# Patient Record
Sex: FEMALE | Race: BLACK OR AFRICAN AMERICAN | NOT HISPANIC OR LATINO | Employment: OTHER | URBAN - METROPOLITAN AREA
[De-identification: names, ages, dates, MRNs, and addresses within clinical notes are randomized per-mention and may not be internally consistent; named-entity substitution may affect disease eponyms.]

---

## 2017-02-26 DIAGNOSIS — I10 ESSENTIAL HYPERTENSION: ICD-10-CM

## 2017-02-27 RX ORDER — AMLODIPINE AND BENAZEPRIL HYDROCHLORIDE 5; 40 MG/1; MG/1
CAPSULE ORAL
Qty: 90 CAPSULE | Refills: 0 | Status: SHIPPED | OUTPATIENT
Start: 2017-02-27 | End: 2017-11-27 | Stop reason: SDUPTHER

## 2017-03-15 ENCOUNTER — CLINICAL SUPPORT (OUTPATIENT)
Dept: OTHER | Facility: CLINIC | Age: 67
End: 2017-03-15
Payer: COMMERCIAL

## 2017-03-15 VITALS
WEIGHT: 183 LBS | SYSTOLIC BLOOD PRESSURE: 130 MMHG | HEIGHT: 61 IN | DIASTOLIC BLOOD PRESSURE: 80 MMHG | BODY MASS INDEX: 34.55 KG/M2

## 2017-03-15 DIAGNOSIS — Z00.8 HEALTH EXAMINATION IN POPULATION SURVEYS: Primary | ICD-10-CM

## 2017-03-15 LAB
GLUCOSE SERPL-MCNC: NORMAL MG/DL (ref 60–140)
POC CHOLESTEROL, HDL: 62 MG/DL (ref 40–?)
POC CHOLESTEROL, LDL: 84 MG/DL (ref ?–160)
POC CHOLESTEROL, TOTAL: 174 MG/DL (ref ?–240)
POC GLUCOSE FASTING: 83 MG/DL (ref 60–110)
POC TOTAL CHOLESTEROL / HDL RATIO: 2.8 (ref ?–6)
POC TRIGLYCERIDES: 141 MG/DL (ref ?–160)

## 2017-03-15 PROCEDURE — 99401 PREV MED CNSL INDIV APPRX 15: CPT | Mod: S$GLB,,, | Performed by: INTERNAL MEDICINE

## 2017-03-15 PROCEDURE — 80061 LIPID PANEL: CPT | Mod: QW,S$GLB,, | Performed by: INTERNAL MEDICINE

## 2017-03-15 PROCEDURE — 82947 ASSAY GLUCOSE BLOOD QUANT: CPT | Mod: QW,S$GLB,, | Performed by: INTERNAL MEDICINE

## 2017-04-03 ENCOUNTER — OFFICE VISIT (OUTPATIENT)
Dept: OPTOMETRY | Facility: CLINIC | Age: 67
End: 2017-04-03
Payer: COMMERCIAL

## 2017-04-03 DIAGNOSIS — H26.9 CORTICAL CATARACT OF BOTH EYES: ICD-10-CM

## 2017-04-03 DIAGNOSIS — H52.201 HYPEROPIA WITH ASTIGMATISM, RIGHT: ICD-10-CM

## 2017-04-03 DIAGNOSIS — H52.01 HYPEROPIA WITH ASTIGMATISM, RIGHT: ICD-10-CM

## 2017-04-03 DIAGNOSIS — H52.4 PRESBYOPIA OU: ICD-10-CM

## 2017-04-03 DIAGNOSIS — H25.13 NUCLEAR SCLEROSIS OF BOTH EYES: Primary | ICD-10-CM

## 2017-04-03 DIAGNOSIS — Z13.5 SCREENING FOR OTHER EYE CONDITIONS: ICD-10-CM

## 2017-04-03 DIAGNOSIS — H52.02 HYPEROPIA, LEFT: ICD-10-CM

## 2017-04-03 PROCEDURE — 99999 PR PBB SHADOW E&M-EST. PATIENT-LVL II: CPT | Mod: PBBFAC,,, | Performed by: OPTOMETRIST

## 2017-04-03 PROCEDURE — 92004 COMPRE OPH EXAM NEW PT 1/>: CPT | Mod: S$GLB,,, | Performed by: OPTOMETRIST

## 2017-04-03 PROCEDURE — 92015 DETERMINE REFRACTIVE STATE: CPT | Mod: S$GLB,,, | Performed by: OPTOMETRIST

## 2017-04-03 NOTE — PROGRESS NOTES
"HPI     Hypertensive Eye Exam    Additional comments: general eye examination and refraction.   H/o   hypertension.  Takes meds for BP control.             Comments   Patient's age: 66 y.o. AA female  Occupation: Retired  Approximate date of last eye examination:  11/2015  Name of last eye doctor seen: Dr. Do   [place]  Wears glasses? Yes     If yes, wears  Full-time or part-time?  Full time  Present glasses are: Bifocal, SV Distance, SV Reading?  Progressives  Approximate age of present glasses:  2 years   Got new glasses following last exam, or subsequently?:  yes   Any problem with VA with glasses?  No  Wears CLs?:  No  Headaches?  No  Eye pain/discomfort?  No                                                                                     Flashes?  No  Floaters?  No  Diplopia/Double vision?  No  Patient's Ocular History:         Any eye surgeries? No         Any eye injury?  No         Any treatment for eye disease?  No  Family history of eye disease?  Mom Cataracts  Significant patient medical history:         1. Diabetes?  No       If yes, IDDM or NIDDM? N/A   2. HBP?  Yes               3. Other (describe):  No   ! OTC eyedrops currently using:  No   ! Prescription eye meds currently using:  No   ! Any history of allergy/adverse reaction to any eye meds used   previously?  No    ! Any history of allergy/adverse reaction to eyedrops used during prior   eye exam(s)? No    ! Any history of allergy/adverse reaction to Novacaine or similar meds?   No   ! Any history of allergy/adverse reaction to Epinephrine or similar meds?   No    ! Patient okay with use of anesthetic eyedrops to check eye pressure?    Yes        ! Patient okay with use of eyedrops to dilate pupils today?  Yes   !  Allergies/Medications/Medical History/Family History reviewed today?    Yes      PD =   66/62  Desired reading distance =  16.75"                                                                Last edited by " Paul Mesa, OD on 4/3/2017  9:21 AM. (History)            Assessment /Plan     For exam results, see Encounter Report.    1. Nuclear sclerosis of both eyes     2. Cortical cataract of both eyes     3. Hyperopia with astigmatism, right     4. Hyperopia, left     5. Presbyopia OU     6. Screening for other eye conditions                    Early nuclear sclerosis of lens both eyes, consistent with age.  Early cortical lens spoking of both eyes, and peripheral cortical cortical cataract in the right eye.  No need for cataract surgery at this time.  Otherwise, ocular health appears good in both eyes.  No hypertensive retinal changes.  Hyperopia with slight astigmatism in the right eye, and hyperopia in the left eye.  Presbyopia consistent with age.  New spectacle lens Rx issued for use as desired.  Recommend full-time wear.  Recheck in one year - or prior if any problem or decreased in VA noted in either eye in the interim.

## 2017-04-03 NOTE — MR AVS SNAPSHOT
[facility]  [address]  [facility] [address]  Phone: [phone]  Fax: [phone]                  Rajesh Mas   4/3/2017 8:45 AM   Office Visit    Description:  Female : 1950   Provider:  Paul Mesa OD   Department:  [facility]           Reason for Visit     Hypertensive Eye Exam                To Do List           Future Appointments        Provider Department Dept Phone    2017 9:20 AM Gail Villarreal MD [facility] - Internal Medicine [phone]      Goals (5 Years of Data)     None      [facility]     [facility] On [facility] Nurse Care Line -  Assistance  Unless otherwise directed by your provider, please contact [facility], our nurse care line that is available for  assistance.     Registered nurses in the [facility] On Call Center provide: appointment scheduling, clinical advisement, health education, and other advisory services.  Call: [phone] (toll free)               Medications           Message regarding Medications     Verify the changes and/or additions to your medication regime listed below are the same as discussed with your clinician today.  If any of these changes or additions are incorrect, please notify your healthcare provider.        STOP taking these medications     predniSONE (DELTASONE) 10 MG tablet Take 3 pills a day for 2 days then 2 pills a day for 2 days then 1 pill a day for 2 days.           Verify that the below list of medications is an accurate representation of the medications you are currently taking.  If none reported, the list may be blank. If incorrect, please contact your healthcare provider. Carry this list with you in case of emergency.           Current Medications     amlodipine-benazepril (LOTREL) 5-40 mg per capsule TAKE ONE CAPSULE BY MOUTH ONCE A DAY    triamterene-hydrochlorothiazide 37.5-25 mg (DYAZIDE) 37.5-25 mg per capsule Take 1 capsule by mouth every morning.    ketoconazole  (NIZORAL) 2 % cream AAA bid x 4 wks           Clinical Reference Information           Your Vitals Were     Last Period                   (LMP Unknown)           Allergies as of 4/3/2017     Adhesive      Immunizations Administered on Date of Encounter - 4/3/2017     None      Instructions    Early nuclear sclerosis of lens both eyes, consistent with age.  Early cortical lens spoking of both eyes, and peripheral cortical cortical cataract in the right eye.  No need for cataract surgery at this time.  Otherwise, ocular health appears good in both eyes.  No hypertensive retinal changes.  Hyperopia with slight astigmatism in the right eye, and hyperopia in the left eye.  Presbyopia consistent with age.  New spectacle lens Rx issued for use as desired.  Recommend full-time wear.  Recheck in one year - or prior if any problem or decreased in VA noted in either eye in the interim.        Language Assistance Services     ATTENTION: Language assistance services are available, free of charge. Please call [phone].      ATENCIÓN: Si habla jo ann, tiene a nogueira disposición servicios gratuitos de asistencia lingüística. Llame al [phone].     JEFF Ý: N?u b?n nói Ti?ng Vi?t, có các d?ch v? h? tr? ngôn ng? mi?n phí dành cho b?n. G?i s? [phone].         [facility] complies with applicable Federal civil rights laws and does not discriminate on the basis of race, color, national origin, age, disability, or sex.

## 2017-04-03 NOTE — PATIENT INSTRUCTIONS
Early nuclear sclerosis of lens both eyes, consistent with age.  Early cortical lens spoking of both eyes, and peripheral cortical cortical cataract in the right eye.  No need for cataract surgery at this time.  Otherwise, ocular health appears good in both eyes.  No hypertensive retinal changes.  Hyperopia with slight astigmatism in the right eye, and hyperopia in the left eye.  Presbyopia consistent with age.  New spectacle lens Rx issued for use as desired.  Recommend full-time wear.  Recheck in one year - or prior if any problem or decreased in VA noted in either eye in the interim.

## 2017-04-05 ENCOUNTER — LAB VISIT (OUTPATIENT)
Dept: LAB | Facility: HOSPITAL | Age: 67
End: 2017-04-05
Attending: INTERNAL MEDICINE
Payer: COMMERCIAL

## 2017-04-05 ENCOUNTER — OFFICE VISIT (OUTPATIENT)
Dept: INTERNAL MEDICINE | Facility: CLINIC | Age: 67
End: 2017-04-05
Payer: COMMERCIAL

## 2017-04-05 VITALS
HEIGHT: 61 IN | WEIGHT: 181.69 LBS | BODY MASS INDEX: 34.3 KG/M2 | HEART RATE: 76 BPM | SYSTOLIC BLOOD PRESSURE: 130 MMHG | DIASTOLIC BLOOD PRESSURE: 76 MMHG

## 2017-04-05 DIAGNOSIS — R10.11 RUQ ABDOMINAL PAIN: Primary | ICD-10-CM

## 2017-04-05 DIAGNOSIS — R10.11 RUQ ABDOMINAL PAIN: ICD-10-CM

## 2017-04-05 LAB
ALBUMIN SERPL BCP-MCNC: 3.9 G/DL
ALP SERPL-CCNC: 76 U/L
ALT SERPL W/O P-5'-P-CCNC: 19 U/L
ANION GAP SERPL CALC-SCNC: 8 MMOL/L
AST SERPL-CCNC: 23 U/L
BASOPHILS # BLD AUTO: 0.03 K/UL
BASOPHILS NFR BLD: 0.3 %
BILIRUB SERPL-MCNC: 0.5 MG/DL
BUN SERPL-MCNC: 28 MG/DL
CALCIUM SERPL-MCNC: 10.2 MG/DL
CHLORIDE SERPL-SCNC: 105 MMOL/L
CO2 SERPL-SCNC: 29 MMOL/L
CREAT SERPL-MCNC: 1.5 MG/DL
DIFFERENTIAL METHOD: ABNORMAL
EOSINOPHIL # BLD AUTO: 0.2 K/UL
EOSINOPHIL NFR BLD: 1.4 %
ERYTHROCYTE [DISTWIDTH] IN BLOOD BY AUTOMATED COUNT: 14.6 %
EST. GFR  (AFRICAN AMERICAN): 42.1 ML/MIN/1.73 M^2
EST. GFR  (NON AFRICAN AMERICAN): 36.6 ML/MIN/1.73 M^2
GLUCOSE SERPL-MCNC: 105 MG/DL
HCT VFR BLD AUTO: 41.5 %
HGB BLD-MCNC: 13.7 G/DL
LIPASE SERPL-CCNC: 63 U/L
LYMPHOCYTES # BLD AUTO: 3.3 K/UL
LYMPHOCYTES NFR BLD: 31.9 %
MCH RBC QN AUTO: 28.7 PG
MCHC RBC AUTO-ENTMCNC: 33 %
MCV RBC AUTO: 87 FL
MONOCYTES # BLD AUTO: 0.7 K/UL
MONOCYTES NFR BLD: 7 %
NEUTROPHILS # BLD AUTO: 6.2 K/UL
NEUTROPHILS NFR BLD: 59.4 %
PLATELET # BLD AUTO: 158 K/UL
PMV BLD AUTO: ABNORMAL FL
POTASSIUM SERPL-SCNC: 4.1 MMOL/L
PROT SERPL-MCNC: 8.5 G/DL
RBC # BLD AUTO: 4.77 M/UL
SODIUM SERPL-SCNC: 142 MMOL/L
WBC # BLD AUTO: 10.41 K/UL

## 2017-04-05 PROCEDURE — 36415 COLL VENOUS BLD VENIPUNCTURE: CPT

## 2017-04-05 PROCEDURE — 99999 PR PBB SHADOW E&M-EST. PATIENT-LVL III: CPT | Mod: PBBFAC,,, | Performed by: INTERNAL MEDICINE

## 2017-04-05 PROCEDURE — 99214 OFFICE O/P EST MOD 30 MIN: CPT | Mod: S$GLB,,, | Performed by: INTERNAL MEDICINE

## 2017-04-05 PROCEDURE — 3078F DIAST BP <80 MM HG: CPT | Mod: S$GLB,,, | Performed by: INTERNAL MEDICINE

## 2017-04-05 PROCEDURE — 83690 ASSAY OF LIPASE: CPT

## 2017-04-05 PROCEDURE — 85025 COMPLETE CBC W/AUTO DIFF WBC: CPT

## 2017-04-05 PROCEDURE — 80053 COMPREHEN METABOLIC PANEL: CPT

## 2017-04-05 PROCEDURE — 3075F SYST BP GE 130 - 139MM HG: CPT | Mod: S$GLB,,, | Performed by: INTERNAL MEDICINE

## 2017-04-05 PROCEDURE — 1160F RVW MEDS BY RX/DR IN RCRD: CPT | Mod: S$GLB,,, | Performed by: INTERNAL MEDICINE

## 2017-04-05 NOTE — PROGRESS NOTES
"Subjective:       Patient ID: Rajesh Mas is a 66 y.o. female.    Chief Complaint: Generalized Body Aches (right side)    HPI   65 yo F with HTN, gout presents for intermittent right sided pain.   ruq under rib cage more so on side, sometimes travels to back.   Reports high tolerance for pain and has been ignoring. Probably 2-3 months. Comes and goes.   Not related to eating. No heavy lifting.  Feels like a pinching feeling. Occasional nausea. No vomiting.   No fevers.     Previously labs with gfr 46.     Review of Systems   Constitutional: Negative for fever.   Respiratory: Negative for shortness of breath.    Cardiovascular: Negative for chest pain.   Gastrointestinal: Positive for abdominal pain. Negative for constipation, diarrhea, nausea and vomiting.   Genitourinary: Positive for frequency. Negative for dysuria, pelvic pain and urgency.        Chronic frequency   Musculoskeletal: Negative.    Skin: Negative.        Objective:   /76  Pulse 76  Ht 5' 1" (1.549 m)  Wt 82.4 kg (181 lb 10.5 oz)  LMP  (LMP Unknown)  BMI 34.32 kg/m2     Physical Exam   Constitutional: She is oriented to person, place, and time. She appears well-developed and well-nourished. No distress.   HENT:   Head: Normocephalic and atraumatic.   Cardiovascular: Normal rate and regular rhythm.    Pulmonary/Chest: Effort normal. No respiratory distress. She has no wheezes. She has no rales.   Abdominal: Soft. She exhibits no distension.   ttp over ruq without rebound nor guarding  No ttp over flank  No rash   Neurological: She is alert and oriented to person, place, and time.   Skin: Skin is warm and dry. She is not diaphoretic.   Psychiatric: She has a normal mood and affect. Her behavior is normal.       Assessment:       1. RUQ abdominal pain        Plan:       Rajesh was seen today for generalized body aches.    Diagnoses and all orders for this visit:    RUQ abdominal pain  -     Comprehensive metabolic panel; Future  -     " Lipase; Future  -     US Abdomen Complete; Future  -     CBC auto differential; Future      If workup unrevealing consider MSK pain  Will have her try icyhot or bengay OTC in meantime  Avoids nsaids due to ckd

## 2017-04-05 NOTE — MR AVS SNAPSHOT
[facility] Medicine  [address]  [facility] [address]  Phone: [phone]  Fax: [phone]                  Rajesh Mas   2017 2:20 PM   Office Visit    Description:  Female : 1950   Provider:  Gail Villarreal MD   Department:  [facility] Medicine           Reason for Visit     Generalized Body Aches           Diagnoses this Visit        Comments    RUQ abdominal pain    -  Primary            To Do List           Future Appointments        Provider Department Dept Phone    2017 9:20 AM Gail Villarreal MD [facility] Internal Medicine [phone]      Goals (5 Years of Data)     None      Follow-Up and Disposition     Return for scheduled.    Follow-up and Disposition History      [facility]     [facility] On Call Nurse Care Line -  Assistance  Unless otherwise directed by your provider, please contact [facility], our nurse care line that is available for  assistance.     Registered nurses in the [facility] On Call Center provide: appointment scheduling, clinical advisement, health education, and other advisory services.  Call: [phone] (toll free)               Medications           Message regarding Medications     Verify the changes and/or additions to your medication regime listed below are the same as discussed with your clinician today.  If any of these changes or additions are incorrect, please notify your healthcare provider.             Verify that the below list of medications is an accurate representation of the medications you are currently taking.  If none reported, the list may be blank. If incorrect, please contact your healthcare provider. Carry this list with you in case of emergency.           Current Medications     amlodipine-benazepril (LOTREL) 5-40 mg per capsule TAKE ONE CAPSULE BY MOUTH ONCE A DAY    ketoconazole (NIZORAL) 2 % cream AAA bid x 4 wks    triamterene-hydrochlorothiazide 37.5-25 mg (DYAZIDE)  "37.5-25 mg per capsule Take 1 capsule by mouth every morning.           Clinical Reference Information           Your Vitals Were     BP Pulse Height Weight Last Period BMI    130/76 76 5' 1" (1.549 m) 82.4 kg (181 lb 10.5 oz) (LMP Unknown) 34.32 kg/m2      Blood Pressure          Most Recent Value    BP  130/76      Allergies as of 4/5/2017     Adhesive      Immunizations Administered on Date of Encounter - 4/5/2017     None      Orders Placed During Today's Visit     Future Labs/Procedures Expected by Expires    CBC auto differential  4/5/2017 6/4/2018    Comprehensive metabolic panel  4/5/2017 7/4/2017    Lipase  4/5/2017 7/4/2017    US Abdomen Complete  4/5/2017 7/4/2017      Language Assistance Services     ATTENTION: Language assistance services are available, free of charge. Please call [phone].      ATENCIÓN: Si habla jo ann, tiene a nogueira disposición servicios gratuitos de asistencia lingüística. Llame al [phone].     JEFF Ý: N?u b?n nói Ti?ng Vi?t, có các d?ch v? h? tr? ngôn ng? mi?n phí dành cho b?n. G?i s? [phone].         [facility] complies with applicable Federal civil rights laws and does not discriminate on the basis of race, color, national origin, age, disability, or sex.        "

## 2017-04-12 ENCOUNTER — PATIENT MESSAGE (OUTPATIENT)
Dept: INTERNAL MEDICINE | Facility: CLINIC | Age: 67
End: 2017-04-12

## 2017-04-13 ENCOUNTER — PATIENT MESSAGE (OUTPATIENT)
Dept: INTERNAL MEDICINE | Facility: CLINIC | Age: 67
End: 2017-04-13

## 2017-04-13 ENCOUNTER — HOSPITAL ENCOUNTER (OUTPATIENT)
Dept: RADIOLOGY | Facility: HOSPITAL | Age: 67
Discharge: HOME | End: 2017-04-13
Attending: INTERNAL MEDICINE
Payer: COMMERCIAL

## 2017-04-13 DIAGNOSIS — K81.9 CHOLECYSTITIS: ICD-10-CM

## 2017-04-13 DIAGNOSIS — K80.50 BILIARY COLIC: ICD-10-CM

## 2017-04-13 DIAGNOSIS — R10.11 RUQ ABDOMINAL PAIN: ICD-10-CM

## 2017-04-13 DIAGNOSIS — K80.20 CALCULUS OF GALLBLADDER WITHOUT CHOLECYSTITIS WITHOUT OBSTRUCTION: ICD-10-CM

## 2017-04-13 DIAGNOSIS — D13.5 ADENOMYOMA OF GALLBLADDER: Primary | ICD-10-CM

## 2017-04-13 PROCEDURE — 76700 US EXAM ABDOM COMPLETE: CPT | Mod: 26,,, | Performed by: RADIOLOGY

## 2017-04-13 PROCEDURE — 76700 US EXAM ABDOM COMPLETE: CPT | Mod: TC

## 2017-04-21 ENCOUNTER — HOSPITAL ENCOUNTER (OUTPATIENT)
Dept: CARDIOLOGY | Facility: CLINIC | Age: 67
Discharge: HOME | End: 2017-04-21
Payer: COMMERCIAL

## 2017-04-21 ENCOUNTER — OFFICE VISIT (OUTPATIENT)
Dept: SURGERY | Facility: CLINIC | Age: 67
End: 2017-04-21
Payer: COMMERCIAL

## 2017-04-21 ENCOUNTER — PATIENT MESSAGE (OUTPATIENT)
Dept: INTERNAL MEDICINE | Facility: CLINIC | Age: 67
End: 2017-04-21

## 2017-04-21 VITALS
DIASTOLIC BLOOD PRESSURE: 69 MMHG | TEMPERATURE: 98 F | HEART RATE: 80 BPM | WEIGHT: 180 LBS | HEIGHT: 61 IN | SYSTOLIC BLOOD PRESSURE: 116 MMHG | BODY MASS INDEX: 33.99 KG/M2

## 2017-04-21 DIAGNOSIS — K80.20 GALLSTONES: Primary | ICD-10-CM

## 2017-04-21 DIAGNOSIS — K80.20 GALLSTONES: ICD-10-CM

## 2017-04-21 PROCEDURE — 99243 OFF/OP CNSLTJ NEW/EST LOW 30: CPT | Mod: S$GLB,,, | Performed by: SURGERY

## 2017-04-21 PROCEDURE — 99999 PR PBB SHADOW E&M-EST. PATIENT-LVL IV: CPT | Mod: PBBFAC,,, | Performed by: SURGERY

## 2017-04-21 PROCEDURE — 93000 ELECTROCARDIOGRAM COMPLETE: CPT | Mod: S$GLB,,, | Performed by: INTERNAL MEDICINE

## 2017-04-21 RX ORDER — SODIUM CHLORIDE 9 MG/ML
INJECTION, SOLUTION INTRAVENOUS CONTINUOUS
Status: CANCELLED | OUTPATIENT
Start: 2017-04-21

## 2017-04-21 NOTE — LETTER
April 21, 2017      Gail Villarreal MD  [address]  [place] [address]           [facility] - General Surgery  [address]  [facility] [address]  Phone: [phone]          Patient: Rajesh Mas   MR Number: [id number]   YOB: 1950   Date of Visit: 4/21/2017       Dear Dr. Gail Villarreal:    Thank you for referring Rajesh Mas to me for evaluation. Attached you will find relevant portions of my assessment and plan of care.    If you have questions, please do not hesitate to call me. I look forward to following Rajesh Mas along with you.    Sincerely,    Joshua Goldberg, MD    Enclosure  CC:  No Recipients    If you would like to receive this communication electronically, please contact [email or web address] or [phone] to request more information on [employer] access.    For providers and/or their staff who would like to refer a patient to [facility], please contact us through our one-stop-shop provider referral line, [facility], at [phone].    If you feel you have received this communication in error or would no longer like to receive these types of communications, please e-mail [email or web address]

## 2017-04-21 NOTE — MR AVS SNAPSHOT
[facility] - General Surgery  [address]  [facility] [address]  Phone: [phone]                  Rajesh Mas   2017 11:00 AM   Office Visit    Description:  Female : 1950   Provider:  Joshua Goldberg, MD   Department:  [facility]           Diagnoses this Visit        Comments    Gallstones    -  Primary            To Do List           Future Appointments        Provider Department Dept Phone    2017 2:15 PM EKG, APPT [facility] - [phone]    2017 9:20 AM Gail Villarreal MD [facility] - Internal Medicine [phone]      Goals (5 Years of Data)     None      [facility] On Call     [facility] On Call Nurse Care Line -  Assistance  Unless otherwise directed by your provider, please contact [facility] On-Call, our nurse care line that is available for  assistance.     Registered nurses in the [facility] On Call Center provide: appointment scheduling, clinical advisement, health education, and other advisory services.  Call: [phone] (toll free)               Medications           Message regarding Medications     Verify the changes and/or additions to your medication regime listed below are the same as discussed with your clinician today.  If any of these changes or additions are incorrect, please notify your healthcare provider.             Verify that the below list of medications is an accurate representation of the medications you are currently taking.  If none reported, the list may be blank. If incorrect, please contact your healthcare provider. Carry this list with you in case of emergency.           Current Medications     amlodipine-benazepril (LOTREL) 5-40 mg per capsule TAKE ONE CAPSULE BY MOUTH ONCE A DAY    ketoconazole (NIZORAL) 2 % cream AAA bid x 4 wks    triamterene-hydrochlorothiazide 37.5-25 mg (DYAZIDE) 37.5-25 mg per capsule Take 1 capsule by mouth every morning.           Clinical Reference Information           Your Vitals  "Were     BP Pulse Temp Height Weight Last Period    116/69 80 98.1 °F (36.7 °C) (Oral) 5' 1" (1.549 m) 81.6 kg (180 lb) (LMP Unknown)    BMI                34.01 kg/m2          Blood Pressure          Most Recent Value    BP  116/69      Allergies as of 4/21/2017     Adhesive      Immunizations Administered on Date of Encounter - 4/21/2017     None      Orders Placed During Today's Visit     Future Labs/Procedures Expected by Expires    SCHEDULED EKG 12-LEAD (to Muse)  As directed 4/21/2018      Instructions      Gallstones with Biliary Colic    You have abdominal pain due to irritation and spasm of the gallbladder. This is called biliary colic. The gallbladder is a small sac under the liver, which stores and releases a fluid that aids in the digestion of fat. A collection of crystals may form stones inside the gallbladder (gallstones). Gallstones can cause the gallbladder to spasm. If they block the duct out of the gallbladder, they can cause pain and even an infection.   A number of factors increase the risk for having gallstones:  · Being female  · Being severely overweight (obese)  · Older age  · Losing or gaining weight quickly  · Eating a high-calorie diet  · Being pregnant  · Taking hormone therapy  · Having diabetes  Home care  · Rest in bed.  · Drink only clear liquids until you feel better.  · You may have been prescribed medicine for pain or nausea. Take these as directed.  · Fat in your diet makes the gallbladder contract and may cause increased pain. Avoid foods that are high in fat (such as full-fat dairy, fried foods, and fatty meats) for at least two days.  · If you are overweight, talk to your healthcare provider about losing weight.  Follow-up care  Follow up with your healthcare provider or as advised. There is a chance that you will have another episode of pain from your gallstones at some point. Removal of the gallbladder is an option to prevent this. Talk with your healthcare provider about " your treatment options.  When to seek medical advice  Call your healthcare provider if any of the following occur:  · Worsening pain or pain lasting for longer than 6 hours  · Pain moving to the right lower abdomen  · Repeated vomiting  · Swollen abdomen  · Fever of 100.4ºF (38ºC) or higher, or as directed by your healthcare provider  · Very dark urine, light colored stools, or yellow color of the skin or eyes  · Chest, arm, back, neck or jaw pain  Date Last Reviewed: 6/18/2015 © 2000-2016 [employer]. [address] [place] [address]. All rights reserved. This information is not intended as a substitute for professional medical care. Always follow your healthcare professional's instructions.        Cholecystectomy     Clips close off the duct connecting the gallbladder to the bile duct. The gallbladder is then removed.     Youve had painful attacks caused by gallstones. To treat the problem, your healthcare provider wants to remove your gallbladder. This surgery is called cholecystectomy. Removing the gallbladder can relieve pain. It will also prevent future attacks. You can live a healthy life without your gallbladder. You may also be able to go back to eating foods you enjoyed before your gallbladder problems started.  Before your surgery  Be prepared:  · Tell your provider what medicines you take. Include those bought over the counter. Also include herbs or supplements. Be sure to mention if you take prescription blood thinners. This includes warfarin, clopidogrel, and aspirin.  · Have any tests your provider asks for, such as blood tests.  · Dont eat or drink after midnight, the night before your surgery. This includes water, coffee, and mints. However, you may need to take some medicine with sips of water--talk with your healthcare provider.  The day of surgery  When you arrive, you will prepare for surgery:  · An IV line will be put into a vein in your arm or hand. This gives you  fluids and medicine.  · An anesthesiologist will talk with you about anesthesia. This is medicine used to prevent pain. You will receive general anesthesia. This puts you into a state like deep sleep through the procedure.  During surgery  There are 2 methods for removing the gallbladder. Your healthcare provider will choose which method is best for you:  · Laparoscopic cholecystectomy. This is most common. During surgery, 2 to 4 small incisions are made. A thin tube with a camera is used. This is called a laparoscope. The scope is put through one of the incisions. It sends images to a video screen. Surgical tools are put through other incisions. The gallbladder is removed using the scope and these tools.  · Open cholecystectomy. One larger incision is made. The surgeon sees and works through this incision. Open surgery is most often used when scarring or other factors make it a better choice for you.  In some cases, safety requires a change from laparoscopic to open surgery during the procedure.  After surgery  You will be sent to a room to wake up from the anesthesia. You will likely go home the same day. In some cases, an overnight stay is needed. If you had open cholecystectomy, you may need to stay in the hospital for a few days. When you are released to go home, have a family member or friend ready to drive you.  Risks and possible complications of gallbladder surgery  All surgeries have risks. The risks of gallbladder surgery include:  · Bleeding  · Infection  · Injury to the common bile duct or nearby organs  · Blood clots in the legs  · Bile leaks  · Hernia at incision site  · Pnemonia   Date Last Reviewed: 7/1/2016  © 3226-2396 [employer], [employer]. [address] [place] [address], [place] [address]. All rights reserved. This information is not intended as a substitute for professional medical care. Always follow your healthcare professional's instructions.        Having Laparoscopic Cholecystectomy  Small  incisions are made in your belly (abdomen). The scope is put through one of the incisions. Surgical tools are put through other incisions.  Small clips are used to close off the connection between the gallbladder and the bile duct. The gallbladder is then detached from the liver.  The gallbladder is removed through one of the incisions. Bile still flows from the liver to the small intestine.  When the surgery is done, all tools are removed. Incisions are closed with stitches (sutures) or staples. Sometimes, a laparoscopic surgery may need to be changed to an open surgery. This method uses one large incision. This change may happen because of scar tissue, unusual anatomy, or for some other reason.     Possible incision sites.   Youve had painful attacks caused by gallstones. Because of this, you are having surgery to remove your gallbladder. This is called cholecystectomy. A method called laparoscopy will be used. This allows surgery to be done through a few small cuts (incisions).  Before your surgery  · Tell your provider what medicines you take. This includes prescription medicines, over-the-counter medicines, street drugs, herbs, vitamins, and other supplements. Be sure to mention if you take prescription blood thinners. This includes warfarin, clopidogrel, ibuprofen, and aspirin.  · If you drink alcohol, tell your provider how much you drink. This is very important if you are a heavy drinker or have had alcohol withdrawal symptoms in the past. Alcohol withdrawal can be dangerous. But the symptoms can be safely managed if your healthcare provider knows your alcohol history.  · Have any tests your provider asks for, such as blood tests.  · Dont eat or drink after midnight the night before your surgery. This includes water, coffee, and mints.  The day of surgery  · Your provider may have you take your normal medicine with a sip of water. Check with your provider.   When you arrive, you will prepare for  surgery:  · An IV (intravenous) line will be put into a vein in your arm or hand. This gives you fluids and medicine.  · An anesthesiologist will talk with you about anesthesia. This is medicine used to prevent pain. You will receive general anesthesia. This puts you into a deep sleep-like state through the procedure.  During laparoscopic surgery  For this surgery, a thin tube with a tiny camera is used. This is called a laparoscope. The scope sends images from inside your body to a video screen. This lets the surgeon view and work on your gallbladder:  · Small incisions are made in your belly (abdomen). The scope is put through one of the incisions. Surgical tools are put through other incisions.  · Small clips are used to close off the connection between the gallbladder and the bile duct. The gallbladder is then detached from the liver.  · The gallbladder is removed through one of the incisions. Bile still flows from the liver to the small intestine.  · When the surgery is done, all tools are removed. Incisions are closed with stitches (sutures) or staples. Sometimes, a laparoscopic surgery may need to be changed to an open surgery. This method uses one large incision. This change may happen because of scar tissue, unusual anatomy, or for some other reason.  After surgery  You will be sent to a room to wake up from the anesthesia. You will likely go home the same day. In some cases, an overnight stay is needed. When you are released to go home, have a family member or friend ready to drive you.  Risks and possible complications of gallbladder surgery  All surgeries have risks. The risks of gallbladder surgery include:  · Bleeding  · Infection  · Injury to the common bile duct or nearby organs  · Blood clots in the legs  · Bile leaks  · Hernia at incision site  · Pneumonia   Date Last Reviewed: 7/1/2016  © 0470-3949 [employer]. [address] [place] [address], [place] [address]. All rights reserved. This  information is not intended as a substitute for professional medical care. Always follow your healthcare professional's instructions.        After Gallbladder Surgery  You can usually go home the same day as your surgery. In some cases, you may need to stay overnight. After open laparoscopic surgery, you will usually need to stay in the hospital for 2 to 5 days. Once youre at home, be sure to follow all your healthcare providers instructions.  In the hospital  Bandages will cover your incisions and you may have special boots on your legs to prevent blood clots. To aid recovery, youll be asked to get up and move as soon as possible. You may also be asked to use a device that helps promote deep breathing to keep your lungs clear.  At home  You can get back to your normal routine as soon as you feel able. To speed healing:  · Take any prescribed pain medicines as directed.  · Follow your healthcare providers instructions about bathing and caring for your incisions.  · Walk and move around as often as possible, but follow your healthcare provider's instructions on how much weight you can lift.   · Ask your healthcare provider about driving and going back to work. This is often about 5 to 10 days after surgery.  Eating normally again  Removing the gallbladder doesnt mean you have to be on a special diet. But you may want to start with light meals. It can also take a few weeks for your digestion to adjust. You may have indigestion, loose stools, or diarrhea. This is common and should go away in time.  Following up  Keep follow-up appointments during your recovery. These allow your healthcare provider to check your progress and answer any questions. Be sure to mention if you have any new symptoms. Also mention if you have diarrhea that doesnt go away.     Call your healthcare provider  Contact your healthcare provider if you have any of the following:  · Fever of 100.4º F (38.0ºC) or higher, or as directed by your  healthcare provider  · Chills  · Increasing pain, redness, or drainage at an incision site  · Vomiting or nausea that lasts more than 12 hours  · Prolonged diarrhea  · Belly pain  · Leg swelling or trouble breathing  · Difficulty urinating  · Bleeding from the rectum   Date Last Reviewed: 7/1/2016  © 7174-4389 [employer]. [address] [place] [address]. All rights reserved. This information is not intended as a substitute for professional medical care. Always follow your healthcare professional's instructions.             Language Assistance Services     ATTENTION: Language assistance services are available, free of charge. Please call [phone].      ATENCIÓN: Si habla español, tiene a nogueira disposición servicios gratuitos de asistencia lingüística. Llame al [phone].     JEFF Ý: N?u b?n nói Ti?ng Vi?t, có các d?ch v? h? tr? ngôn ng? mi?n phí dành cho b?n. G?i s? [phone].         [facility] complies with applicable Federal civil rights laws and does not discriminate on the basis of race, color, national origin, age, disability, or sex.

## 2017-04-21 NOTE — PATIENT INSTRUCTIONS
Gallstones with Biliary Colic    You have abdominal pain due to irritation and spasm of the gallbladder. This is called biliary colic. The gallbladder is a small sac under the liver, which stores and releases a fluid that aids in the digestion of fat. A collection of crystals may form stones inside the gallbladder (gallstones). Gallstones can cause the gallbladder to spasm. If they block the duct out of the gallbladder, they can cause pain and even an infection.   A number of factors increase the risk for having gallstones:  · Being female  · Being severely overweight (obese)  · Older age  · Losing or gaining weight quickly  · Eating a high-calorie diet  · Being pregnant  · Taking hormone therapy  · Having diabetes  Home care  · Rest in bed.  · Drink only clear liquids until you feel better.  · You may have been prescribed medicine for pain or nausea. Take these as directed.  · Fat in your diet makes the gallbladder contract and may cause increased pain. Avoid foods that are high in fat (such as full-fat dairy, fried foods, and fatty meats) for at least two days.  · If you are overweight, talk to your healthcare provider about losing weight.  Follow-up care  Follow up with your healthcare provider or as advised. There is a chance that you will have another episode of pain from your gallstones at some point. Removal of the gallbladder is an option to prevent this. Talk with your healthcare provider about your treatment options.  When to seek medical advice  Call your healthcare provider if any of the following occur:  · Worsening pain or pain lasting for longer than 6 hours  · Pain moving to the right lower abdomen  · Repeated vomiting  · Swollen abdomen  · Fever of 100.4ºF (38ºC) or higher, or as directed by your healthcare provider  · Very dark urine, light colored stools, or yellow color of the skin or eyes  · Chest, arm, back, neck or jaw pain  Date Last Reviewed: 6/18/2015  © 7528-4875 [employer],  [employer]. [address] [place] [address]. All rights reserved. This information is not intended as a substitute for professional medical care. Always follow your healthcare professional's instructions.        Cholecystectomy     Clips close off the duct connecting the gallbladder to the bile duct. The gallbladder is then removed.     Youve had painful attacks caused by gallstones. To treat the problem, your healthcare provider wants to remove your gallbladder. This surgery is called cholecystectomy. Removing the gallbladder can relieve pain. It will also prevent future attacks. You can live a healthy life without your gallbladder. You may also be able to go back to eating foods you enjoyed before your gallbladder problems started.  Before your surgery  Be prepared:  · Tell your provider what medicines you take. Include those bought over the counter. Also include herbs or supplements. Be sure to mention if you take prescription blood thinners. This includes warfarin, clopidogrel, and aspirin.  · Have any tests your provider asks for, such as blood tests.  · Dont eat or drink after midnight, the night before your surgery. This includes water, coffee, and mints. However, you may need to take some medicine with sips of water--talk with your healthcare provider.  The day of surgery  When you arrive, you will prepare for surgery:  · An IV line will be put into a vein in your arm or hand. This gives you fluids and medicine.  · An anesthesiologist will talk with you about anesthesia. This is medicine used to prevent pain. You will receive general anesthesia. This puts you into a state like deep sleep through the procedure.  During surgery  There are 2 methods for removing the gallbladder. Your healthcare provider will choose which method is best for you:  · Laparoscopic cholecystectomy. This is most common. During surgery, 2 to 4 small incisions are made. A thin tube with a camera is used. This is called a  laparoscope. The scope is put through one of the incisions. It sends images to a video screen. Surgical tools are put through other incisions. The gallbladder is removed using the scope and these tools.  · Open cholecystectomy. One larger incision is made. The surgeon sees and works through this incision. Open surgery is most often used when scarring or other factors make it a better choice for you.  In some cases, safety requires a change from laparoscopic to open surgery during the procedure.  After surgery  You will be sent to a room to wake up from the anesthesia. You will likely go home the same day. In some cases, an overnight stay is needed. If you had open cholecystectomy, you may need to stay in the hospital for a few days. When you are released to go home, have a family member or friend ready to drive you.  Risks and possible complications of gallbladder surgery  All surgeries have risks. The risks of gallbladder surgery include:  · Bleeding  · Infection  · Injury to the common bile duct or nearby organs  · Blood clots in the legs  · Bile leaks  · Hernia at incision site  · Pnemonia   Date Last Reviewed: 7/1/2016 © 2000-2016 [employer]. [address] [place] [address]. All rights reserved. This information is not intended as a substitute for professional medical care. Always follow your healthcare professional's instructions.        Having Laparoscopic Cholecystectomy  Small incisions are made in your belly (abdomen). The scope is put through one of the incisions. Surgical tools are put through other incisions.  Small clips are used to close off the connection between the gallbladder and the bile duct. The gallbladder is then detached from the liver.  The gallbladder is removed through one of the incisions. Bile still flows from the liver to the small intestine.  When the surgery is done, all tools are removed. Incisions are closed with stitches (sutures) or staples. Sometimes, a  laparoscopic surgery may need to be changed to an open surgery. This method uses one large incision. This change may happen because of scar tissue, unusual anatomy, or for some other reason.     Possible incision sites.   Youve had painful attacks caused by gallstones. Because of this, you are having surgery to remove your gallbladder. This is called cholecystectomy. A method called laparoscopy will be used. This allows surgery to be done through a few small cuts (incisions).  Before your surgery  · Tell your provider what medicines you take. This includes prescription medicines, over-the-counter medicines, street drugs, herbs, vitamins, and other supplements. Be sure to mention if you take prescription blood thinners. This includes warfarin, clopidogrel, ibuprofen, and aspirin.  · If you drink alcohol, tell your provider how much you drink. This is very important if you are a heavy drinker or have had alcohol withdrawal symptoms in the past. Alcohol withdrawal can be dangerous. But the symptoms can be safely managed if your healthcare provider knows your alcohol history.  · Have any tests your provider asks for, such as blood tests.  · Dont eat or drink after midnight the night before your surgery. This includes water, coffee, and mints.  The day of surgery  · Your provider may have you take your normal medicine with a sip of water. Check with your provider.   When you arrive, you will prepare for surgery:  · An IV (intravenous) line will be put into a vein in your arm or hand. This gives you fluids and medicine.  · An anesthesiologist will talk with you about anesthesia. This is medicine used to prevent pain. You will receive general anesthesia. This puts you into a deep sleep-like state through the procedure.  During laparoscopic surgery  For this surgery, a thin tube with a tiny camera is used. This is called a laparoscope. The scope sends images from inside your body to a video screen. This lets the surgeon  view and work on your gallbladder:  · Small incisions are made in your belly (abdomen). The scope is put through one of the incisions. Surgical tools are put through other incisions.  · Small clips are used to close off the connection between the gallbladder and the bile duct. The gallbladder is then detached from the liver.  · The gallbladder is removed through one of the incisions. Bile still flows from the liver to the small intestine.  · When the surgery is done, all tools are removed. Incisions are closed with stitches (sutures) or staples. Sometimes, a laparoscopic surgery may need to be changed to an open surgery. This method uses one large incision. This change may happen because of scar tissue, unusual anatomy, or for some other reason.  After surgery  You will be sent to a room to wake up from the anesthesia. You will likely go home the same day. In some cases, an overnight stay is needed. When you are released to go home, have a family member or friend ready to drive you.  Risks and possible complications of gallbladder surgery  All surgeries have risks. The risks of gallbladder surgery include:  · Bleeding  · Infection  · Injury to the common bile duct or nearby organs  · Blood clots in the legs  · Bile leaks  · Hernia at incision site  · Pneumonia   Date Last Reviewed: 7/1/2016  © 1691-2916 [employer], [employer]. [address] [place] [address], [place] [address]. All rights reserved. This information is not intended as a substitute for professional medical care. Always follow your healthcare professional's instructions.        After Gallbladder Surgery  You can usually go home the same day as your surgery. In some cases, you may need to stay overnight. After open laparoscopic surgery, you will usually need to stay in the hospital for 2 to 5 days. Once youre at home, be sure to follow all your healthcare providers instructions.  In the hospital  Bandages will cover your incisions and you may have special  boots on your legs to prevent blood clots. To aid recovery, youll be asked to get up and move as soon as possible. You may also be asked to use a device that helps promote deep breathing to keep your lungs clear.  At home  You can get back to your normal routine as soon as you feel able. To speed healing:  · Take any prescribed pain medicines as directed.  · Follow your healthcare providers instructions about bathing and caring for your incisions.  · Walk and move around as often as possible, but follow your healthcare provider's instructions on how much weight you can lift.   · Ask your healthcare provider about driving and going back to work. This is often about 5 to 10 days after surgery.  Eating normally again  Removing the gallbladder doesnt mean you have to be on a special diet. But you may want to start with light meals. It can also take a few weeks for your digestion to adjust. You may have indigestion, loose stools, or diarrhea. This is common and should go away in time.  Following up  Keep follow-up appointments during your recovery. These allow your healthcare provider to check your progress and answer any questions. Be sure to mention if you have any new symptoms. Also mention if you have diarrhea that doesnt go away.     Call your healthcare provider  Contact your healthcare provider if you have any of the following:  · Fever of 100.4º F (38.0ºC) or higher, or as directed by your healthcare provider  · Chills  · Increasing pain, redness, or drainage at an incision site  · Vomiting or nausea that lasts more than 12 hours  · Prolonged diarrhea  · Belly pain  · Leg swelling or trouble breathing  · Difficulty urinating  · Bleeding from the rectum   Date Last Reviewed: 7/1/2016  © 3219-2786 [employer]. [address] [place] [address], [place] [address]. All rights reserved. This information is not intended as a substitute for professional medical care. Always follow your healthcare professional's  instructions.

## 2017-04-21 NOTE — PROGRESS NOTES
History & Physical    SUBJECTIVE:     History of Present Illness:  Patient is a 66 y.o. female presents for evaluation of epigastric and RUQ pain for a few months. No relation to food intake. Denies any f/c, SOB or CP. Did have some nausea on one occasion, no emesis. Deneis any reflux/heartburn. Having diarrhea recently. Pain is in epigastric, RUQ, and radiates to right flank and back.     Review of patient's allergies indicates:   Allergen Reactions    Adhesive Rash       Current Outpatient Prescriptions   Medication Sig Dispense Refill    amlodipine-benazepril (LOTREL) 5-40 mg per capsule TAKE ONE CAPSULE BY MOUTH ONCE A DAY 90 capsule 0    ketoconazole (NIZORAL) 2 % cream AAA bid x 4 wks 60 g 3    triamterene-hydrochlorothiazide 37.5-25 mg (DYAZIDE) 37.5-25 mg per capsule Take 1 capsule by mouth every morning. 30 capsule 11     No current facility-administered medications for this visit.        Past Medical History:   Diagnosis Date    Allergy     seasonal    Fever blister     Hypertension      Past Surgical History:   Procedure Laterality Date    COLONOSCOPY N/A 12/6/2016    Procedure: COLONOSCOPY;  Surgeon: Fidel Mason MD;  Location: [facility] ([address] [place] [address]);  Service: Endoscopy;  Laterality: N/A;    partial colectomy  1997    sigmoid removed due to diverticulitis    SINUS SURGERY  2007     Family History   Problem Relation Age of Onset    Diabetes Mother     Heart failure Mother     Hypertension Mother     Liver cancer Maternal Uncle     Melanoma Neg Hx      Social History   Substance Use Topics    Smoking status: Never Smoker    Smokeless tobacco: None    Alcohol use No        Review of Systems:  Review of Systems   Constitutional: Negative for chills and fever.   HENT: Negative for trouble swallowing.    Eyes: Negative for visual disturbance.   Respiratory: Negative for chest tightness and shortness of breath.    Cardiovascular: Negative for chest pain and palpitations.   Gastrointestinal:  "Positive for abdominal pain and diarrhea. Negative for abdominal distention, constipation, nausea and vomiting.   Endocrine: Negative for polyuria.   Genitourinary: Negative for dysuria.   Neurological: Negative for dizziness and light-headedness.       OBJECTIVE:     Vital Signs (Most Recent)  Temp: 98.1 °F (36.7 °C) (04/21/17 1133)  Pulse: 80 (04/21/17 1133)  BP: 116/69 (04/21/17 1133)  5' 1" (1.549 m)  81.6 kg (180 lb)     Physical Exam:  Physical Exam   Constitutional: She appears well-developed and well-nourished. No distress.   Cardiovascular: Normal rate and regular rhythm.    Pulmonary/Chest: Effort normal and breath sounds normal.   Abdominal: Soft. She exhibits no distension. There is tenderness.   TTP in RUQ and epigastric regions. BS+. No peritoneal signs.        Laboratory  CBC: Reviewed  CMP: Reviewed    Diagnostic Results:  No intrahepatic biliary ductal dilatation is detected. The common bile duct is within normal limits at 0.3 cm. A large non-mobile 2.9 cm gallstone is present in the fundus of the gallbladder. There is evidence of focal adenomyomatosis/cholesterolosis of the gall bladder wall with a possible band of adenomyomatosis near the base of this gallstone. No gallbladder wall thickening, pericholecystic fluid, or focal tenderness over the gallbladder.    ASSESSMENT/PLAN:   67 yo female with large gallstone and symptomatic cholelithiasis.    PLAN:Plan     -to OR for lap cholecystectomy  -consents signed in clinic    Gerald Orellana PGY5    ATTENDING ATTESTATION: Patient seen and examined. My examination confirms the resident's findings and I agree with his assessment and plan above.    "

## 2017-04-25 ENCOUNTER — TELEPHONE (OUTPATIENT)
Dept: SURGERY | Facility: CLINIC | Age: 67
End: 2017-04-25

## 2017-04-25 NOTE — TELEPHONE ENCOUNTER
Pt notified to arrive at [facility] tomorrow 4/26/17 at 8:30am for surgery with Dr. Goldberg.  Pre-Op instructions reinforced.  She verbalized understanding.

## 2017-04-26 ENCOUNTER — SURGERY (OUTPATIENT)
Age: 67
End: 2017-04-26

## 2017-04-26 ENCOUNTER — HOSPITAL ENCOUNTER (OUTPATIENT)
Facility: HOSPITAL | Age: 67
Discharge: HOME | End: 2017-04-26
Attending: SURGERY | Admitting: SURGERY
Payer: COMMERCIAL

## 2017-04-26 ENCOUNTER — ANESTHESIA (OUTPATIENT)
Dept: SURGERY | Facility: HOSPITAL | Age: 67
End: 2017-04-26
Payer: COMMERCIAL

## 2017-04-26 ENCOUNTER — ANESTHESIA EVENT (OUTPATIENT)
Dept: SURGERY | Facility: HOSPITAL | Age: 67
End: 2017-04-26
Payer: COMMERCIAL

## 2017-04-26 VITALS
TEMPERATURE: 98 F | HEART RATE: 72 BPM | DIASTOLIC BLOOD PRESSURE: 66 MMHG | SYSTOLIC BLOOD PRESSURE: 113 MMHG | BODY MASS INDEX: 33.99 KG/M2 | RESPIRATION RATE: 16 BRPM | HEIGHT: 61 IN | OXYGEN SATURATION: 96 % | WEIGHT: 180 LBS

## 2017-04-26 DIAGNOSIS — K80.20 GALLSTONES: ICD-10-CM

## 2017-04-26 PROCEDURE — 71000033 HC RECOVERY, INTIAL HOUR: Performed by: SURGERY

## 2017-04-26 PROCEDURE — 25000003 PHARM REV CODE 250: Performed by: SURGERY

## 2017-04-26 PROCEDURE — 25000003 PHARM REV CODE 250: Performed by: STUDENT IN AN ORGANIZED HEALTH CARE EDUCATION/TRAINING PROGRAM

## 2017-04-26 PROCEDURE — 27201423 OPTIME MED/SURG SUP & DEVICES STERILE SUPPLY: Performed by: SURGERY

## 2017-04-26 PROCEDURE — 25000003 PHARM REV CODE 250: Performed by: PHYSICIAN ASSISTANT

## 2017-04-26 PROCEDURE — 88304 TISSUE EXAM BY PATHOLOGIST: CPT | Mod: 26,,,

## 2017-04-26 PROCEDURE — 71000039 HC RECOVERY, EACH ADD'L HOUR: Performed by: SURGERY

## 2017-04-26 PROCEDURE — 71000015 HC POSTOP RECOV 1ST HR: Performed by: SURGERY

## 2017-04-26 PROCEDURE — 36000709 HC OR TIME LEV III EA ADD 15 MIN: Performed by: SURGERY

## 2017-04-26 PROCEDURE — 88304 TISSUE EXAM BY PATHOLOGIST: CPT

## 2017-04-26 PROCEDURE — 36000708 HC OR TIME LEV III 1ST 15 MIN: Performed by: SURGERY

## 2017-04-26 PROCEDURE — 37000008 HC ANESTHESIA 1ST 15 MINUTES: Performed by: SURGERY

## 2017-04-26 PROCEDURE — 63600175 PHARM REV CODE 636 W HCPCS: Performed by: STUDENT IN AN ORGANIZED HEALTH CARE EDUCATION/TRAINING PROGRAM

## 2017-04-26 PROCEDURE — 47562 LAPAROSCOPIC CHOLECYSTECTOMY: CPT | Mod: ,,, | Performed by: SURGERY

## 2017-04-26 PROCEDURE — 27000221 HC OXYGEN, UP TO 24 HOURS

## 2017-04-26 PROCEDURE — 63600175 PHARM REV CODE 636 W HCPCS: Performed by: PHYSICIAN ASSISTANT

## 2017-04-26 PROCEDURE — 37000009 HC ANESTHESIA EA ADD 15 MINS: Performed by: SURGERY

## 2017-04-26 PROCEDURE — D9220A PRA ANESTHESIA: Mod: ,,, | Performed by: ANESTHESIOLOGY

## 2017-04-26 PROCEDURE — 71000016 HC POSTOP RECOV ADDL HR: Performed by: SURGERY

## 2017-04-26 RX ORDER — OXYCODONE AND ACETAMINOPHEN 5; 325 MG/1; MG/1
1 TABLET ORAL EVERY 4 HOURS PRN
Qty: 61 TABLET | Refills: 0 | Status: SHIPPED | OUTPATIENT
Start: 2017-04-26 | End: 2017-05-12

## 2017-04-26 RX ORDER — HYDROCODONE BITARTRATE AND ACETAMINOPHEN 5; 325 MG/1; MG/1
1 TABLET ORAL EVERY 4 HOURS PRN
Status: DISCONTINUED | OUTPATIENT
Start: 2017-04-26 | End: 2017-04-26 | Stop reason: HOSPADM

## 2017-04-26 RX ORDER — HYDROMORPHONE HYDROCHLORIDE 1 MG/ML
0.2 INJECTION, SOLUTION INTRAMUSCULAR; INTRAVENOUS; SUBCUTANEOUS EVERY 5 MIN PRN
Status: DISCONTINUED | OUTPATIENT
Start: 2017-04-26 | End: 2017-04-26 | Stop reason: HOSPADM

## 2017-04-26 RX ORDER — LIDOCAINE HCL/PF 100 MG/5ML
SYRINGE (ML) INTRAVENOUS
Status: DISCONTINUED | OUTPATIENT
Start: 2017-04-26 | End: 2017-04-26

## 2017-04-26 RX ORDER — BUPIVACAINE HYDROCHLORIDE 5 MG/ML
INJECTION, SOLUTION EPIDURAL; INTRACAUDAL
Status: DISCONTINUED | OUTPATIENT
Start: 2017-04-26 | End: 2017-04-26 | Stop reason: HOSPADM

## 2017-04-26 RX ORDER — ONDANSETRON 2 MG/ML
4 INJECTION INTRAMUSCULAR; INTRAVENOUS DAILY PRN
Status: DISCONTINUED | OUTPATIENT
Start: 2017-04-26 | End: 2017-04-26 | Stop reason: HOSPADM

## 2017-04-26 RX ORDER — PROPOFOL 10 MG/ML
VIAL (ML) INTRAVENOUS
Status: DISCONTINUED | OUTPATIENT
Start: 2017-04-26 | End: 2017-04-26

## 2017-04-26 RX ORDER — MIDAZOLAM HYDROCHLORIDE 1 MG/ML
INJECTION, SOLUTION INTRAMUSCULAR; INTRAVENOUS
Status: DISCONTINUED | OUTPATIENT
Start: 2017-04-26 | End: 2017-04-26

## 2017-04-26 RX ORDER — KETOROLAC TROMETHAMINE 30 MG/ML
INJECTION, SOLUTION INTRAMUSCULAR; INTRAVENOUS
Status: DISCONTINUED | OUTPATIENT
Start: 2017-04-26 | End: 2017-04-26

## 2017-04-26 RX ORDER — ONDANSETRON 2 MG/ML
4 INJECTION INTRAMUSCULAR; INTRAVENOUS EVERY 12 HOURS PRN
Status: DISCONTINUED | OUTPATIENT
Start: 2017-04-26 | End: 2017-04-26 | Stop reason: HOSPADM

## 2017-04-26 RX ORDER — SODIUM CHLORIDE 9 MG/ML
INJECTION, SOLUTION INTRAVENOUS CONTINUOUS
Status: DISCONTINUED | OUTPATIENT
Start: 2017-04-26 | End: 2017-04-26 | Stop reason: HOSPADM

## 2017-04-26 RX ORDER — NEOSTIGMINE METHYLSULFATE 1 MG/ML
INJECTION, SOLUTION INTRAVENOUS
Status: DISCONTINUED | OUTPATIENT
Start: 2017-04-26 | End: 2017-04-26

## 2017-04-26 RX ORDER — FENTANYL CITRATE 50 UG/ML
INJECTION, SOLUTION INTRAMUSCULAR; INTRAVENOUS
Status: DISCONTINUED | OUTPATIENT
Start: 2017-04-26 | End: 2017-04-26

## 2017-04-26 RX ORDER — GLYCOPYRROLATE 0.2 MG/ML
INJECTION INTRAMUSCULAR; INTRAVENOUS
Status: DISCONTINUED | OUTPATIENT
Start: 2017-04-26 | End: 2017-04-26

## 2017-04-26 RX ORDER — KETAMINE HYDROCHLORIDE 100 MG/ML
INJECTION, SOLUTION INTRAMUSCULAR; INTRAVENOUS
Status: DISCONTINUED | OUTPATIENT
Start: 2017-04-26 | End: 2017-04-26

## 2017-04-26 RX ORDER — SODIUM CHLORIDE 0.9 % (FLUSH) 0.9 %
3 SYRINGE (ML) INJECTION EVERY 8 HOURS
Status: DISCONTINUED | OUTPATIENT
Start: 2017-04-26 | End: 2017-04-26 | Stop reason: HOSPADM

## 2017-04-26 RX ORDER — CEFAZOLIN SODIUM 2 G/50ML
2 SOLUTION INTRAVENOUS
Status: COMPLETED | OUTPATIENT
Start: 2017-04-26 | End: 2017-04-26

## 2017-04-26 RX ORDER — ONDANSETRON 2 MG/ML
INJECTION INTRAMUSCULAR; INTRAVENOUS
Status: DISCONTINUED | OUTPATIENT
Start: 2017-04-26 | End: 2017-04-26

## 2017-04-26 RX ORDER — DEXAMETHASONE SODIUM PHOSPHATE 4 MG/ML
INJECTION, SOLUTION INTRA-ARTICULAR; INTRALESIONAL; INTRAMUSCULAR; INTRAVENOUS; SOFT TISSUE
Status: DISCONTINUED | OUTPATIENT
Start: 2017-04-26 | End: 2017-04-26

## 2017-04-26 RX ORDER — ROCURONIUM BROMIDE 10 MG/ML
INJECTION, SOLUTION INTRAVENOUS
Status: DISCONTINUED | OUTPATIENT
Start: 2017-04-26 | End: 2017-04-26

## 2017-04-26 RX ORDER — SODIUM CHLORIDE 0.9 % (FLUSH) 0.9 %
3 SYRINGE (ML) INJECTION
Status: DISCONTINUED | OUTPATIENT
Start: 2017-04-26 | End: 2017-04-26 | Stop reason: HOSPADM

## 2017-04-26 RX ORDER — ACETAMINOPHEN 10 MG/ML
INJECTION, SOLUTION INTRAVENOUS
Status: DISCONTINUED | OUTPATIENT
Start: 2017-04-26 | End: 2017-04-26

## 2017-04-26 RX ADMIN — ONDANSETRON 4 MG: 2 INJECTION INTRAMUSCULAR; INTRAVENOUS at 12:04

## 2017-04-26 RX ADMIN — BUPIVACAINE HYDROCHLORIDE 30 ML: 5 INJECTION, SOLUTION EPIDURAL; INTRACAUDAL; PERINEURAL at 12:04

## 2017-04-26 RX ADMIN — KETOROLAC TROMETHAMINE 30 MG: 30 INJECTION, SOLUTION INTRAMUSCULAR; INTRAVENOUS at 12:04

## 2017-04-26 RX ADMIN — FENTANYL CITRATE 100 MCG: 50 INJECTION, SOLUTION INTRAMUSCULAR; INTRAVENOUS at 11:04

## 2017-04-26 RX ADMIN — ROCURONIUM BROMIDE 5 MG: 10 INJECTION, SOLUTION INTRAVENOUS at 12:04

## 2017-04-26 RX ADMIN — SODIUM CHLORIDE: 0.9 INJECTION, SOLUTION INTRAVENOUS at 11:04

## 2017-04-26 RX ADMIN — KETAMINE HYDROCHLORIDE 5 MG: 100 INJECTION, SOLUTION, CONCENTRATE INTRAMUSCULAR; INTRAVENOUS at 12:04

## 2017-04-26 RX ADMIN — LIDOCAINE HYDROCHLORIDE 100 MG: 20 INJECTION, SOLUTION INTRAVENOUS at 11:04

## 2017-04-26 RX ADMIN — HYDROCODONE BITARTRATE AND ACETAMINOPHEN 1 TABLET: 5; 325 TABLET ORAL at 02:04

## 2017-04-26 RX ADMIN — PROPOFOL 30 MG: 10 INJECTION, EMULSION INTRAVENOUS at 11:04

## 2017-04-26 RX ADMIN — NEOSTIGMINE METHYLSULFATE 3.5 MG: 1 INJECTION INTRAVENOUS at 01:04

## 2017-04-26 RX ADMIN — GLYCOPYRROLATE 0.6 MG: 0.2 INJECTION, SOLUTION INTRAMUSCULAR; INTRAVENOUS at 01:04

## 2017-04-26 RX ADMIN — SODIUM CHLORIDE: 0.9 INJECTION, SOLUTION INTRAVENOUS at 09:04

## 2017-04-26 RX ADMIN — KETAMINE HYDROCHLORIDE 20 MG: 100 INJECTION, SOLUTION, CONCENTRATE INTRAMUSCULAR; INTRAVENOUS at 11:04

## 2017-04-26 RX ADMIN — DEXAMETHASONE SODIUM PHOSPHATE 8 MG: 4 INJECTION, SOLUTION INTRAMUSCULAR; INTRAVENOUS at 11:04

## 2017-04-26 RX ADMIN — FENTANYL CITRATE 50 MCG: 50 INJECTION, SOLUTION INTRAMUSCULAR; INTRAVENOUS at 11:04

## 2017-04-26 RX ADMIN — FENTANYL CITRATE 25 MCG: 50 INJECTION, SOLUTION INTRAMUSCULAR; INTRAVENOUS at 12:04

## 2017-04-26 RX ADMIN — CEFAZOLIN SODIUM 2 G: 2 SOLUTION INTRAVENOUS at 11:04

## 2017-04-26 RX ADMIN — ACETAMINOPHEN 1000 MG: 10 INJECTION, SOLUTION INTRAVENOUS at 11:04

## 2017-04-26 RX ADMIN — ROCURONIUM BROMIDE 40 MG: 10 INJECTION, SOLUTION INTRAVENOUS at 11:04

## 2017-04-26 RX ADMIN — SODIUM CHLORIDE, SODIUM GLUCONATE, SODIUM ACETATE, POTASSIUM CHLORIDE, MAGNESIUM CHLORIDE, SODIUM PHOSPHATE, DIBASIC, AND POTASSIUM PHOSPHATE: .53; .5; .37; .037; .03; .012; .00082 INJECTION, SOLUTION INTRAVENOUS at 11:04

## 2017-04-26 RX ADMIN — PROPOFOL 170 MG: 10 INJECTION, EMULSION INTRAVENOUS at 11:04

## 2017-04-26 RX ADMIN — MIDAZOLAM HYDROCHLORIDE 2 MG: 1 INJECTION, SOLUTION INTRAMUSCULAR; INTRAVENOUS at 11:04

## 2017-04-26 NOTE — ANESTHESIA POSTPROCEDURE EVALUATION
"Anesthesia Post Evaluation    Patient: Rajesh Mas    Procedure(s) Performed: Procedure(s) (LRB):  CHOLECYSTECTOMY-LAPAROSCOPIC Possible Open (N/A)    Final Anesthesia Type: general  Patient location during evaluation: PACU  Patient participation: Yes- Able to Participate  Level of consciousness: awake and alert, awake and oriented  Post-procedure vital signs: reviewed and stable  Pain management: adequate  Airway patency: patent  PONV status at discharge: No PONV  Anesthetic complications: no      Cardiovascular status: blood pressure returned to baseline, stable and hemodynamically stable  Respiratory status: unassisted, spontaneous ventilation and room air  Hydration status: euvolemic  Follow-up not needed.        Visit Vitals    /77 (BP Location: Right arm, Patient Position: Lying, BP Method: Automatic)    Pulse 72    Temp 36.6 °C (97.9 °F) (Axillary)    Resp 16    Ht 5' 1" (1.549 m)    Wt 81.6 kg (180 lb)    LMP  (LMP Unknown)    SpO2 95%    Breastfeeding No    BMI 34.01 kg/m2       Pain/Vishal Score: Pain Assessment Performed: Yes (4/26/2017  3:10 PM)  Presence of Pain: non-verbal indicators absent (4/26/2017  3:10 PM)  Pain Rating Prior to Med Admin: 3 (4/26/2017  2:40 PM)  Vishal Score: 7 (4/26/2017  3:10 PM)      "

## 2017-04-26 NOTE — IP AVS SNAPSHOT
[facility]  [address]  [facility] [address]  Phone: [phone]           Patient Discharge Instructions   Our goal is to set you up for success. This packet includes information on your condition, medications, and your home care.  It will help you care for yourself to prevent having to return to the hospital.     Please ask your nurse if you have any questions.      There are many details to remember when preparing to leave the hospital. Here is what you will need to do:    1. Take your medicine. If you are prescribed medications, review your Medication List on the following pages. You may have new medications to  at the pharmacy and others that you'll need to stop taking. Review the instructions for how and when to take your medications. Talk with your doctor or nurses if you are unsure of what to do.     2. Go to your follow-up appointments. Specific follow-up information is listed in the following pages. Your may be contacted by a nurse or clinical provider about future appointments. Be sure we have all of the phone numbers to reach you. Please contact your provider's office if you are unable to make an appointment.     3. Watch for warning signs. Your doctor or nurse will give you detailed warning signs to watch for and when to call for assistance. These instructions may also include educational information about your condition. If you experience any of warning signs to your health, call your doctor.           [facility]  Unless otherwise directed by your provider, please   contact [facility], our nurse care line   that is available for 24/7 assistance.     [phone] (toll-free)     Registered nurses in the [facility]   provide: appointment scheduling, clinical advisement, health education, and other advisory services.                  ** Verify the list of medication(s) below is accurate and up to date. Carry this with you in case of  emergency. If your medications have changed, please notify your healthcare provider.             Medication List      START taking these medications        Additional Info                      oxycodone-acetaminophen 5-325 mg per tablet   Commonly known as:  PERCOCET   Quantity:  61 tablet   Refills:  0   Dose:  1 tablet    Instructions:  Take 1 tablet by mouth every 4 (four) hours as needed.     Begin Date    AM    Noon    PM    Bedtime         CONTINUE taking these medications        Additional Info                      amlodipine-benazepril 5-40 mg per capsule   Commonly known as:  LOTREL   Quantity:  90 capsule   Refills:  0    Instructions:  TAKE ONE CAPSULE BY MOUTH ONCE A DAY     Begin Date    AM    Noon    PM    Bedtime       ketoconazole 2 % cream   Commonly known as:  NIZORAL   Quantity:  60 g   Refills:  3    Instructions:  AAA bid x 4 wks     Begin Date    AM    Noon    PM    Bedtime       triamterene-hydrochlorothiazide 37.5-25 mg 37.5-25 mg per capsule   Commonly known as:  DYAZIDE   Quantity:  30 capsule   Refills:  11   Dose:  1 capsule    Instructions:  Take 1 capsule by mouth every morning.     Begin Date    AM    Noon    PM    Bedtime            Where to Get Your Medications      You can get these medications from any pharmacy     Bring a paper prescription for each of these medications     oxycodone-acetaminophen 5-325 mg per tablet                  Please bring to all follow up appointments:    1. A copy of your discharge instructions.  2. All medicines you are currently taking in their original bottles.  3. Identification and insurance card.    Please arrive 15 minutes ahead of scheduled appointment time.    Please call 24 hours in advance if you must reschedule your appointment and/or time.        Your Scheduled Appointments     May 09, 2017  9:45 AM CDT   Post OP with Joshua Goldberg, MD [address] - General Surgery ([facility] )    [address]  [facility] [address]    [phone]            May 12, 2017  9:20 AM CDT   Annual Checkup/Physical with Gail Villarreal MD   [facility] - Internal Medicine ([facility])    [address]  [facility] [address] [phone]              Follow-up Information     Follow up with Joshua Goldberg, MD In 2 weeks.    Specialty:  General Surgery    Why:  For wound re-check    Contact information:    [address]  [facility] [id number] [phone]          Discharge Instructions     Future Orders    Call MD for:  difficulty breathing, headache or visual disturbances     Call MD for:  extreme fatigue     Call MD for:  hives     Call MD for:  persistent dizziness or light-headedness     Call MD for:  persistent nausea and vomiting     Call MD for:  redness, tenderness, or signs of infection (pain, swelling, redness, odor or green/yellow discharge around incision site)     Call MD for:  severe uncontrolled pain     Call MD for:  temperature >100.4     Diet general     Questions:    Total calories:      Fat restriction, if any:      Protein restriction, if any:      Na restriction, if any:      Fluid restriction:      Additional restrictions:      Lifting restrictions     Comments:    Nothing greater than 10 lbs for 4-6 weeks    Remove dressing in 48 hours     Comments:    Remove dressing in 48 hours. May shower after dressing removed. Steristrips will fall off on their own. Do not submerge incision under water for 2 weeks such as bath tub or pool        Discharge Instructions         Discharge Instructions for Laparoscopic Cholecystectomy  You have had a procedure known as a laparoscopic cholecystectomy. A laparoscopic cholecystectomy is a procedure to remove your gallbladder. People who have this procedure usually recover more quickly and have less pain than with open gallbladder surgery (called open cholecystectomy). Many surgeons recommend a low-fat diet, avoiding fried food in particular,  for the first month after surgery.   You can live a full and healthy life without your gallbladder. This includes eating the foods and doing the things you enjoyed before your gallbladder problems started.  Home care  Recommendations for home care include the following:   · Ask someone to drive you to your appointments for the next 3 days. Dont drive until you are no longer taking pain medicine and are able to step on the brake pedal without hesitation.   · Wash the skin around your incision daily with mild soap and water. It's OK to shower the day after your surgery.  · Eat your regular diet. It is wise to stay away from rich, greasy, or spicy food for a few days.  · Remember, it takes at least 1 week for you to get most of your strength and energy back.  · Make an office visit to talk to your healthcare provider if the following symptoms dont go away within a week after your surgery:  ¨ Fatigue  ¨ Pain around the incision  ¨ Diarrhea or constipation  ¨ Loss of appetite     When to call your healthcare provider  Call your healthcare provider immediately if you have any of the following:  · Yellowing of your eyes or skin (jaundice)  · Chills  · Fever of 100.4°F (38.0°C) or higher, or as directed by your healthcare provider   · Redness, swelling, increasing pain, pus, or a foul smell at the incision site  · Dark or rust-colored urine  · Stool that is beatriz-colored or light in color instead of brown  · Increasing belly pain  · Rectal bleeding  · Leg swelling or shortness of breath   Date Last Reviewed: 7/1/2016  © 4499-3612 [employer]. [address] [place] [address], [place] [address]. All rights reserved. This information is not intended as a substitute for professional medical care. Always follow your healthcare professional's instructions.      Home Care Instructions  LAPAROSCOPIC  CHOLECYSTECTOMY    ACTIVITY:  If you received sedation or an anesthetic, you may feel sleepy for several hours. Rest until you  are more awake.  Gradually resume light activity. You will probably be able to return to work within 10-12 days after surgery,  usually after your appointment with your surgeon. No heavy lifting for 2- 4 weeks. (Ask your surgeon for  his/her recommendations.) You may resume ordinary daily activities as you improve.  DIET:  You may have a liquid diet the evening of surgery, i.e. soups, jellos, tea, etc. Gradually resume your normal  diet.  DRESSING:  Clean wounds with warm soap and water. (Wash your hands thoroughly before touching the incision sites, and  do not allow others to touch the incisions until completely healed.) You will notice small steri strips (tape) over  your incisions. If the steri strips begin to peel back, they may be removed. You may notice a small amount of  old blood on your incision. It is normal. A small amount of bruising around the incision is expected.  BATHING:  You may shower after surgery.    MEDICATIONS:  You will receive instructions for any pain and/or antibiotic prescriptions. Pain medication should be taken only  if needed and as directed. Antibiotics, if ordered, should be taken as directed until the entire prescription is    CALL YOUR SURGEON IF YOU HAVE OR NOTICE ANY OF THE FOLLOWING:   Bleeding or drainage from the incision site that soaks through clothing or continues to bleed.   Persistent pain not relieved by pain medications.   Redness or heat, swelling, foul odor, and/or drainage from the incision sites.   Temperature higher than 101ºF (38.4ºC).   Persistent nausea/vomiting.  RETURN APPOINTMENT: ______________________________________________________________  FOR EMERGENCIES:  Please call the General Surgery Clinic at [phone] during Clinic hours. After Clinic hours, please call the  page  at [phone] and ask for the general surgery resident on call.            Primary Diagnosis     Your primary diagnosis was:  Gallstones      Admission Information   "   Date & Time Provider Department CSN    4/26/2017  8:23 AM Joshua Goldberg, MD [facility] [id number]      Care Providers     Provider Role Specialty Primary office phone    Joshua Goldberg, MD Attending Provider General Surgery [phone]    Joshua Goldberg, MD Surgeon  General Surgery [phone]      Your Vitals Were     BP Pulse Temp Resp Height Weight    112/68 (BP Location: Right arm, Patient Position: Lying, BP Method: Automatic) 72 97.9 °F (36.6 °C) (Axillary) 16 5' 1" (1.549 m) 81.6 kg (180 lb)    Last Period SpO2 BMI          (LMP Unknown) 95% 34.01 kg/m2        Recent Lab Values     No lab values to display.      Pending Labs     Order Current Status    Specimen to Pathology - Surgery Collected (04/26/17 1238)      Allergies as of 4/26/2017        Reactions    Adhesive Rash      Advance Directives     An advance directive is a document which, in the event you are no longer able to make decisions for yourself, tells your healthcare team what kind of treatment you do or do not want to receive, or who you would like to make those decisions for you.  If you do not currently have an advance directive, [facility] encourages you to create one.  For more information call:  [phone]), [phone] ([phone]),  or log on to [email or web address].        Language Assistance Services     ATTENTION: Language assistance services are available, free of charge. Please call [phone].      ATENCIÓN: Si habla español, tiene a nogueira disposición servicios gratuitos de asistencia lingüística. Llame al [phone].     CHÚ Ý: N?u b?n nói Ti?ng Vi?t, có các d?ch v? h? tr? ngôn ng? mi?n phí dành cho b?n. G?i s? [phone].         [facility] complies with applicable Federal civil rights laws and does not discriminate on the basis of race, color, national origin, age, disability, or sex.        "

## 2017-04-26 NOTE — PROGRESS NOTES
Pt going to DOS 33 early, unreleased to finish recovering in phase II. VSS. 94% on 3L/NC. Denies pain.

## 2017-04-26 NOTE — INTERVAL H&P NOTE
The patient has been examined and the H&P has been reviewed:    I concur with the findings and no changes have occurred since H&P was written.    Anesthesia/Surgery risks, benefits and alternative options discussed and understood by patient/family.          Active Hospital Problems    Diagnosis  POA    Gallstones [K80.20]  Yes      Resolved Hospital Problems    Diagnosis Date Resolved POA   No resolved problems to display.

## 2017-04-26 NOTE — OP NOTE
DATE OF PROCEDURE: 04/26/2017     PREOPERATIVE DIAGNOSIS: Biliary colic.     POSTOPERATIVE DIAGNOSIS: Biliary colic.     PROCEDURE PERFORMED: Laparoscopic cholecystectomy.     ATTENDING SURGEON: Joshua Goldberg, M.D.     HOUSESTAFF SURGEON: Enrique High M.D. (RES)     ANESTHESIA: General endotracheal.     ESTIMATED BLOOD LOSS: 10 mL.     FINDINGS: Cholelithiasis and moderate chronic inflammation.     SPECIMEN: Gallbladder.     DRAINS: None.     COMPLICATIONS: None.     INDICATIONS: Rajesh Mas is a 66 y.o.female referred to my General Surgery Clinic with a history of postprandial right upper quadrant abdominal pain. The history and exam were consistent with biliary colic, which was confirmed by laboratory studies and ultrasound. We recommended laparoscopic cholecystectomy and the patient agreed to proceed. The patient signed informed consent and expressed understanding of the risks and benefits of surgery.     OPERATIVE PROCEDURE: The patient was identified in Preoperative Holding and brought back to the Operating Room. Placed supine on the operating table and padded appropriately. Monitors were applied and there was smooth induction of general endotracheal anesthesia. The patient's abdomen was prepped and draped in the standard sterile surgical fashion. A time-out was performed and all team members present agreed this was the correct procedure on the correct patient. We also confirmed administration of appropriate preoperative antibiotics.    Due to prior colectomy with a midline incision that extended above the umbilicus, we used the Optiview technique. A small transverse skin incision was made in the epigastric area and the abdominal wall was grasped with two penetrating towel clamps. A straight 5-mm laparoscope was loaded into a 5-mm Optiview trocar and the trocar was inserted into the peritoneal cavity under direct vision. The abdomen was insufflated with carbon dioxide to a maximum pressure of 15  mmHg. Two additional 5-mm trocars were placed in the right upper quadrant with separate stab incisions. We visualized some omental adhesions to the periumbilical area and took these down sharply with laparoscopic scissors. A blunt 11-mm trocar was inserted into the abdomen under direct vision at the umbilicus after making a skin incision. We directed our attention to the right upper quadrant. The gallbladder was identified and noted to have moderate chronic inflammatory change. The fundus was grasped and retracted cranially and the infundibulum was grasped and retracted laterally. We bluntly dissected the peritoneal reflection off the infundibulum and neck of the gallbladder. With careful blunt dissection in this area, we were able to identify the cystic duct. Further careful dissection identified the cystic artery and we did obtain a critical view of safety. Both duct and artery were triply clipped and divided. The gallbladder was dissected off the gallbladder fossa using Bovie electrocautery from infundibulum to fundus until free. It was placed into an EndoCatch bag and removed from the umbilical port site. We did have to extended the skin and fascial incisions here slightly due to a large gallstone. We returned the laparoscope and 11-mm trocar to the umbilicus and reexamined the right upper quadrant. The gallbladder fossa was examined and no further bleeding or any bile leak were noted. The clips on the cystic duct and artery were examined and no bleeding or bile leak were noted. The right upper quadrant was irrigated with saline briefly until the returning effluent was clear. All ports were removed under direct vision and no bleeding from any port site was noted. The insufflation of the abdomen was evacuated and the laparoscope and final trocar were removed. The fascial incision at the umbilical port site was closed with a running 0 PDS stitch. All port sites were infiltrated with Marcaine and closed in a  subcuticular fashion. Sterile dressings were applied. The patient was extubated in the Operating Room and transported to the Recovery Room in stable condition. All sponge, instrument and needle counts were correct at the end of the case. I was present and scrubbed for the entire procedure.

## 2017-04-26 NOTE — ANESTHESIA RELEASE NOTE
"Anesthesia Release from PACU Note    Patient: Rajesh Mas    Procedure(s) Performed: Procedure(s) (LRB):  CHOLECYSTECTOMY-LAPAROSCOPIC Possible Open (N/A)    Anesthesia type: general    Post pain: Adequate analgesia    Post assessment: no apparent anesthetic complications, tolerated procedure well and no evidence of recall    Last Vitals:   Visit Vitals    /77 (BP Location: Right arm, Patient Position: Lying, BP Method: Automatic)    Pulse 72    Temp 36.6 °C (97.9 °F) (Axillary)    Resp 16    Ht 5' 1" (1.549 m)    Wt 81.6 kg (180 lb)    LMP  (LMP Unknown)    SpO2 95%    Breastfeeding No    BMI 34.01 kg/m2       Post vital signs: stable    Level of consciousness: awake, alert  and oriented    Nausea/Vomiting: no nausea/no vomiting    Complications: none    Airway Patency: patent    Respiratory: unassisted, spontaneous ventilation, room air    Cardiovascular: stable and blood pressure at baseline    Hydration: euvolemic  "

## 2017-04-26 NOTE — ANESTHESIA PREPROCEDURE EVALUATION
04/26/2017  Rajesh Mas is a 66 y.o., female.    Anesthesia Evaluation    I have reviewed the Patient Summary Reports.        Review of Systems  Anesthesia Hx:   Denies Personal Hx of Anesthesia complications.   Cardiovascular:   Hypertension        Physical Exam  General:  Obesity    Airway/Jaw/Neck:  Airway Findings: Mouth Opening: Normal Tongue: Normal  General Airway Assessment: Adult  Mallampati: III  Improves to II with phonation.  TM Distance: 4 - 6 cm      Dental:  Dental Findings: In tact   Chest/Lungs:  Chest/Lungs Clear    Heart/Vascular:  Heart Findings: Normal            Anesthesia Plan  Type of Anesthesia, risks & benefits discussed:  Anesthesia Type:  general  Patient's Preference:   Intra-op Monitoring Plan:   Intra-op Monitoring Plan Comments:   Post Op Pain Control Plan:   Post Op Pain Control Plan Comments:   Induction:   IV  Beta Blocker:  Patient is not currently on a Beta-Blocker (No further documentation required).       Informed Consent: Patient understands risks and agrees with Anesthesia plan.  Questions answered. Anesthesia consent signed with patient.  ASA Score: 2     Day of Surgery Review of History & Physical:  There are no significant changes.          Ready For Surgery From Anesthesia Perspective.

## 2017-04-26 NOTE — BRIEF OP NOTE
[facility]  Brief Operative Note     SUMMARY     Surgery Date: 4/26/2017     Surgeon(s) and Role:     * Joshua Goldberg, MD - Primary    Assisting Surgeon: None    Pre-op Diagnosis:  Gallstones [K80.20]    Post-op Diagnosis:  Post-Op Diagnosis Codes:     * Gallstones [K80.20]    Procedure(s) (LRB):  CHOLECYSTECTOMY-LAPAROSCOPIC Possible Open (N/A)    Anesthesia: General    Description of the findings of the procedure: Laparoscopic cholecystectomy     Findings/Key Components: Lysis of adhesions, Non inflamed gallbladder with large 4cm gallstone     Estimated Blood Loss: 10cc    Specimens:   Specimen (12h ago through future)    Start     Ordered    04/26/17 1238  Specimen to Pathology - Surgery  Once     Comments:  1) Gall bladder -permanent    04/26/17 1238          Discharge Note    SUMMARY     Admit Date: 4/26/2017    Discharge Date and Time:  04/26/2017 1:25 PM    Hospital Course (synopsis of major diagnoses, care, treatment, and services provided during the course of the hospital stay): Uneventful procedure. Discharged home from recovery when awake and alert.      Final Diagnosis: Post-Op Diagnosis Codes:     * Gallstones [K80.20]    Disposition: Home or Self Care    Follow Up/Patient Instructions:     Medications:  Reconciled Home Medications:   Current Discharge Medication List      CONTINUE these medications which have NOT CHANGED    Details   amlodipine-benazepril (LOTREL) 5-40 mg per capsule TAKE ONE CAPSULE BY MOUTH ONCE A DAY  Qty: 90 capsule, Refills: 0    Associated Diagnoses: Essential hypertension      ketoconazole (NIZORAL) 2 % cream AAA bid x 4 wks  Qty: 60 g, Refills: 3    Associated Diagnoses: Tinea corporis      triamterene-hydrochlorothiazide 37.5-25 mg (DYAZIDE) 37.5-25 mg per capsule Take 1 capsule by mouth every morning.  Qty: 30 capsule, Refills: 11             Discharge Procedure Orders  Diet general     Lifting restrictions   Order Comments: Nothing greater than 10 lbs  for 4-6 weeks     Call MD for:  extreme fatigue     Call MD for:  persistent dizziness or light-headedness     Call MD for:  hives     Call MD for:  redness, tenderness, or signs of infection (pain, swelling, redness, odor or green/yellow discharge around incision site)     Call MD for:  difficulty breathing, headache or visual disturbances     Call MD for:  severe uncontrolled pain     Call MD for:  persistent nausea and vomiting     Call MD for:  temperature >100.4     Remove dressing in 48 hours   Order Comments: Remove dressing in 48 hours. May shower after dressing removed. Steristrips will fall off on their own. Do not submerge incision under water for 2 weeks such as bath tub or pool       Follow-up Information     Follow up with Joshua Goldberg, MD In 2 weeks.    Specialty:  General Surgery    Why:  For wound re-check    Contact information:    [address]  [facility] [id number] [phone]

## 2017-04-26 NOTE — DISCHARGE INSTRUCTIONS
Discharge Instructions for Laparoscopic Cholecystectomy  You have had a procedure known as a laparoscopic cholecystectomy. A laparoscopic cholecystectomy is a procedure to remove your gallbladder. People who have this procedure usually recover more quickly and have less pain than with open gallbladder surgery (called open cholecystectomy). Many surgeons recommend a low-fat diet, avoiding fried food in particular, for the first month after surgery.   You can live a full and healthy life without your gallbladder. This includes eating the foods and doing the things you enjoyed before your gallbladder problems started.  Home care  Recommendations for home care include the following:   · Ask someone to drive you to your appointments for the next 3 days. Dont drive until you are no longer taking pain medicine and are able to step on the brake pedal without hesitation.   · Wash the skin around your incision daily with mild soap and water. It's OK to shower the day after your surgery.  · Eat your regular diet. It is wise to stay away from rich, greasy, or spicy food for a few days.  · Remember, it takes at least 1 week for you to get most of your strength and energy back.  · Make an office visit to talk to your healthcare provider if the following symptoms dont go away within a week after your surgery:  ¨ Fatigue  ¨ Pain around the incision  ¨ Diarrhea or constipation  ¨ Loss of appetite     When to call your healthcare provider  Call your healthcare provider immediately if you have any of the following:  · Yellowing of your eyes or skin (jaundice)  · Chills  · Fever of 100.4°F (38.0°C) or higher, or as directed by your healthcare provider   · Redness, swelling, increasing pain, pus, or a foul smell at the incision site  · Dark or rust-colored urine  · Stool that is beatriz-colored or light in color instead of brown  · Increasing belly pain  · Rectal bleeding  · Leg swelling or shortness of breath   Date Last Reviewed:  7/1/2016 © 2000-2016 [employer]. [address] [place] [address], [place] [address]. All rights reserved. This information is not intended as a substitute for professional medical care. Always follow your healthcare professional's instructions.      Home Care Instructions  LAPAROSCOPIC  CHOLECYSTECTOMY    ACTIVITY:  If you received sedation or an anesthetic, you may feel sleepy for several hours. Rest until you are more awake.  Gradually resume light activity. You will probably be able to return to work within 10-12 days after surgery,  usually after your appointment with your surgeon. No heavy lifting for 2- 4 weeks. (Ask your surgeon for  his/her recommendations.) You may resume ordinary daily activities as you improve.  DIET:  You may have a liquid diet the evening of surgery, i.e. soups, jellos, tea, etc. Gradually resume your normal  diet.  DRESSING:  Clean wounds with warm soap and water. (Wash your hands thoroughly before touching the incision sites, and  do not allow others to touch the incisions until completely healed.) You will notice small steri strips (tape) over  your incisions. If the steri strips begin to peel back, they may be removed. You may notice a small amount of  old blood on your incision. It is normal. A small amount of bruising around the incision is expected.  BATHING:  You may shower after surgery.    MEDICATIONS:  You will receive instructions for any pain and/or antibiotic prescriptions. Pain medication should be taken only  if needed and as directed. Antibiotics, if ordered, should be taken as directed until the entire prescription is    CALL YOUR SURGEON IF YOU HAVE OR NOTICE ANY OF THE FOLLOWING:   Bleeding or drainage from the incision site that soaks through clothing or continues to bleed.   Persistent pain not relieved by pain medications.   Redness or heat, swelling, foul odor, and/or drainage from the incision sites.   Temperature higher than 101ºF (38.4ºC).    Persistent nausea/vomiting.  RETURN APPOINTMENT: ______________________________________________________________  FOR EMERGENCIES:  Please call the General Surgery Clinic at [phone] during Clinic hours. After Clinic hours, please call the  page  at [phone] and ask for the general surgery resident on call.

## 2017-04-26 NOTE — H&P (VIEW-ONLY)
History & Physical    SUBJECTIVE:     History of Present Illness:  Patient is a 66 y.o. female presents for evaluation of epigastric and RUQ pain for a few months. No relation to food intake. Denies any f/c, SOB or CP. Did have some nausea on one occasion, no emesis. Deneis any reflux/heartburn. Having diarrhea recently. Pain is in epigastric, RUQ, and radiates to right flank and back.     Review of patient's allergies indicates:   Allergen Reactions    Adhesive Rash       Current Outpatient Prescriptions   Medication Sig Dispense Refill    amlodipine-benazepril (LOTREL) 5-40 mg per capsule TAKE ONE CAPSULE BY MOUTH ONCE A DAY 90 capsule 0    ketoconazole (NIZORAL) 2 % cream AAA bid x 4 wks 60 g 3    triamterene-hydrochlorothiazide 37.5-25 mg (DYAZIDE) 37.5-25 mg per capsule Take 1 capsule by mouth every morning. 30 capsule 11     No current facility-administered medications for this visit.        Past Medical History:   Diagnosis Date    Allergy     seasonal    Fever blister     Hypertension      Past Surgical History:   Procedure Laterality Date    COLONOSCOPY N/A 12/6/2016    Procedure: COLONOSCOPY;  Surgeon: Fidel Mason MD;  Location: [facility] ([address] [place] [address]);  Service: Endoscopy;  Laterality: N/A;    partial colectomy  1997    sigmoid removed due to diverticulitis    SINUS SURGERY  2007     Family History   Problem Relation Age of Onset    Diabetes Mother     Heart failure Mother     Hypertension Mother     Liver cancer Maternal Uncle     Melanoma Neg Hx      Social History   Substance Use Topics    Smoking status: Never Smoker    Smokeless tobacco: None    Alcohol use No        Review of Systems:  Review of Systems   Constitutional: Negative for chills and fever.   HENT: Negative for trouble swallowing.    Eyes: Negative for visual disturbance.   Respiratory: Negative for chest tightness and shortness of breath.    Cardiovascular: Negative for chest pain and palpitations.   Gastrointestinal:  "Positive for abdominal pain and diarrhea. Negative for abdominal distention, constipation, nausea and vomiting.   Endocrine: Negative for polyuria.   Genitourinary: Negative for dysuria.   Neurological: Negative for dizziness and light-headedness.       OBJECTIVE:     Vital Signs (Most Recent)  Temp: 98.1 °F (36.7 °C) (04/21/17 1133)  Pulse: 80 (04/21/17 1133)  BP: 116/69 (04/21/17 1133)  5' 1" (1.549 m)  81.6 kg (180 lb)     Physical Exam:  Physical Exam   Constitutional: She appears well-developed and well-nourished. No distress.   Cardiovascular: Normal rate and regular rhythm.    Pulmonary/Chest: Effort normal and breath sounds normal.   Abdominal: Soft. She exhibits no distension. There is tenderness.   TTP in RUQ and epigastric regions. BS+. No peritoneal signs.        Laboratory  CBC: Reviewed  CMP: Reviewed    Diagnostic Results:  No intrahepatic biliary ductal dilatation is detected. The common bile duct is within normal limits at 0.3 cm. A large non-mobile 2.9 cm gallstone is present in the fundus of the gallbladder. There is evidence of focal adenomyomatosis/cholesterolosis of the gall bladder wall with a possible band of adenomyomatosis near the base of this gallstone. No gallbladder wall thickening, pericholecystic fluid, or focal tenderness over the gallbladder.    ASSESSMENT/PLAN:   67 yo female with large gallstone and symptomatic cholelithiasis.    PLAN:Plan     -to OR for lap cholecystectomy  -consents signed in clinic    Gerald Orellana PGY5    ATTENDING ATTESTATION: Patient seen and examined. My examination confirms the resident's findings and I agree with his assessment and plan above.    "

## 2017-04-26 NOTE — TRANSFER OF CARE
"Anesthesia Transfer of Care Note    Patient: Rajesh Mas    Procedure(s) Performed: Procedure(s) (LRB):  CHOLECYSTECTOMY-LAPAROSCOPIC Possible Open (N/A)    Patient location: PACU    Anesthesia Type: general    Transport from OR: Transported from OR on 6-10 L/min O2 by face mask with adequate spontaneous ventilation    Post pain: adequate analgesia    Post assessment: no apparent anesthetic complications and tolerated procedure well    Post vital signs: stable    Level of consciousness: awake and alert    Nausea/Vomiting: no nausea/vomiting    Complications: none          Last vitals:   Visit Vitals    /62    Pulse 70    Temp 36.6 °C (97.9 °F) (Axillary)    Resp 14    Ht 5' 1" (1.549 m)    Wt 81.6 kg (180 lb)    LMP  (LMP Unknown)    SpO2 (!) 94%    Breastfeeding No    BMI 34.01 kg/m2     "

## 2017-05-09 ENCOUNTER — OFFICE VISIT (OUTPATIENT)
Dept: SURGERY | Facility: CLINIC | Age: 67
End: 2017-05-09
Payer: COMMERCIAL

## 2017-05-09 VITALS
SYSTOLIC BLOOD PRESSURE: 115 MMHG | WEIGHT: 178 LBS | HEIGHT: 61 IN | BODY MASS INDEX: 33.61 KG/M2 | TEMPERATURE: 98 F | DIASTOLIC BLOOD PRESSURE: 57 MMHG | HEART RATE: 59 BPM

## 2017-05-09 DIAGNOSIS — K80.20 GALLSTONES: Primary | ICD-10-CM

## 2017-05-09 PROCEDURE — 99999 PR PBB SHADOW E&M-EST. PATIENT-LVL III: CPT | Mod: PBBFAC,,, | Performed by: SURGERY

## 2017-05-09 PROCEDURE — 99024 POSTOP FOLLOW-UP VISIT: CPT | Mod: S$GLB,,, | Performed by: SURGERY

## 2017-05-09 NOTE — PROGRESS NOTES
HPI:  The patient is status post-laparoscopic cholecystectomy on 4/26/17. She has 0/10 pain. She is eating well. The patient denies nausea. The patient complains of some diarrhea. She denies any fevers or chills. Pathology showed chronic cholecystitis and cholelithiasis. This was discussed with the patient during her visit.    PHYSICAL EXAM:  Physical Exam   Constitutional: She is oriented to person, place, and time. She appears well-developed and well-nourished. No distress.   HENT:   Head: Normocephalic and atraumatic.   Cardiovascular: Normal rate and regular rhythm.    Pulmonary/Chest: Effort normal and breath sounds normal.   Abdominal: Soft. She exhibits no distension and no mass. There is no tenderness. No hernia.   Neurological: She is alert and oriented to person, place, and time.   Skin: Skin is warm and dry.       ASSESSMENT:    The patient is doing well after surgery.     PLAN:    Follow up PRN.      ATTENDING ATTESTATION: Patient seen and examined. My examination confirms the resident's findings and I agree with her assessment and plan above.

## 2017-05-12 ENCOUNTER — OFFICE VISIT (OUTPATIENT)
Dept: INTERNAL MEDICINE | Facility: CLINIC | Age: 67
End: 2017-05-12
Payer: COMMERCIAL

## 2017-05-12 VITALS
HEART RATE: 62 BPM | BODY MASS INDEX: 33.95 KG/M2 | HEIGHT: 61 IN | DIASTOLIC BLOOD PRESSURE: 74 MMHG | WEIGHT: 179.81 LBS | SYSTOLIC BLOOD PRESSURE: 115 MMHG

## 2017-05-12 DIAGNOSIS — Z90.49 STATUS POST CHOLECYSTECTOMY: ICD-10-CM

## 2017-05-12 DIAGNOSIS — Z00.00 HEALTH CARE MAINTENANCE: Primary | ICD-10-CM

## 2017-05-12 DIAGNOSIS — R94.31 ABNORMAL EKG: ICD-10-CM

## 2017-05-12 DIAGNOSIS — I10 ESSENTIAL HYPERTENSION: ICD-10-CM

## 2017-05-12 PROBLEM — K80.20 GALLSTONES: Status: RESOLVED | Noted: 2017-04-26 | Resolved: 2017-05-12

## 2017-05-12 PROCEDURE — 3078F DIAST BP <80 MM HG: CPT | Mod: S$GLB,,, | Performed by: INTERNAL MEDICINE

## 2017-05-12 PROCEDURE — 99396 PREV VISIT EST AGE 40-64: CPT | Mod: S$GLB,,, | Performed by: INTERNAL MEDICINE

## 2017-05-12 PROCEDURE — 99999 PR PBB SHADOW E&M-EST. PATIENT-LVL III: CPT | Mod: PBBFAC,,, | Performed by: INTERNAL MEDICINE

## 2017-05-12 PROCEDURE — 3074F SYST BP LT 130 MM HG: CPT | Mod: S$GLB,,, | Performed by: INTERNAL MEDICINE

## 2017-05-12 NOTE — PROGRESS NOTES
"Subjective:       Patient ID: Rajesh Mas is a 66 y.o. female.    Chief Complaint: Annual Exam    HPI   67 yo F here for yearly preventative healthcare visit.     Gallstones post cholecystectomy 4/26/17.     HTN  amlod-benazepril 5-40  Tri-hctz 37.5-25  Has been running well at home too.     She noticed a strange coincidence - when mom was 45 had ex-lap for blood in stool and had partial colectomy. In 1989 at 66 had gallbladder removed. When she was 70 she had a heart cath she then had a cabg x 2.   Review of Systems   Constitutional: Negative for fever.   HENT: Negative.    Eyes: Negative.    Respiratory: Negative for shortness of breath.         Occasional shortness of breath with exertion that she has attributed to being out of shape   Cardiovascular: Negative for chest pain and leg swelling.   Gastrointestinal: Negative for abdominal pain, diarrhea, nausea and vomiting.   Genitourinary: Negative.    Musculoskeletal: Negative for arthralgias.   Skin: Negative for rash.   Psychiatric/Behavioral: Negative.        Objective:   /74  Pulse 62  Ht 5' 1" (1.549 m)  Wt 81.5 kg (179 lb 12.6 oz)  LMP  (LMP Unknown)  BMI 33.97 kg/m2     Physical Exam   Constitutional: She is oriented to person, place, and time. She appears well-developed and well-nourished. No distress.   HENT:   Head: Normocephalic and atraumatic.   Cardiovascular: Normal rate and regular rhythm.    Pulmonary/Chest: Effort normal. No respiratory distress. She has no wheezes. She has no rales.   Abdominal:   Well healed lap edgar surgical scars, mild hepatomegaly, soft, nt, nd   Neurological: She is alert and oriented to person, place, and time.   Skin: Skin is warm and dry. She is not diaphoretic.   Psychiatric: She has a normal mood and affect. Her behavior is normal.       Assessment:       1. Health care maintenance    2. Essential hypertension    3. Status post cholecystectomy    4. Abnormal EKG        Plan:       Rajesh was seen today " for annual exam.    Diagnoses and all orders for this visit:    Health care maintenance  -     CBC auto differential; Future  -     Comprehensive metabolic panel; Future  -     Lipid panel; Future  -     TSH; Future    Essential hypertension  Well controlled, continue current meds    Status post cholecystectomy  Doing well    Abnormal EKG seen on pre-op, she has mild exertional dyspnea and hx of CAD in her mother at similar age  -     Exercise stress echo with color flow; Future   If cp, significant dyspnea go to ED

## 2017-05-12 NOTE — MR AVS SNAPSHOT
[facility]  [address]  [facility] [address]  Phone: [phone]  Fax: [phone]                  Rajesh Mas   2017 9:20 AM   Office Visit    Description:  Female : 1950   Provider:  Gail Villarreal MD   Department:  [facility]           Reason for Visit     Annual Exam           Diagnoses this Visit        Comments    Health care maintenance    -  Primary     Essential hypertension         Status post cholecystectomy         Abnormal EKG                To Do List           Goals (5 Years of Data)     None      Follow-Up and Disposition     Return in about 6 months (around 2017).      [facility] On Call     [facility] On Call Nurse Care Line -  Assistance  Unless otherwise directed by your provider, please contact [facility], our nurse care line that is available for  assistance.     Registered nurses in the [facility] provide: appointment scheduling, clinical advisement, health education, and other advisory services.  Call: [phone] (toll free)               Medications           Message regarding Medications     Verify the changes and/or additions to your medication regime listed below are the same as discussed with your clinician today.  If any of these changes or additions are incorrect, please notify your healthcare provider.        STOP taking these medications     oxycodone-acetaminophen (PERCOCET) 5-325 mg per tablet Take 1 tablet by mouth every 4 (four) hours as needed.           Verify that the below list of medications is an accurate representation of the medications you are currently taking.  If none reported, the list may be blank. If incorrect, please contact your healthcare provider. Carry this list with you in case of emergency.           Current Medications     amlodipine-benazepril (LOTREL) 5-40 mg per capsule TAKE ONE CAPSULE BY MOUTH ONCE A DAY    ketoconazole (NIZORAL) 2 % cream AAA bid  "x 4 wks    triamterene-hydrochlorothiazide 37.5-25 mg (DYAZIDE) 37.5-25 mg per capsule Take 1 capsule by mouth every morning.           Clinical Reference Information           Your Vitals Were     BP Pulse Height Weight Last Period BMI    115/74 62 5' 1" (1.549 m) 81.5 kg (179 lb 12.6 oz) (LMP Unknown) 33.97 kg/m2      Blood Pressure          Most Recent Value    BP  115/74      Allergies as of 5/12/2017     Adhesive      Immunizations Administered on Date of Encounter - 5/12/2017     None      Orders Placed During Today's Visit     Future Labs/Procedures Expected by Expires    CBC auto differential  5/12/2017 7/11/2018    Comprehensive metabolic panel  5/12/2017 7/11/2018    Lipid panel  5/12/2017 7/11/2018    TSH  5/12/2017 7/11/2018    Exercise stress echo with color flow  As directed 5/12/2018      Language Assistance Services     ATTENTION: Language assistance services are available, free of charge. Please call [phone].      ATENCIÓN: Si habla jo ann, tiene a nogueira disposición servicios gratuitos de asistencia lingüística. Llame al [phone].     JEFF Ý: N?u b?n nói Ti?ng Vi?t, có các d?ch v? h? tr? ngôn ng? mi?n phí dành cho b?n. G?i s? [phone].         [facility] complies with applicable Federal civil rights laws and does not discriminate on the basis of race, color, national origin, age, disability, or sex.        "

## 2017-05-13 ENCOUNTER — LAB VISIT (OUTPATIENT)
Dept: LAB | Facility: HOSPITAL | Age: 67
End: 2017-05-13
Attending: INTERNAL MEDICINE
Payer: COMMERCIAL

## 2017-05-13 DIAGNOSIS — Z00.00 HEALTH CARE MAINTENANCE: ICD-10-CM

## 2017-05-13 LAB
ALBUMIN SERPL BCP-MCNC: 3.5 G/DL
ALP SERPL-CCNC: 62 U/L
ALT SERPL W/O P-5'-P-CCNC: 18 U/L
ANION GAP SERPL CALC-SCNC: 12 MMOL/L
AST SERPL-CCNC: 20 U/L
BASOPHILS # BLD AUTO: 0.04 K/UL
BASOPHILS NFR BLD: 0.5 %
BILIRUB SERPL-MCNC: 0.5 MG/DL
BUN SERPL-MCNC: 18 MG/DL
CALCIUM SERPL-MCNC: 9.4 MG/DL
CHLORIDE SERPL-SCNC: 108 MMOL/L
CHOLEST/HDLC SERPL: 3.2 {RATIO}
CO2 SERPL-SCNC: 25 MMOL/L
CREAT SERPL-MCNC: 1.1 MG/DL
DIFFERENTIAL METHOD: ABNORMAL
EOSINOPHIL # BLD AUTO: 0.1 K/UL
EOSINOPHIL NFR BLD: 1.2 %
ERYTHROCYTE [DISTWIDTH] IN BLOOD BY AUTOMATED COUNT: 14.5 %
EST. GFR  (AFRICAN AMERICAN): >60 ML/MIN/1.73 M^2
EST. GFR  (NON AFRICAN AMERICAN): 53.2 ML/MIN/1.73 M^2
GLUCOSE SERPL-MCNC: 87 MG/DL
HCT VFR BLD AUTO: 38.3 %
HDL/CHOLESTEROL RATIO: 30.9 %
HDLC SERPL-MCNC: 175 MG/DL
HDLC SERPL-MCNC: 54 MG/DL
HGB BLD-MCNC: 12.6 G/DL
LDLC SERPL CALC-MCNC: 83 MG/DL
LYMPHOCYTES # BLD AUTO: 2.9 K/UL
LYMPHOCYTES NFR BLD: 34.3 %
MCH RBC QN AUTO: 28.9 PG
MCHC RBC AUTO-ENTMCNC: 32.9 %
MCV RBC AUTO: 88 FL
MONOCYTES # BLD AUTO: 0.7 K/UL
MONOCYTES NFR BLD: 8.6 %
NEUTROPHILS # BLD AUTO: 4.7 K/UL
NEUTROPHILS NFR BLD: 55.3 %
NONHDLC SERPL-MCNC: 121 MG/DL
PLATELET # BLD AUTO: 134 K/UL
PMV BLD AUTO: 13.7 FL
POTASSIUM SERPL-SCNC: 4.3 MMOL/L
PROT SERPL-MCNC: 7.7 G/DL
RBC # BLD AUTO: 4.36 M/UL
SODIUM SERPL-SCNC: 145 MMOL/L
T4 FREE SERPL-MCNC: 0.73 NG/DL
TRIGL SERPL-MCNC: 190 MG/DL
TSH SERPL DL<=0.005 MIU/L-ACNC: 4.39 UIU/ML
WBC # BLD AUTO: 8.4 K/UL

## 2017-05-13 PROCEDURE — 80061 LIPID PANEL: CPT

## 2017-05-13 PROCEDURE — 36415 COLL VENOUS BLD VENIPUNCTURE: CPT

## 2017-05-13 PROCEDURE — 84443 ASSAY THYROID STIM HORMONE: CPT

## 2017-05-13 PROCEDURE — 84439 ASSAY OF FREE THYROXINE: CPT

## 2017-05-13 PROCEDURE — 85025 COMPLETE CBC W/AUTO DIFF WBC: CPT

## 2017-05-13 PROCEDURE — 80053 COMPREHEN METABOLIC PANEL: CPT

## 2017-05-18 ENCOUNTER — HOSPITAL ENCOUNTER (OUTPATIENT)
Dept: CARDIOLOGY | Facility: CLINIC | Age: 67
Discharge: HOME | End: 2017-05-18
Payer: COMMERCIAL

## 2017-05-18 DIAGNOSIS — R94.31 ABNORMAL EKG: ICD-10-CM

## 2017-05-18 LAB
DIASTOLIC DYSFUNCTION: YES
ESTIMATED PA SYSTOLIC PRESSURE: 21.34
RETIRED EF AND QEF - SEE NOTES: 45 (ref 55–65)
TRICUSPID VALVE REGURGITATION: ABNORMAL

## 2017-05-18 PROCEDURE — 93325 DOPPLER ECHO COLOR FLOW MAPG: CPT | Mod: S$GLB,,, | Performed by: INTERNAL MEDICINE

## 2017-05-18 PROCEDURE — 93320 DOPPLER ECHO COMPLETE: CPT | Mod: S$GLB,,, | Performed by: INTERNAL MEDICINE

## 2017-05-18 PROCEDURE — 93351 STRESS TTE COMPLETE: CPT | Mod: S$GLB,,, | Performed by: INTERNAL MEDICINE

## 2017-05-19 DIAGNOSIS — E03.8 SUBCLINICAL HYPOTHYROIDISM: Primary | ICD-10-CM

## 2017-05-19 PROBLEM — I51.89 DIASTOLIC DYSFUNCTION: Status: ACTIVE | Noted: 2017-05-19

## 2017-05-22 ENCOUNTER — PATIENT MESSAGE (OUTPATIENT)
Dept: INTERNAL MEDICINE | Facility: CLINIC | Age: 67
End: 2017-05-22

## 2017-06-06 ENCOUNTER — OFFICE VISIT (OUTPATIENT)
Dept: PODIATRY | Facility: CLINIC | Age: 67
End: 2017-06-06
Payer: COMMERCIAL

## 2017-06-06 VITALS
RESPIRATION RATE: 18 BRPM | SYSTOLIC BLOOD PRESSURE: 125 MMHG | BODY MASS INDEX: 33.79 KG/M2 | WEIGHT: 179 LBS | HEART RATE: 70 BPM | HEIGHT: 61 IN | DIASTOLIC BLOOD PRESSURE: 72 MMHG

## 2017-06-06 DIAGNOSIS — B35.3 TINEA PEDIS OF RIGHT FOOT: ICD-10-CM

## 2017-06-06 DIAGNOSIS — B35.1 ONYCHOMYCOSIS OF TOENAIL: Primary | ICD-10-CM

## 2017-06-06 PROCEDURE — 99203 OFFICE O/P NEW LOW 30 MIN: CPT | Mod: S$GLB,,, | Performed by: PODIATRIST

## 2017-06-06 PROCEDURE — 99999 PR PBB SHADOW E&M-EST. PATIENT-LVL III: CPT | Mod: PBBFAC,,, | Performed by: PODIATRIST

## 2017-06-06 RX ORDER — ECONAZOLE NITRATE 10 MG/G
CREAM TOPICAL 2 TIMES DAILY
Qty: 85 G | Refills: 1 | Status: SHIPPED | OUTPATIENT
Start: 2017-06-06 | End: 2017-12-07

## 2017-06-06 RX ORDER — TERBINAFINE HYDROCHLORIDE 250 MG/1
250 TABLET ORAL DAILY
Qty: 90 TABLET | Refills: 0 | Status: SHIPPED | OUTPATIENT
Start: 2017-06-06 | End: 2017-12-07

## 2017-06-06 NOTE — PROGRESS NOTES
Subjective:      Patient ID: Rajesh Mas is a 66 y.o. female.    Chief Complaint: PCP (Gail Villarreal MD 5/12/17); Nail Problem (Rt great toenail thick and fungus ); and Toe Pain    Rajesh is a 66 y.o. female who presents to the clinic complaining of thick and discolored toenails on both feet. Rajesh is inquiring about treatment options.        Patient Active Problem List   Diagnosis    Essential hypertension    Diastolic dysfunction       Current Outpatient Prescriptions on File Prior to Visit   Medication Sig Dispense Refill    amlodipine-benazepril (LOTREL) 5-40 mg per capsule TAKE ONE CAPSULE BY MOUTH ONCE A DAY 90 capsule 0    triamterene-hydrochlorothiazide 37.5-25 mg (DYAZIDE) 37.5-25 mg per capsule Take 1 capsule by mouth every morning. 30 capsule 11    [DISCONTINUED] ketoconazole (NIZORAL) 2 % cream AAA bid x 4 wks 60 g 3     No current facility-administered medications on file prior to visit.        Review of patient's allergies indicates:   Allergen Reactions    Adhesive Rash       Past Surgical History:   Procedure Laterality Date    CHOLECYSTECTOMY  04/26/2017    COLONOSCOPY N/A 12/6/2016    Procedure: COLONOSCOPY;  Surgeon: Fidel Mason MD;  Location: [address]);  Service: Endoscopy;  Laterality: N/A;    partial colectomy  1997    sigmoid removed due to diverticulitis    SINUS SURGERY  2007       Family History   Problem Relation Age of Onset    Diabetes Mother     Heart failure Mother     Hypertension Mother     Liver cancer Maternal Uncle     Melanoma Neg Hx        Social History     Social History    Marital status:      Spouse name: N/A    Number of children: N/A    Years of education: N/A     Occupational History    Not on file.     Social History Main Topics    Smoking status: Never Smoker    Smokeless tobacco: Not on file    Alcohol use No    Drug use: Unknown    Sexual activity: Not on file     Other Topics Concern    Not on file     Social  "History Narrative    No narrative on file           Review of Systems   Constitution: Negative for chills, decreased appetite and fever.   Cardiovascular: Negative for leg swelling.   Skin: Positive for dry skin. Negative for nail changes.   Musculoskeletal: Negative for arthritis, joint pain, joint swelling and myalgias.   Gastrointestinal: Negative for nausea and vomiting.   Neurological: Negative for loss of balance, numbness and paresthesias.           Objective:       Vitals:    06/06/17 0956   BP: 125/72   Pulse: 70   Resp: 18   Weight: 81.2 kg (179 lb)   Height: 5' 1" (1.549 m)   PainSc: 0-No pain        Physical Exam   Constitutional: She is oriented to person, place, and time. She appears well-developed and well-nourished.   Cardiovascular:   Pulses:       Dorsalis pedis pulses are 2+ on the right side, and 2+ on the left side.        Posterior tibial pulses are 2+ on the right side, and 2+ on the left side.   Musculoskeletal: She exhibits no edema or tenderness.        Right ankle: Normal.        Left ankle: Normal.        Right foot: There is no swelling, no crepitus and no deformity.        Left foot: There is no swelling, no crepitus and no deformity.   Adequate joint range of motion without pain, limitation, nor crepitation Bilateral feet and ankle joints. Muscle strength is 5/5 in all groups bilaterally.         Lymphadenopathy:   No palpable lymph nodes   Neurological: She is alert and oriented to person, place, and time. She has normal strength.   Skin: Skin is warm, dry and intact. No rash noted. No erythema. Nails show no clubbing.   Scaling dryness in a moccasin distribution is noted to the bilateral lower extremities with associated erythema.    Nails x3 are elongated by  2-4mm's, thickened by 2-6 mm's, dystrophic, and are darkened in  coloration . Xerosis Bilaterally. No open lesions noted.     Psychiatric: She has a normal mood and affect. Her behavior is normal.             Assessment:     "   Encounter Diagnoses   Name Primary?    Onychomycosis of toenail Yes    Tinea pedis of right foot          Plan:       Rajesh was seen today for pcp, nail problem and toe pain.    Diagnoses and all orders for this visit:    Onychomycosis of toenail    Tinea pedis of right foot    Other orders  -     terbinafine HCl (LAMISIL) 250 mg tablet; Take 1 tablet (250 mg total) by mouth once daily. 1 pill by mouth x 90 days. Avoid alcohol intake while taking medication  -     econazole nitrate 1 % cream; Apply topically 2 (two) times daily.      I counseled the patient on her conditions, their implications and medical management.    Discussed  options for nail fungus including topicals such as Jublia and Penlac, Oral Lamisil, Lasers, and topical OTC remedies    We discussed using Lamisil for onychomycosis. This drug offers a fairly high but not universal cure rate. A 12 week treatment course is recommended. The patient is aware that rare cases of liver injury have been reported; and agrees to come in for liver function tests at 6 weeks of treatment. The symptoms of liver disease have been discussed; call if such occurs. In addition, some insurance plans do not cover the expense of this drug for treating a cosmetic condition, and the patient understands they may have to pay for the medication. Other side effects, such as headaches and rashes, have also been discussed.    Econazole 1% topical cream prescribed for treatment of aforementioned tinea pedis. Patient will use this medication as directed in addition to thourougly drying between toes daily, and applying powder as needed    Advised the patient that fungus likes warm, dark, and moist environments such as bathrooms, gyms, and pools. Advised to spray shower, shower mat , and any shoes w/ Lysol periodically        .

## 2017-06-06 NOTE — PATIENT INSTRUCTIONS
Nail Fungal Infection  A nail fungal infection changes the way fingernails and toenails look. They may thicken, discolor, change shape, or split. This condition is hard to treat because nails grow slowly and have limited blood supply. The infection often comes back after treatment.  There are 2 types of medicines used to treat this condition:  · Topical anti-fungal medicines. These are applied to the surface of the skin and nail area. These medicines are not very effective because they cant get deep into the nail.  · Oral antifungal medicines. These medicines work better because they go into the nail from the inside out. But the infection may still come back. It may take 9 to 12 months for your nail to look normal again. This means you are cured. You can repeat treatment if needed. Most people take these medicines without any problems. It is rare to stop therapy because of side effects. But your healthcare provider may give you some monitoring tests. Talk about possible side effects with your provider before starting treatment.  If medicines fail, the nail can be removed surgically or chemically. These methods physically remove the fungus from the body. This helps medical treatment be more effective.  Home care  · Use medicines exactly as directed for as long as directed. Treating a fungal infection can take longer than other kinds of infections.  · Smoking is a risk factor for fungal infection. This is one more reason to quit.  · Wear absorbent socks, and shoes that let your feet breathe. Sweaty feet increase your risk of fungal infection. They also make an existing infection harder to treat.  · Use footwear when in damp public places like swimming pools, gyms, and shower rooms. This will help you avoid the fungus that grows there.  · Don't share nail clippers or scissors with others.  Follow-up care  Follow up with your healthcare provider, or as advised.  When to seek medical advice  Call your healthcare  provider right away if any of these occur:  · Skin by the nail becomes red, swollen, painful, or drains pus (a creamy yellow or white liquid)  · Side effects from oral anti-fungal medicines  Date Last Reviewed: 8/1/2016 © 2000-2016 [employer]. [address] [place] [address]. All rights reserved. This information is not intended as a substitute for professional medical care. Always follow your healthcare professional's instructions.        Athletes Foot    Athletes foot (tinea pedis) is caused by a fungal infection in the skin. It affects the skin between the toes, causing cracks in the skin called fissures. It can also affect the bottom of the foot where it causes dry white scales and peeling of the skin. This infection is more likely to occur when the foot is in hot, sweaty socks and shoes for long periods of time. You may feel itching and burning between your toes. This infection is treated with skin creams or medicine taken by mouth.  Home care  The following are general care guidelines:  · It is important to keep the feet dry. Use absorbent cotton socks and change them if they become sweaty. Or wear an open-toe shoe or sandal. Wash the feet at least once a day with soap and water.  · Apply the antifungal cream as prescribed. Some antifungal creams are available without a prescription.  · It may take a week before the rash starts to improve. It can take about 3 to 4 weeks to completely clear. Continue the medicine until the rash is all gone.  · Use over-the-counter antifungal powders or sprays on your feet after exposure to high-risk environments, such as public showers, gyms, and locker rooms. This can help prevent future infections. Wearing appropriate shoes in these situations can help.  Prevention  The following tips may help prevent athletes foot:  · Don't share shoes or socks with someone who has athlete's foot.  · Don't walk barefoot in places where a fungal infection can  spread quickly such as locker rooms, showers, and swimming pools.  · Change socks regularly.  · Alternate shoes to assist in drying.  Follow-up care  Follow up with your healthcare provider as recommended if the rash does not improve after 10 days of treatment, or if the rash continues to spread.  When to seek medical care  Get medical attention right away if any of the following occur:  · Fever of 100.4°F (38°C) or higher, or as directed  · Increasing redness or swelling of the foot  · Infection comes back soon after treatment  · Pus draining from cracks in the skin  Date Last Reviewed: 8/1/2016  © 4303-1673 [employer]. [address] [place] [address], [place] [address]. All rights reserved. This information is not intended as a substitute for professional medical care. Always follow your healthcare professional's instructions.

## 2017-06-07 ENCOUNTER — PATIENT MESSAGE (OUTPATIENT)
Dept: PODIATRY | Facility: CLINIC | Age: 67
End: 2017-06-07

## 2017-07-07 ENCOUNTER — OFFICE VISIT (OUTPATIENT)
Dept: INTERNAL MEDICINE | Facility: CLINIC | Age: 67
End: 2017-07-07
Payer: COMMERCIAL

## 2017-07-07 VITALS
SYSTOLIC BLOOD PRESSURE: 100 MMHG | TEMPERATURE: 98 F | WEIGHT: 181.19 LBS | DIASTOLIC BLOOD PRESSURE: 70 MMHG | HEART RATE: 80 BPM | HEIGHT: 61 IN | BODY MASS INDEX: 34.21 KG/M2

## 2017-07-07 DIAGNOSIS — N30.00 ACUTE CYSTITIS WITHOUT HEMATURIA: Primary | ICD-10-CM

## 2017-07-07 LAB
BACTERIA #/AREA URNS AUTO: ABNORMAL /HPF
BILIRUB UR QL STRIP: NEGATIVE
CLARITY UR REFRACT.AUTO: ABNORMAL
COLOR UR AUTO: ABNORMAL
GLUCOSE UR QL STRIP: NEGATIVE
HGB UR QL STRIP: ABNORMAL
HYALINE CASTS UR QL AUTO: 0 /LPF
KETONES UR QL STRIP: NEGATIVE
LEUKOCYTE ESTERASE UR QL STRIP: ABNORMAL
MICROSCOPIC COMMENT: ABNORMAL
NITRITE UR QL STRIP: NEGATIVE
PH UR STRIP: 7 [PH] (ref 5–8)
PROT UR QL STRIP: ABNORMAL
RBC #/AREA URNS AUTO: >100 /HPF (ref 0–4)
SP GR UR STRIP: 1.02 (ref 1–1.03)
SQUAMOUS #/AREA URNS AUTO: 0 /HPF
URN SPEC COLLECT METH UR: ABNORMAL
UROBILINOGEN UR STRIP-ACNC: NEGATIVE EU/DL
WBC #/AREA URNS AUTO: >100 /HPF (ref 0–5)

## 2017-07-07 PROCEDURE — 87077 CULTURE AEROBIC IDENTIFY: CPT

## 2017-07-07 PROCEDURE — 99213 OFFICE O/P EST LOW 20 MIN: CPT | Mod: S$GLB,,, | Performed by: PHYSICIAN ASSISTANT

## 2017-07-07 PROCEDURE — 99999 PR PBB SHADOW E&M-EST. PATIENT-LVL IV: CPT | Mod: PBBFAC,,, | Performed by: PHYSICIAN ASSISTANT

## 2017-07-07 PROCEDURE — 87088 URINE BACTERIA CULTURE: CPT

## 2017-07-07 PROCEDURE — 87086 URINE CULTURE/COLONY COUNT: CPT

## 2017-07-07 PROCEDURE — 81001 URINALYSIS AUTO W/SCOPE: CPT

## 2017-07-07 PROCEDURE — 87186 SC STD MICRODIL/AGAR DIL: CPT

## 2017-07-07 RX ORDER — AMOXICILLIN AND CLAVULANATE POTASSIUM 875; 125 MG/1; MG/1
1 TABLET, FILM COATED ORAL EVERY 12 HOURS
Qty: 14 TABLET | Refills: 0 | Status: CANCELLED | OUTPATIENT
Start: 2017-07-07

## 2017-07-07 RX ORDER — AMOXICILLIN AND CLAVULANATE POTASSIUM 500; 125 MG/1; MG/1
1 TABLET, FILM COATED ORAL 3 TIMES DAILY
Qty: 30 TABLET | Refills: 0 | Status: SHIPPED | OUTPATIENT
Start: 2017-07-07 | End: 2017-07-31

## 2017-07-07 NOTE — PROGRESS NOTES
"Subjective:       Patient ID: Rajesh Mas is a 66 y.o. female.        Chief Complaint: Urinary Tract Infection    Rajesh Mas is an established patient of Gail Villarreal MD here today for urgent care visit.    Increased urinary frequency started last night with decreased urine volume.  "Pulling" sensation when she urinates.  Also with urgency.  With hematuria this morning x 1 episode (perhaps 1/2 teaspoon she thinks).  No N/V.  With suprapubic pain/pressure.  No flank pain. No fever, chills, sweats, body aches.      No UTI in many years.  She has had blood in the urine with previous UTI's.             Review of Systems   Constitutional: Negative for chills, diaphoresis, fatigue and fever.   HENT: Negative for congestion and sore throat.    Eyes: Negative for visual disturbance.   Respiratory: Negative for cough, chest tightness and shortness of breath.    Cardiovascular: Negative for chest pain, palpitations and leg swelling.   Gastrointestinal: Negative for abdominal pain, blood in stool, constipation, diarrhea, nausea and vomiting.   Genitourinary: Positive for dysuria, frequency, hematuria and urgency.   Musculoskeletal: Negative for arthralgias and back pain.   Skin: Negative for rash.   Neurological: Negative for dizziness, syncope, weakness and headaches.   Psychiatric/Behavioral: Negative for dysphoric mood and sleep disturbance. The patient is not nervous/anxious.        Objective:      Physical Exam   Constitutional: She appears well-developed and well-nourished. No distress.   HENT:   Head: Normocephalic and atraumatic.   Right Ear: Tympanic membrane and external ear normal.   Left Ear: Tympanic membrane and external ear normal.   Nose: Nose normal.   Mouth/Throat: Oropharynx is clear and moist.   Eyes: Conjunctivae are normal. Pupils are equal, round, and reactive to light.   Cardiovascular: Normal rate, regular rhythm and normal heart sounds.  Exam reveals no gallop.    No murmur " "heard.  Pulmonary/Chest: Effort normal and breath sounds normal. No respiratory distress.   Abdominal: Soft. Normal appearance. There is tenderness (mild) in the suprapubic area. There is no rigidity, no rebound, no guarding and no CVA tenderness.   Musculoskeletal: She exhibits no edema.   Neurological: She is alert.   Skin: Skin is warm and dry. She is not diaphoretic.   Psychiatric: She has a normal mood and affect.   Nursing note and vitals reviewed.      Assessment:       1. Acute cystitis without hematuria        Plan:       Rajesh was seen today for urinary tract infection.    Diagnoses and all orders for this visit:    Acute cystitis without hematuria  -     POCT urinalysis, dipstick or tablet reag-1+ leukocytes, 250 RBC's  -     Urinalysis  -     Urine culture  -     amoxicillin-clavulanate 500-125mg (AUGMENTIN) 500-125 mg Tab; Take 1 tablet (500 mg total) by mouth 3 (three) times daily.    Drink plenty of fluids, get lots of rest, and follow-up poor results.      Pt has been given instructions populated from [employer] database and has verbalized understanding of the after visit summary and information contained wherein.    Follow up with a primary care provider. May go to ER for acute shortness of breath, lightheadedness, fever, or any other emergent complaints or changes in condition.    "This note will be shared with the patient"    No future appointments.            "

## 2017-07-07 NOTE — PATIENT INSTRUCTIONS

## 2017-07-10 LAB — BACTERIA UR CULT: NORMAL

## 2017-07-18 ENCOUNTER — OFFICE VISIT (OUTPATIENT)
Dept: OBSTETRICS AND GYNECOLOGY | Facility: CLINIC | Age: 67
End: 2017-07-18
Payer: COMMERCIAL

## 2017-07-18 ENCOUNTER — TELEPHONE (OUTPATIENT)
Dept: OBSTETRICS AND GYNECOLOGY | Facility: CLINIC | Age: 67
End: 2017-07-18

## 2017-07-18 ENCOUNTER — PATIENT MESSAGE (OUTPATIENT)
Dept: OBSTETRICS AND GYNECOLOGY | Facility: CLINIC | Age: 67
End: 2017-07-18

## 2017-07-18 VITALS
DIASTOLIC BLOOD PRESSURE: 80 MMHG | WEIGHT: 184.19 LBS | HEIGHT: 61 IN | SYSTOLIC BLOOD PRESSURE: 120 MMHG | BODY MASS INDEX: 34.78 KG/M2

## 2017-07-18 DIAGNOSIS — N76.0 ACUTE VAGINITIS: Primary | ICD-10-CM

## 2017-07-18 LAB
CANDIDA RRNA VAG QL PROBE: POSITIVE
G VAGINALIS RRNA GENITAL QL PROBE: POSITIVE
T VAGINALIS RRNA GENITAL QL PROBE: NEGATIVE

## 2017-07-18 PROCEDURE — 87480 CANDIDA DNA DIR PROBE: CPT

## 2017-07-18 PROCEDURE — 99999 PR PBB SHADOW E&M-EST. PATIENT-LVL III: CPT | Mod: PBBFAC,,, | Performed by: ADVANCED PRACTICE MIDWIFE

## 2017-07-18 PROCEDURE — 99214 OFFICE O/P EST MOD 30 MIN: CPT | Mod: S$GLB,,, | Performed by: ADVANCED PRACTICE MIDWIFE

## 2017-07-18 PROCEDURE — 87660 TRICHOMONAS VAGIN DIR PROBE: CPT

## 2017-07-18 RX ORDER — FLUCONAZOLE 200 MG/1
200 TABLET ORAL EVERY OTHER DAY
Qty: 2 TABLET | Refills: 0 | Status: SHIPPED | OUTPATIENT
Start: 2017-07-18 | End: 2017-07-21

## 2017-07-18 NOTE — TELEPHONE ENCOUNTER
----- Message from Janine Gonsales sent at 7/18/2017  1:55 PM CDT -----  Contact: Pt  x_ 1st Request  _ 2nd Request  _ 3rd Request    Who: Pt    Why: is returning a call from staff    What Number to Call Back: [phone]    When to Expect a call back: (Before the end of the day)  -- if call after 3:00 call back will be tomorrow.

## 2017-07-18 NOTE — PROGRESS NOTES
Rajesh Mas is a 66 y.o. female No obstetric history on file. [facility] with complaint of vaginal irritation and itch.  She reports itching, and denies odor.  She states the discharge is white.  Pt reports being treated for cystitis last last week and had taken a course of antibiotics. Pt reports having severe vaginal itching and irritation for last 2-3 days.        ROS:  GENERAL: No fever, chills, fatigability or weight loss.  VULVAR: No pain, no lesions and no itching.  VAGINAL: No relaxation, no abnormal bleeding and no lesions. Vaginal itching and irritation  ABDOMEN: No abdominal pain. Denies nausea. Denies vomiting. No diarrhea. No constipation  BREAST: Denies pain. No lumps. No discharge.  URINARY: No incontinence, no nocturia, no frequency and no dysuria.  CARDIOVASCULAR: No chest pain. No shortness of breath. No leg cramps.  NEUROLOGICAL: No headaches. No vision changes.      Review of patient's allergies indicates:   Allergen Reactions    Adhesive Rash       Current Outpatient Prescriptions:     amlodipine-benazepril (LOTREL) 5-40 mg per capsule, TAKE ONE CAPSULE BY MOUTH ONCE A DAY, Disp: 90 capsule, Rfl: 0    amoxicillin-clavulanate 500-125mg (AUGMENTIN) 500-125 mg Tab, Take 1 tablet (500 mg total) by mouth 3 (three) times daily., Disp: 30 tablet, Rfl: 0    econazole nitrate 1 % cream, Apply topically 2 (two) times daily., Disp: 85 g, Rfl: 1    fluconazole (DIFLUCAN) 200 MG Tab, Take 1 tablet (200 mg total) by mouth every other day., Disp: 2 tablet, Rfl: 0    terbinafine HCl (LAMISIL) 250 mg tablet, Take 1 tablet (250 mg total) by mouth once daily. 1 pill by mouth x 90 days. Avoid alcohol intake while taking medication, Disp: 90 tablet, Rfl: 0    triamterene-hydrochlorothiazide 37.5-25 mg (DYAZIDE) 37.5-25 mg per capsule, Take 1 capsule by mouth every morning., Disp: 30 capsule, Rfl: 11    Past Medical History:   Diagnosis Date    Allergy     seasonal    Diverticulitis   "   Fever blister     Hypertension      Past Surgical History:   Procedure Laterality Date    CHOLECYSTECTOMY  04/26/2017    COLONOSCOPY N/A 12/6/2016    Procedure: COLONOSCOPY;  Surgeon: Fidel Mason MD;  Location: [facility] ([address] [place] [address]);  Service: Endoscopy;  Laterality: N/A;    partial colectomy  1997    sigmoid removed due to diverticulitis    SINUS SURGERY  2007     Social History   Substance Use Topics    Smoking status: Never Smoker    Smokeless tobacco: Never Used    Alcohol use No     OB History   No data available       /80   Ht 5' 1" (1.549 m)   Wt 83.6 kg (184 lb 3.1 oz)   LMP  (LMP Unknown)   BMI 34.80 kg/m²     PHYSICAL EXAM:  GENERAL: Calm and appropriate affect, alert, oriented x4  SKIN: Color appropriate for race, warm and dry, clean and intact with no rashes.  RESP: Even, unlabored breathing  ABDOMEN: Soft, nontender, no masses.  :   Normal external female genitalia without lesions. Normal hair distribution. Adequate perineal body, normal urethral meatus.  Vagina pink and well rugated, no lesions, vaginal discharge - minimal discharge, no odor  Pt with difficulty tolerating exam        ASSESSMENT / PLAN:    ICD-10-CM ICD-9-CM    1. Acute vaginitis N76.0 616.10 Vaginosis Screen by DNA Probe      fluconazole (DIFLUCAN) 200 MG Tab     Acute vaginitis  -     Vaginosis Screen by DNA Probe  -     fluconazole (DIFLUCAN) 200 MG Tab; Take 1 tablet (200 mg total) by mouth every other day.  Dispense: 2 tablet; Refill: 0          Patient was counseled today on vaginitis prevention including :  a. avoiding feminine products such as deoderant soaps, body wash, bubble bath, douches, scented toilet paper, deoderant tampons or pads, feminine wipes, chronic pad use, etc.  b. avoiding other vulvovaginal irritants such as long hot baths, humidity, tight, synthetic clothing, chlorine and sitting around in wet bathing suits  c. wearing cotton underwear, avoiding thong underwear and no underwear to " bed  d. taking showers instead of baths and use a hair dryer on cool setting afterwards to dry  e. wearing cotton to exercise and shower immediately after exercise and change clothes  f. using polyurethane condoms without spermicide if sexually active and symptoms are triggered by intercourse  g. Discussed use of vagisil, along with repHresh and probiotics    FOLLOW UP:   Pending lab results, PRN lack of improvement.

## 2017-07-19 ENCOUNTER — PATIENT MESSAGE (OUTPATIENT)
Dept: OBSTETRICS AND GYNECOLOGY | Facility: OTHER | Age: 67
End: 2017-07-19

## 2017-07-19 DIAGNOSIS — N76.0 BV (BACTERIAL VAGINOSIS): Primary | ICD-10-CM

## 2017-07-19 DIAGNOSIS — B96.89 BV (BACTERIAL VAGINOSIS): Primary | ICD-10-CM

## 2017-07-19 RX ORDER — METRONIDAZOLE 500 MG/1
500 TABLET ORAL EVERY 12 HOURS
Qty: 14 TABLET | Refills: 0 | Status: SHIPPED | OUTPATIENT
Start: 2017-07-19 | End: 2017-07-26

## 2017-07-28 ENCOUNTER — PATIENT MESSAGE (OUTPATIENT)
Dept: INTERNAL MEDICINE | Facility: CLINIC | Age: 67
End: 2017-07-28

## 2017-07-31 ENCOUNTER — HOSPITAL ENCOUNTER (OUTPATIENT)
Dept: RADIOLOGY | Facility: HOSPITAL | Age: 67
Discharge: HOME | End: 2017-07-31
Attending: INTERNAL MEDICINE
Payer: COMMERCIAL

## 2017-07-31 ENCOUNTER — OFFICE VISIT (OUTPATIENT)
Dept: INTERNAL MEDICINE | Facility: CLINIC | Age: 67
End: 2017-07-31
Payer: COMMERCIAL

## 2017-07-31 VITALS
DIASTOLIC BLOOD PRESSURE: 92 MMHG | OXYGEN SATURATION: 96 % | BODY MASS INDEX: 34.17 KG/M2 | HEART RATE: 66 BPM | WEIGHT: 181 LBS | SYSTOLIC BLOOD PRESSURE: 154 MMHG | TEMPERATURE: 99 F | HEIGHT: 61 IN

## 2017-07-31 DIAGNOSIS — M25.461 PAIN AND SWELLING OF RIGHT KNEE: ICD-10-CM

## 2017-07-31 DIAGNOSIS — M25.561 PAIN AND SWELLING OF RIGHT KNEE: ICD-10-CM

## 2017-07-31 DIAGNOSIS — M25.461 EFFUSION, RIGHT KNEE: Primary | ICD-10-CM

## 2017-07-31 DIAGNOSIS — M25.461 EFFUSION, RIGHT KNEE: ICD-10-CM

## 2017-07-31 PROCEDURE — 99213 OFFICE O/P EST LOW 20 MIN: CPT | Mod: S$GLB,,, | Performed by: INTERNAL MEDICINE

## 2017-07-31 PROCEDURE — 73562 X-RAY EXAM OF KNEE 3: CPT | Mod: 26,RT,, | Performed by: RADIOLOGY

## 2017-07-31 PROCEDURE — 99999 PR PBB SHADOW E&M-EST. PATIENT-LVL IV: CPT | Mod: PBBFAC,,, | Performed by: INTERNAL MEDICINE

## 2017-07-31 PROCEDURE — 73562 X-RAY EXAM OF KNEE 3: CPT | Mod: TC,RT

## 2017-07-31 RX ORDER — TRAMADOL HYDROCHLORIDE 50 MG/1
50 TABLET ORAL EVERY 6 HOURS PRN
Qty: 30 TABLET | Refills: 0 | Status: SHIPPED | OUTPATIENT
Start: 2017-07-31 | End: 2017-08-10

## 2017-07-31 NOTE — PROGRESS NOTES
Subjective:       Patient ID: Rajesh Mas is a 66 y.o. female.    Chief Complaint: Leg Pain (rt leg x 4 days- woke up with it hurtiung. hasn't tried anything for pain )    Leg Pain    There was no injury mechanism. The pain is present in the right knee. The quality of the pain is described as aching. The pain is at a severity of 5/10. The pain is moderate. Pertinent negatives include no inability to bear weight, loss of motion, loss of sensation, muscle weakness, numbness or tingling. She reports no foreign bodies present. The symptoms are aggravated by weight bearing. She has tried nothing for the symptoms. The treatment provided no relief.     Review of Systems   Constitutional: Negative for activity change, chills, fatigue, fever and unexpected weight change.   HENT: Negative for congestion, ear pain, hearing loss, nosebleeds, postnasal drip, rhinorrhea, sinus pressure, sore throat and trouble swallowing.    Eyes: Negative.  Negative for discharge and visual disturbance.   Respiratory: Negative for cough, chest tightness, shortness of breath and wheezing.    Cardiovascular: Negative for chest pain and palpitations.   Gastrointestinal: Negative for abdominal pain, blood in stool, constipation, diarrhea, nausea and vomiting.   Endocrine: Negative for polydipsia and polyuria.   Genitourinary: Negative for difficulty urinating, dysuria, frequency, hematuria, menstrual problem and urgency.   Musculoskeletal: Positive for arthralgias. Negative for joint swelling, neck pain and neck stiffness.   Skin: Negative for rash.   Neurological: Negative for dizziness, tingling, weakness, numbness and headaches.   Psychiatric/Behavioral: Negative for confusion, dysphoric mood and sleep disturbance. The patient is not nervous/anxious.        Objective:      Physical Exam   Musculoskeletal:        Right knee: She exhibits effusion. She exhibits normal range of motion, no swelling, no ecchymosis, no deformity, no laceration, no  erythema, normal alignment, no LCL laxity, normal patellar mobility, no bony tenderness, normal meniscus and no MCL laxity. No tenderness found.       Assessment:       1. Effusion, right knee    2. Pain and swelling of right knee        Plan:   Rajesh was seen today for leg pain.    Diagnoses and all orders for this visit:    Effusion, right knee  -     X-Ray Knee 3 View Right; Future  -     Ambulatory referral to Orthopedics    Pain and swelling of right knee  -     X-Ray Knee 3 View Right; Future  -     Ambulatory referral to Orthopedics    Other orders  -     tramadol (ULTRAM) 50 mg tablet; Take 1 tablet (50 mg total) by mouth every 6 (six) hours as needed for Pain.

## 2017-08-03 ENCOUNTER — OFFICE VISIT (OUTPATIENT)
Dept: ORTHOPEDICS | Facility: CLINIC | Age: 67
End: 2017-08-03
Payer: COMMERCIAL

## 2017-08-03 DIAGNOSIS — M17.11 OSTEOARTHRITIS OF RIGHT KNEE, UNSPECIFIED OSTEOARTHRITIS TYPE: Primary | ICD-10-CM

## 2017-08-03 PROCEDURE — 99203 OFFICE O/P NEW LOW 30 MIN: CPT | Mod: S$GLB,,, | Performed by: PHYSICIAN ASSISTANT

## 2017-08-03 PROCEDURE — 99999 PR PBB SHADOW E&M-EST. PATIENT-LVL II: CPT | Mod: PBBFAC,,, | Performed by: PHYSICIAN ASSISTANT

## 2017-08-03 PROCEDURE — 3008F BODY MASS INDEX DOCD: CPT | Mod: S$GLB,,, | Performed by: PHYSICIAN ASSISTANT

## 2017-08-03 RX ORDER — MELOXICAM 7.5 MG/1
7.5 TABLET ORAL DAILY
Qty: 15 TABLET | Refills: 0 | Status: SHIPPED | OUTPATIENT
Start: 2017-08-03 | End: 2017-12-07

## 2017-08-03 NOTE — LETTER
August 3, 2017      Karina Medrano MD  [address]  [facility] [address]           [facility] - Orthopedics  [address], [address]  [facility] [address]  Phone: [phone]          Patient: Rajesh Mas   MR Number: [id number]   YOB: 1950   Date of Visit: 8/3/2017       Dear Dr. Karina Medrano:    Thank you for referring Rajesh Mas to me for evaluation. Attached you will find relevant portions of my assessment and plan of care.    If you have questions, please do not hesitate to call me. I look forward to following Rajesh Mas along with you.    Sincerely,    Isabela Hathaway PA-C    Enclosure  CC:  No Recipients    If you would like to receive this communication electronically, please contact [email or web address] or [phone] to request more information on [employer] Link access.    For providers and/or their staff who would like to refer a patient to [facility], please contact us through our one-stop-shop provider referral line, [facility] , at [phone].    If you feel you have received this communication in error or would no longer like to receive these types of communications, please e-mail [email or web address]

## 2017-08-04 NOTE — PROGRESS NOTES
Subjective:      Patient ID: Rajesh Mas is a 66 y.o. female.    Chief Complaint: No chief complaint on file.    HPI    Patient is a 66 year old female who presents to clinic with chief complaint of intermittent right knee pain since 2017. Pain increased with walking and standing. Patient has taken ibuprofen without complete relief. Patient denied locking catching    Review of Systems   Constitution: Negative for chills and fever.   Cardiovascular: Negative for chest pain.   Respiratory: Negative for cough and shortness of breath.    Skin: Negative for color change, dry skin, itching, nail changes, poor wound healing and rash.   Musculoskeletal:        Right knee pain   Neurological: Negative for dizziness.   Psychiatric/Behavioral: Negative for altered mental status. The patient is not nervous/anxious.    All other systems reviewed and are negative.        Objective:            General    Constitutional: She is oriented to person, place, and time. She appears well-developed and well-nourished. No distress.   HENT:   Head: Atraumatic.   Eyes: Conjunctivae are normal.   Cardiovascular: Normal rate.    Pulmonary/Chest: Effort normal.   Neurological: She is alert and oriented to person, place, and time.   Psychiatric: She has a normal mood and affect. Her behavior is normal.           Right Knee Exam     Inspection   Swelling: present    Tenderness   The patient is tender to palpation of the medial joint line.    Range of Motion   The patient has normal right knee ROM.    Tests   Ligament Examination Lachman: normal (-1 to 2mm) PCL-Posterior Drawer: normal (0 to 2mm)     MCL - Valgus: normal (0 to 2mm)  LCL - Varus: normal    Other   Sensation: normal    Muscle Strength   Right Lower Extremity   Quadriceps:  5/5   Hamstrin/5         RADS: Tibiofemoral and patellofemoral degenerative arthritic changes.  No evidence of fracture      Assessment:       Encounter Diagnosis   Name Primary?    Osteoarthritis of  right knee, unspecified osteoarthritis type Yes          Plan:       Discussed treatment options with patient. At this time she would like to  rest ice elevation  Antiinflammatories.   - return to clinic as needed, if continued pain consider injection

## 2017-08-28 RX ORDER — TRIAMTERENE AND HYDROCHLOROTHIAZIDE 37.5; 25 MG/1; MG/1
1 CAPSULE ORAL EVERY MORNING
Qty: 90 CAPSULE | Refills: 0 | Status: SHIPPED | OUTPATIENT
Start: 2017-08-28 | End: 2017-12-07 | Stop reason: SDUPTHER

## 2017-09-12 ENCOUNTER — TELEPHONE (OUTPATIENT)
Dept: OBSTETRICS AND GYNECOLOGY | Facility: CLINIC | Age: 67
End: 2017-09-12

## 2017-09-12 ENCOUNTER — PATIENT MESSAGE (OUTPATIENT)
Dept: OBSTETRICS AND GYNECOLOGY | Facility: CLINIC | Age: 67
End: 2017-09-12

## 2017-09-12 DIAGNOSIS — Z12.31 OTHER SCREENING MAMMOGRAM: Primary | ICD-10-CM

## 2017-10-31 ENCOUNTER — TELEPHONE (OUTPATIENT)
Dept: OBSTETRICS AND GYNECOLOGY | Facility: CLINIC | Age: 67
End: 2017-10-31

## 2017-11-03 ENCOUNTER — HOSPITAL ENCOUNTER (OUTPATIENT)
Dept: RADIOLOGY | Facility: HOSPITAL | Age: 67
Discharge: HOME | End: 2017-11-03
Attending: OBSTETRICS & GYNECOLOGY
Payer: COMMERCIAL

## 2017-11-03 ENCOUNTER — TELEPHONE (OUTPATIENT)
Dept: OBSTETRICS AND GYNECOLOGY | Facility: CLINIC | Age: 67
End: 2017-11-03

## 2017-11-03 VITALS — WEIGHT: 181 LBS | BODY MASS INDEX: 34.17 KG/M2 | HEIGHT: 61 IN

## 2017-11-03 DIAGNOSIS — Z12.31 SCREENING MAMMOGRAM, ENCOUNTER FOR: ICD-10-CM

## 2017-11-03 PROCEDURE — 77063 BREAST TOMOSYNTHESIS BI: CPT | Mod: 26,,, | Performed by: RADIOLOGY

## 2017-11-03 PROCEDURE — 77067 SCR MAMMO BI INCL CAD: CPT | Mod: 26,,, | Performed by: RADIOLOGY

## 2017-11-03 PROCEDURE — 77067 SCR MAMMO BI INCL CAD: CPT | Mod: TC

## 2017-11-03 NOTE — TELEPHONE ENCOUNTER
Called pt and rescheduled appt due to Dr. Zhou not being in clinic on 12/4. Pt verbalized understanding.

## 2017-11-03 NOTE — TELEPHONE ENCOUNTER
----- Message from Jannie Shearer sent at 11/2/2017  9:55 AM CDT -----  Contact: self  Pt returning a missed call, she can be reached at [phone].

## 2017-11-14 ENCOUNTER — PATIENT MESSAGE (OUTPATIENT)
Dept: INTERNAL MEDICINE | Facility: CLINIC | Age: 67
End: 2017-11-14

## 2017-11-27 ENCOUNTER — OFFICE VISIT (OUTPATIENT)
Dept: OBSTETRICS AND GYNECOLOGY | Facility: CLINIC | Age: 67
End: 2017-11-27
Payer: MEDICARE

## 2017-11-27 ENCOUNTER — TELEPHONE (OUTPATIENT)
Dept: INTERNAL MEDICINE | Facility: CLINIC | Age: 67
End: 2017-11-27

## 2017-11-27 VITALS — WEIGHT: 177 LBS | HEIGHT: 61 IN | BODY MASS INDEX: 33.42 KG/M2

## 2017-11-27 DIAGNOSIS — I10 ESSENTIAL HYPERTENSION: ICD-10-CM

## 2017-11-27 DIAGNOSIS — Z01.419 WELL WOMAN EXAM WITH ROUTINE GYNECOLOGICAL EXAM: Primary | ICD-10-CM

## 2017-11-27 PROCEDURE — 99396 PREV VISIT EST AGE 40-64: CPT | Mod: S$GLB,,, | Performed by: OBSTETRICS & GYNECOLOGY

## 2017-11-27 PROCEDURE — 99999 PR PBB SHADOW E&M-EST. PATIENT-LVL III: CPT | Mod: PBBFAC,,, | Performed by: OBSTETRICS & GYNECOLOGY

## 2017-11-27 RX ORDER — AMLODIPINE AND BENAZEPRIL HYDROCHLORIDE 5; 40 MG/1; MG/1
1 CAPSULE ORAL DAILY
Qty: 90 CAPSULE | Refills: 3 | Status: SHIPPED | OUTPATIENT
Start: 2017-11-27 | End: 2017-12-07 | Stop reason: SDUPTHER

## 2017-11-27 NOTE — PROGRESS NOTES
History & Physical  Gynecology      SUBJECTIVE:     Chief Complaint: Well Woman       History of Present Illness:  Annual Exam-Postmenopausal  Patient presents for annual exam. The patient has no complaints today. Patient denies post-menopausal vaginal bleeding. The patient is sexually active. The patient is not taking hormone replacement therapy.    GYN screening history: last pap: approximate date  and was normal.  Last colonoscopy was .  Last mammogram was 2017, normal.  Patient reports DXA scan  and was normal.      The patient participates in regular exercise: yes.  The patient is not taking calcium and vitamin D supplementation.  She does no smoke.     FH:   Breast cancer: grandmother  Colon cancer: none  Ovarian cancer: none    Review of patient's allergies indicates:   Allergen Reactions    Nickel sutures [surgical stainless steel]     Adhesive Rash       Past Medical History:   Diagnosis Date    Allergy     seasonal    Diverticulitis     Fever blister     Hypertension      Past Surgical History:   Procedure Laterality Date    CHOLECYSTECTOMY  2017    COLONOSCOPY N/A 2016    Procedure: COLONOSCOPY;  Surgeon: Fidel Mason MD;  Location: [address];  Service: Endoscopy;  Laterality: N/A;    partial colectomy      sigmoid removed due to diverticulitis    SINUS SURGERY       OB History      Para Term  AB Living    5 4 4   1      SAB TAB Ectopic Multiple Live Births    1                Family History   Problem Relation Age of Onset    Diabetes Mother     Heart failure Mother     Hypertension Mother     Liver cancer Maternal Uncle     Breast cancer Maternal Grandmother     Melanoma Neg Hx     Colon cancer Neg Hx     Ovarian cancer Neg Hx      Social History   Substance Use Topics    Smoking status: Never Smoker    Smokeless tobacco: Never Used    Alcohol use No       Current Outpatient Prescriptions   Medication Sig     amlodipine-benazepril (LOTREL) 5-40 mg per capsule Take 1 capsule by mouth once daily.    triamterene-hydrochlorothiazide 37.5-25 mg (DYAZIDE) 37.5-25 mg per capsule Take 1 capsule by mouth every morning.    econazole nitrate 1 % cream Apply topically 2 (two) times daily.    meloxicam (MOBIC) 7.5 MG tablet Take 1 tablet (7.5 mg total) by mouth once daily.    terbinafine HCl (LAMISIL) 250 mg tablet Take 1 tablet (250 mg total) by mouth once daily. 1 pill by mouth x 90 days. Avoid alcohol intake while taking medication     No current facility-administered medications for this visit.        Review of Systems:  Review of Systems   Constitutional: Negative for appetite change, fever and unexpected weight change.   Respiratory: Negative for shortness of breath.    Cardiovascular: Negative for chest pain.   Gastrointestinal: Negative for nausea and vomiting.   Genitourinary: Negative for dyspareunia, frequency, genital sores, pelvic pain, urgency, vaginal bleeding, vaginal discharge, vaginal pain, urinary incontinence, postcoital bleeding, postmenopausal bleeding and vaginal odor.        OBJECTIVE:     Physical Exam:  Physical Exam   Constitutional: She is oriented to person, place, and time. She appears well-developed and well-nourished.   Neck: Normal range of motion. Neck supple. No tracheal deviation present. No thyromegaly present.   Cardiovascular: Normal rate, regular rhythm and normal heart sounds.    Pulmonary/Chest: Effort normal and breath sounds normal. Right breast exhibits no inverted nipple, no mass, no nipple discharge, no skin change and no tenderness. Left breast exhibits no inverted nipple, no mass, no nipple discharge, no skin change and no tenderness. Breasts are symmetrical.   Abdominal: Soft.   Genitourinary: Vagina normal and uterus normal. No labial fusion. There is no rash, tenderness, lesion or injury on the right labia. There is no rash, tenderness, lesion or injury on the left labia. Uterus  is not deviated, not enlarged, not fixed and not tender. Cervix exhibits no motion tenderness, no discharge and no friability. Right adnexum displays no mass, no tenderness and no fullness. Left adnexum displays no mass, no tenderness and no fullness. No erythema, tenderness or bleeding in the vagina. No foreign body in the vagina. No signs of injury around the vagina. No vaginal discharge found.   Neurological: She is alert and oriented to person, place, and time.   Psychiatric: She has a normal mood and affect. Her behavior is normal. Judgment and thought content normal.   Nursing note and vitals reviewed.        ASSESSMENT:       ICD-10-CM ICD-9-CM    1. Well woman exam with routine gynecological exam Z01.419 V72.31           Plan:      Rajesh was seen today for well woman.    Diagnoses and all orders for this visit:    Well woman exam with routine gynecological exam        No orders of the defined types were placed in this encounter.      Well Woman:   - Pap smear: done last year, normal  - Mammogram: recently done  - Colonoscopy: recently done  - Dexa: per patient, done in 2015 and was normal  - Immunizations: discussed pneumovax and flu; patient has PCP appt next week and will get this done there  - Labs: PCP  - Calcium/Vitamin D counseling provided  - Exercise counseling provided    Return in about 1 year (around 11/27/2018) for annual or prn.    Lia Zhou

## 2017-12-07 ENCOUNTER — IMMUNIZATION (OUTPATIENT)
Dept: INTERNAL MEDICINE | Facility: CLINIC | Age: 67
End: 2017-12-07
Payer: MEDICARE

## 2017-12-07 ENCOUNTER — OFFICE VISIT (OUTPATIENT)
Dept: INTERNAL MEDICINE | Facility: CLINIC | Age: 67
End: 2017-12-07
Payer: MEDICARE

## 2017-12-07 VITALS
BODY MASS INDEX: 33.51 KG/M2 | WEIGHT: 177.5 LBS | HEART RATE: 65 BPM | SYSTOLIC BLOOD PRESSURE: 124 MMHG | HEIGHT: 61 IN | OXYGEN SATURATION: 98 % | DIASTOLIC BLOOD PRESSURE: 71 MMHG

## 2017-12-07 DIAGNOSIS — I10 ESSENTIAL HYPERTENSION: ICD-10-CM

## 2017-12-07 DIAGNOSIS — Z00.00 HEALTH CARE MAINTENANCE: Primary | ICD-10-CM

## 2017-12-07 DIAGNOSIS — D69.6 THROMBOCYTOPENIA: ICD-10-CM

## 2017-12-07 PROCEDURE — 99999 PR PBB SHADOW E&M-EST. PATIENT-LVL III: CPT | Mod: PBBFAC,,, | Performed by: INTERNAL MEDICINE

## 2017-12-07 PROCEDURE — 99213 OFFICE O/P EST LOW 20 MIN: CPT | Mod: PBBFAC | Performed by: INTERNAL MEDICINE

## 2017-12-07 PROCEDURE — G0008 ADMIN INFLUENZA VIRUS VAC: HCPCS | Mod: PBBFAC

## 2017-12-07 PROCEDURE — 99396 PREV VISIT EST AGE 40-64: CPT | Mod: S$PBB,,, | Performed by: INTERNAL MEDICINE

## 2017-12-07 RX ORDER — TRIAMTERENE AND HYDROCHLOROTHIAZIDE 37.5; 25 MG/1; MG/1
1 CAPSULE ORAL EVERY MORNING
Qty: 90 CAPSULE | Refills: 3 | Status: SHIPPED | OUTPATIENT
Start: 2017-12-07 | End: 2018-08-03 | Stop reason: SDUPTHER

## 2017-12-07 RX ORDER — AMLODIPINE AND BENAZEPRIL HYDROCHLORIDE 5; 40 MG/1; MG/1
1 CAPSULE ORAL DAILY
Qty: 90 CAPSULE | Refills: 3 | Status: SHIPPED | OUTPATIENT
Start: 2017-12-07 | End: 2018-02-22 | Stop reason: DRUGHIGH

## 2017-12-07 NOTE — PROGRESS NOTES
"Subjective:       Patient ID: Rajesh Mas is a 66 y.o. female.    Chief Complaint: Annual Exam (yearly check up.)    HPI   Rajesh Mas is a 66 y.o. female here for a yearly preventative healthcare visit.     Last labs with slightly high tsh and slightly low platelet count in May.     Amlodipine -benazepril 5-40  triamterene 37.5-25  BP has been good at home.     Knees intermittently bothering her. Arthritis. Took course of meloxicam which helped.     Review of Systems   Constitutional: Negative for activity change and unexpected weight change.   HENT: Negative for hearing loss, rhinorrhea and trouble swallowing.    Eyes: Negative for discharge and visual disturbance.   Respiratory: Negative for chest tightness and wheezing.    Cardiovascular: Negative for chest pain and palpitations.   Gastrointestinal: Negative for blood in stool, constipation, diarrhea and vomiting.   Endocrine: Positive for polyuria. Negative for polydipsia.   Genitourinary: Negative for difficulty urinating, dysuria, hematuria and menstrual problem.   Musculoskeletal: Positive for arthralgias. Negative for joint swelling and neck pain.   Neurological: Negative for weakness and headaches.   Psychiatric/Behavioral: Negative for confusion and dysphoric mood.       Objective:   /71 (BP Location: Left arm, Patient Position: Sitting, BP Method: Medium (Manual))   Pulse 65   Ht 5' 1" (1.549 m)   Wt 80.5 kg (177 lb 7.5 oz)   LMP  (LMP Unknown)   SpO2 98%   BMI 33.53 kg/m²      Physical Exam   Constitutional: She is oriented to person, place, and time. She appears well-developed and well-nourished.   HENT:   Head: Normocephalic and atraumatic.   Eyes: Conjunctivae and EOM are normal. Pupils are equal, round, and reactive to light.   Neck: Neck supple. No thyromegaly present.   Cardiovascular: Normal rate, regular rhythm and normal heart sounds.    No murmur heard.  Pulmonary/Chest: Effort normal and breath sounds normal. No respiratory " distress. She has no wheezes.   Abdominal: Soft. Bowel sounds are normal. She exhibits no distension. There is no tenderness.   Musculoskeletal: Normal range of motion.   Neurological: She is alert and oriented to person, place, and time.   Skin: Skin is warm and dry. No rash noted.   Psychiatric: She has a normal mood and affect. Judgment and thought content normal.   Vitals reviewed.      Assessment:       1. Health care maintenance    2. Essential hypertension    3. Thrombocytopenia        Plan:       Rajesh was seen today for annual exam.    Diagnoses and all orders for this visit:    Health care maintenance  -     CBC auto differential; Future  -     Comprehensive metabolic panel; Future  -     TSH; Future    Essential hypertension  -     Comprehensive metabolic panel; Future  -     triamterene-hydrochlorothiazide 37.5-25 mg (DYAZIDE) 37.5-25 mg per capsule; Take 1 capsule by mouth every morning.  -     amlodipine-benazepril (LOTREL) 5-40 mg per capsule; Take 1 capsule by mouth once daily.    Thrombocytopenia  -     CBC auto differential; Future

## 2018-01-01 ENCOUNTER — PATIENT MESSAGE (OUTPATIENT)
Dept: INTERNAL MEDICINE | Facility: CLINIC | Age: 68
End: 2018-01-01

## 2018-01-02 ENCOUNTER — PATIENT MESSAGE (OUTPATIENT)
Dept: INTERNAL MEDICINE | Facility: CLINIC | Age: 68
End: 2018-01-02

## 2018-01-02 DIAGNOSIS — M10.00 ACUTE IDIOPATHIC GOUT, UNSPECIFIED SITE: Primary | ICD-10-CM

## 2018-01-03 ENCOUNTER — OFFICE VISIT (OUTPATIENT)
Dept: INTERNAL MEDICINE | Facility: CLINIC | Age: 68
End: 2018-01-03
Payer: MEDICARE

## 2018-01-03 ENCOUNTER — PATIENT MESSAGE (OUTPATIENT)
Dept: INTERNAL MEDICINE | Facility: CLINIC | Age: 68
End: 2018-01-03

## 2018-01-03 VITALS
HEART RATE: 88 BPM | DIASTOLIC BLOOD PRESSURE: 92 MMHG | OXYGEN SATURATION: 99 % | WEIGHT: 181 LBS | BODY MASS INDEX: 34.17 KG/M2 | SYSTOLIC BLOOD PRESSURE: 162 MMHG | HEIGHT: 61 IN

## 2018-01-03 DIAGNOSIS — M10.9 ACUTE GOUT OF LEFT FOOT, UNSPECIFIED CAUSE: Primary | ICD-10-CM

## 2018-01-03 DIAGNOSIS — I10 ESSENTIAL HYPERTENSION: ICD-10-CM

## 2018-01-03 PROCEDURE — 99213 OFFICE O/P EST LOW 20 MIN: CPT | Mod: S$PBB,,, | Performed by: NURSE PRACTITIONER

## 2018-01-03 PROCEDURE — 99213 OFFICE O/P EST LOW 20 MIN: CPT | Mod: PBBFAC | Performed by: NURSE PRACTITIONER

## 2018-01-03 PROCEDURE — 99999 PR PBB SHADOW E&M-EST. PATIENT-LVL III: CPT | Mod: PBBFAC,,, | Performed by: NURSE PRACTITIONER

## 2018-01-03 PROCEDURE — 96372 THER/PROPH/DIAG INJ SC/IM: CPT | Mod: PBBFAC

## 2018-01-03 RX ORDER — BETAMETHASONE SODIUM PHOSPHATE AND BETAMETHASONE ACETATE 3; 3 MG/ML; MG/ML
6 INJECTION, SUSPENSION INTRA-ARTICULAR; INTRALESIONAL; INTRAMUSCULAR; SOFT TISSUE
Status: COMPLETED | OUTPATIENT
Start: 2018-01-03 | End: 2018-01-03

## 2018-01-03 RX ORDER — PREDNISONE 10 MG/1
TABLET ORAL
Qty: 12 TABLET | Refills: 0 | Status: SHIPPED | OUTPATIENT
Start: 2018-01-03 | End: 2018-01-05 | Stop reason: ALTCHOICE

## 2018-01-03 RX ORDER — COLCHICINE 0.6 MG/1
0.6 TABLET ORAL 2 TIMES DAILY
Qty: 14 TABLET | Refills: 1 | Status: SHIPPED | OUTPATIENT
Start: 2018-01-03 | End: 2018-08-03

## 2018-01-03 RX ADMIN — BETAMETHASONE ACETATE AND BETAMETHASONE SODIUM PHOSPHATE 6 MG: 3; 3 INJECTION, SUSPENSION INTRA-ARTICULAR; INTRALESIONAL; INTRAMUSCULAR; SOFT TISSUE at 05:01

## 2018-01-03 NOTE — PROGRESS NOTES
INTERNAL MEDICINE URGENT CARE NOTE    CHIEF COMPLAINT     Chief Complaint   Patient presents with    Foot Pain     L foot pain and swelling began thursday       HPI     Rajesh Mas is a 67 y.o. female with HTN and diastolic dysfunction who presents for an urgent visit today.  She is an established pt of Dr. Villarreal who presents to the clinic for left foot swelling and pain x 6 days. Has h/o gout and attributes current swelling and pain to it.   Dr. Villarreal called in prednisone this morning - pt has not picked it up yet   Pain and redness to the left great toe. Sensitive to touch and painful for sheet to touch foot.     Past Medical History:  Past Medical History:   Diagnosis Date    Allergy     seasonal    Diverticulitis     Fever blister     Hypertension        Home Medications:  Prior to Admission medications    Medication Sig Start Date End Date Taking? Authorizing Provider   amlodipine-benazepril (LOTREL) 5-40 mg per capsule Take 1 capsule by mouth once daily. 12/7/17   Gail Villarreal MD   predniSONE (DELTASONE) 10 MG tablet Take 3 pills a day for 2 days then 2 pills a day for 2 days then 1 pill a day for 2 days 1/3/18   Gail Villarreal MD   triamterene-hydrochlorothiazide 37.5-25 mg (DYAZIDE) 37.5-25 mg per capsule Take 1 capsule by mouth every morning. 12/7/17   Gail Villarreal MD       Review of Systems:  Review of Systems   Constitutional: Negative for activity change and unexpected weight change.   HENT: Negative for hearing loss, rhinorrhea and trouble swallowing.    Eyes: Negative for discharge and visual disturbance.   Respiratory: Negative for chest tightness and wheezing.    Cardiovascular: Negative for chest pain and palpitations.   Gastrointestinal: Negative for blood in stool, constipation, diarrhea and vomiting.   Endocrine: Negative for polydipsia and polyuria.   Genitourinary: Negative for difficulty urinating, dysuria, hematuria and menstrual problem.   Musculoskeletal:  "Positive for arthralgias and joint swelling. Negative for neck pain.   Neurological: Negative for weakness and headaches.   Psychiatric/Behavioral: Negative for confusion and dysphoric mood.       Health Maintainence:   Immunizations:  Health Maintenance       Date Due Completion Date    DEXA SCAN 12/13/1992 ---    Pneumococcal (65+) (1 of 2 - PCV13) 12/13/2017 ---    Mammogram 11/03/2018 11/3/2017    Override on 10/1/2015: Done    Lipid Panel 05/13/2022 5/13/2017    TETANUS VACCINE 11/02/2026 11/2/2016    Colonoscopy 12/06/2026 12/6/2016    Override on 12/6/2016: Done           PHYSICAL EXAM     BP (!) 162/92 (BP Location: Left arm, Patient Position: Sitting, BP Method: Large (Manual))   Pulse 88   Ht 5' 1" (1.549 m)   Wt 82.1 kg (181 lb)   LMP  (LMP Unknown)   SpO2 99%   BMI 34.20 kg/m²     Physical Exam   Constitutional: She is oriented to person, place, and time. She appears well-developed and well-nourished.   HENT:   Head: Normocephalic and atraumatic.   Eyes: Pupils are equal, round, and reactive to light.   Cardiovascular: Normal rate and regular rhythm.    Pulmonary/Chest: Effort normal.   Musculoskeletal:        Left foot: There is tenderness, bony tenderness and swelling. There is normal range of motion, normal capillary refill, no crepitus, no deformity and no laceration.        Feet:    Neurological: She is alert and oriented to person, place, and time.   Psychiatric: She has a normal mood and affect.       LABS     No results found for: LABA1C, HGBA1C  CMP  Sodium   Date Value Ref Range Status   12/07/2017 140 136 - 145 mmol/L Final     Potassium   Date Value Ref Range Status   12/07/2017 4.6 3.5 - 5.1 mmol/L Final     Chloride   Date Value Ref Range Status   12/07/2017 103 95 - 110 mmol/L Final     CO2   Date Value Ref Range Status   12/07/2017 29 23 - 29 mmol/L Final     Glucose   Date Value Ref Range Status   12/07/2017 92 70 - 110 mg/dL Final     BUN, Bld   Date Value Ref Range Status "   12/07/2017 24 (H) 8 - 23 mg/dL Final     Creatinine   Date Value Ref Range Status   12/07/2017 1.3 0.5 - 1.4 mg/dL Final     Calcium   Date Value Ref Range Status   12/07/2017 10.1 8.7 - 10.5 mg/dL Final     Total Protein   Date Value Ref Range Status   12/07/2017 8.9 (H) 6.0 - 8.4 g/dL Final     Albumin   Date Value Ref Range Status   12/07/2017 4.2 3.5 - 5.2 g/dL Final     Total Bilirubin   Date Value Ref Range Status   12/07/2017 0.7 0.1 - 1.0 mg/dL Final     Comment:     For infants and newborns, interpretation of results should be based  on gestational age, weight and in agreement with clinical  observations.  Premature Infant recommended reference ranges:  Up to 24 hours.............<8.0 mg/dL  Up to 48 hours............<12.0 mg/dL  3-5 days..................<15.0 mg/dL  6-29 days.................<15.0 mg/dL       Alkaline Phosphatase   Date Value Ref Range Status   12/07/2017 75 55 - 135 U/L Final     AST   Date Value Ref Range Status   12/07/2017 20 10 - 40 U/L Final     ALT   Date Value Ref Range Status   12/07/2017 20 10 - 44 U/L Final     Anion Gap   Date Value Ref Range Status   12/07/2017 8 8 - 16 mmol/L Final     eGFR if    Date Value Ref Range Status   12/07/2017 50 (A) >60 mL/min/1.73 m^2 Final     eGFR if non    Date Value Ref Range Status   12/07/2017 43 (A) >60 mL/min/1.73 m^2 Final     Comment:     Calculation used to obtain the estimated glomerular filtration  rate (eGFR) is the CKD-EPI equation.        Lab Results   Component Value Date    WBC 8.45 12/07/2017    HGB 14.2 12/07/2017    HCT 44.0 12/07/2017    MCV 88 12/07/2017     (L) 12/07/2017     Lab Results   Component Value Date    CHOL 175 05/13/2017    CHOL 174 03/15/2017    CHOL 183 02/26/2016     Lab Results   Component Value Date    HDL 54 05/13/2017    HDL 53 02/26/2016     Lab Results   Component Value Date    LDLCALC 83.0 05/13/2017    LDLCALC 91.4 02/26/2016     Lab Results   Component Value  Date    TRIG 190 (H) 05/13/2017    TRIG 193 (H) 02/26/2016     Lab Results   Component Value Date    CHOLHDL 30.9 05/13/2017    CHOLHDL 29.0 02/26/2016     Lab Results   Component Value Date    TSH 4.506 (H) 12/07/2017       ASSESSMENT/PLAN     Rajesh Mas is a 67 y.o. female with  Past Medical History:   Diagnosis Date    Allergy     seasonal    Diverticulitis     Fever blister     Hypertension        Acute gout of left foot, unspecified cause- will start colchicine bid. [facility] in office   -     colchicine 0.6 mg tablet; Take 1 tablet (0.6 mg total) by mouth 2 (two) times daily.  Dispense: 14 tablet; Refill: 1  -     betamethasone acetate-betamethasone sodium phosphate injection 6 mg; Inject 1 mL (6 mg total) into the muscle one time.     Essential hypertension- Elevated today 2/2 pain. ?HCTZ leading to increased uric acid levels? Pt will discuss with PCP. Low Na diet         Follow up as needed and with PCP     Patient education provided from [facility]. Patient was counseled on when and how to seek emergent care.       Yvette TAYLOR, APRN, FNP-c   Department of Internal Medicine - [facility]  5:08 PM

## 2018-01-05 ENCOUNTER — PATIENT MESSAGE (OUTPATIENT)
Dept: INTERNAL MEDICINE | Facility: CLINIC | Age: 68
End: 2018-01-05

## 2018-01-08 ENCOUNTER — PATIENT MESSAGE (OUTPATIENT)
Dept: INTERNAL MEDICINE | Facility: CLINIC | Age: 68
End: 2018-01-08

## 2018-01-08 DIAGNOSIS — Z12.83 SKIN EXAM, SCREENING FOR CANCER: Primary | ICD-10-CM

## 2018-01-12 ENCOUNTER — OFFICE VISIT (OUTPATIENT)
Dept: DERMATOLOGY | Facility: CLINIC | Age: 68
End: 2018-01-12
Payer: MEDICARE

## 2018-01-12 DIAGNOSIS — L82.1 SK (SEBORRHEIC KERATOSIS): Primary | ICD-10-CM

## 2018-01-12 DIAGNOSIS — L30.4 INTERTRIGO: ICD-10-CM

## 2018-01-12 DIAGNOSIS — Z12.83 SKIN CANCER SCREENING: ICD-10-CM

## 2018-01-12 PROCEDURE — 99212 OFFICE O/P EST SF 10 MIN: CPT | Mod: PBBFAC | Performed by: DERMATOLOGY

## 2018-01-12 PROCEDURE — 99999 PR PBB SHADOW E&M-EST. PATIENT-LVL II: CPT | Mod: PBBFAC,,, | Performed by: DERMATOLOGY

## 2018-01-12 PROCEDURE — 99213 OFFICE O/P EST LOW 20 MIN: CPT | Mod: S$PBB,,, | Performed by: DERMATOLOGY

## 2018-01-12 NOTE — PROGRESS NOTES
Subjective:       Patient ID:  Rajesh Mas is a 67 y.o. female who presents for   Chief Complaint   Patient presents with    Skin Check     UBSE     HPI  Interested in upper body skin check today.  States her PCP sent her here to have her moles checked.  Denies any new, changing, or symptomatic lesions on the skin.    Review of Systems   Constitutional: Negative for fever, chills, weight loss, weight gain, fatigue, night sweats and malaise.   Skin: Negative for daily sunscreen use and recent sunburn.   Hematologic/Lymphatic: Does not bruise/bleed easily.        Objective:    Physical Exam   Constitutional: She appears well-developed and well-nourished. No distress.   Neurological: She is alert and oriented to person, place, and time. She is not disoriented.   Psychiatric: She has a normal mood and affect.   Skin:   Areas Examined (abnormalities noted in diagram):   Head / Face Inspection Performed  Neck Inspection Performed  Chest / Axilla Inspection Performed  Abdomen Inspection Performed  Back Inspection Performed  RUE Inspected  LUE Inspection Performed              Diagram Legend     Erythematous scaling macule/papule c/w actinic keratosis       Vascular papule c/w angioma      Pigmented verrucoid papule/plaque c/w seborrheic keratosis      Yellow umbilicated papule c/w sebaceous hyperplasia      Irregularly shaped tan macule c/w lentigo     1-2 mm smooth white papules consistent with Milia      Movable subcutaneous cyst with punctum c/w epidermal inclusion cyst      Subcutaneous movable cyst c/w pilar cyst      Firm pink to brown papule c/w dermatofibroma      Pedunculated fleshy papule(s) c/w skin tag(s)      Evenly pigmented macule c/w junctional nevus     Mildly variegated pigmented, slightly irregular-bordered macule c/w mildly atypical nevus      Flesh colored to evenly pigmented papule c/w intradermal nevus       Pink pearly papule/plaque c/w basal cell carcinoma      Erythematous hyperkeratotic  cursted plaque c/w SCC      Surgical scar with no sign of skin cancer recurrence      Open and closed comedones      Inflammatory papules and pustules      Verrucoid papule consistent consistent with wart     Erythematous eczematous patches and plaques     Dystrophic onycholytic nail with subungual debris c/w onychomycosis     Umbilicated papule    Erythematous-base heme-crusted tan verrucoid plaque consistent with inflamed seborrheic keratosis     Erythematous Silvery Scaling Plaque c/w Psoriasis     See annotation      Assessment / Plan:        SK (seborrheic keratosis)  These are benign inherited growths without a malignant potential. Reassurance given to patient. No treatment is necessary.   Treatment of benign, asymptomatic lesions may be considered cosmetic.  Warned about risk of hypo- or hyperpigmentation with treatment and risk of recurrence.    Intertrigo  Rec: zeasorb AF powder    Skin cancer screening  Upper body skin examination performed today including at least 6 points as noted in physical examination. No lesions suspicious for malignancy noted.  Patient instructed in importance of daily broad spectrum sunscreen use with spf at least 30. Sun avoidance and topical protection/protective clothing discussed.    Follow-up in about 1 year (around 1/12/2019) for skin check or sooner for any concerns.

## 2018-01-30 ENCOUNTER — PATIENT MESSAGE (OUTPATIENT)
Dept: INTERNAL MEDICINE | Facility: CLINIC | Age: 68
End: 2018-01-30

## 2018-01-30 DIAGNOSIS — M10.9 ACUTE GOUT OF FOOT, UNSPECIFIED CAUSE, UNSPECIFIED LATERALITY: Primary | ICD-10-CM

## 2018-02-01 ENCOUNTER — PATIENT MESSAGE (OUTPATIENT)
Dept: INTERNAL MEDICINE | Facility: CLINIC | Age: 68
End: 2018-02-01

## 2018-02-01 ENCOUNTER — LAB VISIT (OUTPATIENT)
Dept: LAB | Facility: HOSPITAL | Age: 68
End: 2018-02-01
Attending: INTERNAL MEDICINE
Payer: MEDICARE

## 2018-02-01 DIAGNOSIS — M10.9 ACUTE GOUT OF FOOT, UNSPECIFIED CAUSE, UNSPECIFIED LATERALITY: ICD-10-CM

## 2018-02-01 LAB
ALBUMIN SERPL BCP-MCNC: 3.8 G/DL
ALP SERPL-CCNC: 76 U/L
ALT SERPL W/O P-5'-P-CCNC: 22 U/L
ANION GAP SERPL CALC-SCNC: 7 MMOL/L
AST SERPL-CCNC: 21 U/L
BILIRUB SERPL-MCNC: 0.6 MG/DL
BUN SERPL-MCNC: 19 MG/DL
CALCIUM SERPL-MCNC: 9.6 MG/DL
CHLORIDE SERPL-SCNC: 109 MMOL/L
CO2 SERPL-SCNC: 28 MMOL/L
CREAT SERPL-MCNC: 1 MG/DL
EST. GFR  (AFRICAN AMERICAN): >60 ML/MIN/1.73 M^2
EST. GFR  (NON AFRICAN AMERICAN): 59 ML/MIN/1.73 M^2
GLUCOSE SERPL-MCNC: 94 MG/DL
POTASSIUM SERPL-SCNC: 4 MMOL/L
PROT SERPL-MCNC: 7.9 G/DL
SODIUM SERPL-SCNC: 144 MMOL/L
URATE SERPL-MCNC: 6.6 MG/DL

## 2018-02-01 PROCEDURE — 36415 COLL VENOUS BLD VENIPUNCTURE: CPT

## 2018-02-01 PROCEDURE — 80053 COMPREHEN METABOLIC PANEL: CPT

## 2018-02-01 PROCEDURE — 84550 ASSAY OF BLOOD/URIC ACID: CPT

## 2018-02-01 RX ORDER — METHYLPREDNISOLONE 4 MG/1
TABLET ORAL
Qty: 1 PACKAGE | Refills: 0 | Status: SHIPPED | OUTPATIENT
Start: 2018-02-01 | End: 2018-02-13

## 2018-02-06 ENCOUNTER — PATIENT MESSAGE (OUTPATIENT)
Dept: INTERNAL MEDICINE | Facility: CLINIC | Age: 68
End: 2018-02-06

## 2018-02-07 ENCOUNTER — PATIENT MESSAGE (OUTPATIENT)
Dept: INTERNAL MEDICINE | Facility: CLINIC | Age: 68
End: 2018-02-07

## 2018-02-07 DIAGNOSIS — M1A.0720 IDIOPATHIC CHRONIC GOUT OF LEFT FOOT WITHOUT TOPHUS: ICD-10-CM

## 2018-02-07 RX ORDER — ALLOPURINOL 100 MG/1
100 TABLET ORAL DAILY
Qty: 30 TABLET | Refills: 5 | Status: SHIPPED | OUTPATIENT
Start: 2018-02-07 | End: 2018-08-03 | Stop reason: SDUPTHER

## 2018-02-13 ENCOUNTER — HOSPITAL ENCOUNTER (EMERGENCY)
Facility: HOSPITAL | Age: 68
Discharge: HOME | End: 2018-02-13
Attending: FAMILY MEDICINE
Payer: MEDICARE

## 2018-02-13 VITALS
OXYGEN SATURATION: 99 % | BODY MASS INDEX: 33.82 KG/M2 | DIASTOLIC BLOOD PRESSURE: 84 MMHG | RESPIRATION RATE: 20 BRPM | WEIGHT: 179 LBS | TEMPERATURE: 99 F | HEART RATE: 76 BPM | SYSTOLIC BLOOD PRESSURE: 158 MMHG

## 2018-02-13 DIAGNOSIS — J06.9 ACUTE UPPER RESPIRATORY INFECTION: Primary | ICD-10-CM

## 2018-02-13 DIAGNOSIS — R06.02 SHORTNESS OF BREATH: ICD-10-CM

## 2018-02-13 LAB
FLUAV AG SPEC QL IA: NEGATIVE
FLUBV AG SPEC QL IA: NEGATIVE
SPECIMEN SOURCE: NORMAL

## 2018-02-13 PROCEDURE — 93010 ELECTROCARDIOGRAM REPORT: CPT | Mod: ,,, | Performed by: INTERNAL MEDICINE

## 2018-02-13 PROCEDURE — 99285 EMERGENCY DEPT VISIT HI MDM: CPT | Mod: ,,, | Performed by: FAMILY MEDICINE

## 2018-02-13 PROCEDURE — 99284 EMERGENCY DEPT VISIT MOD MDM: CPT | Mod: 25

## 2018-02-13 PROCEDURE — 87400 INFLUENZA A/B EACH AG IA: CPT

## 2018-02-13 PROCEDURE — 93005 ELECTROCARDIOGRAM TRACING: CPT

## 2018-02-13 RX ORDER — BENZONATATE 100 MG/1
100 CAPSULE ORAL 3 TIMES DAILY PRN
Qty: 20 CAPSULE | Refills: 0 | Status: SHIPPED | OUTPATIENT
Start: 2018-02-13 | End: 2018-02-23

## 2018-02-13 NOTE — ED TRIAGE NOTES
Patient states onset cough and SOB at 0430, sinus congestion with nasal drip that caused her throat to swell, unable to  breathe. States clear secretions with cough.

## 2018-02-14 NOTE — DISCHARGE INSTRUCTIONS
You may take Zyrtec or Claritin as well as Flonase or Nasonex nasal sprays as needed for postnasal drip and runny nose.  Rest.  Drink plenty of fluids.  Follow-up a primary care doctor in the next couple of days for reevaluation.  Return to the ER immediately for any new or worsening symptoms.

## 2018-02-14 NOTE — ED NOTES
Patient identifiers verified and correct for Ms Mas  C/C: Cough, sore throat, SOB  APPEARANCE: awake and alert in NAD.  SKIN: warm, dry and intact. No breakdown or bruising.  MUSCULOSKELETAL: Patient moving all extremities spontaneously, no obvious swelling or deformities noted. Ambulates independently.  RESPIRATORY: Denies shortness of breath.Respirations unlabored. Positive cough with clear secretions  CARDIAC: Denies CP, 2+ distal pulses; no peripheral edema  ABDOMEN: S/ND/NT, Denies nausea  : voids spontaneously, denies difficulty  Neurologic: AAO x 4; follows commands equal strength in all extremities; denies numbness/tingling. Denies dizziness Positive weakness, positive nasal congestion, Throat pink, min edema noted.

## 2018-02-14 NOTE — ED PROVIDER NOTES
Attending Attestation:      Physician Attestation Statement for NP/PA:   I discussed this assessment and plan of this patient with the NP/PA, but I did not personally examine the patient. The face to face encounter was performed by the NP/PA.Encounter Date: 2/13/2018       History     Chief Complaint   Patient presents with    Shortness of Breath     started around 0430 this morning     67-year-old female with HTN resents for evaluation of shortness of breath.  Patient reports postnasal drip, sore throat, subjective fever/chills, cough productive of clear sputum, nasal congestion, rhinorrhea, and shortness of breath that began this morning.  Patient states that the postnasal drip caused her to feel as if she was choking.  She also had an episode of feeling that her throat was closing.  She currently denies this symptom.  Patient reports an episode of vomiting during a choking episode, but denies nausea, diarrhea, abdominal pain.  She denies chest pain.          Review of patient's allergies indicates:   Allergen Reactions    Nickel sutures [surgical stainless steel]     Adhesive Rash     Past Medical History:   Diagnosis Date    Allergy     seasonal    Diverticulitis     Fever blister     Hypertension      Past Surgical History:   Procedure Laterality Date    CHOLECYSTECTOMY  04/26/2017    COLONOSCOPY N/A 12/6/2016    Procedure: COLONOSCOPY;  Surgeon: Fidel Mason MD;  Location: [facility] ([address] [place] [address]);  Service: Endoscopy;  Laterality: N/A;    partial colectomy  1997    sigmoid removed due to diverticulitis    SINUS SURGERY  2007     Family History   Problem Relation Age of Onset    Diabetes Mother     Heart failure Mother     Hypertension Mother     Liver cancer Maternal Uncle     Breast cancer Maternal Grandmother     Other Brother      prostate issue    Melanoma Neg Hx     Colon cancer Neg Hx     Ovarian cancer Neg Hx      Social History   Substance Use Topics    Smoking status: Never Smoker     Smokeless tobacco: Never Used    Alcohol use No     Review of Systems   Constitutional: Positive for chills and fever.   HENT: Positive for postnasal drip. Negative for sore throat.    Respiratory: Negative for shortness of breath.    Cardiovascular: Negative for chest pain.   Gastrointestinal: Negative for nausea.   Genitourinary: Negative for dysuria.   Musculoskeletal: Negative for back pain.   Skin: Negative for rash.   Neurological: Negative for weakness.   Hematological: Does not bruise/bleed easily.       Physical Exam     Initial Vitals [02/13/18 1649]   BP Pulse Resp Temp SpO2   (!) 171/80 69 20 98.1 °F (36.7 °C) 98 %      MAP       110.33         Physical Exam    Nursing note and vitals reviewed.  Constitutional: She appears well-developed and well-nourished. She is not diaphoretic.  Non-toxic appearance. She does not appear ill. No distress.   HENT:   Head: Normocephalic and atraumatic.   Mouth/Throat: Posterior oropharyngeal erythema present.   Neck: Neck supple.   Cardiovascular: Normal rate and regular rhythm. Exam reveals no gallop and no friction rub.    No murmur heard.  Pulmonary/Chest: Effort normal and breath sounds normal. No accessory muscle usage. No tachypnea. No respiratory distress. She has no decreased breath sounds. She has no wheezes. She has no rhonchi. She has no rales.   Abdominal: Normal appearance. She exhibits no distension.   Musculoskeletal: Normal range of motion.   Lymphadenopathy:     She has no cervical adenopathy.   Neurological: She is alert and oriented to person, place, and time.   Skin: Skin is warm and dry. No rash noted. No pallor.   Psychiatric: She has a normal mood and affect. Her behavior is normal. Judgment and thought content normal.         ED Course   Procedures  Labs Reviewed   INFLUENZA A AND B ANTIGEN             Medical Decision Making:   History:   Old Medical Records: I decided to obtain old medical records.  Differential Diagnosis:   My differential  diagnosis includes but is not limited to:  URI, influenza, pneumonia, bronchitis, viral syndrome, anemia, electrolyte disturbance  Clinical Tests:   Lab Tests: Ordered and Reviewed  Radiological Study: Ordered and Reviewed       APC / Resident Notes:   68 yo F presents with flu-like symptoms that began this morning. Bp elevated to 171/80. Vitals otherwise WNL. Afebrile. NAD. Lungs CTAB. Mild posterior oropharyngeal erythema.     Rapid flu negative. CXR negative. I will d/c pt in stable condition with Tessalon. Encouraged increased oral fluid intake, rest, OTC medications for symptomatic treatment. Advised PCP follow in 2-3 days if no improvement. Return precautions given. I have reviewed the patient's records and discussed this case with my supervising physician.                ED Course      Clinical Impression:   The primary encounter diagnosis was Acute upper respiratory infection. A diagnosis of Shortness of breath was also pertinent to this visit.    Disposition:   Disposition: Discharged  Condition: Stable                        Zohreh Hart PA-C  02/15/18 1157       Kelvin Lynch MD  02/16/18 0639

## 2018-02-15 ENCOUNTER — PATIENT MESSAGE (OUTPATIENT)
Dept: INTERNAL MEDICINE | Facility: CLINIC | Age: 68
End: 2018-02-15

## 2018-02-22 ENCOUNTER — OFFICE VISIT (OUTPATIENT)
Dept: INTERNAL MEDICINE | Facility: CLINIC | Age: 68
End: 2018-02-22
Payer: MEDICARE

## 2018-02-22 VITALS
HEART RATE: 68 BPM | HEIGHT: 61 IN | OXYGEN SATURATION: 97 % | SYSTOLIC BLOOD PRESSURE: 130 MMHG | DIASTOLIC BLOOD PRESSURE: 79 MMHG | WEIGHT: 176 LBS | BODY MASS INDEX: 33.23 KG/M2

## 2018-02-22 DIAGNOSIS — I10 ESSENTIAL HYPERTENSION: ICD-10-CM

## 2018-02-22 DIAGNOSIS — M89.9 DISORDER OF BONE: ICD-10-CM

## 2018-02-22 DIAGNOSIS — D69.6 THROMBOCYTOPENIA: ICD-10-CM

## 2018-02-22 DIAGNOSIS — J06.9 UPPER RESPIRATORY TRACT INFECTION, UNSPECIFIED TYPE: Primary | ICD-10-CM

## 2018-02-22 PROCEDURE — 99214 OFFICE O/P EST MOD 30 MIN: CPT | Mod: S$PBB,,, | Performed by: INTERNAL MEDICINE

## 2018-02-22 PROCEDURE — 99999 PR PBB SHADOW E&M-EST. PATIENT-LVL IV: CPT | Mod: PBBFAC,,, | Performed by: INTERNAL MEDICINE

## 2018-02-22 PROCEDURE — 99214 OFFICE O/P EST MOD 30 MIN: CPT | Mod: PBBFAC | Performed by: INTERNAL MEDICINE

## 2018-02-22 RX ORDER — BENAZEPRIL HYDROCHLORIDE 40 MG/1
40 TABLET ORAL DAILY
Qty: 90 TABLET | Refills: 3 | Status: SHIPPED | OUTPATIENT
Start: 2018-02-22 | End: 2019-02-04 | Stop reason: ALTCHOICE

## 2018-02-22 RX ORDER — AMLODIPINE BESYLATE 5 MG/1
5 TABLET ORAL DAILY
Qty: 90 TABLET | Refills: 3 | Status: SHIPPED | OUTPATIENT
Start: 2018-02-22 | End: 2019-03-10 | Stop reason: SDUPTHER

## 2018-02-22 NOTE — PROGRESS NOTES
"Subjective:       Patient ID: Rajesh Mas is a 67 y.o. female.    Chief Complaint: Follow-up (pt was seen in the hospital for acute respiratory infection.) and URI (pt still complains of symptoms like post nasal drip, coughing up thick green mucus. no signs of nausea/vomiting or fever.)    HPI     66 yo F here for ED follow up - seen 2/16 and dx URI. cxr negative, flu swab negative. Dc with tessalon, oral fluids, rest, symptomatic treatment.     Still with post nasal drip, coughing and nasal drainage. Started clear then cloudy then green. Worse at night. No wheezing or sob anymore. Temp to 100.8 on 2/17 but no more after that.     Taking zyrtec OTC.     No gout flares on allopurinol.     Review of Systems   Constitutional: Negative for activity change and unexpected weight change.   HENT: Positive for rhinorrhea. Negative for hearing loss and trouble swallowing.    Eyes: Negative for discharge and visual disturbance.   Respiratory: Negative for chest tightness and wheezing.    Cardiovascular: Negative for chest pain and palpitations.   Gastrointestinal: Negative for blood in stool, constipation, diarrhea and vomiting.   Endocrine: Negative for polydipsia and polyuria.   Genitourinary: Negative for difficulty urinating, dysuria, hematuria and menstrual problem.   Musculoskeletal: Negative for arthralgias, joint swelling and neck pain.   Neurological: Positive for headaches. Negative for weakness.   Psychiatric/Behavioral: Negative for confusion and dysphoric mood.       Objective:   /79 (BP Location: Left arm, Patient Position: Sitting, BP Method: Large (Manual))   Pulse 68   Ht 5' 1" (1.549 m)   Wt 79.8 kg (176 lb)   LMP  (LMP Unknown)   SpO2 97%   BMI 33.25 kg/m²      Physical Exam   Constitutional: She is oriented to person, place, and time. She appears well-developed and well-nourished. No distress.   HENT:   Head: Normocephalic and atraumatic.   Bilateral tm with intact light reflex "   Cardiovascular: Normal rate and regular rhythm.    Pulmonary/Chest: Effort normal. No respiratory distress. She has no wheezes. She has no rales.   Neurological: She is alert and oriented to person, place, and time.   Skin: Skin is warm and dry. She is not diaphoretic.   Psychiatric: She has a normal mood and affect. Her behavior is normal.       Assessment:       1. Upper respiratory tract infection, unspecified type    2. Thrombocytopenia    3. Disorder of bone    4. Essential hypertension        Plan:       Rajesh was seen today for follow-up and uri.    Diagnoses and all orders for this visit:    Upper respiratory tract infection, unspecified type  Improving    Thrombocytopenia  Stable, mild, monitor    Disorder of bone  -     DXA Bone Density Spine And Hip; Future    Essential hypertension  -     Change amlod-benazepril combo to:  amLODIPine (NORVASC) 5 MG tablet; Take 1 tablet (5 mg total) by mouth once daily.  -     benazepril (LOTENSIN) 40 MG tablet; Take 1 tablet (40 mg total) by mouth once daily.

## 2018-02-23 ENCOUNTER — HOSPITAL ENCOUNTER (OUTPATIENT)
Dept: RADIOLOGY | Facility: CLINIC | Age: 68
Discharge: HOME | End: 2018-02-23
Attending: INTERNAL MEDICINE
Payer: MEDICARE

## 2018-02-23 DIAGNOSIS — M89.9 DISORDER OF BONE: ICD-10-CM

## 2018-02-23 PROCEDURE — 77080 DXA BONE DENSITY AXIAL: CPT | Mod: TC

## 2018-02-23 PROCEDURE — 77080 DXA BONE DENSITY AXIAL: CPT | Mod: 26,,, | Performed by: INTERNAL MEDICINE

## 2018-08-03 ENCOUNTER — OFFICE VISIT (OUTPATIENT)
Dept: INTERNAL MEDICINE | Facility: CLINIC | Age: 68
End: 2018-08-03
Payer: MEDICARE

## 2018-08-03 ENCOUNTER — TELEPHONE (OUTPATIENT)
Dept: INTERNAL MEDICINE | Facility: CLINIC | Age: 68
End: 2018-08-03

## 2018-08-03 ENCOUNTER — CLINICAL SUPPORT (OUTPATIENT)
Dept: INTERNAL MEDICINE | Facility: CLINIC | Age: 68
End: 2018-08-03
Payer: MEDICARE

## 2018-08-03 VITALS
HEART RATE: 59 BPM | HEIGHT: 61 IN | SYSTOLIC BLOOD PRESSURE: 123 MMHG | OXYGEN SATURATION: 97 % | DIASTOLIC BLOOD PRESSURE: 73 MMHG | WEIGHT: 176 LBS | BODY MASS INDEX: 33.23 KG/M2

## 2018-08-03 DIAGNOSIS — E03.8 SUBCLINICAL HYPOTHYROIDISM: ICD-10-CM

## 2018-08-03 DIAGNOSIS — I10 ESSENTIAL HYPERTENSION: Primary | ICD-10-CM

## 2018-08-03 DIAGNOSIS — M1A.0720 IDIOPATHIC CHRONIC GOUT OF LEFT FOOT WITHOUT TOPHUS: ICD-10-CM

## 2018-08-03 DIAGNOSIS — Z23 NEED FOR VACCINATION WITH 13-POLYVALENT PNEUMOCOCCAL CONJUGATE VACCINE: ICD-10-CM

## 2018-08-03 DIAGNOSIS — D69.6 THROMBOCYTOPENIA: ICD-10-CM

## 2018-08-03 PROCEDURE — 99213 OFFICE O/P EST LOW 20 MIN: CPT | Mod: PBBFAC,25 | Performed by: INTERNAL MEDICINE

## 2018-08-03 PROCEDURE — 99999 PR PBB SHADOW E&M-EST. PATIENT-LVL III: CPT | Mod: PBBFAC,,, | Performed by: INTERNAL MEDICINE

## 2018-08-03 PROCEDURE — G0009 ADMIN PNEUMOCOCCAL VACCINE: HCPCS | Mod: PBBFAC

## 2018-08-03 PROCEDURE — 99214 OFFICE O/P EST MOD 30 MIN: CPT | Mod: S$PBB,,, | Performed by: INTERNAL MEDICINE

## 2018-08-03 RX ORDER — TRIAMTERENE AND HYDROCHLOROTHIAZIDE 37.5; 25 MG/1; MG/1
1 CAPSULE ORAL EVERY MORNING
Qty: 90 CAPSULE | Refills: 3 | Status: SHIPPED | OUTPATIENT
Start: 2018-08-03 | End: 2019-07-08 | Stop reason: SDUPTHER

## 2018-08-03 NOTE — PROGRESS NOTES
"Subjective:       Patient ID: Rajesh Mas is a 67 y.o. female.    Chief Complaint: Foot Pain (pt complains of L foot edema/pain that it reoccurring and started about 3 weeks ago. pt currently takes Tylenol to help relieve. )    HPI   66 yo F here for follow up of HTN and gout.     Overall doing well.   She has had intermittent left foot pain and swelling.  Has been off and on with pain of left dorsum of foot. Right foot not involved.       Review of Systems   Constitutional: Negative for activity change and unexpected weight change.   HENT: Negative for hearing loss, rhinorrhea and trouble swallowing.    Eyes: Negative for discharge and visual disturbance.   Respiratory: Negative for chest tightness and wheezing.    Cardiovascular: Negative for chest pain and palpitations.   Gastrointestinal: Negative for blood in stool, constipation, diarrhea and vomiting.   Endocrine: Positive for polyuria. Negative for polydipsia.   Genitourinary: Negative for difficulty urinating, dysuria, hematuria and menstrual problem.   Musculoskeletal: Positive for arthralgias and joint swelling. Negative for neck pain.   Neurological: Negative for weakness and headaches.   Psychiatric/Behavioral: Negative for confusion and dysphoric mood.       Objective:   /73 (BP Location: Right arm, Patient Position: Sitting, BP Method: Medium (Manual))   Pulse (!) 59   Ht 5' 1" (1.549 m)   Wt 79.8 kg (176 lb)   LMP  (LMP Unknown)   SpO2 97%   BMI 33.25 kg/m²      Physical Exam   Constitutional: She is oriented to person, place, and time. She appears well-developed and well-nourished.   HENT:   Head: Normocephalic and atraumatic.   Eyes: Conjunctivae and EOM are normal. Pupils are equal, round, and reactive to light.   Neck: Neck supple. No thyromegaly present.   Cardiovascular: Normal rate, regular rhythm and normal heart sounds.    No murmur heard.  Pulmonary/Chest: Effort normal and breath sounds normal. No respiratory distress. She " has no wheezes.   Abdominal: Soft. Bowel sounds are normal. She exhibits no distension. There is no tenderness.   Musculoskeletal: Normal range of motion.   Neurological: She is alert and oriented to person, place, and time.   Skin: Skin is warm and dry. No rash noted.   Psychiatric: She has a normal mood and affect. Judgment and thought content normal.   Vitals reviewed.      Assessment:       1. Essential hypertension    2. Idiopathic chronic gout of left foot without tophus    3. Thrombocytopenia    4. Subclinical hypothyroidism        Plan:       Rajesh was seen today for foot pain.    Diagnoses and all orders for this visit:    Essential hypertension  -     Comprehensive metabolic panel; Future  -     Lipid panel; Future  -     triamterene-hydrochlorothiazide 37.5-25 mg (DYAZIDE) 37.5-25 mg per capsule; Take 1 capsule by mouth every morning.    Idiopathic chronic gout of left foot without tophus  -     Uric acid; Future   Discussed diet for gout   If uric acid high will increase allopurinol to 300mg    Thrombocytopenia  -     CBC auto differential; Future    Subclinical hypothyroidism  -     TSH; Future

## 2018-08-03 NOTE — TELEPHONE ENCOUNTER
Please call pt. Your uric acid is still high at 6.2. I am going to increase your allopurinol to 300mg. Other labs looked good.

## 2018-08-03 NOTE — PATIENT INSTRUCTIONS
Eating to Prevent Gout  Gout is a painful form of arthritis caused by an excess of uric acid. This is a waste product made by the body. It builds up in the body and forms crystals that collect in the joints, bringing on a gout attack. Alcohol and certain foods can trigger a gout attack. Below are some guidelines for changing your diet to help you manage gout. Your healthcare provider can work with you to determine the best eating plan for you. Know that diet is only one part of managing gout. Take your medicines as prescribed and follow the other guidelines your healthcare provider has given you.  Foods to limit  Eating too many foods containing purines may increase the levels of uric acid in your body and increase your risk for a gout attack. It may be best to limit these high-purine foods:  · Alcohol (beer, red wine). You may be told to avoid alcohol completely.  · Certain fish (anchovies, sardines, fish roes, herring, tuna, mussels, codfish, scallops, trout, and larry)  · Certain meats (red meat, processed meat, romero, turkey, wild game, and goose)  · Sauces and gravies made with meat  · Organ meats (such as liver, kidneys, sweetbreads, and tripe)  · Legumes (such as dried beans, peas)  · Mushrooms, spinach, asparagus, and cauliflower  · Yeast and yeast extract supplements  Foods to try  Some foods may be helpful for people with gout. You may want to try adding some of the following foods to your diet:  · Dark berries: These include blueberries, blackberries, and cherries. These berries contain chemicals that may lower uric acid.  · Tofu: Tofu, which is made from soy, is a good source of protein. Studies have shown that it may be a better choice than meat for people with gout.  · Omega fatty acids: These acids are found in fatty fish (such as salmon), certain oils (such as flax, olive, or nut oils), or nuts. They may help prevent inflammation due to gout.  The following guidelines are recommended by the  American Medical Association for people with gout. Your diet should be:  · High in fiber, whole grains, fruits, and vegetables.  · Low in protein (15% of calories should come from protein. Choose lean sources such as soy, lean meats, and poultry).  · Low in fat (no more than 30% of calories should come from fat, with only 10% coming from animal fat).   Date Last Reviewed: 6/17/2015  © 0665-5254 [employer]. [address] [place] [address], [place] [address]. All rights reserved. This information is not intended as a substitute for professional medical care. Always follow your healthcare professional's instructions.        Gout Diet  Gout is a painful condition caused by an excess of uric acid, a waste product made by the body. Uric acid forms crystals that collect in the joints. The immune response to these crystals brings on symptoms of joint pain and swelling. This is called a gout attack. Often, medications and diet changes are combined to manage gout. Below are some guidelines for changing your diet to help you manage gout and prevent attacks. Your health care provider will help you determine the best eating plan for you.     Eating to manage gout  Weight loss for those who are overweight may help reduce gout attacks.  Eat less of these foods  Eating too many foods containing purines may raise the levels of uric acid in your body. This raises your risk for a gout attack. Try to limit these foods and drinks:  · Alcohol, such as beer and red wine. You may be told to avoid alcohol completely.  · Soft drinks that contain sugar or high fructose corn syrup  · Certain fish, including anchovies, sardines, fish eggs, and herring  · Shellfish  · Certain meats, such as red meat, hot dogs, luncheon meats, and turkey  · Organ meats, such as liver, kidneys, and sweetbreads  · Legumes, such as dried beans and peas  · Other high fat foods such as gravy, whole milk, and high fat cheeses  · Vegetables such as asparagus,  cauliflower, spinach, and mushrooms used to be thought to contribute to an increased risk for a gout attack, but recent studies show that high purine vegetables don't increase the risk for a gout attack.  Eat more of these foods  Other foods may be helpful for people with gout. Add some of these foods to your diet:  · Cherries contain chemicals that may lower uric acid.  · Omega fatty acids. These are found in some fatty fish such as salmon, certain oils (flax, olive, or nut), and nuts themselves. Omega fatty acids may help prevent inflammation due to gout.  · Dairy products that are low-fat or fat-free, such as cheese and yogurt  · Complex carbohydrate foods, including whole grains, brown rice, oats, and beans  · Coffee, in moderation  · Water, approximately 64 ounces per day  Follow-up care  Follow up with your healthcare provider as advised.  When to seek medical advice  Call your healthcare provider right away if any of these occur:  · Return of gout symptoms, usually at night:  · Severe pain, swelling, and heat in a joint, especially the base of the big toe  · Affected joint is hard to move  · Skin of the affected joint is purple or red  · Fever of 100.4°F (38°C) or higher  · Pain that doesn't get better even with prescribed medicine   Date Last Reviewed: 1/12/2016  © 1878-6527 [employer]. [address] [place] [address], [place] [address]. All rights reserved. This information is not intended as a substitute for professional medical care. Always follow your healthcare professional's instructions.

## 2018-08-06 NOTE — TELEPHONE ENCOUNTER
May be age related. Try kegel exercises. If any burning or discomfort will do urine sample. If persists can see urogynecology.

## 2018-08-06 NOTE — TELEPHONE ENCOUNTER
Spoke with pt, notified of results and medication change. Pt verbalized understanding. Pt is also wondering why she is going to the restroom so frequently at night. she states she takes her fluid pill early in the am but tends to go to the bathroom constantly through the night. She says she has no other symptoms and does not go as often during the day. She is wondering if its age related and what pcp suggest? Please advise

## 2018-09-28 ENCOUNTER — PATIENT MESSAGE (OUTPATIENT)
Dept: OBSTETRICS AND GYNECOLOGY | Facility: CLINIC | Age: 68
End: 2018-09-28

## 2018-09-28 DIAGNOSIS — Z12.39 SCREENING FOR BREAST CANCER: Primary | ICD-10-CM

## 2018-11-06 ENCOUNTER — HOSPITAL ENCOUNTER (OUTPATIENT)
Dept: RADIOLOGY | Facility: HOSPITAL | Age: 68
Discharge: HOME | End: 2018-11-06
Attending: OBSTETRICS & GYNECOLOGY
Payer: MEDICARE

## 2018-11-06 DIAGNOSIS — Z12.39 SCREENING FOR BREAST CANCER: ICD-10-CM

## 2018-11-06 PROCEDURE — 77063 BREAST TOMOSYNTHESIS BI: CPT | Mod: TC

## 2018-11-06 PROCEDURE — 77063 BREAST TOMOSYNTHESIS BI: CPT | Mod: 26,,, | Performed by: RADIOLOGY

## 2018-11-06 PROCEDURE — 77067 SCR MAMMO BI INCL CAD: CPT | Mod: 26,,, | Performed by: RADIOLOGY

## 2018-11-06 PROCEDURE — 77067 SCR MAMMO BI INCL CAD: CPT | Mod: TC

## 2018-11-28 ENCOUNTER — OFFICE VISIT (OUTPATIENT)
Dept: OBSTETRICS AND GYNECOLOGY | Facility: CLINIC | Age: 68
End: 2018-11-28
Payer: MEDICARE

## 2018-11-28 VITALS
SYSTOLIC BLOOD PRESSURE: 146 MMHG | WEIGHT: 181.19 LBS | HEIGHT: 61 IN | BODY MASS INDEX: 34.21 KG/M2 | DIASTOLIC BLOOD PRESSURE: 82 MMHG

## 2018-11-28 DIAGNOSIS — Z01.419 ENCOUNTER FOR GYNECOLOGICAL EXAMINATION WITHOUT ABNORMAL FINDING: Primary | ICD-10-CM

## 2018-11-28 DIAGNOSIS — Z12.4 PAP SMEAR FOR CERVICAL CANCER SCREENING: ICD-10-CM

## 2018-11-28 DIAGNOSIS — Z12.31 SCREENING MAMMOGRAM, ENCOUNTER FOR: ICD-10-CM

## 2018-11-28 PROCEDURE — 99213 OFFICE O/P EST LOW 20 MIN: CPT | Mod: PBBFAC | Performed by: OBSTETRICS & GYNECOLOGY

## 2018-11-28 PROCEDURE — G0101 CA SCREEN;PELVIC/BREAST EXAM: HCPCS | Mod: S$PBB,,, | Performed by: OBSTETRICS & GYNECOLOGY

## 2018-11-28 PROCEDURE — 88175 CYTOPATH C/V AUTO FLUID REDO: CPT

## 2018-11-28 PROCEDURE — 99999 PR PBB SHADOW E&M-EST. PATIENT-LVL III: CPT | Mod: PBBFAC,,, | Performed by: OBSTETRICS & GYNECOLOGY

## 2018-11-28 PROCEDURE — G0101 CA SCREEN;PELVIC/BREAST EXAM: HCPCS | Mod: PBBFAC

## 2018-11-28 PROCEDURE — 87624 HPV HI-RISK TYP POOLED RSLT: CPT

## 2018-11-28 NOTE — PROGRESS NOTES
Subjective:       Patient ID: Rajesh Mas is a 67 y.o. female.    Chief Complaint:  Well Woman      History of Present Illness  HPI    Rajesh Mas is a 67 y.o. female  NEW TO ME here for her annual GYN exam.    She describes her periods as stopped around age 38,   denies break through bleeding.   denies vaginal itching or irritation.  Denies vaginal discharge.  She is sexually active. She has had 1 partner for the past 48 years .   History of abnormal pap: No  Last Pap: approximate date  and was normal  Last MMG: normal--routine follow-up in 12 months  Last Colonoscopy:  colonoscopy 2 years ago without abnormalities.  denies domestic violence. She does feel safe at home.     Past Medical History:   Diagnosis Date    Allergy     seasonal    Diverticulitis     Fever blister     Hypertension      Past Surgical History:   Procedure Laterality Date    CHOLECYSTECTOMY  2017    CHOLECYSTECTOMY-LAPAROSCOPIC Possible Open N/A 2017    Performed by Joshua Goldberg, MD at [facility] OR [facility]    COLONOSCOPY N/A 2016    Procedure: COLONOSCOPY;  Surgeon: Fidel Mason MD;  Location: [facility] ([address] [place] [address]);  Service: Endoscopy;  Laterality: N/A;    COLONOSCOPY N/A 2016    Performed by Fidel Mason MD at [facility] ([address] [place] [address])    partial colectomy      sigmoid removed due to diverticulitis    SINUS SURGERY       Social History     Socioeconomic History    Marital status:      Spouse name: Not on file    Number of children: Not on file    Years of education: Not on file    Highest education level: Not on file   Social Needs    Financial resource strain: Not on file    Food insecurity - worry: Not on file    Food insecurity - inability: Not on file    Transportation needs - medical: Not on file    Transportation needs - non-medical: Not on file   Occupational History    Not on file   Tobacco Use    Smoking status: Never Smoker    Smokeless tobacco: Never Used  "  Substance and Sexual Activity    Alcohol use: No     Alcohol/week: 0.0 oz    Drug use: No    Sexual activity: Yes     Comment: , together since    Other Topics Concern    Are you pregnant or think you may be? Not Asked    Breast-feeding Not Asked   Social History Narrative    Not on file     Family History   Problem Relation Age of Onset    Diabetes Mother     Heart failure Mother     Hypertension Mother     Liver cancer Maternal Uncle     Breast cancer Maternal Grandmother     Other Brother         prostate issue    Melanoma Neg Hx     Colon cancer Neg Hx     Ovarian cancer Neg Hx      OB History      Para Term  AB Living    5 4 4   1 4    SAB TAB Ectopic Multiple Live Births    1       4          BP (!) 146/82   Ht 5' 1" (1.549 m)   Wt 82.2 kg (181 lb 3.5 oz)   LMP 1988 (Approximate)   BMI 34.24 kg/m²         GYN & OB History  Patient's last menstrual period was 1988 (approximate).   Date of Last Pap: 2016    OB History    Para Term  AB Living   5 4 4   1 4   SAB TAB Ectopic Multiple Live Births   1       4      # Outcome Date GA Lbr Yonny/2nd Weight Sex Delivery Anes PTL Lv   5 SAB            4 Term      Vag-Spont   HALLEY   3 Term      Vag-Spont   HALLEY   2 Term      Vag-Spont   HALLEY   1 Term      Vag-Spont   HALLEY          Review of Systems  Review of Systems   Constitutional: Negative for activity change, appetite change, fatigue and unexpected weight change.   HENT: Negative.    Eyes: Negative for visual disturbance.   Respiratory: Negative for shortness of breath and wheezing.    Cardiovascular: Negative for chest pain, palpitations and leg swelling.   Gastrointestinal: Negative for abdominal pain, bloating and blood in stool.   Endocrine: Negative for diabetes, hair loss and hot flashes.   Genitourinary: Negative for decreased libido, dyspareunia, vaginal pain, postcoital bleeding and postmenopausal bleeding.   Musculoskeletal: Negative for " back pain and joint swelling.   Neurological: Negative for headaches.   Hematological: Does not bruise/bleed easily.   Psychiatric/Behavioral: Negative for depression and sleep disturbance. The patient is not nervous/anxious.    Breast: Positive for breast self exam.Negative for mastodynia and nipple discharge          Objective:      Physical Exam:   Constitutional: She is oriented to person, place, and time. She appears well-developed and well-nourished.    HENT:   Head: Normocephalic and atraumatic.    Eyes: EOM are normal. Pupils are equal, round, and reactive to light.    Neck: Normal range of motion. Neck supple.    Cardiovascular: Normal rate and regular rhythm.     Pulmonary/Chest: Effort normal and breath sounds normal.   BREASTS:  no mass, no tenderness, no deformity and no retraction. Right breast exhibits no inverted nipple, no mass, no nipple discharge, no skin change, no tenderness, no bleeding and no swelling. Left breast exhibits no inverted nipple, no mass, no nipple discharge, no skin change, no tenderness, no bleeding and no swelling. Breasts are symmetrical.              Abdominal: Soft. Bowel sounds are normal.     Genitourinary: Pelvic exam was performed with patient supine.   Genitourinary Comments: PELVIC: Normal external genitalia without lesions.  Normal hair distribution.  Adequate perineal body, normal urethral meatus.  Vagina  Dry and poorly rugated, atrophic, without lesions or discharge.  Cervix pink, without lesions, discharge or tenderness.  No significant cystocele or rectocele.  Bimanual exam shows uterus to be normal size, regular, mobile and nontender.  Adnexa without masses or tenderness.    RECTAL:Deferred             Musculoskeletal: Normal range of motion and moves all extremeties.       Neurological: She is alert and oriented to person, place, and time.    Skin: Skin is warm and dry.    Psychiatric: She has a normal mood and affect.              Assessment:        1.  Encounter for gynecological examination without abnormal finding    2. Pap smear for cervical cancer screening    3. Screening mammogram, encounter for                Plan:        1. Encounter for gynecological examination without abnormal finding  COUNSELING:  The patient was counseled today on osteoporosis prevention, calcium supplementation, and regular weight bearing exercise. The patient was also counseled today on ACS PAP guidelines, with recommendations for yearly pelvic exams unless their uterus, cervix, and ovaries were removed for benign reasons; in that case, examinations every 3-5 years are recommended. The patient was also counseled regarding monthly breast self-examination, routine STD screening for at-risk populations, prophylactic immunizations for transmitted infections such as HPV, Pertussis, or Influenza as appropriate, and yearly mammograms when indicated by ACS guidelines. She was advised to see her primary care physician for all other health maintenance.   FOLLOW-UP with me for next routine visit.         2. Pap smear for cervical cancer screening    - Liquid-based pap smear, screening  - HPV High Risk Genotypes, PCR    3. Screening mammogram, encounter for    - Mammo Digital Screening Bilat w/ Bryce; Future       Follow-up in about 2 years (around 11/28/2020).

## 2018-11-30 LAB
HPV HR 12 DNA CVX QL NAA+PROBE: NEGATIVE
HPV16 AG SPEC QL: NEGATIVE
HPV18 DNA SPEC QL NAA+PROBE: NEGATIVE

## 2018-12-04 ENCOUNTER — PATIENT MESSAGE (OUTPATIENT)
Dept: INTERNAL MEDICINE | Facility: CLINIC | Age: 68
End: 2018-12-04

## 2018-12-20 ENCOUNTER — IMMUNIZATION (OUTPATIENT)
Dept: INTERNAL MEDICINE | Facility: CLINIC | Age: 68
End: 2018-12-20
Payer: MEDICARE

## 2018-12-20 PROCEDURE — 90662 IIV NO PRSV INCREASED AG IM: CPT | Mod: PBBFAC

## 2019-02-04 ENCOUNTER — OFFICE VISIT (OUTPATIENT)
Dept: INTERNAL MEDICINE | Facility: CLINIC | Age: 69
End: 2019-02-04
Attending: INTERNAL MEDICINE
Payer: MEDICARE

## 2019-02-04 VITALS
DIASTOLIC BLOOD PRESSURE: 76 MMHG | HEIGHT: 61 IN | HEART RATE: 63 BPM | SYSTOLIC BLOOD PRESSURE: 124 MMHG | BODY MASS INDEX: 33.99 KG/M2 | WEIGHT: 180 LBS | OXYGEN SATURATION: 96 %

## 2019-02-04 DIAGNOSIS — I10 ESSENTIAL HYPERTENSION: Primary | ICD-10-CM

## 2019-02-04 DIAGNOSIS — M1A.0720 IDIOPATHIC CHRONIC GOUT OF LEFT FOOT WITHOUT TOPHUS: ICD-10-CM

## 2019-02-04 DIAGNOSIS — I51.89 DIASTOLIC DYSFUNCTION: ICD-10-CM

## 2019-02-04 DIAGNOSIS — D69.6 THROMBOCYTOPENIA: ICD-10-CM

## 2019-02-04 PROBLEM — M10.9 ACUTE GOUT OF LEFT FOOT: Status: RESOLVED | Noted: 2018-01-03 | Resolved: 2019-02-04

## 2019-02-04 PROCEDURE — 99214 PR OFFICE/OUTPT VISIT, EST, LEVL IV, 30-39 MIN: ICD-10-PCS | Mod: S$PBB,,, | Performed by: INTERNAL MEDICINE

## 2019-02-04 PROCEDURE — 99999 PR PBB SHADOW E&M-EST. PATIENT-LVL III: ICD-10-PCS | Mod: PBBFAC,,, | Performed by: INTERNAL MEDICINE

## 2019-02-04 PROCEDURE — 99213 OFFICE O/P EST LOW 20 MIN: CPT | Mod: PBBFAC | Performed by: INTERNAL MEDICINE

## 2019-02-04 PROCEDURE — 99214 OFFICE O/P EST MOD 30 MIN: CPT | Mod: S$PBB,,, | Performed by: INTERNAL MEDICINE

## 2019-02-04 PROCEDURE — 99999 PR PBB SHADOW E&M-EST. PATIENT-LVL III: CPT | Mod: PBBFAC,,, | Performed by: INTERNAL MEDICINE

## 2019-02-04 RX ORDER — LOSARTAN POTASSIUM 100 MG/1
100 TABLET ORAL DAILY
Qty: 90 TABLET | Refills: 3 | Status: SHIPPED | OUTPATIENT
Start: 2019-02-04 | End: 2019-09-23 | Stop reason: SDUPTHER

## 2019-02-04 NOTE — PROGRESS NOTES
"Subjective:       Patient ID: Rajesh Mas is a 68 y.o. female.    Chief Complaint: Annual Exam (general check up.)    HPI   Rajesh Mas is a 68 y.o. female here for routine follow up of the following chronic issues:    HTN w diastolic dysfunction  Amlodipine 5mg  Benazepril 40mg  Triamterene-hctz 37.5-25mg    Gout  Allopurinol 300mg  Uric acid 6.2 in august.     Occasional RLQ/right groin discomfort without clear cause. No pain w walking. Gyn eval was okay.   No constipation. cscope up to date.   Intermittent pain down left lateral leg while in bed.     Review of Systems   Constitutional: Negative for activity change and unexpected weight change.   HENT: Negative for hearing loss, rhinorrhea and trouble swallowing.    Eyes: Negative for discharge and visual disturbance.   Respiratory: Negative for chest tightness and wheezing.    Cardiovascular: Negative for chest pain and palpitations.   Gastrointestinal: Negative for blood in stool, constipation, diarrhea and vomiting.   Endocrine: Positive for polyuria. Negative for polydipsia.   Genitourinary: Negative for difficulty urinating, dysuria, hematuria and menstrual problem.   Musculoskeletal: Positive for arthralgias. Negative for joint swelling and neck pain.   Neurological: Negative for weakness and headaches.   Psychiatric/Behavioral: Negative for confusion and dysphoric mood.       Objective:   /76 (BP Location: Left arm, Patient Position: Sitting, BP Method: Medium (Manual))   Pulse 63   Ht 5' 1" (1.549 m)   Wt 81.6 kg (180 lb)   LMP 11/28/1988 (Approximate)   SpO2 96%   BMI 34.01 kg/m²      Physical Exam   Constitutional: She is oriented to person, place, and time. She appears well-developed and well-nourished. No distress.   HENT:   Head: Normocephalic and atraumatic.   Cardiovascular: Normal rate and regular rhythm.   Pulmonary/Chest: Effort normal. No respiratory distress. She has no wheezes. She has no rales.   Abdominal: Soft. She " exhibits no distension and no mass. There is no tenderness. There is no guarding.   Musculoskeletal:   Intact ROM right hip without pain, Upper and lower extremity strength 5/5     Neurological: She is alert and oriented to person, place, and time.   Skin: Skin is warm and dry. She is not diaphoretic.   Psychiatric: She has a normal mood and affect. Her behavior is normal.       Assessment:       1. Essential hypertension    2. Idiopathic chronic gout of left foot without tophus    3. Diastolic dysfunction    4. Thrombocytopenia        Plan:       Rajesh was seen today for annual exam.    Diagnoses and all orders for this visit:    Essential hypertension  -     Comprehensive metabolic panel; Future  -     losartan (COZAAR) 100 MG tablet; Take 1 tablet (100 mg total) by mouth once daily.replaces benazepril  Amlodipine 5mg  Triamterene-hctz 37.5-25mg    Idiopathic chronic gout of left foot without tophus  -     Comprehensive metabolic panel; Future  -     Uric acid; Future    Diastolic dysfunction  -     Comprehensive metabolic panel; Future    Thrombocytopenia  -     CBC auto differential; Future    Monitor intermittent RLQ and left leg pain. Suspect abdominal cramps and muscle cramps respectively as cause.

## 2019-02-21 ENCOUNTER — PATIENT MESSAGE (OUTPATIENT)
Dept: INTERNAL MEDICINE | Facility: CLINIC | Age: 69
End: 2019-02-21

## 2019-02-21 ENCOUNTER — OFFICE VISIT (OUTPATIENT)
Dept: INTERNAL MEDICINE | Facility: CLINIC | Age: 69
End: 2019-02-21
Payer: MEDICARE

## 2019-02-21 ENCOUNTER — HOSPITAL ENCOUNTER (OUTPATIENT)
Dept: RADIOLOGY | Facility: HOSPITAL | Age: 69
Discharge: HOME | End: 2019-02-21
Attending: NURSE PRACTITIONER
Payer: MEDICARE

## 2019-02-21 VITALS
HEART RATE: 63 BPM | WEIGHT: 181.69 LBS | HEIGHT: 61 IN | DIASTOLIC BLOOD PRESSURE: 82 MMHG | BODY MASS INDEX: 34.3 KG/M2 | OXYGEN SATURATION: 97 % | SYSTOLIC BLOOD PRESSURE: 124 MMHG

## 2019-02-21 DIAGNOSIS — M54.6 THORACIC SPINE PAIN: ICD-10-CM

## 2019-02-21 DIAGNOSIS — M54.6 THORACIC SPINE PAIN: Primary | ICD-10-CM

## 2019-02-21 LAB
BILIRUB UR QL STRIP: NEGATIVE
CLARITY UR REFRACT.AUTO: ABNORMAL
COLOR UR AUTO: YELLOW
GLUCOSE UR QL STRIP: NEGATIVE
HGB UR QL STRIP: NEGATIVE
KETONES UR QL STRIP: NEGATIVE
LEUKOCYTE ESTERASE UR QL STRIP: NEGATIVE
NITRITE UR QL STRIP: NEGATIVE
PH UR STRIP: 6 [PH] (ref 5–8)
PROT UR QL STRIP: NEGATIVE
SP GR UR STRIP: 1.01 (ref 1–1.03)
URN SPEC COLLECT METH UR: ABNORMAL

## 2019-02-21 PROCEDURE — 99999 PR PBB SHADOW E&M-EST. PATIENT-LVL IV: CPT | Mod: PBBFAC,,, | Performed by: NURSE PRACTITIONER

## 2019-02-21 PROCEDURE — 72070 X-RAY EXAM THORAC SPINE 2VWS: CPT | Mod: TC

## 2019-02-21 PROCEDURE — 72070 XR THORACIC SPINE AP LATERAL: ICD-10-PCS | Mod: 26,,, | Performed by: RADIOLOGY

## 2019-02-21 PROCEDURE — 99213 PR OFFICE/OUTPT VISIT, EST, LEVL III, 20-29 MIN: ICD-10-PCS | Mod: S$PBB,,, | Performed by: NURSE PRACTITIONER

## 2019-02-21 PROCEDURE — 99213 OFFICE O/P EST LOW 20 MIN: CPT | Mod: S$PBB,,, | Performed by: NURSE PRACTITIONER

## 2019-02-21 PROCEDURE — 99999 PR PBB SHADOW E&M-EST. PATIENT-LVL IV: ICD-10-PCS | Mod: PBBFAC,,, | Performed by: NURSE PRACTITIONER

## 2019-02-21 PROCEDURE — 99214 OFFICE O/P EST MOD 30 MIN: CPT | Mod: PBBFAC,25 | Performed by: NURSE PRACTITIONER

## 2019-02-21 PROCEDURE — 81003 URINALYSIS AUTO W/O SCOPE: CPT

## 2019-02-21 PROCEDURE — 72070 X-RAY EXAM THORAC SPINE 2VWS: CPT | Mod: 26,,, | Performed by: RADIOLOGY

## 2019-02-21 RX ORDER — METHOCARBAMOL 500 MG/1
500 TABLET, FILM COATED ORAL 3 TIMES DAILY PRN
Qty: 30 TABLET | Refills: 0 | Status: SHIPPED | OUTPATIENT
Start: 2019-02-21 | End: 2019-03-03

## 2019-02-21 RX ORDER — NAPROXEN 500 MG/1
500 TABLET ORAL 2 TIMES DAILY WITH MEALS
Qty: 20 TABLET | Refills: 0 | Status: SHIPPED | OUTPATIENT
Start: 2019-02-21 | End: 2019-05-20

## 2019-02-21 NOTE — TELEPHONE ENCOUNTER
Spoke with pt regarding xrays- DJD. Will start NSAIDs and muscle relaxer. Call clinic if s/s do not improve after 2 weeks. Pt states understanding

## 2019-02-21 NOTE — PROGRESS NOTES
INTERNAL MEDICINE URGENT CARE NOTE    CHIEF COMPLAINT     Chief Complaint   Patient presents with    Flank Pain     R sided pain, x4 days       HPI     Rajesh Mas is a 68 y.o. female with HTN, thrombocytopenia, diastolic dysfunction, and gout who presents for an urgent visit today.  She is an established pt of Dr Villarreal.     Here with c/o right flank/lower back pain x approx 1 week. Pain intermittent but now constant since last night. ?colicky Described as pushing and TTP.   Located tot he right flank region to the right posterior ribs and back.   Denies injury and trauma.   Denies urinary s/s.   Denies fever and chills.    Denies n/v/d/c   H/o cholecystectomy and colectomy      Past Medical History:  Past Medical History:   Diagnosis Date    Allergy     seasonal    Diverticulitis     Fever blister     Hypertension        Home Medications:  Prior to Admission medications    Medication Sig Start Date End Date Taking? Authorizing Provider   allopurinol (ZYLOPRIM) 300 MG tablet Take 1 tablet (300 mg total) by mouth once daily. (to prevent gout) 8/3/18  Yes Gail Villarreal MD   amLODIPine (NORVASC) 5 MG tablet Take 1 tablet (5 mg total) by mouth once daily. 2/22/18 2/22/19 Yes Gail Villarreal MD   losartan (COZAAR) 100 MG tablet Take 1 tablet (100 mg total) by mouth once daily. 2/4/19 2/4/20 Yes Gail Villarreal MD   triamterene-hydrochlorothiazide 37.5-25 mg (DYAZIDE) 37.5-25 mg per capsule Take 1 capsule by mouth every morning. 8/3/18  Yes Gail Villarreal MD       Review of Systems:  Review of Systems   Constitutional: Negative for activity change and unexpected weight change.   HENT: Negative for hearing loss, rhinorrhea and trouble swallowing.    Eyes: Negative for discharge and visual disturbance.   Respiratory: Negative for chest tightness and wheezing.    Cardiovascular: Negative for chest pain and palpitations.   Gastrointestinal: Negative for blood in stool, constipation, diarrhea  "and vomiting.   Endocrine: Negative for polydipsia and polyuria.   Genitourinary: Negative for difficulty urinating, dysuria, hematuria and menstrual problem.   Musculoskeletal: Negative for arthralgias, joint swelling and neck pain.   Neurological: Negative for weakness and headaches.   Psychiatric/Behavioral: Negative for confusion and dysphoric mood.       Health Maintainence:   Immunizations:  Health Maintenance       Date Due Completion Date    Pneumococcal Vaccine (65+ Low/Medium Risk) (2 of 2 - PPSV23) 08/03/2019 8/3/2018    Mammogram 11/06/2019 11/6/2018    Override on 10/1/2015: Done    Lipid Panel 08/03/2023 8/3/2018    TETANUS VACCINE 11/02/2026 11/2/2016    Colonoscopy 12/06/2026 12/6/2016    Override on 12/6/2016: Done    DEXA SCAN 02/23/2028 2/23/2018           PHYSICAL EXAM     /82 (BP Location: Left arm, Patient Position: Sitting, BP Method: Large (Manual))   Pulse 63   Ht 5' 1" (1.549 m)   Wt 82.4 kg (181 lb 10.5 oz)   LMP 11/28/1988 (Approximate)   SpO2 97%   BMI 34.32 kg/m²     Physical Exam   Constitutional: She is oriented to person, place, and time. She appears well-developed and well-nourished.   HENT:   Head: Normocephalic.   Right Ear: External ear normal.   Left Ear: External ear normal.   Nose: Nose normal.   Mouth/Throat: Oropharynx is clear and moist. No oropharyngeal exudate.   Eyes: Pupils are equal, round, and reactive to light.   Neck: Neck supple. No JVD present. No tracheal deviation present. No thyromegaly present.   Cardiovascular: Normal rate, regular rhythm, normal heart sounds and intact distal pulses. Exam reveals no gallop and no friction rub.   No murmur heard.  Pulmonary/Chest: Effort normal and breath sounds normal. No respiratory distress. She has no wheezes. She has no rales.   Abdominal: Soft. Bowel sounds are normal. She exhibits no distension and no mass. There is no hepatomegaly. There is no tenderness. There is no rigidity, no rebound, no guarding, no " CVA tenderness, no tenderness at McBurney's point and negative Sanford's sign. No hernia.   Musculoskeletal: Normal range of motion. She exhibits no edema.        Thoracic back: She exhibits tenderness, bony tenderness, pain and spasm. She exhibits normal range of motion, no swelling, no edema, no deformity, no laceration and normal pulse.        Back:    Lymphadenopathy:     She has no cervical adenopathy.   Neurological: She is alert and oriented to person, place, and time.   Skin: Skin is warm and dry. No rash noted.   Psychiatric: She has a normal mood and affect. Her behavior is normal.   Vitals reviewed.      LABS     No results found for: LABA1C, HGBA1C  CMP  Sodium   Date Value Ref Range Status   02/04/2019 143 136 - 145 mmol/L Final     Potassium   Date Value Ref Range Status   02/04/2019 4.3 3.5 - 5.1 mmol/L Final     Chloride   Date Value Ref Range Status   02/04/2019 107 95 - 110 mmol/L Final     CO2   Date Value Ref Range Status   02/04/2019 28 23 - 29 mmol/L Final     Glucose   Date Value Ref Range Status   02/04/2019 97 70 - 110 mg/dL Final     BUN, Bld   Date Value Ref Range Status   02/04/2019 16 8 - 23 mg/dL Final     Creatinine   Date Value Ref Range Status   02/04/2019 1.1 0.5 - 1.4 mg/dL Final     Calcium   Date Value Ref Range Status   02/04/2019 9.8 8.7 - 10.5 mg/dL Final     Total Protein   Date Value Ref Range Status   02/04/2019 8.0 6.0 - 8.4 g/dL Final     Albumin   Date Value Ref Range Status   02/04/2019 3.9 3.5 - 5.2 g/dL Final     Total Bilirubin   Date Value Ref Range Status   02/04/2019 0.6 0.1 - 1.0 mg/dL Final     Comment:     For infants and newborns, interpretation of results should be based  on gestational age, weight and in agreement with clinical  observations.  Premature Infant recommended reference ranges:  Up to 24 hours.............<8.0 mg/dL  Up to 48 hours............<12.0 mg/dL  3-5 days..................<15.0 mg/dL  6-29 days.................<15.0 mg/dL       Alkaline  Phosphatase   Date Value Ref Range Status   02/04/2019 76 55 - 135 U/L Final     AST   Date Value Ref Range Status   02/04/2019 18 10 - 40 U/L Final     ALT   Date Value Ref Range Status   02/04/2019 18 10 - 44 U/L Final     Anion Gap   Date Value Ref Range Status   02/04/2019 8 8 - 16 mmol/L Final     eGFR if    Date Value Ref Range Status   02/04/2019 >60 >60 mL/min/1.73 m^2 Final     eGFR if non    Date Value Ref Range Status   02/04/2019 52 (A) >60 mL/min/1.73 m^2 Final     Comment:     Calculation used to obtain the estimated glomerular filtration  rate (eGFR) is the CKD-EPI equation.        Lab Results   Component Value Date    WBC 7.51 02/04/2019    HGB 13.9 02/04/2019    HCT 44.3 02/04/2019    MCV 91 02/04/2019     (L) 02/04/2019     Lab Results   Component Value Date    CHOL 175 08/03/2018    CHOL 175 05/13/2017    CHOL 174 03/15/2017     Lab Results   Component Value Date    HDL 62 08/03/2018    HDL 54 05/13/2017    HDL 53 02/26/2016     Lab Results   Component Value Date    LDLCALC 80.2 08/03/2018    LDLCALC 83.0 05/13/2017    LDLCALC 91.4 02/26/2016     Lab Results   Component Value Date    TRIG 164 (H) 08/03/2018    TRIG 190 (H) 05/13/2017    TRIG 193 (H) 02/26/2016     Lab Results   Component Value Date    CHOLHDL 35.4 08/03/2018    CHOLHDL 30.9 05/13/2017    CHOLHDL 29.0 02/26/2016     Lab Results   Component Value Date    TSH 3.649 08/03/2018       ASSESSMENT/PLAN     Rajesh Mas is a 68 y.o. female with  Past Medical History:   Diagnosis Date    Allergy     seasonal    Diverticulitis     Fever blister     Hypertension      Thoracic spine pain- likely msk in nature. Abd and resp exam benign. Send for imaging. U/a today. NSAIDs and muscle relaxer as needed. Heat to the area.   -     Urinalysis  -     X-Ray Thoracic Spine AP Lateral; Future; Expected date: 02/21/2019    Follow up as needed and with PCP     Patient education provided from [facility]. Patient was  counseled on when and how to seek emergent care.       Yvette TAYLOR, TITO, FNP-c   Department of Internal Medicine - [facility]  7:34 AM

## 2019-02-22 ENCOUNTER — PATIENT MESSAGE (OUTPATIENT)
Dept: INTERNAL MEDICINE | Facility: CLINIC | Age: 69
End: 2019-02-22

## 2019-02-27 ENCOUNTER — PATIENT MESSAGE (OUTPATIENT)
Dept: INTERNAL MEDICINE | Facility: CLINIC | Age: 69
End: 2019-02-27

## 2019-02-28 DIAGNOSIS — M54.6 THORACIC SPINE PAIN: Primary | ICD-10-CM

## 2019-03-10 DIAGNOSIS — I10 ESSENTIAL HYPERTENSION: ICD-10-CM

## 2019-03-11 RX ORDER — AMLODIPINE BESYLATE 5 MG/1
TABLET ORAL
Qty: 90 TABLET | Refills: 0 | Status: SHIPPED | OUTPATIENT
Start: 2019-03-11 | End: 2019-07-08 | Stop reason: SDUPTHER

## 2019-03-13 ENCOUNTER — PATIENT MESSAGE (OUTPATIENT)
Dept: INTERNAL MEDICINE | Facility: CLINIC | Age: 69
End: 2019-03-13

## 2019-03-21 ENCOUNTER — OFFICE VISIT (OUTPATIENT)
Dept: SPINE | Facility: CLINIC | Age: 69
End: 2019-03-21
Payer: MEDICARE

## 2019-03-21 VITALS
DIASTOLIC BLOOD PRESSURE: 70 MMHG | BODY MASS INDEX: 34.17 KG/M2 | HEART RATE: 74 BPM | WEIGHT: 181 LBS | TEMPERATURE: 98 F | HEIGHT: 61 IN | SYSTOLIC BLOOD PRESSURE: 144 MMHG

## 2019-03-21 DIAGNOSIS — M54.14 THORACIC RADICULITIS: ICD-10-CM

## 2019-03-21 DIAGNOSIS — M54.6 ACUTE RIGHT-SIDED THORACIC BACK PAIN: Primary | ICD-10-CM

## 2019-03-21 DIAGNOSIS — M79.18 MYOFASCIAL MUSCLE PAIN: ICD-10-CM

## 2019-03-21 DIAGNOSIS — M47.24 OSTEOARTHRITIS OF SPINE WITH RADICULOPATHY, THORACIC REGION: ICD-10-CM

## 2019-03-21 PROCEDURE — 99214 OFFICE O/P EST MOD 30 MIN: CPT | Mod: S$PBB,,, | Performed by: PHYSICIAN ASSISTANT

## 2019-03-21 PROCEDURE — 99215 OFFICE O/P EST HI 40 MIN: CPT | Mod: PBBFAC | Performed by: PHYSICIAN ASSISTANT

## 2019-03-21 PROCEDURE — 99214 PR OFFICE/OUTPT VISIT, EST, LEVL IV, 30-39 MIN: ICD-10-PCS | Mod: S$PBB,,, | Performed by: PHYSICIAN ASSISTANT

## 2019-03-21 PROCEDURE — 99999 PR PBB SHADOW E&M-EST. PATIENT-LVL V: CPT | Mod: PBBFAC,,, | Performed by: PHYSICIAN ASSISTANT

## 2019-03-21 PROCEDURE — 99999 PR PBB SHADOW E&M-EST. PATIENT-LVL V: ICD-10-PCS | Mod: PBBFAC,,, | Performed by: PHYSICIAN ASSISTANT

## 2019-03-21 RX ORDER — METHYLPREDNISOLONE 4 MG/1
TABLET ORAL
Qty: 1 PACKAGE | Refills: 0 | Status: SHIPPED | OUTPATIENT
Start: 2019-03-21 | End: 2019-05-20

## 2019-03-21 NOTE — PROGRESS NOTES
Subjective:      Patient ID: Rajesh Mas is a 68 y.o. female.    Chief Complaint: Mid-back Pain      HPI  ([place])    History of HTN.     6 week history of constant right sided mid thoracic pain that radiates into her ribs/anterior chest. No left sided pain. Some improvement with hot bath and heating pad. No specific aggravating factors. No known injury. No previous pain like this. No arm pain. No numbness, tingling, or weakness. Pain varies and can be sharp, shooting, stabbing, and aching. She rates her pain as a 5 on a scale of 1-10. She had gallbladder out in April 2017. No change in symptoms with eating/not eating.     She took robaxin with no improvement. Minimal relief relief with naproxen. No PT, injections, or surgery on her back.     Patient denies balance issues, changes in handwriting, problems with hand dexterity, and frequent dropping of items.      Review of Systems   Constitution: Negative for fever, weakness, malaise/fatigue, night sweats, weight gain and weight loss.   HENT: Negative for hearing loss, nosebleeds and odynophagia.    Eyes: Negative for blurred vision and double vision.   Cardiovascular: Negative for chest pain, irregular heartbeat and palpitations.   Respiratory: Negative for cough, hemoptysis, shortness of breath and wheezing.    Endocrine: Negative for cold intolerance and polydipsia.   Hematologic/Lymphatic: Does not bruise/bleed easily.   Skin: Negative for dry skin, poor wound healing, rash and suspicious lesions.        Positive for change in a mole.    Musculoskeletal: Positive for back pain.        See HPI for pertinent positives.   Gastrointestinal: Negative for bloating, abdominal pain, constipation, diarrhea, hematochezia, melena, nausea and vomiting.   Genitourinary: Negative for bladder incontinence, dysuria, hematuria, hesitancy and incomplete emptying.   Neurological: Negative for disturbances in coordination, dizziness, focal weakness, headaches, loss of balance,  numbness, paresthesias and seizures.   Psychiatric/Behavioral: Negative for depression and hallucinations. The patient is not nervous/anxious.            Objective:        General: Rajesh is well-developed, well-nourished, appears stated age, in no acute distress, alert and oriented to time, place and person.     General    Vitals reviewed.  Constitutional: She is oriented to person, place, and time. She appears well-developed and well-nourished.   Pulmonary/Chest: Effort normal.   Abdominal: She exhibits no distension.   Neurological: She is alert and oriented to person, place, and time.   Psychiatric: She has a normal mood and affect. Her behavior is normal. Judgment and thought content normal.           Gait: normal, Romberg shows sway without falling, tandem walking is normal and she is able to heel/toe stand.     On exam of the cervical spine, pt has no posterior cervical or bilateral trapezial tenderness. .     Skin in cervical region is warm to the touch without visible rashes.     Strength Testing of Bilateral UEs shows  Right :  +5/5   Left :  +5/5  Right deltoid:  +5/5   Left deltoid:  +5/5  Right biceps:  +5/5   Left biceps:  +5/5  Right triceps:  +5/5   Left triceps:  +5/5  Right wrist extension:  +5/5  Left wrist extension:  +5/5  Right wrist flexion:  +5/5  Left wrist flexion:  +5/5  Right interosseus:  +5/5  Left interosseus:  +5/5    Sensation is grossly intact to light touch in C5, C6, C7, C8, T1 distribution.     negative clonus in bilateral LEs.    negative hoffmans bilateral UEs.    DTRs:  Right biceps:  +2     Left biceps:  +2   Right brachioradialis:  +2  Left brachioradialis:  +2    On exam of the thoracolumbar spine, Inspection of back is normal, she has mid thoracic tenderness around T7 area with tenderness wrapping around to her ribs. No rash noted. No evidence of shingles.     Skin in the thoracolumbar region is warm to the touch without visible rashes as above.     muscle tone  normal without spasm, limited range of motion without pain  Patient denies pain with lumbar ROM.    Strength testing of the bilateral LEs shows  Right hip abduction:  +5/5  Left hip abduction:  +5/5  Right hip flexion:  +5/5   Left hip flexion:  +5/5  Right hip extensors:  +5/5  Left hip extensors:  +5/5  Right quadriceps:  +5/5  Left quadriceps:  +5/5  Right hamstring:  +5/5  Left hamstring:  +5/5  Right dorsiflexion:  +5/5  Left dorsiflexion:  +5/5  Right plantar flexion:  +5/5  Left plantar flexion:  +5/5   Right EHL:  +5/5   Left EHL:  +5/5    negative straight leg raise on bilateral LEs.     DTRs:  Right patellar:  +2     Left patellar:  +2  Right achilles:  +2   Left achilles:  +2    Sensation is grossly intact in L2, L3, L4, L5, and S1 distribution.       XRAY INTERPRETATION:  X-rays of thoracic spine (AP/lat) dated 2/21/19 are personally reviewed showing diffuse thoracic spondylosis with bridging anterior osteophytes.        Assessment:       1. Acute right-sided thoracic back pain    2. Osteoarthritis of spine with radiculopathy, thoracic region    3. Thoracic radiculitis    4. Myofascial muscle pain           Plan:       Orders Placed This Encounter    Ambulatory referral to Physical Therapy - Lumbar/Thoracic    methylPREDNISolone (MEDROL DOSEPACK) 4 mg tablet       6 week history of constant right sided mid thoracic pain that radiates into her ribs/anterior chest. No left sided pain. Known diffuse thoracic spondylosis with bridging anterior osteophytes. Pain into ribs appears radicular in nature, however she likely has myofascial component as well. Treatment options reviewed with patient along with above thoracic XRs. Following plan made:     - Medrol dose pack for symptom relief. Reviewed dosing and side effects.   - PT for thoracic spine with good HEP. Internal orders sent to [facility].   - If no improvement with above, consider thoracic MRI.     Follow-up: Follow-up in about 2 months (around  5/21/2019). If there are any questions prior to this, the patient was instructed to contact the office.

## 2019-03-25 ENCOUNTER — PATIENT MESSAGE (OUTPATIENT)
Dept: SPINE | Facility: CLINIC | Age: 69
End: 2019-03-25

## 2019-03-28 ENCOUNTER — CLINICAL SUPPORT (OUTPATIENT)
Dept: REHABILITATION | Facility: HOSPITAL | Age: 69
End: 2019-03-28
Payer: MEDICARE

## 2019-03-28 DIAGNOSIS — M54.6 ACUTE RIGHT-SIDED THORACIC BACK PAIN: ICD-10-CM

## 2019-03-28 PROCEDURE — 97140 MANUAL THERAPY 1/> REGIONS: CPT | Mod: PO

## 2019-03-28 PROCEDURE — 97161 PT EVAL LOW COMPLEX 20 MIN: CPT | Mod: PO

## 2019-03-28 NOTE — PLAN OF CARE
[facility]  Physical Therapy Initial Evaluation    Name: Rajesh Mas  Clinic Number: [id number]    Therapy Diagnosis:   Encounter Diagnosis   Name Primary?    Acute right-sided thoracic back pain      Physician: Fiorella Huynh PA-C    Physician Orders: PT Eval and Treat   Medical Diagnosis from Referral:   M54.6 (ICD-10-CM) - Acute right-sided thoracic back pain   M47.24 (ICD-10-CM) - Osteoarthritis of spine with radiculopathy, thoracic region   M54.14 (ICD-10-CM) - Thoracic radiculitis   M79.18 (ICD-10-CM) - Myofascial muscle pain     Evaluation Date: 3/28/2019  Authorization Period Expiration: 3/20/20  Plan of Care Expiration: 5/24/19  Visit # / Visits authorized: 1/ 1    Time In: 700 AM  Time Out: 755 AM  Total Billable Time: 55 minutes    Precautions: Standard    Subjective   Date of onset: Over month ago  History of current condition - Rajesh reports: she began experiencing R sided mid back pain which began radiating into her anterior chest.  At first, she thought it was gas pain, but then it did not subside. She reports it is an intermittent pain. She is unsure what aggravates the pain. She was given steroids which did help with the pain; however, now that she finished the pack, her pain has returned. Occasionally her pain keeps her awake at night. She pushes through the pain and does not let it limit her. She denies numbness and tingling. She has never had this injury before. She states she did have similar pain when she had to have her gallbladder removed.      Medical History:   Past Medical History:   Diagnosis Date    Allergy     seasonal    Diverticulitis     Fever blister     Hypertension        Surgical History:   Rajesh Mas  has a past surgical history that includes partial colectomy (1997); Sinus surgery (2007); Colonoscopy (N/A, 12/6/2016); and Cholecystectomy (04/26/2017).    Medications:   Rajesh has a current medication list which includes the following  prescription(s): allopurinol, amlodipine, losartan, methylprednisolone, naproxen, and triamterene-hydrochlorothiazide 37.5-25 mg.    Allergies:   Review of patient's allergies indicates:   Allergen Reactions    Nickel sutures [surgical stainless steel]     Adhesive Rash        Imaging, X ray FINDINGS:  DJD and bridging osteophytosis.  No fracture or dislocation.  No bone destruction identified    Prior Therapy: None  Social History: Pt lives with their spouse.  Occupation: Pt is retired.  Prior Level of Function: independent  Current Level of Function: independent but pain with sleeping, ADLs    Pain:  Current 5/10, worst 6/10, best 2/10   Location: right thoracic spine   Description: Aching and Burning  Aggravating Factors: unsure what increases the pain  Easing Factors: lying down, aleve as needed, elevating her shoulder    Pts goals: to relieve some of her pain    Objective     Observation: Moderate HFRS posture    - Cervical Flexion: 100%, pulling in R sided thoracic paraspinal  - Cervical Ext: 100%, no pain  - Cervical Rot R:  100%, no pain  - Cervical Rot L:  100%, no pain    UE ROM: L UE WNLs. R UE WNLs except pain with reaching across her body (adduction)      Thoracic Range of Motion:    Degrees Pain   Flexion 75%   increased        Extension 75%   No pain        Left rotation   75% slight        Right Rotation   50% severe           General B UE strength 4 to 4+/5 B    Periscapular:  Rhomboid 4-/5 B  Mid Trap 4-/5 B  Lower trap 3+/5 B      Dermatomes: In tact to LT      Joint Mobility: Hypomobile ribs and T/s, R sided ribs anteriorly rotated in comparison to R    Palpation: + TTP T/S paraspinals and rib 7, 8, 9,  from posterior to sternum        CMS Impairment/Limitation/Restriction for FOTO Lumbar Survey    Therapist reviewed FOTO scores for Rajesh Mas on 3/28/2019.   FOTO documents entered into [employer] - see Media section.    Limitation Score: 21%  Category: Mobility    Current : CJ = at least 20%  but < 40% impaired, limited or restricted  Goal: CI = at least 1% but < 20% impaired, limited or restricted         TREATMENT   Treatment Time In: 740 AM  Treatment Time Out: 755 AM  Total Treatment time separate from Evaluation: 15 minutes    Rajesh received therapeutic exercises to develop strength, endurance, ROM and flexibility for 5 minutes including:  Modified child's pose fwd and to the L 3 x 30s ea direction    Rajesh received the following manual therapy techniques: Joint mobilizations, Myofacial release and Soft tissue Mobilization were applied to the: R T/S spine and ribs for 10 minutes, including:  R posterior rib mobilization T7-8  T/S  grade III T5-9  T/S thoracic SB/rot mobs  Gentle scap oscillations    Home Exercises and Patient Education Provided    Education provided:   - discussed proper posture and body awareness    Written Home Exercises Provided: yes.  Exercises were reviewed and Rajesh was able to demonstrate them prior to the end of the session.  Rajesh demonstrated good  understanding of the education provided.     See EMR under Patient Instructions for exercises provided 3/28/2019.    Assessment   Rajesh is a 68 y.o. female referred to outpatient Physical Therapy with a medical diagnosis of acute R sided thoracic pain. Pt presents with decreased cervical and thoracic mobility, decreased periscapular strength, rib dysfunction, and limited functional mobility due to pain. Positive prognostic factors include acute nature of pain and motivation to participate in therapy.    Pt prognosis is Good.   Pt will benefit from skilled outpatient Physical Therapy to address the deficits stated above and in the chart below, provide pt/family education, and to maximize pt's level of independence.     Plan of care discussed with patient: Yes  Pt's spiritual, cultural and educational needs considered and patient is agreeable to the plan of care and goals as stated below:     Anticipated Barriers for  therapy: none    Medical Necessity is demonstrated by the following  History  Co-morbidities and personal factors that may impact the plan of care Co-morbidities:   HTN and diastolic dysfunction, thrombocytopenia, gout    Personal Factors:   no deficits     high   Examination  Body Structures and Functions, activity limitations and participation restrictions that may impact the plan of care Body Regions:   neck  upper extremities    Body Systems:    gross symmetry  ROM  strength  gross coordinated movement  transitions  motor control    Participation Restrictions:   Pain with ADLs and sleeping    Activity limitations:   Learning and applying knowledge  no deficits    General Tasks and Commands  no deficits    Communication  no deficits    Mobility  lifting and carrying objects  driving (bike, car, motorcycle)    Self care  washing oneself (bathing, drying, washing hands)  caring for body parts (brushing teeth, shaving, grooming)  dressing  looking after one's health    Domestic Life  shopping  cooking  doing house work (cleaning house, washing dishes, laundry)    Interactions/Relationships  no deficits    Life Areas  no deficits    Community and Social Life  community life  recreation and leisure         high   Clinical Presentation stable and uncomplicated low   Decision Making/ Complexity Score: low     Goals:    GOALS: Short Term Goals: 4 weeks    - Pt will increase thoracic flexion ROM by 25% to show improvements in mobility.  - Pt will have pain free cervical ROM in order to show improvements in mobility with driving and ADLs.  - Pt will be consistent and independent with HEP in order to promote carryover between therapy sessions.  - Pt will be able to demonstrate proper sitting posture without therapist cuing in order to decrease pain and improve positional tolerance.    Long Term Goals: 8 weeks    - Pt will increase B periscapular strength by 1 ms grade in order to improve performance of functional activities  to increase tolerance for ADL and work activities.  -  Pt to be Independent with updated HEP to maintain therapy gains following DC.  -  Pt will report a 50% decrease in episodes of R sided thoracic pain in order to improve sleep quality and ability to perform ADLs.  -  Pt will report at CI level (0-20% impaired) on FOTO neck survey score for neck pain disability to demonstrate decrease in disability and improvement in neck pain.      Plan   Plan of care Certification: 3/28/2019 to 5/24/19.    Outpatient Physical Therapy 2 times weekly for 8 weeks to include the following interventions: Manual Therapy, Neuromuscular Re-ed, Patient Education, Self Care and Therapeutic Exercise.     Ashley Holstein, PT

## 2019-04-04 ENCOUNTER — CLINICAL SUPPORT (OUTPATIENT)
Dept: REHABILITATION | Facility: HOSPITAL | Age: 69
End: 2019-04-04
Payer: MEDICARE

## 2019-04-04 DIAGNOSIS — M54.6 ACUTE RIGHT-SIDED THORACIC BACK PAIN: ICD-10-CM

## 2019-04-04 PROCEDURE — 97110 THERAPEUTIC EXERCISES: CPT | Mod: PO

## 2019-04-04 PROCEDURE — 97140 MANUAL THERAPY 1/> REGIONS: CPT | Mod: PO

## 2019-04-04 NOTE — PROGRESS NOTES
Physical Therapy Daily Treatment Note     Name: Rajesh [facility] Number: [id number]    Therapy Diagnosis:   Encounter Diagnosis   Name Primary?    Acute right-sided thoracic back pain      Physician: Fiorella Huynh PA-C    Visit Date: 4/4/2019      Physician Orders: PT Eval and Treat   Medical Diagnosis from Referral:   M54.6 (ICD-10-CM) - Acute right-sided thoracic back pain   M47.24 (ICD-10-CM) - Osteoarthritis of spine with radiculopathy, thoracic region   M54.14 (ICD-10-CM) - Thoracic radiculitis   M79.18 (ICD-10-CM) - Myofascial muscle pain      Evaluation Date: 3/28/2019  Authorization Period Expiration:12/31/19  Plan of Care Expiration: 5/24/19  Visit # / Visits authorized: 1/ 20 (2 total)    FOTO #: 2/5    Time In: 1000  Time Out: 1105  Total Billable Time: 55 minutes    Precautions: Standard    Subjective     Pt reports: she has been doing a little better overall. On Monday, she did have a slight increase in warmth and tenderness but it subsided.  She was compliant with home exercise program.  Response to previous treatment: slight soreness  Functional change: less pain overall    Pain: 2/10  Location: right back      Objective        Rajesh received therapeutic exercises to develop strength, endurance, ROM and flexibility for 35 minutes including:    Chin tucks 20 x 5s  Pelvic tilt 20 x 5s  DKTC 3 x 30s  LTR 10 x 5s  Cat/Cow 10 x 10s  Shoulder rolls posterior 2 x 10  Scap retraction 2 x 10  Swiss ball roll outs 10 x 10 fwd. 5 x 10ea side       Rajesh received the following manual therapy techniques: Joint mobilizations, Myofacial release and Soft tissue Mobilization were applied to the: R T/S spine and ribs for 20 minutes, including:  R posterior rib mobilization T7-8  T/S  grade III T5-9  T/S thoracic SB/rot mobs  Cupping to R sided thoracolumbar paraspinals    Vettthea received hot pack for 10 minutes to mid back.    Home Exercises Provided and Patient Education Provided     Education  provided:   - cont with HEP    Written Home Exercises Provided: yes.  Exercises were reviewed and Rajesh was able to demonstrate them prior to the end of the session.  Rajesh demonstrated good  understanding of the education provided.     See EMR under Patient Instructions for exercises provided prior visit.    Assessment     Good tolerance to initial tx session. Increased MF restrictions noted along thoracolumbar paraspinals which reduced following manual therapy and cupping techniques. Pt performed all activities well without an increase in pain or symtpoms. Occasional cueing needed for technique as this was the pt's first tx session. Will progress per josé luis.  Rajesh is progressing well towards her goals.   Pt prognosis is Good.     Pt will continue to benefit from skilled outpatient physical therapy to address the deficits listed in the problem list box on initial evaluation, provide pt/family education and to maximize pt's level of independence in the home and community environment.     Pt's spiritual, cultural and educational needs considered and pt agreeable to plan of care and goals.     Anticipated barriers to physical therapy: none    Goals:      Short Term Goals: 4 weeks     - Pt will increase thoracic flexion ROM by 25% to show improvements in mobility.  - Pt will have pain free cervical ROM in order to show improvements in mobility with driving and ADLs.  - Pt will be consistent and independent with HEP in order to promote carryover between therapy sessions.  - Pt will be able to demonstrate proper sitting posture without therapist cuing in order to decrease pain and improve positional tolerance.     Long Term Goals: 8 weeks     - Pt will increase B periscapular strength by 1 ms grade in order to improve performance of functional activities to increase tolerance for ADL and work activities.  -  Pt to be Independent with updated HEP to maintain therapy gains following DC.  -  Pt will report a 50%  decrease in episodes of R sided thoracic pain in order to improve sleep quality and ability to perform ADLs.  -  Pt will report at CI level (0-20% impaired) on FOTO neck survey score for neck pain disability to demonstrate decrease in disability and improvement in neck pain.       Plan     Continue to promote gentle stretching and postural retraining    Ashley Holstein, PT

## 2019-04-08 ENCOUNTER — CLINICAL SUPPORT (OUTPATIENT)
Dept: REHABILITATION | Facility: HOSPITAL | Age: 69
End: 2019-04-08
Payer: MEDICARE

## 2019-04-08 DIAGNOSIS — M54.6 ACUTE RIGHT-SIDED THORACIC BACK PAIN: ICD-10-CM

## 2019-04-08 PROCEDURE — 97110 THERAPEUTIC EXERCISES: CPT | Mod: PO

## 2019-04-08 NOTE — PROGRESS NOTES
Physical Therapy Daily Treatment Note     Name: Rajesh Mas  [facility] Number: [id number]    Therapy Diagnosis:   Encounter Diagnosis   Name Primary?    Acute right-sided thoracic back pain      Physician: Fiorella Huynh PA-C    Visit Date: 4/8/2019      Physician Orders: PT Eval and Treat   Medical Diagnosis from Referral:   M54.6 (ICD-10-CM) - Acute right-sided thoracic back pain   M47.24 (ICD-10-CM) - Osteoarthritis of spine with radiculopathy, thoracic region   M54.14 (ICD-10-CM) - Thoracic radiculitis   M79.18 (ICD-10-CM) - Myofascial muscle pain      Evaluation Date: 3/28/2019  Authorization Period Expiration:12/31/19  Plan of Care Expiration: 5/24/19  Visit # / Visits authorized: 2/ 20 (3 total)    FOTO #: 3/5    Time In: 11:00 AM  Time Out: 12:05 PM  Total Billable Time: 55 minutes    Precautions: Standard    Subjective     Pt reports: yesterday she had more pain in her right side but reports that for right now it is better.   She was compliant with home exercise program.  Response to previous treatment: slight soreness  Functional change: less pain overall    Pain: 3/10  Location: right back      Objective        Rajesh received therapeutic exercises to develop strength, endurance, ROM and flexibility for 45 minutes including:    Chin tucks 20 x 5s  Pelvic tilt 20 x 5s  DKTC 2 x 30s  LTR 20 x 5s  Cat/Cow 10 x 10s  Shoulder rolls posterior 2 x 10  Scap retraction 2 x 10  Swiss ball roll outs 10 x 10 fwd. 5 x 10ea side       Rajesh received the following manual therapy techniques: Myofacial release and Soft tissue Mobilization were applied to the: R T/S spine for 5 minutes, including:  R posterior rib mobilization T7-8 - NP  T/S  grade III T5-9 - NP   T/S thoracic SB/rot mobs - NP  Cupping to R sided thoracolumbar paraspinals    Vettice received hot pack for 10 minutes to mid back.    Home Exercises Provided and Patient Education Provided     Education provided:   - cont with HEP    Written Home  Exercises Provided: yes.  Exercises were reviewed and Rajesh was able to demonstrate them prior to the end of the session.  Giattthea demonstrated good  understanding of the education provided.     See EMR under Patient Instructions for exercises provided prior visit.    Assessment     Patient tolerated session well today with no onset of adverse effects. Patient with good tolerance to all exercises performed today. Patient with mild cueing needed throughout exercises however responds well to cueing.Will progress per josé luis.  Rajesh is progressing well towards her goals.   Pt prognosis is Good.     Pt will continue to benefit from skilled outpatient physical therapy to address the deficits listed in the problem list box on initial evaluation, provide pt/family education and to maximize pt's level of independence in the home and community environment.     Pt's spiritual, cultural and educational needs considered and pt agreeable to plan of care and goals.     Anticipated barriers to physical therapy: none    Goals:      Short Term Goals: 4 weeks     - Pt will increase thoracic flexion ROM by 25% to show improvements in mobility.  - Pt will have pain free cervical ROM in order to show improvements in mobility with driving and ADLs.  - Pt will be consistent and independent with HEP in order to promote carryover between therapy sessions.  - Pt will be able to demonstrate proper sitting posture without therapist cuing in order to decrease pain and improve positional tolerance.     Long Term Goals: 8 weeks     - Pt will increase B periscapular strength by 1 ms grade in order to improve performance of functional activities to increase tolerance for ADL and work activities.  -  Pt to be Independent with updated HEP to maintain therapy gains following DC.  -  Pt will report a 50% decrease in episodes of R sided thoracic pain in order to improve sleep quality and ability to perform ADLs.  -  Pt will report at CI level (0-20%  impaired) on FOTO neck survey score for neck pain disability to demonstrate decrease in disability and improvement in neck pain.       Plan     Continue to promote gentle stretching and postural retraining    Mona Joe, PTA

## 2019-04-11 ENCOUNTER — CLINICAL SUPPORT (OUTPATIENT)
Dept: REHABILITATION | Facility: HOSPITAL | Age: 69
End: 2019-04-11
Payer: MEDICARE

## 2019-04-11 DIAGNOSIS — M54.6 ACUTE RIGHT-SIDED THORACIC BACK PAIN: ICD-10-CM

## 2019-04-11 PROCEDURE — 97110 THERAPEUTIC EXERCISES: CPT | Mod: PO

## 2019-04-11 NOTE — PROGRESS NOTES
Physical Therapy Daily Treatment Note     Name: Rajesh Mas  [facility] Number: [id number]    Therapy Diagnosis:   Encounter Diagnosis   Name Primary?    Acute right-sided thoracic back pain      Physician: Fiorella Huynh PA-C    Visit Date: 4/11/2019      Physician Orders: PT Eval and Treat   Medical Diagnosis from Referral:   M54.6 (ICD-10-CM) - Acute right-sided thoracic back pain   M47.24 (ICD-10-CM) - Osteoarthritis of spine with radiculopathy, thoracic region   M54.14 (ICD-10-CM) - Thoracic radiculitis   M79.18 (ICD-10-CM) - Myofascial muscle pain      Evaluation Date: 3/28/2019  Authorization Period Expiration:12/31/19  Plan of Care Expiration: 5/24/19  Visit # / Visits authorized: 3/ 20 (4 total)    FOTO #: 4/5    Time In: 10:00 AM  Time Out: 11:05 AM  Total Billable Time: 15 minutes    Precautions: Standard    Subjective     Pt reports: she is not experiencing any pain.   She was compliant with home exercise program.  Response to previous treatment: mid back soreness.   Functional change: less pain overall    Pain: 0/10  Location: right back      Objective        Rajesh received therapeutic exercises to develop strength, endurance, ROM and flexibility for 50 minutes including:    Chin tucks 20 x 5s  Pelvic tilt 20 x 5s  DKTC 2 x 30s  LTR 20 x 5s  Cat/Cow 10 x 10s  Shoulder rolls posterior 2 x 10  Scap retraction 2 x 10  Swiss ball roll outs 10 x 10 fwd. 5 x 10ea side  Shoulder Rows with OTB 2 x 10        Vettice received the following manual therapy techniques: Myofacial release and Soft tissue Mobilization were applied to the: R T/S spine for 5 minutes, including:    Cupping to R sided thoracolumbar paraspinals  kinesiotape applied in star pattern     Vettice received hot pack for 10 minutes to mid back.    Home Exercises Provided and Patient Education Provided     Education provided:   - cont with HEP    Written Home Exercises Provided: yes.  Exercises were reviewed and Rajesh was able to demonstrate  them prior to the end of the session.  Rajesh demonstrated good  understanding of the education provided.     See EMR under Patient Instructions for exercises provided prior visit.    Assessment     Patient tolerated session well today with no onset of adverse effects. Patient with good tolerance to additional shoulder rows with theraband performed today. Patient continues to report relief with manual therapy techniques. Will progress per josé luis.  Rajesh is progressing well towards her goals.   Pt prognosis is Good.     Pt will continue to benefit from skilled outpatient physical therapy to address the deficits listed in the problem list box on initial evaluation, provide pt/family education and to maximize pt's level of independence in the home and community environment.     Pt's spiritual, cultural and educational needs considered and pt agreeable to plan of care and goals.     Anticipated barriers to physical therapy: none    Goals:      Short Term Goals: 4 weeks     - Pt will increase thoracic flexion ROM by 25% to show improvements in mobility.  - Pt will have pain free cervical ROM in order to show improvements in mobility with driving and ADLs.  - Pt will be consistent and independent with HEP in order to promote carryover between therapy sessions.  - Pt will be able to demonstrate proper sitting posture without therapist cuing in order to decrease pain and improve positional tolerance.     Long Term Goals: 8 weeks     - Pt will increase B periscapular strength by 1 ms grade in order to improve performance of functional activities to increase tolerance for ADL and work activities.  -  Pt to be Independent with updated HEP to maintain therapy gains following DC.  -  Pt will report a 50% decrease in episodes of R sided thoracic pain in order to improve sleep quality and ability to perform ADLs.  -  Pt will report at CI level (0-20% impaired) on FOTO neck survey score for neck pain disability to demonstrate  decrease in disability and improvement in neck pain.       Plan     Continue to promote gentle stretching and postural retraining    Mona Joe PTA

## 2019-04-18 ENCOUNTER — CLINICAL SUPPORT (OUTPATIENT)
Dept: REHABILITATION | Facility: HOSPITAL | Age: 69
End: 2019-04-18
Payer: MEDICARE

## 2019-04-18 DIAGNOSIS — M54.6 ACUTE RIGHT-SIDED THORACIC BACK PAIN: ICD-10-CM

## 2019-04-18 PROCEDURE — 97110 THERAPEUTIC EXERCISES: CPT | Mod: PO

## 2019-04-18 PROCEDURE — 97140 MANUAL THERAPY 1/> REGIONS: CPT | Mod: PO

## 2019-04-18 NOTE — PROGRESS NOTES
Physical Therapy Daily Treatment Note     Name: Rajesh [facility] Number: [id number]    Therapy Diagnosis:   Encounter Diagnosis   Name Primary?    Acute right-sided thoracic back pain      Physician: Fiorella Huynh PA-C    Visit Date: 4/18/2019      Physician Orders: PT Eval and Treat   Medical Diagnosis from Referral:   M54.6 (ICD-10-CM) - Acute right-sided thoracic back pain   M47.24 (ICD-10-CM) - Osteoarthritis of spine with radiculopathy, thoracic region   M54.14 (ICD-10-CM) - Thoracic radiculitis   M79.18 (ICD-10-CM) - Myofascial muscle pain      Evaluation Date: 3/28/2019  Authorization Period Expiration:12/31/19  Plan of Care Expiration: 5/24/19  Visit # / Visits authorized: 4/ 20 (5 total)    Time In: 7:00 AM  Time Out: 805 AM  Total Billable Time: 55 minutes    Precautions: Standard    Subjective     Pt reports: she is not experiencing any pain currently. She does have pain when turning to the R.  She was compliant with home exercise program.  Response to previous treatment: felt like she had a good workout  Functional change: less pain overall    Pain: 0/10  Location: right back      Objective        Rajesh received therapeutic exercises to develop strength, endurance, ROM and flexibility for 45 minutes including:    Chin tucks 20 x 5s  Pelvic tilt 20 x 5s + march  DKTC 2 x 30s  LTR 20 x 5s  Cat/Cow 10 x 10s  Open books x 10 B  Shoulder rolls posterior 2 x 10  Swiss ball roll outs 10 x 10 fwd. 5 x 10ea side  Shoulder Rows with OTB 2 x 10   Shoulder extension with OTB 2 x 10       Rajesh received the following manual therapy techniques: Myofacial release and Soft tissue Mobilization were applied to the: R T/S spine for 10 minutes, including:    Cupping to R sided thoracolumbar paraspinals  Rib springing  T6-9 CPA grade I-II  kinesiotape applied in star pattern     Vettice received hot pack for 10 minutes to mid back.    Home Exercises Provided and Patient Education Provided     Education provided:    - cont with HEP    Written Home Exercises Provided: yes.  Exercises were reviewed and Rajesh was able to demonstrate them prior to the end of the session.  Rajesh demonstrated good  understanding of the education provided.     See EMR under Patient Instructions for exercises provided prior visit.    Assessment     Patient responded well to new activities added at today's session. Occasional cues required for proper technique with therex.. Patient continues to report relief with manual therapy techniques. Increased hypomobility noted in mid thoracic spine today; however, some increased mobility was noted following manual mobilizations. Will progress per josé luis.  aRjesh is progressing well towards her goals.   Pt prognosis is Good.     Pt will continue to benefit from skilled outpatient physical therapy to address the deficits listed in the problem list box on initial evaluation, provide pt/family education and to maximize pt's level of independence in the home and community environment.     Pt's spiritual, cultural and educational needs considered and pt agreeable to plan of care and goals.     Anticipated barriers to physical therapy: none    Goals:      Short Term Goals: 4 weeks     - Pt will increase thoracic flexion ROM by 25% to show improvements in mobility.  - Pt will have pain free cervical ROM in order to show improvements in mobility with driving and ADLs.  - Pt will be consistent and independent with HEP in order to promote carryover between therapy sessions.  - Pt will be able to demonstrate proper sitting posture without therapist cuing in order to decrease pain and improve positional tolerance.     Long Term Goals: 8 weeks     - Pt will increase B periscapular strength by 1 ms grade in order to improve performance of functional activities to increase tolerance for ADL and work activities.  -  Pt to be Independent with updated HEP to maintain therapy gains following DC.  -  Pt will report a 50%  decrease in episodes of R sided thoracic pain in order to improve sleep quality and ability to perform ADLs.  -  Pt will report at CI level (0-20% impaired) on FOTO neck survey score for neck pain disability to demonstrate decrease in disability and improvement in neck pain.       Plan     Continue to promote gentle stretching and postural retraining    Ashley Holstein, PT

## 2019-04-22 ENCOUNTER — CLINICAL SUPPORT (OUTPATIENT)
Dept: REHABILITATION | Facility: HOSPITAL | Age: 69
End: 2019-04-22
Payer: MEDICARE

## 2019-04-22 DIAGNOSIS — M54.6 ACUTE RIGHT-SIDED THORACIC BACK PAIN: ICD-10-CM

## 2019-04-22 PROCEDURE — 97110 THERAPEUTIC EXERCISES: CPT | Mod: PO

## 2019-04-22 NOTE — PROGRESS NOTES
Physical Therapy Daily Treatment Note     [id number]    Therapy Diagnosis:   Encounter Diagnosis   Name Primary?    Acute right-sided thoracic back pain      Physician: Fiorella Huynh PA-C    Visit Date: 4/22/2019      Physician Orders: PT Eval and Treat   Medical Diagnosis from Referral:   M54.6 (ICD-10-CM) - Acute right-sided thoracic back pain   M47.24 (ICD-10-CM) - Osteoarthritis of spine with radiculopathy, thoracic region   M54.14 (ICD-10-CM) - Thoracic radiculitis   M79.18 (ICD-10-CM) - Myofascial muscle pain      Evaluation Date: 3/28/2019  Authorization Period Expiration:12/31/19  Plan of Care Expiration: 5/24/19  Visit # / Visits authorized: 5/ 20 (6 total)    Time In: 8:50 AM  Time Out: 10:04 AM  Total Billable Time: 45 minutes    Precautions: Standard    Subjective     Pt reports: soreness on the R side of her mid back over the rib cage that started on Saturday.   She was compliant with home exercise program.  Response to previous treatment: no adverse effects   Functional change: less pain overall    Pain: 2/10  Location: right back      Objective        Raejsh received therapeutic exercises to develop strength, endurance, ROM and flexibility for 50 minutes including:    Chin tucks 20 x 5s  Pelvic tilt 20 x 5s + march  DKTC 2 x 30s  LTR 20 x 5s  Cat/Cow 10 x 10s  Open books x 15 B  Shoulder rolls posterior 2 x 10  Swiss ball roll outs 10 x 10 fwd. 5 x 10ea side  Shoulder Rows with OTB 2 x 10   Shoulder extension with OTB 2 x 10     Rajesh received the following manual therapy techniques: Myofacial release and Soft tissue Mobilization were applied to the: R T/S spine for 5 minutes, including:    Cupping to R sided thoracolumbar paraspinals  kinesiotape applied in star pattern     Vettice received hot pack for 10 minutes to mid back.    Home Exercises Provided and Patient Education Provided     Education provided:   - cont with HEP    Written Home Exercises  Provided: Patient instructed to cont prior HEP.  Exercises were reviewed and Rajesh was able to demonstrate them prior to the end of the session.  Vettthea demonstrated good  understanding of the education provided.     See EMR under Patient Instructions for exercises provided prior visit.    Assessment     Patient tolerated session well today. Patient reports her back began to loosen up with exercise and reports decreased soreness by the end of treatment. Patient required some cueing with standing shoulder rows and extensions with theraband however with good response to cues given. Will progress per josé luis.  Rajesh is progressing well towards her goals.   Pt prognosis is Good.     Pt will continue to benefit from skilled outpatient physical therapy to address the deficits listed in the problem list box on initial evaluation, provide pt/family education and to maximize pt's level of independence in the home and community environment.     Pt's spiritual, cultural and educational needs considered and pt agreeable to plan of care and goals.     Anticipated barriers to physical therapy: none    Goals:      Short Term Goals: 4 weeks     - Pt will increase thoracic flexion ROM by 25% to show improvements in mobility.  - Pt will have pain free cervical ROM in order to show improvements in mobility with driving and ADLs.  - Pt will be consistent and independent with HEP in order to promote carryover between therapy sessions.  - Pt will be able to demonstrate proper sitting posture without therapist cuing in order to decrease pain and improve positional tolerance.     Long Term Goals: 8 weeks     - Pt will increase B periscapular strength by 1 ms grade in order to improve performance of functional activities to increase tolerance for ADL and work activities.  -  Pt to be Independent with updated HEP to maintain therapy gains following DC.  -  Pt will report a 50% decrease in episodes of R sided thoracic pain in order to  improve sleep quality and ability to perform ADLs.  -  Pt will report at CI level (0-20% impaired) on FOTO neck survey score for neck pain disability to demonstrate decrease in disability and improvement in neck pain.       Plan     Continue to promote gentle stretching and postural retraining    Mona Joe PTA

## 2019-04-25 ENCOUNTER — CLINICAL SUPPORT (OUTPATIENT)
Dept: REHABILITATION | Facility: HOSPITAL | Age: 69
End: 2019-04-25
Payer: MEDICARE

## 2019-04-25 ENCOUNTER — DOCUMENTATION ONLY (OUTPATIENT)
Dept: REHABILITATION | Facility: HOSPITAL | Age: 69
End: 2019-04-25

## 2019-04-25 DIAGNOSIS — M54.6 ACUTE RIGHT-SIDED THORACIC BACK PAIN: ICD-10-CM

## 2019-04-25 PROCEDURE — 97110 THERAPEUTIC EXERCISES: CPT | Mod: PO

## 2019-04-25 NOTE — PROGRESS NOTES
PT/PTA met face to face to discuss pt's treatment plan and progress towards established goals. Pt will be seen by a physical therapist minimally every 6th visit or every 30 days.      Mona Joe PTA

## 2019-04-25 NOTE — PROGRESS NOTES
Physical Therapy Daily Treatment Note     Name: Rajesh Mas  [facility] Number: [id number]    Therapy Diagnosis:   Encounter Diagnosis   Name Primary?    Acute right-sided thoracic back pain      Physician: Fiorella Huynh PA-C    Visit Date: 4/25/2019      Physician Orders: PT Eval and Treat   Medical Diagnosis from Referral:   M54.6 (ICD-10-CM) - Acute right-sided thoracic back pain   M47.24 (ICD-10-CM) - Osteoarthritis of spine with radiculopathy, thoracic region   M54.14 (ICD-10-CM) - Thoracic radiculitis   M79.18 (ICD-10-CM) - Myofascial muscle pain      Evaluation Date: 3/28/2019  Authorization Period Expiration:12/31/19  Plan of Care Expiration: 5/24/19  Visit # / Visits authorized: 6/ 20 (7 total)    Time In: 8:55 AM  Time Out: 10:00 AM  Total Billable Time: 45 minutes    Precautions: Standard    Subjective     Pt reports: no pain upon arrival. Patient reports R sided soreness isn't as bad today.   She was compliant with home exercise program.  Response to previous treatment: no adverse effects   Functional change: less pain overall    Pain: 0/10  Location: right back      Objective        Rajesh received therapeutic exercises to develop strength, endurance, ROM and flexibility for 50 minutes including:    Seated Chin tucks 20 x 5s  Pelvic tilt 20 x 5s + march  DKTC 2 x 30s  LTR 20 x 5s  Cat/Cow 10 x 10s  Open books x 20 B  Shoulder rolls posterior 2 x 10  Swiss ball roll outs 10 x 10 fwd. 5 x 10ea side  Shoulder Rows with OTB 2 x 10   Shoulder extension with OTB 2 x 10  TrA Contraction with unilateral hip abduction, OTB, 2 x 10      Rajesh received the following manual therapy techniques: Myofacial release and Soft tissue Mobilization were applied to the: R T/S spine for 5 minutes, including:    Cupping to R sided thoracolumbar paraspinals      Home Exercises Provided and Patient Education Provided     Education provided:   - HEP    Written Home Exercises Provided: yes.  Exercises were reviewed and  Rajesh was able to demonstrate them prior to the end of the session.  Rajesh demonstrated good  understanding of the education provided.     See EMR under Patient Instructions for exercises provided 4/25/19.    Assessment     Patient tolerated session well today. Patient with good tolerance to performing chin tucks seated rather than in supine with mild cueing needed for proper form. Patient with cueing required for proper positioning during cat/cow exercise. Patient with good postural awareness while seated performing chin tucks making good progress towards goals. Will progress per josé luis.  Rajesh is progressing well towards her goals.   Pt prognosis is Good.     Pt will continue to benefit from skilled outpatient physical therapy to address the deficits listed in the problem list box on initial evaluation, provide pt/family education and to maximize pt's level of independence in the home and community environment.     Pt's spiritual, cultural and educational needs considered and pt agreeable to plan of care and goals.     Anticipated barriers to physical therapy: none    Goals:      Short Term Goals: 4 weeks     - Pt will increase thoracic flexion ROM by 25% to show improvements in mobility.  - Pt will have pain free cervical ROM in order to show improvements in mobility with driving and ADLs.  - Pt will be consistent and independent with HEP in order to promote carryover between therapy sessions.  - Pt will be able to demonstrate proper sitting posture without therapist cuing in order to decrease pain and improve positional tolerance.     Long Term Goals: 8 weeks     - Pt will increase B periscapular strength by 1 ms grade in order to improve performance of functional activities to increase tolerance for ADL and work activities.  -  Pt to be Independent with updated HEP to maintain therapy gains following DC.  -  Pt will report a 50% decrease in episodes of R sided thoracic pain in order to improve sleep quality  and ability to perform ADLs.  -  Pt will report at CI level (0-20% impaired) on FOTO neck survey score for neck pain disability to demonstrate decrease in disability and improvement in neck pain.       Plan     Continue to promote gentle stretching and postural retraining    Mona Joe PTA

## 2019-04-30 ENCOUNTER — CLINICAL SUPPORT (OUTPATIENT)
Dept: REHABILITATION | Facility: HOSPITAL | Age: 69
End: 2019-04-30
Payer: MEDICARE

## 2019-04-30 DIAGNOSIS — M54.6 ACUTE RIGHT-SIDED THORACIC BACK PAIN: ICD-10-CM

## 2019-04-30 PROCEDURE — 97140 MANUAL THERAPY 1/> REGIONS: CPT | Mod: PO

## 2019-04-30 PROCEDURE — 97110 THERAPEUTIC EXERCISES: CPT | Mod: PO

## 2019-04-30 NOTE — PROGRESS NOTES
"  Physical Therapy Daily Treatment Note     Name: Rajesh Mas  [facility] Number: [id number]    Therapy Diagnosis:   Encounter Diagnosis   Name Primary?    Acute right-sided thoracic back pain      Physician: Fiorella Huynh PA-C    Visit Date: 4/30/2019      Physician Orders: PT Eval and Treat   Medical Diagnosis from Referral:   M54.6 (ICD-10-CM) - Acute right-sided thoracic back pain   M47.24 (ICD-10-CM) - Osteoarthritis of spine with radiculopathy, thoracic region   M54.14 (ICD-10-CM) - Thoracic radiculitis   M79.18 (ICD-10-CM) - Myofascial muscle pain      Evaluation Date: 3/28/2019  Authorization Period Expiration:12/31/19  Plan of Care Expiration: 5/24/19  Visit # / Visits authorized: 7/ 20 (8 total)    Time In: 200 PM  Time Out: 255 PM  Total Billable Time: 55 minutes    Precautions: Standard    Subjective     Pt reports: no pain upon arrival to therapy today. She has some diffiuclty when getting out of bed in the AM. She also report some discomfort when picking up objects with her R hand versus her L.  She was compliant with home exercise program.  Response to previous treatment: no adverse effects   Functional change: less pain overall    Pain: 0/10  Location: right back      Objective        Rajesh received therapeutic exercises to develop strength, endurance, ROM and flexibility for 45 minutes including:    Seated Chin tucks 20 x 5s  Pelvic tilt 20 x 5s + march  Serratus punches 2 x 10  DKTC 2 x 30s  LTR 20 x 5s  Cat/Cow 10 x 10s  Open books x 20 B YTB  Shoulder rolls posterior 2 x 10  Swiss ball roll outs 10 x 10 fwd. 5 x 10ea side  Shoulder Rows with OTB 2 x 10   Shoulder extension with OTB 2 x 10  TrA Contraction with unilateral hip abduction, OTB, 2 x 10   Kinesiotape "star" for unloading     Rajesh received the following manual therapy techniques: Myofacial release and Soft tissue Mobilization were applied to the: R T/S spine for 10 minutes, including:  Cupping to R sided thoracolumbar " paraspinals  T/S pA mobs  Rib springing  Lateral scap distraction      Home Exercises Provided and Patient Education Provided     Education provided:   - HEP    Written Home Exercises Provided: yes.  Exercises were reviewed and Rajesh was able to demonstrate them prior to the end of the session.  Vettice demonstrated good  understanding of the education provided.     See EMR under Patient Instructions for exercises provided 4/25/19.    Assessment     Patient able to tolerate slight advancements in activities as bolded above without adverse effects. Lateral scapular mobs and distraction performed today to assist with soft tissue mobilization of periscapular musculatures. Kinesiotape performed today due to pt request. Will progress per josé luis.  Rajesh is progressing well towards her goals.   Pt prognosis is Good.     Pt will continue to benefit from skilled outpatient physical therapy to address the deficits listed in the problem list box on initial evaluation, provide pt/family education and to maximize pt's level of independence in the home and community environment.     Pt's spiritual, cultural and educational needs considered and pt agreeable to plan of care and goals.     Anticipated barriers to physical therapy: none    Goals:      Short Term Goals: 4 weeks     - Pt will increase thoracic flexion ROM by 25% to show improvements in mobility.  - Pt will have pain free cervical ROM in order to show improvements in mobility with driving and ADLs.  - Pt will be consistent and independent with HEP in order to promote carryover between therapy sessions.  - Pt will be able to demonstrate proper sitting posture without therapist cuing in order to decrease pain and improve positional tolerance.     Long Term Goals: 8 weeks     - Pt will increase B periscapular strength by 1 ms grade in order to improve performance of functional activities to increase tolerance for ADL and work activities.  -  Pt to be Independent with  updated HEP to maintain therapy gains following DC.  -  Pt will report a 50% decrease in episodes of R sided thoracic pain in order to improve sleep quality and ability to perform ADLs.  -  Pt will report at CI level (0-20% impaired) on FOTO neck survey score for neck pain disability to demonstrate decrease in disability and improvement in neck pain.       Plan     Continue to promote gentle stretching and postural retraining    Ashley Holstein, PT

## 2019-05-06 ENCOUNTER — CLINICAL SUPPORT (OUTPATIENT)
Dept: REHABILITATION | Facility: HOSPITAL | Age: 69
End: 2019-05-06
Payer: MEDICARE

## 2019-05-06 DIAGNOSIS — M54.6 ACUTE RIGHT-SIDED THORACIC BACK PAIN: ICD-10-CM

## 2019-05-06 PROCEDURE — 97110 THERAPEUTIC EXERCISES: CPT | Mod: PO

## 2019-05-06 NOTE — PROGRESS NOTES
"  Physical Therapy Daily Treatment Note     Name: Rajesh Mas  [facility] Number: [id number]    Therapy Diagnosis:   Encounter Diagnosis   Name Primary?    Acute right-sided thoracic back pain      Physician: Fiorella Huynh PA-C    Visit Date: 5/6/2019      Physician Orders: PT Eval and Treat   Medical Diagnosis from Referral:   M54.6 (ICD-10-CM) - Acute right-sided thoracic back pain   M47.24 (ICD-10-CM) - Osteoarthritis of spine with radiculopathy, thoracic region   M54.14 (ICD-10-CM) - Thoracic radiculitis   M79.18 (ICD-10-CM) - Myofascial muscle pain      Evaluation Date: 3/28/2019  Authorization Period Expiration:12/31/19  Plan of Care Expiration: 5/24/19  Visit # / Visits authorized: 8/ 20 (9 total)    Time In: 10:00 AM  Time Out: 11:00 AM   Total Billable Time: 30 minutes    Precautions: Standard    Subjective     Pt reports: mild pain in the right side of her back.   She was compliant with home exercise program.  Response to previous treatment: no adverse effects   Functional change: less pain overall    Pain: 2/10  Location: right back      Objective     Rajesh received therapeutic exercises to develop strength, endurance, ROM and flexibility for 55 minutes including:    Seated Chin tucks 20 x 5s  Pelvic tilt 20 x 5s + march  Serratus punches 2 x 10  DKTC 2 x 30s  LTR 20 x 5s  Cat/Cow 10 x 10s  Open books x 20 B YTB  Shoulder rolls posterior 2 x 10  Swiss ball roll outs 10 x 10 fwd. 5 x 10ea side  Shoulder Rows with OTB 2 x 10   Shoulder extension with OTB 2 x 10  TrA Contraction with unilateral hip abduction, OTB, 2 x 10 - NP d/t out of time   Kinesiotape "star" for unloading     Rajesh received the following manual therapy techniques: Myofacial release and Soft tissue Mobilization were applied to the: R T/S spine for 5 minutes, including:  Cupping to R sided thoracolumbar paraspinals      Home Exercises Provided and Patient Education Provided     Education provided:   - HEP    Written Home Exercises " Provided: Patient instructed to cont prior HEP.  Exercises were reviewed and Rajesh was able to demonstrate them prior to the end of the session.  Vettice demonstrated good  understanding of the education provided.     See EMR under Patient Instructions for exercises provided 4/25/19.    Assessment     Patient tolerated session well today with no onset of adverse effects. Patient required verbal and tactile cueing for serratus punches throughout the exercise. Kinesiotape performed again today due to pt request. Will progress per josé luis.  Rajesh is progressing well towards her goals.   Pt prognosis is Good.     Pt will continue to benefit from skilled outpatient physical therapy to address the deficits listed in the problem list box on initial evaluation, provide pt/family education and to maximize pt's level of independence in the home and community environment.     Pt's spiritual, cultural and educational needs considered and pt agreeable to plan of care and goals.     Anticipated barriers to physical therapy: none    Goals:      Short Term Goals: 4 weeks     - Pt will increase thoracic flexion ROM by 25% to show improvements in mobility.  - Pt will have pain free cervical ROM in order to show improvements in mobility with driving and ADLs.  - Pt will be consistent and independent with HEP in order to promote carryover between therapy sessions.  - Pt will be able to demonstrate proper sitting posture without therapist cuing in order to decrease pain and improve positional tolerance.     Long Term Goals: 8 weeks     - Pt will increase B periscapular strength by 1 ms grade in order to improve performance of functional activities to increase tolerance for ADL and work activities.  -  Pt to be Independent with updated HEP to maintain therapy gains following DC.  -  Pt will report a 50% decrease in episodes of R sided thoracic pain in order to improve sleep quality and ability to perform ADLs.  -  Pt will report at CI  level (0-20% impaired) on FOTO neck survey score for neck pain disability to demonstrate decrease in disability and improvement in neck pain.       Plan     Continue to promote gentle stretching and postural retraining    Mona Joe PTA

## 2019-05-10 ENCOUNTER — CLINICAL SUPPORT (OUTPATIENT)
Dept: REHABILITATION | Facility: HOSPITAL | Age: 69
End: 2019-05-10
Payer: MEDICARE

## 2019-05-10 DIAGNOSIS — M54.6 ACUTE RIGHT-SIDED THORACIC BACK PAIN: ICD-10-CM

## 2019-05-10 PROCEDURE — 97140 MANUAL THERAPY 1/> REGIONS: CPT | Mod: PO

## 2019-05-10 PROCEDURE — 97110 THERAPEUTIC EXERCISES: CPT | Mod: PO

## 2019-05-10 NOTE — PROGRESS NOTES
"  Physical Therapy Daily Treatment Note     Name: Rajesh Mas  Clinic Number: [id number]    Therapy Diagnosis:   Encounter Diagnosis   Name Primary?    Acute right-sided thoracic back pain      Physician: Fiorella Huynh PA-C    Visit Date: 5/10/2019      Physician Orders: PT Eval and Treat   Medical Diagnosis from Referral:   M54.6 (ICD-10-CM) - Acute right-sided thoracic back pain   M47.24 (ICD-10-CM) - Osteoarthritis of spine with radiculopathy, thoracic region   M54.14 (ICD-10-CM) - Thoracic radiculitis   M79.18 (ICD-10-CM) - Myofascial muscle pain      Evaluation Date: 3/28/2019  Authorization Period Expiration:12/31/19  Plan of Care Expiration: 5/24/19  Visit # / Visits authorized: 9/ 20 (10 total)    Time In: 800 AM  Time Out: 858 AM  Total Billable Time: 30 minutes    Precautions: Standard    Subjective     Pt reports: she feels like her pain keeps getting better and better.  She was compliant with home exercise program.  Response to previous treatment: no adverse effects   Functional change: less pain overall    Pain: 0/10, 4/10 with twisting  Location: right back      Objective     Rajesh received therapeutic exercises to develop strength, endurance, ROM and flexibility for 50 minutes including:    Seated Chin tucks 20 x 5s + arm flexion B 1#  Pelvic tilt 20 x 5s + march 1#  Serratus punches 2 x 10 1#  DKTC 2 x 30s  LTR 20 x 5s  Cat/Cow 10 x 10s  Open books x 20 B YTB  Brueggers no band 2 x 10  Shoulder rolls posterior 2 x 10  Swiss ball roll outs 10 x 10 fwd. 5 x 10ea side  Shoulder Rows with OTB 2 x 10   Shoulder extension with OTB 2 x 10  TrA Contraction with unilateral hip abduction, OTB, 2 x 10 - NP d/t out of time   Kinesiotape "star" for unloading     Rajesh received the following manual therapy techniques: Myofacial release and Soft tissue Mobilization were applied to the: R T/S spine for 8 minutes, including:  Cupping to R sided thoracolumbar paraspinals      Home Exercises Provided and " Patient Education Provided     Education provided:   - HEP    Written Home Exercises Provided: Patient instructed to cont prior HEP.  Exercises were reviewed and Rajesh was able to demonstrate them prior to the end of the session.  Rajesh demonstrated good  understanding of the education provided.     See EMR under Patient Instructions for exercises provided 4/25/19.    Assessment     Pt able to tolerate advancements in core and scapular stabilization activities as bolded above without adverse effects. Continued improvements in soft tissue restrictions reported with cupping and myofascial release. Pt is demonstrating improved independence with therex and symptom mgmt, and she should be ready for DC next week.    Rajesh is progressing well towards her goals.   Pt prognosis is Good.     Pt will continue to benefit from skilled outpatient physical therapy to address the deficits listed in the problem list box on initial evaluation, provide pt/family education and to maximize pt's level of independence in the home and community environment.     Pt's spiritual, cultural and educational needs considered and pt agreeable to plan of care and goals.     Anticipated barriers to physical therapy: none    Goals:      Short Term Goals: 4 weeks     - Pt will increase thoracic flexion ROM by 25% to show improvements in mobility. In progress 5/10/2019   - Pt will have pain free cervical ROM in order to show improvements in mobility with driving and ADLs. In progress 5/10/2019   - Pt will be consistent and independent with HEP in order to promote carryover between therapy sessions. In progress 5/10/2019   - Pt will be able to demonstrate proper sitting posture without therapist cuing in order to decrease pain and improve positional tolerance. In progress 5/10/2019      Long Term Goals: 8 weeks     - Pt will increase B periscapular strength by 1 ms grade in order to improve performance of functional activities to increase  tolerance for ADL and work activities. In progress 5/10/2019   -  Pt to be Independent with updated HEP to maintain therapy gains following DC. In progress 5/10/2019   -  Pt will report a 50% decrease in episodes of R sided thoracic pain in order to improve sleep quality and ability to perform ADLs. In progress 5/10/2019   -  Pt will report at CI level (0-20% impaired) on FOTO neck survey score for neck pain disability to demonstrate decrease in disability and improvement in neck pain. In progress 5/10/2019        Plan     Continue to promote gentle stretching and postural retraining. DC in 2 visits.    Ashley Holstein, PT

## 2019-05-13 ENCOUNTER — CLINICAL SUPPORT (OUTPATIENT)
Dept: REHABILITATION | Facility: HOSPITAL | Age: 69
End: 2019-05-13
Payer: MEDICARE

## 2019-05-13 DIAGNOSIS — M54.6 ACUTE RIGHT-SIDED THORACIC BACK PAIN: ICD-10-CM

## 2019-05-13 PROCEDURE — 97140 MANUAL THERAPY 1/> REGIONS: CPT | Mod: PO

## 2019-05-13 PROCEDURE — 97110 THERAPEUTIC EXERCISES: CPT | Mod: PO

## 2019-05-13 NOTE — PROGRESS NOTES
"  Physical Therapy Daily Treatment Note     Name: Rajesh Mas  [facility] Number: [id number]    Therapy Diagnosis:   Encounter Diagnosis   Name Primary?    Acute right-sided thoracic back pain      Physician: Fiorella Huynh PA-C    Visit Date: 5/13/2019      Physician Orders: PT Eval and Treat   Medical Diagnosis from Referral:   M54.6 (ICD-10-CM) - Acute right-sided thoracic back pain   M47.24 (ICD-10-CM) - Osteoarthritis of spine with radiculopathy, thoracic region   M54.14 (ICD-10-CM) - Thoracic radiculitis   M79.18 (ICD-10-CM) - Myofascial muscle pain      Evaluation Date: 3/28/2019  Authorization Period Expiration:12/31/19  Plan of Care Expiration: 5/24/19  Visit # / Visits authorized: 10/ 20 (10 total)    Time In: 1000 AM  Time Out: 1058 AM  Total Billable Time: 30 minutes    Precautions: Standard    Subjective     Pt reports:no pain prior to session today. Her pain has been pretty manageable over the weekend. She had a little pain with twisting but performed her HEP and felt better.  She was compliant with home exercise program.  Response to previous treatment: no adverse effects   Functional change: less pain overall    Pain: 0/10, 2-3/10 with twisting  Location: right back      Objective     Rajesh received therapeutic exercises to develop strength, endurance, ROM and flexibility for 50 minutes including:    Supine Chin tucks 20 x 5s + arm flexion B 1#  Pelvic tilt 20 x 5s + march 1#  Serratus punches 2 x 10 1#  DKTC 2 x 30s  LTR 20 x 5s  Cat/Cow 10 x 10s  Open books x 20 B YTB  Brueggers no band 2 x 10  Shoulder rolls posterior 2 x 10  Swiss ball roll outs 10 x 10 fwd. 5 x 10ea side  Shoulder Rows with OTB 2 x 10   Shoulder extension with OTB 2 x 10  TrA Contraction with unilateral hip abduction, OTB, 2 x 10 - NP d/t out of time   Kinesiotape "star" for unloading     Rajesh received the following manual therapy techniques: Myofacial release and Soft tissue Mobilization were applied to the: R T/S spine for " 8 minutes, including:  Cupping to R sided thoracolumbar paraspinals      Home Exercises Provided and Patient Education Provided     Education provided:   - HEP    Written Home Exercises Provided: Patient instructed to cont prior HEP.  Exercises were reviewed and Rajesh was able to demonstrate them prior to the end of the session.  Rajesh demonstrated good  understanding of the education provided.     See EMR under Patient Instructions for exercises provided 4/25/19.    Assessment     Pt independent with all exercises at this time. She has been able to manage her symptoms on a daily basis, and she feels ready for DC at next visit. Reassessment and updated HEP to be issued at next session.    Rajesh is progressing well towards her goals.   Pt prognosis is Good.     Pt will continue to benefit from skilled outpatient physical therapy to address the deficits listed in the problem list box on initial evaluation, provide pt/family education and to maximize pt's level of independence in the home and community environment.     Pt's spiritual, cultural and educational needs considered and pt agreeable to plan of care and goals.     Anticipated barriers to physical therapy: none    Goals:      Short Term Goals: 4 weeks     - Pt will increase thoracic flexion ROM by 25% to show improvements in mobility. In progress 5/13/2019   - Pt will have pain free cervical ROM in order to show improvements in mobility with driving and ADLs. In progress 5/10/2019   - Pt will be consistent and independent with HEP in order to promote carryover between therapy sessions. In progress 5/10/2019   - Pt will be able to demonstrate proper sitting posture without therapist cuing in order to decrease pain and improve positional tolerance. In progress 5/10/2019      Long Term Goals: 8 weeks     - Pt will increase B periscapular strength by 1 ms grade in order to improve performance of functional activities to increase tolerance for ADL and work  activities. In progress 5/10/2019   -  Pt to be Independent with updated HEP to maintain therapy gains following DC. In progress 5/10/2019   -  Pt will report a 50% decrease in episodes of R sided thoracic pain in order to improve sleep quality and ability to perform ADLs. In progress 5/10/2019   -  Pt will report at CI level (0-20% impaired) on FOTO neck survey score for neck pain disability to demonstrate decrease in disability and improvement in neck pain. In progress 5/10/2019        Plan     Continue to promote gentle stretching and postural retraining. DC in 1 visits.    Ashley Holstein, PT

## 2019-05-15 ENCOUNTER — CLINICAL SUPPORT (OUTPATIENT)
Dept: REHABILITATION | Facility: HOSPITAL | Age: 69
End: 2019-05-15
Payer: MEDICARE

## 2019-05-15 DIAGNOSIS — M54.6 ACUTE RIGHT-SIDED THORACIC BACK PAIN: ICD-10-CM

## 2019-05-15 PROCEDURE — G8980 MOBILITY D/C STATUS: HCPCS | Mod: CJ,PO

## 2019-05-15 PROCEDURE — 97110 THERAPEUTIC EXERCISES: CPT | Mod: PO

## 2019-05-15 PROCEDURE — 97140 MANUAL THERAPY 1/> REGIONS: CPT | Mod: PO

## 2019-05-15 PROCEDURE — G8979 MOBILITY GOAL STATUS: HCPCS | Mod: CI,PO

## 2019-05-15 NOTE — PROGRESS NOTES
Physical Therapy Daily Treatment Note/Discharge Summary     Name: Rajesh Msa  Clinic Number: [id number]    Therapy Diagnosis:   Encounter Diagnosis   Name Primary?    Acute right-sided thoracic back pain      Physician: Fiorella Huynh PA-C    Visit Date: 5/15/2019      Physician Orders: PT Eval and Treat   Medical Diagnosis from Referral:   M54.6 (ICD-10-CM) - Acute right-sided thoracic back pain   M47.24 (ICD-10-CM) - Osteoarthritis of spine with radiculopathy, thoracic region   M54.14 (ICD-10-CM) - Thoracic radiculitis   M79.18 (ICD-10-CM) - Myofascial muscle pain      Evaluation Date: 3/28/2019  Authorization Period Expiration:12/31/19  Plan of Care Expiration: 5/24/19  Visit # / Visits authorized: 11/ 20 (12 total)    Time In: 1000 AM  Time Out: 1058 AM  Total Billable Time: 30 minutes    Precautions: Standard    Subjective     Pt reports: no pain prior to session today. Pain is in more manageable levels today. She was running errands yesterday so her pain got to a 2/10.  She was compliant with home exercise program.  Response to previous treatment: no adverse effects   Functional change: less pain overall    Pain: 0/10, 2-3/10 with twisting  Location: right back      Objective     Reassess:    Observation: Moderate HFRS posture     - Cervical Flexion: 100%, pulling in R sided thoracic paraspinal  - Cervical Ext: 100%, no pain  - Cervical Rot R:  100%, no pain  - Cervical Rot L:  100%, no pain     UE ROM: L UE WNLs. R UE WNLs except pain with reaching across her body (adduction)        Thoracic Range of Motion:     Degrees Pain   Flexion 90%    stretch         Extension 75%    No pain         Left rotation    90% slight         Right Rotation    70% Slight pulling            General B UE strength 4+/5 B     Periscapular:  Rhomboid 4/5 B  Mid Trap 4/5 B  Lower trap 4-/5 B        CMS Impairment/Limitation/Restriction for FOTO Lumbar Survey     Therapist reviewed FOTO scores for Rajesh Mas on 3/28/2019.  "  FOTO documents entered into [employer] - see Media section.     Limitation Score: 30%  Category: Mobility     Current : CJ = at least 20% but < 40% impaired, limited or restricted  Goal: CI = at least 1% but < 20% impaired, limited or restricted          Vettthea received therapeutic exercises to develop strength, endurance, ROM and flexibility for 50 minutes including: review of issued HEP in patient instructions    Supine Chin tucks 20 x 5s + arm flexion B 1#  Pelvic tilt 20 x 5s + march 1#  Serratus punches 2 x 10 1#  DKTC 2 x 30s  LTR 20 x 5s  Cat/Cow 10 x 10s  Open books x 20 B YTB  Brueggers no band 2 x 10  Shoulder rolls posterior 2 x 10  Swiss ball roll outs 10 x 10 fwd. 5 x 10ea side  Shoulder Rows with OTB 2 x 10   Shoulder extension with OTB 2 x 10  TrA Contraction with unilateral hip abduction, OTB, 2 x 10 - NP d/t out of time   Kinesiotape "star" for unloading     Vettice received the following manual therapy techniques: Myofacial release and Soft tissue Mobilization were applied to the: R T/S spine for 8 minutes, including:  Cupping to R sided thoracolumbar paraspinals      Home Exercises Provided and Patient Education Provided     Education provided:   - new discharge HEP    Written Home Exercises Provided: yes.  Exercises were reviewed and Vettice was able to demonstrate them prior to the end of the session.  Vettice demonstrated good  understanding of the education provided.     See EMR under Patient Instructions for exercises provided 5/15/19.    Assessment     Patient was seen for 12 outpatient PT visits from 3/28/19 to 5/15/2019. Treatment included: evaluation, HEP, pt education, manual therapy, ther ex. Pt has met some goals, and she reports her pain is a more manageable level. She will continue HEP, and pt will follow up with referring provider on Monday. This patient is discharged from outpatient PT Services.      Rajesh is progressing well towards her goals.   Pt prognosis is Good.     Pt will " continue to benefit from skilled outpatient physical therapy to address the deficits listed in the problem list box on initial evaluation, provide pt/family education and to maximize pt's level of independence in the home and community environment.     Pt's spiritual, cultural and educational needs considered and pt agreeable to plan of care and goals.     Anticipated barriers to physical therapy: none    Goals:      Short Term Goals: 4 weeks     - Pt will increase thoracic flexion ROM by 25% to show improvements in mobility. In progress 5/15/2019   - Pt will have pain free cervical ROM in order to show improvements in mobility with driving and ADLs. Met 5/15/2019   - Pt will be consistent and independent with HEP in order to promote carryover between therapy sessions. Met 5/15/2019   - Pt will be able to demonstrate proper sitting posture without therapist cuing in order to decrease pain and improve positional tolerance.met 5/15/2019      Long Term Goals: 8 weeks     - Pt will increase B periscapular strength by 1 ms grade in order to improve performance of functional activities to increase tolerance for ADL and work activities.met 5/15/2019   -  Pt to be Independent with updated HEP to maintain therapy gains following DC.met 5/15/2019   -  Pt will report a 50% decrease in episodes of R sided thoracic pain in order to improve sleep quality and ability to perform ADLs. Met 5/15/2019   -  Pt will report at CI level (0-20% impaired) on FOTO neck survey score for neck pain disability to demonstrate decrease in disability and improvement in neck pain. In progress 5/15/2019        Plan     DC with updated HEP    Ashley Holstein, PT

## 2019-05-18 NOTE — PROGRESS NOTES
Subjective:      Patient ID: Rajesh Mas is a 68 y.o. female.    Chief Complaint: No chief complaint on file.      HPI  ([place])    History of HTN.     Known diffuse thoracic spondylosis with bridging anterior osteophytes. Pain into ribs appears radicular in nature, however she likely has myofascial component as well.    She was given a medrol dose pack and sent to PT at her last visit. Here for follow up.     She did well with PT and pain is much improved. Still with intermittent right sided thoracic pain that is tolerable. She rates her pain as a 0 on a scale of 1-10. She thinks she had relief with dose pack as well. No current pain. No numbness, tingling, or weakness. She is not taking any medication for her back right now.       Review of Systems   Constitution: Negative for chills, fever, night sweats and weight gain.   Gastrointestinal: Negative for bowel incontinence, nausea and vomiting.   Genitourinary: Negative for bladder incontinence.   Neurological: Negative for disturbances in coordination and loss of balance.           Objective:        General: Rajesh is well-developed, well-nourished, appears stated age, in no acute distress, alert and oriented to time, place and person.     Ortho/SPM Exam     Patient sits comfortably in the exam room and answers questions appropriately. Grossly patient is able to move bilateral UEs/LEs without difficulty. Ambulates normally.         Assessment:       1. Acute right-sided thoracic back pain    2. Thoracic radiculitis    3. Myofascial muscle pain           Plan:            Great improvement in right sided thoracic pain with PT and dose pack. Pain is tolerable and intermittent. Currently she has no pain. Known diffuse thoracic spondylosis with bridging anterior osteophytes. Treatment options reviewed with patient and following plan made:     - Continue with HEP from PT.   - Call if she has a flare up and will consider repeat medrol dose pack. Can have up to 3  per year.     Follow-up: Follow up if symptoms worsen or fail to improve. If there are any questions prior to this, the patient was instructed to contact the office.

## 2019-05-20 ENCOUNTER — OFFICE VISIT (OUTPATIENT)
Dept: SPINE | Facility: CLINIC | Age: 69
End: 2019-05-20
Payer: MEDICARE

## 2019-05-20 VITALS
HEIGHT: 61 IN | HEART RATE: 78 BPM | DIASTOLIC BLOOD PRESSURE: 73 MMHG | SYSTOLIC BLOOD PRESSURE: 134 MMHG | WEIGHT: 181 LBS | BODY MASS INDEX: 34.17 KG/M2

## 2019-05-20 DIAGNOSIS — M54.14 THORACIC RADICULITIS: ICD-10-CM

## 2019-05-20 DIAGNOSIS — M79.18 MYOFASCIAL MUSCLE PAIN: ICD-10-CM

## 2019-05-20 DIAGNOSIS — M54.6 ACUTE RIGHT-SIDED THORACIC BACK PAIN: Primary | ICD-10-CM

## 2019-05-20 PROCEDURE — 99213 OFFICE O/P EST LOW 20 MIN: CPT | Mod: S$PBB,,, | Performed by: PHYSICIAN ASSISTANT

## 2019-05-20 PROCEDURE — 99999 PR PBB SHADOW E&M-EST. PATIENT-LVL III: CPT | Mod: PBBFAC,,, | Performed by: PHYSICIAN ASSISTANT

## 2019-05-20 PROCEDURE — 99213 PR OFFICE/OUTPT VISIT, EST, LEVL III, 20-29 MIN: ICD-10-PCS | Mod: S$PBB,,, | Performed by: PHYSICIAN ASSISTANT

## 2019-05-20 PROCEDURE — 99213 OFFICE O/P EST LOW 20 MIN: CPT | Mod: PBBFAC | Performed by: PHYSICIAN ASSISTANT

## 2019-05-20 PROCEDURE — 99999 PR PBB SHADOW E&M-EST. PATIENT-LVL III: ICD-10-PCS | Mod: PBBFAC,,, | Performed by: PHYSICIAN ASSISTANT

## 2019-05-20 NOTE — PATIENT INSTRUCTIONS
It was good to see you again today!    I am so glad that you are feeling better.     I recommend that you continue to do the home exercises they gave you in PT. I would do these at least a few times a week. If you have a bad flare up then let me know. I can always call in another steroid pack like we did last time. You can have up to 3 of these per year.     Call or email me if you need anything. I hope you continue to do well!    Fiorella

## 2019-06-30 ENCOUNTER — EXTERNAL CHRONIC CARE MANAGEMENT (OUTPATIENT)
Dept: PRIMARY CARE CLINIC | Facility: CLINIC | Age: 69
End: 2019-06-30
Payer: MEDICARE

## 2019-06-30 PROCEDURE — 99490 PR CHRONIC CARE MGMT, 1ST 20 MIN: ICD-10-PCS | Mod: S$PBB,,, | Performed by: INTERNAL MEDICINE

## 2019-06-30 PROCEDURE — 99490 CHRNC CARE MGMT STAFF 1ST 20: CPT | Mod: PBBFAC | Performed by: INTERNAL MEDICINE

## 2019-06-30 PROCEDURE — 99490 CHRNC CARE MGMT STAFF 1ST 20: CPT | Mod: S$PBB,,, | Performed by: INTERNAL MEDICINE

## 2019-07-01 ENCOUNTER — PATIENT MESSAGE (OUTPATIENT)
Dept: INTERNAL MEDICINE | Facility: CLINIC | Age: 69
End: 2019-07-01

## 2019-07-01 DIAGNOSIS — D69.6 THROMBOCYTOPENIA: ICD-10-CM

## 2019-07-01 DIAGNOSIS — M1A.0720 IDIOPATHIC CHRONIC GOUT OF LEFT FOOT WITHOUT TOPHUS: ICD-10-CM

## 2019-07-01 DIAGNOSIS — I10 ESSENTIAL HYPERTENSION: ICD-10-CM

## 2019-07-01 DIAGNOSIS — Z13.6 SCREENING FOR CARDIOVASCULAR CONDITION: ICD-10-CM

## 2019-07-01 DIAGNOSIS — Z00.00 HEALTH CARE MAINTENANCE: Primary | ICD-10-CM

## 2019-07-05 ENCOUNTER — LAB VISIT (OUTPATIENT)
Dept: LAB | Facility: HOSPITAL | Age: 69
End: 2019-07-05
Attending: INTERNAL MEDICINE
Payer: MEDICARE

## 2019-07-05 DIAGNOSIS — D69.6 THROMBOCYTOPENIA: ICD-10-CM

## 2019-07-05 DIAGNOSIS — I10 ESSENTIAL HYPERTENSION: ICD-10-CM

## 2019-07-05 DIAGNOSIS — Z13.6 SCREENING FOR CARDIOVASCULAR CONDITION: ICD-10-CM

## 2019-07-05 DIAGNOSIS — M1A.0720 IDIOPATHIC CHRONIC GOUT OF LEFT FOOT WITHOUT TOPHUS: ICD-10-CM

## 2019-07-05 LAB
ALBUMIN SERPL BCP-MCNC: 3.8 G/DL (ref 3.5–5.2)
ALP SERPL-CCNC: 73 U/L (ref 55–135)
ALT SERPL W/O P-5'-P-CCNC: 12 U/L (ref 10–44)
ANION GAP SERPL CALC-SCNC: 8 MMOL/L (ref 8–16)
AST SERPL-CCNC: 16 U/L (ref 10–40)
BASOPHILS # BLD AUTO: 0.03 K/UL (ref 0–0.2)
BASOPHILS NFR BLD: 0.4 % (ref 0–1.9)
BILIRUB SERPL-MCNC: 0.6 MG/DL (ref 0.1–1)
BUN SERPL-MCNC: 22 MG/DL (ref 8–23)
CALCIUM SERPL-MCNC: 9.7 MG/DL (ref 8.7–10.5)
CHLORIDE SERPL-SCNC: 106 MMOL/L (ref 95–110)
CHOLEST SERPL-MCNC: 181 MG/DL (ref 120–199)
CHOLEST/HDLC SERPL: 3.1 {RATIO} (ref 2–5)
CO2 SERPL-SCNC: 30 MMOL/L (ref 23–29)
CREAT SERPL-MCNC: 1.2 MG/DL (ref 0.5–1.4)
DIFFERENTIAL METHOD: ABNORMAL
EOSINOPHIL # BLD AUTO: 0.2 K/UL (ref 0–0.5)
EOSINOPHIL NFR BLD: 2 % (ref 0–8)
ERYTHROCYTE [DISTWIDTH] IN BLOOD BY AUTOMATED COUNT: 14.4 % (ref 11.5–14.5)
EST. GFR  (AFRICAN AMERICAN): 54 ML/MIN/1.73 M^2
EST. GFR  (NON AFRICAN AMERICAN): 47 ML/MIN/1.73 M^2
GLUCOSE SERPL-MCNC: 96 MG/DL (ref 70–110)
HCT VFR BLD AUTO: 43.6 % (ref 37–48.5)
HDLC SERPL-MCNC: 58 MG/DL (ref 40–75)
HDLC SERPL: 32 % (ref 20–50)
HGB BLD-MCNC: 13.9 G/DL (ref 12–16)
LDLC SERPL CALC-MCNC: 96.4 MG/DL (ref 63–159)
LYMPHOCYTES # BLD AUTO: 2.7 K/UL (ref 1–4.8)
LYMPHOCYTES NFR BLD: 35.8 % (ref 18–48)
MCH RBC QN AUTO: 29.2 PG (ref 27–31)
MCHC RBC AUTO-ENTMCNC: 31.9 G/DL (ref 32–36)
MCV RBC AUTO: 92 FL (ref 82–98)
MONOCYTES # BLD AUTO: 0.7 K/UL (ref 0.3–1)
MONOCYTES NFR BLD: 9.1 % (ref 4–15)
NEUTROPHILS # BLD AUTO: 4 K/UL (ref 1.8–7.7)
NEUTROPHILS NFR BLD: 52.7 % (ref 38–73)
NONHDLC SERPL-MCNC: 123 MG/DL
PLATELET # BLD AUTO: 126 K/UL (ref 150–350)
PMV BLD AUTO: 13.9 FL (ref 9.2–12.9)
POTASSIUM SERPL-SCNC: 4.4 MMOL/L (ref 3.5–5.1)
PROT SERPL-MCNC: 7.6 G/DL (ref 6–8.4)
RBC # BLD AUTO: 4.76 M/UL (ref 4–5.4)
SODIUM SERPL-SCNC: 144 MMOL/L (ref 136–145)
TRIGL SERPL-MCNC: 133 MG/DL (ref 30–150)
TSH SERPL DL<=0.005 MIU/L-ACNC: 3.26 UIU/ML (ref 0.4–4)
URATE SERPL-MCNC: 7.8 MG/DL (ref 2.4–5.7)
WBC # BLD AUTO: 7.55 K/UL (ref 3.9–12.7)

## 2019-07-05 PROCEDURE — 80061 LIPID PANEL: CPT

## 2019-07-05 PROCEDURE — 84443 ASSAY THYROID STIM HORMONE: CPT

## 2019-07-05 PROCEDURE — 36415 COLL VENOUS BLD VENIPUNCTURE: CPT

## 2019-07-05 PROCEDURE — 80053 COMPREHEN METABOLIC PANEL: CPT

## 2019-07-05 PROCEDURE — 85025 COMPLETE CBC W/AUTO DIFF WBC: CPT

## 2019-07-05 PROCEDURE — 84550 ASSAY OF BLOOD/URIC ACID: CPT

## 2019-07-08 ENCOUNTER — OFFICE VISIT (OUTPATIENT)
Dept: INTERNAL MEDICINE | Facility: CLINIC | Age: 69
End: 2019-07-08
Payer: MEDICARE

## 2019-07-08 VITALS
BODY MASS INDEX: 33.99 KG/M2 | HEIGHT: 61 IN | WEIGHT: 180 LBS | HEART RATE: 57 BPM | SYSTOLIC BLOOD PRESSURE: 162 MMHG | DIASTOLIC BLOOD PRESSURE: 88 MMHG

## 2019-07-08 DIAGNOSIS — M1A.0720 IDIOPATHIC CHRONIC GOUT OF LEFT FOOT WITHOUT TOPHUS: ICD-10-CM

## 2019-07-08 DIAGNOSIS — I10 ESSENTIAL HYPERTENSION: ICD-10-CM

## 2019-07-08 PROCEDURE — 99213 OFFICE O/P EST LOW 20 MIN: CPT | Mod: PBBFAC | Performed by: INTERNAL MEDICINE

## 2019-07-08 PROCEDURE — 99214 OFFICE O/P EST MOD 30 MIN: CPT | Mod: S$PBB,,, | Performed by: INTERNAL MEDICINE

## 2019-07-08 PROCEDURE — 99214 PR OFFICE/OUTPT VISIT, EST, LEVL IV, 30-39 MIN: ICD-10-PCS | Mod: S$PBB,,, | Performed by: INTERNAL MEDICINE

## 2019-07-08 PROCEDURE — 99999 PR PBB SHADOW E&M-EST. PATIENT-LVL III: CPT | Mod: PBBFAC,,, | Performed by: INTERNAL MEDICINE

## 2019-07-08 PROCEDURE — 99999 PR PBB SHADOW E&M-EST. PATIENT-LVL III: ICD-10-PCS | Mod: PBBFAC,,, | Performed by: INTERNAL MEDICINE

## 2019-07-08 RX ORDER — ALLOPURINOL 300 MG/1
300 TABLET ORAL DAILY
Qty: 90 TABLET | Refills: 3 | Status: SHIPPED | OUTPATIENT
Start: 2019-07-08 | End: 2021-04-26 | Stop reason: SDUPTHER

## 2019-07-08 RX ORDER — AMLODIPINE BESYLATE 10 MG/1
10 TABLET ORAL DAILY
Qty: 90 TABLET | Refills: 3 | Status: SHIPPED | OUTPATIENT
Start: 2019-07-08 | End: 2020-08-10

## 2019-07-08 RX ORDER — TRIAMTERENE AND HYDROCHLOROTHIAZIDE 37.5; 25 MG/1; MG/1
1 CAPSULE ORAL EVERY MORNING
Qty: 90 CAPSULE | Refills: 3 | Status: SHIPPED | OUTPATIENT
Start: 2019-07-08 | End: 2022-05-03 | Stop reason: SDUPTHER

## 2019-07-08 NOTE — PROGRESS NOTES
"Subjective:       Patient ID: Rajesh Mas is a 68 y.o. female.    Chief Complaint: Follow-up    HPI   Rajesh Mas is a 68 y.o. female here for routine follow up of the following chronic issues:     HTN w diastolic dysfunction  Amlodipine 5mg  Losartan 100mg  Triamterene-hctz 37.5-25mg   bp at home 157/101 in am, 137/70 in pm.     Gout  Allopurinol 300mg  Uric acid 7.8 in July 2019.  Mostly chicken and fish. Not much red meat. No alcohol.   Review of Systems   Constitutional: Negative for activity change and unexpected weight change.   HENT: Negative for hearing loss, rhinorrhea and trouble swallowing.    Eyes: Negative for discharge and visual disturbance.   Respiratory: Negative for chest tightness and wheezing.    Cardiovascular: Positive for palpitations. Negative for chest pain.   Gastrointestinal: Negative for blood in stool, constipation, diarrhea and vomiting.   Endocrine: Positive for polyuria. Negative for polydipsia.   Genitourinary: Negative for difficulty urinating, dysuria, hematuria and menstrual problem.   Musculoskeletal: Negative for arthralgias, joint swelling and neck pain.   Neurological: Negative for weakness and headaches.   Psychiatric/Behavioral: Negative for confusion and dysphoric mood.       Objective:   BP (!) 162/88 (BP Location: Right arm, Patient Position: Sitting, BP Method: Large (Manual))   Pulse (!) 57   Ht 5' 1" (1.549 m)   Wt 81.6 kg (180 lb)   LMP 11/28/1988 (Approximate)   BMI 34.01 kg/m²      Physical Exam   Constitutional: She is oriented to person, place, and time. She appears well-developed and well-nourished. No distress.   HENT:   Head: Normocephalic and atraumatic.   Cardiovascular: Normal rate and regular rhythm.   Pulmonary/Chest: Effort normal. No respiratory distress. She has no wheezes. She has no rales.   Neurological: She is alert and oriented to person, place, and time.   Skin: Skin is warm and dry. She is not diaphoretic.   Psychiatric: She has a " normal mood and affect. Her behavior is normal.       Assessment:       1. Essential hypertension    2. Idiopathic chronic gout of left foot without tophus        Plan:       Rajesh was seen today for follow-up.    Diagnoses and all orders for this visit:    Essential hypertension  -    increase amLODIPine (NORVASC) 10 MG tablet; Take 1 tablet (10 mg total) by mouth once daily.  Losartan 100mg  Triamterene-hctz 37.5-25mg  -     Hypertension Digital Medicine ([facility]) Enrollment Order  -     Hypertension Digital Medicine ([facility]): Assign Onboarding Questionnaires  -     triamterene-hydrochlorothiazide 37.5-25 mg (DYAZIDE) 37.5-25 mg per capsule; Take 1 capsule by mouth every morning.    Idiopathic chronic gout of left foot without tophus  -     allopurinol (ZYLOPRIM) 300 MG tablet; Take 1 tablet (300 mg total) by mouth once daily. (to prevent gout)

## 2019-07-11 ENCOUNTER — PATIENT MESSAGE (OUTPATIENT)
Dept: INTERNAL MEDICINE | Facility: CLINIC | Age: 69
End: 2019-07-11

## 2019-07-18 ENCOUNTER — PATIENT MESSAGE (OUTPATIENT)
Dept: ADMINISTRATIVE | Facility: OTHER | Age: 69
End: 2019-07-18

## 2019-07-31 ENCOUNTER — EXTERNAL CHRONIC CARE MANAGEMENT (OUTPATIENT)
Dept: PRIMARY CARE CLINIC | Facility: CLINIC | Age: 69
End: 2019-07-31
Payer: MEDICARE

## 2019-07-31 PROCEDURE — 99490 CHRNC CARE MGMT STAFF 1ST 20: CPT | Mod: PBBFAC | Performed by: INTERNAL MEDICINE

## 2019-07-31 PROCEDURE — 99490 PR CHRONIC CARE MGMT, 1ST 20 MIN: ICD-10-PCS | Mod: S$PBB,,, | Performed by: INTERNAL MEDICINE

## 2019-07-31 PROCEDURE — 99490 CHRNC CARE MGMT STAFF 1ST 20: CPT | Mod: S$PBB,,, | Performed by: INTERNAL MEDICINE

## 2019-08-06 ENCOUNTER — PATIENT OUTREACH (OUTPATIENT)
Dept: ADMINISTRATIVE | Facility: OTHER | Age: 69
End: 2019-08-06

## 2019-08-06 ENCOUNTER — OFFICE VISIT (OUTPATIENT)
Dept: DERMATOLOGY | Facility: CLINIC | Age: 69
End: 2019-08-06
Payer: MEDICARE

## 2019-08-06 DIAGNOSIS — L82.1 SK (SEBORRHEIC KERATOSIS): Primary | ICD-10-CM

## 2019-08-06 PROCEDURE — 99212 PR OFFICE/OUTPT VISIT, EST, LEVL II, 10-19 MIN: ICD-10-PCS | Mod: S$GLB,,, | Performed by: DERMATOLOGY

## 2019-08-06 PROCEDURE — 99212 OFFICE O/P EST SF 10 MIN: CPT | Mod: S$GLB,,, | Performed by: DERMATOLOGY

## 2019-08-06 NOTE — PROGRESS NOTES
Subjective:       Patient ID:  Rajesh Mas is a 68 y.o. female who presents for   Chief Complaint   Patient presents with    Lesion     chest     Lesion  - Initial  Affected locations: chest  Duration: 1 year  Signs / symptoms: irritated, growing and rough  Severity: mild to moderate  Timing: constant  Aggravated by: sweating  Relieving factors/Treatments tried: nothing      Review of Systems   Skin: Negative for rash and recent sunburn.   Hematologic/Lymphatic: Does not bruise/bleed easily.        Objective:    Physical Exam   Constitutional: She appears well-developed and well-nourished. No distress.   HENT:   Mouth/Throat: Lips normal.    Eyes: Lids are normal.  No conjunctival no injection.   Neurological: She is alert and oriented to person, place, and time. She is not disoriented.   Psychiatric: She has a normal mood and affect.   Skin:   Areas Examined (abnormalities noted in diagram):   Head / Face Inspection Performed  Neck Inspection Performed  Chest / Axilla Inspection Performed  Abdomen Inspection Performed              Diagram Legend     Erythematous scaling macule/papule c/w actinic keratosis       Vascular papule c/w angioma      Pigmented verrucoid papule/plaque c/w seborrheic keratosis      Yellow umbilicated papule c/w sebaceous hyperplasia      Irregularly shaped tan macule c/w lentigo     1-2 mm smooth white papules consistent with Milia      Movable subcutaneous cyst with punctum c/w epidermal inclusion cyst      Subcutaneous movable cyst c/w pilar cyst      Firm pink to brown papule c/w dermatofibroma      Pedunculated fleshy papule(s) c/w skin tag(s)      Evenly pigmented macule c/w junctional nevus     Mildly variegated pigmented, slightly irregular-bordered macule c/w mildly atypical nevus      Flesh colored to evenly pigmented papule c/w intradermal nevus       Pink pearly papule/plaque c/w basal cell carcinoma      Erythematous hyperkeratotic cursted plaque c/w SCC      Surgical scar  with no sign of skin cancer recurrence      Open and closed comedones      Inflammatory papules and pustules      Verrucoid papule consistent consistent with wart     Erythematous eczematous patches and plaques     Dystrophic onycholytic nail with subungual debris c/w onychomycosis     Umbilicated papule    Erythematous-base heme-crusted tan verrucoid plaque consistent with inflamed seborrheic keratosis     Erythematous Silvery Scaling Plaque c/w Psoriasis     See annotation      Assessment / Plan:        SK (seborrheic keratosis)  These are benign, inherited growths without a malignant potential. Reassurance given to patient. No treatment is necessary. Handout was provided.  Discussed with the patient that treatment of these benign lesions is not covered by insurance as it is considered cosmetic. Warned about risk of scarring and hypo- or hyperpigmentation with treatment, as well as risk of recurrence and/or development of more lesions.    Follow up if symptoms worsen or fail to improve.

## 2019-08-31 ENCOUNTER — EXTERNAL CHRONIC CARE MANAGEMENT (OUTPATIENT)
Dept: PRIMARY CARE CLINIC | Facility: CLINIC | Age: 69
End: 2019-08-31
Payer: MEDICARE

## 2019-08-31 PROCEDURE — 99490 CHRNC CARE MGMT STAFF 1ST 20: CPT | Mod: PBBFAC | Performed by: INTERNAL MEDICINE

## 2019-08-31 PROCEDURE — 99490 PR CHRONIC CARE MGMT, 1ST 20 MIN: ICD-10-PCS | Mod: S$PBB,,, | Performed by: INTERNAL MEDICINE

## 2019-08-31 PROCEDURE — 99490 CHRNC CARE MGMT STAFF 1ST 20: CPT | Mod: S$PBB,,, | Performed by: INTERNAL MEDICINE

## 2019-09-23 ENCOUNTER — PATIENT MESSAGE (OUTPATIENT)
Dept: INTERNAL MEDICINE | Facility: CLINIC | Age: 69
End: 2019-09-23

## 2019-09-23 DIAGNOSIS — I10 ESSENTIAL HYPERTENSION: ICD-10-CM

## 2019-09-23 RX ORDER — LOSARTAN POTASSIUM 100 MG/1
100 TABLET ORAL DAILY
Qty: 90 TABLET | Refills: 3 | Status: SHIPPED | OUTPATIENT
Start: 2019-09-23 | End: 2020-11-20

## 2019-09-24 ENCOUNTER — PATIENT MESSAGE (OUTPATIENT)
Dept: INTERNAL MEDICINE | Facility: CLINIC | Age: 69
End: 2019-09-24

## 2019-10-17 ENCOUNTER — CLINICAL SUPPORT (OUTPATIENT)
Dept: INTERNAL MEDICINE | Facility: CLINIC | Age: 69
End: 2019-10-17
Payer: MEDICARE

## 2019-10-17 ENCOUNTER — TELEPHONE (OUTPATIENT)
Dept: INTERNAL MEDICINE | Facility: CLINIC | Age: 69
End: 2019-10-17

## 2019-10-17 PROCEDURE — 90662 IIV NO PRSV INCREASED AG IM: CPT | Mod: PBBFAC

## 2019-11-06 ENCOUNTER — LAB VISIT (OUTPATIENT)
Dept: LAB | Facility: HOSPITAL | Age: 69
End: 2019-11-06
Attending: INTERNAL MEDICINE
Payer: MEDICARE

## 2019-11-06 ENCOUNTER — OFFICE VISIT (OUTPATIENT)
Dept: INTERNAL MEDICINE | Facility: CLINIC | Age: 69
End: 2019-11-06
Payer: MEDICARE

## 2019-11-06 VITALS
SYSTOLIC BLOOD PRESSURE: 120 MMHG | HEIGHT: 61 IN | WEIGHT: 182.56 LBS | BODY MASS INDEX: 34.47 KG/M2 | DIASTOLIC BLOOD PRESSURE: 74 MMHG | HEART RATE: 72 BPM

## 2019-11-06 DIAGNOSIS — R00.2 HEART PALPITATIONS: Primary | ICD-10-CM

## 2019-11-06 DIAGNOSIS — R00.2 HEART PALPITATIONS: ICD-10-CM

## 2019-11-06 PROBLEM — M54.6 ACUTE RIGHT-SIDED THORACIC BACK PAIN: Status: RESOLVED | Noted: 2019-03-28 | Resolved: 2019-11-06

## 2019-11-06 LAB
ANION GAP SERPL CALC-SCNC: 9 MMOL/L (ref 8–16)
BASOPHILS # BLD AUTO: 0.02 K/UL (ref 0–0.2)
BASOPHILS NFR BLD: 0.2 % (ref 0–1.9)
BUN SERPL-MCNC: 16 MG/DL (ref 8–23)
CALCIUM SERPL-MCNC: 9.5 MG/DL (ref 8.7–10.5)
CHLORIDE SERPL-SCNC: 105 MMOL/L (ref 95–110)
CO2 SERPL-SCNC: 27 MMOL/L (ref 23–29)
CREAT SERPL-MCNC: 1 MG/DL (ref 0.5–1.4)
DIFFERENTIAL METHOD: ABNORMAL
EOSINOPHIL # BLD AUTO: 0.2 K/UL (ref 0–0.5)
EOSINOPHIL NFR BLD: 1.6 % (ref 0–8)
ERYTHROCYTE [DISTWIDTH] IN BLOOD BY AUTOMATED COUNT: 15.3 % (ref 11.5–14.5)
EST. GFR  (AFRICAN AMERICAN): >60 ML/MIN/1.73 M^2
EST. GFR  (NON AFRICAN AMERICAN): 59 ML/MIN/1.73 M^2
GLUCOSE SERPL-MCNC: 85 MG/DL (ref 70–110)
HCT VFR BLD AUTO: 40.7 % (ref 37–48.5)
HGB BLD-MCNC: 13.6 G/DL (ref 12–16)
LYMPHOCYTES # BLD AUTO: 3.3 K/UL (ref 1–4.8)
LYMPHOCYTES NFR BLD: 35.6 % (ref 18–48)
MCH RBC QN AUTO: 29.8 PG (ref 27–31)
MCHC RBC AUTO-ENTMCNC: 33.4 G/DL (ref 32–36)
MCV RBC AUTO: 89 FL (ref 82–98)
MONOCYTES # BLD AUTO: 0.7 K/UL (ref 0.3–1)
MONOCYTES NFR BLD: 7.3 % (ref 4–15)
NEUTROPHILS # BLD AUTO: 5.2 K/UL (ref 1.8–7.7)
NEUTROPHILS NFR BLD: 55.3 % (ref 38–73)
PLATELET # BLD AUTO: 148 K/UL (ref 150–350)
PMV BLD AUTO: 13.5 FL (ref 9.2–12.9)
POTASSIUM SERPL-SCNC: 3.9 MMOL/L (ref 3.5–5.1)
RBC # BLD AUTO: 4.57 M/UL (ref 4–5.4)
SODIUM SERPL-SCNC: 141 MMOL/L (ref 136–145)
T4 FREE SERPL-MCNC: 0.86 NG/DL (ref 0.71–1.51)
TSH SERPL DL<=0.005 MIU/L-ACNC: 4.33 UIU/ML (ref 0.4–4)
WBC # BLD AUTO: 9.37 K/UL (ref 3.9–12.7)

## 2019-11-06 PROCEDURE — 80048 BASIC METABOLIC PNL TOTAL CA: CPT

## 2019-11-06 PROCEDURE — 85025 COMPLETE CBC W/AUTO DIFF WBC: CPT

## 2019-11-06 PROCEDURE — 84443 ASSAY THYROID STIM HORMONE: CPT

## 2019-11-06 PROCEDURE — 99999 PR PBB SHADOW E&M-EST. PATIENT-LVL III: CPT | Mod: PBBFAC,,, | Performed by: INTERNAL MEDICINE

## 2019-11-06 PROCEDURE — 93010 EKG 12-LEAD: ICD-10-PCS | Mod: S$PBB,,, | Performed by: INTERNAL MEDICINE

## 2019-11-06 PROCEDURE — 99214 OFFICE O/P EST MOD 30 MIN: CPT | Mod: S$PBB,,, | Performed by: INTERNAL MEDICINE

## 2019-11-06 PROCEDURE — 99214 PR OFFICE/OUTPT VISIT, EST, LEVL IV, 30-39 MIN: ICD-10-PCS | Mod: S$PBB,,, | Performed by: INTERNAL MEDICINE

## 2019-11-06 PROCEDURE — 99213 OFFICE O/P EST LOW 20 MIN: CPT | Mod: PBBFAC,25 | Performed by: INTERNAL MEDICINE

## 2019-11-06 PROCEDURE — 93010 ELECTROCARDIOGRAM REPORT: CPT | Mod: S$PBB,,, | Performed by: INTERNAL MEDICINE

## 2019-11-06 PROCEDURE — 84439 ASSAY OF FREE THYROXINE: CPT

## 2019-11-06 PROCEDURE — 99999 PR PBB SHADOW E&M-EST. PATIENT-LVL III: ICD-10-PCS | Mod: PBBFAC,,, | Performed by: INTERNAL MEDICINE

## 2019-11-06 PROCEDURE — 36415 COLL VENOUS BLD VENIPUNCTURE: CPT

## 2019-11-06 PROCEDURE — 93005 ELECTROCARDIOGRAM TRACING: CPT | Mod: PBBFAC | Performed by: INTERNAL MEDICINE

## 2019-11-06 NOTE — PROGRESS NOTES
Rajesh Mas presents today to urgent care for:  Palpitations (x2. once 4 days ago and once 2 days ago.)      Palpitations    This is a new problem. The current episode started in the past 7 days. The problem occurs every several days. The problem has been resolved. Nothing aggravates the symptoms. Pertinent negatives include no anxiety, chest fullness, chest pain, coughing, diaphoresis, dizziness, fever, irregular heartbeat, malaise/fatigue, nausea, near-syncope, numbness, shortness of breath, syncope, vomiting or weakness. She has tried nothing for the symptoms. There is no history of anxiety, heart disease or hyperthyroidism.       Past medical, social, family and surgical history was reviewed and updated today as needed. See encounter for details.     Review of Systems   Constitutional: Negative for diaphoresis, fever and malaise/fatigue.   Respiratory: Negative for cough and shortness of breath.    Cardiovascular: Positive for palpitations. Negative for chest pain, syncope and near-syncope.   Gastrointestinal: Negative for nausea and vomiting.   Genitourinary: Negative.    Musculoskeletal: Negative.    Neurological: Negative for dizziness, weakness and numbness.   Psychiatric/Behavioral: The patient is not nervous/anxious.        Vitals:    11/06/19 0923   BP: 120/74   Pulse: 72   Body mass index is 34.49 kg/m².   Physical Exam   Constitutional: She is oriented to person, place, and time. She appears well-developed and well-nourished. No distress.   Neck: No JVD present. No thyromegaly present.   Cardiovascular: Normal rate, regular rhythm, normal heart sounds and intact distal pulses. Exam reveals no gallop and no friction rub.   No murmur heard.  Pulmonary/Chest: Effort normal and breath sounds normal.   Abdominal: Soft. Bowel sounds are normal.   Musculoskeletal: She exhibits no edema.   Neurological: She is alert and oriented to person, place, and time.   Skin: Skin is warm and dry.         Assessment/plan:   1. Heart palpitations  New issue. Additional evaluation needed.   Orders:   - Basic metabolic panel; Future  - EKG 12-lead  - CBC auto differential; Future  - TSH; Future  Will review results and contact patient with results and recommendations.   Likely this is benign in nature. Prelim reading in office of EKG: shows no arrythmia, no acute ischemic changes.   This was a 25 minute visit.  Greater than 50% of today's visit was time spent on counseling and coordination of care.

## 2019-11-07 ENCOUNTER — HOSPITAL ENCOUNTER (OUTPATIENT)
Dept: RADIOLOGY | Facility: HOSPITAL | Age: 69
Discharge: HOME | End: 2019-11-07
Attending: OBSTETRICS & GYNECOLOGY
Payer: MEDICARE

## 2019-11-07 VITALS — BODY MASS INDEX: 34.47 KG/M2 | HEIGHT: 61 IN | WEIGHT: 182.56 LBS

## 2019-11-07 DIAGNOSIS — Z12.31 SCREENING MAMMOGRAM, ENCOUNTER FOR: ICD-10-CM

## 2019-11-07 PROCEDURE — 77063 BREAST TOMOSYNTHESIS BI: CPT | Mod: 26,,, | Performed by: RADIOLOGY

## 2019-11-07 PROCEDURE — 77067 MAMMO DIGITAL SCREENING BILAT WITH TOMOSYNTHESIS_CAD: ICD-10-PCS | Mod: 26,,, | Performed by: RADIOLOGY

## 2019-11-07 PROCEDURE — 77067 SCR MAMMO BI INCL CAD: CPT | Mod: 26,,, | Performed by: RADIOLOGY

## 2019-11-07 PROCEDURE — 77067 SCR MAMMO BI INCL CAD: CPT | Mod: TC

## 2019-11-07 PROCEDURE — 77063 MAMMO DIGITAL SCREENING BILAT WITH TOMOSYNTHESIS_CAD: ICD-10-PCS | Mod: 26,,, | Performed by: RADIOLOGY

## 2019-12-04 ENCOUNTER — PATIENT MESSAGE (OUTPATIENT)
Dept: OBSTETRICS AND GYNECOLOGY | Facility: CLINIC | Age: 69
End: 2019-12-04

## 2019-12-04 ENCOUNTER — OFFICE VISIT (OUTPATIENT)
Dept: OBSTETRICS AND GYNECOLOGY | Facility: CLINIC | Age: 69
End: 2019-12-04
Payer: MEDICARE

## 2019-12-04 ENCOUNTER — CLINICAL SUPPORT (OUTPATIENT)
Dept: INFECTIOUS DISEASES | Facility: CLINIC | Age: 69
End: 2019-12-04
Payer: MEDICARE

## 2019-12-04 ENCOUNTER — PATIENT OUTREACH (OUTPATIENT)
Dept: ADMINISTRATIVE | Facility: OTHER | Age: 69
End: 2019-12-04

## 2019-12-04 VITALS
WEIGHT: 179.63 LBS | HEIGHT: 61 IN | DIASTOLIC BLOOD PRESSURE: 75 MMHG | BODY MASS INDEX: 33.91 KG/M2 | SYSTOLIC BLOOD PRESSURE: 112 MMHG

## 2019-12-04 DIAGNOSIS — R39.15 URINARY URGENCY: ICD-10-CM

## 2019-12-04 DIAGNOSIS — Z12.31 SCREENING MAMMOGRAM, ENCOUNTER FOR: ICD-10-CM

## 2019-12-04 DIAGNOSIS — R35.0 URINARY FREQUENCY: ICD-10-CM

## 2019-12-04 DIAGNOSIS — R35.0 URINARY FREQUENCY: Primary | ICD-10-CM

## 2019-12-04 DIAGNOSIS — Z01.419 ENCOUNTER FOR GYNECOLOGICAL EXAMINATION WITHOUT ABNORMAL FINDING: Primary | ICD-10-CM

## 2019-12-04 PROCEDURE — G0101 PR CA SCREEN;PELVIC/BREAST EXAM: ICD-10-PCS | Mod: S$PBB,,, | Performed by: OBSTETRICS & GYNECOLOGY

## 2019-12-04 PROCEDURE — 99999 PR PBB SHADOW E&M-EST. PATIENT-LVL III: ICD-10-PCS | Mod: PBBFAC,,, | Performed by: OBSTETRICS & GYNECOLOGY

## 2019-12-04 PROCEDURE — G0009 ADMIN PNEUMOCOCCAL VACCINE: HCPCS | Mod: PBBFAC

## 2019-12-04 PROCEDURE — G0101 CA SCREEN;PELVIC/BREAST EXAM: HCPCS | Mod: S$PBB,,, | Performed by: OBSTETRICS & GYNECOLOGY

## 2019-12-04 PROCEDURE — 99213 OFFICE O/P EST LOW 20 MIN: CPT | Mod: PBBFAC,25 | Performed by: OBSTETRICS & GYNECOLOGY

## 2019-12-04 PROCEDURE — 99999 PR PBB SHADOW E&M-EST. PATIENT-LVL III: CPT | Mod: PBBFAC,,, | Performed by: OBSTETRICS & GYNECOLOGY

## 2019-12-04 RX ORDER — OXYBUTYNIN CHLORIDE 15 MG/1
15 TABLET, EXTENDED RELEASE ORAL DAILY
Qty: 30 TABLET | Refills: 11 | Status: SHIPPED | OUTPATIENT
Start: 2019-12-04 | End: 2019-12-04

## 2019-12-04 RX ORDER — OXYBUTYNIN CHLORIDE 5 MG/1
5 TABLET ORAL 2 TIMES DAILY
Qty: 60 TABLET | Refills: 2 | Status: SHIPPED | OUTPATIENT
Start: 2019-12-04 | End: 2020-03-02

## 2019-12-04 NOTE — PROGRESS NOTES
Subjective:       Patient ID: Rajesh Mas is a 68 y.o. female.    Chief Complaint:  Annual Exam and Urinary Urgency      History of Present Illness  HPI    Rjaesh Mas is a 68 y.o. female  here for her annual GYN exam.  She also reports urinary frequency and urgency, denies dysuria.   She describes her periods as stopped around age 38.  denies break through bleeding.   denies vaginal itching or irritation.  Denies vaginal discharge.  She is sexually active. She has had1 partner for 48 years .   History of abnormal pap: No  Last Pap: approximate date  and was normal  Last MMG: normal--routine follow-up in 12 months  Last Colonoscopy:  colonoscopy 3 years ago with abnormalities.  denies domestic violence. She does feel safe at home.     Past Medical History:   Diagnosis Date    Allergy     seasonal    Diverticulitis     Fever blister     Hypertension      Past Surgical History:   Procedure Laterality Date    CHOLECYSTECTOMY  2017    COLONOSCOPY N/A 2016    Procedure: COLONOSCOPY;  Surgeon: Fidel Mason MD;  Location: [address];  Service: Endoscopy;  Laterality: N/A;    partial colectomy      sigmoid removed due to diverticulitis    SINUS SURGERY       Social History     Socioeconomic History    Marital status:      Spouse name: Not on file    Number of children: Not on file    Years of education: Not on file    Highest education level: Not on file   Occupational History    Not on file   Social Needs    Financial resource strain: Not on file    Food insecurity:     Worry: Not on file     Inability: Not on file    Transportation needs:     Medical: Not on file     Non-medical: Not on file   Tobacco Use    Smoking status: Never Smoker    Smokeless tobacco: Never Used   Substance and Sexual Activity    Alcohol use: No     Alcohol/week: 0.0 standard drinks    Drug use: No    Sexual activity: Yes     Comment: , together since   "  Lifestyle    Physical activity:     Days per week: Not on file     Minutes per session: Not on file    Stress: Not on file   Relationships    Social connections:     Talks on phone: Not on file     Gets together: Not on file     Attends Protestant service: Not on file     Active member of club or organization: Not on file     Attends meetings of clubs or organizations: Not on file     Relationship status: Not on file   Other Topics Concern    Are you pregnant or think you may be? Not Asked    Breast-feeding Not Asked   Social History Narrative    Not on file     Family History   Problem Relation Age of Onset    Diabetes Mother     Heart failure Mother     Hypertension Mother     Liver cancer Maternal Uncle     Breast cancer Maternal Grandmother     Other Brother         prostate issue    Melanoma Neg Hx     Colon cancer Neg Hx     Ovarian cancer Neg Hx      OB History        5    Para   4    Term   4            AB   1    Living   4       SAB   1    TAB        Ectopic        Multiple        Live Births   4                 /75   Ht 5' 1" (1.549 m)   Wt 81.5 kg (179 lb 9.6 oz)   LMP 1988 (Approximate)   BMI 33.94 kg/m²         GYN & OB History  Patient's last menstrual period was 1988 (approximate).   Date of Last Pap: 2018    OB History    Para Term  AB Living   5 4 4   1 4   SAB TAB Ectopic Multiple Live Births   1       4      # Outcome Date GA Lbr Yonny/2nd Weight Sex Delivery Anes PTL Lv   5 SAB            4 Term      Vag-Spont   HALLEY   3 Term      Vag-Spont   HALLEY   2 Term      Vag-Spont   HALLEY   1 Term      Vag-Spont   HALLEY       Review of Systems  Review of Systems   Constitutional: Negative for activity change, appetite change, fatigue and unexpected weight change.   HENT: Negative.    Eyes: Negative for visual disturbance.   Respiratory: Negative for shortness of breath and wheezing.    Cardiovascular: Negative for chest pain, palpitations and " leg swelling.   Gastrointestinal: Negative for abdominal pain, bloating and blood in stool.   Endocrine: Negative for diabetes and hair loss.   Genitourinary: Positive for frequency, urgency and vaginal dryness. Negative for decreased libido and dyspareunia.   Musculoskeletal: Negative for back pain and joint swelling.   Integumentary:  Negative for acne, hair changes and nipple discharge.   Neurological: Negative for headaches.   Hematological: Does not bruise/bleed easily.   Psychiatric/Behavioral: Negative for depression and sleep disturbance. The patient is not nervous/anxious.    Breast: Negative for mastodynia and nipple discharge          Objective:      Physical Exam:   Constitutional: She is oriented to person, place, and time. She appears well-developed and well-nourished.    HENT:   Head: Normocephalic and atraumatic.    Eyes: Pupils are equal, round, and reactive to light. EOM are normal.    Neck: Normal range of motion. Neck supple.    Cardiovascular: Normal rate and regular rhythm.     Pulmonary/Chest: Effort normal and breath sounds normal.   BREASTS:  no mass, no tenderness, no deformity and no retraction. Right breast exhibits no inverted nipple, no mass, no nipple discharge, no skin change, no tenderness, no bleeding and no swelling. Left breast exhibits no inverted nipple, no mass, no nipple discharge, no skin change, no tenderness, no bleeding and no swelling. Breasts are symmetrical.              Abdominal: Soft. Bowel sounds are normal.     Genitourinary: Pelvic exam was performed with patient supine.   Genitourinary Comments: PELVIC: Normal external genitalia without lesions.  Normal hair distribution.  Adequate perineal body, normal urethral meatus.  Vagina  Dry and poorly rugated, atrophic, without lesions or discharge.  Cervix pink, without lesions, discharge or tenderness.  Second degree  cystocele & rectocele.  Bimanual exam shows uterus to be normal size, regular, mobile and nontender.   Adnexa without masses or tenderness.    RECTAL:Deferred             Musculoskeletal: Normal range of motion and moves all extremeties.       Neurological: She is alert and oriented to person, place, and time.    Skin: Skin is warm and dry.    Psychiatric: She has a normal mood and affect.              Assessment:        1. Encounter for gynecological examination without abnormal finding    2. Screening mammogram, encounter for    3. Urinary frequency    4. Urinary urgency                Plan:        1. Encounter for gynecological examination without abnormal finding  COUNSELING:  The patient was counseled today on regular weight bearing exercise. Patient was counseled today on the new ACS guidelines for cervical cytology screening as well as the current recommendations for breast cancer screening. Counseling session lasted approximately 10 minutes, and all her questions were answered. She was advised to see her primary care physician for all other health maintenance.   FOLLOW-UP with me for next routine visit.         2. Screening mammogram, encounter for      - Mammo Digital Screening Bilat w/ Bryce; Future    3. Urinary frequency      - oxybutynin (DITROPAN XL) 15 MG TR24; Take 1 tablet (15 mg total) by mouth once daily.  Dispense: 30 tablet; Refill: 11    4. Urinary urgency      - oxybutynin (DITROPAN XL) 15 MG TR24; Take 1 tablet (15 mg total) by mouth once daily.  Dispense: 30 tablet; Refill: 11       Follow up in about 2 years (around 12/4/2021).      Stable

## 2019-12-11 ENCOUNTER — PATIENT OUTREACH (OUTPATIENT)
Dept: ADMINISTRATIVE | Facility: HOSPITAL | Age: 69
End: 2019-12-11

## 2019-12-27 ENCOUNTER — OFFICE VISIT (OUTPATIENT)
Dept: INTERNAL MEDICINE | Facility: CLINIC | Age: 69
End: 2019-12-27
Payer: MEDICARE

## 2019-12-27 VITALS
BODY MASS INDEX: 33.74 KG/M2 | SYSTOLIC BLOOD PRESSURE: 130 MMHG | DIASTOLIC BLOOD PRESSURE: 82 MMHG | WEIGHT: 178.56 LBS | HEART RATE: 82 BPM | OXYGEN SATURATION: 98 %

## 2019-12-27 DIAGNOSIS — I51.89 DIASTOLIC DYSFUNCTION: ICD-10-CM

## 2019-12-27 DIAGNOSIS — I10 ESSENTIAL HYPERTENSION: Primary | Chronic | ICD-10-CM

## 2019-12-27 PROCEDURE — 99214 OFFICE O/P EST MOD 30 MIN: CPT | Mod: S$PBB,,, | Performed by: INTERNAL MEDICINE

## 2019-12-27 PROCEDURE — 99999 PR PBB SHADOW E&M-EST. PATIENT-LVL III: ICD-10-PCS | Mod: PBBFAC,,, | Performed by: INTERNAL MEDICINE

## 2019-12-27 PROCEDURE — 99999 PR PBB SHADOW E&M-EST. PATIENT-LVL III: CPT | Mod: PBBFAC,,, | Performed by: INTERNAL MEDICINE

## 2019-12-27 PROCEDURE — 1159F MED LIST DOCD IN RCRD: CPT | Mod: ,,, | Performed by: INTERNAL MEDICINE

## 2019-12-27 PROCEDURE — 1126F AMNT PAIN NOTED NONE PRSNT: CPT | Mod: ,,, | Performed by: INTERNAL MEDICINE

## 2019-12-27 PROCEDURE — 1159F PR MEDICATION LIST DOCUMENTED IN MEDICAL RECORD: ICD-10-PCS | Mod: ,,, | Performed by: INTERNAL MEDICINE

## 2019-12-27 PROCEDURE — 99213 OFFICE O/P EST LOW 20 MIN: CPT | Mod: PBBFAC | Performed by: INTERNAL MEDICINE

## 2019-12-27 PROCEDURE — 1126F PR PAIN SEVERITY QUANTIFIED, NO PAIN PRESENT: ICD-10-PCS | Mod: ,,, | Performed by: INTERNAL MEDICINE

## 2019-12-27 PROCEDURE — 99214 PR OFFICE/OUTPT VISIT, EST, LEVL IV, 30-39 MIN: ICD-10-PCS | Mod: S$PBB,,, | Performed by: INTERNAL MEDICINE

## 2019-12-27 NOTE — PROGRESS NOTES
Subjective:       Patient ID: Rajesh Mas is a 69 y.o. female.    Chief Complaint: Annual Exam    HPI   Rajesh Mas is a 69 y.o. female here for routine follow up of the following chronic issues:    HTN w diastolic dysfunction  Amlodipine 5mg -> 10mg last visit  Losartan 100mg  Triamterene-hctz 37.5-25mg     Home 120s/70s and 130s/80s.   Gout  Allopurinol 300mg  Uric acid 7.8 in July 2019.  Mostly chicken and fish. Not much red meat. No alcohol.     She had subclinical HYPOthryoidism on labs done for palpitations last month.     Review of Systems   Constitutional: Negative for activity change and unexpected weight change.   HENT: Negative for hearing loss, rhinorrhea and trouble swallowing.    Eyes: Negative for discharge and visual disturbance.   Respiratory: Negative for wheezing.    Cardiovascular: Negative for chest pain and palpitations.   Gastrointestinal: Negative for blood in stool, constipation, diarrhea and vomiting.   Endocrine: Negative for polydipsia.   Genitourinary: Negative for difficulty urinating, dysuria, hematuria and menstrual problem.   Musculoskeletal: Positive for arthralgias. Negative for joint swelling and neck pain.   Neurological: Negative for headaches.   Psychiatric/Behavioral: Negative for confusion and dysphoric mood.       Objective:   /82   Pulse 82   Wt 81 kg (178 lb 9.2 oz)   LMP 11/28/1988 (Approximate)   SpO2 98%   BMI 33.74 kg/m²      Physical Exam   Constitutional: She is oriented to person, place, and time. She appears well-developed and well-nourished. No distress.   HENT:   Head: Normocephalic and atraumatic.   Cardiovascular: Normal rate and regular rhythm.   Pulmonary/Chest: Effort normal. No respiratory distress. She has no wheezes. She has no rales.   Neurological: She is alert and oriented to person, place, and time.   Skin: Skin is warm and dry. She is not diaphoretic.   Psychiatric: She has a normal mood and affect. Her behavior is normal.        Assessment:       1. Essential hypertension    2. Diastolic dysfunction        Plan:       Rajesh was seen today for annual exam.    Diagnoses and all orders for this visit:    Essential hypertension  Diastolic dysfunction  Continue current medication        Consider holter if palpitations occur again

## 2020-03-01 DIAGNOSIS — R35.0 URINARY FREQUENCY: ICD-10-CM

## 2020-03-02 RX ORDER — OXYBUTYNIN CHLORIDE 5 MG/1
TABLET ORAL
Qty: 180 TABLET | Refills: 2 | Status: SHIPPED | OUTPATIENT
Start: 2020-03-02 | End: 2021-07-19 | Stop reason: SDUPTHER

## 2020-09-29 ENCOUNTER — PATIENT MESSAGE (OUTPATIENT)
Dept: OTHER | Facility: OTHER | Age: 70
End: 2020-09-29

## 2020-12-11 ENCOUNTER — PATIENT MESSAGE (OUTPATIENT)
Dept: OTHER | Facility: OTHER | Age: 70
End: 2020-12-11

## 2020-12-28 ENCOUNTER — PES CALL (OUTPATIENT)
Dept: ADMINISTRATIVE | Facility: CLINIC | Age: 70
End: 2020-12-28

## 2020-12-30 ENCOUNTER — PES CALL (OUTPATIENT)
Dept: ADMINISTRATIVE | Facility: CLINIC | Age: 70
End: 2020-12-30

## 2020-12-30 ENCOUNTER — OFFICE VISIT (OUTPATIENT)
Dept: INTERNAL MEDICINE | Facility: CLINIC | Age: 70
End: 2020-12-30
Payer: MEDICARE

## 2020-12-30 VITALS
SYSTOLIC BLOOD PRESSURE: 124 MMHG | DIASTOLIC BLOOD PRESSURE: 78 MMHG | BODY MASS INDEX: 31.34 KG/M2 | HEART RATE: 52 BPM | WEIGHT: 166 LBS | OXYGEN SATURATION: 98 % | HEIGHT: 61 IN

## 2020-12-30 DIAGNOSIS — M1A.0720 IDIOPATHIC CHRONIC GOUT OF LEFT FOOT WITHOUT TOPHUS: ICD-10-CM

## 2020-12-30 DIAGNOSIS — Z00.00 ENCOUNTER FOR PREVENTIVE HEALTH EXAMINATION: Primary | ICD-10-CM

## 2020-12-30 DIAGNOSIS — I10 ESSENTIAL HYPERTENSION: Chronic | ICD-10-CM

## 2020-12-30 DIAGNOSIS — Z12.31 ENCOUNTER FOR SCREENING MAMMOGRAM FOR BREAST CANCER: ICD-10-CM

## 2020-12-30 DIAGNOSIS — D69.6 THROMBOCYTOPENIA: ICD-10-CM

## 2020-12-30 DIAGNOSIS — I51.89 DIASTOLIC DYSFUNCTION: ICD-10-CM

## 2020-12-30 PROCEDURE — G0439 PPPS, SUBSEQ VISIT: HCPCS | Mod: ,,, | Performed by: NURSE PRACTITIONER

## 2020-12-30 PROCEDURE — 99999 PR PBB SHADOW E&M-EST. PATIENT-LVL IV: ICD-10-PCS | Mod: PBBFAC,,, | Performed by: NURSE PRACTITIONER

## 2020-12-30 PROCEDURE — G0439 PR MEDICARE ANNUAL WELLNESS SUBSEQUENT VISIT: ICD-10-PCS | Mod: ,,, | Performed by: NURSE PRACTITIONER

## 2020-12-30 PROCEDURE — 99999 PR PBB SHADOW E&M-EST. PATIENT-LVL IV: CPT | Mod: PBBFAC,,, | Performed by: NURSE PRACTITIONER

## 2020-12-30 PROCEDURE — 99214 OFFICE O/P EST MOD 30 MIN: CPT | Mod: PBBFAC | Performed by: NURSE PRACTITIONER

## 2020-12-30 NOTE — PROGRESS NOTES
"  Rajesh Mas presented for a  Medicare AWV and comprehensive Health Risk Assessment today. The following components were reviewed and updated:    · Medical history  · Family History  · Social history  · Allergies and Current Medications  · Health Risk Assessment  · Health Maintenance  · Care Team         ** See Completed Assessments for Annual Wellness Visit within the encounter summary.**         The following assessments were completed:  · Living Situation  · CAGE  · Depression Screening  · Timed Get Up and Go  · Whisper Test  · Cognitive Function Screening      ·   · Nutrition Screening  · ADL Screening  · PAQ Screening        Vitals:    12/30/20 1312 12/30/20 1324   BP:  124/78   Pulse:  (!) 52   SpO2:  98%   Weight: 75.3 kg (166 lb 0.1 oz)    Height: 5' 1" (1.549 m)      Body mass index is 31.37 kg/m².  Physical Exam  Vitals signs and nursing note reviewed.   Constitutional:       Appearance: She is well-developed.   HENT:      Head: Normocephalic.   Cardiovascular:      Rate and Rhythm: Normal rate and regular rhythm.   Pulmonary:      Effort: Pulmonary effort is normal.      Breath sounds: Normal breath sounds.   Abdominal:      General: Bowel sounds are normal.      Palpations: Abdomen is soft.   Musculoskeletal: Normal range of motion.   Skin:     General: Skin is warm and dry.   Neurological:      Mental Status: She is alert and oriented to person, place, and time.      Motor: No abnormal muscle tone.   Psychiatric:         Mood and Affect: Mood normal.               Diagnoses and health risks identified today and associated recommendations/orders:    1. Encounter for preventive health examination  Here for Health Risk Assessment/Annual Wellness Visit.  Health maintenance reviewed and updated. Follow up in one year.  Declined influenza vaccine today - prescription given for shingrix    2. Essential hypertension  Chronic, stable on current medications. Followed by PCP.    3. Diastolic " dysfunction  Chronic, stable. Followed by PCP.    4. Thrombocytopenia  Chronic, stable. Followed by PCP.    5. Idiopathic chronic gout of left foot without tophus  Chronic, stable on current medication. Reports no recent gout attacks. Followed by PCP.    6. Encounter for screening mammogram for breast cancer  - Mammo Digital Screening Bilat; Future    7. BMI 31.0-31.9,adult  Chronic, weight decreased 12 from 12/2019. Reports regular exercise. Followed by PCP.      Provided Vettice with a 5-10 year written screening schedule and personal prevention plan. Recommendations were developed using the USPSTF age appropriate recommendations. Education, counseling, and referrals were provided as needed. After Visit Summary printed and given to patient which includes a list of additional screenings\tests needed.    Follow up in about 4 weeks (around 1/27/2021).with PCP    Christy Dee NP

## 2020-12-30 NOTE — PATIENT INSTRUCTIONS
Counseling and Referral of Other Preventative  (Italic type indicates deductible and co-insurance are waived)    Patient Name: Rajesh Mas  Today's Date: 12/30/2020    Health Maintenance       Date Due Completion Date    Shingles Vaccine (2 of 3) 06/02/2016 4/7/2016    Influenza Vaccine (1) 08/01/2020 10/17/2019    Mammogram 11/07/2020 11/7/2019    Override on 10/1/2015: Done    Colorectal Cancer Screening 12/06/2021 12/6/2016    Override on 12/6/2016: Done    Lipid Panel 07/05/2024 7/5/2019    TETANUS VACCINE 11/02/2026 11/2/2016    DEXA SCAN 02/23/2028 2/23/2018        Orders Placed This Encounter   Procedures    Mammo Digital Screening Bilat     The following information is provided to all patients.  This information is to help you find resources for any of the problems found today that may be affecting your health:                Living healthy guide: www.[facility].[place].[facility]      Understanding Diabetes: www.diabetes.org      Eating healthy: www.cdc.gov/healthyweight      CDC home safety checklist: www.cdc.gov/steadi/patient.html      Agency on Aging: www.goea.[place]      Alcoholics anonymous (AA): www.aa.org      Physical Activity: www.ilir.nih.gov/wn6oztv      Tobacco use: www.quitwithusla.org      1 week of sinus congestion. 27 weeks pregnant. No fever. No dizziness. Mild cough.

## 2021-02-18 ENCOUNTER — IMMUNIZATION (OUTPATIENT)
Dept: INTERNAL MEDICINE | Facility: CLINIC | Age: 71
End: 2021-02-18
Payer: MEDICARE

## 2021-02-18 DIAGNOSIS — Z23 NEED FOR VACCINATION: Primary | ICD-10-CM

## 2021-02-18 PROCEDURE — 91300 COVID-19, MRNA, LNP-S, PF, 30 MCG/0.3 ML DOSE VACCINE: CPT | Mod: PBBFAC

## 2021-03-11 ENCOUNTER — IMMUNIZATION (OUTPATIENT)
Dept: INTERNAL MEDICINE | Facility: CLINIC | Age: 71
End: 2021-03-11
Payer: MEDICARE

## 2021-03-11 DIAGNOSIS — Z23 NEED FOR VACCINATION: Primary | ICD-10-CM

## 2021-03-11 PROCEDURE — 91300 COVID-19, MRNA, LNP-S, PF, 30 MCG/0.3 ML DOSE VACCINE: CPT | Mod: PBBFAC | Performed by: INTERNAL MEDICINE

## 2021-03-11 PROCEDURE — 0002A COVID-19, MRNA, LNP-S, PF, 30 MCG/0.3 ML DOSE VACCINE: CPT | Mod: PBBFAC | Performed by: INTERNAL MEDICINE

## 2021-03-26 ENCOUNTER — TELEPHONE (OUTPATIENT)
Dept: ADMINISTRATIVE | Facility: CLINIC | Age: 71
End: 2021-03-26

## 2021-04-14 ENCOUNTER — TELEPHONE (OUTPATIENT)
Dept: ADMINISTRATIVE | Facility: CLINIC | Age: 71
End: 2021-04-14

## 2021-04-26 ENCOUNTER — OFFICE VISIT (OUTPATIENT)
Dept: INTERNAL MEDICINE | Facility: CLINIC | Age: 71
End: 2021-04-26
Payer: MEDICARE

## 2021-04-26 VITALS
HEIGHT: 61 IN | DIASTOLIC BLOOD PRESSURE: 62 MMHG | BODY MASS INDEX: 31.53 KG/M2 | OXYGEN SATURATION: 96 % | HEART RATE: 66 BPM | WEIGHT: 167 LBS | SYSTOLIC BLOOD PRESSURE: 118 MMHG

## 2021-04-26 DIAGNOSIS — E03.8 SUBCLINICAL HYPOTHYROIDISM: ICD-10-CM

## 2021-04-26 DIAGNOSIS — I10 ESSENTIAL HYPERTENSION: Primary | ICD-10-CM

## 2021-04-26 DIAGNOSIS — M1A.0720 IDIOPATHIC CHRONIC GOUT OF LEFT FOOT WITHOUT TOPHUS: ICD-10-CM

## 2021-04-26 DIAGNOSIS — I51.89 DIASTOLIC DYSFUNCTION: ICD-10-CM

## 2021-04-26 DIAGNOSIS — D69.6 THROMBOCYTOPENIA: ICD-10-CM

## 2021-04-26 PROCEDURE — 99214 OFFICE O/P EST MOD 30 MIN: CPT | Mod: S$PBB,,, | Performed by: INTERNAL MEDICINE

## 2021-04-26 PROCEDURE — 99999 PR PBB SHADOW E&M-EST. PATIENT-LVL III: CPT | Mod: PBBFAC,,, | Performed by: INTERNAL MEDICINE

## 2021-04-26 PROCEDURE — 99213 OFFICE O/P EST LOW 20 MIN: CPT | Mod: PBBFAC | Performed by: INTERNAL MEDICINE

## 2021-04-26 PROCEDURE — 99214 PR OFFICE/OUTPT VISIT, EST, LEVL IV, 30-39 MIN: ICD-10-PCS | Mod: S$PBB,,, | Performed by: INTERNAL MEDICINE

## 2021-04-26 PROCEDURE — 99999 PR PBB SHADOW E&M-EST. PATIENT-LVL III: ICD-10-PCS | Mod: PBBFAC,,, | Performed by: INTERNAL MEDICINE

## 2021-04-26 RX ORDER — LOSARTAN POTASSIUM 100 MG/1
100 TABLET ORAL DAILY
Qty: 90 TABLET | Refills: 3 | Status: SHIPPED | OUTPATIENT
Start: 2021-04-26 | End: 2022-05-03 | Stop reason: SDUPTHER

## 2021-04-26 RX ORDER — AMLODIPINE BESYLATE 10 MG/1
10 TABLET ORAL DAILY
Qty: 90 TABLET | Refills: 3 | Status: SHIPPED | OUTPATIENT
Start: 2021-04-26 | End: 2022-05-03 | Stop reason: SDUPTHER

## 2021-04-26 RX ORDER — ALLOPURINOL 300 MG/1
300 TABLET ORAL DAILY
Qty: 90 TABLET | Refills: 3 | Status: SHIPPED | OUTPATIENT
Start: 2021-04-26 | End: 2022-05-03 | Stop reason: SDUPTHER

## 2021-04-27 ENCOUNTER — LAB VISIT (OUTPATIENT)
Dept: LAB | Facility: HOSPITAL | Age: 71
End: 2021-04-27
Attending: INTERNAL MEDICINE
Payer: MEDICARE

## 2021-04-27 DIAGNOSIS — M1A.0720 IDIOPATHIC CHRONIC GOUT OF LEFT FOOT WITHOUT TOPHUS: ICD-10-CM

## 2021-04-27 DIAGNOSIS — D69.6 THROMBOCYTOPENIA: ICD-10-CM

## 2021-04-27 DIAGNOSIS — E03.8 SUBCLINICAL HYPOTHYROIDISM: ICD-10-CM

## 2021-04-27 DIAGNOSIS — I10 ESSENTIAL HYPERTENSION: ICD-10-CM

## 2021-04-27 LAB
ALBUMIN SERPL BCP-MCNC: 3.7 G/DL (ref 3.5–5.2)
ALP SERPL-CCNC: 76 U/L (ref 55–135)
ALT SERPL W/O P-5'-P-CCNC: 17 U/L (ref 10–44)
ANION GAP SERPL CALC-SCNC: 7 MMOL/L (ref 8–16)
AST SERPL-CCNC: 20 U/L (ref 10–40)
BASOPHILS # BLD AUTO: 0.04 K/UL (ref 0–0.2)
BASOPHILS NFR BLD: 0.6 % (ref 0–1.9)
BILIRUB SERPL-MCNC: 0.8 MG/DL (ref 0.1–1)
BUN SERPL-MCNC: 16 MG/DL (ref 8–23)
CALCIUM SERPL-MCNC: 9.3 MG/DL (ref 8.7–10.5)
CHLORIDE SERPL-SCNC: 107 MMOL/L (ref 95–110)
CHOLEST SERPL-MCNC: 167 MG/DL (ref 120–199)
CHOLEST/HDLC SERPL: 2.5 {RATIO} (ref 2–5)
CO2 SERPL-SCNC: 29 MMOL/L (ref 23–29)
CREAT SERPL-MCNC: 1.2 MG/DL (ref 0.5–1.4)
DIFFERENTIAL METHOD: ABNORMAL
EOSINOPHIL # BLD AUTO: 0.1 K/UL (ref 0–0.5)
EOSINOPHIL NFR BLD: 1.7 % (ref 0–8)
ERYTHROCYTE [DISTWIDTH] IN BLOOD BY AUTOMATED COUNT: 14.3 % (ref 11.5–14.5)
EST. GFR  (AFRICAN AMERICAN): 53.7 ML/MIN/1.73 M^2
EST. GFR  (NON AFRICAN AMERICAN): 46.5 ML/MIN/1.73 M^2
GLUCOSE SERPL-MCNC: 87 MG/DL (ref 70–110)
HCT VFR BLD AUTO: 42.8 % (ref 37–48.5)
HDLC SERPL-MCNC: 66 MG/DL (ref 40–75)
HDLC SERPL: 39.5 % (ref 20–50)
HGB BLD-MCNC: 13.8 G/DL (ref 12–16)
IMM GRANULOCYTES # BLD AUTO: 0.01 K/UL (ref 0–0.04)
IMM GRANULOCYTES NFR BLD AUTO: 0.1 % (ref 0–0.5)
LDLC SERPL CALC-MCNC: 76.2 MG/DL (ref 63–159)
LYMPHOCYTES # BLD AUTO: 2.5 K/UL (ref 1–4.8)
LYMPHOCYTES NFR BLD: 34.7 % (ref 18–48)
MCH RBC QN AUTO: 28.7 PG (ref 27–31)
MCHC RBC AUTO-ENTMCNC: 32.2 G/DL (ref 32–36)
MCV RBC AUTO: 89 FL (ref 82–98)
MONOCYTES # BLD AUTO: 0.7 K/UL (ref 0.3–1)
MONOCYTES NFR BLD: 9.2 % (ref 4–15)
NEUTROPHILS # BLD AUTO: 3.8 K/UL (ref 1.8–7.7)
NEUTROPHILS NFR BLD: 53.7 % (ref 38–73)
NONHDLC SERPL-MCNC: 101 MG/DL
NRBC BLD-RTO: 0 /100 WBC
PLATELET # BLD AUTO: 148 K/UL (ref 150–450)
PMV BLD AUTO: 14.1 FL (ref 9.2–12.9)
POTASSIUM SERPL-SCNC: 4.6 MMOL/L (ref 3.5–5.1)
PROT SERPL-MCNC: 8.1 G/DL (ref 6–8.4)
RBC # BLD AUTO: 4.81 M/UL (ref 4–5.4)
SODIUM SERPL-SCNC: 143 MMOL/L (ref 136–145)
TRIGL SERPL-MCNC: 124 MG/DL (ref 30–150)
TSH SERPL DL<=0.005 MIU/L-ACNC: 3.34 UIU/ML (ref 0.4–4)
URATE SERPL-MCNC: 5.8 MG/DL (ref 2.4–5.7)
WBC # BLD AUTO: 7.09 K/UL (ref 3.9–12.7)

## 2021-04-27 PROCEDURE — 84550 ASSAY OF BLOOD/URIC ACID: CPT | Performed by: INTERNAL MEDICINE

## 2021-04-27 PROCEDURE — 84443 ASSAY THYROID STIM HORMONE: CPT | Performed by: INTERNAL MEDICINE

## 2021-04-27 PROCEDURE — 36415 COLL VENOUS BLD VENIPUNCTURE: CPT | Performed by: INTERNAL MEDICINE

## 2021-04-27 PROCEDURE — 80053 COMPREHEN METABOLIC PANEL: CPT | Performed by: INTERNAL MEDICINE

## 2021-04-27 PROCEDURE — 85025 COMPLETE CBC W/AUTO DIFF WBC: CPT | Performed by: INTERNAL MEDICINE

## 2021-04-27 PROCEDURE — 80061 LIPID PANEL: CPT | Performed by: INTERNAL MEDICINE

## 2021-05-02 ENCOUNTER — PATIENT MESSAGE (OUTPATIENT)
Dept: INTERNAL MEDICINE | Facility: CLINIC | Age: 71
End: 2021-05-02

## 2021-07-19 DIAGNOSIS — R35.0 URINARY FREQUENCY: ICD-10-CM

## 2021-07-19 RX ORDER — OXYBUTYNIN CHLORIDE 5 MG/1
5 TABLET ORAL 2 TIMES DAILY
Qty: 180 TABLET | Refills: 0 | Status: SHIPPED | OUTPATIENT
Start: 2021-07-19 | End: 2021-11-29 | Stop reason: SDUPTHER

## 2021-10-26 ENCOUNTER — IMMUNIZATION (OUTPATIENT)
Dept: INTERNAL MEDICINE | Facility: CLINIC | Age: 71
End: 2021-10-26
Payer: MEDICARE

## 2021-10-26 DIAGNOSIS — Z23 NEED FOR VACCINATION: Primary | ICD-10-CM

## 2021-10-26 PROCEDURE — 0003A COVID-19, MRNA, LNP-S, PF, 30 MCG/0.3 ML DOSE VACCINE: CPT | Mod: PBBFAC,CV19

## 2021-10-26 PROCEDURE — 91300 COVID-19, MRNA, LNP-S, PF, 30 MCG/0.3 ML DOSE VACCINE: CPT | Mod: PBBFAC

## 2021-11-12 ENCOUNTER — PES CALL (OUTPATIENT)
Dept: ADMINISTRATIVE | Facility: CLINIC | Age: 71
End: 2021-11-12
Payer: MEDICARE

## 2021-11-27 ENCOUNTER — PATIENT MESSAGE (OUTPATIENT)
Dept: OBSTETRICS AND GYNECOLOGY | Facility: CLINIC | Age: 71
End: 2021-11-27
Payer: MEDICARE

## 2021-11-29 DIAGNOSIS — R35.0 URINARY FREQUENCY: ICD-10-CM

## 2021-11-29 RX ORDER — OXYBUTYNIN CHLORIDE 5 MG/1
5 TABLET ORAL 2 TIMES DAILY
Qty: 60 TABLET | Refills: 0 | Status: SHIPPED | OUTPATIENT
Start: 2021-11-29 | End: 2021-12-08

## 2021-12-06 ENCOUNTER — TELEPHONE (OUTPATIENT)
Dept: ADMINISTRATIVE | Facility: CLINIC | Age: 71
End: 2021-12-06
Payer: MEDICARE

## 2021-12-08 ENCOUNTER — OFFICE VISIT (OUTPATIENT)
Dept: OBSTETRICS AND GYNECOLOGY | Facility: CLINIC | Age: 71
End: 2021-12-08
Payer: MEDICARE

## 2021-12-08 VITALS
DIASTOLIC BLOOD PRESSURE: 88 MMHG | SYSTOLIC BLOOD PRESSURE: 160 MMHG | BODY MASS INDEX: 31.43 KG/M2 | HEIGHT: 61 IN | WEIGHT: 166.44 LBS

## 2021-12-08 DIAGNOSIS — Z12.31 SCREENING MAMMOGRAM, ENCOUNTER FOR: ICD-10-CM

## 2021-12-08 DIAGNOSIS — Z01.419 ENCOUNTER FOR GYNECOLOGICAL EXAMINATION WITHOUT ABNORMAL FINDING: Primary | ICD-10-CM

## 2021-12-08 DIAGNOSIS — R35.0 URINARY FREQUENCY: ICD-10-CM

## 2021-12-08 PROCEDURE — G0101 CA SCREEN;PELVIC/BREAST EXAM: HCPCS | Mod: S$PBB,,, | Performed by: OBSTETRICS & GYNECOLOGY

## 2021-12-08 PROCEDURE — 99999 PR PBB SHADOW E&M-EST. PATIENT-LVL III: CPT | Mod: PBBFAC,,, | Performed by: OBSTETRICS & GYNECOLOGY

## 2021-12-08 PROCEDURE — G0101 PR CA SCREEN;PELVIC/BREAST EXAM: ICD-10-PCS | Mod: S$PBB,,, | Performed by: OBSTETRICS & GYNECOLOGY

## 2021-12-08 PROCEDURE — G0101 CA SCREEN;PELVIC/BREAST EXAM: HCPCS | Mod: PBBFAC | Performed by: OBSTETRICS & GYNECOLOGY

## 2021-12-08 PROCEDURE — 99999 PR PBB SHADOW E&M-EST. PATIENT-LVL III: ICD-10-PCS | Mod: PBBFAC,,, | Performed by: OBSTETRICS & GYNECOLOGY

## 2021-12-08 PROCEDURE — 99213 OFFICE O/P EST LOW 20 MIN: CPT | Mod: PBBFAC | Performed by: OBSTETRICS & GYNECOLOGY

## 2021-12-08 RX ORDER — OXYBUTYNIN CHLORIDE 5 MG/1
5 TABLET ORAL 2 TIMES DAILY
Qty: 180 TABLET | Refills: 3 | Status: SHIPPED | OUTPATIENT
Start: 2021-12-08 | End: 2022-01-03

## 2022-01-10 ENCOUNTER — PES CALL (OUTPATIENT)
Dept: ADMINISTRATIVE | Facility: CLINIC | Age: 72
End: 2022-01-10
Payer: MEDICARE

## 2022-01-11 ENCOUNTER — HOSPITAL ENCOUNTER (OUTPATIENT)
Dept: RADIOLOGY | Facility: HOSPITAL | Age: 72
Discharge: HOME | End: 2022-01-11
Attending: OBSTETRICS & GYNECOLOGY
Payer: MEDICARE

## 2022-01-11 VITALS — WEIGHT: 167 LBS | HEIGHT: 61 IN | BODY MASS INDEX: 31.53 KG/M2

## 2022-01-11 DIAGNOSIS — Z12.31 SCREENING MAMMOGRAM, ENCOUNTER FOR: ICD-10-CM

## 2022-01-11 PROCEDURE — 77063 MAMMO DIGITAL SCREENING BILAT WITH TOMO: ICD-10-PCS | Mod: 26,,, | Performed by: RADIOLOGY

## 2022-01-11 PROCEDURE — 77063 BREAST TOMOSYNTHESIS BI: CPT | Mod: 26,,, | Performed by: RADIOLOGY

## 2022-01-11 PROCEDURE — 77067 MAMMO DIGITAL SCREENING BILAT WITH TOMO: ICD-10-PCS | Mod: 26,,, | Performed by: RADIOLOGY

## 2022-01-11 PROCEDURE — 77067 SCR MAMMO BI INCL CAD: CPT | Mod: 26,,, | Performed by: RADIOLOGY

## 2022-01-11 PROCEDURE — 77063 BREAST TOMOSYNTHESIS BI: CPT | Mod: TC

## 2022-01-11 PROCEDURE — 77067 SCR MAMMO BI INCL CAD: CPT | Mod: TC

## 2022-01-12 ENCOUNTER — PATIENT MESSAGE (OUTPATIENT)
Dept: PHARMACY | Facility: CLINIC | Age: 72
End: 2022-01-12
Payer: MEDICARE

## 2022-01-12 ENCOUNTER — OFFICE VISIT (OUTPATIENT)
Dept: INTERNAL MEDICINE | Facility: CLINIC | Age: 72
End: 2022-01-12
Payer: MEDICARE

## 2022-01-12 VITALS
DIASTOLIC BLOOD PRESSURE: 72 MMHG | HEART RATE: 68 BPM | WEIGHT: 168.44 LBS | OXYGEN SATURATION: 98 % | SYSTOLIC BLOOD PRESSURE: 112 MMHG | BODY MASS INDEX: 31.8 KG/M2 | HEIGHT: 61 IN

## 2022-01-12 DIAGNOSIS — I51.89 DIASTOLIC DYSFUNCTION: ICD-10-CM

## 2022-01-12 DIAGNOSIS — I10 ESSENTIAL HYPERTENSION: Chronic | ICD-10-CM

## 2022-01-12 DIAGNOSIS — N18.30 STAGE 3 CHRONIC KIDNEY DISEASE, UNSPECIFIED WHETHER STAGE 3A OR 3B CKD: ICD-10-CM

## 2022-01-12 DIAGNOSIS — D69.6 THROMBOCYTOPENIA: ICD-10-CM

## 2022-01-12 DIAGNOSIS — Z00.00 ENCOUNTER FOR PREVENTIVE HEALTH EXAMINATION: Primary | ICD-10-CM

## 2022-01-12 PROBLEM — M1A.0720 IDIOPATHIC CHRONIC GOUT OF LEFT FOOT WITHOUT TOPHUS: Status: RESOLVED | Noted: 2018-02-07 | Resolved: 2022-01-12

## 2022-01-12 PROCEDURE — G0439 PR MEDICARE ANNUAL WELLNESS SUBSEQUENT VISIT: ICD-10-PCS | Mod: S$GLB,,, | Performed by: NURSE PRACTITIONER

## 2022-01-12 PROCEDURE — 99999 PR PBB SHADOW E&M-EST. PATIENT-LVL IV: ICD-10-PCS | Mod: PBBFAC,,, | Performed by: NURSE PRACTITIONER

## 2022-01-12 PROCEDURE — 99999 PR PBB SHADOW E&M-EST. PATIENT-LVL IV: CPT | Mod: PBBFAC,,, | Performed by: NURSE PRACTITIONER

## 2022-01-12 PROCEDURE — G0439 PPPS, SUBSEQ VISIT: HCPCS | Mod: S$GLB,,, | Performed by: NURSE PRACTITIONER

## 2022-01-12 PROCEDURE — 99214 OFFICE O/P EST MOD 30 MIN: CPT | Mod: PBBFAC | Performed by: NURSE PRACTITIONER

## 2022-01-12 NOTE — PATIENT INSTRUCTIONS
Counseling and Referral of Other Preventative  (Italic type indicates deductible and co-insurance are waived)    Patient Name: Rajesh Mas  Today's Date: 1/12/2022    Health Maintenance       Date Due Completion Date    Shingles Vaccine (2 of 3) 06/02/2016 4/7/2016    Influenza Vaccine (1) 09/01/2021 10/17/2019    Colorectal Cancer Screening 12/06/2021 12/6/2016    Override on 12/6/2016: Done    Mammogram 01/11/2023 1/11/2022    Override on 10/1/2015: Done    Lipid Panel 04/27/2026 4/27/2021    TETANUS VACCINE 11/02/2026 11/2/2016    DEXA SCAN 02/23/2028 2/23/2018        No orders of the defined types were placed in this encounter.    The following information is provided to all patients.  This information is to help you find resources for any of the problems found today that may be affecting your health:                Living healthy guide: www.[facility].[place].[facility]      Understanding Diabetes: www.diabetes.org      Eating healthy: www.cdc.gov/healthyweight      [facility] home safety checklist: www.cdc.gov/steadi/patient.html      Agency on Aging: www.goea.[place]      Alcoholics anonymous (AA): www.aa.org      Physical Activity: www.ilir.nih.gov/nv5ulua      Tobacco use: www.quitwithusla.org

## 2022-01-12 NOTE — PROGRESS NOTES
"  Rajesh Mas presented for a  Medicare AWV and comprehensive Health Risk Assessment today. The following components were reviewed and updated:    · Medical history  · Family History  · Social history  · Allergies and Current Medications  · Health Risk Assessment  · Health Maintenance  · Care Team         ** See Completed Assessments for Annual Wellness Visit within the encounter summary.**         The following assessments were completed:  · Living Situation  · CAGE  · Depression Screening  · Timed Get Up and Go  · Whisper Test  · Cognitive Function Screening      ·   · Nutrition Screening  · ADL Screening  · PAQ Screening        Vitals:    01/12/22 1019 01/12/22 1025   BP:  112/72   BP Location:  Right arm   Patient Position:  Sitting   Pulse:  68   SpO2:  98%   Weight: 76.4 kg (168 lb 6.9 oz)    Height: 5' 1" (1.549 m)      Body mass index is 31.82 kg/m².  Physical Exam  Vitals and nursing note reviewed.   Constitutional:       Appearance: She is well-developed and well-nourished.   HENT:      Head: Normocephalic.   Cardiovascular:      Rate and Rhythm: Normal rate and regular rhythm.   Pulmonary:      Effort: Pulmonary effort is normal.      Breath sounds: Normal breath sounds.   Abdominal:      General: Bowel sounds are normal.      Palpations: Abdomen is soft.   Musculoskeletal:         General: No edema. Normal range of motion.   Skin:     General: Skin is warm and dry.   Neurological:      Mental Status: She is alert and oriented to person, place, and time.      Motor: No abnormal muscle tone.   Psychiatric:         Mood and Affect: Mood and affect normal.               Diagnoses and health risks identified today and associated recommendations/orders:    1. Encounter for preventive health examination  Here for Health Risk Assessment/Annual Wellness Visit.  Health maintenance reviewed and updated. Follow up in one year.  Prescription given for influenza vaccine, Shingrix.  Declined colonoscopy - wishes to " discuss with PCP at visit on 3/04/2022.     2. Essential hypertension  Chronic, stable on current medications. Followed by PCP.    3. Diastolic dysfunction  Chronic, stable on current medications. Followed by PCP.    4. Thrombocytopenia  Chronic, stable. Followed by PCP.    5. Stage 3 chronic kidney disease, unspecified whether stage 3a or 3b CKD  Chronic, stable on current medications. Followed by PCP.    6. BMI 31.0-31.9,adult  Chronic, reports she is walking 7 days weekly. Followed by PCP.      Provided Vettice with a 5-10 year written screening schedule and personal prevention plan. Recommendations were developed using the USPSTF age appropriate recommendations. Education, counseling, and referrals were provided as needed. After Visit Summary printed and given to patient which includes a list of additional screenings\tests needed.    Follow up in 7 weeks (on 3/4/2022).with PCP    Christy Dee NP

## 2022-04-05 ENCOUNTER — OFFICE VISIT (OUTPATIENT)
Dept: INTERNAL MEDICINE | Facility: CLINIC | Age: 72
End: 2022-04-05
Payer: MEDICARE

## 2022-04-05 ENCOUNTER — HOSPITAL ENCOUNTER (OUTPATIENT)
Dept: RADIOLOGY | Facility: HOSPITAL | Age: 72
Discharge: HOME | End: 2022-04-05
Attending: FAMILY MEDICINE
Payer: MEDICARE

## 2022-04-05 VITALS
OXYGEN SATURATION: 97 % | HEART RATE: 68 BPM | WEIGHT: 165.13 LBS | SYSTOLIC BLOOD PRESSURE: 138 MMHG | BODY MASS INDEX: 31.18 KG/M2 | DIASTOLIC BLOOD PRESSURE: 90 MMHG | HEIGHT: 61 IN

## 2022-04-05 DIAGNOSIS — M95.8 ACQUIRED DEFORMITY OF CLAVICLE: Primary | ICD-10-CM

## 2022-04-05 DIAGNOSIS — M95.8 ACQUIRED DEFORMITY OF CLAVICLE: ICD-10-CM

## 2022-04-05 PROCEDURE — 73000 X-RAY EXAM OF COLLAR BONE: CPT | Mod: 26,50,, | Performed by: RADIOLOGY

## 2022-04-05 PROCEDURE — 99999 PR PBB SHADOW E&M-EST. PATIENT-LVL III: CPT | Mod: PBBFAC,,, | Performed by: FAMILY MEDICINE

## 2022-04-05 PROCEDURE — 76536 US EXAM OF HEAD AND NECK: CPT | Mod: 26,,, | Performed by: RADIOLOGY

## 2022-04-05 PROCEDURE — 76536 US EXAM OF HEAD AND NECK: CPT | Mod: TC

## 2022-04-05 PROCEDURE — 73000 X-RAY EXAM OF COLLAR BONE: CPT | Mod: TC,50

## 2022-04-05 PROCEDURE — 76536 US SOFT TISSUE HEAD NECK THYROID: ICD-10-PCS | Mod: 26,,, | Performed by: RADIOLOGY

## 2022-04-05 PROCEDURE — 73000 XR CLAVICLE BILATERAL: ICD-10-PCS | Mod: 26,50,, | Performed by: RADIOLOGY

## 2022-04-05 PROCEDURE — 99213 OFFICE O/P EST LOW 20 MIN: CPT | Mod: S$GLB,,, | Performed by: FAMILY MEDICINE

## 2022-04-05 PROCEDURE — 99213 PR OFFICE/OUTPT VISIT, EST, LEVL III, 20-29 MIN: ICD-10-PCS | Mod: S$GLB,,, | Performed by: FAMILY MEDICINE

## 2022-04-05 PROCEDURE — 99999 PR PBB SHADOW E&M-EST. PATIENT-LVL III: ICD-10-PCS | Mod: PBBFAC,,, | Performed by: FAMILY MEDICINE

## 2022-04-05 NOTE — PROGRESS NOTES
Subjective:       Patient ID: Rajesh Mas is a 71 y.o. female. PCP Gail Villarreal MD     Chief Complaint: Neck Pain (Swelling x2 months )    Patient is here for UC visit for above  Pt and her family have noticed a swelling x 2 months that seems abnormal, not painful.  Although she says in her neck she is pointing to the medial end of the right clavacle        Social History     Tobacco Use    Smoking status: Never Smoker    Smokeless tobacco: Never Used   Substance Use Topics    Alcohol use: No     Alcohol/week: 0.0 standard drinks    Drug use: No       Family History   Problem Relation Age of Onset    Diabetes Mother     Heart failure Mother     Hypertension Mother     Liver cancer Maternal Uncle     Breast cancer Maternal Grandmother     Other Brother         prostate issue    No Known Problems Sister     No Known Problems Daughter     No Known Problems Son     No Known Problems Son     No Known Problems Daughter     Melanoma Neg Hx     Colon cancer Neg Hx     Ovarian cancer Neg Hx        Past Surgical History:   Procedure Laterality Date    CHOLECYSTECTOMY  04/26/2017    COLON SURGERY      COLONOSCOPY N/A 12/6/2016    Procedure: COLONOSCOPY;  Surgeon: Fidel Mason MD;  Location: [facility] ([address] [place] [address]);  Service: Endoscopy;  Laterality: N/A;    partial colectomy  1997    sigmoid removed due to diverticulitis    SINUS SURGERY  2007       Patient Active Problem List   Diagnosis    Essential hypertension    Diastolic dysfunction    Thrombocytopenia    BMI 31.0-31.9,adult    Stage 3 chronic kidney disease       Current Outpatient Medications on File Prior to Visit   Medication Sig Dispense Refill    allopurinoL (ZYLOPRIM) 300 MG tablet Take 1 tablet (300 mg total) by mouth once daily. (to prevent gout) 90 tablet 3    amLODIPine (NORVASC) 10 MG tablet Take 1 tablet (10 mg total) by mouth once daily. 90 tablet 3    losartan (COZAAR) 100 MG tablet Take 1 tablet (100 mg total) by  mouth once daily. 90 tablet 3    oxybutynin (DITROPAN) 5 MG Tab Take 1 tablet by mouth twice daily 60 tablet 10    triamterene-hydrochlorothiazide 37.5-25 mg (DYAZIDE) 37.5-25 mg per capsule Take 1 capsule by mouth every morning. 90 capsule 3     No current facility-administered medications on file prior to visit.           Review of Systems   Constitutional: Negative for chills and fever.   HENT: Negative for ear pain.    Eyes: Negative for pain.   Respiratory: Negative for chest tightness.    Cardiovascular: Negative for chest pain.   Gastrointestinal: Negative for abdominal pain.   Genitourinary: Negative for flank pain.   Musculoskeletal: Negative for gait problem.   Neurological: Negative for syncope.   Psychiatric/Behavioral: Negative for behavioral problems.       Objective:      Physical Exam  Vitals and nursing note reviewed.   Constitutional:       Appearance: She is well-developed.   HENT:      Head: Normocephalic and atraumatic.   Neck:     Cardiovascular:      Rate and Rhythm: Normal rate.      Heart sounds: Normal heart sounds.   Pulmonary:      Effort: No respiratory distress.      Breath sounds: Normal breath sounds. No wheezing or rales.   Abdominal:      Palpations: Abdomen is soft.   Musculoskeletal:      Cervical back: Neck supple.   Skin:     General: Skin is dry.   Neurological:      Mental Status: She is alert.   Psychiatric:         Behavior: Behavior normal.         Assessment:       1. Acquired deformity of clavicle        Plan:       Rajesh was seen today for neck pain.    Diagnoses and all orders for this visit:    Acquired deformity of clavicle  -     X-Ray Clavicle Bilateral; Future - check for bony deformities or bone tumors of clavicle that might explain the asymmetry and appearance of a mass at medical end of clavacle  -     US Soft Tissue Head Neck Thyroid; Future - r/o soft tissue mass near medial end of right clavacle. I palpated no neck LNs nor thyroid nodules  - we dicussed  that the resutls might be no problem found. Will cc her PCP on this note

## 2022-04-06 ENCOUNTER — PATIENT MESSAGE (OUTPATIENT)
Dept: INTERNAL MEDICINE | Facility: CLINIC | Age: 72
End: 2022-04-06
Payer: MEDICARE

## 2022-05-03 ENCOUNTER — OFFICE VISIT (OUTPATIENT)
Dept: INTERNAL MEDICINE | Facility: CLINIC | Age: 72
End: 2022-05-03
Payer: MEDICARE

## 2022-05-03 ENCOUNTER — IMMUNIZATION (OUTPATIENT)
Dept: INTERNAL MEDICINE | Facility: CLINIC | Age: 72
End: 2022-05-03
Payer: MEDICARE

## 2022-05-03 ENCOUNTER — LAB VISIT (OUTPATIENT)
Dept: LAB | Facility: HOSPITAL | Age: 72
End: 2022-05-03
Attending: INTERNAL MEDICINE
Payer: MEDICARE

## 2022-05-03 VITALS
HEIGHT: 61 IN | SYSTOLIC BLOOD PRESSURE: 132 MMHG | HEART RATE: 67 BPM | DIASTOLIC BLOOD PRESSURE: 84 MMHG | BODY MASS INDEX: 32.11 KG/M2 | OXYGEN SATURATION: 96 % | WEIGHT: 170.06 LBS

## 2022-05-03 DIAGNOSIS — D69.6 THROMBOCYTOPENIA: ICD-10-CM

## 2022-05-03 DIAGNOSIS — N18.31 STAGE 3A CHRONIC KIDNEY DISEASE: ICD-10-CM

## 2022-05-03 DIAGNOSIS — Z13.6 SCREENING FOR CARDIOVASCULAR CONDITION: ICD-10-CM

## 2022-05-03 DIAGNOSIS — M1A.0720 IDIOPATHIC CHRONIC GOUT OF LEFT FOOT WITHOUT TOPHUS: ICD-10-CM

## 2022-05-03 DIAGNOSIS — R79.89 TSH ELEVATION: ICD-10-CM

## 2022-05-03 DIAGNOSIS — I10 ESSENTIAL HYPERTENSION: Primary | ICD-10-CM

## 2022-05-03 DIAGNOSIS — Z12.11 COLON CANCER SCREENING: ICD-10-CM

## 2022-05-03 DIAGNOSIS — Z23 NEED FOR VACCINATION: Primary | ICD-10-CM

## 2022-05-03 LAB
ALBUMIN SERPL BCP-MCNC: 3.8 G/DL (ref 3.5–5.2)
ALP SERPL-CCNC: 67 U/L (ref 55–135)
ALT SERPL W/O P-5'-P-CCNC: 18 U/L (ref 10–44)
ANION GAP SERPL CALC-SCNC: 9 MMOL/L (ref 8–16)
AST SERPL-CCNC: 18 U/L (ref 10–40)
BASOPHILS # BLD AUTO: 0.07 K/UL (ref 0–0.2)
BASOPHILS NFR BLD: 0.7 % (ref 0–1.9)
BILIRUB SERPL-MCNC: 1 MG/DL (ref 0.1–1)
BUN SERPL-MCNC: 22 MG/DL (ref 8–23)
CALCIUM SERPL-MCNC: 9.6 MG/DL (ref 8.7–10.5)
CHLORIDE SERPL-SCNC: 104 MMOL/L (ref 95–110)
CHOLEST SERPL-MCNC: 186 MG/DL (ref 120–199)
CHOLEST/HDLC SERPL: 2.6 {RATIO} (ref 2–5)
CO2 SERPL-SCNC: 28 MMOL/L (ref 23–29)
CREAT SERPL-MCNC: 1.2 MG/DL (ref 0.5–1.4)
DIFFERENTIAL METHOD: ABNORMAL
EOSINOPHIL # BLD AUTO: 0.3 K/UL (ref 0–0.5)
EOSINOPHIL NFR BLD: 2.9 % (ref 0–8)
ERYTHROCYTE [DISTWIDTH] IN BLOOD BY AUTOMATED COUNT: 14.4 % (ref 11.5–14.5)
EST. GFR  (AFRICAN AMERICAN): 53.3 ML/MIN/1.73 M^2
EST. GFR  (NON AFRICAN AMERICAN): 46.2 ML/MIN/1.73 M^2
GLUCOSE SERPL-MCNC: 83 MG/DL (ref 70–110)
HCT VFR BLD AUTO: 44 % (ref 37–48.5)
HDLC SERPL-MCNC: 72 MG/DL (ref 40–75)
HDLC SERPL: 38.7 % (ref 20–50)
HGB BLD-MCNC: 13.6 G/DL (ref 12–16)
IMM GRANULOCYTES # BLD AUTO: 0.02 K/UL (ref 0–0.04)
IMM GRANULOCYTES NFR BLD AUTO: 0.2 % (ref 0–0.5)
LDLC SERPL CALC-MCNC: 82.6 MG/DL (ref 63–159)
LYMPHOCYTES # BLD AUTO: 2.8 K/UL (ref 1–4.8)
LYMPHOCYTES NFR BLD: 29.4 % (ref 18–48)
MCH RBC QN AUTO: 28.5 PG (ref 27–31)
MCHC RBC AUTO-ENTMCNC: 30.9 G/DL (ref 32–36)
MCV RBC AUTO: 92 FL (ref 82–98)
MONOCYTES # BLD AUTO: 0.8 K/UL (ref 0.3–1)
MONOCYTES NFR BLD: 7.9 % (ref 4–15)
NEUTROPHILS # BLD AUTO: 5.6 K/UL (ref 1.8–7.7)
NEUTROPHILS NFR BLD: 58.9 % (ref 38–73)
NONHDLC SERPL-MCNC: 114 MG/DL
NRBC BLD-RTO: 0 /100 WBC
PLATELET # BLD AUTO: 127 K/UL (ref 150–450)
PMV BLD AUTO: 13.2 FL (ref 9.2–12.9)
POTASSIUM SERPL-SCNC: 4.4 MMOL/L (ref 3.5–5.1)
PROT SERPL-MCNC: 8 G/DL (ref 6–8.4)
RBC # BLD AUTO: 4.77 M/UL (ref 4–5.4)
SODIUM SERPL-SCNC: 141 MMOL/L (ref 136–145)
TRIGL SERPL-MCNC: 157 MG/DL (ref 30–150)
TSH SERPL DL<=0.005 MIU/L-ACNC: 3.89 UIU/ML (ref 0.4–4)
URATE SERPL-MCNC: 6.8 MG/DL (ref 2.4–5.7)
WBC # BLD AUTO: 9.57 K/UL (ref 3.9–12.7)

## 2022-05-03 PROCEDURE — 36415 COLL VENOUS BLD VENIPUNCTURE: CPT | Performed by: INTERNAL MEDICINE

## 2022-05-03 PROCEDURE — 84443 ASSAY THYROID STIM HORMONE: CPT | Performed by: INTERNAL MEDICINE

## 2022-05-03 PROCEDURE — 99999 PR PBB SHADOW E&M-EST. PATIENT-LVL III: ICD-10-PCS | Mod: PBBFAC,,, | Performed by: INTERNAL MEDICINE

## 2022-05-03 PROCEDURE — 80061 LIPID PANEL: CPT | Performed by: INTERNAL MEDICINE

## 2022-05-03 PROCEDURE — 85025 COMPLETE CBC W/AUTO DIFF WBC: CPT | Performed by: INTERNAL MEDICINE

## 2022-05-03 PROCEDURE — 99999 PR PBB SHADOW E&M-EST. PATIENT-LVL III: CPT | Mod: PBBFAC,,, | Performed by: INTERNAL MEDICINE

## 2022-05-03 PROCEDURE — 99214 OFFICE O/P EST MOD 30 MIN: CPT | Mod: S$GLB,,, | Performed by: INTERNAL MEDICINE

## 2022-05-03 PROCEDURE — 80053 COMPREHEN METABOLIC PANEL: CPT | Performed by: INTERNAL MEDICINE

## 2022-05-03 PROCEDURE — 84550 ASSAY OF BLOOD/URIC ACID: CPT | Performed by: INTERNAL MEDICINE

## 2022-05-03 PROCEDURE — 99214 PR OFFICE/OUTPT VISIT, EST, LEVL IV, 30-39 MIN: ICD-10-PCS | Mod: S$GLB,,, | Performed by: INTERNAL MEDICINE

## 2022-05-03 PROCEDURE — 91300 COVID-19, MRNA, LNP-S, PF, 30 MCG/0.3 ML DOSE VACCINE: CPT | Mod: PBBFAC | Performed by: INTERNAL MEDICINE

## 2022-05-03 RX ORDER — AMLODIPINE BESYLATE 10 MG/1
10 TABLET ORAL DAILY
Qty: 90 TABLET | Refills: 3 | Status: SHIPPED | OUTPATIENT
Start: 2022-05-03 | End: 2023-04-26 | Stop reason: SINTOL

## 2022-05-03 RX ORDER — LOSARTAN POTASSIUM 100 MG/1
100 TABLET ORAL DAILY
Qty: 90 TABLET | Refills: 3 | Status: SHIPPED | OUTPATIENT
Start: 2022-05-03 | End: 2023-04-26 | Stop reason: SDUPTHER

## 2022-05-03 RX ORDER — TRIAMTERENE AND HYDROCHLOROTHIAZIDE 37.5; 25 MG/1; MG/1
1 CAPSULE ORAL EVERY MORNING
Qty: 90 CAPSULE | Refills: 3 | Status: SHIPPED | OUTPATIENT
Start: 2022-05-03 | End: 2023-04-26 | Stop reason: SDUPTHER

## 2022-05-03 RX ORDER — ALLOPURINOL 300 MG/1
300 TABLET ORAL DAILY
Qty: 90 TABLET | Refills: 3 | Status: SHIPPED | OUTPATIENT
Start: 2022-05-03 | End: 2023-07-08 | Stop reason: SDUPTHER

## 2022-05-03 NOTE — PROGRESS NOTES
"Subjective:       Patient ID: Rajesh Mas is a 71 y.o. female.    Chief Complaint: Annual Exam    HPI   Rajesh Mas is a 71 y.o. female here for routine follow up of the following chronic issues:    Gout  allopurionl 300mg  No recent flares    HTN  Losartan 100mg  Amlodipine 10mg  Triamterene-hctz    Oxybutynin    Capsular hypertrophy and degenerative changes of the sternoclavicular joint with adjacent poorly defined soft tissue nodule, most likely representing a synovial cyst or ganglion.  Clinical follow-up suggested.  Measurement:  0.9 x 0.5 x 0.8 cm near the right medial clavicle  Review of Systems   Constitutional: Negative for fever.   Respiratory: Negative for shortness of breath.    Cardiovascular: Negative for chest pain.   Musculoskeletal: Negative.    Skin: Negative.        Objective:   /84 (BP Location: Right arm, Patient Position: Sitting, BP Method: Medium (Manual))   Pulse 67   Ht 5' 1" (1.549 m)   Wt 77.2 kg (170 lb 1.4 oz)   LMP 11/28/1988 (Approximate)   SpO2 96%   BMI 32.14 kg/m²      Physical Exam  Vitals reviewed.   Constitutional:       Appearance: She is well-developed.   HENT:      Head: Normocephalic and atraumatic.   Eyes:      Conjunctiva/sclera: Conjunctivae normal.      Pupils: Pupils are equal, round, and reactive to light.   Neck:      Thyroid: No thyromegaly.   Cardiovascular:      Rate and Rhythm: Normal rate and regular rhythm.      Heart sounds: Normal heart sounds. No murmur heard.  Pulmonary:      Effort: Pulmonary effort is normal. No respiratory distress.      Breath sounds: Normal breath sounds. No wheezing.   Abdominal:      General: There is no distension.      Palpations: Abdomen is soft.      Tenderness: There is no abdominal tenderness. There is no rebound.   Musculoskeletal:         General: No swelling or deformity. Normal range of motion.      Cervical back: Neck supple.   Lymphadenopathy:      Cervical: No cervical adenopathy.   Skin:     General: " Skin is warm and dry.      Findings: No rash.   Neurological:      Mental Status: She is alert and oriented to person, place, and time.   Psychiatric:         Thought Content: Thought content normal.         Judgment: Judgment normal.         Assessment:       1. Essential hypertension    2. Stage 3a chronic kidney disease    3. Thrombocytopenia    4. Idiopathic chronic gout of left foot without tophus    5. Colon cancer screening    6. Screening for cardiovascular condition    7. TSH elevation        Plan:       Rajesh was seen today for annual exam.    Diagnoses and all orders for this visit:    Essential hypertension  -     amLODIPine (NORVASC) 10 MG tablet; Take 1 tablet (10 mg total) by mouth once daily.  -     losartan (COZAAR) 100 MG tablet; Take 1 tablet (100 mg total) by mouth once daily.  -     triamterene-hydrochlorothiazide 37.5-25 mg (DYAZIDE) 37.5-25 mg per capsule; Take 1 capsule by mouth every morning.    Stage 3a chronic kidney disease  -     Comprehensive Metabolic Panel; Future    Thrombocytopenia  -     CBC Auto Differential; Future    Idiopathic chronic gout of left foot without tophus  -     Uric Acid; Future  -     allopurinoL (ZYLOPRIM) 300 MG tablet; Take 1 tablet (300 mg total) by mouth once daily. (to prevent gout)    Colon cancer screening  -     Case Request Endoscopy: COLONOSCOPY    Screening for cardiovascular condition  -     Lipid Panel; Future    TSH elevation  -     TSH; Future          Colon 2016 - repeat due (5 years)  covid booster  shingrx

## 2022-05-09 ENCOUNTER — PES CALL (OUTPATIENT)
Dept: ADMINISTRATIVE | Facility: CLINIC | Age: 72
End: 2022-05-09
Payer: MEDICARE

## 2022-05-11 ENCOUNTER — PATIENT MESSAGE (OUTPATIENT)
Dept: ENDOSCOPY | Facility: HOSPITAL | Age: 72
End: 2022-05-11
Payer: MEDICARE

## 2022-05-11 DIAGNOSIS — Z12.11 SPECIAL SCREENING FOR MALIGNANT NEOPLASMS, COLON: Primary | ICD-10-CM

## 2022-05-11 RX ORDER — POLYETHYLENE GLYCOL 3350, SODIUM SULFATE ANHYDROUS, SODIUM BICARBONATE, SODIUM CHLORIDE, POTASSIUM CHLORIDE 236; 22.74; 6.74; 5.86; 2.97 G/4L; G/4L; G/4L; G/4L; G/4L
4 POWDER, FOR SOLUTION ORAL ONCE
Qty: 4000 ML | Refills: 0 | Status: SHIPPED | OUTPATIENT
Start: 2022-05-11 | End: 2022-05-13

## 2022-05-17 ENCOUNTER — HOSPITAL ENCOUNTER (OUTPATIENT)
Facility: HOSPITAL | Age: 72
Discharge: HOME | End: 2022-05-17
Attending: COLON & RECTAL SURGERY | Admitting: COLON & RECTAL SURGERY
Payer: MEDICARE

## 2022-05-17 ENCOUNTER — ANESTHESIA EVENT (OUTPATIENT)
Dept: ENDOSCOPY | Facility: HOSPITAL | Age: 72
End: 2022-05-17
Payer: MEDICARE

## 2022-05-17 ENCOUNTER — ANESTHESIA (OUTPATIENT)
Dept: ENDOSCOPY | Facility: HOSPITAL | Age: 72
End: 2022-05-17
Payer: MEDICARE

## 2022-05-17 VITALS
OXYGEN SATURATION: 99 % | HEIGHT: 61 IN | BODY MASS INDEX: 31.72 KG/M2 | WEIGHT: 168 LBS | SYSTOLIC BLOOD PRESSURE: 172 MMHG | RESPIRATION RATE: 18 BRPM | TEMPERATURE: 97 F | DIASTOLIC BLOOD PRESSURE: 86 MMHG | HEART RATE: 74 BPM

## 2022-05-17 DIAGNOSIS — Z12.11 SCREENING FOR COLON CANCER: ICD-10-CM

## 2022-05-17 PROCEDURE — 27201089 HC SNARE, DISP (ANY): Performed by: COLON & RECTAL SURGERY

## 2022-05-17 PROCEDURE — 45385 PR COLONOSCOPY,REMV LESN,SNARE: ICD-10-PCS | Mod: PT,,, | Performed by: COLON & RECTAL SURGERY

## 2022-05-17 PROCEDURE — 37000009 HC ANESTHESIA EA ADD 15 MINS: Performed by: COLON & RECTAL SURGERY

## 2022-05-17 PROCEDURE — 37000008 HC ANESTHESIA 1ST 15 MINUTES: Performed by: COLON & RECTAL SURGERY

## 2022-05-17 PROCEDURE — 88305 TISSUE EXAM BY PATHOLOGIST: ICD-10-PCS | Mod: 26,,, | Performed by: PATHOLOGY

## 2022-05-17 PROCEDURE — 25000003 PHARM REV CODE 250: Performed by: REGISTERED NURSE

## 2022-05-17 PROCEDURE — E9220 PRA ENDO ANESTHESIA: ICD-10-PCS | Mod: PT,,, | Performed by: REGISTERED NURSE

## 2022-05-17 PROCEDURE — E9220 PRA ENDO ANESTHESIA: HCPCS | Mod: PT,,, | Performed by: REGISTERED NURSE

## 2022-05-17 PROCEDURE — 88305 TISSUE EXAM BY PATHOLOGIST: CPT | Performed by: PATHOLOGY

## 2022-05-17 PROCEDURE — 45385 COLONOSCOPY W/LESION REMOVAL: CPT | Mod: PT | Performed by: COLON & RECTAL SURGERY

## 2022-05-17 PROCEDURE — 63600175 PHARM REV CODE 636 W HCPCS: Performed by: REGISTERED NURSE

## 2022-05-17 PROCEDURE — 25000003 PHARM REV CODE 250: Performed by: COLON & RECTAL SURGERY

## 2022-05-17 PROCEDURE — 88305 TISSUE EXAM BY PATHOLOGIST: CPT | Mod: 26,,, | Performed by: PATHOLOGY

## 2022-05-17 PROCEDURE — 45385 COLONOSCOPY W/LESION REMOVAL: CPT | Mod: PT,,, | Performed by: COLON & RECTAL SURGERY

## 2022-05-17 RX ORDER — PROPOFOL 10 MG/ML
VIAL (ML) INTRAVENOUS CONTINUOUS PRN
Status: DISCONTINUED | OUTPATIENT
Start: 2022-05-17 | End: 2022-05-17

## 2022-05-17 RX ORDER — LIDOCAINE HCL/PF 100 MG/5ML
SYRINGE (ML) INTRAVENOUS
Status: DISCONTINUED | OUTPATIENT
Start: 2022-05-17 | End: 2022-05-17

## 2022-05-17 RX ORDER — SODIUM CHLORIDE 9 MG/ML
INJECTION, SOLUTION INTRAVENOUS CONTINUOUS
Status: DISCONTINUED | OUTPATIENT
Start: 2022-05-17 | End: 2022-05-17 | Stop reason: HOSPADM

## 2022-05-17 RX ORDER — PROPOFOL 10 MG/ML
INJECTION, EMULSION INTRAVENOUS
Status: DISCONTINUED | OUTPATIENT
Start: 2022-05-17 | End: 2022-05-17

## 2022-05-17 RX ADMIN — Medication 100 MG: at 07:05

## 2022-05-17 RX ADMIN — GLYCOPYRROLATE 0.2 MG: 0.2 INJECTION, SOLUTION INTRAMUSCULAR; INTRAVITREAL at 07:05

## 2022-05-17 RX ADMIN — PROPOFOL 125 MCG/KG/MIN: 10 INJECTION, EMULSION INTRAVENOUS at 07:05

## 2022-05-17 RX ADMIN — PROPOFOL 60 MG: 10 INJECTION, EMULSION INTRAVENOUS at 07:05

## 2022-05-17 RX ADMIN — SODIUM CHLORIDE: 0.9 INJECTION, SOLUTION INTRAVENOUS at 06:05

## 2022-05-17 NOTE — PROVATION PATIENT INSTRUCTIONS
Discharge Summary/Instructions after an Endoscopic Procedure  Patient Name: Rajesh Mas  Patient MRN: [id number]  Patient YOB: 1950  Tuesday, May 17, 2022  Fidel Rolon MD  Dear patient,  As a result of recent federal legislation (The Federal Cures Act), you may   receive lab or pathology results from your procedure in your [facility]   account before your physician is able to contact you. Your physician or   their representative will relay the results to you with their   recommendations at their soonest availability.  Thank you,  RESTRICTIONS:  During your procedure today, you received medications for sedation.  These   medications may affect your judgment, balance and coordination.  Therefore,   for 24 hours, you have the following restrictions:   - DO NOT drive a car, operate machinery, make legal/financial decisions,   sign important papers or drink alcohol.    ACTIVITY:  Today: no heavy lifting, straining or running due to procedural   sedation/anesthesia.  The following day: return to full activity including work.  DIET:  Eat and drink normally unless instructed otherwise.     TREATMENT FOR COMMON SIDE EFFECTS:  - Mild abdominal pain, nausea, belching, bloating or excessive gas:  rest,   eat lightly and use a heating pad.  - Sore Throat: treat with throat lozenges and/or gargle with warm salt   water.  - Because air was used during the procedure, expelling large amounts of air   from your rectum or belching is normal.  - If a bowel prep was taken, you may not have a bowel movement for 1-3 days.    This is normal.  SYMPTOMS TO WATCH FOR AND REPORT TO YOUR PHYSICIAN:  1. Abdominal pain or bloating, other than gas cramps.  2. Chest pain.  3. Back pain.  4. Signs of infection such as: chills or fever occurring within 24 hours   after the procedure.  5. Rectal bleeding, which would show as bright red, maroon, or black stools.   (A tablespoon of blood from the rectum is not serious, especially if    hemorrhoids are present.)  6. Vomiting.  7. Weakness or dizziness.  GO DIRECTLY TO THE NEAREST EMERGENCY ROOM IF YOU HAVE ANY OF THE FOLLOWING:      Difficulty breathing              Chills and/or fever over 101 F   Persistent vomiting and/or vomiting blood   Severe abdominal pain   Severe chest pain   Black, tarry stools   Bleeding- more than one tablespoon   Any other symptom or condition that you feel may need urgent attention  Your doctor recommends these additional instructions:  If any biopsies were taken, your doctors clinic will contact you in 1 to 2   weeks with any results.  - Discharge patient to home (ambulatory).   - Patient has a contact number available for emergencies.  The signs and   symptoms of potential delayed complications were discussed with the   patient.  Return to normal activities tomorrow.  Written discharge   instructions were provided to the patient.   - Resume previous diet.   - Continue present medications.   - Await pathology results.   - Repeat colonoscopy in 5 years for surveillance based on pathology   results.  For questions, problems or results please call your physician - Fidel Rolon MD at Work:  [phone].  [facility] [place] [facility], EMERGENCY ROOM PHONE NUMBER: [phone]  IF A COMPLICATION OR EMERGENCY SITUATION ARISES AND YOU ARE UNABLE TO REACH   YOUR PHYSICIAN - GO DIRECTLY TO THE EMERGENCY ROOM.  Fidel Rolon MD  5/17/2022 7:44:53 AM  This report has been verified and signed electronically.  Dear patient,  As a result of recent federal legislation (The Federal Cures Act), you may   receive lab or pathology results from your procedure in your [facility]   account before your physician is able to contact you. Your physician or   their representative will relay the results to you with their   recommendations at their soonest availability.  Thank you,  PROVATION

## 2022-05-17 NOTE — TRANSFER OF CARE
Anesthesia Transfer of Care Note    Patient: Rajesh Mas    Procedure(s) Performed: Procedure(s) (LRB):  COLONOSCOPY (N/A)    Patient location: GI    Anesthesia Type: MAC    Transport from OR: Transported from OR on room air with adequate spontaneous ventilation    Post pain: adequate analgesia    Post assessment: no apparent anesthetic complications and tolerated procedure well    Post vital signs: stable    Level of consciousness: awake    Nausea/Vomiting: no nausea/vomiting    Complications: none    Transfer of care protocol was followed      Last vitals:   BP:145/70, HR 68, RR 16, SpO2 100%

## 2022-05-17 NOTE — H&P
COLONOSCOPY HISTORY AND PHYSICAL EXAM    Procedure : Colonoscopy      INDICATIONS: personal history of colon polyps    Family Hx of CRC: Denies    Last Colonoscopy:  2016  Findings: - Diverticulosis in the descending colon, in the                         transverse colon and in the ascending colon.                         - One 3 mm polyp in the ascending colon, removed                         with a cold biopsy forceps. Resected and retrieved. (Path: TA)                        - Medium-sized lipoma in the ascending colon.                         - Patent end-to-end colo-colonic anastomosis,                         characterized by healthy appearing mucosa.                         - The examination was otherwise normal.        Past Medical History:   Diagnosis Date    Allergy     seasonal    Diverticulitis     Fever blister     Hypertension     Personal history of colonic polyps      Sedation Problems: NO  Family History   Problem Relation Age of Onset    Diabetes Mother     Heart failure Mother     Hypertension Mother     No Known Problems Sister     Other Brother         prostate issue    No Known Problems Daughter     No Known Problems Daughter     No Known Problems Son     No Known Problems Son     Liver cancer Maternal Uncle     Colon cancer Maternal Grandmother         colon cancer    Breast cancer Maternal Grandmother     Melanoma Neg Hx     Ovarian cancer Neg Hx      Fam Hx of Sedation Problems: NO  Social History     Socioeconomic History    Marital status:    Tobacco Use    Smoking status: Never Smoker    Smokeless tobacco: Never Used   Substance and Sexual Activity    Alcohol use: No     Alcohol/week: 0.0 standard drinks    Drug use: No    Sexual activity: Yes     Partners: Male     Comment: , together since 1971     Social Determinants of Health     Financial Resource Strain: Medium Risk    Difficulty of Paying Living Expenses: Somewhat hard   Food Insecurity:  "Unknown    Worried About Running Out of Food in the Last Year: Patient refused    Ran Out of Food in the Last Year: Patient refused   Transportation Needs: No Transportation Needs    Lack of Transportation (Medical): No    Lack of Transportation (Non-Medical): No   Physical Activity: Sufficiently Active    Days of Exercise per Week: 5 days    Minutes of Exercise per Session: 50 min   Stress: No Stress Concern Present    Feeling of Stress : Only a little   Social Connections: Unknown    Frequency of Communication with Friends and Family: Three times a week    Frequency of Social Gatherings with Friends and Family: Twice a week    Active Member of Clubs or Organizations: Yes    Attends Club or Organization Meetings: More than 4 times per year    Marital Status:    Housing Stability: Unknown    Unable to Pay for Housing in the Last Year: Patient refused    Number of Places Lived in the Last Year: 1    Unstable Housing in the Last Year: No       Review of Systems - Negative except   Respiratory ROS: no dyspnea  Cardiovascular ROS: no exertional chest pain  Gastrointestinal ROS: NO abdominal discomfort,  NO rectal bleeding  Musculoskeletal ROS: no muscular pain  Neurological ROS: no recent stroke    Physical Exam:  BP (!) 195/91   Pulse (!) 55   Temp 97.3 °F (36.3 °C) (Temporal)   Resp 16   Ht 5' 1" (1.549 m)   Wt 76.2 kg (168 lb)   LMP 11/28/1988 (Approximate)   SpO2 98%   Breastfeeding No   BMI 31.74 kg/m²   General: no distress  Head: normocephalic  Mallampati Score   Neck: supple, symmetrical, trachea midline  Lungs:  clear to auscultation bilaterally and normal respiratory effort  Heart: regular rate and rhythm and no murmur  Abdomen: soft, non-tender non-distented; bowel sounds normal; no masses,  no organomegaly  Extremities: no cyanosis or edema, or clubbing    ASA:  III    PLAN  COLONOSCOPY.    SedationPlan :MAC    The details of the procedure, the possible need for biopsy or " polypectomy and the potential risks including bleeding, perforation, missed polyps were discussed in detail.

## 2022-05-17 NOTE — ANESTHESIA POSTPROCEDURE EVALUATION
Anesthesia Post Evaluation    Patient: Rajesh Mas    Procedure(s) Performed: Procedure(s) (LRB):  COLONOSCOPY (N/A)    Final Anesthesia Type: general      Patient location during evaluation: PACU  Patient participation: Yes- Able to Participate  Level of consciousness: awake and alert  Post-procedure vital signs: reviewed and stable  Pain management: adequate  Airway patency: patent    PONV status at discharge: No PONV  Anesthetic complications: no      Cardiovascular status: blood pressure returned to baseline  Respiratory status: unassisted  Hydration status: euvolemic  Follow-up not needed.          Vitals Value Taken Time   /86 05/17/22 0810   Temp 36.3 °C (97.3 °F) 05/17/22 0746   Pulse 74 05/17/22 0810   Resp 18 05/17/22 0810   SpO2 99 % 05/17/22 0810         Event Time   Out of Recovery 08:27:36         Pain/Vishal Score: Vishal Score: 10 (5/17/2022  8:11 AM)

## 2022-05-17 NOTE — ANESTHESIA PREPROCEDURE EVALUATION
05/17/2022  Rajesh Mas is a 71 y.o., female.    Past Medical History:   Diagnosis Date    Allergy     seasonal    Diverticulitis     Fever blister     Hypertension     Personal history of colonic polyps      Past Surgical History:   Procedure Laterality Date    CHOLECYSTECTOMY  04/26/2017    COLON SURGERY      COLONOSCOPY N/A 12/6/2016    Procedure: COLONOSCOPY;  Surgeon: Fidel Mason MD;  Location: [facility] ([address] [place] [address]);  Service: Endoscopy;  Laterality: N/A;    partial colectomy  1997    sigmoid removed due to diverticulitis    SINUS SURGERY  2007     No current facility-administered medications on file prior to encounter.     Current Outpatient Medications on File Prior to Encounter   Medication Sig Dispense Refill    allopurinoL (ZYLOPRIM) 300 MG tablet Take 1 tablet (300 mg total) by mouth once daily. (to prevent gout) 90 tablet 3    amLODIPine (NORVASC) 10 MG tablet Take 1 tablet (10 mg total) by mouth once daily. 90 tablet 3    losartan (COZAAR) 100 MG tablet Take 1 tablet (100 mg total) by mouth once daily. 90 tablet 3    oxybutynin (DITROPAN) 5 MG Tab Take 1 tablet by mouth twice daily 60 tablet 10    triamterene-hydrochlorothiazide 37.5-25 mg (DYAZIDE) 37.5-25 mg per capsule Take 1 capsule by mouth every morning. 90 capsule 3       Pre-op Assessment    I have reviewed the Patient Summary Reports.     I have reviewed the Nursing Notes.    I have reviewed the Medications.     Review of Systems      Physical Exam  General: Well nourished, Cooperative and Alert    Airway:  Mallampati: II   Mouth Opening: Normal  TM Distance: Normal  Tongue: Normal  Neck ROM: Normal ROM    Dental:  Missing teeth noted, Denies any loose teeth      Anesthesia Plan  Type of Anesthesia, risks & benefits discussed:    Anesthesia Type: MAC  Intra-op Monitoring Plan: Standard ASA Monitors  Induction:   IV  Informed Consent: Informed consent signed with the Patient and all parties understand the risks and agree with anesthesia plan.  All questions answered.   ASA Score: 3  Day of Surgery Review of History & Physical: H&P Update referred to the surgeon/provider.I have interviewed and examined the patient. I have reviewed the patient's H&P dated:     Ready For Surgery From Anesthesia Perspective.     .

## 2022-05-24 LAB
FINAL PATHOLOGIC DIAGNOSIS: NORMAL
Lab: NORMAL

## 2022-06-07 ENCOUNTER — PATIENT MESSAGE (OUTPATIENT)
Dept: OBSTETRICS AND GYNECOLOGY | Facility: CLINIC | Age: 72
End: 2022-06-07
Payer: MEDICARE

## 2022-06-07 DIAGNOSIS — R35.0 URINARY FREQUENCY: ICD-10-CM

## 2022-06-07 RX ORDER — OXYBUTYNIN CHLORIDE 5 MG/1
5 TABLET ORAL 2 TIMES DAILY
Qty: 60 TABLET | Refills: 6 | Status: SHIPPED | OUTPATIENT
Start: 2022-06-07 | End: 2023-02-13 | Stop reason: SDUPTHER

## 2022-06-25 NOTE — PROGRESS NOTES
Adenoma or history of polyps    Repeat colonoscopy in 5 years       If you have any questions or if I can clarify any of the above please contact me:    [employer] [phone]   Pager [phone]  email [email or web address]  Nurse Jaz Fermin [phone]  :  [phone]    Sincerely  H, Fidel Rolon MD, FACS, FASCRS  Staff Surgeon  Dept of Colon and Rectal Surgery

## 2022-09-12 ENCOUNTER — OFFICE VISIT (OUTPATIENT)
Dept: INTERNAL MEDICINE | Facility: CLINIC | Age: 72
End: 2022-09-12
Payer: MEDICARE

## 2022-09-12 ENCOUNTER — HOSPITAL ENCOUNTER (EMERGENCY)
Facility: HOSPITAL | Age: 72
Discharge: HOME | End: 2022-09-12
Attending: EMERGENCY MEDICINE
Payer: MEDICARE

## 2022-09-12 VITALS
BODY MASS INDEX: 31.72 KG/M2 | WEIGHT: 168 LBS | HEIGHT: 61 IN | OXYGEN SATURATION: 98 % | HEART RATE: 84 BPM | SYSTOLIC BLOOD PRESSURE: 175 MMHG | DIASTOLIC BLOOD PRESSURE: 95 MMHG

## 2022-09-12 VITALS
DIASTOLIC BLOOD PRESSURE: 75 MMHG | OXYGEN SATURATION: 94 % | TEMPERATURE: 99 F | SYSTOLIC BLOOD PRESSURE: 138 MMHG | BODY MASS INDEX: 31.53 KG/M2 | WEIGHT: 167 LBS | HEIGHT: 61 IN | HEART RATE: 50 BPM | RESPIRATION RATE: 18 BRPM

## 2022-09-12 DIAGNOSIS — M54.9 BACK PAIN, UNSPECIFIED BACK LOCATION, UNSPECIFIED BACK PAIN LATERALITY, UNSPECIFIED CHRONICITY: Primary | ICD-10-CM

## 2022-09-12 DIAGNOSIS — R07.89 LEFT CHEST PRESSURE: ICD-10-CM

## 2022-09-12 DIAGNOSIS — I10 ESSENTIAL HYPERTENSION: ICD-10-CM

## 2022-09-12 DIAGNOSIS — R07.9 CHEST PAIN: ICD-10-CM

## 2022-09-12 LAB
ALBUMIN SERPL BCP-MCNC: 4 G/DL (ref 3.5–5.2)
ALP SERPL-CCNC: 67 U/L (ref 55–135)
ALT SERPL W/O P-5'-P-CCNC: 15 U/L (ref 10–44)
ANION GAP SERPL CALC-SCNC: 9 MMOL/L (ref 8–16)
ANISOCYTOSIS BLD QL SMEAR: SLIGHT
AST SERPL-CCNC: 18 U/L (ref 10–40)
BACTERIA #/AREA URNS AUTO: ABNORMAL /HPF
BASOPHILS # BLD AUTO: 0.05 K/UL (ref 0–0.2)
BASOPHILS NFR BLD: 0.5 % (ref 0–1.9)
BILIRUB SERPL-MCNC: 0.7 MG/DL (ref 0.1–1)
BILIRUB UR QL STRIP: NEGATIVE
BNP SERPL-MCNC: 78 PG/ML (ref 0–99)
BUN SERPL-MCNC: 20 MG/DL (ref 8–23)
CALCIUM SERPL-MCNC: 9.6 MG/DL (ref 8.7–10.5)
CAOX CRY UR QL COMP ASSIST: ABNORMAL
CHLORIDE SERPL-SCNC: 104 MMOL/L (ref 95–110)
CLARITY UR REFRACT.AUTO: ABNORMAL
CO2 SERPL-SCNC: 27 MMOL/L (ref 23–29)
COLOR UR AUTO: YELLOW
CREAT SERPL-MCNC: 1.2 MG/DL (ref 0.5–1.4)
DIFFERENTIAL METHOD: ABNORMAL
EOSINOPHIL # BLD AUTO: 0.1 K/UL (ref 0–0.5)
EOSINOPHIL NFR BLD: 1.3 % (ref 0–8)
ERYTHROCYTE [DISTWIDTH] IN BLOOD BY AUTOMATED COUNT: 14.4 % (ref 11.5–14.5)
EST. GFR  (NO RACE VARIABLE): 49 ML/MIN/1.73 M^2
GIANT PLATELETS BLD QL SMEAR: PRESENT
GLUCOSE SERPL-MCNC: 95 MG/DL (ref 70–110)
GLUCOSE UR QL STRIP: NEGATIVE
HCT VFR BLD AUTO: 41.2 % (ref 37–48.5)
HGB BLD-MCNC: 14 G/DL (ref 12–16)
HGB UR QL STRIP: NEGATIVE
HYALINE CASTS UR QL AUTO: 0 /LPF
IMM GRANULOCYTES # BLD AUTO: 0.02 K/UL (ref 0–0.04)
IMM GRANULOCYTES NFR BLD AUTO: 0.2 % (ref 0–0.5)
KETONES UR QL STRIP: ABNORMAL
LEUKOCYTE ESTERASE UR QL STRIP: ABNORMAL
LYMPHOCYTES # BLD AUTO: 2.5 K/UL (ref 1–4.8)
LYMPHOCYTES NFR BLD: 26.8 % (ref 18–48)
MCH RBC QN AUTO: 30.4 PG (ref 27–31)
MCHC RBC AUTO-ENTMCNC: 34 G/DL (ref 32–36)
MCV RBC AUTO: 90 FL (ref 82–98)
MICROSCOPIC COMMENT: ABNORMAL
MONOCYTES # BLD AUTO: 0.7 K/UL (ref 0.3–1)
MONOCYTES NFR BLD: 7.3 % (ref 4–15)
NEUTROPHILS # BLD AUTO: 6 K/UL (ref 1.8–7.7)
NEUTROPHILS NFR BLD: 63.9 % (ref 38–73)
NITRITE UR QL STRIP: NEGATIVE
NRBC BLD-RTO: 0 /100 WBC
PH UR STRIP: 6 [PH] (ref 5–8)
PLATELET # BLD AUTO: 135 K/UL (ref 150–450)
PLATELET BLD QL SMEAR: ABNORMAL
PMV BLD AUTO: ABNORMAL FL (ref 9.2–12.9)
POTASSIUM SERPL-SCNC: 3.6 MMOL/L (ref 3.5–5.1)
PROT SERPL-MCNC: 8.2 G/DL (ref 6–8.4)
PROT UR QL STRIP: ABNORMAL
RBC # BLD AUTO: 4.6 M/UL (ref 4–5.4)
RBC #/AREA URNS AUTO: 8 /HPF (ref 0–4)
SODIUM SERPL-SCNC: 140 MMOL/L (ref 136–145)
SP GR UR STRIP: 1.02 (ref 1–1.03)
SQUAMOUS #/AREA URNS AUTO: 10 /HPF
TROPONIN I SERPL DL<=0.01 NG/ML-MCNC: 0.01 NG/ML (ref 0–0.03)
TROPONIN I SERPL DL<=0.01 NG/ML-MCNC: 0.01 NG/ML (ref 0–0.03)
URN SPEC COLLECT METH UR: ABNORMAL
WBC # BLD AUTO: 9.42 K/UL (ref 3.9–12.7)
WBC #/AREA URNS AUTO: 45 /HPF (ref 0–5)

## 2022-09-12 PROCEDURE — 1125F AMNT PAIN NOTED PAIN PRSNT: CPT | Mod: CPTII,GC,S$GLB, | Performed by: STUDENT IN AN ORGANIZED HEALTH CARE EDUCATION/TRAINING PROGRAM

## 2022-09-12 PROCEDURE — 99214 PR OFFICE/OUTPT VISIT, EST, LEVL IV, 30-39 MIN: ICD-10-PCS | Mod: GC,S$GLB,, | Performed by: STUDENT IN AN ORGANIZED HEALTH CARE EDUCATION/TRAINING PROGRAM

## 2022-09-12 PROCEDURE — 3077F SYST BP >= 140 MM HG: CPT | Mod: CPTII,GC,S$GLB, | Performed by: STUDENT IN AN ORGANIZED HEALTH CARE EDUCATION/TRAINING PROGRAM

## 2022-09-12 PROCEDURE — 4010F ACE/ARB THERAPY RXD/TAKEN: CPT | Mod: CPTII,GC,S$GLB, | Performed by: STUDENT IN AN ORGANIZED HEALTH CARE EDUCATION/TRAINING PROGRAM

## 2022-09-12 PROCEDURE — 93005 ELECTROCARDIOGRAM TRACING: CPT | Mod: S$GLB,,, | Performed by: STUDENT IN AN ORGANIZED HEALTH CARE EDUCATION/TRAINING PROGRAM

## 2022-09-12 PROCEDURE — 1101F PT FALLS ASSESS-DOCD LE1/YR: CPT | Mod: CPTII,GC,S$GLB, | Performed by: STUDENT IN AN ORGANIZED HEALTH CARE EDUCATION/TRAINING PROGRAM

## 2022-09-12 PROCEDURE — 3080F PR MOST RECENT DIASTOLIC BLOOD PRESSURE >= 90 MM HG: ICD-10-PCS | Mod: CPTII,GC,S$GLB, | Performed by: STUDENT IN AN ORGANIZED HEALTH CARE EDUCATION/TRAINING PROGRAM

## 2022-09-12 PROCEDURE — 99214 OFFICE O/P EST MOD 30 MIN: CPT | Mod: GC,S$GLB,, | Performed by: STUDENT IN AN ORGANIZED HEALTH CARE EDUCATION/TRAINING PROGRAM

## 2022-09-12 PROCEDURE — 4010F PR ACE/ARB THEARPY RXD/TAKEN: ICD-10-PCS | Mod: CPTII,GC,S$GLB, | Performed by: STUDENT IN AN ORGANIZED HEALTH CARE EDUCATION/TRAINING PROGRAM

## 2022-09-12 PROCEDURE — 81001 URINALYSIS AUTO W/SCOPE: CPT | Performed by: STUDENT IN AN ORGANIZED HEALTH CARE EDUCATION/TRAINING PROGRAM

## 2022-09-12 PROCEDURE — 87088 URINE BACTERIA CULTURE: CPT | Performed by: STUDENT IN AN ORGANIZED HEALTH CARE EDUCATION/TRAINING PROGRAM

## 2022-09-12 PROCEDURE — 93010 ELECTROCARDIOGRAM REPORT: CPT | Mod: S$GLB,,, | Performed by: INTERNAL MEDICINE

## 2022-09-12 PROCEDURE — 99284 PR EMERGENCY DEPT VISIT,LEVEL IV: ICD-10-PCS | Mod: GC,,, | Performed by: EMERGENCY MEDICINE

## 2022-09-12 PROCEDURE — 3080F DIAST BP >= 90 MM HG: CPT | Mod: CPTII,GC,S$GLB, | Performed by: STUDENT IN AN ORGANIZED HEALTH CARE EDUCATION/TRAINING PROGRAM

## 2022-09-12 PROCEDURE — 80053 COMPREHEN METABOLIC PANEL: CPT | Performed by: PHYSICIAN ASSISTANT

## 2022-09-12 PROCEDURE — 87186 SC STD MICRODIL/AGAR DIL: CPT | Performed by: STUDENT IN AN ORGANIZED HEALTH CARE EDUCATION/TRAINING PROGRAM

## 2022-09-12 PROCEDURE — 3008F BODY MASS INDEX DOCD: CPT | Mod: CPTII,GC,S$GLB, | Performed by: STUDENT IN AN ORGANIZED HEALTH CARE EDUCATION/TRAINING PROGRAM

## 2022-09-12 PROCEDURE — 3288F FALL RISK ASSESSMENT DOCD: CPT | Mod: CPTII,GC,S$GLB, | Performed by: STUDENT IN AN ORGANIZED HEALTH CARE EDUCATION/TRAINING PROGRAM

## 2022-09-12 PROCEDURE — 99999 PR PBB SHADOW E&M-EST. PATIENT-LVL III: CPT | Mod: PBBFAC,GC,, | Performed by: STUDENT IN AN ORGANIZED HEALTH CARE EDUCATION/TRAINING PROGRAM

## 2022-09-12 PROCEDURE — 85025 COMPLETE CBC W/AUTO DIFF WBC: CPT | Performed by: PHYSICIAN ASSISTANT

## 2022-09-12 PROCEDURE — 3008F PR BODY MASS INDEX (BMI) DOCUMENTED: ICD-10-PCS | Mod: CPTII,GC,S$GLB, | Performed by: STUDENT IN AN ORGANIZED HEALTH CARE EDUCATION/TRAINING PROGRAM

## 2022-09-12 PROCEDURE — 1101F PR PT FALLS ASSESS DOC 0-1 FALLS W/OUT INJ PAST YR: ICD-10-PCS | Mod: CPTII,GC,S$GLB, | Performed by: STUDENT IN AN ORGANIZED HEALTH CARE EDUCATION/TRAINING PROGRAM

## 2022-09-12 PROCEDURE — 3077F PR MOST RECENT SYSTOLIC BLOOD PRESSURE >= 140 MM HG: ICD-10-PCS | Mod: CPTII,GC,S$GLB, | Performed by: STUDENT IN AN ORGANIZED HEALTH CARE EDUCATION/TRAINING PROGRAM

## 2022-09-12 PROCEDURE — 93005 EKG 12-LEAD: ICD-10-PCS | Mod: S$GLB,,, | Performed by: STUDENT IN AN ORGANIZED HEALTH CARE EDUCATION/TRAINING PROGRAM

## 2022-09-12 PROCEDURE — 87077 CULTURE AEROBIC IDENTIFY: CPT | Performed by: STUDENT IN AN ORGANIZED HEALTH CARE EDUCATION/TRAINING PROGRAM

## 2022-09-12 PROCEDURE — 3288F PR FALLS RISK ASSESSMENT DOCUMENTED: ICD-10-PCS | Mod: CPTII,GC,S$GLB, | Performed by: STUDENT IN AN ORGANIZED HEALTH CARE EDUCATION/TRAINING PROGRAM

## 2022-09-12 PROCEDURE — 83880 ASSAY OF NATRIURETIC PEPTIDE: CPT | Performed by: PHYSICIAN ASSISTANT

## 2022-09-12 PROCEDURE — 87086 URINE CULTURE/COLONY COUNT: CPT | Performed by: STUDENT IN AN ORGANIZED HEALTH CARE EDUCATION/TRAINING PROGRAM

## 2022-09-12 PROCEDURE — 1125F PR PAIN SEVERITY QUANTIFIED, PAIN PRESENT: ICD-10-PCS | Mod: CPTII,GC,S$GLB, | Performed by: STUDENT IN AN ORGANIZED HEALTH CARE EDUCATION/TRAINING PROGRAM

## 2022-09-12 PROCEDURE — 84484 ASSAY OF TROPONIN QUANT: CPT | Performed by: PHYSICIAN ASSISTANT

## 2022-09-12 PROCEDURE — 99999 PR PBB SHADOW E&M-EST. PATIENT-LVL III: ICD-10-PCS | Mod: PBBFAC,GC,, | Performed by: STUDENT IN AN ORGANIZED HEALTH CARE EDUCATION/TRAINING PROGRAM

## 2022-09-12 PROCEDURE — 99284 EMERGENCY DEPT VISIT MOD MDM: CPT | Mod: GC,,, | Performed by: EMERGENCY MEDICINE

## 2022-09-12 PROCEDURE — 99285 EMERGENCY DEPT VISIT HI MDM: CPT | Mod: 25

## 2022-09-12 PROCEDURE — 93010 EKG 12-LEAD: ICD-10-PCS | Mod: S$GLB,,, | Performed by: INTERNAL MEDICINE

## 2022-09-12 NOTE — FIRST PROVIDER EVALUATION
Emergency Department TeleTriage Encounter Note      CHIEF COMPLAINT    Chief Complaint   Patient presents with    Chest Pain     Pt complains of chest pain that started last night, pt endorses shortness of breath this morning. Pt denies cardiac history. Pt sent from clinic.        VITAL SIGNS   Initial Vitals [09/12/22 1644]   BP Pulse Resp Temp SpO2   (!) 186/88 60 18 99.1 °F (37.3 °C) 98 %      MAP       --            ALLERGIES    Review of patient's allergies indicates:   Allergen Reactions    Nickel sutures [surgical stainless steel]     Adhesive Rash       PROVIDER TRIAGE NOTE  This is a teletriage evaluation of a 71 y.o. female presenting to the ED complaining of chest pain. Patient reports intermittent chest pain since last  night. She has associated shortness of breath.     Initial orders will be placed and care will be transferred to an alternate provider when patient is roomed for a full evaluation. Any additional orders and the final disposition will be determined by that provider.         ORDERS  Labs Reviewed   CBC W/ AUTO DIFFERENTIAL   COMPREHENSIVE METABOLIC PANEL   TROPONIN I   TROPONIN I   B-TYPE NATRIURETIC PEPTIDE       ED Orders (720h ago, onward)      Start Ordered     Status Ordering Provider    09/12/22 2131 09/12/22 1831  Troponin I #2  Once Timed         Ordered KIM ROSEN.    09/12/22 1832 09/12/22 1831  X-Ray Chest AP Portable  1 time imaging         Ordered KIM ROSEN.    09/12/22 1831 09/12/22 1831  Vital signs  Every 15 min         Ordered KIM ROSEN G.    09/12/22 1831 09/12/22 1831  Cardiac Monitoring - Adult  Continuous        Comments: Notify Physician If:    Ordered KIM ROSEN.    09/12/22 1831 09/12/22 1831  Pulse Oximetry Continuous  Continuous         Ordered KIM ROSEN G.    09/12/22 1831 09/12/22 1831  Diet NPO  Diet effective now         Ordered KIM ROSEN.    09/12/22 1831 09/12/22 1831  Saline lock IV  Once         Ordered KIM ROSEN.    09/12/22  1831 09/12/22 1831  CBC auto differential  STAT         Ordered KIM ROSEN.    09/12/22 1831 09/12/22 1831  Comprehensive metabolic panel  STAT         Ordered KIM ROSEN.    09/12/22 1831 09/12/22 1831  Troponin I #1  STAT         Ordered KIM ROSEN.    09/12/22 1831 09/12/22 1831  B-Type natriuretic peptide (BNP)  STAT         Ordered KIM ROSEN.    09/12/22 1647 09/12/22 1646  EKG 12-lead  Once         Completed by ANNI TRUJILLO on 9/12/2022 at  4:58 PM ANGELITO SIMONS              Virtual Visit Note: The provider triage portion of this emergency department evaluation and documentation was performed via [employer], a HIPAA-compliant telemedicine application, in concert with a tele-presenter in the room. A face to face patient evaluation with one of my colleagues will occur once the patient is placed in an emergency department room.      DISCLAIMER: This note was prepared with [employer] voice recognition transcription software. Garbled syntax, mangled pronouns, and other bizarre constructions may be attributed to that software system.

## 2022-09-12 NOTE — PROGRESS NOTES
INTERNAL MEDICINE RESIDENT CLINIC  CLINIC NOTE    Patient Name: Rajesh Mas  YOB: 1950    PRESENTING HISTORY       History of Present Illness:  Ms. Rajesh Mas is a 71 y.o. female w/ an active medical problem list including HTN, HLD, diastolic dysfunction, CKD3, thrombocytopenia who presents to clinic as same-day visit for L upper back pain and L chest pain.    She reports last night while she was at rest, she developed onset of L-sided chest pressure and L upper back pain. Since that time, the pressure has been waxing and waning. She is unable to quantify how long it lasts when it comes on. She did experience an episode of shortness of breath this morning when she was making coffee. She denies any nausea, vomiting, fevers, sweats, chills, arm pain, jaw pain. She had a stress Echo in 2018 which was negative for ischemia, but she does have a family history of CAD and CABG in her mother around this age. She does not take ASA or a statin.     Of note, her pressure is quite high in clinic today at 160-175/ despite taking her 3 home antihypertensives. She reports she checks her blood pressure at home every 3 days and it runs in the 120-130s. However looking back at past doctor visits, her pressure frequently is in the 160s. I am concerned her home cuff is not accurate and she may have uncontrolled hypertension.     Review of Systems   Constitutional:  Negative for chills, diaphoresis and fever.   Respiratory:  Positive for shortness of breath.    Cardiovascular:  Positive for chest pain (L sided pressure).        Denies arm or jaw pain   Gastrointestinal:  Negative for abdominal pain and vomiting.   Genitourinary:  Positive for frequency. Negative for dysuria.   Neurological:  Negative for dizziness and loss of consciousness.     PAST HISTORY:     Past Medical History:   Diagnosis Date    Allergy     seasonal    Diverticulitis     Fever blister     Hypertension     Personal history of colonic  polyps        Past Surgical History:   Procedure Laterality Date    CHOLECYSTECTOMY  04/26/2017    COLON SURGERY      COLONOSCOPY N/A 12/6/2016    Procedure: COLONOSCOPY;  Surgeon: Fidel Mason MD;  [facility]  Service: Endoscopy;  Laterality: N/A;    COLONOSCOPY N/A 5/17/2022    Procedure: COLONOSCOPY;  Surgeon: RUSSELL Rolon MD;  [facility]  Service: Endoscopy;  Laterality: N/A;  fully vaccinated, prep instr portal -ml    partial colectomy  1997    sigmoid removed due to diverticulitis    SINUS SURGERY  2007       Family History   Problem Relation Age of Onset    Diabetes Mother     Heart failure Mother     Hypertension Mother     No Known Problems Sister     Other Brother         prostate issue    No Known Problems Daughter     No Known Problems Daughter     No Known Problems Son     No Known Problems Son     Liver cancer Maternal Uncle     Colon cancer Maternal Grandmother         colon cancer    Breast cancer Maternal Grandmother     Melanoma Neg Hx     Ovarian cancer Neg Hx        Social History     Socioeconomic History    Marital status:    Tobacco Use    Smoking status: Never    Smokeless tobacco: Never   Substance and Sexual Activity    Alcohol use: No     Alcohol/week: 0.0 standard drinks    Drug use: No    Sexual activity: Yes     Partners: Male     Comment: , together since 1971     Social Determinants of Health     Financial Resource Strain: Low Risk     Difficulty of Paying Living Expenses: Not very hard   Food Insecurity: Unknown    Worried About Running Out of Food in the Last Year: Patient refused    Ran Out of Food in the Last Year: Patient refused   Transportation Needs: No Transportation Needs    Lack of Transportation (Medical): No    Lack of Transportation (Non-Medical): No   Physical Activity: Insufficiently Active    Days of Exercise per Week: 2 days    Minutes of Exercise per Session: 60 min   Stress: No Stress Concern Present     "Feeling of Stress : Not at all   Social Connections: Unknown    Frequency of Communication with Friends and Family: Three times a week    Frequency of Social Gatherings with Friends and Family: More than three times a week    Active Member of Clubs or Organizations: Yes    Attends Club or Organization Meetings: Patient refused    Marital Status:    Housing Stability: Unknown    Unable to Pay for Housing in the Last Year: Patient refused    Number of Places Lived in the Last Year: 1    Unstable Housing in the Last Year: No       MEDICATIONS & ALLERGIES:     Current Outpatient Medications on File Prior to Visit   Medication Sig    allopurinoL (ZYLOPRIM) 300 MG tablet Take 1 tablet (300 mg total) by mouth once daily. (to prevent gout)    amLODIPine (NORVASC) 10 MG tablet Take 1 tablet (10 mg total) by mouth once daily.    losartan (COZAAR) 100 MG tablet Take 1 tablet (100 mg total) by mouth once daily.    oxybutynin (DITROPAN) 5 MG Tab Take 1 tablet (5 mg total) by mouth 2 (two) times daily.    triamterene-hydrochlorothiazide 37.5-25 mg (DYAZIDE) 37.5-25 mg per capsule Take 1 capsule by mouth every morning.     No current facility-administered medications on file prior to visit.       Review of patient's allergies indicates:   Allergen Reactions    Nickel sutures [surgical stainless steel]     Adhesive Rash       OBJECTIVE:   Vital Signs:  Vitals:    09/12/22 1537 09/12/22 1600   BP: (!) 160/102 (!) 175/95   Pulse: 84    SpO2: 98%    Weight: 76.2 kg (167 lb 15.9 oz)    Height: 5' 1" (1.549 m)        No results found for this or any previous visit (from the past 24 hour(s)).      Physical Exam  Vitals and nursing note reviewed.   Constitutional:       General: She is not in acute distress.     Appearance: She is not toxic-appearing or diaphoretic.   HENT:      Head: Normocephalic.   Cardiovascular:      Rate and Rhythm: Normal rate and regular rhythm.   Pulmonary:      Effort: Pulmonary effort is normal. No " respiratory distress.      Breath sounds: No wheezing.   Abdominal:      Palpations: Abdomen is soft.      Tenderness: There is no guarding.   Skin:     General: Skin is warm and dry.   Neurological:      Mental Status: She is alert. Mental status is at baseline.      Cranial Nerves: No dysarthria.   Psychiatric:         Mood and Affect: Mood normal.         Behavior: Behavior normal.       Laboratory  Lab Results   Component Value Date    WBC 9.57 05/03/2022    HGB 13.6 05/03/2022    HCT 44.0 05/03/2022    MCV 92 05/03/2022     (L) 05/03/2022     @VLBNCLFNM58(GLU,NA,K,Cl,CO2,BUN,Creatinine,Calcium,MG)@  No results found for: INR, PROTIME  No results found for: HGBA1C  No results for input(s): POCTGLUCOSE in the last 72 hours.    Diagnostic Results:  EKG reviewed    ASSESSMENT & PLAN:     Ms. Mas presents today with ~18h of intermittent L sided chest pressure and L upper back pressure. Her blood pressure is quite elevated today despite taking her 3 home antihypertensives this morning. She states she checks her pressure at home and it is usually 120-230 systolic. However, it is frequently quite elevated at her doctor's visits and I am concerned her home cuff may not be accurate. Her mother had CAD and a CABG around this age. Given her risk factors of age and HTN, I am concerned for unstable angina.  EKG is not significantly changed from prior. No STEMI.  Will refer to ER for further workup and monitoring if needed.       Rajesh was seen today for back pain.    Diagnoses and all orders for this visit:    Back pain, unspecified back location, unspecified back pain laterality, unspecified chronicity  -     Urinalysis, Reflex to Urine Culture Urine, Clean Catch    Left chest pressure  -     B-TYPE NATRIURETIC PEPTIDE; Future  -     IN OFFICE EKG 12-LEAD (to Muse)  -     TROPONIN I; Future  -     Refer to Emergency Dept.    Hypertension  Her blood pressure is quite elevated today despite taking her 3 home  antihypertensives this morning. She states she checks her pressure at home and it is usually 120-230 systolic. However, it is frequently quite elevated at her doctor's visits and I am concerned her home cuff may not be accurate.   Advised her to try other cuffs, etc. To ensure her pressure is well-controlled at home.    Refer to ED  RTC PRN with Dr. Costa.     Discussed with Dr. Parikh  - staff attestation to follow    Coral Mesa MD  Internal Medicine PGY-3

## 2022-09-13 ENCOUNTER — PATIENT MESSAGE (OUTPATIENT)
Dept: INTERNAL MEDICINE | Facility: CLINIC | Age: 72
End: 2022-09-13
Payer: MEDICARE

## 2022-09-13 DIAGNOSIS — N30.00 ACUTE CYSTITIS WITHOUT HEMATURIA: Primary | ICD-10-CM

## 2022-09-13 DIAGNOSIS — R07.9 CHEST PAIN, UNSPECIFIED TYPE: Primary | ICD-10-CM

## 2022-09-13 NOTE — ED TRIAGE NOTES
Pt complains of chest pain that started last night, pt endorses shortness of breath this morning. Pt denies cardiac history. Pt sent from clinic. Denies N/V, cough, H/A

## 2022-09-13 NOTE — ED NOTES
Patient identifiers verified and correct for Rajesh Mas  LOC: The patient is awake, alert and aware of environment with an appropriate affect, the patient is oriented x 3 and speaking appropriately.   APPEARANCE: Patient appears comfortable and in no acute distress, patient is clean and well groomed.  SKIN: The skin is warm and dry, color consistent with ethnicity, patient has normal skin turgor and moist mucus membranes, skin intact, no breakdown or bruising noted.   MUSCULOSKELETAL: Patient moving all extremities spontaneously, no swelling noted.  RESPIRATORY: Airway is open and patent, respirations are spontaneous, patient has a normal effort and rate, no accessory muscle use noted, pt placed on continuous pulse ox with O2 sats noted at 97% on room air. Pt c/o SOB.  CARDIAC: Pt placed on cardiac monitor. Patient has a normal rate and regular rhythm, no edema noted, capillary refill < 3 seconds. Pt c/o chest pain.   GASTRO: Soft and non tender to palpation, no distention noted, normoactive bowel sounds present in all four quadrants. Pt states bowel movements have been regular.  : Pt denies any pain or frequency with urination.  NEURO: Pt opens eyes spontaneously, behavior appropriate to situation, follows commands, facial expression symmetrical, bilateral hand grasp equal and even, purposeful motor response noted, normal sensation in all extremities when touched with a finger.    No

## 2022-09-13 NOTE — DISCHARGE INSTRUCTIONS
Diagnosis: chest pain    Follow up with your cardiologist for a complete evaluation.     Tests today showed:   Labs Reviewed   CBC W/ AUTO DIFFERENTIAL - Abnormal; Notable for the following components:       Result Value    Platelets 135 (*)     Platelet Estimate Decreased (*)     All other components within normal limits   COMPREHENSIVE METABOLIC PANEL - Abnormal; Notable for the following components:    eGFR 49.0 (*)     All other components within normal limits   TROPONIN I   TROPONIN I   B-TYPE NATRIURETIC PEPTIDE     X-Ray Chest AP Portable   Final Result      No detrimental change or radiographic acute intrathoracic process seen on this single view.         Electronically signed by: Dashawn Fonseca MD   Date:    09/12/2022   Time:    20:02          Treatments you had today:   Medications - No data to display    Follow-Up Plan:  - Follow-up with primary care doctor within 3 - 5 days  - Additional testing and/or evaluation as directed by your primary doctor    Return to the Emergency Department for symptoms including but not limited to: worsening symptoms, shortness of breath or chest pain, vomiting with inability to hold down fluids, fevers greater than 100.4°F, passing out/fainting/unconsciousness, or other concerning symptoms.

## 2022-09-13 NOTE — ED PROVIDER NOTES
Encounter Date: 9/12/2022       History     Chief Complaint   Patient presents with    Chest Pain     Pt complains of chest pain that started last night, pt endorses shortness of breath this morning. Pt denies cardiac history. Pt sent from clinic.      70 y/o F w/ HTN presents with left sided chest pain since last night. Also reports associated shortness of breath that is worse with exertion. She has never has similar pain in the past. No n/v/d. No abdominal pain, fever, LE edema, cough, congestion. No recent exertion. No medications taken PTA today. No recent immobilization or estrogen use.     The history is provided by the patient.   Review of patient's allergies indicates:   Allergen Reactions    Nickel sutures [surgical stainless steel]     Adhesive Rash     Past Medical History:   Diagnosis Date    Allergy     seasonal    Diverticulitis     Fever blister     Hypertension     Personal history of colonic polyps      Past Surgical History:   Procedure Laterality Date    CHOLECYSTECTOMY  04/26/2017    COLON SURGERY      COLONOSCOPY N/A 12/6/2016    Procedure: COLONOSCOPY;  Surgeon: Fidel Mason MD;  Location: [facility] ENDO ([address] [place] [address]);  Service: Endoscopy;  Laterality: N/A;    COLONOSCOPY N/A 5/17/2022    Procedure: COLONOSCOPY;  Surgeon: RUSSELL Rolon MD;  [facility]  Service: Endoscopy;  Laterality: N/A;  fully vaccinated, prep instr portal -ml    partial colectomy  1997    sigmoid removed due to diverticulitis    SINUS SURGERY  2007     Family History   Problem Relation Age of Onset    Diabetes Mother     Heart failure Mother     Hypertension Mother     No Known Problems Sister     Other Brother         prostate issue    No Known Problems Daughter     No Known Problems Daughter     No Known Problems Son     No Known Problems Son     Liver cancer Maternal Uncle     Colon cancer Maternal Grandmother         colon cancer    Breast cancer Maternal Grandmother     Melanoma Neg Hx     Ovarian  cancer Neg Hx      Social History     Tobacco Use    Smoking status: Never    Smokeless tobacco: Never   Substance Use Topics    Alcohol use: No     Alcohol/week: 0.0 standard drinks    Drug use: No     Review of Systems   Constitutional:  Negative for chills and fever.   HENT:  Negative for rhinorrhea and sore throat.    Respiratory:  Positive for shortness of breath. Negative for cough.    Cardiovascular:  Positive for chest pain. Negative for leg swelling.   Gastrointestinal:  Negative for abdominal pain, diarrhea, nausea and vomiting.   Genitourinary:  Negative for dysuria and hematuria.   Musculoskeletal:  Negative for back pain and neck pain.   Skin:  Negative for rash and wound.   Neurological:  Negative for seizures, weakness and headaches.   Hematological:  Does not bruise/bleed easily.   Psychiatric/Behavioral:  Negative for agitation and behavioral problems.      Physical Exam     Initial Vitals [09/12/22 1644]   BP Pulse Resp Temp SpO2   (!) 186/88 60 18 99.1 °F (37.3 °C) 98 %      MAP       --         Physical Exam    Nursing note and vitals reviewed.  Constitutional: She appears well-developed and well-nourished.   HENT:   Head: Normocephalic and atraumatic.   Eyes: Conjunctivae and EOM are normal.   Neck: Neck supple.   Normal range of motion.  Cardiovascular:  Normal rate, regular rhythm, normal heart sounds and intact distal pulses.           Pulmonary/Chest: No respiratory distress. She has no wheezes. She has no rhonchi. She has no rales.   Abdominal: Abdomen is soft. Bowel sounds are normal. She exhibits no distension. There is no abdominal tenderness. There is no rebound and no guarding.   Musculoskeletal:         General: No tenderness or edema. Normal range of motion.      Cervical back: Normal range of motion and neck supple.     Neurological: She is alert and oriented to person, place, and time. She has normal strength.   Skin: Skin is warm and dry.   Psychiatric: She has a normal mood and  affect. Thought content normal.       ED Course   Procedures  Labs Reviewed   CBC W/ AUTO DIFFERENTIAL - Abnormal; Notable for the following components:       Result Value    Platelets 135 (*)     Platelet Estimate Decreased (*)     All other components within normal limits   COMPREHENSIVE METABOLIC PANEL - Abnormal; Notable for the following components:    eGFR 49.0 (*)     All other components within normal limits   TROPONIN I   TROPONIN I   B-TYPE NATRIURETIC PEPTIDE     EKG Readings: (Independently Interpreted)   Initial Reading: No STEMI. Previous EKG: Compared with most recent EKG Rhythm: Normal Sinus Rhythm. Heart Rate: 67. Ectopy: PVCs. Axis: Normal.     Imaging Results              X-Ray Chest AP Portable (Final result)  Result time 09/12/22 20:02:41      Final result by Dashawn Fonseca MD (09/12/22 20:02:41)                   Impression:      No detrimental change or radiographic acute intrathoracic process seen on this single view.      Electronically signed by: Dashawn Fonseca MD  Date:    09/12/2022  Time:    20:02               Narrative:    EXAMINATION:  XR CHEST AP PORTABLE    CLINICAL HISTORY:  Chest Pain;    TECHNIQUE:  Single frontal view of the chest was performed.    COMPARISON:  Chest radiograph 02/13/2018    FINDINGS:  Monitoring leads overlie the chest.  Patient is slightly rotated.  Large body habitus.    Trachea is relatively midline and patent.  Mediastinal structures are midline.  Grossly similar tortuosity of the aorta.  Heart is upper limits of normal in size without evidence of failure, stable.  Pulmonary vasculature and hilar contours are within normal limits.  Grossly similar chronic nonspecific elevation of the right hemidiaphragm.    Bibasilar minimal scattered platelike scarring versus atelectasis.  The lungs are otherwise well expanded without consolidation, pleural effusion or pneumothorax.    No acute osseous process seen.  PA and lateral views can be obtained.                                        Medications - No data to display  Medical Decision Making:   Initial Assessment:   70 y/o F w/ HTN presents with chest pain. Normal vitals on arrival. ACS r/o initiated.   Differential Diagnosis:   ACS, pneumonia, pneumothorax, pulmonary edema/CHF, aortic dissection, pericarditis, intra-abdominal process  Clinical Tests:   Lab Tests: Reviewed and Ordered  Radiological Study: Ordered and Reviewed  Medical Tests: Reviewed and Ordered  ED Management:  - EKG and history do not support the diagnosis of pericarditis.   - There is no evidence of pneumothorax or infiltrate on CXR.   - Aortic dissection considered, but presenting symptoms are uncharacteristic. The chest X-ray shows no evidence of mediastinal widening and the patient has strong, equal and symmetric pulses. Given the current presentation, aortic dissection is considered unlikely.  - History, CXR, and exam do not suggest pulmonary edema/congestive heart failure.   - Acute coronary syndrome considered but there are negative serial biomarkers, no acute ischemic EKG changes, and the patient has a low HEART score. Based on multiple studies, a patient with a HEART score of 3 or less has a very low risk (<2%) of short term adverse events including MI/death and are appropriate for discharge with close outpatient follow-up.    I have discussed this with the patient and reviewed options for inpatient and outpatient management. The patient verbalizes an excellent understanding of the above including presence of small risk of short-term major adverse cardiac event even in the setting of low HEART score, negative cardiac biomarker, and compendium of elements of this presentation. The patient wishes to pursue further workup on as an outpatient.    Additional MDM:   Heart Score:    History:          Slightly suspicious.  ECG:             Normal  Age:               >65 years  Risk factors: 1-2 risk factors  Troponin:       Less than or equal to normal  limit  Final Score: 3         Attending Attestation:   Physician Attestation Statement for Resident:  As the supervising MD   Physician Attestation Statement: I have personally seen and examined this patient.   I agree with the above history.  -:   As the supervising MD I agree with the above PE.     As the supervising MD I agree with the above treatment, course, plan, and disposition.   -: CP since last night, resolved after several hours of pain, slight residual SOB.  EKG with similar t waves to past, nothing acute.  Given many hours of symptoms and negative troponin, I think ACS is unlikely.  Will d/c with close f/u for provocative testing.                     ED Course as of 09/13/22 0921   Mon Sep 12, 2022   2306 Troponin I: 0.013 [KL]   2306 BNP: 78 [KL]   2306 WBC: 9.42 [KL]      ED Course User Index  [KL] Janette Hillman MD                 Clinical Impression:   Final diagnoses:  [R07.9] Chest pain        ED Disposition Condition    Discharge Stable          ED Prescriptions    None       Follow-up Information       Follow up With Specialties Details Why Contact Info    [facility] - Emergency Dept Emergency Medicine Go to  As needed, If symptoms worsen [id number]  [facility] [id number] [phone]             Janette Hillman MD  Resident  09/13/22 031       Fidel Jones MD  09/13/22 [id number]

## 2022-09-14 LAB — BACTERIA UR CULT: ABNORMAL

## 2022-09-14 RX ORDER — SULFAMETHOXAZOLE AND TRIMETHOPRIM 800; 160 MG/1; MG/1
1 TABLET ORAL 2 TIMES DAILY
Qty: 6 TABLET | Refills: 0 | Status: SHIPPED | OUTPATIENT
Start: 2022-09-14 | End: 2022-09-18

## 2022-09-15 ENCOUNTER — OFFICE VISIT (OUTPATIENT)
Dept: INTERNAL MEDICINE | Facility: CLINIC | Age: 72
End: 2022-09-15
Payer: MEDICARE

## 2022-09-15 ENCOUNTER — TELEPHONE (OUTPATIENT)
Dept: INTERNAL MEDICINE | Facility: CLINIC | Age: 72
End: 2022-09-15
Payer: MEDICARE

## 2022-09-15 ENCOUNTER — OFFICE VISIT (OUTPATIENT)
Dept: CARDIOLOGY | Facility: CLINIC | Age: 72
End: 2022-09-15
Payer: MEDICARE

## 2022-09-15 VITALS
DIASTOLIC BLOOD PRESSURE: 84 MMHG | OXYGEN SATURATION: 99 % | WEIGHT: 166.69 LBS | HEART RATE: 69 BPM | BODY MASS INDEX: 31.47 KG/M2 | SYSTOLIC BLOOD PRESSURE: 144 MMHG | HEIGHT: 61 IN

## 2022-09-15 VITALS
HEIGHT: 61 IN | OXYGEN SATURATION: 96 % | HEART RATE: 84 BPM | SYSTOLIC BLOOD PRESSURE: 138 MMHG | DIASTOLIC BLOOD PRESSURE: 82 MMHG | BODY MASS INDEX: 31.23 KG/M2 | WEIGHT: 165.38 LBS

## 2022-09-15 DIAGNOSIS — N18.31 STAGE 3A CHRONIC KIDNEY DISEASE: ICD-10-CM

## 2022-09-15 DIAGNOSIS — Z09 FOLLOW UP: Primary | ICD-10-CM

## 2022-09-15 DIAGNOSIS — I10 ESSENTIAL HYPERTENSION: ICD-10-CM

## 2022-09-15 DIAGNOSIS — R07.89 ATYPICAL CHEST PAIN: Primary | ICD-10-CM

## 2022-09-15 DIAGNOSIS — I10 ESSENTIAL HYPERTENSION: Chronic | ICD-10-CM

## 2022-09-15 PROCEDURE — 1101F PT FALLS ASSESS-DOCD LE1/YR: CPT | Mod: CPTII,GC,S$GLB, | Performed by: STUDENT IN AN ORGANIZED HEALTH CARE EDUCATION/TRAINING PROGRAM

## 2022-09-15 PROCEDURE — 1159F PR MEDICATION LIST DOCUMENTED IN MEDICAL RECORD: ICD-10-PCS | Mod: CPTII,GC,S$GLB, | Performed by: STUDENT IN AN ORGANIZED HEALTH CARE EDUCATION/TRAINING PROGRAM

## 2022-09-15 PROCEDURE — 3077F PR MOST RECENT SYSTOLIC BLOOD PRESSURE >= 140 MM HG: ICD-10-PCS | Mod: CPTII,GC,S$GLB, | Performed by: STUDENT IN AN ORGANIZED HEALTH CARE EDUCATION/TRAINING PROGRAM

## 2022-09-15 PROCEDURE — 3008F BODY MASS INDEX DOCD: CPT | Mod: CPTII,GC,S$GLB, | Performed by: STUDENT IN AN ORGANIZED HEALTH CARE EDUCATION/TRAINING PROGRAM

## 2022-09-15 PROCEDURE — 4010F PR ACE/ARB THEARPY RXD/TAKEN: ICD-10-PCS | Mod: CPTII,GC,S$GLB, | Performed by: STUDENT IN AN ORGANIZED HEALTH CARE EDUCATION/TRAINING PROGRAM

## 2022-09-15 PROCEDURE — 99999 PR PBB SHADOW E&M-EST. PATIENT-LVL III: CPT | Mod: PBBFAC,,, | Performed by: NURSE PRACTITIONER

## 2022-09-15 PROCEDURE — 1126F AMNT PAIN NOTED NONE PRSNT: CPT | Mod: CPTII,GC,S$GLB, | Performed by: STUDENT IN AN ORGANIZED HEALTH CARE EDUCATION/TRAINING PROGRAM

## 2022-09-15 PROCEDURE — 4010F ACE/ARB THERAPY RXD/TAKEN: CPT | Mod: CPTII,GC,S$GLB, | Performed by: STUDENT IN AN ORGANIZED HEALTH CARE EDUCATION/TRAINING PROGRAM

## 2022-09-15 PROCEDURE — 3075F PR MOST RECENT SYSTOLIC BLOOD PRESS GE 130-139MM HG: ICD-10-PCS | Mod: CPTII,S$GLB,, | Performed by: NURSE PRACTITIONER

## 2022-09-15 PROCEDURE — 3008F PR BODY MASS INDEX (BMI) DOCUMENTED: ICD-10-PCS | Mod: CPTII,S$GLB,, | Performed by: NURSE PRACTITIONER

## 2022-09-15 PROCEDURE — 99213 PR OFFICE/OUTPT VISIT, EST, LEVL III, 20-29 MIN: ICD-10-PCS | Mod: GC,S$GLB,, | Performed by: STUDENT IN AN ORGANIZED HEALTH CARE EDUCATION/TRAINING PROGRAM

## 2022-09-15 PROCEDURE — 1126F AMNT PAIN NOTED NONE PRSNT: CPT | Mod: CPTII,S$GLB,, | Performed by: NURSE PRACTITIONER

## 2022-09-15 PROCEDURE — 1126F PR PAIN SEVERITY QUANTIFIED, NO PAIN PRESENT: ICD-10-PCS | Mod: CPTII,GC,S$GLB, | Performed by: STUDENT IN AN ORGANIZED HEALTH CARE EDUCATION/TRAINING PROGRAM

## 2022-09-15 PROCEDURE — 3079F PR MOST RECENT DIASTOLIC BLOOD PRESSURE 80-89 MM HG: ICD-10-PCS | Mod: CPTII,S$GLB,, | Performed by: NURSE PRACTITIONER

## 2022-09-15 PROCEDURE — 1126F PR PAIN SEVERITY QUANTIFIED, NO PAIN PRESENT: ICD-10-PCS | Mod: CPTII,S$GLB,, | Performed by: NURSE PRACTITIONER

## 2022-09-15 PROCEDURE — 1101F PT FALLS ASSESS-DOCD LE1/YR: CPT | Mod: CPTII,S$GLB,, | Performed by: NURSE PRACTITIONER

## 2022-09-15 PROCEDURE — 3288F PR FALLS RISK ASSESSMENT DOCUMENTED: ICD-10-PCS | Mod: CPTII,GC,S$GLB, | Performed by: STUDENT IN AN ORGANIZED HEALTH CARE EDUCATION/TRAINING PROGRAM

## 2022-09-15 PROCEDURE — 99999 PR PBB SHADOW E&M-EST. PATIENT-LVL IV: ICD-10-PCS | Mod: PBBFAC,GC,, | Performed by: STUDENT IN AN ORGANIZED HEALTH CARE EDUCATION/TRAINING PROGRAM

## 2022-09-15 PROCEDURE — 99213 OFFICE O/P EST LOW 20 MIN: CPT | Mod: GC,S$GLB,, | Performed by: STUDENT IN AN ORGANIZED HEALTH CARE EDUCATION/TRAINING PROGRAM

## 2022-09-15 PROCEDURE — 99999 PR PBB SHADOW E&M-EST. PATIENT-LVL III: ICD-10-PCS | Mod: PBBFAC,,, | Performed by: NURSE PRACTITIONER

## 2022-09-15 PROCEDURE — 99999 PR PBB SHADOW E&M-EST. PATIENT-LVL IV: CPT | Mod: PBBFAC,GC,, | Performed by: STUDENT IN AN ORGANIZED HEALTH CARE EDUCATION/TRAINING PROGRAM

## 2022-09-15 PROCEDURE — 4010F PR ACE/ARB THEARPY RXD/TAKEN: ICD-10-PCS | Mod: CPTII,S$GLB,, | Performed by: NURSE PRACTITIONER

## 2022-09-15 PROCEDURE — 99214 PR OFFICE/OUTPT VISIT, EST, LEVL IV, 30-39 MIN: ICD-10-PCS | Mod: S$GLB,,, | Performed by: NURSE PRACTITIONER

## 2022-09-15 PROCEDURE — 4010F ACE/ARB THERAPY RXD/TAKEN: CPT | Mod: CPTII,S$GLB,, | Performed by: NURSE PRACTITIONER

## 2022-09-15 PROCEDURE — 1159F MED LIST DOCD IN RCRD: CPT | Mod: CPTII,GC,S$GLB, | Performed by: STUDENT IN AN ORGANIZED HEALTH CARE EDUCATION/TRAINING PROGRAM

## 2022-09-15 PROCEDURE — 1101F PR PT FALLS ASSESS DOC 0-1 FALLS W/OUT INJ PAST YR: ICD-10-PCS | Mod: CPTII,GC,S$GLB, | Performed by: STUDENT IN AN ORGANIZED HEALTH CARE EDUCATION/TRAINING PROGRAM

## 2022-09-15 PROCEDURE — 3079F DIAST BP 80-89 MM HG: CPT | Mod: CPTII,S$GLB,, | Performed by: NURSE PRACTITIONER

## 2022-09-15 PROCEDURE — 3077F SYST BP >= 140 MM HG: CPT | Mod: CPTII,GC,S$GLB, | Performed by: STUDENT IN AN ORGANIZED HEALTH CARE EDUCATION/TRAINING PROGRAM

## 2022-09-15 PROCEDURE — 3008F BODY MASS INDEX DOCD: CPT | Mod: CPTII,S$GLB,, | Performed by: NURSE PRACTITIONER

## 2022-09-15 PROCEDURE — 3008F PR BODY MASS INDEX (BMI) DOCUMENTED: ICD-10-PCS | Mod: CPTII,GC,S$GLB, | Performed by: STUDENT IN AN ORGANIZED HEALTH CARE EDUCATION/TRAINING PROGRAM

## 2022-09-15 PROCEDURE — 3075F SYST BP GE 130 - 139MM HG: CPT | Mod: CPTII,S$GLB,, | Performed by: NURSE PRACTITIONER

## 2022-09-15 PROCEDURE — 3079F PR MOST RECENT DIASTOLIC BLOOD PRESSURE 80-89 MM HG: ICD-10-PCS | Mod: CPTII,GC,S$GLB, | Performed by: STUDENT IN AN ORGANIZED HEALTH CARE EDUCATION/TRAINING PROGRAM

## 2022-09-15 PROCEDURE — 3288F FALL RISK ASSESSMENT DOCD: CPT | Mod: CPTII,S$GLB,, | Performed by: NURSE PRACTITIONER

## 2022-09-15 PROCEDURE — 99214 OFFICE O/P EST MOD 30 MIN: CPT | Mod: S$GLB,,, | Performed by: NURSE PRACTITIONER

## 2022-09-15 PROCEDURE — 3079F DIAST BP 80-89 MM HG: CPT | Mod: CPTII,GC,S$GLB, | Performed by: STUDENT IN AN ORGANIZED HEALTH CARE EDUCATION/TRAINING PROGRAM

## 2022-09-15 PROCEDURE — 3288F PR FALLS RISK ASSESSMENT DOCUMENTED: ICD-10-PCS | Mod: CPTII,S$GLB,, | Performed by: NURSE PRACTITIONER

## 2022-09-15 PROCEDURE — 1101F PR PT FALLS ASSESS DOC 0-1 FALLS W/OUT INJ PAST YR: ICD-10-PCS | Mod: CPTII,S$GLB,, | Performed by: NURSE PRACTITIONER

## 2022-09-15 PROCEDURE — 3288F FALL RISK ASSESSMENT DOCD: CPT | Mod: CPTII,GC,S$GLB, | Performed by: STUDENT IN AN ORGANIZED HEALTH CARE EDUCATION/TRAINING PROGRAM

## 2022-09-15 NOTE — PROGRESS NOTES
"INTERNAL MEDICINE PROGRESS/URGENT CARE NOTE    CHIEF COMPLAINT     Chief Complaint   Patient presents with    Follow-up     Hospital f/u       HPI     Rajesh Mas is a 71 y.o. female who presents for an urgent/follow up visit today. She is a current patient of Dr. Villarreal, last seen in clinic 05/03/2022 for HTN.    Today, she presents for ED follow up.    She was seen in the ED on 09/12/2022 for CP; per ED HPI: " 72 y/o F w/ HTN presents with left sided chest pain since last night. Also reports associated shortness of breath that is worse with exertion. She has never has similar pain in the past. No n/v/d. No abdominal pain, fever, LE edema, cough, congestion. No recent exertion. No medications taken PTA today. No recent immobilization or estrogen use." EKG, CXR negative- CMP notable for eGFR 49, troponin x 2 negative, CBC decreased platelets. She was discharged home and instructed to follow up with PCP.    Symptoms have improved somewhat, heaviness on left side of chest has decreased; Sob with exertion still occurs (after walking a mile patient); no diaphoresis, no sweating. At this time, 0/10 heaviness.    She has an appointment with Cardology this afternoon, Dr. Maria.    Past Medical History:  Past Medical History:   Diagnosis Date    Allergy     seasonal    Diverticulitis     Fever blister     Hypertension     Personal history of colonic polyps        Home Medications:  Prior to Admission medications    Medication Sig Start Date End Date Taking? Authorizing Provider   allopurinoL (ZYLOPRIM) 300 MG tablet Take 1 tablet (300 mg total) by mouth once daily. (to prevent gout) 5/3/22  Yes Gail Villarreal MD   amLODIPine (NORVASC) 10 MG tablet Take 1 tablet (10 mg total) by mouth once daily. 5/3/22  Yes Gail Villarreal MD   losartan (COZAAR) 100 MG tablet Take 1 tablet (100 mg total) by mouth once daily. 5/3/22  Yes Gail Villarreal MD   oxybutynin (DITROPAN) 5 MG Tab Take 1 tablet (5 mg total) by " "mouth 2 (two) times daily. 6/7/22  Yes Sharmila Kelley MD   sulfamethoxazole-trimethoprim 800-160mg (BACTRIM DS) 800-160 mg Tab Take 1 tablet by mouth 2 (two) times daily. for 3 days 9/14/22 9/17/22 Yes Coral Mesa MD   triamterene-hydrochlorothiazide 37.5-25 mg (DYAZIDE) 37.5-25 mg per capsule Take 1 capsule by mouth every morning. 5/3/22  Yes Gail Villarreal MD       Review of Systems:  Review of Systems   Constitutional:  Negative for chills, fever and unexpected weight change.   Eyes:  Negative for pain and visual disturbance.   Respiratory:  Positive for shortness of breath (With exertion, states shortness of breath happens after walking a mile). Negative for cough.    Cardiovascular:  Negative for chest pain (0/10 presently), palpitations and leg swelling.   Gastrointestinal:  Negative for nausea and vomiting.   Musculoskeletal:  Negative for back pain and gait problem.   Neurological:  Negative for dizziness, light-headedness, numbness and headaches.     Health Maintainence:   Immunizations:  Health Maintenance         Date Due Completion Date    Influenza Vaccine (1) 09/01/2022 1/12/2022    Mammogram 01/11/2023 1/11/2022    Override on 10/1/2015: Done    TETANUS VACCINE 11/02/2026 11/2/2016    Lipid Panel 05/03/2027 5/3/2022    Colorectal Cancer Screening 05/17/2027 5/17/2022    Override on 12/6/2016: Done    DEXA Scan 02/23/2028 2/23/2018             PHYSICAL EXAM     /82   Pulse 84   Ht 5' 1" (1.549 m)   Wt 75 kg (165 lb 5.5 oz)   LMP 11/28/1988 (Approximate)   SpO2 96%   BMI 31.24 kg/m²     Physical Exam  Vitals and nursing note reviewed.   Constitutional:       General: She is not in acute distress.     Appearance: Normal appearance. She is not toxic-appearing or diaphoretic.   HENT:      Head: Normocephalic and atraumatic.      Right Ear: External ear normal.      Left Ear: External ear normal.   Eyes:      Extraocular Movements: Extraocular movements intact.      " Conjunctiva/sclera: Conjunctivae normal.   Cardiovascular:      Rate and Rhythm: Normal rate and regular rhythm.      Pulses:           Radial pulses are 2+ on the right side and 2+ on the left side.      Heart sounds: Normal heart sounds. No murmur heard.    No friction rub. No gallop.   Pulmonary:      Effort: Pulmonary effort is normal.      Breath sounds: Normal breath sounds. No wheezing, rhonchi or rales.   Musculoskeletal:         General: Normal range of motion.      Cervical back: Normal range of motion and neck supple.      Right lower leg: No edema.      Left lower leg: No edema.   Skin:     General: Skin is warm and dry.   Neurological:      General: No focal deficit present.      Mental Status: She is alert and oriented to person, place, and time.      Coordination: Coordination normal.      Gait: Gait normal.   Psychiatric:         Mood and Affect: Mood normal.         Behavior: Behavior normal.       LABS     No results found for: LABA1C, HGBA1C  CMP  Sodium   Date Value Ref Range Status   09/12/2022 140 136 - 145 mmol/L Final     Potassium   Date Value Ref Range Status   09/12/2022 3.6 3.5 - 5.1 mmol/L Final     Chloride   Date Value Ref Range Status   09/12/2022 104 95 - 110 mmol/L Final     CO2   Date Value Ref Range Status   09/12/2022 27 23 - 29 mmol/L Final     Glucose   Date Value Ref Range Status   09/12/2022 95 70 - 110 mg/dL Final     BUN   Date Value Ref Range Status   09/12/2022 20 8 - 23 mg/dL Final     Creatinine   Date Value Ref Range Status   09/12/2022 1.2 0.5 - 1.4 mg/dL Final     Calcium   Date Value Ref Range Status   09/12/2022 9.6 8.7 - 10.5 mg/dL Final     Total Protein   Date Value Ref Range Status   09/12/2022 8.2 6.0 - 8.4 g/dL Final     Albumin   Date Value Ref Range Status   09/12/2022 4.0 3.5 - 5.2 g/dL Final     Total Bilirubin   Date Value Ref Range Status   09/12/2022 0.7 0.1 - 1.0 mg/dL Final     Comment:     For infants and newborns, interpretation of results should  be based  on gestational age, weight and in agreement with clinical  observations.    Premature Infant recommended reference ranges:  Up to 24 hours.............<8.0 mg/dL  Up to 48 hours............<12.0 mg/dL  3-5 days..................<15.0 mg/dL  6-29 days.................<15.0 mg/dL       Alkaline Phosphatase   Date Value Ref Range Status   09/12/2022 67 55 - 135 U/L Final     AST   Date Value Ref Range Status   09/12/2022 18 10 - 40 U/L Final     ALT   Date Value Ref Range Status   09/12/2022 15 10 - 44 U/L Final     Anion Gap   Date Value Ref Range Status   09/12/2022 9 8 - 16 mmol/L Final     eGFR if    Date Value Ref Range Status   05/03/2022 53.3 (A) >60 mL/min/1.73 m^2 Final     eGFR if non    Date Value Ref Range Status   05/03/2022 46.2 (A) >60 mL/min/1.73 m^2 Final     Comment:     Calculation used to obtain the estimated glomerular filtration  rate (eGFR) is the CKD-EPI equation.        Lab Results   Component Value Date    WBC 9.42 09/12/2022    HGB 14.0 09/12/2022    HCT 41.2 09/12/2022    MCV 90 09/12/2022     (L) 09/12/2022     Lab Results   Component Value Date    CHOL 186 05/03/2022    CHOL 167 04/27/2021    CHOL 181 07/05/2019     Lab Results   Component Value Date    HDL 72 05/03/2022    HDL 66 04/27/2021    HDL 58 07/05/2019     Lab Results   Component Value Date    LDLCALC 82.6 05/03/2022    LDLCALC 76.2 04/27/2021    LDLCALC 96.4 07/05/2019     Lab Results   Component Value Date    TRIG 157 (H) 05/03/2022    TRIG 124 04/27/2021    TRIG 133 07/05/2019     Lab Results   Component Value Date    CHOLHDL 38.7 05/03/2022    CHOLHDL 39.5 04/27/2021    CHOLHDL 32.0 07/05/2019     Lab Results   Component Value Date    TSH 3.889 05/03/2022       ASSESSMENT/PLAN     Rajesh Mas is a 71 y.o. female     Vetali was seen today for follow-up.    Diagnoses and all orders for this visit:    Follow up- she has an appointment with Cardiology this afternoon instructed  to keep appointment as previously scheduled.  Blood pressure at goal continue current treatment plan. EGFR reduced but stable, follow up with PCP.    Essential hypertension    Stage 3a chronic kidney disease         Follow up with PCP.    Patient education provided from Jammie. Patient was counseled on when and how to seek emergent care.     Blanca Albert, MSN, APRN P-BC  Department of Internal Medicine - [facility]  11:54 AM

## 2022-09-15 NOTE — PROGRESS NOTES
" I have personally taken the history and examined this patient and agree with the resident's note as stated above with the following thoughts:  BP (!) 175/95   Pulse 84   Ht 5' 1" (1.549 m)   Wt 76.2 kg (167 lb 15.9 oz)   LMP 11/28/1988 (Approximate)   SpO2 98%   BMI 31.74 kg/m²       I am worried about acute coronary syndrome.  Her EKG does not look bad, but I would like her to go to the emergency room and be further evaluated.  "

## 2022-09-15 NOTE — PROGRESS NOTES
Clinic Note  9/18/2022      Subjective:       Patient ID:  Rajesh Mas is a 71 y.o. female being seen for an urgent care visit.    Chief Complaint: Chest Pain (Ed f/u 9/12/22) and Shortness of Breath    Rajesh Mas is a 71 year old woman with HTN, CKD3, who presents today for evaluation of chest pain.     The patient reports developing sudden onset chest pain 5 days ago while watching the game. This lasted for bout 2 hours before going to bed. She describes it as pressure on the left side of chest that radiates to her back. It is present both on rest and with exertion. She has associated exertional dyspnea. This has not happened before. The pain is present in supine position. It is alleviated with rest. She has a hx of GERD and has previously taken nexium a few years ago. She is no longer on nexium.  She is a non-smoker. No hx of CAD or premature CAD in family. She visited her PCP who sent her to the ED. Upon evaluation int he ED, ECG and troponin w/u were unremarkable. She was sent home to follow up with PCP who then sent her to the cardiology clinic. Of note she had an exercise echo in 2017 that was negative, though EF was estimated to be 45%. Today, she developed some exertional dyspnea/ mild chest pressure walking from the parking lot to the clinic.       Review of Systems   Constitutional: Negative.    HENT: Negative.     Eyes: Negative.    Respiratory:  Positive for shortness of breath.    Cardiovascular:  Positive for chest pain. Negative for palpitations, orthopnea, leg swelling and PND.   Gastrointestinal:  Negative for heartburn, nausea and vomiting.   Genitourinary:  Negative for dysuria.   Musculoskeletal: Negative.    Skin: Negative.    Neurological: Negative.    Endo/Heme/Allergies: Negative.      Medication List with Changes/Refills   Current Medications    ALLOPURINOL (ZYLOPRIM) 300 MG TABLET    Take 1 tablet (300 mg total) by mouth once daily. (to prevent gout)    AMLODIPINE (NORVASC) 10  "MG TABLET    Take 1 tablet (10 mg total) by mouth once daily.    LOSARTAN (COZAAR) 100 MG TABLET    Take 1 tablet (100 mg total) by mouth once daily.    OXYBUTYNIN (DITROPAN) 5 MG TAB    Take 1 tablet (5 mg total) by mouth 2 (two) times daily.    SULFAMETHOXAZOLE-TRIMETHOPRIM 800-160MG (BACTRIM DS) 800-160 MG TAB    Take 1 tablet by mouth 2 (two) times daily. for 3 days    TRIAMTERENE-HYDROCHLOROTHIAZIDE 37.5-25 MG (DYAZIDE) 37.5-25 MG PER CAPSULE    Take 1 capsule by mouth every morning.       Patient Active Problem List   Diagnosis    Essential hypertension    Diastolic dysfunction    Thrombocytopenia    BMI 31.0-31.9,adult    Stage 3 chronic kidney disease           Objective:      BP (!) 144/84 (BP Location: Left arm, Patient Position: Sitting, BP Method: Medium (Automatic))   Pulse 69   Ht 5' 1" (1.549 m)   Wt 75.6 kg (166 lb 10.7 oz)   LMP 11/28/1988 (Approximate)   SpO2 99%   BMI 31.49 kg/m²   Estimated body mass index is 31.49 kg/m² as calculated from the following:    Height as of this encounter: 5' 1" (1.549 m).    Weight as of this encounter: 75.6 kg (166 lb 10.7 oz).  Physical Exam  Vitals reviewed.   Constitutional:       General: She is not in acute distress.     Appearance: Normal appearance. She is normal weight. She is not ill-appearing.   HENT:      Head: Normocephalic.   Eyes:      Extraocular Movements: Extraocular movements intact.   Cardiovascular:      Rate and Rhythm: Normal rate and regular rhythm.      Pulses: Normal pulses.      Heart sounds: Normal heart sounds. No murmur heard.  Pulmonary:      Effort: Pulmonary effort is normal. No respiratory distress.      Breath sounds: Normal breath sounds. No wheezing or rales.   Abdominal:      General: Abdomen is flat. Bowel sounds are normal. There is no distension.      Palpations: Abdomen is soft.      Tenderness: There is no abdominal tenderness.   Musculoskeletal:         General: No swelling or tenderness. Normal range of motion.      " Cervical back: Normal range of motion.   Skin:     General: Skin is warm.      Coloration: Skin is not jaundiced.      Findings: No bruising.   Neurological:      Mental Status: She is alert and oriented to person, place, and time. Mental status is at baseline.   Psychiatric:         Mood and Affect: Mood normal.         Behavior: Behavior normal.         Thought Content: Thought content normal.       Assessment and Plan:     Rajesh was seen today for chest pain and shortness of breath.    Diagnoses and all orders for this visit:    Atypical chest pain  Patient with atypical presentation of chest pain. Previously evaluated by the ED a few days ago that was unremarkable. Given the presence of risk factor- HTN, will obtain an ECG stress test to rule out CAD. TTE to evaluate cardiac function as she had an exercise stress echo in 2017 with an EF of 45%.   -     Exercise Stress - EKG; Future  -     Echo; Future    Essential hypertension  - Controlled. Continue antihypertensives      Follow Up:   Follow up if symptoms worsen or fail to improve.        Plan discussed with attending Dr. Mccrary, further recommendations as per attending addendum. Please feel free to call with any questions or concerns.        Crow Rivera MD  Cardiovascular Disease  Fellow Physician - PGY4

## 2022-09-19 ENCOUNTER — HOSPITAL ENCOUNTER (OUTPATIENT)
Dept: CARDIOLOGY | Facility: HOSPITAL | Age: 72
Discharge: HOME | End: 2022-09-19
Attending: STUDENT IN AN ORGANIZED HEALTH CARE EDUCATION/TRAINING PROGRAM
Payer: MEDICARE

## 2022-09-19 ENCOUNTER — PATIENT MESSAGE (OUTPATIENT)
Dept: CARDIOLOGY | Facility: CLINIC | Age: 72
End: 2022-09-19
Payer: MEDICARE

## 2022-09-19 VITALS — WEIGHT: 166 LBS | BODY MASS INDEX: 31.34 KG/M2 | HEIGHT: 61 IN

## 2022-09-19 VITALS
SYSTOLIC BLOOD PRESSURE: 112 MMHG | HEIGHT: 61 IN | HEART RATE: 70 BPM | WEIGHT: 166 LBS | DIASTOLIC BLOOD PRESSURE: 60 MMHG | BODY MASS INDEX: 31.34 KG/M2

## 2022-09-19 DIAGNOSIS — R07.89 ATYPICAL CHEST PAIN: ICD-10-CM

## 2022-09-19 LAB
ASCENDING AORTA: 3.1 CM
AV INDEX (PROSTH): 0.69
AV MEAN GRADIENT: 7 MMHG
AV PEAK GRADIENT: 11 MMHG
AV VALVE AREA: 2.21 CM2
AV VELOCITY RATIO: 0.69
BSA FOR ECHO PROCEDURE: 1.8 M2
CV ECHO LV RWT: 0.38 CM
CV STRESS BASE HR: 69 BPM
DIASTOLIC BLOOD PRESSURE: 75 MMHG
DOP CALC AO PEAK VEL: 1.67 M/S
DOP CALC AO VTI: 31.49 CM
DOP CALC LVOT AREA: 3.2 CM2
DOP CALC LVOT DIAMETER: 2.02 CM
DOP CALC LVOT PEAK VEL: 1.16 M/S
DOP CALC LVOT STROKE VOLUME: 69.64 CM3
DOP CALCLVOT PEAK VEL VTI: 21.74 CM
E WAVE DECELERATION TIME: 206.9 MSEC
E/A RATIO: 0.66
E/E' RATIO: 12.92 M/S
ECHO LV POSTERIOR WALL: 0.96 CM (ref 0.6–1.1)
EJECTION FRACTION: 55 %
FRACTIONAL SHORTENING: 30 % (ref 28–44)
INTERVENTRICULAR SEPTUM: 0.6 CM (ref 0.6–1.1)
IVRT: 111.32 MSEC
LA MAJOR: 5.2 CM
LA MINOR: 5.25 CM
LA WIDTH: 4.06 CM
LEFT ATRIUM SIZE: 3.22 CM
LEFT ATRIUM VOLUME INDEX MOD: 31.4 ML/M2
LEFT ATRIUM VOLUME INDEX: 33.2 ML/M2
LEFT ATRIUM VOLUME MOD: 54.91 CM3
LEFT ATRIUM VOLUME: 58.06 CM3
LEFT INTERNAL DIMENSION IN SYSTOLE: 3.51 CM (ref 2.1–4)
LEFT VENTRICLE DIASTOLIC VOLUME INDEX: 67.7 ML/M2
LEFT VENTRICLE DIASTOLIC VOLUME: 118.48 ML
LEFT VENTRICLE MASS INDEX: 75 G/M2
LEFT VENTRICLE SYSTOLIC VOLUME INDEX: 29.2 ML/M2
LEFT VENTRICLE SYSTOLIC VOLUME: 51.08 ML
LEFT VENTRICULAR INTERNAL DIMENSION IN DIASTOLE: 5 CM (ref 3.5–6)
LEFT VENTRICULAR MASS: 131.47 G
LV LATERAL E/E' RATIO: 10.5 M/S
LV SEPTAL E/E' RATIO: 16.8 M/S
MV A" WAVE DURATION": 8.56 MSEC
MV PEAK A VEL: 1.27 M/S
MV PEAK E VEL: 0.84 M/S
MV STENOSIS PRESSURE HALF TIME: 60 MS
MV VALVE AREA P 1/2 METHOD: 3.67 CM2
OHS CV CPX 1 MINUTE RECOVERY HEART RATE: 117 BPM
OHS CV CPX 85 PERCENT MAX PREDICTED HEART RATE MALE: 123
OHS CV CPX ESTIMATED METS: 9
OHS CV CPX MAX PREDICTED HEART RATE: 145
OHS CV CPX PATIENT IS FEMALE: 1
OHS CV CPX PATIENT IS MALE: 0
OHS CV CPX PEAK DIASTOLIC BLOOD PRESSURE: 97 MMHG
OHS CV CPX PEAK HEAR RATE: 131 BPM
OHS CV CPX PEAK RATE PRESSURE PRODUCT: NORMAL
OHS CV CPX PEAK SYSTOLIC BLOOD PRESSURE: 183 MMHG
OHS CV CPX PERCENT MAX PREDICTED HEART RATE ACHIEVED: 90
OHS CV CPX RATE PRESSURE PRODUCT PRESENTING: 8556
PISA TR MAX VEL: 2.49 M/S
PULM VEIN S/D RATIO: 1.89
PV PEAK D VEL: 0.36 M/S
PV PEAK S VEL: 0.68 M/S
RA MAJOR: 4.7 CM
RA PRESSURE: 3 MMHG
RA WIDTH: 3.88 CM
RIGHT VENTRICULAR END-DIASTOLIC DIMENSION: 3.26 CM
RV TISSUE DOPPLER FREE WALL SYSTOLIC VELOCITY 1 (APICAL 4 CHAMBER VIEW): 21.14 CM/S
SINUS: 2.69 CM
STJ: 2.31 CM
STRESS ECHO POST EXERCISE DUR MIN: 6 MINUTES
STRESS ECHO POST EXERCISE DUR SEC: 22 SECONDS
SYSTOLIC BLOOD PRESSURE: 124 MMHG
TDI LATERAL: 0.08 M/S
TDI SEPTAL: 0.05 M/S
TDI: 0.07 M/S
TR MAX PG: 25 MMHG
TRICUSPID ANNULAR PLANE SYSTOLIC EXCURSION: 1.82 CM
TV REST PULMONARY ARTERY PRESSURE: 28 MMHG

## 2022-09-19 PROCEDURE — 93017 CV STRESS TEST TRACING ONLY: CPT

## 2022-09-19 PROCEDURE — 93016 EXERCISE STRESS - EKG (CUPID ONLY): ICD-10-PCS | Mod: ,,, | Performed by: INTERNAL MEDICINE

## 2022-09-19 PROCEDURE — 93018 EXERCISE STRESS - EKG (CUPID ONLY): ICD-10-PCS | Mod: ,,, | Performed by: INTERNAL MEDICINE

## 2022-09-19 PROCEDURE — 93306 ECHO (CUPID ONLY): ICD-10-PCS | Mod: 26,,, | Performed by: INTERNAL MEDICINE

## 2022-09-19 PROCEDURE — 93306 TTE W/DOPPLER COMPLETE: CPT

## 2022-09-19 PROCEDURE — 93018 CV STRESS TEST I&R ONLY: CPT | Mod: ,,, | Performed by: INTERNAL MEDICINE

## 2022-09-19 PROCEDURE — 93306 TTE W/DOPPLER COMPLETE: CPT | Mod: 26,,, | Performed by: INTERNAL MEDICINE

## 2022-09-19 PROCEDURE — 93016 CV STRESS TEST SUPVJ ONLY: CPT | Mod: ,,, | Performed by: INTERNAL MEDICINE

## 2022-09-20 ENCOUNTER — PATIENT MESSAGE (OUTPATIENT)
Dept: CARDIOLOGY | Facility: CLINIC | Age: 72
End: 2022-09-20
Payer: MEDICARE

## 2022-11-03 ENCOUNTER — IMMUNIZATION (OUTPATIENT)
Dept: INTERNAL MEDICINE | Facility: CLINIC | Age: 72
End: 2022-11-03
Payer: MEDICARE

## 2022-11-03 ENCOUNTER — OFFICE VISIT (OUTPATIENT)
Dept: INTERNAL MEDICINE | Facility: CLINIC | Age: 72
End: 2022-11-03
Payer: MEDICARE

## 2022-11-03 VITALS
DIASTOLIC BLOOD PRESSURE: 72 MMHG | OXYGEN SATURATION: 97 % | HEIGHT: 62 IN | SYSTOLIC BLOOD PRESSURE: 120 MMHG | BODY MASS INDEX: 30.28 KG/M2 | WEIGHT: 164.56 LBS | HEART RATE: 57 BPM

## 2022-11-03 DIAGNOSIS — N18.31 STAGE 3A CHRONIC KIDNEY DISEASE: ICD-10-CM

## 2022-11-03 DIAGNOSIS — I10 ESSENTIAL HYPERTENSION: Primary | Chronic | ICD-10-CM

## 2022-11-03 PROCEDURE — 3074F PR MOST RECENT SYSTOLIC BLOOD PRESSURE < 130 MM HG: ICD-10-PCS | Mod: CPTII,S$GLB,, | Performed by: INTERNAL MEDICINE

## 2022-11-03 PROCEDURE — 3008F BODY MASS INDEX DOCD: CPT | Mod: CPTII,S$GLB,, | Performed by: INTERNAL MEDICINE

## 2022-11-03 PROCEDURE — G0008 ADMIN INFLUENZA VIRUS VAC: HCPCS | Mod: S$GLB,,, | Performed by: INTERNAL MEDICINE

## 2022-11-03 PROCEDURE — 99999 PR PBB SHADOW E&M-EST. PATIENT-LVL III: CPT | Mod: PBBFAC,,, | Performed by: INTERNAL MEDICINE

## 2022-11-03 PROCEDURE — 1159F MED LIST DOCD IN RCRD: CPT | Mod: CPTII,S$GLB,, | Performed by: INTERNAL MEDICINE

## 2022-11-03 PROCEDURE — 90694 VACC AIIV4 NO PRSRV 0.5ML IM: CPT | Mod: S$GLB,,, | Performed by: INTERNAL MEDICINE

## 2022-11-03 PROCEDURE — 4010F PR ACE/ARB THEARPY RXD/TAKEN: ICD-10-PCS | Mod: CPTII,S$GLB,, | Performed by: INTERNAL MEDICINE

## 2022-11-03 PROCEDURE — 99214 PR OFFICE/OUTPT VISIT, EST, LEVL IV, 30-39 MIN: ICD-10-PCS | Mod: S$GLB,,, | Performed by: INTERNAL MEDICINE

## 2022-11-03 PROCEDURE — 1160F RVW MEDS BY RX/DR IN RCRD: CPT | Mod: CPTII,S$GLB,, | Performed by: INTERNAL MEDICINE

## 2022-11-03 PROCEDURE — 4010F ACE/ARB THERAPY RXD/TAKEN: CPT | Mod: CPTII,S$GLB,, | Performed by: INTERNAL MEDICINE

## 2022-11-03 PROCEDURE — 3288F FALL RISK ASSESSMENT DOCD: CPT | Mod: CPTII,S$GLB,, | Performed by: INTERNAL MEDICINE

## 2022-11-03 PROCEDURE — 1126F AMNT PAIN NOTED NONE PRSNT: CPT | Mod: CPTII,S$GLB,, | Performed by: INTERNAL MEDICINE

## 2022-11-03 PROCEDURE — 99214 OFFICE O/P EST MOD 30 MIN: CPT | Mod: S$GLB,,, | Performed by: INTERNAL MEDICINE

## 2022-11-03 PROCEDURE — 1101F PT FALLS ASSESS-DOCD LE1/YR: CPT | Mod: CPTII,S$GLB,, | Performed by: INTERNAL MEDICINE

## 2022-11-03 PROCEDURE — 1101F PR PT FALLS ASSESS DOC 0-1 FALLS W/OUT INJ PAST YR: ICD-10-PCS | Mod: CPTII,S$GLB,, | Performed by: INTERNAL MEDICINE

## 2022-11-03 PROCEDURE — 99999 PR PBB SHADOW E&M-EST. PATIENT-LVL III: ICD-10-PCS | Mod: PBBFAC,,, | Performed by: INTERNAL MEDICINE

## 2022-11-03 PROCEDURE — 3008F PR BODY MASS INDEX (BMI) DOCUMENTED: ICD-10-PCS | Mod: CPTII,S$GLB,, | Performed by: INTERNAL MEDICINE

## 2022-11-03 PROCEDURE — 1126F PR PAIN SEVERITY QUANTIFIED, NO PAIN PRESENT: ICD-10-PCS | Mod: CPTII,S$GLB,, | Performed by: INTERNAL MEDICINE

## 2022-11-03 PROCEDURE — 3078F PR MOST RECENT DIASTOLIC BLOOD PRESSURE < 80 MM HG: ICD-10-PCS | Mod: CPTII,S$GLB,, | Performed by: INTERNAL MEDICINE

## 2022-11-03 PROCEDURE — 90694 FLU VACCINE - QUADRIVALENT - ADJUVANTED: ICD-10-PCS | Mod: S$GLB,,, | Performed by: INTERNAL MEDICINE

## 2022-11-03 PROCEDURE — G0008 FLU VACCINE - QUADRIVALENT - ADJUVANTED: ICD-10-PCS | Mod: S$GLB,,, | Performed by: INTERNAL MEDICINE

## 2022-11-03 PROCEDURE — 3078F DIAST BP <80 MM HG: CPT | Mod: CPTII,S$GLB,, | Performed by: INTERNAL MEDICINE

## 2022-11-03 PROCEDURE — 3074F SYST BP LT 130 MM HG: CPT | Mod: CPTII,S$GLB,, | Performed by: INTERNAL MEDICINE

## 2022-11-03 PROCEDURE — 3288F PR FALLS RISK ASSESSMENT DOCUMENTED: ICD-10-PCS | Mod: CPTII,S$GLB,, | Performed by: INTERNAL MEDICINE

## 2022-11-03 PROCEDURE — 1159F PR MEDICATION LIST DOCUMENTED IN MEDICAL RECORD: ICD-10-PCS | Mod: CPTII,S$GLB,, | Performed by: INTERNAL MEDICINE

## 2022-11-03 PROCEDURE — 1160F PR REVIEW ALL MEDS BY PRESCRIBER/CLIN PHARMACIST DOCUMENTED: ICD-10-PCS | Mod: CPTII,S$GLB,, | Performed by: INTERNAL MEDICINE

## 2022-11-03 NOTE — PROGRESS NOTES
"Subjective:       Patient ID: Rajesh Mas is a 71 y.o. female.    Chief Complaint: Follow-up    HPI  71 y.o. female here for follow up of    Gout  allopurionl 300mg  No recent flares     HTN  Losartan 100mg  Amlodipine 10mg  Triamterene-hctz     CKD 3  GFR 49 (stable)    Oxybutynin    Atypical chest pain seen in ED then by cardiology 9/15/22. Exercise stress echo 2017 w EF 45%.  Echo 9/2022: EF 55%.  No more episodes of chest pain.    Left foot/ankle swelling at night, better with elevation.  Review of Systems   Constitutional:  Negative for fever.   Respiratory:  Negative for shortness of breath.    Cardiovascular:  Negative for chest pain.   Musculoskeletal: Negative.    Skin: Negative.      Objective:   /72 (BP Location: Right arm, Patient Position: Sitting, BP Method: Medium (Manual))   Pulse (!) 57   Ht 5' 2" (1.575 m)   Wt 74.6 kg (164 lb 9.2 oz)   LMP 11/28/1988 (Approximate)   SpO2 97%   BMI 30.10 kg/m²      Physical Exam  Constitutional:       General: She is not in acute distress.     Appearance: She is well-developed. She is not diaphoretic.   HENT:      Head: Normocephalic and atraumatic.   Cardiovascular:      Rate and Rhythm: Normal rate and regular rhythm.   Pulmonary:      Effort: Pulmonary effort is normal. No respiratory distress.      Breath sounds: No wheezing or rales.   Skin:     General: Skin is warm and dry.   Neurological:      Mental Status: She is alert and oriented to person, place, and time.   Psychiatric:         Behavior: Behavior normal.       Assessment:       1. Essential hypertension    2. Stage 3a chronic kidney disease        Plan:       Rajesh was seen today for follow-up.    Diagnoses and all orders for this visit:    Essential hypertension  Continue current regimen    Stage 3a chronic kidney disease  stable      Flu vaccine      "

## 2022-12-07 ENCOUNTER — PATIENT MESSAGE (OUTPATIENT)
Dept: OBSTETRICS AND GYNECOLOGY | Facility: CLINIC | Age: 72
End: 2022-12-07
Payer: MEDICARE

## 2022-12-21 ENCOUNTER — PES CALL (OUTPATIENT)
Dept: ADMINISTRATIVE | Facility: CLINIC | Age: 72
End: 2022-12-21
Payer: MEDICARE

## 2023-01-10 ENCOUNTER — OFFICE VISIT (OUTPATIENT)
Dept: OBSTETRICS AND GYNECOLOGY | Facility: CLINIC | Age: 73
End: 2023-01-10
Attending: OBSTETRICS & GYNECOLOGY
Payer: MEDICARE

## 2023-01-10 VITALS
SYSTOLIC BLOOD PRESSURE: 141 MMHG | DIASTOLIC BLOOD PRESSURE: 88 MMHG | BODY MASS INDEX: 31.53 KG/M2 | HEIGHT: 61 IN | HEART RATE: 65 BPM | WEIGHT: 167 LBS

## 2023-01-10 DIAGNOSIS — Z01.419 ENCOUNTER FOR GYNECOLOGICAL EXAMINATION WITHOUT ABNORMAL FINDING: Primary | ICD-10-CM

## 2023-01-10 DIAGNOSIS — Z12.31 ENCOUNTER FOR SCREENING MAMMOGRAM FOR MALIGNANT NEOPLASM OF BREAST: ICD-10-CM

## 2023-01-10 PROCEDURE — G0101 CA SCREEN;PELVIC/BREAST EXAM: HCPCS | Mod: S$GLB,,, | Performed by: OBSTETRICS & GYNECOLOGY

## 2023-01-10 PROCEDURE — 99999 PR PBB SHADOW E&M-EST. PATIENT-LVL III: ICD-10-PCS | Mod: PBBFAC,,, | Performed by: OBSTETRICS & GYNECOLOGY

## 2023-01-10 PROCEDURE — 1126F AMNT PAIN NOTED NONE PRSNT: CPT | Mod: CPTII,S$GLB,, | Performed by: OBSTETRICS & GYNECOLOGY

## 2023-01-10 PROCEDURE — 3077F PR MOST RECENT SYSTOLIC BLOOD PRESSURE >= 140 MM HG: ICD-10-PCS | Mod: CPTII,S$GLB,, | Performed by: OBSTETRICS & GYNECOLOGY

## 2023-01-10 PROCEDURE — 3079F PR MOST RECENT DIASTOLIC BLOOD PRESSURE 80-89 MM HG: ICD-10-PCS | Mod: CPTII,S$GLB,, | Performed by: OBSTETRICS & GYNECOLOGY

## 2023-01-10 PROCEDURE — 1101F PT FALLS ASSESS-DOCD LE1/YR: CPT | Mod: CPTII,S$GLB,, | Performed by: OBSTETRICS & GYNECOLOGY

## 2023-01-10 PROCEDURE — 3288F PR FALLS RISK ASSESSMENT DOCUMENTED: ICD-10-PCS | Mod: CPTII,S$GLB,, | Performed by: OBSTETRICS & GYNECOLOGY

## 2023-01-10 PROCEDURE — 1160F RVW MEDS BY RX/DR IN RCRD: CPT | Mod: CPTII,S$GLB,, | Performed by: OBSTETRICS & GYNECOLOGY

## 2023-01-10 PROCEDURE — 1101F PR PT FALLS ASSESS DOC 0-1 FALLS W/OUT INJ PAST YR: ICD-10-PCS | Mod: CPTII,S$GLB,, | Performed by: OBSTETRICS & GYNECOLOGY

## 2023-01-10 PROCEDURE — 3008F PR BODY MASS INDEX (BMI) DOCUMENTED: ICD-10-PCS | Mod: CPTII,S$GLB,, | Performed by: OBSTETRICS & GYNECOLOGY

## 2023-01-10 PROCEDURE — 1160F PR REVIEW ALL MEDS BY PRESCRIBER/CLIN PHARMACIST DOCUMENTED: ICD-10-PCS | Mod: CPTII,S$GLB,, | Performed by: OBSTETRICS & GYNECOLOGY

## 2023-01-10 PROCEDURE — 3077F SYST BP >= 140 MM HG: CPT | Mod: CPTII,S$GLB,, | Performed by: OBSTETRICS & GYNECOLOGY

## 2023-01-10 PROCEDURE — G0101 PR CA SCREEN;PELVIC/BREAST EXAM: ICD-10-PCS | Mod: S$GLB,,, | Performed by: OBSTETRICS & GYNECOLOGY

## 2023-01-10 PROCEDURE — 1126F PR PAIN SEVERITY QUANTIFIED, NO PAIN PRESENT: ICD-10-PCS | Mod: CPTII,S$GLB,, | Performed by: OBSTETRICS & GYNECOLOGY

## 2023-01-10 PROCEDURE — 99999 PR PBB SHADOW E&M-EST. PATIENT-LVL III: CPT | Mod: PBBFAC,,, | Performed by: OBSTETRICS & GYNECOLOGY

## 2023-01-10 PROCEDURE — 1159F MED LIST DOCD IN RCRD: CPT | Mod: CPTII,S$GLB,, | Performed by: OBSTETRICS & GYNECOLOGY

## 2023-01-10 PROCEDURE — 3079F DIAST BP 80-89 MM HG: CPT | Mod: CPTII,S$GLB,, | Performed by: OBSTETRICS & GYNECOLOGY

## 2023-01-10 PROCEDURE — 3288F FALL RISK ASSESSMENT DOCD: CPT | Mod: CPTII,S$GLB,, | Performed by: OBSTETRICS & GYNECOLOGY

## 2023-01-10 PROCEDURE — 1159F PR MEDICATION LIST DOCUMENTED IN MEDICAL RECORD: ICD-10-PCS | Mod: CPTII,S$GLB,, | Performed by: OBSTETRICS & GYNECOLOGY

## 2023-01-10 PROCEDURE — 3008F BODY MASS INDEX DOCD: CPT | Mod: CPTII,S$GLB,, | Performed by: OBSTETRICS & GYNECOLOGY

## 2023-01-10 NOTE — PROGRESS NOTES
Subjective:       Patient ID: Rajesh Mas is a 72 y.o. female.    Chief Complaint:  Gynecologic Exam      History of Present Illness  HPI    Rajesh Mas is a 72 y.o. female  here for her annual GYN exam.    She is menopausal since age 42 and is not on HRT.  denies break through bleeding.   denies vaginal itching or irritation.  Denies vaginal discharge.  She is sexually active. She has had1 partner for 53 years .   History of abnormal pap: No  Last Pap: approximate date 2018 and was normal (and negative for HR HPV)  Last MMG: normal--routine follow-up in 12 months  Last Dexa: 2018: Normal  Last Colonoscopy:  colonoscopy 1 year ago with abnormalities.  denies domestic violence. She does feel safe at home.     Past Medical History:   Diagnosis Date    Allergy     seasonal    Diverticulitis     Fever blister     Hypertension     Personal history of colonic polyps      Past Surgical History:   Procedure Laterality Date    CHOLECYSTECTOMY  2017    COLON SURGERY      COLONOSCOPY N/A 2016    Procedure: COLONOSCOPY;  Surgeon: Fidel Mason MD;  Location: [address]);  Service: Endoscopy;  Laterality: N/A;    COLONOSCOPY N/A 2022    Procedure: COLONOSCOPY;  Surgeon: RUSSELL Rolon MD;  Location: [address]);  Service: Endoscopy;  Laterality: N/A;  fully vaccinated, prep instr portal -ml    partial colectomy      sigmoid removed due to diverticulitis    SINUS SURGERY       Social History     Socioeconomic History    Marital status:    Tobacco Use    Smoking status: Never    Smokeless tobacco: Never   Substance and Sexual Activity    Alcohol use: No     Alcohol/week: 0.0 standard drinks    Drug use: No    Sexual activity: Yes     Partners: Male     Comment: , together since      Social Determinants of Health     Financial Resource Strain: Low Risk     Difficulty of Paying Living Expenses: Not very hard   Food Insecurity: Unknown    Worried About  "Running Out of Food in the Last Year: Patient refused    Ran Out of Food in the Last Year: Patient refused   Transportation Needs: No Transportation Needs    Lack of Transportation (Medical): No    Lack of Transportation (Non-Medical): No   Physical Activity: Sufficiently Active    Days of Exercise per Week: 4 days    Minutes of Exercise per Session: 40 min   Stress: No Stress Concern Present    Feeling of Stress : Not at all   Social Connections: Unknown    Frequency of Communication with Friends and Family: More than three times a week    Frequency of Social Gatherings with Friends and Family: Twice a week    Active Member of Clubs or Organizations: Yes    Attends Club or Organization Meetings: More than 4 times per year    Marital Status:    Housing Stability: Low Risk     Unable to Pay for Housing in the Last Year: No    Number of Places Lived in the Last Year: 1    Unstable Housing in the Last Year: No     Family History   Problem Relation Age of Onset    Colon cancer Maternal Grandmother         colon cancer    Breast cancer Maternal Grandmother     Diabetes Mother     Heart failure Mother     Hypertension Mother     Other Brother         prostate issue    No Known Problems Sister     No Known Problems Daughter     No Known Problems Daughter     No Known Problems Son     No Known Problems Son     Liver cancer Maternal Uncle     Melanoma Neg Hx     Ovarian cancer Neg Hx     Cancer Neg Hx      OB History          5    Para   4    Term   4            AB   1    Living   4         SAB   1    IAB        Ectopic        Multiple        Live Births   4                 BP (!) 141/88   Pulse 65   Ht 5' 1" (1.549 m)   Wt 75.8 kg (167 lb)   LMP 1988 (Approximate)   BMI 31.55 kg/m²         GYN & OB History  Patient's last menstrual period was 1988 (approximate).   Date of Last Pap: No result found    OB History    Para Term  AB Living   5 4 4   1 4   SAB IAB Ectopic " Multiple Live Births   1       4      # Outcome Date GA Lbr Yonny/2nd Weight Sex Delivery Anes PTL Lv   5 SAB            4 Term      Vag-Spont   HALLEY   3 Term      Vag-Spont   HALLEY   2 Term      Vag-Spont   HALLEY   1 Term      Vag-Spont   HALLEY       Review of Systems  Review of Systems   Constitutional:  Negative for activity change, appetite change, fatigue and unexpected weight change.   HENT: Negative.     Eyes:  Negative for visual disturbance.   Respiratory:  Negative for shortness of breath and wheezing.    Cardiovascular:  Negative for chest pain, palpitations and leg swelling.   Gastrointestinal:  Negative for abdominal pain, bloating and blood in stool.   Endocrine: Negative for diabetes and hair loss.   Genitourinary:  Negative for decreased libido, dyspareunia and vaginal bleeding.   Musculoskeletal:  Negative for back pain and joint swelling.   Integumentary:  Negative for acne, hair changes and nipple discharge.   Neurological:  Negative for headaches.   Hematological:  Does not bruise/bleed easily.   Psychiatric/Behavioral:  Negative for depression and sleep disturbance. The patient is not nervous/anxious.    Breast: Negative for mastodynia and nipple discharge        Objective:      Physical Exam:   Constitutional: She is oriented to person, place, and time. She appears well-developed and well-nourished.    HENT:   Head: Normocephalic and atraumatic.    Eyes: Pupils are equal, round, and reactive to light. EOM are normal.     Cardiovascular:  Normal rate and regular rhythm.             Pulmonary/Chest: Effort normal and breath sounds normal.   BREASTS:  no mass, no tenderness, no deformity and no retraction. Right breast exhibits no inverted nipple, no mass, no nipple discharge, no skin change, no tenderness, no bleeding and no swelling. Left breast exhibits no inverted nipple, no mass, no nipple discharge, no skin change, no tenderness, no bleeding and no swelling. Breasts are symmetrical.               Abdominal: Soft. Bowel sounds are normal.     Genitourinary:    Pelvic exam was performed with patient supine.      Genitourinary Comments: PELVIC: Normal external genitalia without lesions.  Normal hair distribution.  Adequate perineal body, normal urethral meatus.  Vagina  Dry and poorly rugated, atrophic, without lesions or discharge.  Cervix pink, without lesions, discharge or tenderness.  No significant cystocele or rectocele.  Bimanual exam shows uterus to be normal size, regular, mobile and nontender.  Adnexa without masses or tenderness.    RECTAL:Deferred                 Musculoskeletal: Normal range of motion and moves all extremeties.       Neurological: She is alert and oriented to person, place, and time.    Skin: Skin is warm and dry.    Psychiatric: She has a normal mood and affect.            Assessment:        1. Encounter for gynecological examination without abnormal finding    2. Encounter for screening mammogram for malignant neoplasm of breast                Plan:        Problem List Items Addressed This Visit    None  Visit Diagnoses       Encounter for gynecological examination without abnormal finding    -  Primary    Encounter for screening mammogram for malignant neoplasm of breast        Relevant Orders    Mammo Digital Screening Bilat w/ Bryce             Follow up in about 1 year (around 1/10/2024).

## 2023-01-13 ENCOUNTER — LAB VISIT (OUTPATIENT)
Dept: LAB | Facility: HOSPITAL | Age: 73
End: 2023-01-13
Payer: MEDICARE

## 2023-01-13 ENCOUNTER — OFFICE VISIT (OUTPATIENT)
Dept: INTERNAL MEDICINE | Facility: CLINIC | Age: 73
End: 2023-01-13
Payer: MEDICARE

## 2023-01-13 VITALS
SYSTOLIC BLOOD PRESSURE: 132 MMHG | RESPIRATION RATE: 16 BRPM | DIASTOLIC BLOOD PRESSURE: 78 MMHG | BODY MASS INDEX: 31.84 KG/M2 | OXYGEN SATURATION: 96 % | HEART RATE: 78 BPM | WEIGHT: 168.63 LBS | HEIGHT: 61 IN

## 2023-01-13 DIAGNOSIS — I51.89 DIASTOLIC DYSFUNCTION: ICD-10-CM

## 2023-01-13 DIAGNOSIS — D69.6 THROMBOCYTOPENIA: ICD-10-CM

## 2023-01-13 DIAGNOSIS — Z00.00 ENCOUNTER FOR PREVENTIVE HEALTH EXAMINATION: Primary | ICD-10-CM

## 2023-01-13 DIAGNOSIS — E66.9 OBESITY (BMI 30-39.9): ICD-10-CM

## 2023-01-13 DIAGNOSIS — Z13.1 DIABETES MELLITUS SCREENING: ICD-10-CM

## 2023-01-13 DIAGNOSIS — N18.30 STAGE 3 CHRONIC KIDNEY DISEASE, UNSPECIFIED WHETHER STAGE 3A OR 3B CKD: ICD-10-CM

## 2023-01-13 DIAGNOSIS — I10 ESSENTIAL HYPERTENSION: Chronic | ICD-10-CM

## 2023-01-13 LAB
ESTIMATED AVG GLUCOSE: 103 MG/DL (ref 68–131)
HBA1C MFR BLD: 5.2 % (ref 4–5.6)

## 2023-01-13 PROCEDURE — G0439 PR MEDICARE ANNUAL WELLNESS SUBSEQUENT VISIT: ICD-10-PCS | Mod: S$GLB,,, | Performed by: NURSE PRACTITIONER

## 2023-01-13 PROCEDURE — 99999 PR PBB SHADOW E&M-EST. PATIENT-LVL IV: CPT | Mod: PBBFAC,,, | Performed by: NURSE PRACTITIONER

## 2023-01-13 PROCEDURE — 1126F AMNT PAIN NOTED NONE PRSNT: CPT | Mod: CPTII,S$GLB,, | Performed by: NURSE PRACTITIONER

## 2023-01-13 PROCEDURE — 99999 PR PBB SHADOW E&M-EST. PATIENT-LVL IV: ICD-10-PCS | Mod: PBBFAC,,, | Performed by: NURSE PRACTITIONER

## 2023-01-13 PROCEDURE — 3075F SYST BP GE 130 - 139MM HG: CPT | Mod: CPTII,S$GLB,, | Performed by: NURSE PRACTITIONER

## 2023-01-13 PROCEDURE — 3008F BODY MASS INDEX DOCD: CPT | Mod: CPTII,S$GLB,, | Performed by: NURSE PRACTITIONER

## 2023-01-13 PROCEDURE — 1170F PR FUNCTIONAL STATUS ASSESSED: ICD-10-PCS | Mod: CPTII,S$GLB,, | Performed by: NURSE PRACTITIONER

## 2023-01-13 PROCEDURE — 3288F PR FALLS RISK ASSESSMENT DOCUMENTED: ICD-10-PCS | Mod: CPTII,S$GLB,, | Performed by: NURSE PRACTITIONER

## 2023-01-13 PROCEDURE — 1159F PR MEDICATION LIST DOCUMENTED IN MEDICAL RECORD: ICD-10-PCS | Mod: CPTII,S$GLB,, | Performed by: NURSE PRACTITIONER

## 2023-01-13 PROCEDURE — 3078F PR MOST RECENT DIASTOLIC BLOOD PRESSURE < 80 MM HG: ICD-10-PCS | Mod: CPTII,S$GLB,, | Performed by: NURSE PRACTITIONER

## 2023-01-13 PROCEDURE — 1160F RVW MEDS BY RX/DR IN RCRD: CPT | Mod: CPTII,S$GLB,, | Performed by: NURSE PRACTITIONER

## 2023-01-13 PROCEDURE — 3008F PR BODY MASS INDEX (BMI) DOCUMENTED: ICD-10-PCS | Mod: CPTII,S$GLB,, | Performed by: NURSE PRACTITIONER

## 2023-01-13 PROCEDURE — 36415 COLL VENOUS BLD VENIPUNCTURE: CPT | Performed by: NURSE PRACTITIONER

## 2023-01-13 PROCEDURE — 1101F PT FALLS ASSESS-DOCD LE1/YR: CPT | Mod: CPTII,S$GLB,, | Performed by: NURSE PRACTITIONER

## 2023-01-13 PROCEDURE — 3078F DIAST BP <80 MM HG: CPT | Mod: CPTII,S$GLB,, | Performed by: NURSE PRACTITIONER

## 2023-01-13 PROCEDURE — G0439 PPPS, SUBSEQ VISIT: HCPCS | Mod: S$GLB,,, | Performed by: NURSE PRACTITIONER

## 2023-01-13 PROCEDURE — 3075F PR MOST RECENT SYSTOLIC BLOOD PRESS GE 130-139MM HG: ICD-10-PCS | Mod: CPTII,S$GLB,, | Performed by: NURSE PRACTITIONER

## 2023-01-13 PROCEDURE — 1101F PR PT FALLS ASSESS DOC 0-1 FALLS W/OUT INJ PAST YR: ICD-10-PCS | Mod: CPTII,S$GLB,, | Performed by: NURSE PRACTITIONER

## 2023-01-13 PROCEDURE — 1160F PR REVIEW ALL MEDS BY PRESCRIBER/CLIN PHARMACIST DOCUMENTED: ICD-10-PCS | Mod: CPTII,S$GLB,, | Performed by: NURSE PRACTITIONER

## 2023-01-13 PROCEDURE — 1126F PR PAIN SEVERITY QUANTIFIED, NO PAIN PRESENT: ICD-10-PCS | Mod: CPTII,S$GLB,, | Performed by: NURSE PRACTITIONER

## 2023-01-13 PROCEDURE — 1170F FXNL STATUS ASSESSED: CPT | Mod: CPTII,S$GLB,, | Performed by: NURSE PRACTITIONER

## 2023-01-13 PROCEDURE — 1159F MED LIST DOCD IN RCRD: CPT | Mod: CPTII,S$GLB,, | Performed by: NURSE PRACTITIONER

## 2023-01-13 PROCEDURE — 83036 HEMOGLOBIN GLYCOSYLATED A1C: CPT | Performed by: NURSE PRACTITIONER

## 2023-01-13 PROCEDURE — 3288F FALL RISK ASSESSMENT DOCD: CPT | Mod: CPTII,S$GLB,, | Performed by: NURSE PRACTITIONER

## 2023-01-13 NOTE — PROGRESS NOTES
"  Rajesh Mas presented for a  Medicare AWV and comprehensive Health Risk Assessment today. The following components were reviewed and updated:    Medical history  Family History  Social history  Allergies and Current Medications  Health Risk Assessment  Health Maintenance  Care Team         ** See Completed Assessments for Annual Wellness Visit within the encounter summary.**      The following assessments were completed:  Living Situation  CAGE  Depression Screening  Timed Get Up and Go  Whisper Test  Cognitive Function Screening      Nutrition Screening  ADL Screening  PAQ Screening    Vitals:    01/13/23 0853   BP: 132/78   BP Location: Right arm   Patient Position: Sitting   BP Method: Small (Manual)   Pulse: 78   Resp: 16   SpO2: 96%   Weight: 76.5 kg (168 lb 10.4 oz)   Height: 5' 1" (1.549 m)     Body mass index is 31.87 kg/m².  Physical Exam  Vitals and nursing note reviewed.   Constitutional:       Appearance: She is obese.   HENT:      Head: Normocephalic.      Nose: Nose normal.      Mouth/Throat:      Mouth: Mucous membranes are moist.   Eyes:      Conjunctiva/sclera: Conjunctivae normal.   Cardiovascular:      Rate and Rhythm: Normal rate.   Pulmonary:      Effort: Pulmonary effort is normal.   Musculoskeletal:         General: Normal range of motion.      Cervical back: Normal range of motion.   Neurological:      General: No focal deficit present.      Mental Status: She is alert and oriented to person, place, and time.   Psychiatric:         Mood and Affect: Mood normal.         Behavior: Behavior normal.         Thought Content: Thought content normal.         Judgment: Judgment normal.     Diagnoses and health risks identified today and associated recommendations/orders:    1. Encounter for preventive health examination  Exam done    Health Maintenance updated    Records reviewed    2. Thrombocytopenia  Chronic, followed by PCP    3. Stage 3 chronic kidney disease, unspecified whether stage 3a " or 3b CKD  Chronic, followed by PCP    Goal BP < 140/80, LDL goal < 100, low salt diet, avoid otc NSAIDs.    4. Essential hypertension  Chronic, followed by PCP    Take medications as prescribed.    Monitor BP at home, goal BP < or = 140/80, call office if consistently above this range.    Follow low salt DASH diet and exercise.    BMI reviewed.    Go to ED if Headaches, blurred vision, chest pain, or SOB occurs along with elevated readings > or = 160/90.     5. Diastolic dysfunction  Chronic, followed by PCP    6. Diabetes mellitus screening  - Hemoglobin A1C; Future    7. BMI 31.0-31.9,adult  BMI reviewed    - Hemoglobin A1C; Future    8. Obesity (BMI 30-39.9)  BMI reviewed.    Diet and exercise to lose weight.    - Hemoglobin A1C; Future      Provided Vettice with a 5-10 year written screening schedule and personal prevention plan. Recommendations were developed using the USPSTF age appropriate recommendations. Education, counseling, and referrals were provided as needed. After Visit Summary printed and given to patient which includes a list of additional screenings\tests needed.    Follow up in about 10 months (around 11/13/2023) for with PCP Dr. Villarreal for f/u.    Lila Cabello DNP      I offered to discuss advanced care planning, including how to pick a person who would make decisions for you if you were unable to make them for yourself, called a health care power of , and what kind of decisions you might make such as use of life sustaining treatments such as ventilators and tube feeding when faced with a life limiting illness recorded on a living will that they will need to know. (How you want to be cared for as you near the end of your natural life)     X Patient is interested in learning more about how to make advanced directives.  I provided them paperwork and offered to discuss this with them.

## 2023-01-13 NOTE — PATIENT INSTRUCTIONS
Counseling and Referral of Other Preventative  (Italic type indicates deductible and co-insurance are waived)    Patient Name: Rajesh Mas  Today's Date: 1/13/2023    Health Maintenance         Date Due Completion Date    Hemoglobin A1c (Diabetic Prevention Screening) Ordered today Ordered today    COVID-19 Vaccine (5 - Booster for Pfizer series) Declined for now 5/3/2022    Mammogram Scheduled March 2, 2023 1/11/2022    Override on 10/1/2015: Done    TETANUS VACCINE 11/02/2026 11/2/2016    Lipid Panel 05/03/2027 5/3/2022    Colorectal Cancer Screening 05/17/2027 5/17/2022    Override on 12/6/2016: Done    DEXA Scan 02/23/2028 2/23/2018          No orders of the defined types were placed in this encounter.    The following information is provided to all patients.  This information is to help you find resources for any of the problems found today that may be affecting your health:                Living healthy guide: www.[facility].[place].[facility]      Understanding Diabetes: www.diabetes.org      Eating healthy: www.cdc.gov/healthyweight      [facility] home safety checklist: www.cdc.gov/steadi/patient.html      Agency on Aging: www.goea.[place]      Alcoholics anonymous (AA): www.aa.org      Physical Activity: www.ilir.nih.gov/by2sdpt      Tobacco use: www.quitwithusla.org

## 2023-02-08 DIAGNOSIS — R35.0 URINARY FREQUENCY: ICD-10-CM

## 2023-02-08 RX ORDER — OXYBUTYNIN CHLORIDE 5 MG/1
5 TABLET ORAL 2 TIMES DAILY
Qty: 60 TABLET | Refills: 6 | Status: CANCELLED | OUTPATIENT
Start: 2023-02-08

## 2023-02-13 DIAGNOSIS — R35.0 URINARY FREQUENCY: ICD-10-CM

## 2023-02-14 RX ORDER — OXYBUTYNIN CHLORIDE 5 MG/1
5 TABLET ORAL 2 TIMES DAILY
Qty: 60 TABLET | Refills: 6 | Status: SHIPPED | OUTPATIENT
Start: 2023-02-14 | End: 2023-09-21 | Stop reason: SDUPTHER

## 2023-03-02 ENCOUNTER — TELEPHONE (OUTPATIENT)
Dept: INTERNAL MEDICINE | Facility: CLINIC | Age: 73
End: 2023-03-02
Payer: MEDICARE

## 2023-03-02 ENCOUNTER — HOSPITAL ENCOUNTER (OUTPATIENT)
Dept: RADIOLOGY | Facility: HOSPITAL | Age: 73
Discharge: HOME | End: 2023-03-02
Attending: OBSTETRICS & GYNECOLOGY
Payer: MEDICARE

## 2023-03-02 DIAGNOSIS — Z12.31 ENCOUNTER FOR SCREENING MAMMOGRAM FOR MALIGNANT NEOPLASM OF BREAST: ICD-10-CM

## 2023-03-02 PROCEDURE — 77063 MAMMO DIGITAL SCREENING BILAT WITH TOMO: ICD-10-PCS | Mod: 26,,, | Performed by: INTERNAL MEDICINE

## 2023-03-02 PROCEDURE — 77063 BREAST TOMOSYNTHESIS BI: CPT | Mod: 26,,, | Performed by: INTERNAL MEDICINE

## 2023-03-02 PROCEDURE — 77067 SCR MAMMO BI INCL CAD: CPT | Mod: 26,,, | Performed by: INTERNAL MEDICINE

## 2023-03-02 PROCEDURE — 77067 SCR MAMMO BI INCL CAD: CPT | Mod: TC

## 2023-03-02 PROCEDURE — 77067 MAMMO DIGITAL SCREENING BILAT WITH TOMO: ICD-10-PCS | Mod: 26,,, | Performed by: INTERNAL MEDICINE

## 2023-03-02 NOTE — TELEPHONE ENCOUNTER
----- Message from Poonam Vazquez sent at 3/2/2023 11:26 AM CST -----  Contact: [phone]  Patient is returning a phone call.  Who left a message for the patient: Dr Carvalho's office  Does patient know what this is regarding:  no  Would you like a call back, or a response through your [facility] portal?:   phone  Comments:

## 2023-04-25 ENCOUNTER — PATIENT MESSAGE (OUTPATIENT)
Dept: ADMINISTRATIVE | Facility: HOSPITAL | Age: 73
End: 2023-04-25
Payer: MEDICARE

## 2023-04-25 ENCOUNTER — PATIENT OUTREACH (OUTPATIENT)
Dept: ADMINISTRATIVE | Facility: HOSPITAL | Age: 73
End: 2023-04-25
Payer: MEDICARE

## 2023-04-25 DIAGNOSIS — I10 ESSENTIAL HYPERTENSION: ICD-10-CM

## 2023-04-25 NOTE — PROGRESS NOTES
There are no preventive care reminders to display for this patient.    Outreached regarding blood pressure medication adherence per [facility] Quality Measures report.    Jessica Chavez LPN   Clinical Care Coordinator  Primary Care and Wellness

## 2023-04-26 RX ORDER — TRIAMTERENE AND HYDROCHLOROTHIAZIDE 37.5; 25 MG/1; MG/1
1 CAPSULE ORAL EVERY MORNING
Qty: 90 CAPSULE | Refills: 3 | Status: ON HOLD | OUTPATIENT
Start: 2023-04-26 | End: 2024-01-19

## 2023-04-26 RX ORDER — LOSARTAN POTASSIUM 100 MG/1
100 TABLET ORAL DAILY
Qty: 90 TABLET | Refills: 3 | Status: ON HOLD | OUTPATIENT
Start: 2023-04-26 | End: 2024-01-19

## 2023-04-26 NOTE — TELEPHONE ENCOUNTER
If unable to provide home readings OR if they are high let's have her do a visit for HTN follow up within the next few weeks.

## 2023-06-12 ENCOUNTER — PATIENT MESSAGE (OUTPATIENT)
Dept: INTERNAL MEDICINE | Facility: CLINIC | Age: 73
End: 2023-06-12
Payer: MEDICARE

## 2023-06-12 DIAGNOSIS — E03.8 SUBCLINICAL HYPOTHYROIDISM: ICD-10-CM

## 2023-06-12 DIAGNOSIS — D69.6 THROMBOCYTOPENIA: ICD-10-CM

## 2023-06-12 DIAGNOSIS — N18.30 STAGE 3 CHRONIC KIDNEY DISEASE, UNSPECIFIED WHETHER STAGE 3A OR 3B CKD: ICD-10-CM

## 2023-06-12 DIAGNOSIS — Z13.6 SCREENING FOR CARDIOVASCULAR CONDITION: ICD-10-CM

## 2023-06-12 DIAGNOSIS — R79.89 TSH ELEVATION: ICD-10-CM

## 2023-06-12 DIAGNOSIS — I10 ESSENTIAL HYPERTENSION: Primary | Chronic | ICD-10-CM

## 2023-06-12 DIAGNOSIS — M1A.0720 IDIOPATHIC CHRONIC GOUT OF LEFT FOOT WITHOUT TOPHUS: ICD-10-CM

## 2023-06-29 ENCOUNTER — LAB VISIT (OUTPATIENT)
Dept: LAB | Facility: HOSPITAL | Age: 73
End: 2023-06-29
Attending: INTERNAL MEDICINE
Payer: MEDICARE

## 2023-06-29 DIAGNOSIS — N18.30 STAGE 3 CHRONIC KIDNEY DISEASE, UNSPECIFIED WHETHER STAGE 3A OR 3B CKD: ICD-10-CM

## 2023-06-29 DIAGNOSIS — E03.8 SUBCLINICAL HYPOTHYROIDISM: ICD-10-CM

## 2023-06-29 DIAGNOSIS — D69.6 THROMBOCYTOPENIA: ICD-10-CM

## 2023-06-29 DIAGNOSIS — M1A.0720 IDIOPATHIC CHRONIC GOUT OF LEFT FOOT WITHOUT TOPHUS: ICD-10-CM

## 2023-06-29 DIAGNOSIS — I10 ESSENTIAL HYPERTENSION: Chronic | ICD-10-CM

## 2023-06-29 DIAGNOSIS — Z13.6 SCREENING FOR CARDIOVASCULAR CONDITION: ICD-10-CM

## 2023-06-29 LAB
ALBUMIN SERPL BCP-MCNC: 3.9 G/DL (ref 3.5–5.2)
ALP SERPL-CCNC: 50 U/L (ref 55–135)
ALT SERPL W/O P-5'-P-CCNC: 18 U/L (ref 10–44)
ANION GAP SERPL CALC-SCNC: 15 MMOL/L (ref 8–16)
AST SERPL-CCNC: 19 U/L (ref 10–40)
BASOPHILS # BLD AUTO: 0.06 K/UL (ref 0–0.2)
BASOPHILS NFR BLD: 0.9 % (ref 0–1.9)
BILIRUB SERPL-MCNC: 0.7 MG/DL (ref 0.1–1)
BUN SERPL-MCNC: 31 MG/DL (ref 8–23)
CALCIUM SERPL-MCNC: 9.6 MG/DL (ref 8.7–10.5)
CHLORIDE SERPL-SCNC: 108 MMOL/L (ref 95–110)
CHOLEST SERPL-MCNC: 196 MG/DL (ref 120–199)
CHOLEST/HDLC SERPL: 3.2 {RATIO} (ref 2–5)
CO2 SERPL-SCNC: 21 MMOL/L (ref 23–29)
CREAT SERPL-MCNC: 1.6 MG/DL (ref 0.5–1.4)
DIFFERENTIAL METHOD: ABNORMAL
EOSINOPHIL # BLD AUTO: 0.1 K/UL (ref 0–0.5)
EOSINOPHIL NFR BLD: 1.9 % (ref 0–8)
ERYTHROCYTE [DISTWIDTH] IN BLOOD BY AUTOMATED COUNT: 14.6 % (ref 11.5–14.5)
EST. GFR  (NO RACE VARIABLE): 34.5 ML/MIN/1.73 M^2
GLUCOSE SERPL-MCNC: 103 MG/DL (ref 70–110)
HCT VFR BLD AUTO: 38.9 % (ref 37–48.5)
HDLC SERPL-MCNC: 62 MG/DL (ref 40–75)
HDLC SERPL: 31.6 % (ref 20–50)
HGB BLD-MCNC: 12.6 G/DL (ref 12–16)
IMM GRANULOCYTES # BLD AUTO: 0.01 K/UL (ref 0–0.04)
IMM GRANULOCYTES NFR BLD AUTO: 0.1 % (ref 0–0.5)
LDLC SERPL CALC-MCNC: 99.2 MG/DL (ref 63–159)
LYMPHOCYTES # BLD AUTO: 2.1 K/UL (ref 1–4.8)
LYMPHOCYTES NFR BLD: 30.7 % (ref 18–48)
MCH RBC QN AUTO: 29.4 PG (ref 27–31)
MCHC RBC AUTO-ENTMCNC: 32.4 G/DL (ref 32–36)
MCV RBC AUTO: 91 FL (ref 82–98)
MONOCYTES # BLD AUTO: 0.6 K/UL (ref 0.3–1)
MONOCYTES NFR BLD: 8.4 % (ref 4–15)
NEUTROPHILS # BLD AUTO: 4 K/UL (ref 1.8–7.7)
NEUTROPHILS NFR BLD: 58 % (ref 38–73)
NONHDLC SERPL-MCNC: 134 MG/DL
NRBC BLD-RTO: 0 /100 WBC
PLATELET # BLD AUTO: 140 K/UL (ref 150–450)
PMV BLD AUTO: 14 FL (ref 9.2–12.9)
POTASSIUM SERPL-SCNC: 5 MMOL/L (ref 3.5–5.1)
PROT SERPL-MCNC: 8.2 G/DL (ref 6–8.4)
RBC # BLD AUTO: 4.28 M/UL (ref 4–5.4)
SODIUM SERPL-SCNC: 144 MMOL/L (ref 136–145)
T4 FREE SERPL-MCNC: 0.81 NG/DL (ref 0.71–1.51)
TRIGL SERPL-MCNC: 174 MG/DL (ref 30–150)
TSH SERPL DL<=0.005 MIU/L-ACNC: 5.72 UIU/ML (ref 0.4–4)
URATE SERPL-MCNC: 9.9 MG/DL (ref 2.4–5.7)
WBC # BLD AUTO: 6.94 K/UL (ref 3.9–12.7)

## 2023-06-29 PROCEDURE — 80061 LIPID PANEL: CPT | Performed by: INTERNAL MEDICINE

## 2023-06-29 PROCEDURE — 36415 COLL VENOUS BLD VENIPUNCTURE: CPT | Performed by: INTERNAL MEDICINE

## 2023-06-29 PROCEDURE — 84439 ASSAY OF FREE THYROXINE: CPT | Performed by: INTERNAL MEDICINE

## 2023-06-29 PROCEDURE — 85025 COMPLETE CBC W/AUTO DIFF WBC: CPT | Performed by: INTERNAL MEDICINE

## 2023-06-29 PROCEDURE — 80053 COMPREHEN METABOLIC PANEL: CPT | Performed by: INTERNAL MEDICINE

## 2023-06-29 PROCEDURE — 84550 ASSAY OF BLOOD/URIC ACID: CPT | Performed by: INTERNAL MEDICINE

## 2023-06-29 PROCEDURE — 84443 ASSAY THYROID STIM HORMONE: CPT | Performed by: INTERNAL MEDICINE

## 2023-07-08 ENCOUNTER — LAB VISIT (OUTPATIENT)
Dept: LAB | Facility: HOSPITAL | Age: 73
End: 2023-07-08
Attending: INTERNAL MEDICINE
Payer: MEDICARE

## 2023-07-08 ENCOUNTER — OFFICE VISIT (OUTPATIENT)
Dept: INTERNAL MEDICINE | Facility: CLINIC | Age: 73
End: 2023-07-08
Payer: MEDICARE

## 2023-07-08 VITALS
OXYGEN SATURATION: 96 % | DIASTOLIC BLOOD PRESSURE: 76 MMHG | HEART RATE: 60 BPM | WEIGHT: 166.69 LBS | BODY MASS INDEX: 29.54 KG/M2 | SYSTOLIC BLOOD PRESSURE: 124 MMHG | HEIGHT: 63 IN

## 2023-07-08 DIAGNOSIS — N18.30 STAGE 3 CHRONIC KIDNEY DISEASE, UNSPECIFIED WHETHER STAGE 3A OR 3B CKD: Primary | ICD-10-CM

## 2023-07-08 DIAGNOSIS — I10 ESSENTIAL HYPERTENSION: Chronic | ICD-10-CM

## 2023-07-08 DIAGNOSIS — R79.89 SERUM CREATININE RAISED: ICD-10-CM

## 2023-07-08 DIAGNOSIS — E03.8 SUBCLINICAL HYPOTHYROIDISM: ICD-10-CM

## 2023-07-08 DIAGNOSIS — M1A.0720 IDIOPATHIC CHRONIC GOUT OF LEFT FOOT WITHOUT TOPHUS: ICD-10-CM

## 2023-07-08 DIAGNOSIS — E78.2 MIXED HYPERLIPIDEMIA: ICD-10-CM

## 2023-07-08 DIAGNOSIS — N18.30 STAGE 3 CHRONIC KIDNEY DISEASE, UNSPECIFIED WHETHER STAGE 3A OR 3B CKD: ICD-10-CM

## 2023-07-08 LAB
ANION GAP SERPL CALC-SCNC: 10 MMOL/L (ref 8–16)
BILIRUB SERPL-MCNC: NEGATIVE MG/DL
BILIRUB UR QL STRIP: NEGATIVE
BLOOD, POC UA: NEGATIVE
BUN SERPL-MCNC: 32 MG/DL (ref 8–23)
CALCIUM SERPL-MCNC: 10 MG/DL (ref 8.7–10.5)
CHLORIDE SERPL-SCNC: 109 MMOL/L (ref 95–110)
CLARITY UR REFRACT.AUTO: CLEAR
CO2 SERPL-SCNC: 26 MMOL/L (ref 23–29)
COLOR UR AUTO: YELLOW
CREAT SERPL-MCNC: 1.5 MG/DL (ref 0.5–1.4)
EST. GFR  (NO RACE VARIABLE): 37.3 ML/MIN/1.73 M^2
GLUCOSE SERPL-MCNC: 91 MG/DL (ref 70–110)
GLUCOSE UR QL STRIP: NEGATIVE
GLUCOSE UR QL STRIP: NORMAL
HGB UR QL STRIP: NEGATIVE
KETONES UR QL STRIP: NEGATIVE
KETONES UR QL STRIP: NEGATIVE
LEUKOCYTE ESTERASE UR QL STRIP: NEGATIVE
LEUKOCYTE ESTERASE URINE, POC: NORMAL
NITRITE UR QL STRIP: NEGATIVE
NITRITE, POC UA: NEGATIVE
PH UR STRIP: 6 [PH] (ref 5–8)
PH, POC UA: 5
POTASSIUM SERPL-SCNC: 4.3 MMOL/L (ref 3.5–5.1)
PROT UR QL STRIP: NEGATIVE
PROTEIN, POC: NORMAL
SODIUM SERPL-SCNC: 145 MMOL/L (ref 136–145)
SP GR UR STRIP: 1.01 (ref 1–1.03)
SPECIFIC GRAVITY, POC UA: 1.01
URN SPEC COLLECT METH UR: NORMAL
UROBILINOGEN, POC UA: NORMAL

## 2023-07-08 PROCEDURE — 36415 COLL VENOUS BLD VENIPUNCTURE: CPT | Performed by: INTERNAL MEDICINE

## 2023-07-08 PROCEDURE — 99999 PR PBB SHADOW E&M-EST. PATIENT-LVL III: CPT | Mod: PBBFAC,,, | Performed by: INTERNAL MEDICINE

## 2023-07-08 PROCEDURE — 3074F SYST BP LT 130 MM HG: CPT | Mod: CPTII,S$GLB,, | Performed by: INTERNAL MEDICINE

## 2023-07-08 PROCEDURE — 1159F PR MEDICATION LIST DOCUMENTED IN MEDICAL RECORD: ICD-10-PCS | Mod: CPTII,S$GLB,, | Performed by: INTERNAL MEDICINE

## 2023-07-08 PROCEDURE — 1160F PR REVIEW ALL MEDS BY PRESCRIBER/CLIN PHARMACIST DOCUMENTED: ICD-10-PCS | Mod: CPTII,S$GLB,, | Performed by: INTERNAL MEDICINE

## 2023-07-08 PROCEDURE — 3078F PR MOST RECENT DIASTOLIC BLOOD PRESSURE < 80 MM HG: ICD-10-PCS | Mod: CPTII,S$GLB,, | Performed by: INTERNAL MEDICINE

## 2023-07-08 PROCEDURE — 1125F AMNT PAIN NOTED PAIN PRSNT: CPT | Mod: CPTII,S$GLB,, | Performed by: INTERNAL MEDICINE

## 2023-07-08 PROCEDURE — 4010F PR ACE/ARB THEARPY RXD/TAKEN: ICD-10-PCS | Mod: CPTII,S$GLB,, | Performed by: INTERNAL MEDICINE

## 2023-07-08 PROCEDURE — 1159F MED LIST DOCD IN RCRD: CPT | Mod: CPTII,S$GLB,, | Performed by: INTERNAL MEDICINE

## 2023-07-08 PROCEDURE — 3074F PR MOST RECENT SYSTOLIC BLOOD PRESSURE < 130 MM HG: ICD-10-PCS | Mod: CPTII,S$GLB,, | Performed by: INTERNAL MEDICINE

## 2023-07-08 PROCEDURE — 81003 POCT URINALYSIS: ICD-10-PCS | Mod: QW,S$GLB,, | Performed by: INTERNAL MEDICINE

## 2023-07-08 PROCEDURE — 99214 OFFICE O/P EST MOD 30 MIN: CPT | Mod: S$GLB,,, | Performed by: INTERNAL MEDICINE

## 2023-07-08 PROCEDURE — 3288F FALL RISK ASSESSMENT DOCD: CPT | Mod: CPTII,S$GLB,, | Performed by: INTERNAL MEDICINE

## 2023-07-08 PROCEDURE — 1101F PT FALLS ASSESS-DOCD LE1/YR: CPT | Mod: CPTII,S$GLB,, | Performed by: INTERNAL MEDICINE

## 2023-07-08 PROCEDURE — 1160F RVW MEDS BY RX/DR IN RCRD: CPT | Mod: CPTII,S$GLB,, | Performed by: INTERNAL MEDICINE

## 2023-07-08 PROCEDURE — 1125F PR PAIN SEVERITY QUANTIFIED, PAIN PRESENT: ICD-10-PCS | Mod: CPTII,S$GLB,, | Performed by: INTERNAL MEDICINE

## 2023-07-08 PROCEDURE — 80048 BASIC METABOLIC PNL TOTAL CA: CPT | Performed by: INTERNAL MEDICINE

## 2023-07-08 PROCEDURE — 3008F PR BODY MASS INDEX (BMI) DOCUMENTED: ICD-10-PCS | Mod: CPTII,S$GLB,, | Performed by: INTERNAL MEDICINE

## 2023-07-08 PROCEDURE — 81003 URINALYSIS AUTO W/O SCOPE: CPT | Performed by: INTERNAL MEDICINE

## 2023-07-08 PROCEDURE — 3288F PR FALLS RISK ASSESSMENT DOCUMENTED: ICD-10-PCS | Mod: CPTII,S$GLB,, | Performed by: INTERNAL MEDICINE

## 2023-07-08 PROCEDURE — 4010F ACE/ARB THERAPY RXD/TAKEN: CPT | Mod: CPTII,S$GLB,, | Performed by: INTERNAL MEDICINE

## 2023-07-08 PROCEDURE — 99999 PR PBB SHADOW E&M-EST. PATIENT-LVL III: ICD-10-PCS | Mod: PBBFAC,,, | Performed by: INTERNAL MEDICINE

## 2023-07-08 PROCEDURE — 81003 URINALYSIS AUTO W/O SCOPE: CPT | Mod: QW,S$GLB,, | Performed by: INTERNAL MEDICINE

## 2023-07-08 PROCEDURE — 3008F BODY MASS INDEX DOCD: CPT | Mod: CPTII,S$GLB,, | Performed by: INTERNAL MEDICINE

## 2023-07-08 PROCEDURE — 99214 PR OFFICE/OUTPT VISIT, EST, LEVL IV, 30-39 MIN: ICD-10-PCS | Mod: S$GLB,,, | Performed by: INTERNAL MEDICINE

## 2023-07-08 PROCEDURE — 3044F HG A1C LEVEL LT 7.0%: CPT | Mod: CPTII,S$GLB,, | Performed by: INTERNAL MEDICINE

## 2023-07-08 PROCEDURE — 3044F PR MOST RECENT HEMOGLOBIN A1C LEVEL <7.0%: ICD-10-PCS | Mod: CPTII,S$GLB,, | Performed by: INTERNAL MEDICINE

## 2023-07-08 PROCEDURE — 3078F DIAST BP <80 MM HG: CPT | Mod: CPTII,S$GLB,, | Performed by: INTERNAL MEDICINE

## 2023-07-08 PROCEDURE — 1101F PR PT FALLS ASSESS DOC 0-1 FALLS W/OUT INJ PAST YR: ICD-10-PCS | Mod: CPTII,S$GLB,, | Performed by: INTERNAL MEDICINE

## 2023-07-08 RX ORDER — ATORVASTATIN CALCIUM 40 MG/1
40 TABLET, FILM COATED ORAL DAILY
Qty: 90 TABLET | Refills: 3 | Status: SHIPPED | OUTPATIENT
Start: 2023-07-08 | End: 2023-09-18

## 2023-07-08 RX ORDER — ALLOPURINOL 300 MG/1
300 TABLET ORAL DAILY
Qty: 90 TABLET | Refills: 3 | Status: SHIPPED | OUTPATIENT
Start: 2023-07-08 | End: 2023-09-18 | Stop reason: SDUPTHER

## 2023-07-08 NOTE — PROGRESS NOTES
"Subjective:       Patient ID: Rajesh Mas is a 72 y.o. female.    Chief Complaint: Annual Exam    HPI  Rajesh Mas is a 72 y.o. female here for routine follow up of the following chronic issues:     Gout  allopurionl 300mg  No recent flares   uric acid 9.9    HTN  Losartan 100mg  Amlodipine 10mg- stopped due to lower ext swelling in late April 2023.  Triamterene-hctz     CKD 3  GFR 49 (stable)   cr inc to 1.6 from 1.2 baseline    Oxybutynin    Tsh inc 5.7  Free t4 wnl    The 10-year ASCVD risk score (Sandra AGUERO, et al., 2019) is: 11.2%    Values used to calculate the score:      Age: 72 years      Sex: Female      Is Non- : Yes      Diabetic: No      Tobacco smoker: No      Systolic Blood Pressure: 124 mmHg      Is BP treated: Yes      HDL Cholesterol: 62 mg/dL      Total Cholesterol: 196 mg/dL          Review of Systems   Constitutional:  Negative for activity change and unexpected weight change.   HENT:  Negative for hearing loss, rhinorrhea and trouble swallowing.    Eyes:  Positive for visual disturbance. Negative for discharge.   Respiratory:  Positive for chest tightness. Negative for wheezing.    Cardiovascular:  Negative for chest pain and palpitations.   Gastrointestinal:  Negative for blood in stool, constipation, diarrhea and vomiting.   Endocrine: Negative for polydipsia and polyuria.   Genitourinary:  Negative for difficulty urinating, dysuria, hematuria and menstrual problem.   Musculoskeletal:  Negative for arthralgias, joint swelling and neck pain.   Neurological:  Negative for weakness and headaches.   Psychiatric/Behavioral:  Negative for confusion and dysphoric mood.      Objective:   /76   Pulse 60   Ht 5' 2.5" (1.588 m)   Wt 75.6 kg (166 lb 10.7 oz)   LMP 11/28/1988 (Approximate)   SpO2 96%   BMI 30.00 kg/m²      Physical Exam  Constitutional:       General: She is not in acute distress.     Appearance: She is well-developed. She is not diaphoretic. "   HENT:      Head: Normocephalic and atraumatic.   Cardiovascular:      Rate and Rhythm: Normal rate and regular rhythm.   Pulmonary:      Effort: Pulmonary effort is normal. No respiratory distress.      Breath sounds: No wheezing or rales.   Lymphadenopathy:      Cervical: No cervical adenopathy.   Skin:     General: Skin is warm and dry.   Neurological:      Mental Status: She is alert and oriented to person, place, and time.   Psychiatric:         Behavior: Behavior normal.       Assessment:       1. Stage 3 chronic kidney disease, unspecified whether stage 3a or 3b CKD    2. Essential hypertension    3. Mixed hyperlipidemia    4. Idiopathic chronic gout of left foot without tophus    5. Serum creatinine raised        Plan:       1. Stage 3 chronic kidney disease, with 5. Serum creatinine raised  -     Basic Metabolic Panel; Future; Expected date: 07/08/2023  -     Urinalysis, Reflex to Urine Culture Urine, Clean Catch; Future; Expected date: 07/08/2023  -     POCT Urinalysis    2. Essential hypertension  Controlled on current regimen    3. Mixed hyperlipidemia  -    START atorvastatin (LIPITOR) 40 MG tablet; Take 1 tablet (40 mg total) by mouth once daily.  Dispense: 90 tablet; Refill: 3    4. Idiopathic chronic gout of left foot without tophus  -     allopurinoL (ZYLOPRIM) 300 MG tablet; Take 1 tablet (300 mg total) by mouth once daily. (to prevent gout)  Dispense: 90 tablet; Refill: 3    Subclincal hypothyroidism\  monitor           You are up to date for your primary preventive health care, and there are no reminders at this time.

## 2023-07-10 ENCOUNTER — PATIENT MESSAGE (OUTPATIENT)
Dept: INTERNAL MEDICINE | Facility: CLINIC | Age: 73
End: 2023-07-10
Payer: MEDICARE

## 2023-07-10 DIAGNOSIS — R79.89 SERUM CREATININE RAISED: Primary | ICD-10-CM

## 2023-07-26 ENCOUNTER — LAB VISIT (OUTPATIENT)
Dept: LAB | Facility: HOSPITAL | Age: 73
End: 2023-07-26
Attending: INTERNAL MEDICINE
Payer: MEDICARE

## 2023-07-26 DIAGNOSIS — R79.89 SERUM CREATININE RAISED: ICD-10-CM

## 2023-07-26 LAB
ANION GAP SERPL CALC-SCNC: 10 MMOL/L (ref 8–16)
BUN SERPL-MCNC: 30 MG/DL (ref 8–23)
CALCIUM SERPL-MCNC: 9.7 MG/DL (ref 8.7–10.5)
CHLORIDE SERPL-SCNC: 104 MMOL/L (ref 95–110)
CO2 SERPL-SCNC: 24 MMOL/L (ref 23–29)
CREAT SERPL-MCNC: 1.5 MG/DL (ref 0.5–1.4)
EST. GFR  (NO RACE VARIABLE): 37.3 ML/MIN/1.73 M^2
GLUCOSE SERPL-MCNC: 90 MG/DL (ref 70–110)
POTASSIUM SERPL-SCNC: 4.5 MMOL/L (ref 3.5–5.1)
SODIUM SERPL-SCNC: 138 MMOL/L (ref 136–145)

## 2023-07-26 PROCEDURE — 36415 COLL VENOUS BLD VENIPUNCTURE: CPT | Performed by: INTERNAL MEDICINE

## 2023-07-26 PROCEDURE — 80048 BASIC METABOLIC PNL TOTAL CA: CPT | Performed by: INTERNAL MEDICINE

## 2023-07-28 ENCOUNTER — OFFICE VISIT (OUTPATIENT)
Dept: OPTOMETRY | Facility: CLINIC | Age: 73
End: 2023-07-28
Payer: MEDICARE

## 2023-07-28 DIAGNOSIS — H25.13 NUCLEAR SCLEROSIS OF BOTH EYES: Primary | ICD-10-CM

## 2023-07-28 PROCEDURE — 3044F PR MOST RECENT HEMOGLOBIN A1C LEVEL <7.0%: ICD-10-PCS | Mod: CPTII,S$GLB,, | Performed by: OPTOMETRIST

## 2023-07-28 PROCEDURE — 1126F PR PAIN SEVERITY QUANTIFIED, NO PAIN PRESENT: ICD-10-PCS | Mod: CPTII,S$GLB,, | Performed by: OPTOMETRIST

## 2023-07-28 PROCEDURE — 1159F MED LIST DOCD IN RCRD: CPT | Mod: CPTII,S$GLB,, | Performed by: OPTOMETRIST

## 2023-07-28 PROCEDURE — 4010F PR ACE/ARB THEARPY RXD/TAKEN: ICD-10-PCS | Mod: CPTII,S$GLB,, | Performed by: OPTOMETRIST

## 2023-07-28 PROCEDURE — 3288F PR FALLS RISK ASSESSMENT DOCUMENTED: ICD-10-PCS | Mod: CPTII,S$GLB,, | Performed by: OPTOMETRIST

## 2023-07-28 PROCEDURE — 99999 PR PBB SHADOW E&M-EST. PATIENT-LVL II: ICD-10-PCS | Mod: PBBFAC,,, | Performed by: OPTOMETRIST

## 2023-07-28 PROCEDURE — 92015 PR REFRACTION: ICD-10-PCS | Mod: S$GLB,,, | Performed by: OPTOMETRIST

## 2023-07-28 PROCEDURE — 92004 COMPRE OPH EXAM NEW PT 1/>: CPT | Mod: S$GLB,,, | Performed by: OPTOMETRIST

## 2023-07-28 PROCEDURE — 3044F HG A1C LEVEL LT 7.0%: CPT | Mod: CPTII,S$GLB,, | Performed by: OPTOMETRIST

## 2023-07-28 PROCEDURE — 1159F PR MEDICATION LIST DOCUMENTED IN MEDICAL RECORD: ICD-10-PCS | Mod: CPTII,S$GLB,, | Performed by: OPTOMETRIST

## 2023-07-28 PROCEDURE — 1101F PT FALLS ASSESS-DOCD LE1/YR: CPT | Mod: CPTII,S$GLB,, | Performed by: OPTOMETRIST

## 2023-07-28 PROCEDURE — 4010F ACE/ARB THERAPY RXD/TAKEN: CPT | Mod: CPTII,S$GLB,, | Performed by: OPTOMETRIST

## 2023-07-28 PROCEDURE — 99999 PR PBB SHADOW E&M-EST. PATIENT-LVL II: CPT | Mod: PBBFAC,,, | Performed by: OPTOMETRIST

## 2023-07-28 PROCEDURE — 3288F FALL RISK ASSESSMENT DOCD: CPT | Mod: CPTII,S$GLB,, | Performed by: OPTOMETRIST

## 2023-07-28 PROCEDURE — 92004 PR EYE EXAM, NEW PATIENT,COMPREHESV: ICD-10-PCS | Mod: S$GLB,,, | Performed by: OPTOMETRIST

## 2023-07-28 PROCEDURE — 92015 DETERMINE REFRACTIVE STATE: CPT | Mod: S$GLB,,, | Performed by: OPTOMETRIST

## 2023-07-28 PROCEDURE — 1101F PR PT FALLS ASSESS DOC 0-1 FALLS W/OUT INJ PAST YR: ICD-10-PCS | Mod: CPTII,S$GLB,, | Performed by: OPTOMETRIST

## 2023-07-28 PROCEDURE — 1126F AMNT PAIN NOTED NONE PRSNT: CPT | Mod: CPTII,S$GLB,, | Performed by: OPTOMETRIST

## 2023-07-28 NOTE — PROGRESS NOTES
HPI    Patient's age: 66 y.o.  Approximate date of last eye examination: 04/17/2017  Name of last eye doctor seen: Dr. Mesa  City/State: [facility]  Wears glasses? Yes  If yes, wears  Full-time or part-time?  Full time  Present glasses are: Bifocal, SV Distance, SV Reading?  Progressives  Approximate age of present glasses:  1 1/2 years old *Had exam with   [facility]  Got new glasses following last exam, or subsequently?:  yes  Any problem with [facility] with glasses?  Yes, hard to see out of glasses.  Wears CLs?:  No  Headaches?  Yes  Eye pain/discomfort?  No                                                                                     Flashes?  No  Floaters?  No  Diplopia/Double vision?  No  Patient's Ocular History:         Any eye surgeries? No         Any eye injury?  No  NO GTTS  Last edited by Mara Parson, OD on 7/28/2023 12:29 PM.            Assessment /Plan     For exam results, see Encounter Report.    Nuclear sclerosis of both eyes      Educated pt on findings. Not visually significant. No need for removal at this time. Monitor yearly.      Eyeglass Final Rx       Eyeglass Final Rx         Sphere Cylinder Axis Add    Right +3.00 Sphere  +2.50    Left +2.25 +0.50 180 +2.50      Type: PAL    Expiration Date: 7/28/2024                   RTC in 1 year for annual eye exam unless changes noted sooner.

## 2023-08-01 ENCOUNTER — PATIENT MESSAGE (OUTPATIENT)
Dept: INTERNAL MEDICINE | Facility: CLINIC | Age: 73
End: 2023-08-01
Payer: MEDICARE

## 2023-08-01 DIAGNOSIS — N18.32 STAGE 3B CHRONIC KIDNEY DISEASE: Primary | ICD-10-CM

## 2023-08-02 ENCOUNTER — PATIENT MESSAGE (OUTPATIENT)
Dept: INTERNAL MEDICINE | Facility: CLINIC | Age: 73
End: 2023-08-02

## 2023-08-02 PROBLEM — M25.552 LEFT HIP PAIN: Status: ACTIVE | Noted: 2023-08-02

## 2023-08-15 ENCOUNTER — LAB VISIT (OUTPATIENT)
Dept: LAB | Facility: HOSPITAL | Age: 73
End: 2023-08-15
Payer: MEDICARE

## 2023-08-15 ENCOUNTER — TELEPHONE (OUTPATIENT)
Dept: NEPHROLOGY | Facility: CLINIC | Age: 73
End: 2023-08-15
Payer: MEDICARE

## 2023-08-15 DIAGNOSIS — N18.30 STAGE 3 CHRONIC KIDNEY DISEASE, UNSPECIFIED WHETHER STAGE 3A OR 3B CKD: Primary | ICD-10-CM

## 2023-08-15 DIAGNOSIS — N18.30 STAGE 3 CHRONIC KIDNEY DISEASE, UNSPECIFIED WHETHER STAGE 3A OR 3B CKD: ICD-10-CM

## 2023-08-15 LAB
ALBUMIN SERPL BCP-MCNC: 3.9 G/DL (ref 3.5–5.2)
ANION GAP SERPL CALC-SCNC: 11 MMOL/L (ref 8–16)
BASOPHILS # BLD AUTO: 0.05 K/UL (ref 0–0.2)
BASOPHILS NFR BLD: 0.6 % (ref 0–1.9)
BUN SERPL-MCNC: 35 MG/DL (ref 8–23)
CALCIUM SERPL-MCNC: 9.8 MG/DL (ref 8.7–10.5)
CHLORIDE SERPL-SCNC: 108 MMOL/L (ref 95–110)
CO2 SERPL-SCNC: 23 MMOL/L (ref 23–29)
CREAT SERPL-MCNC: 1.9 MG/DL (ref 0.5–1.4)
DIFFERENTIAL METHOD: ABNORMAL
EOSINOPHIL # BLD AUTO: 0.1 K/UL (ref 0–0.5)
EOSINOPHIL NFR BLD: 1.3 % (ref 0–8)
ERYTHROCYTE [DISTWIDTH] IN BLOOD BY AUTOMATED COUNT: 14.3 % (ref 11.5–14.5)
EST. GFR  (NO RACE VARIABLE): 28.1 ML/MIN/1.73 M^2
GLUCOSE SERPL-MCNC: 90 MG/DL (ref 70–110)
HCT VFR BLD AUTO: 38.8 % (ref 37–48.5)
HGB BLD-MCNC: 12.4 G/DL (ref 12–16)
IMM GRANULOCYTES # BLD AUTO: 0.02 K/UL (ref 0–0.04)
IMM GRANULOCYTES NFR BLD AUTO: 0.2 % (ref 0–0.5)
LYMPHOCYTES # BLD AUTO: 2.5 K/UL (ref 1–4.8)
LYMPHOCYTES NFR BLD: 29.1 % (ref 18–48)
MCH RBC QN AUTO: 29.5 PG (ref 27–31)
MCHC RBC AUTO-ENTMCNC: 32 G/DL (ref 32–36)
MCV RBC AUTO: 92 FL (ref 82–98)
MONOCYTES # BLD AUTO: 0.7 K/UL (ref 0.3–1)
MONOCYTES NFR BLD: 8.3 % (ref 4–15)
NEUTROPHILS # BLD AUTO: 5.1 K/UL (ref 1.8–7.7)
NEUTROPHILS NFR BLD: 60.5 % (ref 38–73)
NRBC BLD-RTO: 0 /100 WBC
PHOSPHATE SERPL-MCNC: 3.3 MG/DL (ref 2.7–4.5)
PLATELET # BLD AUTO: 150 K/UL (ref 150–450)
PMV BLD AUTO: 14.1 FL (ref 9.2–12.9)
POTASSIUM SERPL-SCNC: 4.5 MMOL/L (ref 3.5–5.1)
RBC # BLD AUTO: 4.2 M/UL (ref 4–5.4)
SODIUM SERPL-SCNC: 142 MMOL/L (ref 136–145)
WBC # BLD AUTO: 8.44 K/UL (ref 3.9–12.7)

## 2023-08-15 PROCEDURE — 85025 COMPLETE CBC W/AUTO DIFF WBC: CPT | Performed by: NURSE PRACTITIONER

## 2023-08-15 PROCEDURE — 36415 COLL VENOUS BLD VENIPUNCTURE: CPT | Performed by: NURSE PRACTITIONER

## 2023-08-15 PROCEDURE — 80069 RENAL FUNCTION PANEL: CPT | Performed by: NURSE PRACTITIONER

## 2023-08-16 ENCOUNTER — OFFICE VISIT (OUTPATIENT)
Dept: NEPHROLOGY | Facility: CLINIC | Age: 73
End: 2023-08-16
Payer: MEDICARE

## 2023-08-16 VITALS
WEIGHT: 165.38 LBS | SYSTOLIC BLOOD PRESSURE: 137 MMHG | OXYGEN SATURATION: 97 % | BODY MASS INDEX: 30.24 KG/M2 | DIASTOLIC BLOOD PRESSURE: 87 MMHG | HEART RATE: 77 BPM

## 2023-08-16 DIAGNOSIS — E79.0 HYPERURICEMIA: ICD-10-CM

## 2023-08-16 DIAGNOSIS — R80.9 PROTEINURIA, UNSPECIFIED TYPE: ICD-10-CM

## 2023-08-16 DIAGNOSIS — N18.4 CKD (CHRONIC KIDNEY DISEASE) STAGE 4, GFR 15-29 ML/MIN: Primary | ICD-10-CM

## 2023-08-16 DIAGNOSIS — I10 ESSENTIAL HYPERTENSION: Chronic | ICD-10-CM

## 2023-08-16 PROCEDURE — 3008F PR BODY MASS INDEX (BMI) DOCUMENTED: ICD-10-PCS | Mod: CPTII,S$GLB,, | Performed by: NURSE PRACTITIONER

## 2023-08-16 PROCEDURE — 4010F PR ACE/ARB THEARPY RXD/TAKEN: ICD-10-PCS | Mod: CPTII,S$GLB,, | Performed by: NURSE PRACTITIONER

## 2023-08-16 PROCEDURE — 3288F PR FALLS RISK ASSESSMENT DOCUMENTED: ICD-10-PCS | Mod: CPTII,S$GLB,, | Performed by: NURSE PRACTITIONER

## 2023-08-16 PROCEDURE — 99204 PR OFFICE/OUTPT VISIT, NEW, LEVL IV, 45-59 MIN: ICD-10-PCS | Mod: S$GLB,,, | Performed by: NURSE PRACTITIONER

## 2023-08-16 PROCEDURE — 1159F MED LIST DOCD IN RCRD: CPT | Mod: CPTII,S$GLB,, | Performed by: NURSE PRACTITIONER

## 2023-08-16 PROCEDURE — 3066F PR DOCUMENTATION OF TREATMENT FOR NEPHROPATHY: ICD-10-PCS | Mod: CPTII,S$GLB,, | Performed by: NURSE PRACTITIONER

## 2023-08-16 PROCEDURE — 4010F ACE/ARB THERAPY RXD/TAKEN: CPT | Mod: CPTII,S$GLB,, | Performed by: NURSE PRACTITIONER

## 2023-08-16 PROCEDURE — 3044F HG A1C LEVEL LT 7.0%: CPT | Mod: CPTII,S$GLB,, | Performed by: NURSE PRACTITIONER

## 2023-08-16 PROCEDURE — 99999 PR PBB SHADOW E&M-EST. PATIENT-LVL III: CPT | Mod: PBBFAC,,, | Performed by: NURSE PRACTITIONER

## 2023-08-16 PROCEDURE — 1159F PR MEDICATION LIST DOCUMENTED IN MEDICAL RECORD: ICD-10-PCS | Mod: CPTII,S$GLB,, | Performed by: NURSE PRACTITIONER

## 2023-08-16 PROCEDURE — 99204 OFFICE O/P NEW MOD 45 MIN: CPT | Mod: S$GLB,,, | Performed by: NURSE PRACTITIONER

## 2023-08-16 PROCEDURE — 3066F NEPHROPATHY DOC TX: CPT | Mod: CPTII,S$GLB,, | Performed by: NURSE PRACTITIONER

## 2023-08-16 PROCEDURE — 3288F FALL RISK ASSESSMENT DOCD: CPT | Mod: CPTII,S$GLB,, | Performed by: NURSE PRACTITIONER

## 2023-08-16 PROCEDURE — 1101F PR PT FALLS ASSESS DOC 0-1 FALLS W/OUT INJ PAST YR: ICD-10-PCS | Mod: CPTII,S$GLB,, | Performed by: NURSE PRACTITIONER

## 2023-08-16 PROCEDURE — 3079F PR MOST RECENT DIASTOLIC BLOOD PRESSURE 80-89 MM HG: ICD-10-PCS | Mod: CPTII,S$GLB,, | Performed by: NURSE PRACTITIONER

## 2023-08-16 PROCEDURE — 3044F PR MOST RECENT HEMOGLOBIN A1C LEVEL <7.0%: ICD-10-PCS | Mod: CPTII,S$GLB,, | Performed by: NURSE PRACTITIONER

## 2023-08-16 PROCEDURE — 3075F SYST BP GE 130 - 139MM HG: CPT | Mod: CPTII,S$GLB,, | Performed by: NURSE PRACTITIONER

## 2023-08-16 PROCEDURE — 1101F PT FALLS ASSESS-DOCD LE1/YR: CPT | Mod: CPTII,S$GLB,, | Performed by: NURSE PRACTITIONER

## 2023-08-16 PROCEDURE — 1126F PR PAIN SEVERITY QUANTIFIED, NO PAIN PRESENT: ICD-10-PCS | Mod: CPTII,S$GLB,, | Performed by: NURSE PRACTITIONER

## 2023-08-16 PROCEDURE — 1126F AMNT PAIN NOTED NONE PRSNT: CPT | Mod: CPTII,S$GLB,, | Performed by: NURSE PRACTITIONER

## 2023-08-16 PROCEDURE — 3079F DIAST BP 80-89 MM HG: CPT | Mod: CPTII,S$GLB,, | Performed by: NURSE PRACTITIONER

## 2023-08-16 PROCEDURE — 99999 PR PBB SHADOW E&M-EST. PATIENT-LVL III: ICD-10-PCS | Mod: PBBFAC,,, | Performed by: NURSE PRACTITIONER

## 2023-08-16 PROCEDURE — 3008F BODY MASS INDEX DOCD: CPT | Mod: CPTII,S$GLB,, | Performed by: NURSE PRACTITIONER

## 2023-08-16 PROCEDURE — 3075F PR MOST RECENT SYSTOLIC BLOOD PRESS GE 130-139MM HG: ICD-10-PCS | Mod: CPTII,S$GLB,, | Performed by: NURSE PRACTITIONER

## 2023-08-16 NOTE — PROGRESS NOTES
Subjective:       Patient ID: Rajesh Mas is a 72 y.o. Black or  female who presents for new evaluation of renal dysfunction.    HPI     Patient is new to me. No prior nephrologist.  Prior pertinent chart reviewed since this is patient's first appointment with me.    Patient presents for new evaluation of CKD 3b.  Baseline creatinine of ~1-1.3 since . Now trending up to 1.5--> 1.9 on last labs.     Significant other medical problems include HTN (reports dx ), diastolic dysfunction, HLD.  The patient denies taking NSAIDs, herbal supplements, or new antibiotics, recreational drugs, recent episode of dehydration, diarrhea, nausea or vomiting, acute illness, hospitalization or exposure to IV radiocontrast. She reports start atorvastatin at the beginning of July and stopped 2 weeks later due to myalgias. She drinks about 40 oz of water per day. She has been spending more time outside with increased perspiration. Bps at home range 126-130s/60. She denies recent gout flares and has been maintained on allopurinol 300mg qd. Uric acid trended up to 9.9 in . Reports crawfish intake during this time.     Significant family hx includes:   Paternal Grandfather ()    Paternal Grandmother ()    Maternal Grandmother () Colon cancer     Breast cancer   Maternal Grandfather ()    Father ( at age 60)    Mother ( at age 79) Diabetes    Heart failure    Hypertension    Heart disease    Kidney disease    Brother Other    Brother ()    Brother ()    Sister No Known Problems   Daughter No Known Problems   Daughter No Known Problems   Son No Known Problems   Son No Known Problems   Maternal Uncle Liver cancer       Last renal US: None available , reviewed.    Review of Systems   Constitutional:         No recent illness   Respiratory:  Negative for shortness of breath.    Cardiovascular:  Negative for chest pain and leg swelling.    Gastrointestinal:  Negative for diarrhea, nausea and vomiting.   Genitourinary:  Negative for difficulty urinating, dysuria and hematuria.   All other systems reviewed and are negative.      Objective:       Blood pressure 137/87, pulse 77, weight 75 kg (165 lb 5.5 oz), last menstrual period 11/28/1988, SpO2 97 %.  Physical Exam  Vitals reviewed.   Constitutional:       Appearance: Normal appearance.   HENT:      Head: Normocephalic and atraumatic.   Eyes:      General: No scleral icterus.  Cardiovascular:      Rate and Rhythm: Normal rate and regular rhythm.   Pulmonary:      Effort: Pulmonary effort is normal. No respiratory distress.      Breath sounds: No wheezing or rales.   Abdominal:      Tenderness: There is no right CVA tenderness or left CVA tenderness.   Musculoskeletal:      Right lower leg: No edema.      Left lower leg: No edema.   Skin:     General: Skin is warm and dry.   Neurological:      Mental Status: She is alert and oriented to person, place, and time.   Psychiatric:         Mood and Affect: Mood normal.         Behavior: Behavior normal.           Lab Results   Component Value Date    CREATININE 1.9 (H) 08/15/2023    URICACID 9.9 (H) 06/29/2023     Prot/Creat Ratio, Urine   Date Value Ref Range Status   08/15/2023 0.11 0.00 - 0.20 Final     Lab Results   Component Value Date     08/15/2023    K 4.5 08/15/2023    CO2 23 08/15/2023     08/15/2023     Lab Results   Component Value Date    CALCIUM 9.8 08/15/2023    PHOS 3.3 08/15/2023     Lab Results   Component Value Date    HGB 12.4 08/15/2023    WBC 8.44 08/15/2023    HCT 38.8 08/15/2023      Lab Results   Component Value Date    HGBA1C 5.2 01/13/2023     08/15/2023    BUN 35 (H) 08/15/2023     Lab Results   Component Value Date    LDLCALC 99.2 06/29/2023         Assessment:       1. CKD (chronic kidney disease) stage 4, GFR 15-29 ml/min    2. Proteinuria, unspecified type    3. Essential hypertension    4. Hyperuricemia         Plan:   CKD stage IV c eGFR 29 mL/min -  - Baseline sCr 1-1.3 since 2016 (last at baseline on labs September 2022). Now trending up to 1.5--> 1.9  - CKD clinically related to longstanding HTN +/- uric acid nephropathy  - Unclear etiology of sCr uptrend. She reports spending time outside with increased perspiration.  - Instructed to increase water intake. Avoid NSAIDs. Monitor Bps at home.   - Check renal US. Repeat RFP, uric acid, CPK, MICHELLE, sFLCs, SPEP, BISHNU 1 week    UPCR 110mg; continue ARB, monitor need to hold   Acid-base WNL   Renal osteodystrophy Ca and phos stable   Anemia WNL   DM No history   Lipid Management Myalgias with statin. Check CPK.    ESRD planning Provided education about the stages of CKD, usual progression, and need to monitor for and treat CKD-related disease in an effort to delay progression of CKD.     Defer advanced planning     HTN - Controlled on losartan 100, triamterene-HCTZ     Hyperuricemia  - Allopurinol 300mg qd  - Uric acid trending up to 9.9, reports crawfish intake  - Discussed low purine diet  - Repeat level    All questions patient had were answered.  Asked if further questions. None. F/u in clinic pending w/u  with labs and urine prior to next visit or sooner if needed.  ER for emergency concerns.    Summary of Plan:  - Instructed to increase hydration when spending time outside in the heat  - Renal US  - Check RFP, uric acid, CPK, MICHELLE, sFLCs, SPEP, BISHNU in 1 week

## 2023-08-30 ENCOUNTER — HOSPITAL ENCOUNTER (OUTPATIENT)
Dept: RADIOLOGY | Facility: HOSPITAL | Age: 73
Discharge: HOME | End: 2023-08-30
Attending: NURSE PRACTITIONER
Payer: MEDICARE

## 2023-08-30 DIAGNOSIS — N18.4 CKD (CHRONIC KIDNEY DISEASE) STAGE 4, GFR 15-29 ML/MIN: ICD-10-CM

## 2023-08-30 PROCEDURE — 76770 US EXAM ABDO BACK WALL COMP: CPT | Mod: TC

## 2023-08-30 PROCEDURE — 76770 US RETROPERITONEAL COMPLETE: ICD-10-PCS | Mod: 26,,, | Performed by: RADIOLOGY

## 2023-08-30 PROCEDURE — 76770 US EXAM ABDO BACK WALL COMP: CPT | Mod: 26,,, | Performed by: RADIOLOGY

## 2023-09-01 ENCOUNTER — PATIENT MESSAGE (OUTPATIENT)
Dept: NEPHROLOGY | Facility: CLINIC | Age: 73
End: 2023-09-01
Payer: MEDICARE

## 2023-09-08 ENCOUNTER — PATIENT MESSAGE (OUTPATIENT)
Dept: NEPHROLOGY | Facility: CLINIC | Age: 73
End: 2023-09-08
Payer: MEDICARE

## 2023-09-08 DIAGNOSIS — R76.8 POSITIVE ANA (ANTINUCLEAR ANTIBODY): ICD-10-CM

## 2023-09-08 DIAGNOSIS — N18.4 CKD (CHRONIC KIDNEY DISEASE) STAGE 4, GFR 15-29 ML/MIN: Primary | ICD-10-CM

## 2023-09-11 ENCOUNTER — TELEPHONE (OUTPATIENT)
Dept: NEPHROLOGY | Facility: CLINIC | Age: 73
End: 2023-09-11
Payer: MEDICARE

## 2023-09-11 ENCOUNTER — PATIENT MESSAGE (OUTPATIENT)
Dept: NEPHROLOGY | Facility: CLINIC | Age: 73
End: 2023-09-11
Payer: MEDICARE

## 2023-09-18 ENCOUNTER — E-CONSULT (OUTPATIENT)
Dept: TRANSPLANT | Facility: HOSPITAL | Age: 73
End: 2023-09-18
Payer: MEDICARE

## 2023-09-18 ENCOUNTER — DOCUMENTATION ONLY (OUTPATIENT)
Dept: RHEUMATOLOGY | Facility: CLINIC | Age: 73
End: 2023-09-18

## 2023-09-18 ENCOUNTER — OFFICE VISIT (OUTPATIENT)
Dept: RHEUMATOLOGY | Facility: CLINIC | Age: 73
End: 2023-09-18
Payer: MEDICARE

## 2023-09-18 ENCOUNTER — HOSPITAL ENCOUNTER (OUTPATIENT)
Dept: RADIOLOGY | Facility: HOSPITAL | Age: 73
Discharge: HOME | End: 2023-09-18
Attending: INTERNAL MEDICINE
Payer: MEDICARE

## 2023-09-18 VITALS
HEART RATE: 80 BPM | DIASTOLIC BLOOD PRESSURE: 80 MMHG | WEIGHT: 169.75 LBS | SYSTOLIC BLOOD PRESSURE: 127 MMHG | BODY MASS INDEX: 32.05 KG/M2 | HEIGHT: 61 IN

## 2023-09-18 DIAGNOSIS — N18.4 CKD (CHRONIC KIDNEY DISEASE) STAGE 4, GFR 15-29 ML/MIN: ICD-10-CM

## 2023-09-18 DIAGNOSIS — R80.3 FREE MONOCLONAL LIGHT CHAIN: Primary | ICD-10-CM

## 2023-09-18 DIAGNOSIS — R80.3 FREE MONOCLONAL LIGHT CHAIN: ICD-10-CM

## 2023-09-18 DIAGNOSIS — M1A.09X0 IDIOPATHIC CHRONIC GOUT OF MULTIPLE SITES WITHOUT TOPHUS: ICD-10-CM

## 2023-09-18 DIAGNOSIS — R76.8 POSITIVE ANA (ANTINUCLEAR ANTIBODY): ICD-10-CM

## 2023-09-18 DIAGNOSIS — M1A.09X0 IDIOPATHIC CHRONIC GOUT OF MULTIPLE SITES WITHOUT TOPHUS: Primary | ICD-10-CM

## 2023-09-18 DIAGNOSIS — M1A.0720 IDIOPATHIC CHRONIC GOUT OF LEFT FOOT WITHOUT TOPHUS: ICD-10-CM

## 2023-09-18 PROCEDURE — 73130 X-RAY EXAM OF HAND: CPT | Mod: TC,50

## 2023-09-18 PROCEDURE — 99205 PR OFFICE/OUTPT VISIT, NEW, LEVL V, 60-74 MIN: ICD-10-PCS | Mod: S$GLB,,, | Performed by: INTERNAL MEDICINE

## 2023-09-18 PROCEDURE — 3008F BODY MASS INDEX DOCD: CPT | Mod: CPTII,S$GLB,, | Performed by: INTERNAL MEDICINE

## 2023-09-18 PROCEDURE — 3074F PR MOST RECENT SYSTOLIC BLOOD PRESSURE < 130 MM HG: ICD-10-PCS | Mod: CPTII,S$GLB,, | Performed by: INTERNAL MEDICINE

## 2023-09-18 PROCEDURE — 3074F SYST BP LT 130 MM HG: CPT | Mod: CPTII,S$GLB,, | Performed by: INTERNAL MEDICINE

## 2023-09-18 PROCEDURE — 4010F PR ACE/ARB THEARPY RXD/TAKEN: ICD-10-PCS | Mod: CPTII,S$GLB,, | Performed by: INTERNAL MEDICINE

## 2023-09-18 PROCEDURE — 1101F PR PT FALLS ASSESS DOC 0-1 FALLS W/OUT INJ PAST YR: ICD-10-PCS | Mod: CPTII,S$GLB,, | Performed by: INTERNAL MEDICINE

## 2023-09-18 PROCEDURE — 3044F HG A1C LEVEL LT 7.0%: CPT | Mod: CPTII,S$GLB,, | Performed by: INTERNAL MEDICINE

## 2023-09-18 PROCEDURE — 1125F AMNT PAIN NOTED PAIN PRSNT: CPT | Mod: CPTII,S$GLB,, | Performed by: INTERNAL MEDICINE

## 2023-09-18 PROCEDURE — 3008F PR BODY MASS INDEX (BMI) DOCUMENTED: ICD-10-PCS | Mod: CPTII,S$GLB,, | Performed by: INTERNAL MEDICINE

## 2023-09-18 PROCEDURE — 4010F ACE/ARB THERAPY RXD/TAKEN: CPT | Mod: CPTII,S$GLB,, | Performed by: INTERNAL MEDICINE

## 2023-09-18 PROCEDURE — 3079F PR MOST RECENT DIASTOLIC BLOOD PRESSURE 80-89 MM HG: ICD-10-PCS | Mod: CPTII,S$GLB,, | Performed by: INTERNAL MEDICINE

## 2023-09-18 PROCEDURE — 73130 X-RAY EXAM OF HAND: CPT | Mod: 26,50,, | Performed by: RADIOLOGY

## 2023-09-18 PROCEDURE — 99451 NTRPROF PH1/NTRNET/EHR 5/>: CPT | Mod: ,,, | Performed by: INTERNAL MEDICINE

## 2023-09-18 PROCEDURE — 99999 PR PBB SHADOW E&M-EST. PATIENT-LVL IV: CPT | Mod: PBBFAC,,, | Performed by: INTERNAL MEDICINE

## 2023-09-18 PROCEDURE — 99451 PR INTERPROF, PHONE/INTERNET/EHR, CONSULT, >= 5MINS: ICD-10-PCS | Mod: ,,, | Performed by: INTERNAL MEDICINE

## 2023-09-18 PROCEDURE — 3066F PR DOCUMENTATION OF TREATMENT FOR NEPHROPATHY: ICD-10-PCS | Mod: CPTII,S$GLB,, | Performed by: INTERNAL MEDICINE

## 2023-09-18 PROCEDURE — 99999 PR PBB SHADOW E&M-EST. PATIENT-LVL IV: ICD-10-PCS | Mod: PBBFAC,,, | Performed by: INTERNAL MEDICINE

## 2023-09-18 PROCEDURE — 1101F PT FALLS ASSESS-DOCD LE1/YR: CPT | Mod: CPTII,S$GLB,, | Performed by: INTERNAL MEDICINE

## 2023-09-18 PROCEDURE — 1125F PR PAIN SEVERITY QUANTIFIED, PAIN PRESENT: ICD-10-PCS | Mod: CPTII,S$GLB,, | Performed by: INTERNAL MEDICINE

## 2023-09-18 PROCEDURE — 3288F FALL RISK ASSESSMENT DOCD: CPT | Mod: CPTII,S$GLB,, | Performed by: INTERNAL MEDICINE

## 2023-09-18 PROCEDURE — 73130 XR HAND COMPLETE 3 VIEWS BILATERAL: ICD-10-PCS | Mod: 26,50,, | Performed by: RADIOLOGY

## 2023-09-18 PROCEDURE — 3079F DIAST BP 80-89 MM HG: CPT | Mod: CPTII,S$GLB,, | Performed by: INTERNAL MEDICINE

## 2023-09-18 PROCEDURE — 3044F PR MOST RECENT HEMOGLOBIN A1C LEVEL <7.0%: ICD-10-PCS | Mod: CPTII,S$GLB,, | Performed by: INTERNAL MEDICINE

## 2023-09-18 PROCEDURE — 1159F PR MEDICATION LIST DOCUMENTED IN MEDICAL RECORD: ICD-10-PCS | Mod: CPTII,S$GLB,, | Performed by: INTERNAL MEDICINE

## 2023-09-18 PROCEDURE — 1159F MED LIST DOCD IN RCRD: CPT | Mod: CPTII,S$GLB,, | Performed by: INTERNAL MEDICINE

## 2023-09-18 PROCEDURE — 99205 OFFICE O/P NEW HI 60 MIN: CPT | Mod: S$GLB,,, | Performed by: INTERNAL MEDICINE

## 2023-09-18 PROCEDURE — 3066F NEPHROPATHY DOC TX: CPT | Mod: CPTII,S$GLB,, | Performed by: INTERNAL MEDICINE

## 2023-09-18 PROCEDURE — 3288F PR FALLS RISK ASSESSMENT DOCUMENTED: ICD-10-PCS | Mod: CPTII,S$GLB,, | Performed by: INTERNAL MEDICINE

## 2023-09-18 RX ORDER — ALLOPURINOL 100 MG/1
TABLET ORAL
Qty: 45 TABLET | Refills: 1 | Status: ON HOLD | OUTPATIENT
Start: 2023-09-18 | End: 2024-01-19 | Stop reason: HOSPADM

## 2023-09-18 RX ORDER — PREDNISONE 2.5 MG/1
TABLET ORAL
Qty: 90 TABLET | Refills: 1 | Status: SHIPPED | OUTPATIENT
Start: 2023-09-18 | End: 2024-04-01

## 2023-09-18 RX ORDER — ALLOPURINOL 300 MG/1
TABLET ORAL
Qty: 90 TABLET | Refills: 3 | Status: SHIPPED | OUTPATIENT
Start: 2023-09-18 | End: 2024-04-01

## 2023-09-18 NOTE — PROGRESS NOTES
72 year old female  HTN,HLD,diverticulitis  Portion of colon removed  MICHELLE positive  CK high  No specific symptoms that led to the lab    MICHELLE 1: 320 positive  No lupus symptoms    SSA 5.49  No sicca symptoms  Less likely she has sjogren's  Complete the lupus and sjogrens panel    Hands hurt right> left for few years  Cramps in hands for 30 minutes  Not on medications  6/10 cramp  0/10 pain  No functional limitations  Will send RF,CCP,ESR,CRP  Hand xrays    Right hip pain for 7 months  When she lays on right side  Pain resolves when she puts a pillow  No morning stiffness  Right hip bursitis possibly    Gout-uric acid 9.9/8.5/6.8/5.8/7.8  On allopurinol 300 mg daily  Last attack 2 years ago  Craw fish and sugars cause the attack  Uric acid very high,goal is less than 6  Will increase allopurinol to 350 mg  Will add 2.5 mg prednisone for prophylaxis until she reaches her goal uric acid < 6    Muscle weakness when she overexerts  Twice a week    No classic symptoms of polymyositis,dermatomyositis  This ck is not elevated  No need to worry          Kappa free light chain elevated-4.89  Kappa lambda ratio elevated 1.94  Nml SPEP,BISHNU  Will seek hematology consult    GFR worse since June 2023  UPCR nml,no hematuria,no proteinuria  RENAL US chronic kidney disease  Baseline sCr 1-1.3 since 2016 (last at baseline on labs September 2022). Now trending up to 1.5--> 1.9  - CKD clinically related to longstanding HTN +/- uric acid nephropathy  Less likely this is lupus related but if we have to confirm or rule out lupus vs sjogrens nephritis we need a kidney biopsy    Nml white count  Nml hb  Did have thrombocytopenia but never less than 100 thousand    TSH elevated 5.720  Nml T4  Will ask her to talk to PCP      RTC in 3 months after she completes hip PT    Answers submitted by the patient for this visit:  Rheumatology Questionnaire (Submitted on 9/11/2023)  fever: No  eye redness: No  mouth sores: No  headaches:  No  shortness of breath: Yes  chest pain: No  trouble swallowing: No  diarrhea: No  constipation: No  unexpected weight change: No  genital sore: No  dysuria: No  During the last 3 days, have you had a skin rash?: No  Bruises or bleeds easily: No  cough: No

## 2023-09-18 NOTE — PROGRESS NOTES
E hem consult reviewed      Recommendation: Repeat quantitative immunoglobulins and free light chains in 3 months. Appears to be reactive or age related as such low level. Normal BISHNU and SPEP.      Contingency: If light chain or IgG trend up on next check needs clinical hematology consult.

## 2023-09-18 NOTE — CONSULTS
[facility] [employer] [facility]  Response for E-Consult     Patient Name: Rajesh Mas  MRN: [id number]  Primary Care Provider: Gail Villarreal MD   Requesting Provider: Fly Boss*  Consults    Recommendation: Repeat quantitative immunoglobulins and free light chains in 3 months. Appears to be reactive or age related as such low level. Normal BISHNU and SPEP.     Contingency: If light chain or IgG trend up on next check needs clinical hematology consult.    Total time of Consultation: 5 minute    I did not speak to the requesting provider verbally about this.     *This eConsult is based on the clinical data available to me and is furnished without benefit of a physical examination. The eConsult will need to be interpreted in light of any clinical issues or changes in patient status not available to me at the time of filing this eConsults. Significant changes in patient condition or level of acuity should result in immediate formal consultation and reevaluation. Please alert me if you have further questions.    Thank you for this eConsult referral.     Christy Hernandez MD  [facility] [employer] [facility]

## 2023-09-18 NOTE — PROGRESS NOTES
Chief Complaint   Patient presents with    Disease Management       Patient was referred by Barbra Woodard NP    History of Presenting Illness    72 y.o. year old female with history of gout, CKD 4, HTN (reports dx 1998), HLD, and diverticulitis.     Pain to joints to b/l hands, R>L for last few year. Every few days she gets cramps in her hands for about 30 minutes; has to shake hand to release cramp. No known triggers. Does not take medication for pain. 6/10 pain when hand is cramping, otherwise 0/10 on pain scale. Denies any erythema, edema or warmth to joints. Denies having trouble with daily activities.    Right hip discomfort for the last 7 months which occurs intermittently. Notices discomfort more when lying on her right side, will resolve once she puts a pillow under right hip. Denies pain or any erythema, edema or warmth. Denies any morning stiffness.    Hyperuricemia: gout to right great toe. On Allopurinol 300mg once daily since about 2018. Last gout attack was around 2 years ago. Patient states fish and crawfish trigger gout attacks. Denies any gout pain currently.     If she overexerts herself she feels slight muscle weakness at the end of the day which occurs twice a week. Mainly on the day she looks after granddaughter. Denies taking any medication for pain. Denies morning stiffness. No hematuria.     Past history: HTN (reports dx 1998), HLD, and diverticulitis.  Family history: mother- HTN, diabetes, CHF; cousin- lupus  Social history:  Tobacco- no   Alcohol- no  Surgery: partial colectomy, colonoscopy, cholecystectomy    No skin rashes,malar rash, photosensitivity   No telangiectasias   No calcinosis   No psoriasis   No patchy alopecia   No oral and nasal ulcers   No dry eyes and dry mouth   No pleurisy or any cardiopulmonary complaints   No dysphagia, diplopia and dysphonia and muscle weakness   No n/v/d/c   No acid reflux+   No raynaud's+   No digital ulcers   No cytopenias   + renal issues: CKD  stage IV   No blood clots   No fever,chills,night sweats,weight loss and loss of appetite   No pre term deliveries /pregnancy complications   - (+) 1 pregnancy loss  No new onset headaches   No recurrent conjunctivitis or uveitis or scleritis or episcleritis   No chronic or bloody diarrhea with no u colitis or crohn's/ inflammatory bowel disease   No vaginal and urethral  d/c/STDs/no ulcers   No unexplained lymphadenopathy, parotitis   No seizures,strokes,psychosis  No sclerodactyly  No puffy hands  No perioral tightness     Review of Systems   Constitutional:  Negative for appetite change, fatigue, fever and unexpected weight change.   HENT:  Negative for mouth sores and trouble swallowing.    Eyes:  Negative for pain, discharge and redness.   Respiratory:  Negative for cough and shortness of breath.    Cardiovascular:  Negative for chest pain.   Gastrointestinal:  Negative for abdominal pain, blood in stool, constipation, diarrhea, nausea and vomiting.   Genitourinary:  Negative for dysuria, genital sores, hematuria and vaginal discharge.   Musculoskeletal:  Positive for arthralgias and myalgias. Negative for back pain, gait problem and joint swelling.   Skin:  Negative for rash.   Neurological:  Negative for headaches.   Hematological:  Does not bruise/bleed easily.           Physical Exam   Constitutional: She is oriented to person, place, and time. normal appearance. No distress.   HENT:   Head: Normocephalic.   Cardiovascular: Normal rate and regular rhythm.   Pulmonary/Chest: Effort normal and breath sounds normal.   Abdominal: She exhibits no distension.   Musculoskeletal:         General: Tenderness present. No swelling. Normal range of motion.      Right shoulder: Normal.      Left shoulder: Normal.      Right elbow: Normal.      Left elbow: Normal.      Right wrist: Normal.      Left wrist: Normal.      Right knee: Normal.      Left knee: Normal.      Right lower leg: No edema.      Left lower leg: No  edema.   Neurological: She is alert and oriented to person, place, and time.   Skin: Skin is warm.   Psychiatric: Mood normal.       Right Side Rheumatological Exam     Examination finds the shoulder, elbow, wrist, knee, 1st PIP, 1st MCP, 2nd PIP, 3rd PIP, 3rd MCP, 4th PIP, 4th MCP, 5th PIP and 5th MCP normal.    The patient is tender to palpation of the 2nd MCP    Shoulder Exam   Tenderness Location: no tenderness    Range of Motion   The patient has normal right shoulder ROM.    Hip Exam   Tenderness Location: no tenderness    Range of Motion   The patient has normal right hip ROM.  Abduction:  normal   Adduction:  normal   Extension:  normal   Flexion:  normal     Elbow/Wrist Exam   Tenderness Location: no elbow tenderness and no wrist tenderness    Range of Motion   Elbow   The patient has normal right elbow ROM.    Range of Motion   Wrist   The patient has normal right wrist ROM.  Extension:  normal   Flexion:  normal     Elbow Warmth: negative  Elbow Swelling: negative    Wrist Warmth: negative  Wrist Swelling: negative    Foot Exam   Right foot exam exhibits signs of no podagra    Range of Motion   The patient has normal right ankle ROM.    Ankle Warmth: negative  Ankle Swelling: negative    Left Side Rheumatological Exam     Examination finds the shoulder, elbow, wrist, knee, 1st PIP, 1st MCP, 2nd PIP, 2nd MCP, 3rd PIP, 3rd MCP, 4th PIP, 4th MCP and 5th PIP normal.    The patient is tender to palpation of the 5th MCP.    Shoulder Exam   Tenderness Location: no tenderness    Range of Motion   The patient has normal left shoulder ROM.    Hip Exam   Tenderness Location: no tenderness    Range of Motion   The patient has normal left hip ROM.  Abduction:  normal   Adduction:  normal   Extension:  normal   Flexion:  normal     Elbow/Wrist Exam   Tenderness Location: no elbow tenderness and no wrist tenderness    Range of Motion   Elbow   The patient has normal left elbow ROM.    Range of Motion   Wrist   The  patient has normal left wrist ROM.  Extension:  normal   Flexion:  normal     Elbow Warmth: negative  Elbow Swelling: negative    Wrist Warmth: negative  Wrist Swelling: negative    Foot Exam   Left foot exam exhibits signs of no podagra    Range of Motion   The patient has normal left ankle ROM.     Ankle Warmth: negative  Ankle Swelling: negative      Labs Reviewed:    Labs:   8/30/2023  Component Ref Range & Units 2 wk ago  (8/30/23) 2 mo ago  (6/29/23) 1 yr ago  (5/3/22) 2 yr ago  (4/27/21) 4 yr ago  (7/5/19) 4 yr ago  (2/4/19) 5 yr ago  (8/3/18)   Uric Acid 2.4 - 5.7 mg/dL 8.5 High   9.9 High   6.8 High   5.8 High   7.8 High   5.1  6.2 High       Component Ref Range & Units 2 wk ago   Anti Sm Antibody 0.00 - 0.99 Ratio 0.10    Anti-Sm Interpretation Negative Negative    Anti-SSA Antibody 0.00 - 0.99 Ratio 5.49 High     Anti-SSA Interpretation Negative Positive Abnormal     Anti-SSB Antibody 0.00 - 0.99 Ratio 0.44    Anti-SSB Interpretation Negative Negative    ds DNA Ab Negative 1:10 Negative 1:10    Comment: Performed by fluorescent crithidia assay.   Anti Sm/RNP Antibody 0.00 - 0.99 Ratio 0.13    Anti-Sm/RNP Interpretation Negative Negative      MICHELLE positive   MICHELLE titer 1 1:320  [place] free light chain elevated-4.89  Kappa lambda ratio elevated 1.94  SPEP and BISHNU nml    8/15/2023  Protein Urine positive  CBC nml   BUN 30  Cr 1.5  eGFR 37.3    6/29/2023  TSH 5.720    Assessment   1. Idiopathic chronic gout of multiple sites without tophus    2. CKD (chronic kidney disease) stage 4, GFR 15-29 ml/min    3. Positive MICHELLE (antinuclear antibody)    4. Idiopathic chronic gout of left foot without tophus    5. Free monoclonal light chain      Reviewed medications  Ordered labs /imaging    New problem     Rajesh Mas is a 72 y.o. female with history of gout, CKD 4, HTN (reports dx 1998), HLD, and diverticulitis. She presents due to positive MICHELLE test and elevated CK. She complains of intermittent cramping to  her hands.    Lupus: MICHELLE 1: 320 positive. No lupus symptoms. Normal white count and hgb.  Did have thrombocytopenia but never less than 100 thousand.     Sjogren's: less likely to be Sjogren's. SSA 5.49. No sicca symptoms.     Neuromuscular disorder: less likely;  which is not considered elevated for an  female (normal >650). No unusual muscle weakness after activity. No hematuria.     Gout: uric acid 9.9/8.5/6.8/5.8/7.8 not controlled on allopurinol 300mg. Increased uric acid can also cause inflammation to joints.     Rheumatoid Arthritis: tenderness to R 5th MCP and L 2nd MCP. RF,CCP,ESR,CRP and Hand xrays ordered.     Plan  Rajesh was seen today for disease management.    Diagnoses and all orders for this visit:    Idiopathic chronic gout of multiple sites without tophus  -     Rheumatoid Factor; Future  -     Cyclic Citrullinated Peptide Antibody, IgG; Future  -     Sedimentation rate; Future  -     C-Reactive Protein; Future  -     X-Ray Hand 3 View Bilateral; Future  -     Ambulatory referral/consult to Physical/Occupational Therapy; Future  -     C3 Complement; Future  -     C4 Complement; Future  -     Direct antiglobulin test; Future  -     Cardiolipin antibody; Future  -     Beta-2 Glycoprotein Abs (IgA, IgG, IgM); Future  -     DRVVT; Future  -     Cryoglobulin; Future  -     IMMUNOGLOBULINS (IGG, IGA, IGM) QUANTITATIVE; Future    CKD (chronic kidney disease) stage 4, GFR 15-29 ml/min  -     Ambulatory referral/consult to Rheumatology  -     Rheumatoid Factor; Future  -     Cyclic Citrullinated Peptide Antibody, IgG; Future  -     Sedimentation rate; Future  -     C-Reactive Protein; Future  -     X-Ray Hand 3 View Bilateral; Future  -     Ambulatory referral/consult to Physical/Occupational Therapy; Future  -     C3 Complement; Future  -     C4 Complement; Future  -     Direct antiglobulin test; Future  -     Cardiolipin antibody; Future  -     Beta-2 Glycoprotein Abs (IgA, IgG,  IgM); Future  -     DRVVT; Future  -     Cryoglobulin; Future  -     IMMUNOGLOBULINS (IGG, IGA, IGM) QUANTITATIVE; Future  -     E-Consult to [facility]    Positive MICHELLE (antinuclear antibody)  -     Ambulatory referral/consult to Rheumatology  -     Rheumatoid Factor; Future  -     Cyclic Citrullinated Peptide Antibody, IgG; Future  -     Sedimentation rate; Future  -     C-Reactive Protein; Future  -     X-Ray Hand 3 View Bilateral; Future  -     Ambulatory referral/consult to Physical/Occupational Therapy; Future  -     C3 Complement; Future  -     C4 Complement; Future  -     Direct antiglobulin test; Future  -     Cardiolipin antibody; Future  -     Beta-2 Glycoprotein Abs (IgA, IgG, IgM); Future  -     DRVVT; Future  -     Cryoglobulin; Future  -     IMMUNOGLOBULINS (IGG, IGA, IGM) QUANTITATIVE; Future    Idiopathic chronic gout of left foot without tophus  -     allopurinoL (ZYLOPRIM) 300 MG tablet; Take one tablet by mouth once daily in combination with 50mg tablets for a daily dose of 350mg    Free monoclonal light chain  -     [facility]    Other orders  -     predniSONE (DELTASONE) 2.5 MG tablet; Take one tablet by mouth once daily  -     allopurinoL (ZYLOPRIM) 100 MG tablet; Take ½ tablet by mouth once daily in combination with 300mg tablets for a daily dose of 350mg      Follow up in 3 months.     Dayna RICO

## 2023-09-21 DIAGNOSIS — R35.0 URINARY FREQUENCY: ICD-10-CM

## 2023-09-21 RX ORDER — OXYBUTYNIN CHLORIDE 5 MG/1
5 TABLET ORAL 2 TIMES DAILY
Qty: 180 TABLET | Refills: 1 | Status: SHIPPED | OUTPATIENT
Start: 2023-09-21 | End: 2024-03-19 | Stop reason: SDUPTHER

## 2023-09-21 NOTE — TELEPHONE ENCOUNTER
Refill Decision Note   Rajesh Mas  is requesting a refill authorization.  Brief Assessment and Rationale for Refill:  Approve     Medication Therapy Plan:         Comments:     Note composed:12:45 PM 09/21/2023

## 2023-10-05 PROBLEM — R07.9 CHEST PAIN OF UNKNOWN ETIOLOGY: Status: ACTIVE | Noted: 2023-10-05

## 2023-10-05 PROBLEM — E78.5 HYPERLIPIDEMIA: Status: ACTIVE | Noted: 2023-07-08

## 2023-10-05 NOTE — PROGRESS NOTES
"Chart has been dictated using voice recognition software.  It is not been reviewed carefully for any transcriptional errors due to this technology.   Subjective:   Patient ID:  Rajesh Mas is a 72 y.o. female who presents for follow-up of No chief complaint on file.      HPI:  Patient with hypertension, CKD 3, idiopathic gout, and positive MICHELLE.  The patient was seen a year ago by Sarah Rivera and Nelson for chest discomfort and had a normal echocardiogram and normal exercise stress test without imaging.  Patient is referred back for evaluation of chest pain.    Patient will feel a heaviness in hier chest when she is active associated with shortness of breath lasting about 5 min.  Gets better when she moves slower.  Will also get "flip-flops" about once a week lasting 5-10 min. No syncope.  No edema.  Patient denies any dyspnea at rest, orthopnea, PND, or edema.  Will get one block dyspnea on exertion if runs.  Walks 1 mile in 30 mins 3x/week and is asymptomatic.              Past Medical History:   Diagnosis Date    Allergy     seasonal    Diverticulitis     Fever blister     Hypertension     Personal history of colonic polyps        Outpatient Medications Prior to Visit   Medication Sig Dispense Refill    allopurinoL (ZYLOPRIM) 100 MG tablet Take ½ tablet by mouth once daily in combination with 300mg tablets for a daily dose of 350mg 45 tablet 1    allopurinoL (ZYLOPRIM) 300 MG tablet Take one tablet by mouth once daily in combination with 50mg tablets for a daily dose of 350mg 90 tablet 3    losartan (COZAAR) 100 MG tablet Take 1 tablet (100 mg total) by mouth once daily. 90 tablet 3    oxybutynin (DITROPAN) 5 MG Tab Take 1 tablet (5 mg total) by mouth 2 (two) times daily. 180 tablet 1    predniSONE (DELTASONE) 2.5 MG tablet Take one tablet by mouth once daily 90 tablet 1    triamterene-hydrochlorothiazide 37.5-25 mg (DYAZIDE) 37.5-25 mg per capsule Take 1 capsule by mouth every morning. 90 capsule 3     No " "facility-administered medications prior to visit.       Review of Systems   Constitutional: Negative for weight gain and weight loss.   HENT:  Negative for nosebleeds.    Respiratory:  Negative for hemoptysis.    Hematologic/Lymphatic: Negative for bleeding problem. Does not bruise/bleed easily.   Musculoskeletal:  Positive for joint pain (hands).   Gastrointestinal:  Negative for hematemesis and hematochezia.   Genitourinary:  Negative for hematuria.   Neurological:  Negative for focal weakness, loss of balance, numbness and weakness.      Objective:   Physical Exam  Constitutional:       Appearance: She is well-developed. She is obese.      Comments: /64   Pulse 70   Ht 5' 1" (1.549 m)   Wt 77.5 kg (170 lb 13.7 oz)   LMP 11/28/1988 (Approximate)   SpO2 96%   BMI 32.28 kg/m²      Neck:      Vascular: No carotid bruit or JVD.   Cardiovascular:      Rate and Rhythm: Normal rate and regular rhythm. Occasional Extrasystoles are present.     Pulses: Intact distal pulses.      Heart sounds: Normal heart sounds. No murmur heard.     No friction rub. No gallop.   Pulmonary:      Effort: Pulmonary effort is normal.      Breath sounds: Normal breath sounds. No wheezing or rales.   Abdominal:      General: Bowel sounds are normal. There is no abdominal bruit.      Palpations: Abdomen is soft. There is no hepatomegaly.      Tenderness: There is no abdominal tenderness.   Musculoskeletal:      Cervical back: Neck supple.   Skin:     Nails: There is no clubbing.   Neurological:      Mental Status: She is alert and oriented to person, place, and time.           Lab Results   Component Value Date     08/30/2023    K 4.7 08/30/2023    BUN 33 (H) 08/30/2023    CREATININE 1.6 (H) 08/30/2023    GLU 88 08/30/2023    HGBA1C 5.2 01/13/2023    BNP 78 09/12/2022    CHOL 196 06/29/2023    CHOL 174 03/15/2017    HDL 62 06/29/2023    LDLCALC 99.2 06/29/2023    TRIG 174 (H) 06/29/2023    CHOLHDL 31.6 06/29/2023    HGB 12.4 " 08/15/2023    HCT 38.8 08/15/2023     08/15/2023     ECG (12-September-2022) showed normal sinus rhythm normal axis and intervals.  There is poor R-wave progression and nonspecific T-wave changes.    ECG (today) showed normal sinus rhythm with isolated PVCs.  There were nonspecific ST-T wave changes. Compared to the prior ECG above, PVCs are now present.    Assessment:     1. Chest pain of unknown etiology    2. Essential hypertension    3. BMI 31.0-31.9,adult    4. Stage 3b chronic kidney disease    5. Hyperlipidemia, unspecified hyperlipidemia type      The patient has chest discomfort has not quite typical for coronary ischemia that is similar to what she had a year ago when she had a negative stress test a year ago.  She does have symptoms of possible isolated ectopy although she did not complain of any symptoms of flip-flops during the physical exam when I noticed that she had ectopic beats.  Therefore, since her symptoms are every 3 or 4 days, a 14 day patch would be reasonable to put on the patient to see if her symptoms correlate with any underlying arrhythmias.  The patient will be sent to cardiac electrophysiology to obtain a 14 day patch.  Patient has no symptoms of heart failure.  Her renal disease is stable and is managed by her other physicians.  As she is in the primary prevention category for vascular disease, her lipid profile is acceptable.  Patient's blood pressure is relatively well controlled.  Further decisions will be made after review of the results her 14 day monitoring.    Unless there are intervening problems, patient should return for re-evaluation in 6 months.       Plan:     Diagnoses and all orders for this visit:    Chest pain of unknown etiology    Essential hypertension    BMI 31.0-31.9,adult    Stage 3b chronic kidney disease    Hyperlipidemia, unspecified hyperlipidemia type          Eugene Arenas MD  Consultative Cardiology

## 2023-10-06 ENCOUNTER — OFFICE VISIT (OUTPATIENT)
Dept: CARDIOLOGY | Facility: CLINIC | Age: 73
End: 2023-10-06
Payer: MEDICARE

## 2023-10-06 VITALS
DIASTOLIC BLOOD PRESSURE: 64 MMHG | OXYGEN SATURATION: 96 % | SYSTOLIC BLOOD PRESSURE: 123 MMHG | BODY MASS INDEX: 32.26 KG/M2 | HEART RATE: 70 BPM | HEIGHT: 61 IN | WEIGHT: 170.88 LBS

## 2023-10-06 DIAGNOSIS — I10 ESSENTIAL HYPERTENSION: Chronic | ICD-10-CM

## 2023-10-06 DIAGNOSIS — R07.9 CHEST PAIN, UNSPECIFIED TYPE: ICD-10-CM

## 2023-10-06 DIAGNOSIS — R07.9 CHEST PAIN OF UNKNOWN ETIOLOGY: Primary | ICD-10-CM

## 2023-10-06 DIAGNOSIS — N18.32 STAGE 3B CHRONIC KIDNEY DISEASE: ICD-10-CM

## 2023-10-06 DIAGNOSIS — R00.2 PALPITATIONS: ICD-10-CM

## 2023-10-06 DIAGNOSIS — E78.5 HYPERLIPIDEMIA, UNSPECIFIED HYPERLIPIDEMIA TYPE: ICD-10-CM

## 2023-10-06 PROCEDURE — 99214 PR OFFICE/OUTPT VISIT, EST, LEVL IV, 30-39 MIN: ICD-10-PCS | Mod: S$GLB,,, | Performed by: INTERNAL MEDICINE

## 2023-10-06 PROCEDURE — 4010F ACE/ARB THERAPY RXD/TAKEN: CPT | Mod: CPTII,S$GLB,, | Performed by: INTERNAL MEDICINE

## 2023-10-06 PROCEDURE — 3288F FALL RISK ASSESSMENT DOCD: CPT | Mod: CPTII,S$GLB,, | Performed by: INTERNAL MEDICINE

## 2023-10-06 PROCEDURE — 99999 PR PBB SHADOW E&M-EST. PATIENT-LVL V: CPT | Mod: PBBFAC,,, | Performed by: INTERNAL MEDICINE

## 2023-10-06 PROCEDURE — 4010F PR ACE/ARB THEARPY RXD/TAKEN: ICD-10-PCS | Mod: CPTII,S$GLB,, | Performed by: INTERNAL MEDICINE

## 2023-10-06 PROCEDURE — 3074F PR MOST RECENT SYSTOLIC BLOOD PRESSURE < 130 MM HG: ICD-10-PCS | Mod: CPTII,S$GLB,, | Performed by: INTERNAL MEDICINE

## 2023-10-06 PROCEDURE — 93010 EKG 12-LEAD: ICD-10-PCS | Mod: S$GLB,,, | Performed by: INTERNAL MEDICINE

## 2023-10-06 PROCEDURE — 3078F DIAST BP <80 MM HG: CPT | Mod: CPTII,S$GLB,, | Performed by: INTERNAL MEDICINE

## 2023-10-06 PROCEDURE — 1126F PR PAIN SEVERITY QUANTIFIED, NO PAIN PRESENT: ICD-10-PCS | Mod: CPTII,S$GLB,, | Performed by: INTERNAL MEDICINE

## 2023-10-06 PROCEDURE — 99999 PR PBB SHADOW E&M-EST. PATIENT-LVL V: ICD-10-PCS | Mod: PBBFAC,,, | Performed by: INTERNAL MEDICINE

## 2023-10-06 PROCEDURE — 3074F SYST BP LT 130 MM HG: CPT | Mod: CPTII,S$GLB,, | Performed by: INTERNAL MEDICINE

## 2023-10-06 PROCEDURE — 1101F PT FALLS ASSESS-DOCD LE1/YR: CPT | Mod: CPTII,S$GLB,, | Performed by: INTERNAL MEDICINE

## 2023-10-06 PROCEDURE — 3008F PR BODY MASS INDEX (BMI) DOCUMENTED: ICD-10-PCS | Mod: CPTII,S$GLB,, | Performed by: INTERNAL MEDICINE

## 2023-10-06 PROCEDURE — 3044F HG A1C LEVEL LT 7.0%: CPT | Mod: CPTII,S$GLB,, | Performed by: INTERNAL MEDICINE

## 2023-10-06 PROCEDURE — 3008F BODY MASS INDEX DOCD: CPT | Mod: CPTII,S$GLB,, | Performed by: INTERNAL MEDICINE

## 2023-10-06 PROCEDURE — 1126F AMNT PAIN NOTED NONE PRSNT: CPT | Mod: CPTII,S$GLB,, | Performed by: INTERNAL MEDICINE

## 2023-10-06 PROCEDURE — 3078F PR MOST RECENT DIASTOLIC BLOOD PRESSURE < 80 MM HG: ICD-10-PCS | Mod: CPTII,S$GLB,, | Performed by: INTERNAL MEDICINE

## 2023-10-06 PROCEDURE — 93010 ELECTROCARDIOGRAM REPORT: CPT | Mod: S$GLB,,, | Performed by: INTERNAL MEDICINE

## 2023-10-06 PROCEDURE — 93005 ELECTROCARDIOGRAM TRACING: CPT | Mod: S$GLB,,, | Performed by: INTERNAL MEDICINE

## 2023-10-06 PROCEDURE — 93005 EKG 12-LEAD: ICD-10-PCS | Mod: S$GLB,,, | Performed by: INTERNAL MEDICINE

## 2023-10-06 PROCEDURE — 3044F PR MOST RECENT HEMOGLOBIN A1C LEVEL <7.0%: ICD-10-PCS | Mod: CPTII,S$GLB,, | Performed by: INTERNAL MEDICINE

## 2023-10-06 PROCEDURE — 1101F PR PT FALLS ASSESS DOC 0-1 FALLS W/OUT INJ PAST YR: ICD-10-PCS | Mod: CPTII,S$GLB,, | Performed by: INTERNAL MEDICINE

## 2023-10-06 PROCEDURE — 3066F PR DOCUMENTATION OF TREATMENT FOR NEPHROPATHY: ICD-10-PCS | Mod: CPTII,S$GLB,, | Performed by: INTERNAL MEDICINE

## 2023-10-06 PROCEDURE — 3066F NEPHROPATHY DOC TX: CPT | Mod: CPTII,S$GLB,, | Performed by: INTERNAL MEDICINE

## 2023-10-06 PROCEDURE — 3288F PR FALLS RISK ASSESSMENT DOCUMENTED: ICD-10-PCS | Mod: CPTII,S$GLB,, | Performed by: INTERNAL MEDICINE

## 2023-10-06 PROCEDURE — 99214 OFFICE O/P EST MOD 30 MIN: CPT | Mod: S$GLB,,, | Performed by: INTERNAL MEDICINE

## 2023-10-06 NOTE — Clinical Note
Thank you for allowing me to follow-up with Giaadithea Tg for chest pain of undetermined etiology. Please see my note for details of this encounter. If you have any questions, please contact me.  Thank you again for allowing to participate in the care of this patient.

## 2023-10-10 ENCOUNTER — OFFICE VISIT (OUTPATIENT)
Dept: NEPHROLOGY | Facility: CLINIC | Age: 73
End: 2023-10-10
Payer: MEDICARE

## 2023-10-10 ENCOUNTER — OFFICE VISIT (OUTPATIENT)
Dept: ELECTROPHYSIOLOGY | Facility: CLINIC | Age: 73
End: 2023-10-10
Payer: MEDICARE

## 2023-10-10 VITALS
SYSTOLIC BLOOD PRESSURE: 130 MMHG | HEIGHT: 61 IN | DIASTOLIC BLOOD PRESSURE: 76 MMHG | WEIGHT: 169.31 LBS | BODY MASS INDEX: 31.97 KG/M2 | HEART RATE: 78 BPM

## 2023-10-10 VITALS
DIASTOLIC BLOOD PRESSURE: 77 MMHG | SYSTOLIC BLOOD PRESSURE: 139 MMHG | BODY MASS INDEX: 31.66 KG/M2 | WEIGHT: 167.56 LBS

## 2023-10-10 DIAGNOSIS — R80.9 PROTEINURIA, UNSPECIFIED TYPE: ICD-10-CM

## 2023-10-10 DIAGNOSIS — R76.8 POSITIVE ANA (ANTINUCLEAR ANTIBODY): ICD-10-CM

## 2023-10-10 DIAGNOSIS — R00.2 PALPITATIONS: ICD-10-CM

## 2023-10-10 DIAGNOSIS — E79.0 HYPERURICEMIA: ICD-10-CM

## 2023-10-10 DIAGNOSIS — I49.3 PVCS (PREMATURE VENTRICULAR CONTRACTIONS): Primary | ICD-10-CM

## 2023-10-10 DIAGNOSIS — I10 ESSENTIAL HYPERTENSION: ICD-10-CM

## 2023-10-10 DIAGNOSIS — N18.32 STAGE 3B CHRONIC KIDNEY DISEASE: Primary | ICD-10-CM

## 2023-10-10 DIAGNOSIS — I49.3 PVCS (PREMATURE VENTRICULAR CONTRACTIONS): ICD-10-CM

## 2023-10-10 PROCEDURE — 4010F PR ACE/ARB THEARPY RXD/TAKEN: ICD-10-PCS | Mod: CPTII,S$GLB,, | Performed by: NURSE PRACTITIONER

## 2023-10-10 PROCEDURE — 1101F PT FALLS ASSESS-DOCD LE1/YR: CPT | Mod: CPTII,S$GLB,, | Performed by: INTERNAL MEDICINE

## 2023-10-10 PROCEDURE — 4010F ACE/ARB THERAPY RXD/TAKEN: CPT | Mod: CPTII,S$GLB,, | Performed by: INTERNAL MEDICINE

## 2023-10-10 PROCEDURE — 99205 PR OFFICE/OUTPT VISIT, NEW, LEVL V, 60-74 MIN: ICD-10-PCS | Mod: S$GLB,,, | Performed by: INTERNAL MEDICINE

## 2023-10-10 PROCEDURE — 1159F MED LIST DOCD IN RCRD: CPT | Mod: CPTII,S$GLB,, | Performed by: NURSE PRACTITIONER

## 2023-10-10 PROCEDURE — 3066F NEPHROPATHY DOC TX: CPT | Mod: CPTII,S$GLB,, | Performed by: INTERNAL MEDICINE

## 2023-10-10 PROCEDURE — 3288F PR FALLS RISK ASSESSMENT DOCUMENTED: ICD-10-PCS | Mod: CPTII,S$GLB,, | Performed by: INTERNAL MEDICINE

## 2023-10-10 PROCEDURE — 3066F PR DOCUMENTATION OF TREATMENT FOR NEPHROPATHY: ICD-10-PCS | Mod: CPTII,S$GLB,, | Performed by: INTERNAL MEDICINE

## 2023-10-10 PROCEDURE — 93005 ELECTROCARDIOGRAM TRACING: CPT | Mod: S$GLB,,, | Performed by: INTERNAL MEDICINE

## 2023-10-10 PROCEDURE — 3044F HG A1C LEVEL LT 7.0%: CPT | Mod: CPTII,S$GLB,, | Performed by: INTERNAL MEDICINE

## 2023-10-10 PROCEDURE — 3075F PR MOST RECENT SYSTOLIC BLOOD PRESS GE 130-139MM HG: ICD-10-PCS | Mod: CPTII,S$GLB,, | Performed by: NURSE PRACTITIONER

## 2023-10-10 PROCEDURE — 3078F PR MOST RECENT DIASTOLIC BLOOD PRESSURE < 80 MM HG: ICD-10-PCS | Mod: CPTII,S$GLB,, | Performed by: NURSE PRACTITIONER

## 2023-10-10 PROCEDURE — 99214 PR OFFICE/OUTPT VISIT, EST, LEVL IV, 30-39 MIN: ICD-10-PCS | Mod: S$GLB,,, | Performed by: NURSE PRACTITIONER

## 2023-10-10 PROCEDURE — 3008F PR BODY MASS INDEX (BMI) DOCUMENTED: ICD-10-PCS | Mod: CPTII,S$GLB,, | Performed by: INTERNAL MEDICINE

## 2023-10-10 PROCEDURE — 3075F SYST BP GE 130 - 139MM HG: CPT | Mod: CPTII,S$GLB,, | Performed by: NURSE PRACTITIONER

## 2023-10-10 PROCEDURE — 3008F BODY MASS INDEX DOCD: CPT | Mod: CPTII,S$GLB,, | Performed by: NURSE PRACTITIONER

## 2023-10-10 PROCEDURE — 99999 PR PBB SHADOW E&M-EST. PATIENT-LVL IV: CPT | Mod: PBBFAC,,, | Performed by: INTERNAL MEDICINE

## 2023-10-10 PROCEDURE — 99999 PR PBB SHADOW E&M-EST. PATIENT-LVL II: ICD-10-PCS | Mod: PBBFAC,,, | Performed by: NURSE PRACTITIONER

## 2023-10-10 PROCEDURE — 3008F PR BODY MASS INDEX (BMI) DOCUMENTED: ICD-10-PCS | Mod: CPTII,S$GLB,, | Performed by: NURSE PRACTITIONER

## 2023-10-10 PROCEDURE — 3066F NEPHROPATHY DOC TX: CPT | Mod: CPTII,S$GLB,, | Performed by: NURSE PRACTITIONER

## 2023-10-10 PROCEDURE — 3075F SYST BP GE 130 - 139MM HG: CPT | Mod: CPTII,S$GLB,, | Performed by: INTERNAL MEDICINE

## 2023-10-10 PROCEDURE — 93010 RHYTHM STRIP: ICD-10-PCS | Mod: S$GLB,,, | Performed by: INTERNAL MEDICINE

## 2023-10-10 PROCEDURE — 3044F PR MOST RECENT HEMOGLOBIN A1C LEVEL <7.0%: ICD-10-PCS | Mod: CPTII,S$GLB,, | Performed by: INTERNAL MEDICINE

## 2023-10-10 PROCEDURE — 1126F AMNT PAIN NOTED NONE PRSNT: CPT | Mod: CPTII,S$GLB,, | Performed by: NURSE PRACTITIONER

## 2023-10-10 PROCEDURE — 3044F HG A1C LEVEL LT 7.0%: CPT | Mod: CPTII,S$GLB,, | Performed by: NURSE PRACTITIONER

## 2023-10-10 PROCEDURE — 99205 OFFICE O/P NEW HI 60 MIN: CPT | Mod: S$GLB,,, | Performed by: INTERNAL MEDICINE

## 2023-10-10 PROCEDURE — 99214 OFFICE O/P EST MOD 30 MIN: CPT | Mod: S$GLB,,, | Performed by: NURSE PRACTITIONER

## 2023-10-10 PROCEDURE — 1126F PR PAIN SEVERITY QUANTIFIED, NO PAIN PRESENT: ICD-10-PCS | Mod: CPTII,S$GLB,, | Performed by: INTERNAL MEDICINE

## 2023-10-10 PROCEDURE — 3066F PR DOCUMENTATION OF TREATMENT FOR NEPHROPATHY: ICD-10-PCS | Mod: CPTII,S$GLB,, | Performed by: NURSE PRACTITIONER

## 2023-10-10 PROCEDURE — 1159F PR MEDICATION LIST DOCUMENTED IN MEDICAL RECORD: ICD-10-PCS | Mod: CPTII,S$GLB,, | Performed by: NURSE PRACTITIONER

## 2023-10-10 PROCEDURE — 4010F PR ACE/ARB THEARPY RXD/TAKEN: ICD-10-PCS | Mod: CPTII,S$GLB,, | Performed by: INTERNAL MEDICINE

## 2023-10-10 PROCEDURE — 1126F AMNT PAIN NOTED NONE PRSNT: CPT | Mod: CPTII,S$GLB,, | Performed by: INTERNAL MEDICINE

## 2023-10-10 PROCEDURE — 3075F PR MOST RECENT SYSTOLIC BLOOD PRESS GE 130-139MM HG: ICD-10-PCS | Mod: CPTII,S$GLB,, | Performed by: INTERNAL MEDICINE

## 2023-10-10 PROCEDURE — 99999 PR PBB SHADOW E&M-EST. PATIENT-LVL IV: ICD-10-PCS | Mod: PBBFAC,,, | Performed by: INTERNAL MEDICINE

## 2023-10-10 PROCEDURE — 93005 RHYTHM STRIP: ICD-10-PCS | Mod: S$GLB,,, | Performed by: INTERNAL MEDICINE

## 2023-10-10 PROCEDURE — 1101F PR PT FALLS ASSESS DOC 0-1 FALLS W/OUT INJ PAST YR: ICD-10-PCS | Mod: CPTII,S$GLB,, | Performed by: NURSE PRACTITIONER

## 2023-10-10 PROCEDURE — 1126F PR PAIN SEVERITY QUANTIFIED, NO PAIN PRESENT: ICD-10-PCS | Mod: CPTII,S$GLB,, | Performed by: NURSE PRACTITIONER

## 2023-10-10 PROCEDURE — 3078F DIAST BP <80 MM HG: CPT | Mod: CPTII,S$GLB,, | Performed by: INTERNAL MEDICINE

## 2023-10-10 PROCEDURE — 3044F PR MOST RECENT HEMOGLOBIN A1C LEVEL <7.0%: ICD-10-PCS | Mod: CPTII,S$GLB,, | Performed by: NURSE PRACTITIONER

## 2023-10-10 PROCEDURE — 3288F PR FALLS RISK ASSESSMENT DOCUMENTED: ICD-10-PCS | Mod: CPTII,S$GLB,, | Performed by: NURSE PRACTITIONER

## 2023-10-10 PROCEDURE — 99999 PR PBB SHADOW E&M-EST. PATIENT-LVL II: CPT | Mod: PBBFAC,,, | Performed by: NURSE PRACTITIONER

## 2023-10-10 PROCEDURE — 93010 ELECTROCARDIOGRAM REPORT: CPT | Mod: S$GLB,,, | Performed by: INTERNAL MEDICINE

## 2023-10-10 PROCEDURE — 3288F FALL RISK ASSESSMENT DOCD: CPT | Mod: CPTII,S$GLB,, | Performed by: NURSE PRACTITIONER

## 2023-10-10 PROCEDURE — 3078F PR MOST RECENT DIASTOLIC BLOOD PRESSURE < 80 MM HG: ICD-10-PCS | Mod: CPTII,S$GLB,, | Performed by: INTERNAL MEDICINE

## 2023-10-10 PROCEDURE — 3078F DIAST BP <80 MM HG: CPT | Mod: CPTII,S$GLB,, | Performed by: NURSE PRACTITIONER

## 2023-10-10 PROCEDURE — 3288F FALL RISK ASSESSMENT DOCD: CPT | Mod: CPTII,S$GLB,, | Performed by: INTERNAL MEDICINE

## 2023-10-10 PROCEDURE — 3008F BODY MASS INDEX DOCD: CPT | Mod: CPTII,S$GLB,, | Performed by: INTERNAL MEDICINE

## 2023-10-10 PROCEDURE — 4010F ACE/ARB THERAPY RXD/TAKEN: CPT | Mod: CPTII,S$GLB,, | Performed by: NURSE PRACTITIONER

## 2023-10-10 PROCEDURE — 1101F PR PT FALLS ASSESS DOC 0-1 FALLS W/OUT INJ PAST YR: ICD-10-PCS | Mod: CPTII,S$GLB,, | Performed by: INTERNAL MEDICINE

## 2023-10-10 PROCEDURE — 1101F PT FALLS ASSESS-DOCD LE1/YR: CPT | Mod: CPTII,S$GLB,, | Performed by: NURSE PRACTITIONER

## 2023-10-10 NOTE — PROGRESS NOTES
Subjective:       Patient ID: Rajesh Mas is a 72 y.o. Black or  female who presents for evaluation of renal dysfunction.    HPI     Patient is new to me. No prior nephrologist.  Prior pertinent chart reviewed since this is patient's first appointment with me.    Patient presents for new evaluation of CKD 3b.  Baseline creatinine of ~1-1.3 since . Now trending up to 1.5--> 1.9 on last labs.     Significant other medical problems include HTN (reports dx ), diastolic dysfunction, HLD.  The patient denies taking NSAIDs, herbal supplements, or new antibiotics, recreational drugs, recent episode of dehydration, diarrhea, nausea or vomiting, acute illness, hospitalization or exposure to IV radiocontrast. She reports start atorvastatin at the beginning of July and stopped 2 weeks later due to myalgias. She drinks about 40 oz of water per day. She has been spending more time outside with increased perspiration. Bps at home range 126-130s/60. She denies recent gout flares and has been maintained on allopurinol 300mg qd. Uric acid trended up to 9.9 in . Reports crawfish intake during this time.     Significant family hx includes:   Paternal Grandfather ()    Paternal Grandmother ()    Maternal Grandmother () Colon cancer     Breast cancer   Maternal Grandfather ()    Father ( at age 60)    Mother ( at age 79) Diabetes    Heart failure    Hypertension    Heart disease    Kidney disease    Brother Other    Brother ()    Brother ()    Sister No Known Problems   Daughter No Known Problems   Daughter No Known Problems   Son No Known Problems   Son No Known Problems   Maternal Uncle Liver cancer       Last renal US: None available , reviewed.    Update 10/10/23:  - Presents for follow-up of CKD  - Now following with rheumatology. Allopurinol increased to 350mg qd.   - Denies gout flares or new issues today.     Review of Systems    Respiratory:  Negative for shortness of breath.    Cardiovascular:  Negative for chest pain and leg swelling.   Gastrointestinal:  Negative for diarrhea, nausea and vomiting.   Genitourinary:  Negative for difficulty urinating, dysuria and hematuria.   All other systems reviewed and are negative.      Objective:       Blood pressure 139/77, weight 76 kg (167 lb 8.8 oz), last menstrual period 11/28/1988.  Physical Exam  Vitals reviewed.   Constitutional:       Appearance: Normal appearance.   HENT:      Head: Normocephalic and atraumatic.   Eyes:      General: No scleral icterus.  Cardiovascular:      Rate and Rhythm: Normal rate.   Pulmonary:      Effort: Pulmonary effort is normal. No respiratory distress.   Musculoskeletal:      Right lower leg: No edema.      Left lower leg: No edema.   Skin:     General: Skin is warm and dry.   Neurological:      Mental Status: She is alert and oriented to person, place, and time.   Psychiatric:         Mood and Affect: Mood normal.         Behavior: Behavior normal.           Lab Results   Component Value Date    CREATININE 1.6 (H) 08/30/2023    URICACID 8.5 (H) 08/30/2023     Prot/Creat Ratio, Urine   Date Value Ref Range Status   08/15/2023 0.11 0.00 - 0.20 Final     Lab Results   Component Value Date     08/30/2023    K 4.7 08/30/2023    CO2 26 08/30/2023     08/30/2023     Lab Results   Component Value Date    CALCIUM 9.8 08/30/2023    PHOS 3.1 08/30/2023     Lab Results   Component Value Date    HGB 12.4 08/15/2023    WBC 8.44 08/15/2023    HCT 38.8 08/15/2023      Lab Results   Component Value Date    HGBA1C 5.2 01/13/2023     08/15/2023    BUN 33 (H) 08/30/2023     Lab Results   Component Value Date    LDLCALC 99.2 06/29/2023         Assessment:       1. Stage 3b chronic kidney disease    2. Proteinuria, unspecified type    3. Essential hypertension    4. Hyperuricemia    5. Positive MICHELLE (antinuclear antibody)          Plan:   CKD stage 3b c eGFR  34.5 mL/min -  - Baseline sCr 1-1.3 since 2016 (last at baseline on labs September 2022). Now trending up to 1.5--> 1.9  - CKD clinically related to longstanding HTN +/- uric acid nephropathy  - Instructed to increase water intake. Avoid NSAIDs. Monitor Bps at home.     UPCR 110mg; continue ARB   Acid-base WNL   Renal osteodystrophy Ca and phos stable. Monitor PTH and Vit D.    Anemia WNL   DM No history   Lipid Management Myalgias with statin. CPK okay.    ESRD planning Provided education about the stages of CKD, usual progression, and need to monitor for and treat CKD-related disease in an effort to delay progression of CKD.     Defer advanced planning     HTN - Controlled on losartan 100, triamterene-HCTZ     Hyperuricemia  - Allopurinol 350mg qd  - Following with rheumatology   - Discussed low purine diet. Recommend holding HCTZ.     Positive MICHELLE  - W/u per rheumatology    All questions patient had were answered.  Asked if further questions. None. F/u in clinic 3-4 months with labs and urine prior to next visit or sooner if needed.  ER for emergency concerns.    Summary of Plan:  - RTC 3-4 months with RFP, CBC, UA,  UPCR, PTH, and Vit D

## 2023-10-10 NOTE — Clinical Note
See note for likely PVC case. Will see what her holter shows. If 10% or above, likely ablation thanks

## 2023-10-10 NOTE — PROGRESS NOTES
Subjective:   Patient ID:  Rajesh Mas is a 72 y.o. female who presents for evaluation of Palpitations      72 yoM here for evaluation of palpitations. She has had palpitations for several months/years. She had normal EF noted last year. She feels heart palpitations more recently. EF normal in 2022. No syncope or near syncope. No prior monitor. She has OT PVCs on ECG today. She is not on AVN or AAD agents.     Echo 9/22:  · The left ventricle is normal in size with normal systolic function. The estimated ejection fraction is 55%.  · Normal right ventricular size with normal right ventricular systolic function.  · Normal left ventricular diastolic function.  · The estimated PA systolic pressure is 28 mmHg.  · Normal central venous pressure (3 mmHg).  · Cannot exclude PFO vs small ASD.    Past Medical History:  No date: Allergy      Comment:  seasonal  No date: Diverticulitis  No date: Fever blister  No date: Hypertension  No date: Personal history of colonic polyps    Past Surgical History:  04/26/2017: CHOLECYSTECTOMY  No date: COLON SURGERY  12/06/2016: COLONOSCOPY; N/A      Comment:  Procedure: COLONOSCOPY;  Surgeon: Fidel Mason MD;                 Location: [facility] ENDO ([address] [place] [address]);  Service: Endoscopy;                 Laterality: N/A;  05/17/2022: COLONOSCOPY; N/A      Comment:  Procedure: COLONOSCOPY;  Surgeon: RUSSELL Rolon MD;                 Location: [facility] ENDO ([address] [place] [address]);  Service: Endoscopy;                 Laterality: N/A;  fully vaccinated, prep instr portal -ml  1997: partial colectomy      Comment:  sigmoid removed due to diverticulitis  2007: SINUS SURGERY  June 1997@ [facility]: SMALL INTESTINE SURGERY      Comment:  Partial Sigm Colon /Divaticulitus    Social History    Socioeconomic History      Marital status:     Tobacco Use      Smoking status: Never      Smokeless tobacco: Never    Substance and Sexual Activity      Alcohol use: No      Drug use: Never      Sexual activity: Yes         Partners: Male        Birth control/protection: None        Comment: , together since 1971    Social Determinants of Health  Financial Resource Strain: Low Risk  (10/6/2023)      Overall Financial Resource Strain (CARDIA)          Difficulty of Paying Living Expenses: Not very hard  Food Insecurity: No Food Insecurity (10/6/2023)      Hunger Vital Sign          Worried About Running Out of Food in the Last Year: Never true          Ran Out of Food in the Last Year: Never true  Transportation Needs: No Transportation Needs (10/6/2023)      PRAPARE - Transportation          Lack of Transportation (Medical): No          Lack of Transportation (Non-Medical): No  Physical Activity: Insufficiently Active (10/6/2023)      Exercise Vital Sign          Days of Exercise per Week: 3 days          Minutes of Exercise per Session: 40 min  Stress: No Stress Concern Present (10/6/2023)      [place] [employer] - Occupational Stress Questionnaire          Feeling of Stress : Not at all  Social Connections: Unknown (10/6/2023)      Social Connection and Isolation Panel [NHANES]          Frequency of Communication with Friends and Family: More than three times a week          Frequency of Social Gatherings with Friends and Family: More than three times a week          Active Member of Clubs or Organizations: Yes          Attends Club or Organization Meetings: More than 4 times per year          Marital Status:   Housing Stability: Unknown (10/6/2023)      Housing Stability Vital Sign          Unable to Pay for Housing in the Last Year: Patient refused          Number of Places Lived in the Last Year: 1          Unstable Housing in the Last Year: No    Review of patient's family history indicates:  Problem: Colon cancer      Relation: Maternal Grandmother          Age of Onset: (Not Specified)          Comment: colon cancer  Problem: Breast cancer      Relation: Maternal Grandmother          Age of  Onset: (Not Specified)  Problem: Diabetes      Relation: Mother          Age of Onset: (Not Specified)  Problem: Heart failure      Relation: Mother          Age of Onset: (Not Specified)  Problem: Hypertension      Relation: Mother          Age of Onset: (Not Specified)  Problem: Heart disease      Relation: Mother          Age of Onset: (Not Specified)  Problem: Kidney disease      Relation: Mother          Age of Onset: (Not Specified)          Comment: Dialysis  Problem: Other      Relation: Brother          Age of Onset: (Not Specified)          Comment: prostate issue  Problem: No Known Problems      Relation: Sister          Age of Onset: (Not Specified)  Problem: No Known Problems      Relation: Daughter          Age of Onset: (Not Specified)  Problem: No Known Problems      Relation: Daughter          Age of Onset: (Not Specified)  Problem: No Known Problems      Relation: Son          Age of Onset: (Not Specified)  Problem: No Known Problems      Relation: Son          Age of Onset: (Not Specified)  Problem: Liver cancer      Relation: Maternal Uncle          Age of Onset: (Not Specified)  Problem: Melanoma      Relation: Neg Hx          Age of Onset: (Not Specified)  Problem: Ovarian cancer      Relation: Neg Hx          Age of Onset: (Not Specified)  Problem: Cancer      Relation: Neg Hx          Age of Onset: (Not Specified)      Current Outpatient Medications:  allopurinoL (ZYLOPRIM) 100 MG tablet, Take ½ tablet by mouth once daily in combination with 300mg tablets for a daily dose of 350mg, Disp: 45 tablet, Rfl: 1  allopurinoL (ZYLOPRIM) 300 MG tablet, Take one tablet by mouth once daily in combination with 50mg tablets for a daily dose of 350mg, Disp: 90 tablet, Rfl: 3  losartan (COZAAR) 100 MG tablet, Take 1 tablet (100 mg total) by mouth once daily., Disp: 90 tablet, Rfl: 3  oxybutynin (DITROPAN) 5 MG Tab, Take 1 tablet (5 mg total) by mouth 2 (two) times daily., Disp: 180 tablet, Rfl:  1  predniSONE (DELTASONE) 2.5 MG tablet, Take one tablet by mouth once daily, Disp: 90 tablet, Rfl: 1  triamterene-hydrochlorothiazide 37.5-25 mg (DYAZIDE) 37.5-25 mg per capsule, Take 1 capsule by mouth every morning., Disp: 90 capsule, Rfl: 3    No current facility-administered medications for this visit.          Review of Systems   Constitutional: Negative.   HENT: Negative.     Eyes: Negative.    Cardiovascular:  Positive for palpitations. Negative for chest pain, dyspnea on exertion, leg swelling, near-syncope and syncope.   Respiratory: Negative.  Negative for shortness of breath.    Endocrine: Negative.    Hematologic/Lymphatic: Negative.    Skin: Negative.    Musculoskeletal: Negative.    Gastrointestinal: Negative.    Genitourinary: Negative.    Neurological: Negative.  Negative for dizziness and light-headedness.   Psychiatric/Behavioral: Negative.     Allergic/Immunologic: Negative.        Objective:   Physical Exam  Vitals reviewed.   Constitutional:       General: She is not in acute distress.     Appearance: She is well-developed.   HENT:      Head: Normocephalic and atraumatic.   Eyes:      Pupils: Pupils are equal, round, and reactive to light.   Neck:      Thyroid: No thyromegaly.      Vascular: No JVD.   Cardiovascular:      Rate and Rhythm: Normal rate. Rhythm irregular.      Chest Wall: PMI is not displaced.      Heart sounds: Normal heart sounds, S1 normal and S2 normal. No murmur heard.     No friction rub. No gallop.   Pulmonary:      Effort: Pulmonary effort is normal. No respiratory distress.      Breath sounds: Normal breath sounds. No wheezing or rales.   Abdominal:      General: Bowel sounds are normal. There is no distension.      Palpations: Abdomen is soft.      Tenderness: There is no abdominal tenderness. There is no guarding or rebound.   Musculoskeletal:         General: No tenderness. Normal range of motion.      Cervical back: Normal range of motion.   Skin:     General: Skin is  warm and dry.      Findings: No erythema or rash.   Neurological:      Mental Status: She is alert and oriented to person, place, and time.      Cranial Nerves: No cranial nerve deficit.   Psychiatric:         Behavior: Behavior normal.         Thought Content: Thought content normal.         Judgment: Judgment normal.       ECG: NSR nl VA, QRS, QTc; PVCs LBBB/V3, R/I axis    Assessment:      1. Palpitations    2. PVCs (premature ventricular contractions)        Plan:     72 yoF here for PVC management. She has PVCs of unknown burden that appear to be outflow tract origin. Will assess with 24h holter. If sufficient burden I will offer PVC ablation. She stated she would prefer a procedure over medications. Will await holter. If we proceed with ablation:    PVC ablation  Prepare to map LVOT and RVOT  PEPE

## 2023-10-18 ENCOUNTER — HOSPITAL ENCOUNTER (OUTPATIENT)
Dept: CARDIOLOGY | Facility: HOSPITAL | Age: 73
Discharge: HOME | End: 2023-10-18
Attending: INTERNAL MEDICINE
Payer: MEDICARE

## 2023-10-18 VITALS
DIASTOLIC BLOOD PRESSURE: 74 MMHG | WEIGHT: 167 LBS | SYSTOLIC BLOOD PRESSURE: 151 MMHG | BODY MASS INDEX: 31.53 KG/M2 | HEART RATE: 71 BPM | HEIGHT: 61 IN

## 2023-10-18 LAB
ASCENDING AORTA: 2.94 CM
AV INDEX (PROSTH): 0.67
AV MEAN GRADIENT: 4 MMHG
AV PEAK GRADIENT: 8 MMHG
AV VALVE AREA BY VELOCITY RATIO: 2.33 CM²
AV VALVE AREA: 2.28 CM²
AV VELOCITY RATIO: 0.68
BSA FOR ECHO PROCEDURE: 1.81 M2
CV ECHO LV RWT: 0.31 CM
DOP CALC AO PEAK VEL: 1.37 M/S
DOP CALC AO VTI: 33.73 CM
DOP CALC LVOT AREA: 3.4 CM2
DOP CALC LVOT DIAMETER: 2.09 CM
DOP CALC LVOT PEAK VEL: 0.93 M/S
DOP CALC LVOT STROKE VOLUME: 76.98 CM3
DOP CALCLVOT PEAK VEL VTI: 22.45 CM
E WAVE DECELERATION TIME: 219.85 MSEC
E/A RATIO: 0.69
E/E' RATIO: 9.29 M/S
ECHO LV POSTERIOR WALL: 0.81 CM (ref 0.6–1.1)
EJECTION FRACTION: 55 %
FRACTIONAL SHORTENING: 33 % (ref 28–44)
INTERVENTRICULAR SEPTUM: 0.78 CM (ref 0.6–1.1)
LA MAJOR: 50 CM
LA MINOR: 5.28 CM
LA WIDTH: 3.62 CM
LEFT ATRIUM SIZE: 3.41 CM
LEFT ATRIUM VOLUME INDEX MOD: 32.2 ML/M2
LEFT ATRIUM VOLUME INDEX: 57.3 ML/M2
LEFT ATRIUM VOLUME MOD: 56.31 CM3
LEFT ATRIUM VOLUME: 100.22 CM3
LEFT INTERNAL DIMENSION IN SYSTOLE: 3.45 CM (ref 2.1–4)
LEFT VENTRICLE DIASTOLIC VOLUME INDEX: 73.07 ML/M2
LEFT VENTRICLE DIASTOLIC VOLUME: 127.87 ML
LEFT VENTRICLE MASS INDEX: 82 G/M2
LEFT VENTRICLE SYSTOLIC VOLUME INDEX: 28.1 ML/M2
LEFT VENTRICLE SYSTOLIC VOLUME: 49.18 ML
LEFT VENTRICULAR INTERNAL DIMENSION IN DIASTOLE: 5.17 CM (ref 3.5–6)
LEFT VENTRICULAR MASS: 142.65 G
LV LATERAL E/E' RATIO: 7.22 M/S
LV SEPTAL E/E' RATIO: 13 M/S
MV A" WAVE DURATION": 7.99 MSEC
MV PEAK A VEL: 0.94 M/S
MV PEAK E VEL: 0.65 M/S
PISA TR MAX VEL: 2.22 M/S
PULM VEIN S/D RATIO: 1.8
PV PEAK D VEL: 0.35 M/S
PV PEAK S VEL: 0.63 M/S
RA MAJOR: 4.42 CM
RA PRESSURE ESTIMATED: 3 MMHG
RA WIDTH: 3.07 CM
RIGHT VENTRICULAR END-DIASTOLIC DIMENSION: 3.27 CM
RV TB RVSP: 5 MMHG
SINUS: 2.88 CM
STJ: 2.48 CM
TDI LATERAL: 0.09 M/S
TDI SEPTAL: 0.05 M/S
TDI: 0.07 M/S
TR MAX PG: 20 MMHG
TRICUSPID ANNULAR PLANE SYSTOLIC EXCURSION: 1.67 CM
TV REST PULMONARY ARTERY PRESSURE: 23 MMHG
Z-SCORE OF LEFT VENTRICULAR DIMENSION IN END DIASTOLE: 0.66
Z-SCORE OF LEFT VENTRICULAR DIMENSION IN END SYSTOLE: 1.12

## 2023-10-18 PROCEDURE — 93306 ECHO (CUPID ONLY): ICD-10-PCS | Mod: 26,,, | Performed by: INTERNAL MEDICINE

## 2023-10-18 PROCEDURE — 93306 TTE W/DOPPLER COMPLETE: CPT

## 2023-10-18 PROCEDURE — 93306 TTE W/DOPPLER COMPLETE: CPT | Mod: 26,,, | Performed by: INTERNAL MEDICINE

## 2023-11-02 ENCOUNTER — CLINICAL SUPPORT (OUTPATIENT)
Dept: CARDIOLOGY | Facility: HOSPITAL | Age: 73
End: 2023-11-02
Attending: INTERNAL MEDICINE
Payer: MEDICARE

## 2023-11-02 DIAGNOSIS — R00.2 PALPITATIONS: ICD-10-CM

## 2023-11-02 DIAGNOSIS — I49.3 PVCS (PREMATURE VENTRICULAR CONTRACTIONS): ICD-10-CM

## 2023-11-02 PROCEDURE — 93226 XTRNL ECG REC<48 HR SCAN A/R: CPT

## 2023-11-02 PROCEDURE — 93227 XTRNL ECG REC<48 HR R&I: CPT | Mod: ,,, | Performed by: INTERNAL MEDICINE

## 2023-11-02 PROCEDURE — 93227 HOLTER MONITOR - 24 HOUR (CUPID ONLY): ICD-10-PCS | Mod: ,,, | Performed by: INTERNAL MEDICINE

## 2023-11-06 LAB
OHS CV EVENT MONITOR DAY: 0
OHS CV HOLTER LENGTH DECIMAL HOURS: 24
OHS CV HOLTER LENGTH HOURS: 24
OHS CV HOLTER LENGTH MINUTES: 0
OHS CV HOLTER SINUS AVERAGE HR: 77
OHS CV HOLTER SINUS MAX HR: 114
OHS CV HOLTER SINUS MIN HR: 54

## 2023-11-07 ENCOUNTER — PATIENT MESSAGE (OUTPATIENT)
Dept: ELECTROPHYSIOLOGY | Facility: CLINIC | Age: 73
End: 2023-11-07
Payer: MEDICARE

## 2023-11-07 NOTE — TELEPHONE ENCOUNTER
Called patient to discuss monitor results. Patient did not answer. I left a message with my call back number.

## 2023-11-08 ENCOUNTER — TELEPHONE (OUTPATIENT)
Dept: ELECTROPHYSIOLOGY | Facility: CLINIC | Age: 73
End: 2023-11-08
Payer: MEDICARE

## 2023-11-08 DIAGNOSIS — Z01.812 PRE-PROCEDURE LAB EXAM: ICD-10-CM

## 2023-11-08 DIAGNOSIS — I49.9 CARDIAC ARRHYTHMIA, UNSPECIFIED CARDIAC ARRHYTHMIA TYPE: ICD-10-CM

## 2023-11-08 DIAGNOSIS — I49.3 PVCS (PREMATURE VENTRICULAR CONTRACTIONS): Primary | ICD-10-CM

## 2023-11-08 NOTE — TELEPHONE ENCOUNTER
Called patient and discussed monitor results with her. I explained that Dr. Ellison recommends a PVC ablation. Patient scheduled for clinic visit to discuss with Dr. Ellison on 12/19/23. Patient agreed to procedure date of 1/18/24.

## 2023-12-19 ENCOUNTER — HOSPITAL ENCOUNTER (OUTPATIENT)
Dept: CARDIOLOGY | Facility: CLINIC | Age: 73
Discharge: HOME | End: 2023-12-19
Payer: MEDICARE

## 2023-12-19 ENCOUNTER — OFFICE VISIT (OUTPATIENT)
Dept: ELECTROPHYSIOLOGY | Facility: CLINIC | Age: 73
End: 2023-12-19
Payer: MEDICARE

## 2023-12-19 VITALS
DIASTOLIC BLOOD PRESSURE: 78 MMHG | BODY MASS INDEX: 32.46 KG/M2 | SYSTOLIC BLOOD PRESSURE: 138 MMHG | HEIGHT: 61 IN | WEIGHT: 171.94 LBS | HEART RATE: 62 BPM

## 2023-12-19 DIAGNOSIS — I49.3 PVCS (PREMATURE VENTRICULAR CONTRACTIONS): ICD-10-CM

## 2023-12-19 DIAGNOSIS — I49.3 PVCS (PREMATURE VENTRICULAR CONTRACTIONS): Primary | ICD-10-CM

## 2023-12-19 PROCEDURE — 4010F ACE/ARB THERAPY RXD/TAKEN: CPT | Mod: CPTII,S$GLB,, | Performed by: INTERNAL MEDICINE

## 2023-12-19 PROCEDURE — 3044F PR MOST RECENT HEMOGLOBIN A1C LEVEL <7.0%: ICD-10-PCS | Mod: CPTII,S$GLB,, | Performed by: INTERNAL MEDICINE

## 2023-12-19 PROCEDURE — 99213 OFFICE O/P EST LOW 20 MIN: CPT | Mod: S$GLB,,, | Performed by: INTERNAL MEDICINE

## 2023-12-19 PROCEDURE — 99213 PR OFFICE/OUTPT VISIT, EST, LEVL III, 20-29 MIN: ICD-10-PCS | Mod: S$GLB,,, | Performed by: INTERNAL MEDICINE

## 2023-12-19 PROCEDURE — 3288F FALL RISK ASSESSMENT DOCD: CPT | Mod: CPTII,S$GLB,, | Performed by: INTERNAL MEDICINE

## 2023-12-19 PROCEDURE — 3008F BODY MASS INDEX DOCD: CPT | Mod: CPTII,S$GLB,, | Performed by: INTERNAL MEDICINE

## 2023-12-19 PROCEDURE — 3078F PR MOST RECENT DIASTOLIC BLOOD PRESSURE < 80 MM HG: ICD-10-PCS | Mod: CPTII,S$GLB,, | Performed by: INTERNAL MEDICINE

## 2023-12-19 PROCEDURE — 93005 RHYTHM STRIP: ICD-10-PCS | Mod: S$GLB,,, | Performed by: INTERNAL MEDICINE

## 2023-12-19 PROCEDURE — 1159F PR MEDICATION LIST DOCUMENTED IN MEDICAL RECORD: ICD-10-PCS | Mod: CPTII,S$GLB,, | Performed by: INTERNAL MEDICINE

## 2023-12-19 PROCEDURE — 99999 PR PBB SHADOW E&M-EST. PATIENT-LVL III: CPT | Mod: PBBFAC,,, | Performed by: INTERNAL MEDICINE

## 2023-12-19 PROCEDURE — 3075F PR MOST RECENT SYSTOLIC BLOOD PRESS GE 130-139MM HG: ICD-10-PCS | Mod: CPTII,S$GLB,, | Performed by: INTERNAL MEDICINE

## 2023-12-19 PROCEDURE — 3288F PR FALLS RISK ASSESSMENT DOCUMENTED: ICD-10-PCS | Mod: CPTII,S$GLB,, | Performed by: INTERNAL MEDICINE

## 2023-12-19 PROCEDURE — 3075F SYST BP GE 130 - 139MM HG: CPT | Mod: CPTII,S$GLB,, | Performed by: INTERNAL MEDICINE

## 2023-12-19 PROCEDURE — 93010 ELECTROCARDIOGRAM REPORT: CPT | Mod: S$GLB,,, | Performed by: INTERNAL MEDICINE

## 2023-12-19 PROCEDURE — 1126F AMNT PAIN NOTED NONE PRSNT: CPT | Mod: CPTII,S$GLB,, | Performed by: INTERNAL MEDICINE

## 2023-12-19 PROCEDURE — 3008F PR BODY MASS INDEX (BMI) DOCUMENTED: ICD-10-PCS | Mod: CPTII,S$GLB,, | Performed by: INTERNAL MEDICINE

## 2023-12-19 PROCEDURE — 1101F PT FALLS ASSESS-DOCD LE1/YR: CPT | Mod: CPTII,S$GLB,, | Performed by: INTERNAL MEDICINE

## 2023-12-19 PROCEDURE — 99999 PR PBB SHADOW E&M-EST. PATIENT-LVL III: ICD-10-PCS | Mod: PBBFAC,,, | Performed by: INTERNAL MEDICINE

## 2023-12-19 PROCEDURE — 1101F PR PT FALLS ASSESS DOC 0-1 FALLS W/OUT INJ PAST YR: ICD-10-PCS | Mod: CPTII,S$GLB,, | Performed by: INTERNAL MEDICINE

## 2023-12-19 PROCEDURE — 3044F HG A1C LEVEL LT 7.0%: CPT | Mod: CPTII,S$GLB,, | Performed by: INTERNAL MEDICINE

## 2023-12-19 PROCEDURE — 1126F PR PAIN SEVERITY QUANTIFIED, NO PAIN PRESENT: ICD-10-PCS | Mod: CPTII,S$GLB,, | Performed by: INTERNAL MEDICINE

## 2023-12-19 PROCEDURE — 93010 RHYTHM STRIP: ICD-10-PCS | Mod: S$GLB,,, | Performed by: INTERNAL MEDICINE

## 2023-12-19 PROCEDURE — 3066F NEPHROPATHY DOC TX: CPT | Mod: CPTII,S$GLB,, | Performed by: INTERNAL MEDICINE

## 2023-12-19 PROCEDURE — 3078F DIAST BP <80 MM HG: CPT | Mod: CPTII,S$GLB,, | Performed by: INTERNAL MEDICINE

## 2023-12-19 PROCEDURE — 1159F MED LIST DOCD IN RCRD: CPT | Mod: CPTII,S$GLB,, | Performed by: INTERNAL MEDICINE

## 2023-12-19 PROCEDURE — 4010F PR ACE/ARB THEARPY RXD/TAKEN: ICD-10-PCS | Mod: CPTII,S$GLB,, | Performed by: INTERNAL MEDICINE

## 2023-12-19 PROCEDURE — 3066F PR DOCUMENTATION OF TREATMENT FOR NEPHROPATHY: ICD-10-PCS | Mod: CPTII,S$GLB,, | Performed by: INTERNAL MEDICINE

## 2023-12-19 PROCEDURE — 93005 ELECTROCARDIOGRAM TRACING: CPT | Mod: S$GLB,,, | Performed by: INTERNAL MEDICINE

## 2023-12-19 NOTE — PROGRESS NOTES
Subjective:   Patient ID:  Rajseh Mas is a 73 y.o. female who presents for follow-up of PVC      73 yoM here for evaluation of palpitations.     10/23: She has had palpitations for several months/years. She had normal EF noted last year. She feels heart palpitations more recently. EF normal in 2022. No syncope or near syncope. No prior monitor. She has OT PVCs on ECG today. She is not on AVN or AAD agents.     Holter 11/23 with 11% PVC burden. PVC ablation set for 1/18/24    Interval history: Continued PVC symptoms. She is here with her  to discuss the ablation procedure.     Echo 9/22:  · The left ventricle is normal in size with normal systolic function. The estimated ejection fraction is 55%.  · Normal right ventricular size with normal right ventricular systolic function.  · Normal left ventricular diastolic function.  · The estimated PA systolic pressure is 28 mmHg.  · Normal central venous pressure (3 mmHg).  · Cannot exclude PFO vs small ASD.    Past Medical History:  No date: Allergy      Comment:  seasonal  No date: Diverticulitis  No date: Fever blister  No date: Hypertension  No date: Personal history of colonic polyps    Past Surgical History:  04/26/2017: CHOLECYSTECTOMY  No date: COLON SURGERY  12/06/2016: COLONOSCOPY; N/A      Comment:  Procedure: COLONOSCOPY;  Surgeon: Fidel Mason MD;                 Location: [facility] ENDO ([address] [place] [address]);  Service: Endoscopy;                 Laterality: N/A;  05/17/2022: COLONOSCOPY; N/A      Comment:  Procedure: COLONOSCOPY;  Surgeon: RUSSELL Rolon MD;                 Location: [facility] SOTO ([address] [place] [address]);  Service: Endoscopy;                 Laterality: N/A;  fully vaccinated, prep instr portal -ml  1997: partial colectomy      Comment:  sigmoid removed due to diverticulitis  2007: SINUS SURGERY  June 1997@ [facility]: SMALL INTESTINE SURGERY      Comment:  Partial Sigm Colon /Divaticulitus    Social History    Socioeconomic History      Marital status:      Tobacco Use      Smoking status: Never      Smokeless tobacco: Never    Substance and Sexual Activity      Alcohol use: No      Drug use: Never      Sexual activity: Yes        Partners: Male        Birth control/protection: None        Comment: , together since 1971    Social Determinants of Health  Financial Resource Strain: Medium Risk (12/16/2023)      Overall Financial Resource Strain (CARDIA)          Difficulty of Paying Living Expenses: Somewhat hard  Food Insecurity: Food Insecurity Present (12/16/2023)      Hunger Vital Sign          Worried About Running Out of Food in the Last Year: Sometimes true          Ran Out of Food in the Last Year: Sometimes true  Transportation Needs: No Transportation Needs (12/16/2023)      PRAPARE - Transportation          Lack of Transportation (Medical): No          Lack of Transportation (Non-Medical): No  Physical Activity: Sufficiently Active (12/16/2023)      Exercise Vital Sign          Days of Exercise per Week: 4 days          Minutes of Exercise per Session: 50 min  Recent Concern: Physical Activity - Insufficiently Active (10/6/2023)      Exercise Vital Sign          Days of Exercise per Week: 3 days          Minutes of Exercise per Session: 40 min  Stress: No Stress Concern Present (12/16/2023)      [place] [employer] - Occupational Stress Questionnaire          Feeling of Stress : Only a little  Social Connections: Unknown (12/16/2023)      Social Connection and Isolation Panel [NHANES]          Frequency of Communication with Friends and Family: More than three times a week          Frequency of Social Gatherings with Friends and Family: More than three times a week          Active Member of Clubs or Organizations: Yes          Attends Club or Organization Meetings: More than 4 times per year          Marital Status:   Housing Stability: Unknown (12/16/2023)      Housing Stability Vital Sign          Unable to Pay for Housing  in the Last Year: Patient declined          Number of Places Lived in the Last Year: 1          Unstable Housing in the Last Year: No    Review of patient's family history indicates:  Problem: Colon cancer      Relation: Maternal Grandmother          Age of Onset: (Not Specified)          Comment: colon cancer  Problem: Breast cancer      Relation: Maternal Grandmother          Age of Onset: (Not Specified)  Problem: Diabetes      Relation: Mother          Age of Onset: (Not Specified)  Problem: Heart failure      Relation: Mother          Age of Onset: (Not Specified)  Problem: Hypertension      Relation: Mother          Age of Onset: (Not Specified)  Problem: Heart disease      Relation: Mother          Age of Onset: (Not Specified)  Problem: Kidney disease      Relation: Mother          Age of Onset: (Not Specified)          Comment: Dialysis  Problem: Other      Relation: Brother          Age of Onset: (Not Specified)          Comment: prostate issue  Problem: No Known Problems      Relation: Sister          Age of Onset: (Not Specified)  Problem: No Known Problems      Relation: Daughter          Age of Onset: (Not Specified)  Problem: No Known Problems      Relation: Daughter          Age of Onset: (Not Specified)  Problem: No Known Problems      Relation: Son          Age of Onset: (Not Specified)  Problem: No Known Problems      Relation: Son          Age of Onset: (Not Specified)  Problem: Liver cancer      Relation: Maternal Uncle          Age of Onset: (Not Specified)  Problem: Melanoma      Relation: Neg Hx          Age of Onset: (Not Specified)  Problem: Ovarian cancer      Relation: Neg Hx          Age of Onset: (Not Specified)  Problem: Cancer      Relation: Neg Hx          Age of Onset: (Not Specified)    Current Outpatient Medications:  allopurinoL (ZYLOPRIM) 100 MG tablet, Take ½ tablet by mouth once daily in combination with 300mg tablets for a daily dose of 350mg, Disp: 45 tablet, Rfl:  1  allopurinoL (ZYLOPRIM) 300 MG tablet, Take one tablet by mouth once daily in combination with 50mg tablets for a daily dose of 350mg, Disp: 90 tablet, Rfl: 3  losartan (COZAAR) 100 MG tablet, Take 1 tablet (100 mg total) by mouth once daily., Disp: 90 tablet, Rfl: 3  oxybutynin (DITROPAN) 5 MG Tab, Take 1 tablet (5 mg total) by mouth 2 (two) times daily., Disp: 180 tablet, Rfl: 1  predniSONE (DELTASONE) 2.5 MG tablet, Take one tablet by mouth once daily, Disp: 90 tablet, Rfl: 1  triamterene-hydrochlorothiazide 37.5-25 mg (DYAZIDE) 37.5-25 mg per capsule, Take 1 capsule by mouth every morning., Disp: 90 capsule, Rfl: 3    No current facility-administered medications for this visit.          Review of Systems   Constitutional: Negative.   HENT: Negative.     Eyes: Negative.    Cardiovascular:  Positive for palpitations. Negative for chest pain, dyspnea on exertion, leg swelling, near-syncope and syncope.   Respiratory: Negative.  Negative for shortness of breath.    Endocrine: Negative.    Hematologic/Lymphatic: Negative.    Skin: Negative.    Musculoskeletal: Negative.    Gastrointestinal: Negative.    Genitourinary: Negative.    Neurological: Negative.  Negative for dizziness and light-headedness.   Psychiatric/Behavioral: Negative.     Allergic/Immunologic: Negative.        Objective:   Physical Exam  Vitals reviewed.   Constitutional:       General: She is not in acute distress.     Appearance: She is well-developed.   HENT:      Head: Normocephalic and atraumatic.   Eyes:      Pupils: Pupils are equal, round, and reactive to light.   Neck:      Thyroid: No thyromegaly.      Vascular: No JVD.   Cardiovascular:      Rate and Rhythm: Normal rate. Rhythm irregular.      Chest Wall: PMI is not displaced.      Heart sounds: Normal heart sounds, S1 normal and S2 normal. No murmur heard.     No friction rub. No gallop.   Pulmonary:      Effort: Pulmonary effort is normal. No respiratory distress.      Breath sounds:  Normal breath sounds. No wheezing or rales.   Abdominal:      General: Bowel sounds are normal. There is no distension.      Palpations: Abdomen is soft.      Tenderness: There is no abdominal tenderness. There is no guarding or rebound.   Musculoskeletal:         General: No tenderness. Normal range of motion.      Cervical back: Normal range of motion.   Skin:     General: Skin is warm and dry.      Findings: No erythema or rash.   Neurological:      Mental Status: She is alert and oriented to person, place, and time.      Cranial Nerves: No cranial nerve deficit.   Psychiatric:         Behavior: Behavior normal.         Thought Content: Thought content normal.         Judgment: Judgment normal.       ECg: SR with PVCs; LBBB/V3, R/I axis    Assessment:      1. PVCs (premature ventricular contractions)        Plan:     73 yoF here for PVC management. Questions answered regarding PVC ablation. Prepare for arterial access and LVOT mapping. Extensive discussion regarding risks, benefits, and alternative approaches to the procedure was had with the patient.  The patient voices understanding and all questions have been answered.  The patient would like to proceed as planned.'

## 2023-12-22 ENCOUNTER — PATIENT MESSAGE (OUTPATIENT)
Dept: CARDIOLOGY | Facility: CLINIC | Age: 73
End: 2023-12-22
Payer: MEDICARE

## 2023-12-28 ENCOUNTER — OFFICE VISIT (OUTPATIENT)
Dept: RHEUMATOLOGY | Facility: CLINIC | Age: 73
End: 2023-12-28
Payer: MEDICARE

## 2023-12-28 ENCOUNTER — LAB VISIT (OUTPATIENT)
Dept: LAB | Facility: HOSPITAL | Age: 73
End: 2023-12-28
Attending: INTERNAL MEDICINE
Payer: MEDICARE

## 2023-12-28 VITALS
WEIGHT: 176.38 LBS | DIASTOLIC BLOOD PRESSURE: 83 MMHG | HEART RATE: 75 BPM | HEIGHT: 61 IN | BODY MASS INDEX: 33.3 KG/M2 | SYSTOLIC BLOOD PRESSURE: 132 MMHG

## 2023-12-28 DIAGNOSIS — R80.3 FREE MONOCLONAL LIGHT CHAIN: ICD-10-CM

## 2023-12-28 DIAGNOSIS — M1A.09X0 IDIOPATHIC CHRONIC GOUT OF MULTIPLE SITES WITHOUT TOPHUS: ICD-10-CM

## 2023-12-28 DIAGNOSIS — Z71.89 ENCOUNTER FOR INJECTION EDUCATION: Primary | ICD-10-CM

## 2023-12-28 LAB
ALBUMIN SERPL BCP-MCNC: 3.7 G/DL (ref 3.5–5.2)
ALP SERPL-CCNC: 57 U/L (ref 55–135)
ALT SERPL W/O P-5'-P-CCNC: 15 U/L (ref 10–44)
ANION GAP SERPL CALC-SCNC: 9 MMOL/L (ref 8–16)
AST SERPL-CCNC: 17 U/L (ref 10–40)
BASOPHILS # BLD AUTO: 0.04 K/UL (ref 0–0.2)
BASOPHILS NFR BLD: 0.4 % (ref 0–1.9)
BILIRUB SERPL-MCNC: 0.9 MG/DL (ref 0.1–1)
BUN SERPL-MCNC: 30 MG/DL (ref 8–23)
CALCIUM SERPL-MCNC: 10.1 MG/DL (ref 8.7–10.5)
CHLORIDE SERPL-SCNC: 106 MMOL/L (ref 95–110)
CO2 SERPL-SCNC: 27 MMOL/L (ref 23–29)
CREAT SERPL-MCNC: 1.4 MG/DL (ref 0.5–1.4)
DIFFERENTIAL METHOD BLD: ABNORMAL
EOSINOPHIL # BLD AUTO: 0.1 K/UL (ref 0–0.5)
EOSINOPHIL NFR BLD: 1 % (ref 0–8)
ERYTHROCYTE [DISTWIDTH] IN BLOOD BY AUTOMATED COUNT: 14.3 % (ref 11.5–14.5)
EST. GFR  (NO RACE VARIABLE): 40.2 ML/MIN/1.73 M^2
GLUCOSE SERPL-MCNC: 78 MG/DL (ref 70–110)
HCT VFR BLD AUTO: 39.1 % (ref 37–48.5)
HGB BLD-MCNC: 12.7 G/DL (ref 12–16)
IGA SERPL-MCNC: 282 MG/DL (ref 40–350)
IGG SERPL-MCNC: 1626 MG/DL (ref 650–1600)
IGM SERPL-MCNC: 85 MG/DL (ref 50–300)
IMM GRANULOCYTES # BLD AUTO: 0.04 K/UL (ref 0–0.04)
IMM GRANULOCYTES NFR BLD AUTO: 0.4 % (ref 0–0.5)
LYMPHOCYTES # BLD AUTO: 2.5 K/UL (ref 1–4.8)
LYMPHOCYTES NFR BLD: 27.1 % (ref 18–48)
MCH RBC QN AUTO: 30.4 PG (ref 27–31)
MCHC RBC AUTO-ENTMCNC: 32.5 G/DL (ref 32–36)
MCV RBC AUTO: 94 FL (ref 82–98)
MONOCYTES # BLD AUTO: 0.7 K/UL (ref 0.3–1)
MONOCYTES NFR BLD: 7.5 % (ref 4–15)
NEUTROPHILS # BLD AUTO: 5.8 K/UL (ref 1.8–7.7)
NEUTROPHILS NFR BLD: 63.6 % (ref 38–73)
NRBC BLD-RTO: 0 /100 WBC
PLATELET # BLD AUTO: 140 K/UL (ref 150–450)
PMV BLD AUTO: 14.2 FL (ref 9.2–12.9)
POTASSIUM SERPL-SCNC: 4.1 MMOL/L (ref 3.5–5.1)
PROT SERPL-MCNC: 7.9 G/DL (ref 6–8.4)
RBC # BLD AUTO: 4.18 M/UL (ref 4–5.4)
SODIUM SERPL-SCNC: 142 MMOL/L (ref 136–145)
URATE SERPL-MCNC: 4.1 MG/DL (ref 2.4–5.7)
WBC # BLD AUTO: 9.19 K/UL (ref 3.9–12.7)

## 2023-12-28 PROCEDURE — 84165 PROTEIN E-PHORESIS SERUM: CPT | Mod: 26,,, | Performed by: PATHOLOGY

## 2023-12-28 PROCEDURE — 1159F MED LIST DOCD IN RCRD: CPT | Mod: CPTII,S$GLB,, | Performed by: INTERNAL MEDICINE

## 2023-12-28 PROCEDURE — 84550 ASSAY OF BLOOD/URIC ACID: CPT | Performed by: INTERNAL MEDICINE

## 2023-12-28 PROCEDURE — 3079F PR MOST RECENT DIASTOLIC BLOOD PRESSURE 80-89 MM HG: ICD-10-PCS | Mod: CPTII,S$GLB,, | Performed by: INTERNAL MEDICINE

## 2023-12-28 PROCEDURE — 1101F PT FALLS ASSESS-DOCD LE1/YR: CPT | Mod: CPTII,S$GLB,, | Performed by: INTERNAL MEDICINE

## 2023-12-28 PROCEDURE — 3288F FALL RISK ASSESSMENT DOCD: CPT | Mod: CPTII,S$GLB,, | Performed by: INTERNAL MEDICINE

## 2023-12-28 PROCEDURE — 3044F HG A1C LEVEL LT 7.0%: CPT | Mod: CPTII,S$GLB,, | Performed by: INTERNAL MEDICINE

## 2023-12-28 PROCEDURE — 86334 IMMUNOFIX E-PHORESIS SERUM: CPT | Mod: 26,,, | Performed by: PATHOLOGY

## 2023-12-28 PROCEDURE — 3075F PR MOST RECENT SYSTOLIC BLOOD PRESS GE 130-139MM HG: ICD-10-PCS | Mod: CPTII,S$GLB,, | Performed by: INTERNAL MEDICINE

## 2023-12-28 PROCEDURE — 85025 COMPLETE CBC W/AUTO DIFF WBC: CPT | Performed by: INTERNAL MEDICINE

## 2023-12-28 PROCEDURE — 3079F DIAST BP 80-89 MM HG: CPT | Mod: CPTII,S$GLB,, | Performed by: INTERNAL MEDICINE

## 2023-12-28 PROCEDURE — 3066F PR DOCUMENTATION OF TREATMENT FOR NEPHROPATHY: ICD-10-PCS | Mod: CPTII,S$GLB,, | Performed by: INTERNAL MEDICINE

## 2023-12-28 PROCEDURE — 86334 IMMUNOFIX E-PHORESIS SERUM: CPT | Performed by: INTERNAL MEDICINE

## 2023-12-28 PROCEDURE — 3044F PR MOST RECENT HEMOGLOBIN A1C LEVEL <7.0%: ICD-10-PCS | Mod: CPTII,S$GLB,, | Performed by: INTERNAL MEDICINE

## 2023-12-28 PROCEDURE — 36415 COLL VENOUS BLD VENIPUNCTURE: CPT | Performed by: INTERNAL MEDICINE

## 2023-12-28 PROCEDURE — 99214 PR OFFICE/OUTPT VISIT, EST, LEVL IV, 30-39 MIN: ICD-10-PCS | Mod: S$GLB,,, | Performed by: INTERNAL MEDICINE

## 2023-12-28 PROCEDURE — 99999 PR PBB SHADOW E&M-EST. PATIENT-LVL III: CPT | Mod: PBBFAC,,, | Performed by: INTERNAL MEDICINE

## 2023-12-28 PROCEDURE — 99214 OFFICE O/P EST MOD 30 MIN: CPT | Mod: S$GLB,,, | Performed by: INTERNAL MEDICINE

## 2023-12-28 PROCEDURE — 3288F PR FALLS RISK ASSESSMENT DOCUMENTED: ICD-10-PCS | Mod: CPTII,S$GLB,, | Performed by: INTERNAL MEDICINE

## 2023-12-28 PROCEDURE — 1159F PR MEDICATION LIST DOCUMENTED IN MEDICAL RECORD: ICD-10-PCS | Mod: CPTII,S$GLB,, | Performed by: INTERNAL MEDICINE

## 2023-12-28 PROCEDURE — 3075F SYST BP GE 130 - 139MM HG: CPT | Mod: CPTII,S$GLB,, | Performed by: INTERNAL MEDICINE

## 2023-12-28 PROCEDURE — 1101F PR PT FALLS ASSESS DOC 0-1 FALLS W/OUT INJ PAST YR: ICD-10-PCS | Mod: CPTII,S$GLB,, | Performed by: INTERNAL MEDICINE

## 2023-12-28 PROCEDURE — 84165 PROTEIN E-PHORESIS SERUM: CPT | Performed by: INTERNAL MEDICINE

## 2023-12-28 PROCEDURE — 83521 IG LIGHT CHAINS FREE EACH: CPT | Mod: 59 | Performed by: INTERNAL MEDICINE

## 2023-12-28 PROCEDURE — 80053 COMPREHEN METABOLIC PANEL: CPT | Performed by: INTERNAL MEDICINE

## 2023-12-28 PROCEDURE — 3008F BODY MASS INDEX DOCD: CPT | Mod: CPTII,S$GLB,, | Performed by: INTERNAL MEDICINE

## 2023-12-28 PROCEDURE — 3066F NEPHROPATHY DOC TX: CPT | Mod: CPTII,S$GLB,, | Performed by: INTERNAL MEDICINE

## 2023-12-28 PROCEDURE — 3008F PR BODY MASS INDEX (BMI) DOCUMENTED: ICD-10-PCS | Mod: CPTII,S$GLB,, | Performed by: INTERNAL MEDICINE

## 2023-12-28 PROCEDURE — 4010F PR ACE/ARB THEARPY RXD/TAKEN: ICD-10-PCS | Mod: CPTII,S$GLB,, | Performed by: INTERNAL MEDICINE

## 2023-12-28 PROCEDURE — 4010F ACE/ARB THERAPY RXD/TAKEN: CPT | Mod: CPTII,S$GLB,, | Performed by: INTERNAL MEDICINE

## 2023-12-28 PROCEDURE — 99999 PR PBB SHADOW E&M-EST. PATIENT-LVL III: ICD-10-PCS | Mod: PBBFAC,,, | Performed by: INTERNAL MEDICINE

## 2023-12-28 PROCEDURE — 82784 ASSAY IGA/IGD/IGG/IGM EACH: CPT | Mod: 59 | Performed by: INTERNAL MEDICINE

## 2023-12-28 NOTE — PROGRESS NOTES
After obtaining consent, and per orders of Dr. Fenton, injection of K10 given by Jason Lion. Patient instructed to remain in clinic for 20 minutes afterwards, and to report any adverse reaction to me immediately.  L  Buttocks LOT -[id number]   EXP 04/2025   R Buttocks LOT -[id number]   EXP 04/2025      Answers submitted by the patient for this visit:  Rheumatology Questionnaire (Submitted on 12/21/2023)  fever: No  eye redness: No  mouth sores: No  headaches: No  shortness of breath: No  chest pain: No  trouble swallowing: No  diarrhea: No  constipation: No  unexpected weight change: No  genital sore: No  dysuria: No  Bruises or bleeds easily: No  cough: No

## 2023-12-28 NOTE — PROGRESS NOTES
Chief Complaint   Patient presents with    Disease Management         History of Presenting Illness    73 y.o. year old female with history of gout, CKD 4, HTN (reports dx 1998), HLD, and diverticulitis.     Pain to joints to b/l hands, R>L for last few year. Every few days she gets cramps in her hands for about 30 minutes; has to shake hand to release cramp. No known triggers. Does not take medication for pain. 6/10 pain when hand is cramping, otherwise 0/10 on pain scale. Denies any erythema, edema or warmth to joints. Denies having trouble with daily activities.    Right hip discomfort for the last 7 months which occurs intermittently. Notices discomfort more when lying on her right side, will resolve once she puts a pillow under right hip. Denies pain or any erythema, edema or warmth. Denies any morning stiffness.    Hyperuricemia: gout to right great toe. On Allopurinol 300mg once daily since about 2018. Last gout attack was around 2 years ago. Patient states fish and crawfish trigger gout attacks. Denies any gout pain currently.     If she overexerts herself she feels slight muscle weakness at the end of the day which occurs twice a week. Mainly on the day she looks after granddaughter. Denies taking any medication for pain. Denies morning stiffness. No hematuria.     Past history: HTN (reports dx 1998), HLD, and diverticulitis.  Family history: mother- HTN, diabetes, CHF; cousin- lupus  Social history:  Tobacco- no   Alcohol- no  Surgery: partial colectomy, colonoscopy, cholecystectomy    No skin rashes,malar rash, photosensitivity   No telangiectasias   No calcinosis   No psoriasis   No patchy alopecia   No oral and nasal ulcers   No dry eyes and dry mouth   No pleurisy or any cardiopulmonary complaints   No dysphagia, diplopia and dysphonia and muscle weakness   No n/v/d/c   No acid reflux+   No raynaud's+   No digital ulcers   No cytopenias   + renal issues: CKD stage IV   No blood clots   No  fever,chills,night sweats,weight loss and loss of appetite   No pre term deliveries /pregnancy complications   - (+) 1 pregnancy loss  No new onset headaches   No recurrent conjunctivitis or uveitis or scleritis or episcleritis   No chronic or bloody diarrhea with no u colitis or crohn's/ inflammatory bowel disease   No vaginal and urethral  d/c/STDs/no ulcers   No unexplained lymphadenopathy, parotitis   No seizures,strokes,psychosis  No sclerodactyly  No puffy hands  No perioral tightness     Review of Systems   Constitutional:  Negative for appetite change, fatigue, fever and unexpected weight change.   HENT:  Negative for mouth sores and trouble swallowing.    Eyes:  Negative for pain, discharge and redness.   Respiratory:  Negative for cough and shortness of breath.    Cardiovascular:  Negative for chest pain.   Gastrointestinal:  Negative for abdominal pain, blood in stool, constipation, diarrhea, nausea and vomiting.   Genitourinary:  Negative for dysuria, genital sores, hematuria and vaginal discharge.   Musculoskeletal:  Positive for arthralgias and myalgias. Negative for back pain, gait problem and joint swelling.   Skin:  Negative for rash.   Neurological:  Negative for headaches.   Hematological:  Does not bruise/bleed easily.       There is currently no information documented on the homunculus. Go to the Rheumatology activity and complete the homunculus joint exam.    Physical Exam   Constitutional: She is oriented to person, place, and time. normal appearance. No distress.   HENT:   Head: Normocephalic.   Cardiovascular: Normal rate and regular rhythm.   Pulmonary/Chest: Effort normal and breath sounds normal.   Abdominal: She exhibits no distension.   Musculoskeletal:         General: Tenderness present. No swelling. Normal range of motion.      Right shoulder: Normal.      Left shoulder: Normal.      Right elbow: Normal.      Left elbow: Normal.      Right wrist: Normal.      Left wrist: Normal.       Right knee: Normal.      Left knee: Normal.      Right lower leg: No edema.      Left lower leg: No edema.   Neurological: She is alert and oriented to person, place, and time.   Skin: Skin is warm.   Psychiatric: Mood normal.       Right Side Rheumatological Exam     Examination finds the shoulder, elbow, wrist, knee, 1st PIP, 1st MCP, 2nd PIP, 3rd PIP, 3rd MCP, 4th PIP, 4th MCP, 5th PIP and 5th MCP normal.    The patient is tender to palpation of the 2nd MCP    Shoulder Exam   Tenderness Location: no tenderness    Range of Motion   The patient has normal right shoulder ROM.    Hip Exam   Tenderness Location: no tenderness    Range of Motion   The patient has normal right hip ROM.  Abduction:  normal   Adduction:  normal   Extension:  normal   Flexion:  normal     Elbow/Wrist Exam   Tenderness Location: no elbow tenderness and no wrist tenderness    Range of Motion   Elbow   The patient has normal right elbow ROM.    Range of Motion   Wrist   The patient has normal right wrist ROM.  Extension:  normal   Flexion:  normal     Elbow Warmth: negative  Elbow Swelling: negative    Wrist Warmth: negative  Wrist Swelling: negative    Foot Exam   Right foot exam exhibits signs of no podagra    Range of Motion   The patient has normal right ankle ROM.    Ankle Warmth: negative  Ankle Swelling: negative    Left Side Rheumatological Exam     Examination finds the shoulder, elbow, wrist, knee, 1st PIP, 1st MCP, 2nd PIP, 2nd MCP, 3rd PIP, 3rd MCP, 4th PIP, 4th MCP and 5th PIP normal.    The patient is tender to palpation of the 5th MCP.    Shoulder Exam   Tenderness Location: no tenderness    Range of Motion   The patient has normal left shoulder ROM.    Hip Exam   Tenderness Location: no tenderness    Range of Motion   The patient has normal left hip ROM.  Abduction:  normal   Adduction:  normal   Extension:  normal   Flexion:  normal     Elbow/Wrist Exam   Tenderness Location: no elbow tenderness and no wrist  tenderness    Range of Motion   Elbow   The patient has normal left elbow ROM.    Range of Motion   Wrist   The patient has normal left wrist ROM.  Extension:  normal   Flexion:  normal     Elbow Warmth: negative  Elbow Swelling: negative    Wrist Warmth: negative  Wrist Swelling: negative    Foot Exam   Left foot exam exhibits signs of no podagra    Range of Motion   The patient has normal left ankle ROM.     Ankle Warmth: negative  Ankle Swelling: negative      Labs Reviewed:    Labs:   8/30/2023  Component Ref Range & Units 2 wk ago  (8/30/23) 2 mo ago  (6/29/23) 1 yr ago  (5/3/22) 2 yr ago  (4/27/21) 4 yr ago  (7/5/19) 4 yr ago  (2/4/19) 5 yr ago  (8/3/18)   Uric Acid 2.4 - 5.7 mg/dL 8.5 High   9.9 High   6.8 High   5.8 High   7.8 High   5.1  6.2 High       Component Ref Range & Units 2 wk ago   Anti Sm Antibody 0.00 - 0.99 Ratio 0.10    Anti-Sm Interpretation Negative Negative    Anti-SSA Antibody 0.00 - 0.99 Ratio 5.49 High     Anti-SSA Interpretation Negative Positive Abnormal     Anti-SSB Antibody 0.00 - 0.99 Ratio 0.44    Anti-SSB Interpretation Negative Negative    ds DNA Ab Negative 1:10 Negative 1:10    Comment: Performed by fluorescent crithidia assay.   Anti Sm/RNP Antibody 0.00 - 0.99 Ratio 0.13    Anti-Sm/RNP Interpretation Negative Negative        MICHELLE positive   MICHELLE titer 1 1:320  Sarah Ann free light chain elevated-4.89  Kappa lambda ratio elevated 1.94  SPEP and BISHNU nml    8/15/2023    Protein Urine positive  CBC nml   BUN 30  Cr 1.5  eGFR 37.3    6/29/2023  TSH 5.720    RF,CCP neg    Cryos neg  APLAS neg  SUMA neg  Igg elevated 1803  Complements nml  ESR 90  CRP nml    Assessment     1. Encounter for injection education    2. Idiopathic chronic gout of multiple sites without tophus    3. Free monoclonal light chain      Reviewed medications  Ordered labs /imaging    New problem       73 year old female  HTN,HLD,diverticulitis  Portion of colon removed  MICHELLE positive  CK high  No specific  symptoms that led to the lab     MICHELLE 1: 320 positive  No lupus symptoms  SSA 5.49  No sicca symptoms  Less likely she has sjogren's  Completed the lupus and sjogrens panel  Cryos neg  APLAS neg  SUMA neg  Igg elevated 1803  Complements nml  ESR 90  CRP nml     Hands hurt right> left for few years  Cramps in hands for 30 minutes  Not on medications  6/10 cramp  0/10 pain  No functional limitations  RF,CCP neg  Right hand OA on xrays    Right hip pain for 7 months  When she lays on right side  Pain resolves when she puts a pillow  No morning stiffness  Right hip bursitis possibly     Gout-uric acid 9.9/8.5/6.8/5.8/7.8  On allopurinol 350 mg daily,prednisone 2.5 mg daily  Last attack 2 years ago  Craw fish and sugars cause the attack  Uric acid very high,goal is less than 6  Check CBC,CMP,uric acid today     Muscle weakness when she overexerts  Twice a week    No classic symptoms of polymyositis,dermatomyositis  This ck is not elevated  No need to worry           Kappa free light chain elevated-4.89  Kappa lambda ratio elevated 1.94  Nml SPEP,BISHNU  Hematology -Repeat quantitative immunoglobulins and free light chains in 3 months. Appears to be reactive or age related as such low level. Normal BISHNU and SPEP   If light chain or IgG trend up on next check needs clinical hematology consult   I will send immunoglobulins and free light chains today    This patient has high ESR,elevated free light chain ratio and elevated Igg  She doesn't have anything actively autoimmune going on  She does have gout and no symptoms for 2 years  She has MICHELLE,SSA but has no symptoms of lupus or sjogrens  So we think there is something hematologic going on    GFR worse since June 2023  UPCR nml,no hematuria,no proteinuria  RENAL US chronic kidney disease  Baseline sCr 1-1.3 since 2016 (last at baseline on labs September 2022). Now trending up to 1.5--> 1.9  - CKD clinically related to longstanding HTN +/- uric acid nephropathy  Less likely  this is lupus related but if we have to confirm or rule out lupus vs sjogrens nephritis we need a kidney biopsy     Nml white count  Nml hb  Did have thrombocytopenia but never less than 100 thousand     TSH elevated 5.720  Nml T4  She will talk to her PCP    PVCs will do ablation soon        RTC in 3 months after she completes hip PT                Plan    Labs today and rtc in 3 months     Hematology consult   So that they will have 2 sets of immunoglobulins and free light chain ratio to compare    Vettice was seen today for disease management.    Diagnoses and all orders for this visit:    Encounter for injection education    Idiopathic chronic gout of multiple sites without tophus  -     Uric Acid; Future  -     CBC Auto Differential; Future  -     Comprehensive Metabolic Panel; Future  -     IMMUNOGLOBULINS (IGG, IGA, IGM) QUANTITATIVE; Future  -     IMMUNOGLOBULIN FREE LT CHAINS BLOOD; Future  -     PROTEIN ELECTROPHORESIS, SERUM; Future  -     Immunofixation Electrophoresis; Future    Free monoclonal light chain  -     IMMUNOGLOBULINS (IGG, IGA, IGM) QUANTITATIVE; Future  -     IMMUNOGLOBULIN FREE LT CHAINS BLOOD; Future  -     Ambulatory referral/consult to Hematology / Oncology; Future  -     PROTEIN ELECTROPHORESIS, SERUM; Future  -     Immunofixation Electrophoresis; Future

## 2023-12-29 LAB
ALBUMIN SERPL ELPH-MCNC: 3.92 G/DL (ref 3.35–5.55)
ALPHA1 GLOB SERPL ELPH-MCNC: 0.28 G/DL (ref 0.17–0.41)
ALPHA2 GLOB SERPL ELPH-MCNC: 0.86 G/DL (ref 0.43–0.99)
B-GLOBULIN SERPL ELPH-MCNC: 1.02 G/DL (ref 0.5–1.1)
GAMMA GLOB SERPL ELPH-MCNC: 1.43 G/DL (ref 0.67–1.58)
INTERPRETATION SERPL IFE-IMP: NORMAL
KAPPA LC SER QL IA: 3.67 MG/DL (ref 0.33–1.94)
KAPPA LC/LAMBDA SER IA: 1.69 (ref 0.26–1.65)
LAMBDA LC SER QL IA: 2.17 MG/DL (ref 0.57–2.63)
PATHOLOGIST INTERPRETATION IFE: NORMAL
PATHOLOGIST INTERPRETATION SPE: NORMAL
PROT SERPL-MCNC: 7.5 G/DL (ref 6–8.4)

## 2024-01-08 ENCOUNTER — LAB VISIT (OUTPATIENT)
Dept: LAB | Facility: HOSPITAL | Age: 74
End: 2024-01-08
Attending: INTERNAL MEDICINE
Payer: MEDICARE

## 2024-01-08 ENCOUNTER — OFFICE VISIT (OUTPATIENT)
Dept: INTERNAL MEDICINE | Facility: CLINIC | Age: 74
End: 2024-01-08
Payer: MEDICARE

## 2024-01-08 ENCOUNTER — PATIENT MESSAGE (OUTPATIENT)
Dept: INTERNAL MEDICINE | Facility: CLINIC | Age: 74
End: 2024-01-08
Payer: MEDICARE

## 2024-01-08 VITALS
SYSTOLIC BLOOD PRESSURE: 120 MMHG | HEIGHT: 61 IN | DIASTOLIC BLOOD PRESSURE: 74 MMHG | BODY MASS INDEX: 32.3 KG/M2 | HEART RATE: 70 BPM | WEIGHT: 171.06 LBS | OXYGEN SATURATION: 97 %

## 2024-01-08 DIAGNOSIS — E03.8 SUBCLINICAL HYPOTHYROIDISM: ICD-10-CM

## 2024-01-08 DIAGNOSIS — E03.8 SUBCLINICAL HYPOTHYROIDISM: Primary | ICD-10-CM

## 2024-01-08 DIAGNOSIS — I49.3 PVCS (PREMATURE VENTRICULAR CONTRACTIONS): ICD-10-CM

## 2024-01-08 DIAGNOSIS — I10 ESSENTIAL HYPERTENSION: Primary | Chronic | ICD-10-CM

## 2024-01-08 DIAGNOSIS — E78.5 HYPERLIPIDEMIA, UNSPECIFIED HYPERLIPIDEMIA TYPE: ICD-10-CM

## 2024-01-08 DIAGNOSIS — Z01.812 PRE-PROCEDURE LAB EXAM: ICD-10-CM

## 2024-01-08 DIAGNOSIS — N18.32 STAGE 3B CHRONIC KIDNEY DISEASE: ICD-10-CM

## 2024-01-08 DIAGNOSIS — D69.6 THROMBOCYTOPENIA: ICD-10-CM

## 2024-01-08 DIAGNOSIS — I49.9 CARDIAC ARRHYTHMIA, UNSPECIFIED CARDIAC ARRHYTHMIA TYPE: ICD-10-CM

## 2024-01-08 DIAGNOSIS — Z12.31 ENCOUNTER FOR SCREENING MAMMOGRAM FOR BREAST CANCER: ICD-10-CM

## 2024-01-08 LAB
ANION GAP SERPL CALC-SCNC: 9 MMOL/L (ref 8–16)
APTT PPP: 26.1 SEC (ref 21–32)
BUN SERPL-MCNC: 29 MG/DL (ref 8–23)
CALCIUM SERPL-MCNC: 9.2 MG/DL (ref 8.7–10.5)
CHLORIDE SERPL-SCNC: 109 MMOL/L (ref 95–110)
CO2 SERPL-SCNC: 24 MMOL/L (ref 23–29)
CREAT SERPL-MCNC: 1.5 MG/DL (ref 0.5–1.4)
ERYTHROCYTE [DISTWIDTH] IN BLOOD BY AUTOMATED COUNT: 14.6 % (ref 11.5–14.5)
EST. GFR  (NO RACE VARIABLE): 37 ML/MIN/1.73 M^2
GLUCOSE SERPL-MCNC: 78 MG/DL (ref 70–110)
HCT VFR BLD AUTO: 40.2 % (ref 37–48.5)
HGB BLD-MCNC: 12.6 G/DL (ref 12–16)
INR PPP: 1 (ref 0.8–1.2)
MCH RBC QN AUTO: 30.2 PG (ref 27–31)
MCHC RBC AUTO-ENTMCNC: 31.3 G/DL (ref 32–36)
MCV RBC AUTO: 96 FL (ref 82–98)
PLATELET # BLD AUTO: 150 K/UL (ref 150–450)
PMV BLD AUTO: 13.9 FL (ref 9.2–12.9)
POTASSIUM SERPL-SCNC: 4.3 MMOL/L (ref 3.5–5.1)
PROTHROMBIN TIME: 10.4 SEC (ref 9–12.5)
RBC # BLD AUTO: 4.17 M/UL (ref 4–5.4)
SODIUM SERPL-SCNC: 142 MMOL/L (ref 136–145)
WBC # BLD AUTO: 8.66 K/UL (ref 3.9–12.7)

## 2024-01-08 PROCEDURE — 4010F ACE/ARB THERAPY RXD/TAKEN: CPT | Mod: CPTII,S$GLB,, | Performed by: INTERNAL MEDICINE

## 2024-01-08 PROCEDURE — 99999 PR PBB SHADOW E&M-EST. PATIENT-LVL IV: CPT | Mod: PBBFAC,,, | Performed by: INTERNAL MEDICINE

## 2024-01-08 PROCEDURE — 3008F BODY MASS INDEX DOCD: CPT | Mod: CPTII,S$GLB,, | Performed by: INTERNAL MEDICINE

## 2024-01-08 PROCEDURE — 36415 COLL VENOUS BLD VENIPUNCTURE: CPT | Performed by: INTERNAL MEDICINE

## 2024-01-08 PROCEDURE — 3078F DIAST BP <80 MM HG: CPT | Mod: CPTII,S$GLB,, | Performed by: INTERNAL MEDICINE

## 2024-01-08 PROCEDURE — 99214 OFFICE O/P EST MOD 30 MIN: CPT | Mod: S$GLB,,, | Performed by: INTERNAL MEDICINE

## 2024-01-08 PROCEDURE — 1101F PT FALLS ASSESS-DOCD LE1/YR: CPT | Mod: CPTII,S$GLB,, | Performed by: INTERNAL MEDICINE

## 2024-01-08 PROCEDURE — 80048 BASIC METABOLIC PNL TOTAL CA: CPT | Performed by: INTERNAL MEDICINE

## 2024-01-08 PROCEDURE — 85027 COMPLETE CBC AUTOMATED: CPT | Performed by: INTERNAL MEDICINE

## 2024-01-08 PROCEDURE — 1160F RVW MEDS BY RX/DR IN RCRD: CPT | Mod: CPTII,S$GLB,, | Performed by: INTERNAL MEDICINE

## 2024-01-08 PROCEDURE — 1159F MED LIST DOCD IN RCRD: CPT | Mod: CPTII,S$GLB,, | Performed by: INTERNAL MEDICINE

## 2024-01-08 PROCEDURE — 3074F SYST BP LT 130 MM HG: CPT | Mod: CPTII,S$GLB,, | Performed by: INTERNAL MEDICINE

## 2024-01-08 PROCEDURE — 3288F FALL RISK ASSESSMENT DOCD: CPT | Mod: CPTII,S$GLB,, | Performed by: INTERNAL MEDICINE

## 2024-01-08 PROCEDURE — 1126F AMNT PAIN NOTED NONE PRSNT: CPT | Mod: CPTII,S$GLB,, | Performed by: INTERNAL MEDICINE

## 2024-01-08 PROCEDURE — 85610 PROTHROMBIN TIME: CPT | Performed by: INTERNAL MEDICINE

## 2024-01-08 PROCEDURE — 85730 THROMBOPLASTIN TIME PARTIAL: CPT | Performed by: INTERNAL MEDICINE

## 2024-01-08 NOTE — PROGRESS NOTES
"Subjective:       Patient ID: Rajesh Mas is a 73 y.o. female.    Chief Complaint: Follow-up (6 mth follow up )    HPI  73 y.o. female here for follow up of    HLD  Atorva 40mg started in July '23.     PVC  Ablation scheduled on 1/18.    Gout  allopurionl 350mg  Prednisone 2.5mg daily  No recent flares   uric acid 4.1     HTN  Losartan 100mg  Triamterene-hctz     CKD 3  GFR 49 (stable)   cr inc to 1.6 from 1.2 baseline     Oxybutynin     Tsh inc 5.7  Free t4 wnl     Review of Systems   Constitutional:  Negative for fever.   Respiratory:  Negative for shortness of breath.    Cardiovascular:  Negative for chest pain.   Musculoskeletal: Negative.    Skin: Negative.        Objective:   /74 (BP Location: Left arm, Patient Position: Sitting, BP Method: Medium (Manual))   Pulse 70   Ht 5' 1" (1.549 m)   Wt 77.6 kg (171 lb 1.2 oz)   LMP 11/28/1988 (Approximate)   SpO2 97%   BMI 32.32 kg/m²      Physical Exam  Constitutional:       General: She is not in acute distress.     Appearance: She is well-developed. She is not diaphoretic.   HENT:      Head: Normocephalic and atraumatic.   Cardiovascular:      Rate and Rhythm: Normal rate and regular rhythm.   Pulmonary:      Effort: Pulmonary effort is normal. No respiratory distress.      Breath sounds: No wheezing or rales.   Skin:     General: Skin is warm and dry.   Neurological:      Mental Status: She is alert and oriented to person, place, and time.   Psychiatric:         Behavior: Behavior normal.         Assessment:       1. Essential hypertension    2. Hyperlipidemia, unspecified hyperlipidemia type    3. Thrombocytopenia    4. Subclinical hypothyroidism    5. Stage 3b chronic kidney disease    6. Encounter for screening mammogram for breast cancer        Plan:       1. Essential hypertension    2. Hyperlipidemia, unspecified hyperlipidemia type    3. Thrombocytopenia    4. Subclinical hypothyroidism  -     TSH; Future; Expected date: 01/08/2024    5. Stage " 3b chronic kidney disease    6. Encounter for screening mammogram for breast cancer  -     Mammo Digital Screening Bilat w/ Bryce; Future; Expected date: 03/03/2024           Health Maintenance Due   Topic    Mammogram       Lab  Mmg 3/3 or later

## 2024-01-11 ENCOUNTER — OFFICE VISIT (OUTPATIENT)
Dept: OBSTETRICS AND GYNECOLOGY | Facility: CLINIC | Age: 74
End: 2024-01-11
Attending: OBSTETRICS & GYNECOLOGY
Payer: MEDICARE

## 2024-01-11 VITALS
BODY MASS INDEX: 32.89 KG/M2 | WEIGHT: 174.19 LBS | DIASTOLIC BLOOD PRESSURE: 76 MMHG | SYSTOLIC BLOOD PRESSURE: 148 MMHG | HEIGHT: 61 IN

## 2024-01-11 DIAGNOSIS — Z12.31 SCREENING MAMMOGRAM, ENCOUNTER FOR: ICD-10-CM

## 2024-01-11 DIAGNOSIS — Z01.419 ENCOUNTER FOR GYNECOLOGICAL EXAMINATION WITHOUT ABNORMAL FINDING: Primary | ICD-10-CM

## 2024-01-11 DIAGNOSIS — Z12.4 PAP SMEAR FOR CERVICAL CANCER SCREENING: ICD-10-CM

## 2024-01-11 PROCEDURE — 87624 HPV HI-RISK TYP POOLED RSLT: CPT | Performed by: OBSTETRICS & GYNECOLOGY

## 2024-01-11 PROCEDURE — 99999 PR PBB SHADOW E&M-EST. PATIENT-LVL III: CPT | Mod: PBBFAC,,, | Performed by: OBSTETRICS & GYNECOLOGY

## 2024-01-11 PROCEDURE — G0101 CA SCREEN;PELVIC/BREAST EXAM: HCPCS | Mod: GZ,S$GLB,, | Performed by: OBSTETRICS & GYNECOLOGY

## 2024-01-11 PROCEDURE — 1101F PT FALLS ASSESS-DOCD LE1/YR: CPT | Mod: CPTII,S$GLB,, | Performed by: OBSTETRICS & GYNECOLOGY

## 2024-01-11 PROCEDURE — 3077F SYST BP >= 140 MM HG: CPT | Mod: CPTII,S$GLB,, | Performed by: OBSTETRICS & GYNECOLOGY

## 2024-01-11 PROCEDURE — 3288F FALL RISK ASSESSMENT DOCD: CPT | Mod: CPTII,S$GLB,, | Performed by: OBSTETRICS & GYNECOLOGY

## 2024-01-11 PROCEDURE — 1160F RVW MEDS BY RX/DR IN RCRD: CPT | Mod: CPTII,S$GLB,, | Performed by: OBSTETRICS & GYNECOLOGY

## 2024-01-11 PROCEDURE — 1126F AMNT PAIN NOTED NONE PRSNT: CPT | Mod: CPTII,S$GLB,, | Performed by: OBSTETRICS & GYNECOLOGY

## 2024-01-11 PROCEDURE — 88175 CYTOPATH C/V AUTO FLUID REDO: CPT | Performed by: OBSTETRICS & GYNECOLOGY

## 2024-01-11 PROCEDURE — 1159F MED LIST DOCD IN RCRD: CPT | Mod: CPTII,S$GLB,, | Performed by: OBSTETRICS & GYNECOLOGY

## 2024-01-11 PROCEDURE — 3078F DIAST BP <80 MM HG: CPT | Mod: CPTII,S$GLB,, | Performed by: OBSTETRICS & GYNECOLOGY

## 2024-01-11 NOTE — PROGRESS NOTES
Subjective:       Patient ID: Rajesh Mas is a 73 y.o. female.    Chief Complaint:  Annual Exam and Gynecologic Exam      History of Present Illness  Gynecologic Exam  Pertinent negatives include no abdominal pain, back pain or headaches.       Rajesh Mas is a 73 y.o. female  here for her annual GYN exam.  She is up to date with her PCP and labs. She walks 2 miles 3x/week regularly.  She is menopausal since age 38 and is not on HRT.  denies break through bleeding.   denies vaginal itching or irritation.  Denies vaginal discharge.  She is sexually active. She has had1 partner for 53 years .     History of abnormal pap: Yes - in her 20's  Last Pap: approximate date  and was normal(and negative for HR HPV)  Last MMG: normal-: BI-RADS Category 1: Negative-routine follow-up in 12 months(scheduled)  Last Colonoscopy:  colonoscopy 3 years ago with abnormalities.  denies domestic violence. She does feel safe at home.     Past Medical History:   Diagnosis Date    Allergy     seasonal    Diverticulitis     Fever blister     Hypertension     Personal history of colonic polyps      Past Surgical History:   Procedure Laterality Date    CHOLECYSTECTOMY  2017    COLON SURGERY      COLONOSCOPY N/A 2016    Procedure: COLONOSCOPY;  Surgeon: Fidel Mason MD;  Location: [address]);  Service: Endoscopy;  Laterality: N/A;    COLONOSCOPY N/A 2022    Procedure: COLONOSCOPY;  Surgeon: RUSSELL Rolon MD;  Location: [address]);  Service: Endoscopy;  Laterality: N/A;  fully vaccinated, prep instr portal -ml    partial colectomy      sigmoid removed due to diverticulitis    SINUS SURGERY      SMALL INTESTINE SURGERY  1997@ [facility]    Partial Sigm Colon /Divaticulitus     Social History     Socioeconomic History    Marital status:    Tobacco Use    Smoking status: Never    Smokeless tobacco: Never   Substance and Sexual Activity    Alcohol use: No    Drug  use: Never    Sexual activity: Yes     Partners: Male     Birth control/protection: None     Comment: , together since 1971     Social Determinants of Health     Financial Resource Strain: Medium Risk (12/16/2023)    Overall Financial Resource Strain (CARDIA)     Difficulty of Paying Living Expenses: Somewhat hard   Food Insecurity: Food Insecurity Present (12/16/2023)    Hunger Vital Sign     Worried About Running Out of Food in the Last Year: Sometimes true     Ran Out of Food in the Last Year: Sometimes true   Transportation Needs: No Transportation Needs (12/16/2023)    PRAPARE - Transportation     Lack of Transportation (Medical): No     Lack of Transportation (Non-Medical): No   Physical Activity: Sufficiently Active (12/16/2023)    Exercise Vital Sign     Days of Exercise per Week: 4 days     Minutes of Exercise per Session: 50 min   Recent Concern: Physical Activity - Insufficiently Active (10/6/2023)    Exercise Vital Sign     Days of Exercise per Week: 3 days     Minutes of Exercise per Session: 40 min   Stress: No Stress Concern Present (12/16/2023)    [employer] - Occupational Stress Questionnaire     Feeling of Stress : Only a little   Social Connections: Unknown (12/16/2023)    Social Connection and Isolation Panel [NHANES]     Frequency of Communication with Friends and Family: More than three times a week     Frequency of Social Gatherings with Friends and Family: More than three times a week     Active Member of Clubs or Organizations: Yes     Attends Club or Organization Meetings: More than 4 times per year     Marital Status:    Housing Stability: Unknown (12/16/2023)    Housing Stability Vital Sign     Unable to Pay for Housing in the Last Year: Patient declined     Number of Places Lived in the Last Year: 1     Unstable Housing in the Last Year: No     Family History   Problem Relation Age of Onset    Colon cancer Maternal Grandmother         colon  "cancer    Breast cancer Maternal Grandmother     Diabetes Mother     Heart failure Mother     Hypertension Mother     Heart disease Mother     Kidney disease Mother         Dialysis    Other Brother         prostate issue    No Known Problems Sister     No Known Problems Daughter     No Known Problems Daughter     No Known Problems Son     No Known Problems Son     Liver cancer Maternal Uncle     Melanoma Neg Hx     Ovarian cancer Neg Hx     Cancer Neg Hx      OB History          5    Para   4    Term   4            AB   1    Living   4         SAB   1    IAB        Ectopic        Multiple        Live Births   4                 BP (!) 148/76   Ht 5' 1" (1.549 m)   Wt 79 kg (174 lb 3.2 oz)   LMP 1988 (Approximate)   BMI 32.91 kg/m²         GYN & OB History  Patient's last menstrual period was 1988 (approximate).   Date of Last Pap: No result found    OB History    Para Term  AB Living   5 4 4   1 4   SAB IAB Ectopic Multiple Live Births   1       4      # Outcome Date GA Lbr Yonny/2nd Weight Sex Delivery Anes PTL Lv   5 SAB            4 Term      Vag-Spont   HALLEY   3 Term      Vag-Spont   HALLEY   2 Term      Vag-Spont   HALLEY   1 Term      Vag-Spont   HALLEY       Review of Systems  Review of Systems   Constitutional:  Negative for activity change, appetite change, fatigue and unexpected weight change.   HENT: Negative.     Eyes:  Negative for visual disturbance.   Respiratory:  Negative for shortness of breath and wheezing.    Cardiovascular:  Negative for chest pain, palpitations and leg swelling.   Gastrointestinal:  Negative for abdominal pain, bloating and blood in stool.   Endocrine: Positive for hypothyroidism. Negative for diabetes and hair loss.   Genitourinary:  Negative for decreased libido, dyspareunia, hot flashes, postmenopausal bleeding and vaginal dryness.   Musculoskeletal:  Negative for back pain and joint swelling.   Integumentary:  Negative for acne, hair changes " and nipple discharge.   Neurological:  Negative for headaches.   Hematological:  Does not bruise/bleed easily.   Psychiatric/Behavioral:  Negative for depression and sleep disturbance. The patient is not nervous/anxious.    Breast: Negative for mastodynia and nipple discharge          Objective:      Physical Exam:   Constitutional: She is oriented to person, place, and time. She appears well-developed and well-nourished.    HENT:   Head: Normocephalic and atraumatic.    Eyes: Pupils are equal, round, and reactive to light. EOM are normal.     Cardiovascular:  Normal rate and regular rhythm.             Pulmonary/Chest: Effort normal and breath sounds normal.   BREASTS:  no mass, no tenderness, no deformity and no retraction. Right breast exhibits no inverted nipple, no mass, no nipple discharge, no skin change, no tenderness, no bleeding and no swelling. Left breast exhibits no inverted nipple, no mass, no nipple discharge, no skin change, no tenderness, no bleeding and no swelling. Breasts are symmetrical.              Abdominal: Soft. Bowel sounds are normal.     Genitourinary:    Pelvic exam was performed with patient supine.      Genitourinary Comments: PELVIC: Normal external genitalia without lesions.  Normal hair distribution.  Adequate perineal body, normal urethral meatus.  Vagina  Moist and moderately well rugated, Minimally  atrophic, without lesions or discharge.  Cervix pink, without lesions, discharge or tenderness.  No significant cystocele or rectocele.  Bimanual exam shows uterus to be normal size, regular, mobile and nontender.  Adnexa without masses or tenderness.    RECTAL:Deferred                 Musculoskeletal: Normal range of motion and moves all extremeties.       Neurological: She is alert and oriented to person, place, and time.    Skin: Skin is warm and dry.    Psychiatric: She has a normal mood and affect.              Assessment:        1. Encounter for gynecological examination  without abnormal finding    2. Pap smear for cervical cancer screening    3. Screening mammogram, encounter for                Plan:        Problem List Items Addressed This Visit    None  Visit Diagnoses       Encounter for gynecological examination without abnormal finding    -  Primary  COUNSELING:  The patient was counseled today on regular weight bearing exercise. Patient was counseled today on the new ACS guidelines for cervical cytology screening as well as the current recommendations for breast cancer screening. Counseling session lasted approximately 10 minutes, and all her questions were answered. She was advised to see her primary care physician for all other health maintenance.   FOLLOW-UP with me for next routine visit.       Pap smear for cervical cancer screening        Relevant Orders    Liquid-Based Pap Smear, Screening    HPV High Risk Genotypes, PCR    Screening mammogram, encounter for                 Follow up in about 1 year (around 1/11/2025).

## 2024-01-16 ENCOUNTER — TELEPHONE (OUTPATIENT)
Dept: ELECTROPHYSIOLOGY | Facility: CLINIC | Age: 74
End: 2024-01-16
Payer: MEDICARE

## 2024-01-16 NOTE — TELEPHONE ENCOUNTER
Spoke to Patient    CONFIRMED procedure arrival time of 5:15 am    Reiterated instructions including:  -Directions to check in desk  -NPO after midnight night prior to procedure  -Pre-procedure LABS resulted and WNL  -Confirmed absence or presence of implanted device/stimulator No implants  -Confirmed no fever, cough, or shortness of breath in the past 30 days. No fever or cough  -Confirmed no redness, rash, irritation, or yeast infection to groin area. No rash or irritation.   -Do not wear mascara day of procedure  Patient verbalized understanding of above and appreciated the call.

## 2024-01-17 ENCOUNTER — ANESTHESIA EVENT (OUTPATIENT)
Dept: MEDSURG UNIT | Facility: HOSPITAL | Age: 74
DRG: 273 | End: 2024-01-17
Payer: MEDICARE

## 2024-01-18 ENCOUNTER — HOSPITAL ENCOUNTER (INPATIENT)
Facility: HOSPITAL | Age: 74
LOS: 5 days | Discharge: HOME | DRG: 273 | End: 2024-01-23
Attending: INTERNAL MEDICINE | Admitting: INTERNAL MEDICINE
Payer: MEDICARE

## 2024-01-18 ENCOUNTER — ANESTHESIA (OUTPATIENT)
Dept: MEDSURG UNIT | Facility: HOSPITAL | Age: 74
DRG: 273 | End: 2024-01-18
Payer: MEDICARE

## 2024-01-18 DIAGNOSIS — Z09 HOSPITAL DISCHARGE FOLLOW-UP: ICD-10-CM

## 2024-01-18 DIAGNOSIS — I49.9 ARRHYTHMIA: ICD-10-CM

## 2024-01-18 DIAGNOSIS — I49.3 PVC'S (PREMATURE VENTRICULAR CONTRACTIONS): ICD-10-CM

## 2024-01-18 DIAGNOSIS — I48.91 A-FIB: ICD-10-CM

## 2024-01-18 DIAGNOSIS — Z86.79 S/P RADIOFREQUENCY ABLATION OPERATION FOR ARRHYTHMIA: ICD-10-CM

## 2024-01-18 DIAGNOSIS — I10 ESSENTIAL HYPERTENSION: ICD-10-CM

## 2024-01-18 DIAGNOSIS — I49.3 PVCS (PREMATURE VENTRICULAR CONTRACTIONS): ICD-10-CM

## 2024-01-18 DIAGNOSIS — J90 PLEURAL EFFUSION, LEFT: ICD-10-CM

## 2024-01-18 DIAGNOSIS — I31.4 TAMPONADE: Primary | ICD-10-CM

## 2024-01-18 DIAGNOSIS — Z98.890 S/P RADIOFREQUENCY ABLATION OPERATION FOR ARRHYTHMIA: ICD-10-CM

## 2024-01-18 DIAGNOSIS — I50.31 ACUTE DIASTOLIC HEART FAILURE: ICD-10-CM

## 2024-01-18 DIAGNOSIS — I31.39 PERICARDIAL EFFUSION: ICD-10-CM

## 2024-01-18 DIAGNOSIS — I50.31 ACUTE DIASTOLIC (CONGESTIVE) HEART FAILURE: ICD-10-CM

## 2024-01-18 PROBLEM — Z87.39 HISTORY OF GOUT: Status: ACTIVE | Noted: 2024-01-18

## 2024-01-18 PROBLEM — E78.5 HYPERLIPIDEMIA: Status: RESOLVED | Noted: 2023-07-08 | Resolved: 2024-01-18

## 2024-01-18 LAB
ASCENDING AORTA: 2.99 CM
BSA FOR ECHO PROCEDURE: 1.82 M2
CV ECHO LV RWT: 0.38 CM
DOP CALC LVOT AREA: 2.7 CM2
DOP CALC LVOT DIAMETER: 1.87 CM
DOP CALC LVOT PEAK VEL: 0.77 M/S
DOP CALC LVOT STROKE VOLUME: 42.77 CM3
DOP CALCLVOT PEAK VEL VTI: 15.58 CM
E WAVE DECELERATION TIME: 252.11 MSEC
E/A RATIO: 0.62
E/E' RATIO: 11.4 M/S
ECHO LV POSTERIOR WALL: 0.95 CM (ref 0.6–1.1)
FINAL PATHOLOGIC DIAGNOSIS: NORMAL
FRACTIONAL SHORTENING: 38 % (ref 28–44)
HPV HR 12 DNA SPEC QL NAA+PROBE: NEGATIVE
HPV16 AG SPEC QL: NEGATIVE
HPV18 DNA SPEC QL NAA+PROBE: NEGATIVE
INTERVENTRICULAR SEPTUM: 0.99 CM (ref 0.6–1.1)
LA MAJOR: 5.94 CM
LA MINOR: 5.26 CM
LA WIDTH: 3.06 CM
LEFT ATRIUM SIZE: 3.51 CM
LEFT ATRIUM VOLUME INDEX: 28.9 ML/M2
LEFT ATRIUM VOLUME: 50.94 CM3
LEFT INTERNAL DIMENSION IN SYSTOLE: 3.09 CM (ref 2.1–4)
LEFT VENTRICLE DIASTOLIC VOLUME INDEX: 36.97 ML/M2
LEFT VENTRICLE DIASTOLIC VOLUME: 65.06 ML
LEFT VENTRICLE MASS INDEX: 99 G/M2
LEFT VENTRICLE SYSTOLIC VOLUME INDEX: 21.4 ML/M2
LEFT VENTRICLE SYSTOLIC VOLUME: 37.7 ML
LEFT VENTRICULAR INTERNAL DIMENSION IN DIASTOLE: 5 CM (ref 3.5–6)
LEFT VENTRICULAR MASS: 174.7 G
LV LATERAL E/E' RATIO: 11.4 M/S
LV SEPTAL E/E' RATIO: 11.4 M/S
Lab: NORMAL
MV PEAK A VEL: 0.92 M/S
MV PEAK E VEL: 0.57 M/S
MV STENOSIS PRESSURE HALF TIME: 73.11 MS
MV VALVE AREA P 1/2 METHOD: 3.01 CM2
POC ACTIVATED CLOTTING TIME K: 168 SEC (ref 74–137)
RA MAJOR: 5.1 CM
RA PRESSURE ESTIMATED: 3 MMHG
RA WIDTH: 3.02 CM
RIGHT VENTRICULAR END-DIASTOLIC DIMENSION: 2.81 CM
SAMPLE: ABNORMAL
SINUS: 2.2 CM
STJ: 2.65 CM
TDI LATERAL: 0.05 M/S
TDI SEPTAL: 0.05 M/S
TDI: 0.05 M/S
Z-SCORE OF LEFT VENTRICULAR DIMENSION IN END DIASTOLE: 0.27
Z-SCORE OF LEFT VENTRICULAR DIMENSION IN END SYSTOLE: 0.21

## 2024-01-18 PROCEDURE — 37000008 HC ANESTHESIA 1ST 15 MINUTES: Performed by: INTERNAL MEDICINE

## 2024-01-18 PROCEDURE — 20000000 HC ICU ROOM

## 2024-01-18 PROCEDURE — C1730 CATH, EP, 19 OR FEW ELECT: HCPCS | Performed by: INTERNAL MEDICINE

## 2024-01-18 PROCEDURE — 02583ZZ DESTRUCTION OF CONDUCTION MECHANISM, PERCUTANEOUS APPROACH: ICD-10-PCS | Performed by: INTERNAL MEDICINE

## 2024-01-18 PROCEDURE — 99233 SBSQ HOSP IP/OBS HIGH 50: CPT | Mod: GC,,, | Performed by: INTERNAL MEDICINE

## 2024-01-18 PROCEDURE — 25500020 PHARM REV CODE 255: Performed by: INTERNAL MEDICINE

## 2024-01-18 PROCEDURE — 25000003 PHARM REV CODE 250: Performed by: INTERNAL MEDICINE

## 2024-01-18 PROCEDURE — C1760 CLOSURE DEV, VASC: HCPCS | Performed by: INTERNAL MEDICINE

## 2024-01-18 PROCEDURE — 4A0234Z MEASUREMENT OF CARDIAC ELECTRICAL ACTIVITY, PERCUTANEOUS APPROACH: ICD-10-PCS | Performed by: INTERNAL MEDICINE

## 2024-01-18 PROCEDURE — 93654 COMPRE EP EVAL TX VT: CPT | Performed by: INTERNAL MEDICINE

## 2024-01-18 PROCEDURE — 37000009 HC ANESTHESIA EA ADD 15 MINS: Performed by: INTERNAL MEDICINE

## 2024-01-18 PROCEDURE — 25000003 PHARM REV CODE 250: Performed by: NURSE ANESTHETIST, CERTIFIED REGISTERED

## 2024-01-18 PROCEDURE — 0W9D3ZZ DRAINAGE OF PERICARDIAL CAVITY, PERCUTANEOUS APPROACH: ICD-10-PCS | Performed by: INTERNAL MEDICINE

## 2024-01-18 PROCEDURE — C1887 CATHETER, GUIDING: HCPCS | Performed by: INTERNAL MEDICINE

## 2024-01-18 PROCEDURE — 02K83ZZ MAP CONDUCTION MECHANISM, PERCUTANEOUS APPROACH: ICD-10-PCS | Performed by: INTERNAL MEDICINE

## 2024-01-18 PROCEDURE — 27201423 OPTIME MED/SURG SUP & DEVICES STERILE SUPPLY: Performed by: INTERNAL MEDICINE

## 2024-01-18 PROCEDURE — 63600175 PHARM REV CODE 636 W HCPCS: Performed by: NURSE ANESTHETIST, CERTIFIED REGISTERED

## 2024-01-18 PROCEDURE — 33016 PERICARDIOCENTESIS W/IMAGING: CPT | Performed by: INTERNAL MEDICINE

## 2024-01-18 PROCEDURE — 63600175 PHARM REV CODE 636 W HCPCS: Performed by: INTERNAL MEDICINE

## 2024-01-18 PROCEDURE — 25000003 PHARM REV CODE 250

## 2024-01-18 PROCEDURE — 63600175 PHARM REV CODE 636 W HCPCS

## 2024-01-18 PROCEDURE — 93005 ELECTROCARDIOGRAM TRACING: CPT

## 2024-01-18 PROCEDURE — C1769 GUIDE WIRE: HCPCS | Performed by: INTERNAL MEDICINE

## 2024-01-18 PROCEDURE — D9220A PRA ANESTHESIA: Mod: ANES,,, | Performed by: ANESTHESIOLOGY

## 2024-01-18 PROCEDURE — C1894 INTRO/SHEATH, NON-LASER: HCPCS | Performed by: INTERNAL MEDICINE

## 2024-01-18 PROCEDURE — D9220A PRA ANESTHESIA: Mod: CRNA,,, | Performed by: NURSE ANESTHETIST, CERTIFIED REGISTERED

## 2024-01-18 PROCEDURE — C2630 CATH, EP, COOL-TIP: HCPCS | Performed by: INTERNAL MEDICINE

## 2024-01-18 PROCEDURE — 93654 COMPRE EP EVAL TX VT: CPT | Mod: ,,, | Performed by: INTERNAL MEDICINE

## 2024-01-18 PROCEDURE — 93010 ELECTROCARDIOGRAM REPORT: CPT | Mod: 76,,, | Performed by: INTERNAL MEDICINE

## 2024-01-18 PROCEDURE — 33016 PERICARDIOCENTESIS W/IMAGING: CPT | Mod: ,,, | Performed by: INTERNAL MEDICINE

## 2024-01-18 PROCEDURE — 63600175 PHARM REV CODE 636 W HCPCS: Performed by: STUDENT IN AN ORGANIZED HEALTH CARE EDUCATION/TRAINING PROGRAM

## 2024-01-18 PROCEDURE — C1766 INTRO/SHEATH,STRBLE,NON-PEEL: HCPCS | Performed by: INTERNAL MEDICINE

## 2024-01-18 PROCEDURE — 93010 ELECTROCARDIOGRAM REPORT: CPT | Mod: ,,, | Performed by: INTERNAL MEDICINE

## 2024-01-18 RX ORDER — LIDOCAINE HYDROCHLORIDE 20 MG/ML
INJECTION, SOLUTION INFILTRATION; PERINEURAL
Status: DISCONTINUED | OUTPATIENT
Start: 2024-01-18 | End: 2024-01-23 | Stop reason: HOSPADM

## 2024-01-18 RX ORDER — PHENYLEPHRINE HYDROCHLORIDE 10 MG/ML
INJECTION INTRAVENOUS
Status: DISCONTINUED | OUTPATIENT
Start: 2024-01-18 | End: 2024-01-18

## 2024-01-18 RX ORDER — EPHEDRINE SULFATE 50 MG/ML
INJECTION, SOLUTION INTRAVENOUS
Status: DISCONTINUED | OUTPATIENT
Start: 2024-01-18 | End: 2024-01-18

## 2024-01-18 RX ORDER — HEPARIN SOD,PORCINE/0.9 % NACL 1000/500ML
INTRAVENOUS SOLUTION INTRAVENOUS
Status: DISCONTINUED | OUTPATIENT
Start: 2024-01-18 | End: 2024-01-22

## 2024-01-18 RX ORDER — HEPARIN SODIUM 10000 [USP'U]/100ML
INJECTION, SOLUTION INTRAVENOUS CONTINUOUS PRN
Status: DISCONTINUED | OUTPATIENT
Start: 2024-01-18 | End: 2024-01-18

## 2024-01-18 RX ORDER — PROTAMINE SULFATE 10 MG/ML
INJECTION, SOLUTION INTRAVENOUS
Status: DISCONTINUED | OUTPATIENT
Start: 2024-01-18 | End: 2024-01-18

## 2024-01-18 RX ORDER — FENTANYL CITRATE 50 UG/ML
25 INJECTION, SOLUTION INTRAMUSCULAR; INTRAVENOUS EVERY 5 MIN PRN
Status: DISCONTINUED | OUTPATIENT
Start: 2024-01-18 | End: 2024-01-23 | Stop reason: HOSPADM

## 2024-01-18 RX ORDER — LOSARTAN POTASSIUM 50 MG/1
100 TABLET ORAL DAILY
Status: DISCONTINUED | OUTPATIENT
Start: 2024-01-19 | End: 2024-01-18

## 2024-01-18 RX ORDER — EPINEPHRINE 1 MG/ML
INJECTION, SOLUTION, CONCENTRATE INTRAVENOUS
Status: DISCONTINUED | OUTPATIENT
Start: 2024-01-18 | End: 2024-01-18

## 2024-01-18 RX ORDER — ALLOPURINOL 300 MG/1
300 TABLET ORAL DAILY
Status: DISCONTINUED | OUTPATIENT
Start: 2024-01-18 | End: 2024-01-23 | Stop reason: HOSPADM

## 2024-01-18 RX ORDER — ACETAMINOPHEN 325 MG/1
650 TABLET ORAL EVERY 4 HOURS PRN
Status: DISCONTINUED | OUTPATIENT
Start: 2024-01-18 | End: 2024-01-23 | Stop reason: HOSPADM

## 2024-01-18 RX ORDER — IBUPROFEN 400 MG/1
400 TABLET ORAL EVERY 6 HOURS PRN
Status: CANCELLED | OUTPATIENT
Start: 2024-01-18

## 2024-01-18 RX ORDER — HEPARIN SODIUM 1000 [USP'U]/ML
INJECTION, SOLUTION INTRAVENOUS; SUBCUTANEOUS
Status: DISCONTINUED | OUTPATIENT
Start: 2024-01-18 | End: 2024-01-18

## 2024-01-18 RX ORDER — PROPOFOL 10 MG/ML
VIAL (ML) INTRAVENOUS
Status: DISCONTINUED | OUTPATIENT
Start: 2024-01-18 | End: 2024-01-18

## 2024-01-18 RX ORDER — KETOROLAC TROMETHAMINE 15 MG/ML
15 INJECTION, SOLUTION INTRAMUSCULAR; INTRAVENOUS ONCE
Status: COMPLETED | OUTPATIENT
Start: 2024-01-18 | End: 2024-01-18

## 2024-01-18 RX ORDER — HYDROMORPHONE HYDROCHLORIDE 1 MG/ML
0.2 INJECTION, SOLUTION INTRAMUSCULAR; INTRAVENOUS; SUBCUTANEOUS EVERY 5 MIN PRN
Status: DISCONTINUED | OUTPATIENT
Start: 2024-01-18 | End: 2024-01-23 | Stop reason: HOSPADM

## 2024-01-18 RX ORDER — OXYBUTYNIN CHLORIDE 5 MG/1
5 TABLET ORAL 2 TIMES DAILY
Status: DISCONTINUED | OUTPATIENT
Start: 2024-01-18 | End: 2024-01-23 | Stop reason: HOSPADM

## 2024-01-18 RX ORDER — ONDANSETRON HYDROCHLORIDE 2 MG/ML
4 INJECTION, SOLUTION INTRAVENOUS DAILY PRN
Status: DISCONTINUED | OUTPATIENT
Start: 2024-01-18 | End: 2024-01-23 | Stop reason: HOSPADM

## 2024-01-18 RX ADMIN — PHENYLEPHRINE HYDROCHLORIDE 200 MCG: 10 INJECTION INTRAVENOUS at 09:01

## 2024-01-18 RX ADMIN — EPINEPHRINE 10 MCG: 1 INJECTION, SOLUTION, CONCENTRATE INTRAVENOUS at 09:01

## 2024-01-18 RX ADMIN — SODIUM CHLORIDE 0.2 MCG/KG/MIN: 9 INJECTION, SOLUTION INTRAVENOUS at 07:01

## 2024-01-18 RX ADMIN — PROPOFOL 100 MCG/KG/MIN: 10 INJECTION, EMULSION INTRAVENOUS at 07:01

## 2024-01-18 RX ADMIN — PROTAMINE SULFATE 50 MG: 10 INJECTION, SOLUTION INTRAVENOUS at 09:01

## 2024-01-18 RX ADMIN — KETOROLAC TROMETHAMINE 15 MG: 30 INJECTION, SOLUTION INTRAMUSCULAR; INTRAVENOUS at 11:01

## 2024-01-18 RX ADMIN — OXYBUTYNIN CHLORIDE 5 MG: 5 TABLET ORAL at 09:01

## 2024-01-18 RX ADMIN — HUMAN ALBUMIN MICROSPHERES AND PERFLUTREN 0.66 MG: 10; .22 INJECTION, SOLUTION INTRAVENOUS at 11:01

## 2024-01-18 RX ADMIN — PROPOFOL 10 MG: 10 INJECTION, EMULSION INTRAVENOUS at 07:01

## 2024-01-18 RX ADMIN — EPHEDRINE SULFATE 10 MG: 50 INJECTION INTRAVENOUS at 09:01

## 2024-01-18 RX ADMIN — EPINEPHRINE 30 MCG: 1 INJECTION, SOLUTION, CONCENTRATE INTRAVENOUS at 09:01

## 2024-01-18 RX ADMIN — HYDROMORPHONE HYDROCHLORIDE 0.2 MG: 1 INJECTION, SOLUTION INTRAMUSCULAR; INTRAVENOUS; SUBCUTANEOUS at 03:01

## 2024-01-18 RX ADMIN — EPINEPHRINE 20 MCG: 1 INJECTION, SOLUTION, CONCENTRATE INTRAVENOUS at 09:01

## 2024-01-18 RX ADMIN — HYDROMORPHONE HYDROCHLORIDE 0.2 MG: 1 INJECTION, SOLUTION INTRAMUSCULAR; INTRAVENOUS; SUBCUTANEOUS at 06:01

## 2024-01-18 RX ADMIN — HEPARIN SODIUM 15000 UNITS: 1000 INJECTION, SOLUTION INTRAVENOUS; SUBCUTANEOUS at 08:01

## 2024-01-18 RX ADMIN — ALLOPURINOL 300 MG: 300 TABLET ORAL at 01:01

## 2024-01-18 RX ADMIN — HEPARIN SODIUM AND DEXTROSE 12 UNITS/KG/HR: 10000; 5 INJECTION INTRAVENOUS at 08:01

## 2024-01-18 RX ADMIN — ACETAMINOPHEN 650 MG: 325 TABLET ORAL at 06:01

## 2024-01-18 RX ADMIN — ACETAMINOPHEN 650 MG: 325 TABLET ORAL at 11:01

## 2024-01-18 RX ADMIN — SODIUM CHLORIDE: 0.9 INJECTION, SOLUTION INTRAVENOUS at 07:01

## 2024-01-18 NOTE — PROCEDURES
Brief Operative Note:    : Santi Ellison MD     Referring Physician: Santi Ellison     All Operators: Surgeon(s):  Santi Ellison, Santi Clement, Jamel Vivas, Jamel Vivas, Alethea Astorga MD     Preoperative Diagnosis: PVCs (premature ventricular contractions) [I49.3]     Postop Diagnosis: PVCs (premature ventricular contractions) [I49.3]    Treatments/Procedures: Procedure(s) (LRB):  Ablation (N/A)  Pericardiocentesis (N/A)    Access: subxiphoid     Findings: pericardial effusion, clinical tamponade     See catheterization report for full details.    Intervention:  170 cc of blood aspirated, drain was placed in the pericardium     See catheterization report for full details.    Closure device:        Plan:  - Post cath protocol   - connect to negative pressure   - monitor output   - when output less than 50 cc over 12 hours, check Echo and if no effusion, you can remove drain     Estimated Blood loss: 20 cc    Specimens removed: No    Alethea Tovar MD

## 2024-01-18 NOTE — PROGRESS NOTES
Noted oozing to L groin. Pressure held. Gauze applied.   Dr. Lucas has been notified. He is still good for sutures to be removed at 1400 as planned.

## 2024-01-18 NOTE — TRANSFER OF CARE
"Anesthesia Transfer of Care Note    Patient: Rajesh Mas    Procedure(s) Performed: Procedure(s) (LRB):  Ablation (N/A)  Pericardiocentesis (N/A)    Patient location: PACU    Anesthesia Type: general    Transport from OR: Transported from OR on 6-10 L/min O2 by face mask with adequate spontaneous ventilation    Post pain: adequate analgesia    Post assessment: no apparent anesthetic complications and tolerated procedure well    Post vital signs: stable    Level of consciousness: awake    Nausea/Vomiting: no nausea/vomiting    Complications: none    Transfer of care protocol was followed      Last vitals: Visit Vitals  /76 (BP Location: Left arm, Patient Position: Lying)   Pulse 72   Temp 36.6 °C (97.9 °F) (Temporal)   Resp 18   Ht 5' 1" (1.549 m)   Wt 76.7 kg (169 lb)   LMP 11/28/1988 (Approximate)   SpO2 97%   Breastfeeding No   BMI 31.93 kg/m²     "

## 2024-01-18 NOTE — ASSESSMENT & PLAN NOTE
73 year-old that presented for frequent PVCs here for RFA with EP team. Status post RFA on 1/18/24. During procedure it was noted patient had new pericardial effusion with tamponade physiology. Emergent pericardiocentesis done with drainage of approximately 250 cc of blood. Will admit to CCU service for further monitoring.    Stat Echo ordered on 1/18/24 with results shown below:    2D echo only.    Left Ventricle: The left ventricle is normal in size. Ventricular mass is normal. Normal wall thickness. Normal wall motion. There is low normal systolic function with a visually estimated ejection fraction of 50 - 55%. There is normal diastolic function.    Right Ventricle: Normal right ventricular cavity size. Wall thickness is normal. Right ventricle wall motion  is normal. Systolic function is normal.    Aortic Valve: The aortic valve is a trileaflet valve. There is mild aortic valve sclerosis. There is mild annular calcification present.    Aorta: Aortic root is normal in size measuring 2.20 cm. Ascending aorta is normal measuring 2.99 cm.    IVC/SVC: Normal venous pressure at 3 mmHg.    Pericardium: There is no pericardial effusion.    Plan:  --Keep pericardial drain in place overnight with output monitoring  --Holding all anticoagulation  --Repeat echo in the AM to further assess  --trend CBC  --telemetry  --admitted to CCU for close monitoring

## 2024-01-18 NOTE — PROGRESS NOTES
Bilateral groin sutures removed. Oozing noted to left groin but appears to be superficial skin from suture. Gauze and tegaderm applied to both right and left groins. Patient HOB lifted to 30 degrees. Tolerating well. Bilateral DP and PT pulses palpable.

## 2024-01-18 NOTE — H&P
[facility] - Short Stay Cardiac Unit  Cardiac Electrophysiology  History and Physical     Admission Date: 1/18/2024  Code Status: No Order   Attending Provider: Santi Ellison MD   Principal Problem:<principal problem not specified>    Subjective:     Chief Complaint:  PVC     HPI:  72 yoM here for evaluation of palpitations. She has had palpitations for several months/years. She had normal EF noted last year. She feels heart palpitations more recently. EF normal in 2022. No syncope or near syncope. No prior monitor. She has OT PVCs on ECG today. She is not on AVN or AAD agents. ECG show NSR with PVCs LB V3 transition.   Echo 9/22:  ·           The left ventricle is normal in size with normal systolic function. The estimated ejection fraction is 55%.  ·           Normal right ventricular size with normal right ventricular systolic function.  ·           Normal left ventricular diastolic function.  ·           The estimated PA systolic pressure is 28 mmHg.  ·           Normal central venous pressure (3 mmHg).  ·           Cannot exclude PFO vs small ASD.    Past Medical History:   Diagnosis Date    Allergy     seasonal    Diverticulitis     Fever blister     Hypertension     Personal history of colonic polyps        Past Surgical History:   Procedure Laterality Date    CHOLECYSTECTOMY  04/26/2017    COLON SURGERY      COLONOSCOPY N/A 12/06/2016    Procedure: COLONOSCOPY;  Surgeon: Fidel Mason MD;  Location: [facility] ([address] [place] [address]);  Service: Endoscopy;  Laterality: N/A;    COLONOSCOPY N/A 05/17/2022    Procedure: COLONOSCOPY;  Surgeon: RUSSELL Rolon MD;  Location: [facility] ([facility]);  Service: Endoscopy;  Laterality: N/A;  fully vaccinated, prep instr portal -ml    partial colectomy  1997    sigmoid removed due to diverticulitis    SINUS SURGERY  2007    SMALL INTESTINE SURGERY  June 1997@ [facility]    Partial Sigm Colon /Divaticulitus       Review of patient's allergies indicates:   Allergen Reactions    Nickel  sutures [surgical stainless steel]     Adhesive Rash       No current facility-administered medications on file prior to encounter.     Current Outpatient Medications on File Prior to Encounter   Medication Sig    allopurinoL (ZYLOPRIM) 100 MG tablet Take ½ tablet by mouth once daily in combination with 300mg tablets for a daily dose of 350mg    allopurinoL (ZYLOPRIM) 300 MG tablet Take one tablet by mouth once daily in combination with 50mg tablets for a daily dose of 350mg    losartan (COZAAR) 100 MG tablet Take 1 tablet (100 mg total) by mouth once daily.    oxybutynin (DITROPAN) 5 MG Tab Take 1 tablet (5 mg total) by mouth 2 (two) times daily.    predniSONE (DELTASONE) 2.5 MG tablet Take one tablet by mouth once daily    triamterene-hydrochlorothiazide 37.5-25 mg (DYAZIDE) 37.5-25 mg per capsule Take 1 capsule by mouth every morning.     Family History       Problem Relation (Age of Onset)    Breast cancer Maternal Grandmother    Colon cancer Maternal Grandmother    Diabetes Mother    Heart disease Mother    Heart failure Mother    Hypertension Mother    Kidney disease Mother    Liver cancer Maternal Uncle    No Known Problems Sister, Daughter, Daughter, Son, Son    Other Brother          Tobacco Use    Smoking status: Never    Smokeless tobacco: Never   Substance and Sexual Activity    Alcohol use: No    Drug use: Never    Sexual activity: Yes     Partners: Male     Birth control/protection: None     Comment: , together since 1971     Review of Systems   All other systems reviewed and are negative.    Objective:     Vital Signs (Most Recent):  Temp: 97.9 °F (36.6 °C) (01/18/24 0619)  Pulse: (!) 59 (01/18/24 0619)  Resp: 18 (01/18/24 0619)  BP: 134/75 (01/18/24 0620)  SpO2: 97 % (01/18/24 0619) Vital Signs (24h Range):  Temp:  [97.9 °F (36.6 °C)] 97.9 °F (36.6 °C)  Pulse:  [59] 59  Resp:  [18] 18  SpO2:  [97 %] 97 %  BP: (130-134)/(68-75) 134/75       Weight: 76.7 kg (169 lb)  Body mass index is 31.93  kg/m².    SpO2: 97 %        Physical Exam  General: NAD. AAO  HENT: EOMI  Neck: supple. No JVD  CV: RRR. Normal S1/S2. No gallops, rubs, or murmurs. 2+ radial pulses  Resp: CTAB. No increased work of breathing  Ext: warm. No edema.         Significant Labs: All pertinent lab results from the last 24 hours have been reviewed.    Significant Imaging:  REviewed  Assessment and Plan:     PVCs (premature ventricular contractions)  - EPS and RFA for treatment of PVCs    Anticoagulation:  none  EF (most recent): normal  AAD/AVN agents: none    The risks, benefits and alternatives of the procedure were explained to the patient, patient's family and/or surrogate decision maker. Risks include (but not limited to) bleeding, hematoma, infection, pain, vascular damage, damage to structures surrounding the vasculature, myocardial damage [perforation, valvular damage], cardiac tamponade, coronary artery injury, contrast-induced nephropathy, CVA, MI, and death. Patient is understanding that repeat ablations may be required. All questions were answered. Patient is understanding of these risks, and would like to proceed. Consents signed.         Jamel Lucas MD  Cardiac Electrophysiology  [facility] - Short Stay Cardiac Unit

## 2024-01-18 NOTE — HPI
72 yoM here for evaluation of palpitations. She has had palpitations for several months/years. She had normal EF noted last year. She feels heart palpitations more recently. EF normal in 2022. No syncope or near syncope. No prior monitor. She has OT PVCs on ECG on the day of procedure. She is not on AVN or AAD agents. ECG show NSR with PVCs LB V3 transition.   Echo 9/22:  ·           The left ventricle is normal in size with normal systolic function. The estimated ejection fraction is 55%.  ·           Normal right ventricular size with normal right ventricular systolic function.  ·           Normal left ventricular diastolic function.  ·           The estimated PA systolic pressure is 28 mmHg.  ·           Normal central venous pressure (3 mmHg).  ·           Cannot exclude PFO vs small ASD.    After the procedure he was hypotensive and was found to have a pericardial effusion. He was admitted to CCU and pericardial drain was placed by interventional cardiology.

## 2024-01-18 NOTE — H&P
[facility] - Cardiology  Cardiology  History and Physical     Patient Name: Rajesh Mas  MRN: [id number]  Admission Date: 1/18/2024  Code Status: Full Code   Attending Provider: Chris Lopez MD   Primary Care Physician: Gail Villarreal MD  Principal Problem:PVCs (premature ventricular contractions)    Patient information was obtained from patient and past medical records.     Subjective:     Chief Complaint: Presented for EP study has history of PVCs     HPI:  73 year-old female with history of essential hypertension, thrombocytopenia, obesity BMI 31.93, HLD, subclinical hypothyroidism presenting here for evaluation of palpitations. She has had palpitations for several months to years. She feels heart palpitations more recently.  No syncope or near syncope. No prior monitor. She is not on AVN or AAD agents. ECG show NSR with PVCs.    On 1/18/24 Patient underwent RFA for PVCs. It was noted by EP team that during the course of the procedure she was noted to have an acute drop in her blood pressure. Bedside echo during procedure confirmed a new moderate sized pericardial effusion with tamponade physiology. Patient underwent emergent pericardiocentesis on 1/18/24 with removal off approximately 250cc. Patient to be admitted to CCU for further care.     Past Medical History:   Diagnosis Date    Allergy     seasonal    Diverticulitis     Fever blister     Hypertension     Personal history of colonic polyps        Past Surgical History:   Procedure Laterality Date    CHOLECYSTECTOMY  04/26/2017    COLON SURGERY      COLONOSCOPY N/A 12/06/2016    Procedure: COLONOSCOPY;  Surgeon: Fidel Mason MD;  Location: [address]);  Service: Endoscopy;  Laterality: N/A;    COLONOSCOPY N/A 05/17/2022    Procedure: COLONOSCOPY;  Surgeon: RUSSELL Rolon MD;  Location: [address]);  Service: Endoscopy;  Laterality: N/A;  fully vaccinated, prep instr portal -ml    partial colectomy  1997    sigmoid removed  due to diverticulitis    SINUS SURGERY  2007    SMALL INTESTINE SURGERY  June 1997@ [facility]    Partial Sigm Colon /Divaticulitus       Review of patient's allergies indicates:   Allergen Reactions    Nickel sutures [surgical stainless steel]     Adhesive Rash       No current facility-administered medications on file prior to encounter.     Current Outpatient Medications on File Prior to Encounter   Medication Sig    allopurinoL (ZYLOPRIM) 100 MG tablet Take ½ tablet by mouth once daily in combination with 300mg tablets for a daily dose of 350mg    allopurinoL (ZYLOPRIM) 300 MG tablet Take one tablet by mouth once daily in combination with 50mg tablets for a daily dose of 350mg    losartan (COZAAR) 100 MG tablet Take 1 tablet (100 mg total) by mouth once daily.    oxybutynin (DITROPAN) 5 MG Tab Take 1 tablet (5 mg total) by mouth 2 (two) times daily.    predniSONE (DELTASONE) 2.5 MG tablet Take one tablet by mouth once daily    triamterene-hydrochlorothiazide 37.5-25 mg (DYAZIDE) 37.5-25 mg per capsule Take 1 capsule by mouth every morning.     Family History       Problem Relation (Age of Onset)    Breast cancer Maternal Grandmother    Colon cancer Maternal Grandmother    Diabetes Mother    Heart disease Mother    Heart failure Mother    Hypertension Mother    Kidney disease Mother    Liver cancer Maternal Uncle    No Known Problems Sister, Daughter, Daughter, Son, Son    Other Brother          Tobacco Use    Smoking status: Never    Smokeless tobacco: Never   Substance and Sexual Activity    Alcohol use: No    Drug use: Never    Sexual activity: Yes     Partners: Male     Birth control/protection: None     Comment: , together since 1971     Review of Systems   Constitutional: Negative for chills and fever.   HENT: Negative.     Cardiovascular:  Negative for chest pain and leg swelling.   Respiratory:  Negative for shortness of breath.    Gastrointestinal:  Negative for abdominal pain, constipation, diarrhea,  nausea and vomiting.   Psychiatric/Behavioral:  Negative for altered mental status.      Objective:     Vital Signs (Most Recent):  Temp: 97.3 °F (36.3 °C) (01/18/24 1045)  Pulse: 68 (01/18/24 1345)  Resp: (!) 21 (01/18/24 1345)  BP: 115/80 (01/18/24 1345)  SpO2: 96 % (01/18/24 1345) Vital Signs (24h Range):  Temp:  [97.3 °F (36.3 °C)-97.9 °F (36.6 °C)] 97.3 °F (36.3 °C)  Pulse:  [59-81] 68  Resp:  [16-36] 21  SpO2:  [90 %-99 %] 96 %  BP: (106-150)/(68-83) 115/80     Weight: 76.7 kg (169 lb)  Body mass index is 31.93 kg/m².    SpO2: 96 %         Intake/Output Summary (Last 24 hours) at 1/18/2024 1434  Last data filed at 1/18/2024 1024  Gross per 24 hour   Intake 750 ml   Output --   Net 750 ml       Lines/Drains/Airways       Drain  Duration                  Drain/Device  01/18/24 1003  lower sternal <1 day              Peripheral Intravenous Line  Duration                  Peripheral IV - Single Lumen 01/18/24 0626 20 G Left Antecubital <1 day         Peripheral IV - Single Lumen 01/18/24 0626 20 G Right Forearm <1 day                     Physical Exam  Vitals reviewed.   Constitutional:       General: She is not in acute distress.     Appearance: She is not ill-appearing.   HENT:      Head: Normocephalic and atraumatic.      Right Ear: External ear normal.      Left Ear: External ear normal.      Nose: Nose normal.   Eyes:      General:         Right eye: No discharge.         Left eye: No discharge.      Conjunctiva/sclera: Conjunctivae normal.   Cardiovascular:      Rate and Rhythm: Normal rate and regular rhythm.      Pulses: Normal pulses.      Heart sounds: Normal heart sounds.      Comments: Pericardial drain in place filled with blood  Pulmonary:      Effort: Pulmonary effort is normal.      Breath sounds: Normal breath sounds.   Abdominal:      General: Abdomen is flat. There is no distension.      Palpations: Abdomen is soft.      Tenderness: There is no abdominal tenderness.   Musculoskeletal:      Right  lower leg: No edema.      Left lower leg: No edema.   Skin:     General: Skin is warm and dry.   Neurological:      Mental Status: She is alert and oriented to person, place, and time. Mental status is at baseline.   Psychiatric:         Mood and Affect: Mood normal.         Behavior: Behavior normal.          Significant Labs: Past labs reviewed. Will obtain new labs for today.    Significant Imaging:  results of imaging in past 24 hours reviewed  Assessment and Plan:     * PVCs (premature ventricular contractions)  73 year-old that presented for frequent PVCs here for RFA and mapping with EP team. Status post RFA on 1/18/24. During procedure it was noted patient had new pericardial effusion with tamponade physiology. Emergent pericardiocentesis done with drainage of approximately 250 cc of blood. Will admit to CCU service for further monitoring.    Stat Echo ordered on 1/18/24 with results shown below:    2D echo only.    Left Ventricle: The left ventricle is normal in size. Ventricular mass is normal. Normal wall thickness. Normal wall motion. There is low normal systolic function with a visually estimated ejection fraction of 50 - 55%. There is normal diastolic function.    Right Ventricle: Normal right ventricular cavity size. Wall thickness is normal. Right ventricle wall motion  is normal. Systolic function is normal.    Aortic Valve: The aortic valve is a trileaflet valve. There is mild aortic valve sclerosis. There is mild annular calcification present.    Aorta: Aortic root is normal in size measuring 2.20 cm. Ascending aorta is normal measuring 2.99 cm.    IVC/SVC: Normal venous pressure at 3 mmHg.    Pericardium: There is no pericardial effusion.    Plan:  --Keep pericardial drain in place overnight with output monitoring  --Holding all anticoagulation  --Repeat echo in the AM to further assess  --trend CBC  --telemetry  --admitted to CCU for close monitoring  --follow EP recommendations    History of  gout  Patient notes she gets gout in her toe. Is on Allopurinol for prophylaxis. Patient notes she is also on prednisone    --will continue allopurinol  --monitor kidney function.    Pericardial effusion  See PVCs    Stage 3 chronic kidney disease  Baseline creatinine of 1.5 to 1.6    Will obtain daily CMP to monitor kidney function    Thrombocytopenia  Will obtain CBC daily  Baseline ranges from 127 to 150    Essential hypertension  Hold anti-hypertensive medication in setting of pericardial effusion and hypotension during procedure.        VTE Risk Mitigation (From admission, onward)           Ordered     Reason for no Mechanical VTE Prophylaxis  Once        Question:  Reasons:  Answer:  Physician Provided (leave comment)  Comment:  Holding in setting of pericardial effusion s/p drainage    01/18/24 1358     IP VTE HIGH RISK PATIENT  Once         01/18/24 1358     Place sequential compression device  Until discontinued         01/18/24 1358     heparin infusion 1,000 units/500 ml in 0.9% NaCl (on sterile field)  As needed (PRN)         01/18/24 1013     heparin (porcine) 2,000 Units in sodium chloride 0.9% 2,000 mL  As needed (PRN)         01/18/24 1017                    Noel Ching MD  Cardiology   [facility] - Cardiology

## 2024-01-18 NOTE — PLAN OF CARE
Patient resting comfortably. Pericardial pain better controlled. Hemodynamically stable. 65 cc of serosanguinous fluid out since drain placed. Repeat bedside echo performed this afternoon without any notable effusion. Will transfer to ICU. Hold all anticoagulation. Formal echo in the AM.    Matias Lucas MD, PGY8  Electrophysiology

## 2024-01-18 NOTE — ANESTHESIA PREPROCEDURE EVALUATION
01/17/2024  Rajesh Mas is a 73 y.o., female with premature ventricular contractions here for EP study and possible ablation.    Pre-operative evaluation for Procedure(s) (LRB):  Ablation (N/A)        Patient Active Problem List   Diagnosis    Essential hypertension    Diastolic dysfunction    Thrombocytopenia    BMI 31.0-31.9,adult    Stage 3 chronic kidney disease    Subclinical hypothyroidism    Hyperlipidemia    Left hip pain    Chest pain of unknown etiology    PVCs (premature ventricular contractions)       Review of patient's allergies indicates:   Allergen Reactions    Nickel sutures [surgical stainless steel]     Adhesive Rash       No current facility-administered medications on file prior to encounter.     Current Outpatient Medications on File Prior to Encounter   Medication Sig Dispense Refill    allopurinoL (ZYLOPRIM) 100 MG tablet Take ½ tablet by mouth once daily in combination with 300mg tablets for a daily dose of 350mg 45 tablet 1    allopurinoL (ZYLOPRIM) 300 MG tablet Take one tablet by mouth once daily in combination with 50mg tablets for a daily dose of 350mg 90 tablet 3    losartan (COZAAR) 100 MG tablet Take 1 tablet (100 mg total) by mouth once daily. 90 tablet 3    oxybutynin (DITROPAN) 5 MG Tab Take 1 tablet (5 mg total) by mouth 2 (two) times daily. 180 tablet 1    predniSONE (DELTASONE) 2.5 MG tablet Take one tablet by mouth once daily 90 tablet 1    triamterene-hydrochlorothiazide 37.5-25 mg (DYAZIDE) 37.5-25 mg per capsule Take 1 capsule by mouth every morning. 90 capsule 3       Past Surgical History:   Procedure Laterality Date    CHOLECYSTECTOMY  04/26/2017    COLON SURGERY      COLONOSCOPY N/A 12/06/2016    Procedure: COLONOSCOPY;  Surgeon: Fidel Mason MD;  Location: [facility] ([address] [place] [address]);  Service: Endoscopy;  Laterality: N/A;    COLONOSCOPY N/A 05/17/2022     Procedure: COLONOSCOPY;  Surgeon: RUSSELL Rolon MD;  Location: [facility] ([address] [place] [address]);  Service: Endoscopy;  Laterality: N/A;  fully vaccinated, prep instr portal -ml    partial colectomy  1997    sigmoid removed due to diverticulitis    SINUS SURGERY  2007    SMALL INTESTINE SURGERY  June 1997@ [facility]    Partial Sigm Colon /Divaticulitus       Social History     Socioeconomic History    Marital status:    Tobacco Use    Smoking status: Never    Smokeless tobacco: Never   Substance and Sexual Activity    Alcohol use: No    Drug use: Never    Sexual activity: Yes     Partners: Male     Birth control/protection: None     Comment: , together since 1971     Social Determinants of Health     Financial Resource Strain: Medium Risk (12/16/2023)    Overall Financial Resource Strain (CARDIA)     Difficulty of Paying Living Expenses: Somewhat hard   Food Insecurity: Food Insecurity Present (12/16/2023)    Hunger Vital Sign     Worried About Running Out of Food in the Last Year: Sometimes true     Ran Out of Food in the Last Year: Sometimes true   Transportation Needs: No Transportation Needs (12/16/2023)    PRAPARE - Transportation     Lack of Transportation (Medical): No     Lack of Transportation (Non-Medical): No   Physical Activity: Sufficiently Active (12/16/2023)    Exercise Vital Sign     Days of Exercise per Week: 4 days     Minutes of Exercise per Session: 50 min   Recent Concern: Physical Activity - Insufficiently Active (10/6/2023)    Exercise Vital Sign     Days of Exercise per Week: 3 days     Minutes of Exercise per Session: 40 min   Stress: No Stress Concern Present (12/16/2023)    [employer] - Occupational Stress Questionnaire     Feeling of Stress : Only a little   Social Connections: Unknown (12/16/2023)    Social Connection and Isolation Panel [NHANES]     Frequency of Communication with Friends and Family: More than three times a week     Frequency of Social  "Gatherings with Friends and Family: More than three times a week     Active Member of Clubs or Organizations: Yes     Attends Club or Organization Meetings: More than 4 times per year     Marital Status:    Housing Stability: Unknown (12/16/2023)    Housing Stability Vital Sign     Unable to Pay for Housing in the Last Year: Patient declined     Number of Places Lived in the Last Year: 1     Unstable Housing in the Last Year: No         CBC: No results for input(s): "WBC", "RBC", "HGB", "HCT", "PLT", "MCV", "MCH", "MCHC" in the last 72 hours.    CMP: No results for input(s): "NA", "K", "CL", "CO2", "BUN", "CREATININE", "GLU", "MG", "PHOS", "CALCIUM", "ALBUMIN", "PROT", "ALKPHOS", "ALT", "AST", "BILITOT" in the last 72 hours.    INR  No results for input(s): "PT", "INR", "PROTIME", "APTT" in the last 72 hours.              2D Echo:  No results found for this or any previous visit.        Pre-op Assessment    I have reviewed the Patient Summary Reports.     I have reviewed the Nursing Notes.    I have reviewed the Medications.     Review of Systems  Anesthesia Hx:  No problems with previous Anesthesia                Hematology/Oncology:  Hematology Normal   Oncology Normal                                   EENT/Dental:  EENT/Dental Normal           Cardiovascular:     Hypertension    Dysrhythmias                                    Pulmonary:  Pulmonary Normal                       Renal/:  Renal/ Normal                 Hepatic/GI:  Hepatic/GI Normal                 Musculoskeletal:  Musculoskeletal Normal                Neurological:  Neurology Normal                                      Endocrine:  Endocrine Normal            Dermatological:  Skin Normal    Psych:  Psychiatric Normal                    Physical Exam  General: Well nourished    Airway:  Mallampati: II   Mouth Opening: Normal  TM Distance: Normal  Tongue: Normal  Neck ROM: Normal ROM    Dental:  Intact    Chest/Lungs:  Clear to auscultation, " Normal Respiratory Rate    Heart:  Rate: Normal  Rhythm: Regular Rhythm  Sounds: Normal        Anesthesia Plan  Type of Anesthesia, risks & benefits discussed:    Anesthesia Type: Gen Natural Airway  Intra-op Monitoring Plan: Standard ASA Monitors  Post Op Pain Control Plan: multimodal analgesia  Induction:  IV  Informed Consent: Informed consent signed with the Patient and all parties understand the risks and agree with anesthesia plan.  All questions answered.   ASA Score: 3  Anesthesia Plan Notes: Plan for general natural airway, discussed anesthetic risks, questions answered    Ready For Surgery From Anesthesia Perspective.     .

## 2024-01-18 NOTE — ASSESSMENT & PLAN NOTE
Patient notes she gets gout in her toe. Is on Allopurinol for prophylaxis. Patient notes she is also on prednisone    --will continue allopurinol  --monitor kidney function.

## 2024-01-18 NOTE — SUBJECTIVE & OBJECTIVE
Past Medical History:   Diagnosis Date    Allergy     seasonal    Diverticulitis     Fever blister     Hypertension     Personal history of colonic polyps        Past Surgical History:   Procedure Laterality Date    CHOLECYSTECTOMY  04/26/2017    COLON SURGERY      COLONOSCOPY N/A 12/06/2016    Procedure: COLONOSCOPY;  Surgeon: Fidel Mason MD;  Location: [facility] ENDO (4TH FLR);  Service: Endoscopy;  Laterality: N/A;    COLONOSCOPY N/A 05/17/2022    Procedure: COLONOSCOPY;  Surgeon: RUSSELL Rolon MD;  Location: [facility] ENDO ([facility] FLR);  Service: Endoscopy;  Laterality: N/A;  fully vaccinated, prep instr portal -ml    partial colectomy  1997    sigmoid removed due to diverticulitis    SINUS SURGERY  2007    SMALL INTESTINE SURGERY  June 1997@ [facility]    Partial Sigm Colon /Divaticulitus       Review of patient's allergies indicates:   Allergen Reactions    Nickel sutures [surgical stainless steel]     Adhesive Rash       No current facility-administered medications on file prior to encounter.     Current Outpatient Medications on File Prior to Encounter   Medication Sig    allopurinoL (ZYLOPRIM) 100 MG tablet Take ½ tablet by mouth once daily in combination with 300mg tablets for a daily dose of 350mg    allopurinoL (ZYLOPRIM) 300 MG tablet Take one tablet by mouth once daily in combination with 50mg tablets for a daily dose of 350mg    losartan (COZAAR) 100 MG tablet Take 1 tablet (100 mg total) by mouth once daily.    oxybutynin (DITROPAN) 5 MG Tab Take 1 tablet (5 mg total) by mouth 2 (two) times daily.    predniSONE (DELTASONE) 2.5 MG tablet Take one tablet by mouth once daily    triamterene-hydrochlorothiazide 37.5-25 mg (DYAZIDE) 37.5-25 mg per capsule Take 1 capsule by mouth every morning.     Family History       Problem Relation (Age of Onset)    Breast cancer Maternal Grandmother    Colon cancer Maternal Grandmother    Diabetes Mother    Heart disease Mother    Heart failure Mother    Hypertension Mother     Kidney disease Mother    Liver cancer Maternal Uncle    No Known Problems Sister, Daughter, Daughter, Son, Son    Other Brother          Tobacco Use    Smoking status: Never    Smokeless tobacco: Never   Substance and Sexual Activity    Alcohol use: No    Drug use: Never    Sexual activity: Yes     Partners: Male     Birth control/protection: None     Comment: , together since 1971     Review of Systems   All other systems reviewed and are negative.    Objective:     Vital Signs (Most Recent):  Temp: 97.9 °F (36.6 °C) (01/18/24 0619)  Pulse: (!) 59 (01/18/24 0619)  Resp: 18 (01/18/24 0619)  BP: 134/75 (01/18/24 0620)  SpO2: 97 % (01/18/24 0619) Vital Signs (24h Range):  Temp:  [97.9 °F (36.6 °C)] 97.9 °F (36.6 °C)  Pulse:  [59] 59  Resp:  [18] 18  SpO2:  [97 %] 97 %  BP: (130-134)/(68-75) 134/75       Weight: 76.7 kg (169 lb)  Body mass index is 31.93 kg/m².    SpO2: 97 %        Physical Exam  General: NAD. AAO  HENT: EOMI  Neck: supple. No JVD  CV: RRR. Normal S1/S2. No gallops, rubs, or murmurs. 2+ radial pulses  Resp: CTAB. No increased work of breathing  Ext: warm. No edema.         Significant Labs: All pertinent lab results from the last 24 hours have been reviewed.    Significant Imaging:  REviewed

## 2024-01-18 NOTE — HPI
73 year-old female with history of essential hypertension, thrombocytopenia, obesity BMI 31.93, HLD, subclinical hypothyroidism presenting here for evaluation of palpitations. She has had palpitations for several months to years. She feels heart palpitations more recently.  No syncope or near syncope. No prior monitor. She is not on AVN or AAD agents. ECG show NSR with PVCs.    On 1/18/24 Patient underwent RFA for PVCs. It was noted by EP team that during the course of the procedure she was noted to have an acute drop in her blood pressure. Bedside echo during procedure confirmed a new moderate sized pericardial effusion with tamponade physiology. Patient underwent emergent pericardiocentesis on 1/18/24 with removal off approximately 250cc. Patient to be admitted to CCU for further care.

## 2024-01-18 NOTE — ASSESSMENT & PLAN NOTE
- EPS and RFA for treatment of PVCs    Anticoagulation:  none  EF (most recent): normal  AAD/AVN agents: none    The risks, benefits and alternatives of the procedure were explained to the patient, patient's family and/or surrogate decision maker. Risks include (but not limited to) bleeding, hematoma, infection, pain, vascular damage, damage to structures surrounding the vasculature, myocardial damage [perforation, valvular damage], cardiac tamponade, coronary artery injury, contrast-induced nephropathy, CVA, MI, and death. Patient is understanding that repeat ablations may be required. All questions were answered. Patient is understanding of these risks, and would like to proceed. Consents signed.

## 2024-01-18 NOTE — ASSESSMENT & PLAN NOTE
Hold anti-hypertensive medication in setting of pericardial effusion and hypotension during procedure.

## 2024-01-18 NOTE — BRIEF OP NOTE
: Santi Ellison MD  Date of Procedure: 01/18/2024    Post-operative Diagnosis: RVOT PVC, pericardial effusion    Procedure Performed: RFA of PVC and pericardiocentesis    Description of Procedure: The patient was brought to the EP lab in the fasting state. Prepped and draped in sterile fashion. Safety timeout was performed. Sedation administered by anesthesia staff. Ultrasound guided venous access of the bilateral femoral veins was performed. CS and RV catheters placed via left femoral vein access, as well as an empty sheath for ACTs and in the event retrograde aortic access with RFA around coronary cusps performed. Heparin started. Long sheath with Grid right femoral vein access which was eventually exchanged for the ablator once localized with Grid LAT map and omnipolar vectors. Clinical VPC localized to posterosetpal RVOT. RFA performed. During the course of the procedure she was noted to have an acute drop in BP and lateral flouroscopy image without motion of lateral heart border. Bedside echo confirmed a new moderate sized pericardial effusion with tamponade physiology. Protamine administered. Emergent pericardiocentesis performed with ~250 cc of blood removed with abrupt resolution of hemodynamics and almost complete resolution on echo. Drain placed. All catheters removed.    EBL: 300cc    Specimens: none  Complications: see above    Plan:  Admit to CCU for monitoring  STAT bedside echo  Keep drain in place overnight with output monitoring  Hold anticoagulation  Repeat echo in the AM    Matias Lucas MD, PGY8  Electrophysiology

## 2024-01-18 NOTE — SUBJECTIVE & OBJECTIVE
Past Medical History:   Diagnosis Date    Allergy     seasonal    Diverticulitis     Fever blister     Hypertension     Personal history of colonic polyps        Past Surgical History:   Procedure Laterality Date    CHOLECYSTECTOMY  04/26/2017    COLON SURGERY      COLONOSCOPY N/A 12/06/2016    Procedure: COLONOSCOPY;  Surgeon: Fidel Mason MD;  Location: [facility] ENDO (4TH FLR);  Service: Endoscopy;  Laterality: N/A;    COLONOSCOPY N/A 05/17/2022    Procedure: COLONOSCOPY;  Surgeon: RUSSELL Rolon MD;  Location: [facility] ENDO ([facility] FLR);  Service: Endoscopy;  Laterality: N/A;  fully vaccinated, prep instr portal -ml    partial colectomy  1997    sigmoid removed due to diverticulitis    SINUS SURGERY  2007    SMALL INTESTINE SURGERY  June 1997@ [facility]    Partial Sigm Colon /Divaticulitus       Review of patient's allergies indicates:   Allergen Reactions    Nickel sutures [surgical stainless steel]     Adhesive Rash       No current facility-administered medications on file prior to encounter.     Current Outpatient Medications on File Prior to Encounter   Medication Sig    allopurinoL (ZYLOPRIM) 100 MG tablet Take ½ tablet by mouth once daily in combination with 300mg tablets for a daily dose of 350mg    allopurinoL (ZYLOPRIM) 300 MG tablet Take one tablet by mouth once daily in combination with 50mg tablets for a daily dose of 350mg    losartan (COZAAR) 100 MG tablet Take 1 tablet (100 mg total) by mouth once daily.    oxybutynin (DITROPAN) 5 MG Tab Take 1 tablet (5 mg total) by mouth 2 (two) times daily.    predniSONE (DELTASONE) 2.5 MG tablet Take one tablet by mouth once daily    triamterene-hydrochlorothiazide 37.5-25 mg (DYAZIDE) 37.5-25 mg per capsule Take 1 capsule by mouth every morning.     Family History       Problem Relation (Age of Onset)    Breast cancer Maternal Grandmother    Colon cancer Maternal Grandmother    Diabetes Mother    Heart disease Mother    Heart failure Mother    Hypertension Mother     Kidney disease Mother    Liver cancer Maternal Uncle    No Known Problems Sister, Daughter, Daughter, Son, Son    Other Brother          Tobacco Use    Smoking status: Never    Smokeless tobacco: Never   Substance and Sexual Activity    Alcohol use: No    Drug use: Never    Sexual activity: Yes     Partners: Male     Birth control/protection: None     Comment: , together since 1971     Review of Systems   Constitutional: Negative for chills and fever.   HENT: Negative.     Cardiovascular:  Negative for chest pain and leg swelling.   Respiratory:  Negative for shortness of breath.    Gastrointestinal:  Negative for abdominal pain, constipation, diarrhea, nausea and vomiting.   Psychiatric/Behavioral:  Negative for altered mental status.      Objective:     Vital Signs (Most Recent):  Temp: 97.3 °F (36.3 °C) (01/18/24 1045)  Pulse: 68 (01/18/24 1345)  Resp: (!) 21 (01/18/24 1345)  BP: 115/80 (01/18/24 1345)  SpO2: 96 % (01/18/24 1345) Vital Signs (24h Range):  Temp:  [97.3 °F (36.3 °C)-97.9 °F (36.6 °C)] 97.3 °F (36.3 °C)  Pulse:  [59-81] 68  Resp:  [16-36] 21  SpO2:  [90 %-99 %] 96 %  BP: (106-150)/(68-83) 115/80     Weight: 76.7 kg (169 lb)  Body mass index is 31.93 kg/m².    SpO2: 96 %         Intake/Output Summary (Last 24 hours) at 1/18/2024 1434  Last data filed at 1/18/2024 1024  Gross per 24 hour   Intake 750 ml   Output --   Net 750 ml       Lines/Drains/Airways       Drain  Duration                  Drain/Device  01/18/24 1003  lower sternal <1 day              Peripheral Intravenous Line  Duration                  Peripheral IV - Single Lumen 01/18/24 0626 20 G Left Antecubital <1 day         Peripheral IV - Single Lumen 01/18/24 0626 20 G Right Forearm <1 day                     Physical Exam  Vitals reviewed.   Constitutional:       General: She is not in acute distress.     Appearance: She is not ill-appearing.   HENT:      Head: Normocephalic and atraumatic.      Right Ear: External ear normal.       Left Ear: External ear normal.      Nose: Nose normal.   Eyes:      General:         Right eye: No discharge.         Left eye: No discharge.      Conjunctiva/sclera: Conjunctivae normal.   Cardiovascular:      Rate and Rhythm: Normal rate and regular rhythm.      Pulses: Normal pulses.      Heart sounds: Normal heart sounds.      Comments: Pericardial drain in place filled with blood  Pulmonary:      Effort: Pulmonary effort is normal.      Breath sounds: Normal breath sounds.   Abdominal:      General: Abdomen is flat. There is no distension.      Palpations: Abdomen is soft.      Tenderness: There is no abdominal tenderness.   Musculoskeletal:      Right lower leg: No edema.      Left lower leg: No edema.   Skin:     General: Skin is warm and dry.   Neurological:      Mental Status: She is alert and oriented to person, place, and time. Mental status is at baseline.   Psychiatric:         Mood and Affect: Mood normal.         Behavior: Behavior normal.          Significant Labs: Past labs reviewed. Will obtain new labs for today.    Significant Imaging:  results of imaging in past 24 hours reviewed

## 2024-01-18 NOTE — NURSING NOTE
Patient identified by 2 identifiers. Allergies reviewed, procedure explained and pt verbalized understanding. IV in place, flushed w/ 10cc NS pre & post contrast administration.  3cc Optison administered, echo images obtained.  Pt tolerated procedure well.

## 2024-01-19 PROBLEM — J96.01 ACUTE RESPIRATORY FAILURE WITH HYPOXIA: Status: ACTIVE | Noted: 2024-01-19

## 2024-01-19 LAB
ALBUMIN SERPL BCP-MCNC: 3.1 G/DL (ref 3.5–5.2)
ALP SERPL-CCNC: 46 U/L (ref 55–135)
ALT SERPL W/O P-5'-P-CCNC: 17 U/L (ref 10–44)
ANION GAP SERPL CALC-SCNC: 7 MMOL/L (ref 8–16)
AST SERPL-CCNC: 27 U/L (ref 10–40)
BASOPHILS # BLD AUTO: 0.02 K/UL (ref 0–0.2)
BASOPHILS # BLD AUTO: 0.02 K/UL (ref 0–0.2)
BASOPHILS NFR BLD: 0.1 % (ref 0–1.9)
BASOPHILS NFR BLD: 0.2 % (ref 0–1.9)
BILIRUB SERPL-MCNC: 1 MG/DL (ref 0.1–1)
BSA FOR ECHO PROCEDURE: 1.84 M2
BUN SERPL-MCNC: 32 MG/DL (ref 8–23)
CALCIUM SERPL-MCNC: 8.8 MG/DL (ref 8.7–10.5)
CHLORIDE SERPL-SCNC: 108 MMOL/L (ref 95–110)
CO2 SERPL-SCNC: 24 MMOL/L (ref 23–29)
CREAT SERPL-MCNC: 1.4 MG/DL (ref 0.5–1.4)
CV ECHO LV RWT: 0.4 CM
DIFFERENTIAL METHOD BLD: ABNORMAL
DIFFERENTIAL METHOD BLD: ABNORMAL
DOP CALC LVOT PEAK VEL: 1.09 M/S
DOP CALCLVOT PEAK VEL VTI: 17.92 CM
E WAVE DECELERATION TIME: 210.05 MSEC
E/A RATIO: 0.73
ECHO LV POSTERIOR WALL: 0.9 CM (ref 0.6–1.1)
EOSINOPHIL # BLD AUTO: 0 K/UL (ref 0–0.5)
EOSINOPHIL # BLD AUTO: 0 K/UL (ref 0–0.5)
EOSINOPHIL NFR BLD: 0.1 % (ref 0–8)
EOSINOPHIL NFR BLD: 0.2 % (ref 0–8)
ERYTHROCYTE [DISTWIDTH] IN BLOOD BY AUTOMATED COUNT: 14.3 % (ref 11.5–14.5)
ERYTHROCYTE [DISTWIDTH] IN BLOOD BY AUTOMATED COUNT: 14.3 % (ref 11.5–14.5)
EST. GFR  (NO RACE VARIABLE): 40.2 ML/MIN/1.73 M^2
FRACTIONAL SHORTENING: 26 % (ref 28–44)
GLUCOSE SERPL-MCNC: 111 MG/DL (ref 70–110)
HCT VFR BLD AUTO: 32.4 % (ref 37–48.5)
HCT VFR BLD AUTO: 32.9 % (ref 37–48.5)
HGB BLD-MCNC: 10.5 G/DL (ref 12–16)
HGB BLD-MCNC: 10.7 G/DL (ref 12–16)
IMM GRANULOCYTES # BLD AUTO: 0.03 K/UL (ref 0–0.04)
IMM GRANULOCYTES # BLD AUTO: 0.05 K/UL (ref 0–0.04)
IMM GRANULOCYTES NFR BLD AUTO: 0.3 % (ref 0–0.5)
IMM GRANULOCYTES NFR BLD AUTO: 0.4 % (ref 0–0.5)
INTERVENTRICULAR SEPTUM: 0.91 CM (ref 0.6–1.1)
LEFT ATRIUM SIZE: 3.35 CM
LEFT INTERNAL DIMENSION IN SYSTOLE: 3.3 CM (ref 2.1–4)
LEFT VENTRICLE DIASTOLIC VOLUME INDEX: 50.62 ML/M2
LEFT VENTRICLE DIASTOLIC VOLUME: 90.11 ML
LEFT VENTRICLE MASS INDEX: 74 G/M2
LEFT VENTRICLE SYSTOLIC VOLUME INDEX: 24.7 ML/M2
LEFT VENTRICLE SYSTOLIC VOLUME: 44.02 ML
LEFT VENTRICULAR INTERNAL DIMENSION IN DIASTOLE: 4.45 CM (ref 3.5–6)
LEFT VENTRICULAR MASS: 131.38 G
LYMPHOCYTES # BLD AUTO: 1.2 K/UL (ref 1–4.8)
LYMPHOCYTES # BLD AUTO: 1.6 K/UL (ref 1–4.8)
LYMPHOCYTES NFR BLD: 10.8 % (ref 18–48)
LYMPHOCYTES NFR BLD: 12.1 % (ref 18–48)
MCH RBC QN AUTO: 30.3 PG (ref 27–31)
MCH RBC QN AUTO: 30.3 PG (ref 27–31)
MCHC RBC AUTO-ENTMCNC: 31.9 G/DL (ref 32–36)
MCHC RBC AUTO-ENTMCNC: 33 G/DL (ref 32–36)
MCV RBC AUTO: 92 FL (ref 82–98)
MCV RBC AUTO: 95 FL (ref 82–98)
MONOCYTES # BLD AUTO: 0.9 K/UL (ref 0.3–1)
MONOCYTES # BLD AUTO: 1.4 K/UL (ref 0.3–1)
MONOCYTES NFR BLD: 10 % (ref 4–15)
MONOCYTES NFR BLD: 8 % (ref 4–15)
MV PEAK A VEL: 0.59 M/S
MV PEAK E VEL: 0.43 M/S
MV STENOSIS PRESSURE HALF TIME: 60.91 MS
MV VALVE AREA P 1/2 METHOD: 3.61 CM2
NEUTROPHILS # BLD AUTO: 10.5 K/UL (ref 1.8–7.7)
NEUTROPHILS # BLD AUTO: 9.2 K/UL (ref 1.8–7.7)
NEUTROPHILS NFR BLD: 77.3 % (ref 38–73)
NEUTROPHILS NFR BLD: 80.5 % (ref 38–73)
NRBC BLD-RTO: 0 /100 WBC
NRBC BLD-RTO: 0 /100 WBC
PHOSPHATE SERPL-MCNC: 2.9 MG/DL (ref 2.7–4.5)
PLATELET # BLD AUTO: 110 K/UL (ref 150–450)
PLATELET # BLD AUTO: 72 K/UL (ref 150–450)
PLATELET BLD QL SMEAR: ABNORMAL
PLATELET BLD QL SMEAR: ABNORMAL
PMV BLD AUTO: 12.9 FL (ref 9.2–12.9)
PMV BLD AUTO: ABNORMAL FL (ref 9.2–12.9)
POTASSIUM SERPL-SCNC: 3.9 MMOL/L (ref 3.5–5.1)
PROT SERPL-MCNC: 6.7 G/DL (ref 6–8.4)
RA PRESSURE ESTIMATED: 3 MMHG
RBC # BLD AUTO: 3.47 M/UL (ref 4–5.4)
RBC # BLD AUTO: 3.53 M/UL (ref 4–5.4)
SODIUM SERPL-SCNC: 139 MMOL/L (ref 136–145)
TRICUSPID ANNULAR PLANE SYSTOLIC EXCURSION: 1.27 CM
WBC # BLD AUTO: 11.45 K/UL (ref 3.9–12.7)
WBC # BLD AUTO: 13.56 K/UL (ref 3.9–12.7)
Z-SCORE OF LEFT VENTRICULAR DIMENSION IN END DIASTOLE: -1
Z-SCORE OF LEFT VENTRICULAR DIMENSION IN END SYSTOLE: 0.64

## 2024-01-19 PROCEDURE — 99900035 HC TECH TIME PER 15 MIN (STAT)

## 2024-01-19 PROCEDURE — 84100 ASSAY OF PHOSPHORUS: CPT | Performed by: INTERNAL MEDICINE

## 2024-01-19 PROCEDURE — 25000003 PHARM REV CODE 250: Performed by: INTERNAL MEDICINE

## 2024-01-19 PROCEDURE — 93005 ELECTROCARDIOGRAM TRACING: CPT

## 2024-01-19 PROCEDURE — 80053 COMPREHEN METABOLIC PANEL: CPT | Performed by: INTERNAL MEDICINE

## 2024-01-19 PROCEDURE — 99232 SBSQ HOSP IP/OBS MODERATE 35: CPT | Mod: ,,, | Performed by: INTERNAL MEDICINE

## 2024-01-19 PROCEDURE — 25000003 PHARM REV CODE 250: Performed by: STUDENT IN AN ORGANIZED HEALTH CARE EDUCATION/TRAINING PROGRAM

## 2024-01-19 PROCEDURE — 99236 HOSP IP/OBS SAME DATE HI 85: CPT | Mod: GC,,, | Performed by: INTERNAL MEDICINE

## 2024-01-19 PROCEDURE — 85025 COMPLETE CBC W/AUTO DIFF WBC: CPT

## 2024-01-19 PROCEDURE — 63600175 PHARM REV CODE 636 W HCPCS: Performed by: STUDENT IN AN ORGANIZED HEALTH CARE EDUCATION/TRAINING PROGRAM

## 2024-01-19 PROCEDURE — 94761 N-INVAS EAR/PLS OXIMETRY MLT: CPT

## 2024-01-19 PROCEDURE — 93010 ELECTROCARDIOGRAM REPORT: CPT | Mod: ,,, | Performed by: INTERNAL MEDICINE

## 2024-01-19 PROCEDURE — 27000221 HC OXYGEN, UP TO 24 HOURS

## 2024-01-19 PROCEDURE — 20000000 HC ICU ROOM

## 2024-01-19 RX ORDER — TRIAMTERENE AND HYDROCHLOROTHIAZIDE 37.5; 25 MG/1; MG/1
1 CAPSULE ORAL EVERY MORNING
Qty: 90 CAPSULE | Refills: 3 | Status: SHIPPED | OUTPATIENT
Start: 2024-01-19 | End: 2024-01-22

## 2024-01-19 RX ORDER — ASPIRIN 81 MG/1
81 TABLET ORAL NIGHTLY
Status: DISCONTINUED | OUTPATIENT
Start: 2024-01-19 | End: 2024-01-23 | Stop reason: HOSPADM

## 2024-01-19 RX ORDER — LOSARTAN POTASSIUM 100 MG/1
100 TABLET ORAL DAILY
Qty: 90 TABLET | Refills: 3 | Status: SHIPPED | OUTPATIENT
Start: 2024-01-19 | End: 2024-01-22

## 2024-01-19 RX ORDER — FUROSEMIDE 10 MG/ML
20 INJECTION INTRAMUSCULAR; INTRAVENOUS ONCE
Status: COMPLETED | OUTPATIENT
Start: 2024-01-19 | End: 2024-01-19

## 2024-01-19 RX ADMIN — ALLOPURINOL 300 MG: 300 TABLET ORAL at 08:01

## 2024-01-19 RX ADMIN — OXYBUTYNIN CHLORIDE 5 MG: 5 TABLET ORAL at 08:01

## 2024-01-19 RX ADMIN — FUROSEMIDE 20 MG: 10 INJECTION, SOLUTION INTRAMUSCULAR; INTRAVENOUS at 05:01

## 2024-01-19 RX ADMIN — ASPIRIN 81 MG: 81 TABLET, COATED ORAL at 08:01

## 2024-01-19 NOTE — ANESTHESIA POSTPROCEDURE EVALUATION
Anesthesia Post Evaluation    Patient: Rajesh Mas    Procedure(s) Performed: Procedure(s) (LRB):  Ablation (N/A)  Pericardiocentesis (N/A)    Final Anesthesia Type: general      Patient location during evaluation: PACU  Patient participation: Yes- Able to Participate  Level of consciousness: awake and alert  Post-procedure vital signs: reviewed and stable  Pain management: adequate  Airway patency: patent    PONV status at discharge: No PONV  Anesthetic complications: no      Cardiovascular status: blood pressure returned to baseline  Respiratory status: unassisted  Hydration status: euvolemic  Follow-up not needed.              Vitals Value Taken Time   BP 97/65 01/19/24 0801   Temp 37.1 °C (98.7 °F) 01/19/24 0700   Pulse 85 01/19/24 0846   Resp 24 01/19/24 0846   SpO2 95 % 01/19/24 0846   Vitals shown include unvalidated device data.      No case tracking events are documented in the log.      Pain/Vishal Score: Pain Rating Prior to Med Admin: 6 (1/18/2024  6:19 PM)

## 2024-01-19 NOTE — NURSING TRANSFER
Nursing Transfer Note      1/18/2024   7:09 PM    Nurse giving handoff:NEPTALI Borjas   Nurse receiving handoff:NEPTALI Marie    Reason patient is being transferred: post op    Transfer To: [id number]    Transfer via stretcher    Transfer with cardiac monitoring;2L NC  Transported by RN x 2    Telemetry: transport monitor   Order for Tele Monitor? ICU    Additional Lines: Oxygen; pericardial drain     Medicines sent:ketorolac    Any special needs or follow-up needed: monitor drain     Patient belongings transferred with patient: Yes    Chart send with patient: Yes    Notified: spouse    Patient reassessed at:1900

## 2024-01-19 NOTE — ASSESSMENT & PLAN NOTE
Following with CCU for post-ablation pericardial effusion  Agree with pulling pericardial catheter due to 5cc drainage overnight  Follow up TTE post drain removal to ensure no worsening of effusion  From EP standpoint can go if stable echo later today

## 2024-01-19 NOTE — PROGRESS NOTES
[facility]  Cardiology  Progress Note    Patient Name: Rajesh Mas  MRN: [id number]  Admission Date: 1/18/2024  Hospital Length of Stay: 1 days  Code Status: Full Code   Attending Physician: Aurora Morales MD   Primary Care Physician: Gail Villarreal MD  Expected Discharge Date: 1/20/2024  Principal Problem:PVCs (premature ventricular contractions)    Subjective:     Hospital Course:   73 year-old female that was admitted to CCU after RFA for PVCs done by EP and subsequently developed pericardial effusion with tamponade physiology that required emergent pericardiocentesis (250 cc drained). Patient had pericardial drain that was placed and monitored while in CCU. Echocardiogram on 1/18 after procedure showed no pericardial effusion. Pericardial drain with approximately 80 cc of total drainage of serosanguinous fluid. Pericardial drain with less than 5 cc's of drainage overnight and was pulled on 1/19/24 without complication. Repeat echo was done on AM of 1/19 with only trivial pericardial effusion, EF of 50-55%. Pending bedside echocardiogram.          Interval History: Patient well this morning. Able to have pericardial drain pulled without complication. Initially planned on discharge but patient with some shortness of breath today. Patient on 2L NC. Repeat echo done this morning with trace pericardial effusion.    Review of Systems   Constitutional: Negative for chills and fever.   HENT: Negative.     Cardiovascular:  Negative for chest pain and leg swelling.   Respiratory:  Positive for shortness of breath.    Gastrointestinal:  Negative for abdominal pain, constipation, diarrhea, nausea and vomiting.   Psychiatric/Behavioral:  Negative for altered mental status.      Objective:     Vital Signs (Most Recent):  Temp: 98.5 °F (36.9 °C) (01/19/24 1100)  Pulse: 92 (01/19/24 1100)  Resp: 16 (01/19/24 1100)  BP: (!) 99/59 (01/19/24 1100)  SpO2: 95 % (01/19/24 1100) Vital Signs (24h  Range):  Temp:  [98.2 °F (36.8 °C)-98.7 °F (37.1 °C)] 98.5 °F (36.9 °C)  Pulse:  [75-92] 92  Resp:  [5-46] 16  SpO2:  [91 %-99 %] 95 %  BP: ()/(59-82) 99/59     Weight: 78.9 kg (174 lb)  Body mass index is 32.88 kg/m².     SpO2: 95 %         Intake/Output Summary (Last 24 hours) at 1/19/2024 1629  Last data filed at 1/19/2024 1300  Gross per 24 hour   Intake 336 ml   Output 1715 ml   Net -1379 ml       Lines/Drains/Airways       Drain  Duration                  Drain/Device  01/18/24 1003  lower sternal 1 day              Peripheral Intravenous Line  Duration                  Peripheral IV - Single Lumen 01/18/24 0626 20 G Left Antecubital 1 day         Peripheral IV - Single Lumen 01/18/24 0626 20 G Right Forearm 1 day                       Physical Exam  Vitals reviewed.   Constitutional:       General: She is not in acute distress.     Appearance: She is not ill-appearing.   HENT:      Head: Normocephalic and atraumatic.      Right Ear: External ear normal.      Left Ear: External ear normal.      Nose: Nose normal.   Eyes:      General:         Right eye: No discharge.         Left eye: No discharge.      Conjunctiva/sclera: Conjunctivae normal.   Cardiovascular:      Rate and Rhythm: Normal rate and regular rhythm.      Pulses: Normal pulses.      Heart sounds: Normal heart sounds.      Comments: Pericardial drain pulled  Pulmonary:      Effort: Pulmonary effort is normal.      Breath sounds: Normal breath sounds.   Abdominal:      General: Abdomen is flat. There is no distension.      Palpations: Abdomen is soft.      Tenderness: There is no abdominal tenderness.   Musculoskeletal:      Right lower leg: No edema.      Left lower leg: No edema.   Skin:     General: Skin is warm and dry.   Neurological:      Mental Status: She is alert and oriented to person, place, and time. Mental status is at baseline.   Psychiatric:         Mood and Affect: Mood normal.         Behavior: Behavior normal.             Significant Labs: CMP   Recent Labs   Lab 01/19/24  0530      K 3.9      CO2 24   *   BUN 32*   CREATININE 1.4   CALCIUM 8.8   PROT 6.7   ALBUMIN 3.1*   BILITOT 1.0   ALKPHOS 46*   AST 27   ALT 17   ANIONGAP 7*   , CBC   Recent Labs   Lab 01/19/24  0400 01/19/24  1310   WBC 11.45 13.56*   HGB 10.7* 10.5*   HCT 32.4* 32.9*   PLT 72* 110*   , and All pertinent lab results from the last 24 hours have been reviewed.    Significant Imaging:  imaging results from past 24 hours reviewed.  Assessment and Plan:       * PVCs (premature ventricular contractions)  73 year-old that presented for frequent PVCs here for RFA with EP team. Status post RFA on 1/18/24. During procedure it was noted patient had new pericardial effusion with tamponade physiology. Emergent pericardiocentesis done with drainage of approximately 250 cc of blood. Will admit to CCU service for further monitoring.    Stat Echo ordered on 1/18/24 with results shown below:    2D echo only.    Left Ventricle: The left ventricle is normal in size. Ventricular mass is normal. Normal wall thickness. Normal wall motion. There is low normal systolic function with a visually estimated ejection fraction of 50 - 55%. There is normal diastolic function.    Right Ventricle: Normal right ventricular cavity size. Wall thickness is normal. Right ventricle wall motion  is normal. Systolic function is normal.    Aortic Valve: The aortic valve is a trileaflet valve. There is mild aortic valve sclerosis. There is mild annular calcification present.    Aorta: Aortic root is normal in size measuring 2.20 cm. Ascending aorta is normal measuring 2.99 cm.    IVC/SVC: Normal venous pressure at 3 mmHg.    Pericardium: There is no pericardial effusion.    Plan:  --pericardial drain pulled  --Holding all anticoagulation  --trend CBC--platelets improved, Hgb stable  --telemetry  --admitted to CCU for close monitoring    Acute respiratory failure with  hypoxia  Patient with Hypoxic Respiratory failure which is Acute.  she is not on home oxygen. Supplemental oxygen was provided and noted- Oxygen Concentration (%):  [28] 28    .   Signs/symptoms of respiratory failure include-  increased oxygen requirement . Contributing diagnoses includes - Pleural effusion Labs and images were reviewed. Patient Has not had a recent ABG. Will treat underlying causes and adjust management of respiratory failure as follows-     Plan:  --wean oxygen as tolerated  --IV lasix 20 mg  --strict ins and outs  --possible discharge tomorrow.  --left pleural effusion noted on echo.    History of gout  Patient notes she gets gout in her toe. Is on Allopurinol for prophylaxis. Patient notes she is also on prednisone    --will continue allopurinol  --monitor kidney function.    Pericardial effusion  See PVCs    --stable, repeat echos done today.    Stage 3 chronic kidney disease  Baseline creatinine of 1.5 to 1.6    Will obtain daily CMP to monitor kidney function    Thrombocytopenia  Will obtain CBC daily  Baseline ranges from 127 to 150    Essential hypertension  Hold anti-hypertensive medication in setting of pericardial effusion and hypotension during procedure.        VTE Risk Mitigation (From admission, onward)           Ordered     Reason for no Mechanical VTE Prophylaxis  Once        Question:  Reasons:  Answer:  Physician Provided (leave comment)  Comment:  Holding in setting of pericardial effusion s/p drainage    01/18/24 1358     IP VTE HIGH RISK PATIENT  Once         01/18/24 1358     Place sequential compression device  Until discontinued         01/18/24 1358     heparin infusion 1,000 units/500 ml in 0.9% NaCl (on sterile field)  As needed (PRN)         01/18/24 1013     heparin (porcine) 2,000 Units in sodium chloride 0.9% 2,000 mL  As needed (PRN)         01/18/24 1017                    Noel Ching MD  Cardiology  [facility]

## 2024-01-19 NOTE — ASSESSMENT & PLAN NOTE
S/p RFA on 1/19  Post procedurally developed hypotension in the setting of pericardial effusion requiring pericardiocentesis and pericardial drain in place

## 2024-01-19 NOTE — NURSING
Pt arrived to 3093 and placed in bed and on cm.  VSS.  NSR on CM.  Spox 95 on 2LNC.  B/P 114/70.  Pericardial drain dressing clean, dry, and intact with LUIS drain to FBS.  Sanguenous drainage noted.  Bilateral femoral site dressings clean, dry, and intact.  BLE warm and DP & PT pulses easily palpable.  No acute distress noted.  Denies pain.  Skin check performed and pt given wipe bath.  No skin breakdown noted.  Applied Purewick at patient request.  Notified cardiology resident of her arrival and brief update given.  Family brought to bedside.  SR up and essential items/call light in reach.  Instructed to call for assistance.

## 2024-01-19 NOTE — ASSESSMENT & PLAN NOTE
73 year-old that presented for frequent PVCs here for RFA with EP team. Status post RFA on 1/18/24. During procedure it was noted patient had new pericardial effusion with tamponade physiology. Emergent pericardiocentesis done with drainage of approximately 250 cc of blood. Will admit to CCU service for further monitoring.    Stat Echo ordered on 1/18/24 with results shown below:    2D echo only.    Left Ventricle: The left ventricle is normal in size. Ventricular mass is normal. Normal wall thickness. Normal wall motion. There is low normal systolic function with a visually estimated ejection fraction of 50 - 55%. There is normal diastolic function.    Right Ventricle: Normal right ventricular cavity size. Wall thickness is normal. Right ventricle wall motion  is normal. Systolic function is normal.    Aortic Valve: The aortic valve is a trileaflet valve. There is mild aortic valve sclerosis. There is mild annular calcification present.    Aorta: Aortic root is normal in size measuring 2.20 cm. Ascending aorta is normal measuring 2.99 cm.    IVC/SVC: Normal venous pressure at 3 mmHg.    Pericardium: There is no pericardial effusion.    Plan:  --pericardial drain pulled  --Holding all anticoagulation  --trend CBC--platelets improved, Hgb stable  --telemetry  --admitted to CCU for close monitoring

## 2024-01-19 NOTE — SUBJECTIVE & OBJECTIVE
Interval History: Patient with minimal drain output overnight. 5cc output from 7pm-7am (nurse dumped 10cc right at 7PM at shift start). She feels around baseline. No PVCs or other events overnight on telemetry.     Review of Systems   Constitutional: Negative for diaphoresis and fever.   Cardiovascular:  Negative for chest pain, dyspnea on exertion, leg swelling, near-syncope, orthopnea, palpitations, paroxysmal nocturnal dyspnea and syncope.   Respiratory:  Negative for cough and shortness of breath.    Gastrointestinal:  Negative for abdominal pain, diarrhea, nausea and vomiting.   Neurological:  Negative for light-headedness.   Psychiatric/Behavioral:  Negative for altered mental status and substance abuse.      Objective:     Vital Signs (Most Recent):  Temp: 98.5 °F (36.9 °C) (01/19/24 1100)  Pulse: 92 (01/19/24 1100)  Resp: 16 (01/19/24 1100)  BP: (!) 99/59 (01/19/24 1100)  SpO2: 95 % (01/19/24 1100) Vital Signs (24h Range):  Temp:  [98.2 °F (36.8 °C)-98.7 °F (37.1 °C)] 98.5 °F (36.9 °C)  Pulse:  [64-92] 92  Resp:  [5-46] 16  SpO2:  [91 %-99 %] 95 %  BP: ()/(59-86) 99/59     Weight: 78.9 kg (174 lb)  Body mass index is 32.88 kg/m².     SpO2: 95 %        Physical Exam  Constitutional:       Appearance: Normal appearance.   Cardiovascular:      Rate and Rhythm: Normal rate and regular rhythm.      Pulses: Normal pulses.      Heart sounds: Normal heart sounds.   Pulmonary:      Effort: Pulmonary effort is normal.      Breath sounds: Normal breath sounds. No wheezing or rales.   Abdominal:      General: Abdomen is flat. There is no distension.      Palpations: Abdomen is soft.      Tenderness: There is no abdominal tenderness.   Musculoskeletal:      Right lower leg: No edema.      Left lower leg: No edema.   Skin:     General: Skin is warm and dry.   Neurological:      Mental Status: She is alert and oriented to person, place, and time. Mental status is at baseline.   Psychiatric:         Mood and Affect:  Mood normal.         Behavior: Behavior normal.            Significant Labs: All pertinent lab results from the last 24 hours have been reviewed.    Significant Imaging:  Reviewed

## 2024-01-19 NOTE — HOSPITAL COURSE
73 year-old female that was admitted to CCU after RFA for PVCs done by EP and subsequently developed pericardial effusion with tamponade physiology that required emergent pericardiocentesis (250 cc drained). Patient had pericardial drain that was placed and monitored while in CCU. Echocardiogram on 1/18 after procedure showed no pericardial effusion. Pericardial drain with approximately 80 cc of total drainage of serosanguinous fluid. Pericardial drain with less than 5 cc's of drainage overnight and was pulled on 1/19/24 without complication. Repeat echo was done on AM of 1/19 with only trivial pericardial effusion, EF of 50-55%.    Patient with acute hypoxic resp failure from Left pleural effusion, and likely acute diastolic dysfunction. Upgraded to inpatient from observation. Treating for CAP with azithro and ceftriaxone. On 1/22 patient developed atrial fib with RVR and started on IV dilt.

## 2024-01-19 NOTE — PROGRESS NOTES
[facility]  Cardiac Electrophysiology  Progress Note    Admission Date: 1/18/2024  Code Status: Full Code   Attending Physician: Aurora Morales MD   Expected Discharge Date: 1/19/2024  Principal Problem:PVCs (premature ventricular contractions)    Subjective:     Interval History: Patient with minimal drain output overnight. 5cc output from 7pm-7am (nurse dumped 10cc right at 7PM at shift start). She feels around baseline. No PVCs or other events overnight on telemetry.     Review of Systems   Constitutional: Negative for diaphoresis and fever.   Cardiovascular:  Negative for chest pain, dyspnea on exertion, leg swelling, near-syncope, orthopnea, palpitations, paroxysmal nocturnal dyspnea and syncope.   Respiratory:  Negative for cough and shortness of breath.    Gastrointestinal:  Negative for abdominal pain, diarrhea, nausea and vomiting.   Neurological:  Negative for light-headedness.   Psychiatric/Behavioral:  Negative for altered mental status and substance abuse.      Objective:     Vital Signs (Most Recent):  Temp: 98.5 °F (36.9 °C) (01/19/24 1100)  Pulse: 92 (01/19/24 1100)  Resp: 16 (01/19/24 1100)  BP: (!) 99/59 (01/19/24 1100)  SpO2: 95 % (01/19/24 1100) Vital Signs (24h Range):  Temp:  [98.2 °F (36.8 °C)-98.7 °F (37.1 °C)] 98.5 °F (36.9 °C)  Pulse:  [64-92] 92  Resp:  [5-46] 16  SpO2:  [91 %-99 %] 95 %  BP: ()/(59-86) 99/59     Weight: 78.9 kg (174 lb)  Body mass index is 32.88 kg/m².     SpO2: 95 %        Physical Exam  Constitutional:       Appearance: Normal appearance.   Cardiovascular:      Rate and Rhythm: Normal rate and regular rhythm.      Pulses: Normal pulses.      Heart sounds: Normal heart sounds.   Pulmonary:      Effort: Pulmonary effort is normal.      Breath sounds: Normal breath sounds. No wheezing or rales.   Abdominal:      General: Abdomen is flat. There is no distension.      Palpations: Abdomen is soft.      Tenderness: There is no abdominal  tenderness.   Musculoskeletal:      Right lower leg: No edema.      Left lower leg: No edema.   Skin:     General: Skin is warm and dry.   Neurological:      Mental Status: She is alert and oriented to person, place, and time. Mental status is at baseline.   Psychiatric:         Mood and Affect: Mood normal.         Behavior: Behavior normal.            Significant Labs: All pertinent lab results from the last 24 hours have been reviewed.    Significant Imaging:  Reviewed  Assessment and Plan:     * PVCs (premature ventricular contractions)  S/p RFA on 1/19  Post procedurally developed hypotension in the setting of pericardial effusion requiring pericardiocentesis and pericardial drain in place    Pericardial effusion  Following with CCU for post-ablation pericardial effusion  Agree with pulling pericardial catheter due to 5cc drainage overnight  Follow up TTE post drain removal to ensure no worsening of effusion  From EP standpoint can go if stable echo later today        Connor M Gillies, MD  Cardiac Electrophysiology  [facility]

## 2024-01-19 NOTE — ASSESSMENT & PLAN NOTE
Patient with Hypoxic Respiratory failure which is Acute.  she is not on home oxygen. Supplemental oxygen was provided and noted- Oxygen Concentration (%):  [28] 28    .   Signs/symptoms of respiratory failure include-  increased oxygen requirement . Contributing diagnoses includes - Pleural effusion Labs and images were reviewed. Patient Has not had a recent ABG. Will treat underlying causes and adjust management of respiratory failure as follows-     Plan:  --wean oxygen as tolerated  --IV lasix 20 mg  --strict ins and outs  --possible discharge tomorrow.  --left pleural effusion noted on echo.

## 2024-01-19 NOTE — DISCHARGE SUMMARY
[facility]  Cardiology  Discharge Summary      Patient Name: Rajesh Mas  MRN: [id number]  Admission Date: 1/18/2024  Hospital Length of Stay: 5 days  Discharge Date and Time:  01/23/2024 2:38 PM  Attending Physician: Aurora Morales MD    Discharging Provider: Noel Ching MD  Primary Care Physician: Gail Villarreal MD    HPI:   73 year-old female with history of essential hypertension, thrombocytopenia, obesity BMI 31.93, HLD, subclinical hypothyroidism presenting here for evaluation of palpitations. She has had palpitations for several months to years. She feels heart palpitations more recently.  No syncope or near syncope. No prior monitor. She is not on AVN or AAD agents. ECG show NSR with PVCs.    On 1/18/24 Patient underwent RFA for PVCs. It was noted by EP team that during the course of the procedure she was noted to have an acute drop in her blood pressure. Bedside echo during procedure confirmed a new moderate sized pericardial effusion with tamponade physiology. Patient underwent emergent pericardiocentesis on 1/18/24 with removal off approximately 250cc. Patient to be admitted to CCU for further care.     Procedure(s) (LRB):  Ablation (N/A)  Pericardiocentesis (N/A)     Indwelling Lines/Drains at time of discharge:  Lines/Drains/Airways       Drain  Duration                  Drain/Device  01/18/24 1003  lower sternal 1 day                    Hospital Course:  73 year-old female that was admitted to CCU after RFA for PVCs done by EP and subsequently developed pericardial effusion with tamponade physiology that required emergent pericardiocentesis (250 cc drained). Patient had pericardial drain that was placed and monitored while in CCU. Echocardiogram on 1/18 after procedure showed no pericardial effusion. Pericardial drain with approximately 80 cc of total drainage of serosanguinous fluid. Pericardial drain with less than 5 cc's of drainage overnight and was  pulled on 1/19/24 without complication. Repeat echo was done on AM of 1/19 with only trivial pericardial effusion, EF of 50-55%.     Patient developed acute hypoxic resp failure from Left pleural effusion and acute diastolic dysfunction. Upgraded to inpatient from observation. Improved with diuretic therapy. Treating for CAP with azithro and ceftriaxone. On 1/22 patient developed atrial fib with RVR and started on IV dilt. On 1/23/24 patient converted to normal sinus rhythm. Will plan for discharge with outpatient follow up to Cardiology, EP. Will order 30 day event monitor. Patient planned for discharge from hospital on 1/24/24. Pending 6MWT. Will hold anti-coagulation per EP recs. Will start metoprolol XL 25 mg daily.     Goals of Care Treatment Preferences:  Code Status: Full Code    Vitals:    01/23/24 0519 01/23/24 0659 01/23/24 0712 01/23/24 0804   BP: (!) 94/50   109/60   BP Location: Right arm   Right arm   Patient Position: Lying   Sitting   Pulse: 72 70 69 63   Resp: 20   17   Temp: 98.5 °F (36.9 °C)   97.7 °F (36.5 °C)   TempSrc: Oral   Tympanic   SpO2: 95%   (!) 91%   Weight:       Height:          Physical Exam  Vitals reviewed.   Constitutional:       General: She is not in acute distress.     Appearance: She is not ill-appearing.   HENT:      Head: Normocephalic and atraumatic.      Right Ear: External ear normal.      Left Ear: External ear normal.      Nose: Nose normal.   Eyes:      General:         Right eye: No discharge.         Left eye: No discharge.      Conjunctiva/sclera: Conjunctivae normal.   Cardiovascular:      Rate and Rhythm: Normal rate and regular rhythm.      Pulses: Normal pulses.      Heart sounds: Normal heart sounds.   Pulmonary:      Effort: Pulmonary effort is normal.      Breath sounds: Normal breath sounds.   Abdominal:      General: Abdomen is flat. There is no distension.      Palpations: Abdomen is soft.      Tenderness: There is no abdominal tenderness.    Musculoskeletal:      Right lower leg: No edema.      Left lower leg: No edema.   Skin:     General: Skin is warm and dry.   Neurological:      Mental Status: She is alert and oriented to person, place, and time. Mental status is at baseline.   Psychiatric:         Mood and Affect: Mood normal.         Behavior: Behavior normal.      Consults:     Significant Diagnostic Studies: N/A    Pending Diagnostic Studies:       None            Final Active Diagnoses:    Diagnosis Date Noted POA    PRINCIPAL PROBLEM:  Acute diastolic heart failure [I50.31] 01/20/2024 Unknown    Atrial fibrillation [I48.91] 01/22/2024 Unknown    Acute hypoxic respiratory failure [J96.01] 01/19/2024 No    Pleural effusion, left [J90] 01/20/2024 Yes    Pericardial effusion [I31.39] 01/18/2024 Yes    History of gout [Z87.39] 01/18/2024 Yes    PVCs (premature ventricular contractions) [I49.3] 10/10/2023 Yes    Stage 3 chronic kidney disease [N18.30] 01/12/2022 Yes    Thrombocytopenia [D69.6] 02/22/2018 Yes    Essential hypertension [I10] 02/25/2016 Yes     Chronic      Problems Resolved During this Admission:    Diagnosis Date Noted Date Resolved POA    Hyperlipidemia [E78.5] 07/08/2023 01/18/2024 Yes     No new Assessment & Plan notes have been filed under this hospital service since the last note was generated.  Service: Cardiology      Discharged Condition: stable    Disposition: Home or Self Care    Follow Up:    Patient Instructions:      Ambulatory referral/consult to Cardiac Electrophysiology   Standing Status: Future   Referral Priority: Routine Referral Type: Consultation   Referral Reason: Specialty Services Required   Referred to Provider: SAVANNA BLEDSOE Requested Specialty: Cardiology   Number of Visits Requested: 1     Ambulatory referral/consult to Cardiology   Standing Status: Future   Referral Priority: Routine Referral Type: Consultation   Referral Reason: Specialty Services Required   Requested Specialty: Cardiology    Number of Visits Requested: 1     Ambulatory referral/consult to Internal Medicine   Standing Status: Future   Referral Priority: Routine Referral Type: Consultation   Referral Reason: Specialty Services Required   Referred to Provider: ANNABELLE URENA Requested Specialty: Internal Medicine   Number of Visits Requested: 1     Medications:  Reconciled Home Medications:      Medication List        START taking these medications      aspirin 81 MG EC tablet  Commonly known as: ECOTRIN  Take 1 tablet (81 mg total) by mouth once daily.            CHANGE how you take these medications      allopurinoL 300 MG tablet  Commonly known as: ZYLOPRIM  Take one tablet by mouth once daily in combination with 50mg tablets for a daily dose of 350mg  What changed: Another medication with the same name was removed. Continue taking this medication, and follow the directions you see here.     losartan 100 MG tablet  Commonly known as: COZAAR  Take 1 tablet (100 mg total) by mouth once daily. Hold until outpatient follow-up  What changed: additional instructions     triamterene-hydrochlorothiazide 37.5-25 mg 37.5-25 mg per capsule  Commonly known as: DYAZIDE  Take 1 capsule by mouth every morning. Hold until outpatient follow up with Cardiology  What changed: additional instructions            CONTINUE taking these medications      oxybutynin 5 MG Tab  Commonly known as: DITROPAN  Take 1 tablet (5 mg total) by mouth 2 (two) times daily.     predniSONE 2.5 MG tablet  Commonly known as: DELTASONE  Take one tablet by mouth once daily              Time spent on the discharge of patient: 35 minutes    Noel Ching MD  Cardiology  [facility]

## 2024-01-19 NOTE — SUBJECTIVE & OBJECTIVE
Interval History: Patient well this morning. Able to have pericardial drain pulled without complication. Initially planned on discharge but patient with some shortness of breath today. Patient on 2L NC. Repeat echo done this morning with trace pericardial effusion.    Review of Systems   Constitutional: Negative for chills and fever.   HENT: Negative.     Cardiovascular:  Negative for chest pain and leg swelling.   Respiratory:  Positive for shortness of breath.    Gastrointestinal:  Negative for abdominal pain, constipation, diarrhea, nausea and vomiting.   Psychiatric/Behavioral:  Negative for altered mental status.      Objective:     Vital Signs (Most Recent):  Temp: 98.5 °F (36.9 °C) (01/19/24 1100)  Pulse: 92 (01/19/24 1100)  Resp: 16 (01/19/24 1100)  BP: (!) 99/59 (01/19/24 1100)  SpO2: 95 % (01/19/24 1100) Vital Signs (24h Range):  Temp:  [98.2 °F (36.8 °C)-98.7 °F (37.1 °C)] 98.5 °F (36.9 °C)  Pulse:  [75-92] 92  Resp:  [5-46] 16  SpO2:  [91 %-99 %] 95 %  BP: ()/(59-82) 99/59     Weight: 78.9 kg (174 lb)  Body mass index is 32.88 kg/m².     SpO2: 95 %         Intake/Output Summary (Last 24 hours) at 1/19/2024 1629  Last data filed at 1/19/2024 1300  Gross per 24 hour   Intake 336 ml   Output 1715 ml   Net -1379 ml       Lines/Drains/Airways       Drain  Duration                  Drain/Device  01/18/24 1003  lower sternal 1 day              Peripheral Intravenous Line  Duration                  Peripheral IV - Single Lumen 01/18/24 0626 20 G Left Antecubital 1 day         Peripheral IV - Single Lumen 01/18/24 0626 20 G Right Forearm 1 day                       Physical Exam  Vitals reviewed.   Constitutional:       General: She is not in acute distress.     Appearance: She is not ill-appearing.   HENT:      Head: Normocephalic and atraumatic.      Right Ear: External ear normal.      Left Ear: External ear normal.      Nose: Nose normal.   Eyes:      General:         Right eye: No discharge.          Left eye: No discharge.      Conjunctiva/sclera: Conjunctivae normal.   Cardiovascular:      Rate and Rhythm: Normal rate and regular rhythm.      Pulses: Normal pulses.      Heart sounds: Normal heart sounds.      Comments: Pericardial drain pulled  Pulmonary:      Effort: Pulmonary effort is normal.      Breath sounds: Normal breath sounds.   Abdominal:      General: Abdomen is flat. There is no distension.      Palpations: Abdomen is soft.      Tenderness: There is no abdominal tenderness.   Musculoskeletal:      Right lower leg: No edema.      Left lower leg: No edema.   Skin:     General: Skin is warm and dry.   Neurological:      Mental Status: She is alert and oriented to person, place, and time. Mental status is at baseline.   Psychiatric:         Mood and Affect: Mood normal.         Behavior: Behavior normal.            Significant Labs: CMP   Recent Labs   Lab 01/19/24  0530      K 3.9      CO2 24   *   BUN 32*   CREATININE 1.4   CALCIUM 8.8   PROT 6.7   ALBUMIN 3.1*   BILITOT 1.0   ALKPHOS 46*   AST 27   ALT 17   ANIONGAP 7*   , CBC   Recent Labs   Lab 01/19/24  0400 01/19/24  1310   WBC 11.45 13.56*   HGB 10.7* 10.5*   HCT 32.4* 32.9*   PLT 72* 110*   , and All pertinent lab results from the last 24 hours have been reviewed.    Significant Imaging:  imaging results from past 24 hours reviewed.

## 2024-01-19 NOTE — PLAN OF CARE
Pt on CM with stable vital signs.  NSR without ectopy on CM.  Pericardial drain with minimal output.  Site WNL.  Bilateral femoral incision sites with dressing clean, dry, and intact.  BLE warm and normal DP and PT pulses palpable.  Reviewed safety precautions and call light and other essential items placed in reach.  Encouraged her to call for assistance.  Purewick in place.

## 2024-01-19 NOTE — PLAN OF CARE
[facility]  Initial Discharge Assessment       Primary Care Provider: Gail Villarreal MD    Admission Diagnosis: PVCs (premature ventricular contractions) [I49.3]  Tamponade [I31.4]    Admission Date: 1/18/2024  Expected Discharge Date: 1/20/2024    Transition of Care Barriers: None    Payor: [employer] MEDICARE ADVANTAGE / Plan: [employer] MCARE ADV / Product Type: Medicare Advantage /     Extended Emergency Contact Information  Primary Emergency Contact: Alfredo Mas  Address: [address] [place] [address] [facility]  Home Phone: [phone]  Mobile Phone: [phone]  Relation: Spouse  Preferred language: English    Discharge Plan A: Home with family  Discharge Plan B: Home with family      [facility]  [address]  [facility] [address]  Phone: [phone] Fax: [phone]      Initial Assessment (most recent)       Adult Discharge Assessment - 01/19/24 1508          Discharge Assessment    Assessment Type Discharge Planning Assessment     Confirmed/corrected address, phone number and insurance Yes     Confirmed Demographics Correct on Facesheet     Source of Information patient;family;health record     Communicated MARJ with patient/caregiver Yes     Reason For Admission PVCs     People in Home spouse     Facility Arrived From: Home     Do you expect to return to your current living situation? Yes     Do you have help at home or someone to help you manage your care at home? Yes     Who are your caregiver(s) and their phone number(s)? Alfredo Mas () [phone]     Prior to hospitilization cognitive status: Alert/Oriented     Current cognitive status: Alert/Oriented     Walking or Climbing Stairs Difficulty no     Dressing/Bathing Difficulty no     Home Layout Able to live on 1st floor     Equipment Currently Used at Home none     Readmission within 30 days? No     Patient currently being  followed by outpatient case management? No     Do you currently have service(s) that help you manage your care at home? No     Do you take prescription medications? Yes     Do you have prescription coverage? Yes     Do you have any problems affording any of your prescribed medications? No     Is the patient taking medications as prescribed? yes     Who is going to help you get home at discharge? Alfredo Mas () [phone]     How do you get to doctors appointments? family or friend will provide     Are you on dialysis? No     Do you take coumadin? No     Discharge Plan A Home with family     Discharge Plan B Home with family     DME Needed Upon Discharge  none     Discharge Plan discussed with: Patient;Spouse/sig other     Name(s) and Number(s) Alfredo Mas () [phone]     Transition of Care Barriers None                 SW met with pt and  at bedside to discuss discharge planning.  Pt lives with her  in a one-story house and is independent with ambulation and ADLs.  Pt will have transportation and assistance from her  at discharge.  Discharge Plan A and Plan B have been determined by review of patient's clinical status, future medical and therapeutic needs, and coverage/benefits for post-acute care in coordination with multidisciplinary team members.  SW name and ext on whiteboard.  Will continue to follow.      Gail Leon LMSW  [facility]  [id number]

## 2024-01-20 PROBLEM — I50.31 ACUTE DIASTOLIC HEART FAILURE: Status: ACTIVE | Noted: 2024-01-20

## 2024-01-20 PROBLEM — J90 PLEURAL EFFUSION, LEFT: Status: ACTIVE | Noted: 2024-01-20

## 2024-01-20 LAB
ALBUMIN SERPL BCP-MCNC: 2.8 G/DL (ref 3.5–5.2)
ALP SERPL-CCNC: 47 U/L (ref 55–135)
ALT SERPL W/O P-5'-P-CCNC: 15 U/L (ref 10–44)
ANION GAP SERPL CALC-SCNC: 6 MMOL/L (ref 8–16)
ANION GAP SERPL CALC-SCNC: 8 MMOL/L (ref 8–16)
AST SERPL-CCNC: 21 U/L (ref 10–40)
BASOPHILS # BLD AUTO: 0.04 K/UL (ref 0–0.2)
BASOPHILS NFR BLD: 0.3 % (ref 0–1.9)
BILIRUB SERPL-MCNC: 0.9 MG/DL (ref 0.1–1)
BNP SERPL-MCNC: 96 PG/ML (ref 0–99)
BUN SERPL-MCNC: 28 MG/DL (ref 8–23)
BUN SERPL-MCNC: 32 MG/DL (ref 8–23)
CALCIUM SERPL-MCNC: 9 MG/DL (ref 8.7–10.5)
CALCIUM SERPL-MCNC: 9.5 MG/DL (ref 8.7–10.5)
CHLORIDE SERPL-SCNC: 105 MMOL/L (ref 95–110)
CHLORIDE SERPL-SCNC: 106 MMOL/L (ref 95–110)
CO2 SERPL-SCNC: 24 MMOL/L (ref 23–29)
CO2 SERPL-SCNC: 24 MMOL/L (ref 23–29)
CREAT SERPL-MCNC: 1.3 MG/DL (ref 0.5–1.4)
CREAT SERPL-MCNC: 1.5 MG/DL (ref 0.5–1.4)
DIFFERENTIAL METHOD BLD: ABNORMAL
EOSINOPHIL # BLD AUTO: 0 K/UL (ref 0–0.5)
EOSINOPHIL NFR BLD: 0.3 % (ref 0–8)
ERYTHROCYTE [DISTWIDTH] IN BLOOD BY AUTOMATED COUNT: 14.4 % (ref 11.5–14.5)
EST. GFR  (NO RACE VARIABLE): 37 ML/MIN/1.73 M^2
EST. GFR  (NO RACE VARIABLE): 44 ML/MIN/1.73 M^2
GLUCOSE SERPL-MCNC: 110 MG/DL (ref 70–110)
GLUCOSE SERPL-MCNC: 133 MG/DL (ref 70–110)
HCT VFR BLD AUTO: 35.4 % (ref 37–48.5)
HGB BLD-MCNC: 11.3 G/DL (ref 12–16)
IMM GRANULOCYTES # BLD AUTO: 0.06 K/UL (ref 0–0.04)
IMM GRANULOCYTES NFR BLD AUTO: 0.4 % (ref 0–0.5)
LYMPHOCYTES # BLD AUTO: 1.6 K/UL (ref 1–4.8)
LYMPHOCYTES NFR BLD: 10.8 % (ref 18–48)
MAGNESIUM SERPL-MCNC: 2.1 MG/DL (ref 1.6–2.6)
MCH RBC QN AUTO: 29.7 PG (ref 27–31)
MCHC RBC AUTO-ENTMCNC: 31.9 G/DL (ref 32–36)
MCV RBC AUTO: 93 FL (ref 82–98)
MONOCYTES # BLD AUTO: 1.5 K/UL (ref 0.3–1)
MONOCYTES NFR BLD: 10.2 % (ref 4–15)
NEUTROPHILS # BLD AUTO: 11.8 K/UL (ref 1.8–7.7)
NEUTROPHILS NFR BLD: 78 % (ref 38–73)
NRBC BLD-RTO: 0 /100 WBC
PLATELET # BLD AUTO: 122 K/UL (ref 150–450)
PMV BLD AUTO: 13.2 FL (ref 9.2–12.9)
POTASSIUM SERPL-SCNC: 3.4 MMOL/L (ref 3.5–5.1)
POTASSIUM SERPL-SCNC: 3.5 MMOL/L (ref 3.5–5.1)
PROCALCITONIN SERPL IA-MCNC: 0.55 NG/ML
PROT SERPL-MCNC: 6.9 G/DL (ref 6–8.4)
RBC # BLD AUTO: 3.8 M/UL (ref 4–5.4)
SODIUM SERPL-SCNC: 135 MMOL/L (ref 136–145)
SODIUM SERPL-SCNC: 138 MMOL/L (ref 136–145)
WBC # BLD AUTO: 15.06 K/UL (ref 3.9–12.7)

## 2024-01-20 PROCEDURE — 87205 SMEAR GRAM STAIN: CPT | Performed by: STUDENT IN AN ORGANIZED HEALTH CARE EDUCATION/TRAINING PROGRAM

## 2024-01-20 PROCEDURE — 80053 COMPREHEN METABOLIC PANEL: CPT

## 2024-01-20 PROCEDURE — 87070 CULTURE OTHR SPECIMN AEROBIC: CPT | Performed by: STUDENT IN AN ORGANIZED HEALTH CARE EDUCATION/TRAINING PROGRAM

## 2024-01-20 PROCEDURE — 83735 ASSAY OF MAGNESIUM: CPT

## 2024-01-20 PROCEDURE — 25000003 PHARM REV CODE 250: Performed by: INTERNAL MEDICINE

## 2024-01-20 PROCEDURE — 25000003 PHARM REV CODE 250

## 2024-01-20 PROCEDURE — 21400001 HC TELEMETRY ROOM

## 2024-01-20 PROCEDURE — 80048 BASIC METABOLIC PNL TOTAL CA: CPT | Mod: XB | Performed by: INTERNAL MEDICINE

## 2024-01-20 PROCEDURE — 63600175 PHARM REV CODE 636 W HCPCS: Performed by: INTERNAL MEDICINE

## 2024-01-20 PROCEDURE — 63700000 PHARM REV CODE 250 ALT 637 W/O HCPCS

## 2024-01-20 PROCEDURE — 85025 COMPLETE CBC W/AUTO DIFF WBC: CPT

## 2024-01-20 PROCEDURE — 83880 ASSAY OF NATRIURETIC PEPTIDE: CPT | Performed by: STUDENT IN AN ORGANIZED HEALTH CARE EDUCATION/TRAINING PROGRAM

## 2024-01-20 PROCEDURE — 99231 SBSQ HOSP IP/OBS SF/LOW 25: CPT | Mod: GC,,, | Performed by: INTERNAL MEDICINE

## 2024-01-20 PROCEDURE — 99223 1ST HOSP IP/OBS HIGH 75: CPT | Mod: GC,,, | Performed by: STUDENT IN AN ORGANIZED HEALTH CARE EDUCATION/TRAINING PROGRAM

## 2024-01-20 PROCEDURE — 25000003 PHARM REV CODE 250: Performed by: STUDENT IN AN ORGANIZED HEALTH CARE EDUCATION/TRAINING PROGRAM

## 2024-01-20 PROCEDURE — 94761 N-INVAS EAR/PLS OXIMETRY MLT: CPT

## 2024-01-20 PROCEDURE — 63600175 PHARM REV CODE 636 W HCPCS

## 2024-01-20 PROCEDURE — 84145 PROCALCITONIN (PCT): CPT

## 2024-01-20 RX ORDER — POTASSIUM CHLORIDE 20 MEQ/1
40 TABLET, EXTENDED RELEASE ORAL ONCE
Status: COMPLETED | OUTPATIENT
Start: 2024-01-20 | End: 2024-01-20

## 2024-01-20 RX ORDER — FUROSEMIDE 10 MG/ML
40 INJECTION INTRAMUSCULAR; INTRAVENOUS ONCE
Status: COMPLETED | OUTPATIENT
Start: 2024-01-20 | End: 2024-01-20

## 2024-01-20 RX ORDER — FUROSEMIDE 10 MG/ML
20 INJECTION INTRAMUSCULAR; INTRAVENOUS ONCE
Status: COMPLETED | OUTPATIENT
Start: 2024-01-20 | End: 2024-01-20

## 2024-01-20 RX ORDER — POTASSIUM CHLORIDE 20 MEQ/1
40 TABLET, EXTENDED RELEASE ORAL 2 TIMES DAILY
Status: COMPLETED | OUTPATIENT
Start: 2024-01-20 | End: 2024-01-21

## 2024-01-20 RX ORDER — FUROSEMIDE 10 MG/ML
20 INJECTION INTRAMUSCULAR; INTRAVENOUS DAILY
Status: DISCONTINUED | OUTPATIENT
Start: 2024-01-21 | End: 2024-01-22

## 2024-01-20 RX ORDER — AZITHROMYCIN 250 MG/1
500 TABLET, FILM COATED ORAL DAILY
Status: COMPLETED | OUTPATIENT
Start: 2024-01-20 | End: 2024-01-22

## 2024-01-20 RX ADMIN — OXYBUTYNIN CHLORIDE 5 MG: 5 TABLET ORAL at 08:01

## 2024-01-20 RX ADMIN — POTASSIUM CHLORIDE 40 MEQ: 1500 TABLET, EXTENDED RELEASE ORAL at 08:01

## 2024-01-20 RX ADMIN — AZITHROMYCIN 500 MG: 250 TABLET, FILM COATED ORAL at 02:01

## 2024-01-20 RX ADMIN — OXYBUTYNIN CHLORIDE 5 MG: 5 TABLET ORAL at 09:01

## 2024-01-20 RX ADMIN — FUROSEMIDE 40 MG: 10 INJECTION, SOLUTION INTRAVENOUS at 11:01

## 2024-01-20 RX ADMIN — ASPIRIN 81 MG: 81 TABLET, COATED ORAL at 09:01

## 2024-01-20 RX ADMIN — POTASSIUM BICARBONATE 50 MEQ: 978 TABLET, EFFERVESCENT ORAL at 02:01

## 2024-01-20 RX ADMIN — POTASSIUM CHLORIDE 40 MEQ: 1500 TABLET, EXTENDED RELEASE ORAL at 11:01

## 2024-01-20 RX ADMIN — CEFTRIAXONE 1 G: 1 INJECTION, POWDER, FOR SOLUTION INTRAMUSCULAR; INTRAVENOUS at 02:01

## 2024-01-20 RX ADMIN — ALLOPURINOL 300 MG: 300 TABLET ORAL at 08:01

## 2024-01-20 RX ADMIN — FUROSEMIDE 20 MG: 10 INJECTION, SOLUTION INTRAMUSCULAR; INTRAVENOUS at 08:01

## 2024-01-20 NOTE — PLAN OF CARE
"Cardiac ICU Care Plan    POC reviewed with Rajesh Mas and family. Questions and concerns addressed. No acute events today. Pt progressing toward goals. Antibiotics started, sputum culture submitted.   Will continue to monitor. See below and flowsheets for full assessment and VS info.       Neuro:  [place] Coma Scale  Best Eye Response: 4-->(E4) spontaneous  Best Motor Response: 6-->(M6) obeys commands  Best Verbal Response: 5-->(V5) oriented  [place] Coma Scale Score: 15  Assessment Qualifiers: patient not sedated/intubated  Pupil PERRLA: yes    24 hr Temp:  [98.2 °F (36.8 °C)-98.8 °F (37.1 °C)]      CV:  Rhythm: normal sinus rhythm   DVT prophylaxis: VTE Required Core Measure: Provider determined low risk VTE                            Pulses  Right Radial Pulse: 2+ (normal)  Left Radial Pulse: 2+ (normal)  Right Dorsalis Pedis Pulse: 2+ (normal)  Left Dorsalis Pedis Pulse: 2+ (normal)  Right Posterior Tibial Pulse: 2+ (normal)  Left Posterior Tibial Pulse: 2+ (normal)    Resp:  Flow (L/min): 2  Oxygen Concentration (%): 28    GI/:  GI prophylaxis: yes  Diet/Nutrition Received: 2 gram sodium, low saturated fat/low cholesterol  Last Bowel Movement: 01/17/24      Intake/Output Summary (Last 24 hours) at 1/20/2024 1513  Last data filed at 1/20/2024 1500  Gross per 24 hour   Intake 1424.67 ml   Output 2300 ml   Net -875.33 ml        Nutritional Supplement Intake: Quantity , Type:     Labs/Accuchecks:  Recent Labs   Lab 01/19/24  0400 01/19/24  1310 01/20/24  0447   WBC 11.45 13.56* 15.06*   RBC 3.53* 3.47* 3.80*   HGB 10.7* 10.5* 11.3*   HCT 32.4* 32.9* 35.4*   PLT 72* 110* 122*    No results for input(s): "PT", "INR", "APTT" in the last 168 hours.   Recent Labs     01/20/24  0447 01/20/24  1338   * 138   K 3.5 3.4*   CO2 24 24    106   BUN 28* 32*   CREATININE 1.3 1.5*   ALKPHOS 47*  --    ALT 15  --    AST 21  --    BILITOT 0.9  --      No results for input(s): "CPK", "CPKMB", "MB", "TROPONINI" in " "the last 168 hours. No results for input(s): "PH", "PCO2", "PO2", "HCO3", "POCSATURATED", "BE" in the last 72 hours.    Electrolytes: Electrolytes replaced  Accuchecks: none    Gtts/LDAs:      Lines/Drains/Airways       Peripheral Intravenous Line  Duration                  Peripheral IV - Single Lumen 01/18/24 0626 20 G Right Forearm 2 days         Peripheral IV - Single Lumen 01/20/24 1430 18 G Anterior;Left Forearm <1 day                    Skin/Wounds  Bathing/Skin Care: bath, complete;dressed/undressed;electrode patches/site rotation;linen changed (01/19/24 1901)  Wounds: Yes  Wound care consulted: Yes   "

## 2024-01-20 NOTE — PROGRESS NOTES
[facility]  Cardiac Electrophysiology  Progress Note    Admission Date: 1/18/2024  Code Status: Full Code   Attending Physician: Aurora Morales MD   Expected Discharge Date: 1/20/2024  Principal Problem:PVCs (premature ventricular contractions)    Subjective:     Interval History: NAEON. Pericardial effusion trivial since pericardial drain removal. Feels fine today. No tele events.     Review of Systems   Constitutional: Negative for diaphoresis and fever.   Cardiovascular:  Negative for chest pain, dyspnea on exertion, leg swelling, near-syncope, orthopnea, palpitations, paroxysmal nocturnal dyspnea and syncope.   Respiratory:  Negative for cough and shortness of breath.    Gastrointestinal:  Negative for abdominal pain, diarrhea, nausea and vomiting.   Neurological:  Negative for light-headedness.   Psychiatric/Behavioral:  Negative for altered mental status and substance abuse.      Objective:     Vital Signs (Most Recent):  Temp: 98.5 °F (36.9 °C) (01/20/24 0301)  Pulse: 81 (01/20/24 0700)  Resp: 20 (01/20/24 0700)  BP: 109/67 (01/20/24 0700)  SpO2: 95 % (01/20/24 0700) Vital Signs (24h Range):  Temp:  [98.2 °F (36.8 °C)-98.7 °F (37.1 °C)] 98.5 °F (36.9 °C)  Pulse:  [] 81  Resp:  [16-29] 20  SpO2:  [90 %-96 %] 95 %  BP: ()/(58-74) 109/67     Weight: 78.9 kg (174 lb)  Body mass index is 32.88 kg/m².     SpO2: 95 %        Physical Exam  Constitutional:       Appearance: Normal appearance.   Cardiovascular:      Rate and Rhythm: Normal rate and regular rhythm.      Pulses: Normal pulses.   Pulmonary:      Effort: Pulmonary effort is normal. No respiratory distress.   Abdominal:      General: Abdomen is flat.      Palpations: Abdomen is soft.      Tenderness: There is no abdominal tenderness.   Musculoskeletal:      Right lower leg: No edema.      Left lower leg: No edema.   Skin:     General: Skin is warm and dry.   Neurological:      Mental Status: She is alert and  "oriented to person, place, and time. Mental status is at baseline.   Psychiatric:         Mood and Affect: Mood normal.         Behavior: Behavior normal.            Significant Labs: BMP:   Recent Labs   Lab 01/19/24  0530 01/20/24  0447   * 110    135*   K 3.9 3.5    105   CO2 24 24   BUN 32* 28*   CREATININE 1.4 1.3   CALCIUM 8.8 9.0   MG  --  2.1   , CMP:   Recent Labs   Lab 01/19/24  0530 01/20/24  0447    135*   K 3.9 3.5    105   CO2 24 24   * 110   BUN 32* 28*   CREATININE 1.4 1.3   CALCIUM 8.8 9.0   PROT 6.7 6.9   ALBUMIN 3.1* 2.8*   BILITOT 1.0 0.9   ALKPHOS 46* 47*   AST 27 21   ALT 17 15   ANIONGAP 7* 6*   , CBC:   Recent Labs   Lab 01/19/24  0400 01/19/24  1310 01/20/24  0447   WBC 11.45 13.56* 15.06*   HGB 10.7* 10.5* 11.3*   HCT 32.4* 32.9* 35.4*   PLT 72* 110* 122*   , and INR: No results for input(s): "INR", "PROTIME" in the last 48 hours.      Assessment and Plan:     * PVCs (premature ventricular contractions)  S/p RFA on 1/19  - ASA 81mg X30days.     Pericardial effusion  Following with CCU for post-ablation pericardial effusion  Pericardial drain pulled 01/19  Follow up TTE post drain removal shows trivial pericardial effusion. Bedside echo performed yesterday evening with stable effusion.  From EP standpoint can go home today      We will sign off    Crow Rivera MD  Cardiac Electrophysiology  [facility]    "

## 2024-01-20 NOTE — PLAN OF CARE
"Cardiac ICU Care Plan    POC reviewed with Rajesh Mas and family. Questions and concerns addressed. No acute events today. Pt progressing toward goals.   Removed LUIS No CP,.     Will continue to monitor. See below and flowsheets for full assessment and VS info.       Neuro:  [place] Coma Scale  Best Eye Response: 4-->(E4) spontaneous  Best Motor Response: 6-->(M6) obeys commands  Best Verbal Response: 5-->(V5) oriented  [place] Coma Scale Score: 15  Assessment Qualifiers: patient not sedated/intubated  Pupil PERRLA: yes    24 hr Temp:  [98.3 °F (36.8 °C)-98.7 °F (37.1 °C)]      CV:  Rhythm: normal sinus rhythm   DVT prophylaxis: VTE Required Core Measure: Provider determined low risk VTE                            Pulses  Right Radial Pulse: 2+ (normal)  Left Radial Pulse: 2+ (normal)  Right Dorsalis Pedis Pulse: 2+ (normal)  Left Dorsalis Pedis Pulse: 2+ (normal)  Right Posterior Tibial Pulse: 2+ (normal)  Left Posterior Tibial Pulse: 2+ (normal)    Resp:  Flow (L/min): 2  Oxygen Concentration (%): 28    GI/:  GI prophylaxis: yes  Diet/Nutrition Received: regular  Last Bowel Movement: 01/17/24      Intake/Output Summary (Last 24 hours) at 1/19/2024 1933  Last data filed at 1/19/2024 1800  Gross per 24 hour   Intake 454 ml   Output 1515 ml   Net -1061 ml        Nutritional Supplement Intake: Quantity , Type:     Labs/Accuchecks:  Recent Labs   Lab 01/19/24  0400 01/19/24  1310   WBC 11.45 13.56*   RBC 3.53* 3.47*   HGB 10.7* 10.5*   HCT 32.4* 32.9*   PLT 72* 110*    No results for input(s): "PT", "INR", "APTT" in the last 168 hours.   Recent Labs     01/19/24  0530      K 3.9   CO2 24      BUN 32*   CREATININE 1.4   ALKPHOS 46*   ALT 17   AST 27   BILITOT 1.0     No results for input(s): "CPK", "CPKMB", "MB", "TROPONINI" in the last 168 hours. No results for input(s): "PH", "PCO2", "PO2", "HCO3", "POCSATURATED", "BE" in the last 72 hours.    Electrolytes: No replacement orders  Accuchecks: " none    Gtts/LDAs:      Lines/Drains/Airways       Peripheral Intravenous Line  Duration                  Peripheral IV - Single Lumen 01/18/24 0626 20 G Left Antecubital 1 day         Peripheral IV - Single Lumen 01/18/24 0626 20 G Right Forearm 1 day                    Skin/Wounds  Bathing/Skin Care: back care;bath, complete;dressed/undressed;electrode patches/site rotation;linen changed (01/19/24 1700)  Wounds: Yes  Wound care consulted: Yes

## 2024-01-20 NOTE — ASSESSMENT & PLAN NOTE
Patient with Hypoxic Respiratory failure which is Acute.  she is not on home oxygen. Supplemental oxygen was provided and noted-      .   Signs/symptoms of respiratory failure include-  increased oxygen requirement . Contributing diagnoses includes - Pleural effusion Labs and images were reviewed. Patient Has not had a recent ABG. Will treat underlying causes and adjust management of respiratory failure as follows-     Plan:  --wean oxygen as tolerated  --IV lasix 20 mg today  --strict ins and outs  --left pleural effusion noted on echo and chest x-ray--continue diuresis  --will cover for PNA with CAP coverage  --follow up BNP and procal

## 2024-01-20 NOTE — ASSESSMENT & PLAN NOTE
Patient with evidence of congestion on chest-x ray and likely has element of acute diastolic dysfunction.    Plan:  -obtain BNP  -continue IV lasix 20mg daily  -strict ins and outs  -monitor CMP

## 2024-01-20 NOTE — ASSESSMENT & PLAN NOTE
73 year-old that presented for frequent PVCs here for RFA with EP team. Status post RFA on 1/18/24. During procedure it was noted patient had new pericardial effusion with tamponade physiology. Emergent pericardiocentesis done with drainage of approximately 250 cc of blood. Will admit to CCU service for further monitoring.    Stat Echo ordered on 1/18/24 with results shown below:    2D echo only.    Left Ventricle: The left ventricle is normal in size. Ventricular mass is normal. Normal wall thickness. Normal wall motion. There is low normal systolic function with a visually estimated ejection fraction of 50 - 55%. There is normal diastolic function.    Right Ventricle: Normal right ventricular cavity size. Wall thickness is normal. Right ventricle wall motion  is normal. Systolic function is normal.    Aortic Valve: The aortic valve is a trileaflet valve. There is mild aortic valve sclerosis. There is mild annular calcification present.    Aorta: Aortic root is normal in size measuring 2.20 cm. Ascending aorta is normal measuring 2.99 cm.    IVC/SVC: Normal venous pressure at 3 mmHg.    Pericardium: There is no pericardial effusion.    Plan:  --pericardial drain pulled  --Holding all anticoagulation for now  --trend CBC--platelets improved, Hgb stable  --telemetry  --admitted to CCU for close monitoring

## 2024-01-20 NOTE — ASSESSMENT & PLAN NOTE
Following with CCU for post-ablation pericardial effusion  Pericardial drain pulled 01/19  Follow up TTE post drain removal shows trivial pericardial effusion. Bedside echo performed yesterday evening with stable effusion.  From EP standpoint can go home  Primary getting some volume off and likely home

## 2024-01-20 NOTE — PROGRESS NOTES
[facility] - Card[facility]ensive [facility]  Cardiology  Progress Note    Patient Name: Rajesh Mas  MRN: [id number]  Admission Date: 1/18/2024  Hospital Length of Stay: 2 days  Code Status: Full Code   Attending Physician: Aurora Morales MD   Primary Care Physician: Gail Villarreal MD  Expected Discharge Date: 1/20/2024  Principal Problem:Acute diastolic heart failure    Subjective:     Hospital Course:   73 year-old female that was admitted to CCU after RFA for PVCs done by EP and subsequently developed pericardial effusion with tamponade physiology that required emergent pericardiocentesis (250 cc drained). Patient had pericardial drain that was placed and monitored while in CCU. Echocardiogram on 1/18 after procedure showed no pericardial effusion. Pericardial drain with approximately 80 cc of total drainage of serosanguinous fluid. Pericardial drain with less than 5 cc's of drainage overnight and was pulled on 1/19/24 without complication. Repeat echo was done on AM of 1/19 with only trivial pericardial effusion, EF of 50-55%.    Patient with acute hypoxic resp failure from Left pleural effusion, and likely acute diastolic dysfunction. Upgraded to inpatient from observation. Treating for CAP.          Interval History: Patient feels somewhat improved after IV lasix 20. Repeat CXR with congestion and left pleural effusion despite lasix IV x2.    Review of Systems   Constitutional: Negative for chills and fever.   HENT: Negative.     Cardiovascular:  Negative for chest pain and leg swelling.   Respiratory:  Positive for shortness of breath (improving).    Gastrointestinal:  Negative for abdominal pain, constipation, diarrhea, nausea and vomiting.   Psychiatric/Behavioral:  Negative for altered mental status.      Objective:     Vital Signs (Most Recent):  Temp: 98.3 °F (36.8 °C) (01/20/24 1100)  Pulse: 85 (01/20/24 1100)  Resp: 20 (01/20/24 1100)  BP: 107/70 (01/20/24 1100)  SpO2: 95 % (01/20/24 1100) Vital  Signs (24h Range):  Temp:  [98.2 °F (36.8 °C)-98.7 °F (37.1 °C)] 98.3 °F (36.8 °C)  Pulse:  [] 85  Resp:  [16-29] 20  SpO2:  [91 %-96 %] 95 %  BP: ()/(58-74) 107/70     Weight: 78.9 kg (174 lb)  Body mass index is 32.88 kg/m².     SpO2: 95 %         Intake/Output Summary (Last 24 hours) at 1/20/2024 1303  Last data filed at 1/20/2024 0800  Gross per 24 hour   Intake 628 ml   Output 1500 ml   Net -872 ml       Lines/Drains/Airways       Peripheral Intravenous Line  Duration                  Peripheral IV - Single Lumen 01/18/24 0626 20 G Left Antecubital 2 days         Peripheral IV - Single Lumen 01/18/24 0626 20 G Right Forearm 2 days                       Physical Exam  Vitals reviewed.   Constitutional:       General: She is not in acute distress.     Appearance: She is not ill-appearing.   HENT:      Head: Normocephalic and atraumatic.      Right Ear: External ear normal.      Left Ear: External ear normal.      Nose: Nose normal.   Eyes:      General:         Right eye: No discharge.         Left eye: No discharge.      Conjunctiva/sclera: Conjunctivae normal.   Cardiovascular:      Rate and Rhythm: Normal rate and regular rhythm.      Pulses: Normal pulses.      Heart sounds: Normal heart sounds.      Comments: Pericardial drain pulled  Pulmonary:      Effort: Pulmonary effort is normal.      Comments: Decreased breath sounds in left lower lung field  On 2L nc  Abdominal:      General: Abdomen is flat. There is no distension.      Palpations: Abdomen is soft.      Tenderness: There is no abdominal tenderness.   Musculoskeletal:      Right lower leg: No edema.      Left lower leg: No edema.   Skin:     General: Skin is warm and dry.   Neurological:      Mental Status: She is alert and oriented to person, place, and time. Mental status is at baseline.   Psychiatric:         Mood and Affect: Mood normal.         Behavior: Behavior normal.            Significant Labs:  Recent Labs   Lab 01/19/24  5358  01/20/24  0447    135*   K 3.9 3.5    105   CO2 24 24   * 110   BUN 32* 28*   CREATININE 1.4 1.3   CALCIUM 8.8 9.0   PROT 6.7 6.9   ALBUMIN 3.1* 2.8*   BILITOT 1.0 0.9   ALKPHOS 46* 47*   AST 27 21   ALT 17 15   ANIONGAP 7* 6*     Recent Labs   Lab 01/19/24  0400 01/19/24  1310 01/20/24  0447   WBC 11.45 13.56* 15.06*   HGB 10.7* 10.5* 11.3*   HCT 32.4* 32.9* 35.4*   PLT 72* 110* 122*       Significant Imaging:  Imaging from past 24 hours reviewed  Assessment and Plan:         * Acute diastolic heart failure  Patient with evidence of congestion on chest-x ray and likely has element of acute diastolic dysfunction.    Plan:  -obtain BNP  -continue IV lasix 20mg daily  -strict ins and outs  -monitor CMP    Acute hypoxic respiratory failure  Patient with Hypoxic Respiratory failure which is Acute.  she is not on home oxygen. Supplemental oxygen was provided and noted-      .   Signs/symptoms of respiratory failure include-  increased oxygen requirement . Contributing diagnoses includes - Pleural effusion Labs and images were reviewed. Patient Has not had a recent ABG. Will treat underlying causes and adjust management of respiratory failure as follows-     Plan:  --wean oxygen as tolerated  --IV lasix 20 mg today  --strict ins and outs  --left pleural effusion noted on echo and chest x-ray--continue diuresis  --will cover for PNA with CAP coverage  --follow up BNP and procal    Pleural effusion, left  See acute hypoxic resp failure    History of gout  Patient notes she gets gout in her toe. Is on Allopurinol for prophylaxis. Patient notes she is also on prednisone    --will continue allopurinol  --monitor kidney function.    Pericardial effusion  See PVCs    --stable, repeat echos done today.    PVCs (premature ventricular contractions)  73 year-old that presented for frequent PVCs here for RFA with EP team. Status post RFA on 1/18/24. During procedure it was noted patient had new pericardial effusion  with tamponade physiology. Emergent pericardiocentesis done with drainage of approximately 250 cc of blood. Will admit to CCU service for further monitoring.    Stat Echo ordered on 1/18/24 with results shown below:    2D echo only.    Left Ventricle: The left ventricle is normal in size. Ventricular mass is normal. Normal wall thickness. Normal wall motion. There is low normal systolic function with a visually estimated ejection fraction of 50 - 55%. There is normal diastolic function.    Right Ventricle: Normal right ventricular cavity size. Wall thickness is normal. Right ventricle wall motion  is normal. Systolic function is normal.    Aortic Valve: The aortic valve is a trileaflet valve. There is mild aortic valve sclerosis. There is mild annular calcification present.    Aorta: Aortic root is normal in size measuring 2.20 cm. Ascending aorta is normal measuring 2.99 cm.    IVC/SVC: Normal venous pressure at 3 mmHg.    Pericardium: There is no pericardial effusion.    Plan:  --pericardial drain pulled  --Holding all anticoagulation for now  --trend CBC--platelets improved, Hgb stable  --telemetry  --admitted to CCU for close monitoring    Stage 3 chronic kidney disease  Baseline creatinine of 1.5 to 1.6    Will obtain daily CMP to monitor kidney function    Thrombocytopenia  Will obtain CBC daily  Baseline ranges from 127 to 150    Essential hypertension  Hold anti-hypertensive medication in setting of pericardial effusion and hypotension during procedure.        VTE Risk Mitigation (From admission, onward)           Ordered     Reason for no Mechanical VTE Prophylaxis  Once        Question:  Reasons:  Answer:  Physician Provided (leave comment)  Comment:  Holding in setting of pericardial effusion s/p drainage    01/18/24 1358     IP VTE HIGH RISK PATIENT  Once         01/18/24 1358     Place sequential compression device  Until discontinued         01/18/24 1358     heparin infusion 1,000 units/500 ml in 0.9%  NaCl (on sterile field)  As needed (PRN)         01/18/24 1013     heparin (porcine) 2,000 Units in sodium chloride 0.9% 2,000 mL  As needed (PRN)         01/18/24 1017                    Noel Ching MD  Cardiology  [facility] - [facility]

## 2024-01-20 NOTE — SUBJECTIVE & OBJECTIVE
Interval History: Patient feels somewhat improved after IV lasix 20. Repeat CXR with congestion and left pleural effusion despite lasix IV x2.    Review of Systems   Constitutional: Negative for chills and fever.   HENT: Negative.     Cardiovascular:  Negative for chest pain and leg swelling.   Respiratory:  Positive for shortness of breath (improving).    Gastrointestinal:  Negative for abdominal pain, constipation, diarrhea, nausea and vomiting.   Psychiatric/Behavioral:  Negative for altered mental status.      Objective:     Vital Signs (Most Recent):  Temp: 98.3 °F (36.8 °C) (01/20/24 1100)  Pulse: 85 (01/20/24 1100)  Resp: 20 (01/20/24 1100)  BP: 107/70 (01/20/24 1100)  SpO2: 95 % (01/20/24 1100) Vital Signs (24h Range):  Temp:  [98.2 °F (36.8 °C)-98.7 °F (37.1 °C)] 98.3 °F (36.8 °C)  Pulse:  [] 85  Resp:  [16-29] 20  SpO2:  [91 %-96 %] 95 %  BP: ()/(58-74) 107/70     Weight: 78.9 kg (174 lb)  Body mass index is 32.88 kg/m².     SpO2: 95 %         Intake/Output Summary (Last 24 hours) at 1/20/2024 1303  Last data filed at 1/20/2024 0800  Gross per 24 hour   Intake 628 ml   Output 1500 ml   Net -872 ml       Lines/Drains/Airways       Peripheral Intravenous Line  Duration                  Peripheral IV - Single Lumen 01/18/24 0626 20 G Left Antecubital 2 days         Peripheral IV - Single Lumen 01/18/24 0626 20 G Right Forearm 2 days                       Physical Exam  Vitals reviewed.   Constitutional:       General: She is not in acute distress.     Appearance: She is not ill-appearing.   HENT:      Head: Normocephalic and atraumatic.      Right Ear: External ear normal.      Left Ear: External ear normal.      Nose: Nose normal.   Eyes:      General:         Right eye: No discharge.         Left eye: No discharge.      Conjunctiva/sclera: Conjunctivae normal.   Cardiovascular:      Rate and Rhythm: Normal rate and regular rhythm.      Pulses: Normal pulses.      Heart sounds: Normal heart  sounds.      Comments: Pericardial drain pulled  Pulmonary:      Effort: Pulmonary effort is normal.      Comments: Decreased breath sounds in left lower lung field  On 2L nc  Abdominal:      General: Abdomen is flat. There is no distension.      Palpations: Abdomen is soft.      Tenderness: There is no abdominal tenderness.   Musculoskeletal:      Right lower leg: No edema.      Left lower leg: No edema.   Skin:     General: Skin is warm and dry.   Neurological:      Mental Status: She is alert and oriented to person, place, and time. Mental status is at baseline.   Psychiatric:         Mood and Affect: Mood normal.         Behavior: Behavior normal.            Significant Labs:  Recent Labs   Lab 01/19/24  0530 01/20/24  0447    135*   K 3.9 3.5    105   CO2 24 24   * 110   BUN 32* 28*   CREATININE 1.4 1.3   CALCIUM 8.8 9.0   PROT 6.7 6.9   ALBUMIN 3.1* 2.8*   BILITOT 1.0 0.9   ALKPHOS 46* 47*   AST 27 21   ALT 17 15   ANIONGAP 7* 6*     Recent Labs   Lab 01/19/24  0400 01/19/24  1310 01/20/24  0447   WBC 11.45 13.56* 15.06*   HGB 10.7* 10.5* 11.3*   HCT 32.4* 32.9* 35.4*   PLT 72* 110* 122*       Significant Imaging:  Imaging from past 24 hours reviewed

## 2024-01-20 NOTE — PLAN OF CARE
SW met with pt and spouse, Alfredo at bedside. Spouse is providing pt's transportation home when medically cleared for discharge. Discharge orders have been entered by MD. Danny Posada LMSW

## 2024-01-20 NOTE — ASSESSMENT & PLAN NOTE
Following with CCU for post-ablation pericardial effusion  Agree with pulling pericardial catheter due to 5cc drainage overnight  Follow up TTE post drain removal shows trivial pericardial effusion  From EP standpoint can go home today

## 2024-01-20 NOTE — PLAN OF CARE
DAILY GOALS     Family/Goals of care/Code Status   Code Status: Full Code    24H Vital Sign Range  Temp:  [98.2 °F (36.8 °C)-98.7 °F (37.1 °C)]   Pulse:  []   Resp:  [16-29]   BP: ()/(58-74)   SpO2:  [90 %-96 %]      Shift Events (include procedures and significant events)   No acute events throughout shift    AWAKE RASS: Goal - RASS Goal: 0-->alert and calm  Actual - RASS ([place] Agitation-Sedation Scale): alert and calm    Restraint necessity: Not necessary   BREATHE SBT: Not intubated    Coordinate A & B, analgesics/sedatives Pain: managed   SAT: Not intubated   Delirium CAM-ICU: Overall CAM-ICU: Negative   Early(intubated/ Progressive (non-intubated) Mobility MOVE Screen (INTUBATED ONLY): Not intubated    Activity: Activity Management: Rolling - L1   Feeding/Nutrition Diet order: Diet/Nutrition Received: regular,     Thrombus DVT prophylaxis: VTE Required Core Measure: Provider determined low risk VTE   HOB Elevation Head of Bed (HOB) Positioning: HOB elevated   Ulcer Prophylaxis GI: yes   Glucose control managed     Skin Skin assessed during: Q Shift Change    Sacrum intact/not altered? Yes  Heels intact/not altered? Yes  Surgical wound? Yes    Check one (no altered skin or altered skin) and sub boxes:  [] No Altered Skin Integrity Present    []Prevention Measures Documented    [] Altered Skin Integrity Present or Discovered   [] LDA present in EPIC              [] LDA added in EPIC   [] Wound Image Taken (required on admit,                   transfer/discharge and every Tuesday)    Wound Care Consulted? No    Attending Nurse:     Second RN/Staff Member:    Bowel Function no issues    Indwelling Catheter Necessity     N/A        De-escalation Antibiotics  N/A       VS and assessment per flow sheet, patient progressing towards goals as tolerated, plan of care reviewed with  Mrs. Mas , all concerns addressed, will continue to monitor.     Problem: Adult Inpatient Plan of Care  Goal: Plan of Care  Review  Outcome: Ongoing, Progressing  Goal: Readiness for Transition of Care  Outcome: Ongoing, Progressing

## 2024-01-20 NOTE — SUBJECTIVE & OBJECTIVE
Interval History: NAEON. Pericardial effusion trivial since pericardial drain removal. Feels fine today. No tele events.     Review of Systems   Constitutional: Negative for diaphoresis and fever.   Cardiovascular:  Negative for chest pain, dyspnea on exertion, leg swelling, near-syncope, orthopnea, palpitations, paroxysmal nocturnal dyspnea and syncope.   Respiratory:  Negative for cough and shortness of breath.    Gastrointestinal:  Negative for abdominal pain, diarrhea, nausea and vomiting.   Neurological:  Negative for light-headedness.   Psychiatric/Behavioral:  Negative for altered mental status and substance abuse.      Objective:     Vital Signs (Most Recent):  Temp: 98.5 °F (36.9 °C) (01/20/24 0301)  Pulse: 81 (01/20/24 0700)  Resp: 20 (01/20/24 0700)  BP: 109/67 (01/20/24 0700)  SpO2: 95 % (01/20/24 0700) Vital Signs (24h Range):  Temp:  [98.2 °F (36.8 °C)-98.7 °F (37.1 °C)] 98.5 °F (36.9 °C)  Pulse:  [] 81  Resp:  [16-29] 20  SpO2:  [90 %-96 %] 95 %  BP: ()/(58-74) 109/67     Weight: 78.9 kg (174 lb)  Body mass index is 32.88 kg/m².     SpO2: 95 %        Physical Exam  Constitutional:       Appearance: Normal appearance.   Cardiovascular:      Rate and Rhythm: Normal rate and regular rhythm.      Pulses: Normal pulses.   Pulmonary:      Effort: Pulmonary effort is normal. No respiratory distress.   Abdominal:      General: Abdomen is flat.      Palpations: Abdomen is soft.      Tenderness: There is no abdominal tenderness.   Musculoskeletal:      Right lower leg: No edema.      Left lower leg: No edema.   Skin:     General: Skin is warm and dry.   Neurological:      Mental Status: She is alert and oriented to person, place, and time. Mental status is at baseline.   Psychiatric:         Mood and Affect: Mood normal.         Behavior: Behavior normal.            Significant Labs: BMP:   Recent Labs   Lab 01/19/24  0530 01/20/24  0447   * 110    135*   K 3.9 3.5    105   CO2 24  "24   BUN 32* 28*   CREATININE 1.4 1.3   CALCIUM 8.8 9.0   MG  --  2.1   , CMP:   Recent Labs   Lab 01/19/24  0530 01/20/24  0447    135*   K 3.9 3.5    105   CO2 24 24   * 110   BUN 32* 28*   CREATININE 1.4 1.3   CALCIUM 8.8 9.0   PROT 6.7 6.9   ALBUMIN 3.1* 2.8*   BILITOT 1.0 0.9   ALKPHOS 46* 47*   AST 27 21   ALT 17 15   ANIONGAP 7* 6*   , CBC:   Recent Labs   Lab 01/19/24  0400 01/19/24  1310 01/20/24  0447   WBC 11.45 13.56* 15.06*   HGB 10.7* 10.5* 11.3*   HCT 32.4* 32.9* 35.4*   PLT 72* 110* 122*   , and INR: No results for input(s): "INR", "PROTIME" in the last 48 hours.      "

## 2024-01-21 LAB
ALBUMIN SERPL BCP-MCNC: 3 G/DL (ref 3.5–5.2)
ALP SERPL-CCNC: 62 U/L (ref 55–135)
ALT SERPL W/O P-5'-P-CCNC: 19 U/L (ref 10–44)
ANION GAP SERPL CALC-SCNC: 10 MMOL/L (ref 8–16)
AST SERPL-CCNC: 26 U/L (ref 10–40)
BASOPHILS # BLD AUTO: 0.04 K/UL (ref 0–0.2)
BASOPHILS NFR BLD: 0.3 % (ref 0–1.9)
BILIRUB SERPL-MCNC: 0.9 MG/DL (ref 0.1–1)
BUN SERPL-MCNC: 38 MG/DL (ref 8–23)
CALCIUM SERPL-MCNC: 9.6 MG/DL (ref 8.7–10.5)
CHLORIDE SERPL-SCNC: 107 MMOL/L (ref 95–110)
CO2 SERPL-SCNC: 25 MMOL/L (ref 23–29)
CREAT SERPL-MCNC: 1.5 MG/DL (ref 0.5–1.4)
DIFFERENTIAL METHOD BLD: ABNORMAL
EOSINOPHIL # BLD AUTO: 0.1 K/UL (ref 0–0.5)
EOSINOPHIL NFR BLD: 0.3 % (ref 0–8)
ERYTHROCYTE [DISTWIDTH] IN BLOOD BY AUTOMATED COUNT: 14.4 % (ref 11.5–14.5)
EST. GFR  (NO RACE VARIABLE): 37 ML/MIN/1.73 M^2
GLUCOSE SERPL-MCNC: 121 MG/DL (ref 70–110)
HCT VFR BLD AUTO: 36.5 % (ref 37–48.5)
HGB BLD-MCNC: 11.8 G/DL (ref 12–16)
IMM GRANULOCYTES # BLD AUTO: 0.08 K/UL (ref 0–0.04)
IMM GRANULOCYTES NFR BLD AUTO: 0.5 % (ref 0–0.5)
LYMPHOCYTES # BLD AUTO: 1.1 K/UL (ref 1–4.8)
LYMPHOCYTES NFR BLD: 7.4 % (ref 18–48)
MAGNESIUM SERPL-MCNC: 2.2 MG/DL (ref 1.6–2.6)
MCH RBC QN AUTO: 30.1 PG (ref 27–31)
MCHC RBC AUTO-ENTMCNC: 32.3 G/DL (ref 32–36)
MCV RBC AUTO: 93 FL (ref 82–98)
MONOCYTES # BLD AUTO: 1.2 K/UL (ref 0.3–1)
MONOCYTES NFR BLD: 7.6 % (ref 4–15)
NEUTROPHILS # BLD AUTO: 12.6 K/UL (ref 1.8–7.7)
NEUTROPHILS NFR BLD: 83.9 % (ref 38–73)
NRBC BLD-RTO: 0 /100 WBC
PLATELET # BLD AUTO: 124 K/UL (ref 150–450)
PMV BLD AUTO: 14.4 FL (ref 9.2–12.9)
POTASSIUM SERPL-SCNC: 4.7 MMOL/L (ref 3.5–5.1)
PROT SERPL-MCNC: 7.8 G/DL (ref 6–8.4)
RBC # BLD AUTO: 3.92 M/UL (ref 4–5.4)
SODIUM SERPL-SCNC: 142 MMOL/L (ref 136–145)
WBC # BLD AUTO: 15.08 K/UL (ref 3.9–12.7)

## 2024-01-21 PROCEDURE — 25000003 PHARM REV CODE 250: Performed by: STUDENT IN AN ORGANIZED HEALTH CARE EDUCATION/TRAINING PROGRAM

## 2024-01-21 PROCEDURE — 63700000 PHARM REV CODE 250 ALT 637 W/O HCPCS

## 2024-01-21 PROCEDURE — 83735 ASSAY OF MAGNESIUM: CPT

## 2024-01-21 PROCEDURE — 20600001 HC STEP DOWN PRIVATE ROOM

## 2024-01-21 PROCEDURE — 80053 COMPREHEN METABOLIC PANEL: CPT

## 2024-01-21 PROCEDURE — 25000003 PHARM REV CODE 250: Performed by: INTERNAL MEDICINE

## 2024-01-21 PROCEDURE — 63600175 PHARM REV CODE 636 W HCPCS

## 2024-01-21 PROCEDURE — 99231 SBSQ HOSP IP/OBS SF/LOW 25: CPT | Mod: GC,,, | Performed by: INTERNAL MEDICINE

## 2024-01-21 PROCEDURE — 25000003 PHARM REV CODE 250

## 2024-01-21 PROCEDURE — 99233 SBSQ HOSP IP/OBS HIGH 50: CPT | Mod: ,,, | Performed by: STUDENT IN AN ORGANIZED HEALTH CARE EDUCATION/TRAINING PROGRAM

## 2024-01-21 PROCEDURE — 85025 COMPLETE CBC W/AUTO DIFF WBC: CPT

## 2024-01-21 PROCEDURE — 21400001 HC TELEMETRY ROOM

## 2024-01-21 RX ADMIN — ASPIRIN 81 MG: 81 TABLET, COATED ORAL at 09:01

## 2024-01-21 RX ADMIN — FUROSEMIDE 20 MG: 10 INJECTION, SOLUTION INTRAMUSCULAR; INTRAVENOUS at 08:01

## 2024-01-21 RX ADMIN — AZITHROMYCIN 500 MG: 250 TABLET, FILM COATED ORAL at 08:01

## 2024-01-21 RX ADMIN — ALLOPURINOL 300 MG: 300 TABLET ORAL at 08:01

## 2024-01-21 RX ADMIN — OXYBUTYNIN CHLORIDE 5 MG: 5 TABLET ORAL at 08:01

## 2024-01-21 RX ADMIN — CEFTRIAXONE 1 G: 1 INJECTION, POWDER, FOR SOLUTION INTRAMUSCULAR; INTRAVENOUS at 01:01

## 2024-01-21 RX ADMIN — POTASSIUM CHLORIDE 40 MEQ: 1500 TABLET, EXTENDED RELEASE ORAL at 08:01

## 2024-01-21 RX ADMIN — OXYBUTYNIN CHLORIDE 5 MG: 5 TABLET ORAL at 09:01

## 2024-01-21 RX ADMIN — ACETAMINOPHEN 650 MG: 325 TABLET ORAL at 09:01

## 2024-01-21 NOTE — PLAN OF CARE
Problem: Adult Inpatient Plan of Care  Goal: Plan of Care Review  Outcome: Ongoing, Progressing  Goal: Patient-Specific Goal (Individualized)  Outcome: Ongoing, Progressing  Goal: Optimal Comfort and Wellbeing  Outcome: Ongoing, Progressing     Problem: Infection  Goal: Absence of Infection Signs and Symptoms  Outcome: Ongoing, Progressing     AAOX4,VSS,O2 sats > 92% on 2L NC. Plan of care discussed with patient. Patient has no complaints of chest pain/SOB/palpitations. Pt ambulating  with assist standby, fall precautions in place,no falls/injuries through the shift.Discussed medications and care.Patient has no questions at this time.Pt resting comfortably with no acute distress.Call light within reach,bed at lowest position.

## 2024-01-21 NOTE — NURSING
Patient transferred to CSU. Patient arrived to floor from CICU no evidence of distress; patient AAO x4 at this time. Patient placed on tele. Vital signs obtained. O2 at 2L NC. Patient voices no complaints at this time. Plan of care initiated with patient. Bed in lowest position, locked, SR up x2, call bell in reach. Will continue to monitor patient.

## 2024-01-22 ENCOUNTER — PATIENT MESSAGE (OUTPATIENT)
Dept: INTERNAL MEDICINE | Facility: CLINIC | Age: 74
End: 2024-01-22
Payer: MEDICARE

## 2024-01-22 ENCOUNTER — PATIENT MESSAGE (OUTPATIENT)
Dept: RHEUMATOLOGY | Facility: CLINIC | Age: 74
End: 2024-01-22
Payer: MEDICARE

## 2024-01-22 ENCOUNTER — PATIENT MESSAGE (OUTPATIENT)
Dept: CARDIOLOGY | Facility: CLINIC | Age: 74
End: 2024-01-22
Payer: MEDICARE

## 2024-01-22 PROBLEM — I48.91 ATRIAL FIBRILLATION: Status: ACTIVE | Noted: 2024-01-22

## 2024-01-22 LAB
ALBUMIN SERPL BCP-MCNC: 2.7 G/DL (ref 3.5–5.2)
ALP SERPL-CCNC: 61 U/L (ref 55–135)
ALT SERPL W/O P-5'-P-CCNC: 24 U/L (ref 10–44)
ANION GAP SERPL CALC-SCNC: 10 MMOL/L (ref 8–16)
ASCENDING AORTA: 2.73 CM
AST SERPL-CCNC: 31 U/L (ref 10–40)
BACTERIA SPEC AEROBE CULT: NORMAL
BACTERIA SPEC AEROBE CULT: NORMAL
BASOPHILS # BLD AUTO: 0.04 K/UL (ref 0–0.2)
BASOPHILS NFR BLD: 0.3 % (ref 0–1.9)
BILIRUB SERPL-MCNC: 0.7 MG/DL (ref 0.1–1)
BSA FOR ECHO PROCEDURE: 1.8 M2
BUN SERPL-MCNC: 50 MG/DL (ref 8–23)
CALCIUM SERPL-MCNC: 9.2 MG/DL (ref 8.7–10.5)
CHLORIDE SERPL-SCNC: 110 MMOL/L (ref 95–110)
CO2 SERPL-SCNC: 24 MMOL/L (ref 23–29)
CREAT SERPL-MCNC: 1.7 MG/DL (ref 0.5–1.4)
CV ECHO LV RWT: 0.53 CM
DIFFERENTIAL METHOD BLD: ABNORMAL
DOP CALC LVOT AREA: 3 CM2
DOP CALC LVOT DIAMETER: 1.95 CM
DOP CALC LVOT PEAK VEL: 0.82 M/S
DOP CALC LVOT STROKE VOLUME: 34.03 CM3
DOP CALCLVOT PEAK VEL VTI: 11.4 CM
E/E' RATIO: 15.08 M/S
ECHO LV POSTERIOR WALL: 1.03 CM (ref 0.6–1.1)
EJECTION FRACTION: 35 %
EOSINOPHIL # BLD AUTO: 0.3 K/UL (ref 0–0.5)
EOSINOPHIL NFR BLD: 2.4 % (ref 0–8)
ERYTHROCYTE [DISTWIDTH] IN BLOOD BY AUTOMATED COUNT: 14.5 % (ref 11.5–14.5)
EST. GFR  (NO RACE VARIABLE): 31.9 ML/MIN/1.73 M^2
FRACTIONAL SHORTENING: 27 % (ref 28–44)
GLUCOSE SERPL-MCNC: 89 MG/DL (ref 70–110)
GRAM STN SPEC: NORMAL
HCT VFR BLD AUTO: 35.6 % (ref 37–48.5)
HGB BLD-MCNC: 11 G/DL (ref 12–16)
IMM GRANULOCYTES # BLD AUTO: 0.05 K/UL (ref 0–0.04)
IMM GRANULOCYTES NFR BLD AUTO: 0.4 % (ref 0–0.5)
INTERVENTRICULAR SEPTUM: 0.59 CM (ref 0.6–1.1)
IVRT: 51.38 MSEC
LA MAJOR: 6.16 CM
LA MINOR: 6.17 CM
LA WIDTH: 3.55 CM
LEFT ATRIUM SIZE: 3.27 CM
LEFT ATRIUM VOLUME INDEX MOD: 25.8 ML/M2
LEFT ATRIUM VOLUME INDEX: 34.8 ML/M2
LEFT ATRIUM VOLUME MOD: 45.18 CM3
LEFT ATRIUM VOLUME: 60.83 CM3
LEFT INTERNAL DIMENSION IN SYSTOLE: 2.84 CM (ref 2.1–4)
LEFT VENTRICLE DIASTOLIC VOLUME INDEX: 37.69 ML/M2
LEFT VENTRICLE DIASTOLIC VOLUME: 65.95 ML
LEFT VENTRICLE MASS INDEX: 52 G/M2
LEFT VENTRICLE SYSTOLIC VOLUME INDEX: 17.4 ML/M2
LEFT VENTRICLE SYSTOLIC VOLUME: 30.49 ML
LEFT VENTRICULAR INTERNAL DIMENSION IN DIASTOLE: 3.9 CM (ref 3.5–6)
LEFT VENTRICULAR MASS: 91.19 G
LV LATERAL E/E' RATIO: 16.33 M/S
LV SEPTAL E/E' RATIO: 14 M/S
LYMPHOCYTES # BLD AUTO: 1.7 K/UL (ref 1–4.8)
LYMPHOCYTES NFR BLD: 14.3 % (ref 18–48)
MAGNESIUM SERPL-MCNC: 2.4 MG/DL (ref 1.6–2.6)
MCH RBC QN AUTO: 29.6 PG (ref 27–31)
MCHC RBC AUTO-ENTMCNC: 30.9 G/DL (ref 32–36)
MCV RBC AUTO: 96 FL (ref 82–98)
MONOCYTES # BLD AUTO: 1.1 K/UL (ref 0.3–1)
MONOCYTES NFR BLD: 9.4 % (ref 4–15)
MV PEAK E VEL: 0.98 M/S
NEUTROPHILS # BLD AUTO: 8.9 K/UL (ref 1.8–7.7)
NEUTROPHILS NFR BLD: 73.2 % (ref 38–73)
NRBC BLD-RTO: 0 /100 WBC
PISA TR MAX VEL: 2.06 M/S
PLATELET # BLD AUTO: 144 K/UL (ref 150–450)
PMV BLD AUTO: 14 FL (ref 9.2–12.9)
POCT GLUCOSE: 112 MG/DL (ref 70–110)
POCT GLUCOSE: 127 MG/DL (ref 70–110)
POCT GLUCOSE: 87 MG/DL (ref 70–110)
POTASSIUM SERPL-SCNC: 4.9 MMOL/L (ref 3.5–5.1)
PROT SERPL-MCNC: 7.1 G/DL (ref 6–8.4)
RA MAJOR: 5.58 CM
RA PRESSURE ESTIMATED: 3 MMHG
RA WIDTH: 3.81 CM
RBC # BLD AUTO: 3.72 M/UL (ref 4–5.4)
RIGHT VENTRICULAR END-DIASTOLIC DIMENSION: 3.1 CM
RV TB RVSP: 5 MMHG
SINUS: 2.59 CM
SODIUM SERPL-SCNC: 144 MMOL/L (ref 136–145)
STJ: 2.44 CM
TDI LATERAL: 0.06 M/S
TDI SEPTAL: 0.07 M/S
TDI: 0.07 M/S
TR MAX PG: 17 MMHG
TRICUSPID ANNULAR PLANE SYSTOLIC EXCURSION: 1.04 CM
TV REST PULMONARY ARTERY PRESSURE: 20 MMHG
WBC # BLD AUTO: 12.13 K/UL (ref 3.9–12.7)
Z-SCORE OF LEFT VENTRICULAR DIMENSION IN END DIASTOLE: -2.16
Z-SCORE OF LEFT VENTRICULAR DIMENSION IN END SYSTOLE: -0.42

## 2024-01-22 PROCEDURE — 25000003 PHARM REV CODE 250: Performed by: STUDENT IN AN ORGANIZED HEALTH CARE EDUCATION/TRAINING PROGRAM

## 2024-01-22 PROCEDURE — 36415 COLL VENOUS BLD VENIPUNCTURE: CPT

## 2024-01-22 PROCEDURE — 99233 SBSQ HOSP IP/OBS HIGH 50: CPT | Mod: ,,, | Performed by: STUDENT IN AN ORGANIZED HEALTH CARE EDUCATION/TRAINING PROGRAM

## 2024-01-22 PROCEDURE — 63600175 PHARM REV CODE 636 W HCPCS: Performed by: STUDENT IN AN ORGANIZED HEALTH CARE EDUCATION/TRAINING PROGRAM

## 2024-01-22 PROCEDURE — 25000003 PHARM REV CODE 250

## 2024-01-22 PROCEDURE — 21400001 HC TELEMETRY ROOM

## 2024-01-22 PROCEDURE — 63700000 PHARM REV CODE 250 ALT 637 W/O HCPCS

## 2024-01-22 PROCEDURE — 99233 SBSQ HOSP IP/OBS HIGH 50: CPT | Mod: GC,,, | Performed by: INTERNAL MEDICINE

## 2024-01-22 PROCEDURE — 85025 COMPLETE CBC W/AUTO DIFF WBC: CPT

## 2024-01-22 PROCEDURE — 63600175 PHARM REV CODE 636 W HCPCS

## 2024-01-22 PROCEDURE — 20600001 HC STEP DOWN PRIVATE ROOM

## 2024-01-22 PROCEDURE — 93010 ELECTROCARDIOGRAM REPORT: CPT | Mod: ,,, | Performed by: INTERNAL MEDICINE

## 2024-01-22 PROCEDURE — 25000003 PHARM REV CODE 250: Performed by: INTERNAL MEDICINE

## 2024-01-22 PROCEDURE — 80053 COMPREHEN METABOLIC PANEL: CPT

## 2024-01-22 PROCEDURE — 83735 ASSAY OF MAGNESIUM: CPT

## 2024-01-22 PROCEDURE — 93005 ELECTROCARDIOGRAM TRACING: CPT

## 2024-01-22 RX ORDER — TRIAMTERENE AND HYDROCHLOROTHIAZIDE 37.5; 25 MG/1; MG/1
1 CAPSULE ORAL EVERY MORNING
Qty: 90 CAPSULE | Refills: 3 | Status: SHIPPED | OUTPATIENT
Start: 2024-01-22 | End: 2024-01-23

## 2024-01-22 RX ORDER — LOSARTAN POTASSIUM 100 MG/1
100 TABLET ORAL DAILY
Qty: 90 TABLET | Refills: 3 | Status: SHIPPED | OUTPATIENT
Start: 2024-01-22 | End: 2024-01-23

## 2024-01-22 RX ORDER — ASPIRIN 81 MG/1
81 TABLET ORAL DAILY
Qty: 30 TABLET | Refills: 0 | Status: SHIPPED | OUTPATIENT
Start: 2024-01-22 | End: 2024-05-03

## 2024-01-22 RX ORDER — ENOXAPARIN SODIUM 100 MG/ML
75 INJECTION SUBCUTANEOUS
Status: DISCONTINUED | OUTPATIENT
Start: 2024-01-22 | End: 2024-01-23

## 2024-01-22 RX ORDER — DILTIAZEM HYDROCHLORIDE 5 MG/ML
10 INJECTION INTRAVENOUS ONCE
Status: COMPLETED | OUTPATIENT
Start: 2024-01-22 | End: 2024-01-22

## 2024-01-22 RX ORDER — DILTIAZEM HCL/D5W 125 MG/125
10 PLASTIC BAG, INJECTION (ML) INTRAVENOUS CONTINUOUS
Status: DISCONTINUED | OUTPATIENT
Start: 2024-01-22 | End: 2024-01-23

## 2024-01-22 RX ADMIN — ACETAMINOPHEN 650 MG: 325 TABLET ORAL at 08:01

## 2024-01-22 RX ADMIN — CEFTRIAXONE 1 G: 1 INJECTION, POWDER, FOR SOLUTION INTRAMUSCULAR; INTRAVENOUS at 03:01

## 2024-01-22 RX ADMIN — AZITHROMYCIN 500 MG: 250 TABLET, FILM COATED ORAL at 08:01

## 2024-01-22 RX ADMIN — ALLOPURINOL 300 MG: 300 TABLET ORAL at 08:01

## 2024-01-22 RX ADMIN — OXYBUTYNIN CHLORIDE 5 MG: 5 TABLET ORAL at 08:01

## 2024-01-22 RX ADMIN — FUROSEMIDE 20 MG: 10 INJECTION, SOLUTION INTRAMUSCULAR; INTRAVENOUS at 08:01

## 2024-01-22 RX ADMIN — ENOXAPARIN SODIUM 80 MG: 80 INJECTION SUBCUTANEOUS at 05:01

## 2024-01-22 RX ADMIN — DILTIAZEM HYDROCHLORIDE 10 MG: 5 INJECTION INTRAVENOUS at 10:01

## 2024-01-22 RX ADMIN — DILTIAZEM HYDROCHLORIDE 10 MG/HR: 5 INJECTION INTRAVENOUS at 11:01

## 2024-01-22 RX ADMIN — ASPIRIN 81 MG: 81 TABLET, COATED ORAL at 08:01

## 2024-01-22 RX ADMIN — DILTIAZEM HYDROCHLORIDE 5 MG/HR: 5 INJECTION INTRAVENOUS at 11:01

## 2024-01-22 NOTE — ASSESSMENT & PLAN NOTE
Patient with evidence of congestion on chest-x ray and likely has element of acute diastolic dysfunction. Echo results from today below.  Suspect decreased EF is from high heart rate.    Echo from 1/22/24    Irregular rhyhm with tachycardia up to 138 bpm.    Left Ventricle: The left ventricle is normal in size. Normal wall thickness. Normal wall motion. There is moderately reduced systolic function. Ejection fraction by visual approximation is 35%. Unable to assess diastolic function due to atrial fibrillation.    Right Ventricle: Normal right ventricular cavity size. Wall thickness is normal. Right ventricle wall motion  is normal. Systolic function is normal.    Pulmonary Artery: The estimated pulmonary artery systolic pressure is 20 mmHg.    IVC/SVC: Normal venous pressure at 3 mmHg.    Plan:  -will hold lasix today  -volume status improving  -strict ins and outs  -monitor CMP

## 2024-01-22 NOTE — SUBJECTIVE & OBJECTIVE
Interval History: doing well this morning. Continue current diuresis. Plan to DC tomorrow     Review of Systems   Constitutional: Negative for chills and fever.   HENT: Negative.     Cardiovascular:  Negative for chest pain and leg swelling.   Respiratory:  Positive for shortness of breath (improving).    Gastrointestinal:  Negative for abdominal pain, constipation, diarrhea, nausea and vomiting.   Psychiatric/Behavioral:  Negative for altered mental status.      Objective:     Vital Signs (Most Recent):  Temp: 98 °F (36.7 °C) (01/21/24 1512)  Pulse: 89 (01/21/24 1512)  Resp: 18 (01/21/24 1512)  BP: 106/62 (01/21/24 1512)  SpO2: 95 % (01/21/24 1512) Vital Signs (24h Range):  Temp:  [98 °F (36.7 °C)-99.9 °F (37.7 °C)] 98 °F (36.7 °C)  Pulse:  [] 89  Resp:  [17-18] 18  SpO2:  [92 %-97 %] 95 %  BP: ()/(57-77) 106/62     Weight: 75.7 kg (166 lb 14.2 oz)  Body mass index is 31.53 kg/m².     SpO2: 95 %         Intake/Output Summary (Last 24 hours) at 1/21/2024 1806  Last data filed at 1/21/2024 0800  Gross per 24 hour   Intake 240 ml   Output 450 ml   Net -210 ml       Lines/Drains/Airways       Peripheral Intravenous Line  Duration                  Peripheral IV - Single Lumen 01/18/24 0626 20 G Right Forearm 3 days         Peripheral IV - Single Lumen 01/20/24 1430 18 G Anterior;Left Forearm 1 day                       Physical Exam  Vitals reviewed.   Constitutional:       General: She is not in acute distress.     Appearance: She is not ill-appearing.   HENT:      Head: Normocephalic and atraumatic.      Right Ear: External ear normal.      Left Ear: External ear normal.      Nose: Nose normal.   Eyes:      General:         Right eye: No discharge.         Left eye: No discharge.      Conjunctiva/sclera: Conjunctivae normal.   Cardiovascular:      Rate and Rhythm: Normal rate and regular rhythm.      Pulses: Normal pulses.      Heart sounds: Normal heart sounds.   Pulmonary:      Effort: Pulmonary effort  is normal.      Comments: Decreased breath sounds in left lower lung field  On 2L nc  Abdominal:      General: Abdomen is flat. There is no distension.      Palpations: Abdomen is soft.      Tenderness: There is no abdominal tenderness.   Musculoskeletal:      Right lower leg: No edema.      Left lower leg: No edema.   Skin:     General: Skin is warm and dry.   Neurological:      Mental Status: She is alert and oriented to person, place, and time. Mental status is at baseline.   Psychiatric:         Mood and Affect: Mood normal.         Behavior: Behavior normal.            Significant Labs: All pertinent lab results from the last 24 hours have been reviewed.

## 2024-01-22 NOTE — SUBJECTIVE & OBJECTIVE
Interval History: NAEON. Pericardial effusion trivial since pericardial drain removal. Feels fine today. No tele events.     Review of Systems   Constitutional: Negative for diaphoresis and fever.   Cardiovascular:  Negative for chest pain, dyspnea on exertion, leg swelling, near-syncope, orthopnea, palpitations, paroxysmal nocturnal dyspnea and syncope.   Respiratory:  Negative for cough and shortness of breath.    Gastrointestinal:  Negative for abdominal pain, diarrhea, nausea and vomiting.   Neurological:  Negative for light-headedness.   Psychiatric/Behavioral:  Negative for altered mental status and substance abuse.      Objective:     Vital Signs (Most Recent):  Temp: 98 °F (36.7 °C) (01/22/24 0803)  Pulse: 81 (01/22/24 0803)  Resp: 20 (01/22/24 0803)  BP: 105/68 (01/22/24 0803)  SpO2: 96 % (01/22/24 0803) Vital Signs (24h Range):  Temp:  [97.3 °F (36.3 °C)-99.2 °F (37.3 °C)] 98 °F (36.7 °C)  Pulse:  [] 81  Resp:  [16-20] 20  SpO2:  [91 %-96 %] 96 %  BP: ()/(57-68) 105/68     Weight: 75.4 kg (166 lb 3.6 oz)  Body mass index is 31.41 kg/m².     SpO2: 96 %        Physical Exam  Constitutional:       Appearance: Normal appearance.   Cardiovascular:      Rate and Rhythm: Normal rate and regular rhythm.      Pulses: Normal pulses.   Pulmonary:      Effort: Pulmonary effort is normal. No respiratory distress.   Abdominal:      General: Abdomen is flat.      Palpations: Abdomen is soft.      Tenderness: There is no abdominal tenderness.   Musculoskeletal:      Right lower leg: No edema.      Left lower leg: No edema.   Skin:     General: Skin is warm and dry.   Neurological:      Mental Status: She is alert and oriented to person, place, and time. Mental status is at baseline.   Psychiatric:         Mood and Affect: Mood normal.         Behavior: Behavior normal.            Significant Labs: BMP:   Recent Labs   Lab 01/20/24  1338 01/21/24  1004 01/22/24  0438   * 121* 89    142 144   K 3.4*  "4.7 4.9    107 110   CO2 24 25 24   BUN 32* 38* 50*   CREATININE 1.5* 1.5* 1.7*   CALCIUM 9.5 9.6 9.2   MG  --  2.2 2.4     , CMP:   Recent Labs   Lab 01/20/24  1338 01/21/24  1004 01/22/24  0438    142 144   K 3.4* 4.7 4.9    107 110   CO2 24 25 24   * 121* 89   BUN 32* 38* 50*   CREATININE 1.5* 1.5* 1.7*   CALCIUM 9.5 9.6 9.2   PROT  --  7.8 7.1   ALBUMIN  --  3.0* 2.7*   BILITOT  --  0.9 0.7   ALKPHOS  --  62 61   AST  --  26 31   ALT  --  19 24   ANIONGAP 8 10 10     , CBC:   Recent Labs   Lab 01/21/24  1004 01/22/24  0438   WBC 15.08* 12.13   HGB 11.8* 11.0*   HCT 36.5* 35.6*   * 144*     , and INR: No results for input(s): "INR", "PROTIME" in the last 48 hours.      "

## 2024-01-22 NOTE — NURSING
Nurses Note -- 4 Eyes      1/22/2024   4:24 PM      Skin assessed during: Daily Assessment      [x] No Altered Skin Integrity Present    []Prevention Measures Documented      [] Yes- Altered Skin Integrity Present or Discovered   [] LDA Added if Not in Epic (Describe Wound)   [] New Altered Skin Integrity was Present on Admit and Documented in LDA   [] Wound Image Taken    Wound Care Consulted? No    Attending Nurse:  Avelino Bowen RN/Staff Member: Claudette,RN

## 2024-01-22 NOTE — PROGRESS NOTES
[facility] - Cardiology Stepdown  Cardiac Electrophysiology  Progress Note    Admission Date: 1/18/2024  Code Status: Full Code   Attending Physician: Aurora Morales MD   Expected Discharge Date: 1/21/2024  Principal Problem:Acute diastolic heart failure    Subjective:     Interval History: NAEON. Pericardial effusion trivial since pericardial drain removal. Feels fine today. No tele events.     Review of Systems   Constitutional: Negative for diaphoresis and fever.   Cardiovascular:  Negative for chest pain, dyspnea on exertion, leg swelling, near-syncope, orthopnea, palpitations, paroxysmal nocturnal dyspnea and syncope.   Respiratory:  Negative for cough and shortness of breath.    Gastrointestinal:  Negative for abdominal pain, diarrhea, nausea and vomiting.   Neurological:  Negative for light-headedness.   Psychiatric/Behavioral:  Negative for altered mental status and substance abuse.      Objective:     Vital Signs (Most Recent):  Temp: 97.3 °F (36.3 °C) (01/21/24 2100)  Pulse: 83 (01/21/24 2100)  Resp: 18 (01/21/24 2100)  BP: 103/60 (01/21/24 2100)  SpO2: 96 % (01/21/24 2100) Vital Signs (24h Range):  Temp:  [97.3 °F (36.3 °C)-99.9 °F (37.7 °C)] 97.3 °F (36.3 °C)  Pulse:  [] 83  Resp:  [17-18] 18  SpO2:  [92 %-97 %] 96 %  BP: ()/(57-72) 103/60     Weight: 75.7 kg (166 lb 14.2 oz)  Body mass index is 31.53 kg/m².     SpO2: 96 %        Physical Exam  Constitutional:       Appearance: Normal appearance.   Cardiovascular:      Rate and Rhythm: Normal rate and regular rhythm.      Pulses: Normal pulses.   Pulmonary:      Effort: Pulmonary effort is normal. No respiratory distress.   Abdominal:      General: Abdomen is flat.      Palpations: Abdomen is soft.      Tenderness: There is no abdominal tenderness.   Musculoskeletal:      Right lower leg: No edema.      Left lower leg: No edema.   Skin:     General: Skin is warm and dry.   Neurological:      Mental Status: She is alert and oriented to  "person, place, and time. Mental status is at baseline.   Psychiatric:         Mood and Affect: Mood normal.         Behavior: Behavior normal.            Significant Labs: BMP:   Recent Labs   Lab 01/20/24  0447 01/20/24  1338 01/21/24  1004    133* 121*   * 138 142   K 3.5 3.4* 4.7    106 107   CO2 24 24 25   BUN 28* 32* 38*   CREATININE 1.3 1.5* 1.5*   CALCIUM 9.0 9.5 9.6   MG 2.1  --  2.2     , CMP:   Recent Labs   Lab 01/20/24  0447 01/20/24  1338 01/21/24  1004   * 138 142   K 3.5 3.4* 4.7    106 107   CO2 24 24 25    133* 121*   BUN 28* 32* 38*   CREATININE 1.3 1.5* 1.5*   CALCIUM 9.0 9.5 9.6   PROT 6.9  --  7.8   ALBUMIN 2.8*  --  3.0*   BILITOT 0.9  --  0.9   ALKPHOS 47*  --  62   AST 21  --  26   ALT 15  --  19   ANIONGAP 6* 8 10     , CBC:   Recent Labs   Lab 01/20/24  0447 01/21/24  1004   WBC 15.06* 15.08*   HGB 11.3* 11.8*   HCT 35.4* 36.5*   * 124*     , and INR: No results for input(s): "INR", "PROTIME" in the last 48 hours.      Assessment and Plan:     Pericardial effusion  Following with CCU for post-ablation pericardial effusion  Pericardial drain pulled 01/19  Follow up TTE post drain removal shows trivial pericardial effusion. Bedside echo performed yesterday evening with stable effusion.  From EP standpoint can go home  Primary getting some volume off and likely home      PVCs (premature ventricular contractions)  S/p RFA on 1/19  - ASA 81mg [id number].         Connor M Gillies, MD  Cardiac Electrophysiology  [facility] - Cardiology Stepdown    "

## 2024-01-22 NOTE — SUBJECTIVE & OBJECTIVE
Interval History: Patient felt well this AM. Tolerating diet. No N/V. Got call during rounds that patient developed Afib with RVR. Started on dilt gtt.    Review of Systems   Constitutional: Negative for chills and fever.   HENT: Negative.     Cardiovascular:  Negative for chest pain and leg swelling.   Respiratory:  Positive for shortness of breath (improving).    Gastrointestinal:  Negative for abdominal pain, constipation, diarrhea, nausea and vomiting.   Psychiatric/Behavioral:  Negative for altered mental status.      Objective:     Vital Signs (Most Recent):  Temp: 98.3 °F (36.8 °C) (01/22/24 1116)  Pulse: (!) 140 (01/22/24 1116)  Resp: 20 (01/22/24 1116)  BP: 104/65 (01/22/24 1116)  SpO2: 96 % (01/22/24 1116) Vital Signs (24h Range):  Temp:  [97.3 °F (36.3 °C)-98.3 °F (36.8 °C)] 98.3 °F (36.8 °C)  Pulse:  [] 140  Resp:  [16-20] 20  SpO2:  [91 %-96 %] 96 %  BP: ()/(52-86) 104/65     Weight: 75.4 kg (166 lb 3.6 oz)  Body mass index is 31.41 kg/m².     SpO2: 96 %         Intake/Output Summary (Last 24 hours) at 1/22/2024 1130  Last data filed at 1/22/2024 1118  Gross per 24 hour   Intake 680 ml   Output 950 ml   Net -270 ml       Lines/Drains/Airways       Peripheral Intravenous Line  Duration                  Peripheral IV - Single Lumen 01/18/24 0626 20 G Right Forearm 4 days         Peripheral IV - Single Lumen 01/20/24 1430 18 G Anterior;Left Forearm 1 day                       Physical Exam  Vitals reviewed.   Constitutional:       General: She is not in acute distress.     Appearance: She is not ill-appearing.   HENT:      Head: Normocephalic and atraumatic.      Right Ear: External ear normal.      Left Ear: External ear normal.      Nose: Nose normal.   Eyes:      General:         Right eye: No discharge.         Left eye: No discharge.      Conjunctiva/sclera: Conjunctivae normal.   Cardiovascular:      Rate and Rhythm: Normal rate and regular rhythm.      Pulses: Normal pulses.      Heart  sounds: Normal heart sounds.   Pulmonary:      Effort: Pulmonary effort is normal.      Comments: Decreased breath sounds in left lower lung field  On 2L nc  Abdominal:      General: Abdomen is flat. There is no distension.      Palpations: Abdomen is soft.      Tenderness: There is no abdominal tenderness.   Musculoskeletal:      Right lower leg: No edema.      Left lower leg: No edema.   Skin:     General: Skin is warm and dry.   Neurological:      Mental Status: She is alert and oriented to person, place, and time. Mental status is at baseline.   Psychiatric:         Mood and Affect: Mood normal.         Behavior: Behavior normal.            Significant Labs:   Recent Labs   Lab 01/20/24  1338 01/21/24  1004 01/22/24  0438    142 144   K 3.4* 4.7 4.9    107 110   CO2 24 25 24   * 121* 89   BUN 32* 38* 50*   CREATININE 1.5* 1.5* 1.7*   CALCIUM 9.5 9.6 9.2   PROT  --  7.8 7.1   ALBUMIN  --  3.0* 2.7*   BILITOT  --  0.9 0.7   ALKPHOS  --  62 61   AST  --  26 31   ALT  --  19 24   ANIONGAP 8 10 10     Recent Labs   Lab 01/21/24  1004 01/22/24  0438   WBC 15.08* 12.13   HGB 11.8* 11.0*   HCT 36.5* 35.6*   * 144*       Significant Imaging:  Imaging from past 24 hours reviewed

## 2024-01-22 NOTE — PROGRESS NOTES
[facility] - Cardiology Stepdown  Cardiac Electrophysiology  Progress Note    Admission Date: 1/18/2024  Code Status: Full Code   Attending Physician: Aurora Morales MD   Expected Discharge Date: 1/21/2024  Principal Problem:Acute diastolic heart failure    Subjective:     Interval History: NAEON. Pericardial effusion trivial since pericardial drain removal. Feels fine today. No tele events.     Review of Systems   Constitutional: Negative for diaphoresis and fever.   Cardiovascular:  Negative for chest pain, dyspnea on exertion, leg swelling, near-syncope, orthopnea, palpitations, paroxysmal nocturnal dyspnea and syncope.   Respiratory:  Negative for cough and shortness of breath.    Gastrointestinal:  Negative for abdominal pain, diarrhea, nausea and vomiting.   Neurological:  Negative for light-headedness.   Psychiatric/Behavioral:  Negative for altered mental status and substance abuse.      Objective:     Vital Signs (Most Recent):  Temp: 98 °F (36.7 °C) (01/22/24 0803)  Pulse: 81 (01/22/24 0803)  Resp: 20 (01/22/24 0803)  BP: 105/68 (01/22/24 0803)  SpO2: 96 % (01/22/24 0803) Vital Signs (24h Range):  Temp:  [97.3 °F (36.3 °C)-99.2 °F (37.3 °C)] 98 °F (36.7 °C)  Pulse:  [] 81  Resp:  [16-20] 20  SpO2:  [91 %-96 %] 96 %  BP: ()/(57-68) 105/68     Weight: 75.4 kg (166 lb 3.6 oz)  Body mass index is 31.41 kg/m².     SpO2: 96 %        Physical Exam  Constitutional:       Appearance: Normal appearance.   Cardiovascular:      Rate and Rhythm: Normal rate and regular rhythm.      Pulses: Normal pulses.   Pulmonary:      Effort: Pulmonary effort is normal. No respiratory distress.   Abdominal:      General: Abdomen is flat.      Palpations: Abdomen is soft.      Tenderness: There is no abdominal tenderness.   Musculoskeletal:      Right lower leg: No edema.      Left lower leg: No edema.   Skin:     General: Skin is warm and dry.   Neurological:      Mental Status: She is alert and oriented to person,  "place, and time. Mental status is at baseline.   Psychiatric:         Mood and Affect: Mood normal.         Behavior: Behavior normal.            Significant Labs: BMP:   Recent Labs   Lab 01/20/24  1338 01/21/24  1004 01/22/24  0438   * 121* 89    142 144   K 3.4* 4.7 4.9    107 110   CO2 24 25 24   BUN 32* 38* 50*   CREATININE 1.5* 1.5* 1.7*   CALCIUM 9.5 9.6 9.2   MG  --  2.2 2.4     , CMP:   Recent Labs   Lab 01/20/24  1338 01/21/24  1004 01/22/24  0438    142 144   K 3.4* 4.7 4.9    107 110   CO2 24 25 24   * 121* 89   BUN 32* 38* 50*   CREATININE 1.5* 1.5* 1.7*   CALCIUM 9.5 9.6 9.2   PROT  --  7.8 7.1   ALBUMIN  --  3.0* 2.7*   BILITOT  --  0.9 0.7   ALKPHOS  --  62 61   AST  --  26 31   ALT  --  19 24   ANIONGAP 8 10 10     , CBC:   Recent Labs   Lab 01/21/24  1004 01/22/24  0438   WBC 15.08* 12.13   HGB 11.8* 11.0*   HCT 36.5* 35.6*   * 144*     , and INR: No results for input(s): "INR", "PROTIME" in the last 48 hours.      Assessment and Plan:     Pericardial effusion  Following with CCU for post-ablation pericardial effusion  Pericardial drain pulled 01/19  Follow up TTE post drain removal shows trivial pericardial effusion. Bedside echo performed yesterday evening with stable effusion.  From EP standpoint can go home  Primary getting some volume off and likely home      PVCs (premature ventricular contractions)  S/p RFA on 1/19  - ASA 81mg X30days.       EP will sign off.    Connor M Gillies, MD  Cardiac Electrophysiology  [facility] - Cardiology Stepdown    "

## 2024-01-22 NOTE — PROGRESS NOTES
[facility] - Cardiology Stepdown  Cardiology  Progress Note    Patient Name: Rajesh Mas  MRN: [id number]  Admission Date: 1/18/2024  Hospital Length of Stay: 3 days  Code Status: Full Code   Attending Physician: Aurora Morales MD   Primary Care Physician: Gail Villarreal MD  Expected Discharge Date: 1/21/2024  Principal Problem:Acute diastolic heart failure    Subjective:         Interval History: doing well this morning. Continue current diuresis. Plan to DC tomorrow     Review of Systems   Constitutional: Negative for chills and fever.   HENT: Negative.     Cardiovascular:  Negative for chest pain and leg swelling.   Respiratory:  Positive for shortness of breath (improving).    Gastrointestinal:  Negative for abdominal pain, constipation, diarrhea, nausea and vomiting.   Psychiatric/Behavioral:  Negative for altered mental status.      Objective:     Vital Signs (Most Recent):  Temp: 98 °F (36.7 °C) (01/21/24 1512)  Pulse: 89 (01/21/24 1512)  Resp: 18 (01/21/24 1512)  BP: 106/62 (01/21/24 1512)  SpO2: 95 % (01/21/24 1512) Vital Signs (24h Range):  Temp:  [98 °F (36.7 °C)-99.9 °F (37.7 °C)] 98 °F (36.7 °C)  Pulse:  [] 89  Resp:  [17-18] 18  SpO2:  [92 %-97 %] 95 %  BP: ()/(57-77) 106/62     Weight: 75.7 kg (166 lb 14.2 oz)  Body mass index is 31.53 kg/m².     SpO2: 95 %         Intake/Output Summary (Last 24 hours) at 1/21/2024 1806  Last data filed at 1/21/2024 0800  Gross per 24 hour   Intake 240 ml   Output 450 ml   Net -210 ml       Lines/Drains/Airways       Peripheral Intravenous Line  Duration                  Peripheral IV - Single Lumen 01/18/24 0626 20 G Right Forearm 3 days         Peripheral IV - Single Lumen 01/20/24 1430 18 G Anterior;Left Forearm 1 day                       Physical Exam  Vitals reviewed.   Constitutional:       General: She is not in acute distress.     Appearance: She is not ill-appearing.   HENT:      Head: Normocephalic and atraumatic.      Right Ear:  External ear normal.      Left Ear: External ear normal.      Nose: Nose normal.   Eyes:      General:         Right eye: No discharge.         Left eye: No discharge.      Conjunctiva/sclera: Conjunctivae normal.   Cardiovascular:      Rate and Rhythm: Normal rate and regular rhythm.      Pulses: Normal pulses.      Heart sounds: Normal heart sounds.   Pulmonary:      Effort: Pulmonary effort is normal.      Comments: Decreased breath sounds in left lower lung field  On 2L nc  Abdominal:      General: Abdomen is flat. There is no distension.      Palpations: Abdomen is soft.      Tenderness: There is no abdominal tenderness.   Musculoskeletal:      Right lower leg: No edema.      Left lower leg: No edema.   Skin:     General: Skin is warm and dry.   Neurological:      Mental Status: She is alert and oriented to person, place, and time. Mental status is at baseline.   Psychiatric:         Mood and Affect: Mood normal.         Behavior: Behavior normal.            Significant Labs: All pertinent lab results from the last 24 hours have been reviewed.      Assessment and Plan:         * Acute diastolic heart failure  Patient with evidence of congestion on chest-x ray and likely has element of acute diastolic dysfunction.    Volume status improving   -continue IV lasix 20mg daily  -strict ins and outs  -monitor CMP    Pleural effusion, left  See acute hypoxic resp failure    Acute hypoxic respiratory failure  Community acquired pneumonia  Patient with Hypoxic Respiratory failure which is Acute.  she is not on home oxygen. Supplemental oxygen was provided and noted-      .   Signs/symptoms of respiratory failure include-  increased oxygen requirement . Contributing diagnoses includes - Pleural effusion Labs and images were reviewed. Patient Has not had a recent ABG. Will treat underlying causes and adjust management of respiratory failure as follows-     Plan:  --wean oxygen as tolerated  --IV lasix 20 mg today  --strict  ins and outs  --left pleural effusion noted on echo and chest x-ray--continue diuresis  --will cover for PNA with CAP coverage      History of gout  Patient notes she gets gout in her toe. Is on Allopurinol for prophylaxis. Patient notes she is also on prednisone    --will continue allopurinol  --monitor kidney function.    Pericardial effusion  See PVCs    --stable, repeat echos done today.    PVCs (premature ventricular contractions)  73 year-old that presented for frequent PVCs here for RFA with EP team. Status post RFA on 1/18/24. During procedure it was noted patient had new pericardial effusion with tamponade physiology. Emergent pericardiocentesis done with drainage of approximately 250 cc of blood. Will admit to CCU service for further monitoring.    Stat Echo ordered on 1/18/24 with results shown below:    2D echo only.    Left Ventricle: The left ventricle is normal in size. Ventricular mass is normal. Normal wall thickness. Normal wall motion. There is low normal systolic function with a visually estimated ejection fraction of 50 - 55%. There is normal diastolic function.    Right Ventricle: Normal right ventricular cavity size. Wall thickness is normal. Right ventricle wall motion  is normal. Systolic function is normal.    Aortic Valve: The aortic valve is a trileaflet valve. There is mild aortic valve sclerosis. There is mild annular calcification present.    Aorta: Aortic root is normal in size measuring 2.20 cm. Ascending aorta is normal measuring 2.99 cm.    IVC/SVC: Normal venous pressure at 3 mmHg.    Pericardium: There is no pericardial effusion.    Plan:  --pericardial drain pulled  --Holding all anticoagulation for now  --trend CBC--platelets improved, Hgb stable  --telemetry  --admitted to CCU for close monitoring    Stage 3 chronic kidney disease  Baseline creatinine of 1.5 to 1.6    Will obtain daily CMP to monitor kidney function    Thrombocytopenia  Will obtain CBC daily  Baseline ranges  from 127 to 150    Essential hypertension  Hold anti-hypertensive medication in setting of pericardial effusion and hypotension during procedure.        VTE Risk Mitigation (From admission, onward)           Ordered     Reason for no Mechanical VTE Prophylaxis  Once        Question:  Reasons:  Answer:  Physician Provided (leave comment)  Comment:  Holding in setting of pericardial effusion s/p drainage    01/18/24 1358     IP VTE HIGH RISK PATIENT  Once         01/18/24 1358     Place sequential compression device  Until discontinued         01/18/24 1358     heparin infusion 1,000 units/500 ml in 0.9% NaCl (on sterile field)  As needed (PRN)         01/18/24 1013     heparin (porcine) 2,000 Units in sodium chloride 0.9% 2,000 mL  As needed (PRN)         01/18/24 1017                    Jack , MD  Cardiology  [facility] - Cardiology Stepdown

## 2024-01-22 NOTE — PLAN OF CARE
[facility] - Cardiology Stepdown  Discharge Final Note    Primary Care Provider: Gail Villarreal MD    Expected Discharge Date: 1/22/2024    Final Discharge Note (most recent)       Final Note - 01/22/24 0926          Final Note    Assessment Type Final Discharge Note     Anticipated Discharge Disposition Home or Self Care     Hospital Resources/Appts/Education Provided Appointments scheduled and added to AVS                 Gail Leon LMSW  [facility]  [id number]      Important Message from Medicare  Important Message from Medicare regarding Discharge Appeal Rights: Explained to patient/caregiver, Given to patient/caregiver, Signed/date by patient/caregiver     Date IMM was signed: 01/20/24  Time IMM was signed: 1134      Future Appointments   Date Time Provider Department Center   1/26/2024  9:30 AM Rebeca Das PA-C [facility] CARDIO [facility]   2/5/2024  1:00 PM Huang He MD [facility] HC BMT [place] [facility]   2/21/2024  9:20 AM LAB, APPOINTMENT [facility]   3/11/2024  9:45 AM [facility] OIC-[facility] Imaging Ctr   7/9/2024  9:00 AM Gail Villarreal MD [facility] IM [facility]

## 2024-01-22 NOTE — PLAN OF CARE
Pt educated on fall risk overnight,pt remained free from falls/trauma/injury. Denies chest pain, SOB, palpitations, dizziness, pain, or discomfort. Diuresing with lasix IVP and IV antbx contd. Plan of care reviewed with pt and spouse at bedside, all questions answered. Bed locked in lowest position, call bell within reach, no acute distress noted, will continue to monitor.

## 2024-01-22 NOTE — ASSESSMENT & PLAN NOTE
New Onset cardioversion. On AM of 1/22/24 patient went into atrial fibrillation with RVR.      [id number] Score: 2    6 High Risk:18.2% if no warfarin  5 High Risk: 12.5% if no warfarin  4 High Risk: 8.5% if no warfarin  3 High Risk: 5.9% if no warfarin  2 Intermediate Risk: 4% if no warfarin  1 Intermediate Risk: 2.8% if no warfarin        Plan:  --started diltizam gtt--heart rate improving.  --continue therapeutic enoxaparin  --EP consulted will follow up recommendations for possible cardioversion.  --goal heart rate less than 110-120  --CMP daily to check electrolytes  --telemetry

## 2024-01-22 NOTE — CARE UPDATE
EP re-consulted for atrial fibrillation. Patient noted to go into new onset atrial fibrillation with RVR while ambulating. Likely in the setting of recent PVC ablation/pericardial effusion followed by pericardiocentesis. CHADsVASc 4. She was started on anticoagulation with lovenox BID and dilt gtt at 10mg/hr. EF preserved.   Tele review showed rate controlled atrial fibrillation with ventricular rate of 80bpm.    Recommendations  - Given new onset atrial fib <24 hrs, please keep NPO for possible straight DCCV tomorrow.  - Continue dilt gtt  - Continue anticoagulation  - Discussed with EP fellow.     Crow Rivera MD  Cardiovascular Disease PGY5  [facility]

## 2024-01-22 NOTE — ASSESSMENT & PLAN NOTE
Patient with Hypoxic Respiratory failure which is Acute.  she is not on home oxygen. Supplemental oxygen was provided and noted-      .   Signs/symptoms of respiratory failure include-  increased oxygen requirement . Contributing diagnoses includes - Pleural effusion Labs and images were reviewed. Patient Has not had a recent ABG. Will treat underlying causes and adjust management of respiratory failure as follows-     Plan:  --wean oxygen as tolerated  --Patient respiratory status improving. will hold lasix  --strict ins and outs  --left pleural effusion noted on echo and chest x-ray--improving on repeat cxr  --will cover for PNA with CAP coverage

## 2024-01-22 NOTE — PROGRESS NOTES
[facility]  Cardiology  Progress Note    Patient Name: Rajesh Mas  MRN: [id number]  Admission Date: 1/18/2024  Hospital Length of Stay: 4 days  Code Status: Full Code   Attending Physician: Aurora Morales MD   Primary Care Physician: Gail Villarreal MD  Expected Discharge Date: 1/23/2024  Principal Problem:Acute diastolic heart failure    Subjective:     Hospital Course:   73 year-old female that was admitted to CCU after RFA for PVCs done by EP and subsequently developed pericardial effusion with tamponade physiology that required emergent pericardiocentesis (250 cc drained). Patient had pericardial drain that was placed and monitored while in CCU. Echocardiogram on 1/18 after procedure showed no pericardial effusion. Pericardial drain with approximately 80 cc of total drainage of serosanguinous fluid. Pericardial drain with less than 5 cc's of drainage overnight and was pulled on 1/19/24 without complication. Repeat echo was done on AM of 1/19 with only trivial pericardial effusion, EF of 50-55%.    Patient with acute hypoxic resp failure from Left pleural effusion, and likely acute diastolic dysfunction. Upgraded to inpatient from observation. Treating for CAP with azithro and ceftriaxone. On 1/22 patient developed atrial fib with RVR and started on IV dilt.              Interval History: Patient felt well this AM. Tolerating diet. No N/V. Got call during rounds that patient developed Afib with RVR. Started on dilt gtt.    Review of Systems   Constitutional: Negative for chills and fever.   HENT: Negative.     Cardiovascular:  Negative for chest pain and leg swelling.   Respiratory:  Positive for shortness of breath (improving).    Gastrointestinal:  Negative for abdominal pain, constipation, diarrhea, nausea and vomiting.   Psychiatric/Behavioral:  Negative for altered mental status.      Objective:     Vital Signs (Most Recent):  Temp: 98.3 °F (36.8 °C) (01/22/24 1116)  Pulse:  (!) 140 (01/22/24 1116)  Resp: 20 (01/22/24 1116)  BP: 104/65 (01/22/24 1116)  SpO2: 96 % (01/22/24 1116) Vital Signs (24h Range):  Temp:  [97.3 °F (36.3 °C)-98.3 °F (36.8 °C)] 98.3 °F (36.8 °C)  Pulse:  [] 140  Resp:  [16-20] 20  SpO2:  [91 %-96 %] 96 %  BP: ()/(52-86) 104/65     Weight: 75.4 kg (166 lb 3.6 oz)  Body mass index is 31.41 kg/m².     SpO2: 96 %         Intake/Output Summary (Last 24 hours) at 1/22/2024 1130  Last data filed at 1/22/2024 1118  Gross per 24 hour   Intake 680 ml   Output 950 ml   Net -270 ml       Lines/Drains/Airways       Peripheral Intravenous Line  Duration                  Peripheral IV - Single Lumen 01/18/24 0626 20 G Right Forearm 4 days         Peripheral IV - Single Lumen 01/20/24 1430 18 G Anterior;Left Forearm 1 day                       Physical Exam  Vitals reviewed.   Constitutional:       General: She is not in acute distress.     Appearance: She is not ill-appearing.   HENT:      Head: Normocephalic and atraumatic.      Right Ear: External ear normal.      Left Ear: External ear normal.      Nose: Nose normal.   Eyes:      General:         Right eye: No discharge.         Left eye: No discharge.      Conjunctiva/sclera: Conjunctivae normal.   Cardiovascular:      Rate and Rhythm: Normal rate and regular rhythm.      Pulses: Normal pulses.      Heart sounds: Normal heart sounds.   Pulmonary:      Effort: Pulmonary effort is normal.      Comments: Decreased breath sounds in left lower lung field  On 2L nc  Abdominal:      General: Abdomen is flat. There is no distension.      Palpations: Abdomen is soft.      Tenderness: There is no abdominal tenderness.   Musculoskeletal:      Right lower leg: No edema.      Left lower leg: No edema.   Skin:     General: Skin is warm and dry.   Neurological:      Mental Status: She is alert and oriented to person, place, and time. Mental status is at baseline.   Psychiatric:         Mood and Affect: Mood normal.          Behavior: Behavior normal.            Significant Labs:   Recent Labs   Lab 01/20/24  1338 01/21/24  1004 01/22/24  0438    142 144   K 3.4* 4.7 4.9    107 110   CO2 24 25 24   * 121* 89   BUN 32* 38* 50*   CREATININE 1.5* 1.5* 1.7*   CALCIUM 9.5 9.6 9.2   PROT  --  7.8 7.1   ALBUMIN  --  3.0* 2.7*   BILITOT  --  0.9 0.7   ALKPHOS  --  62 61   AST  --  26 31   ALT  --  19 24   ANIONGAP 8 10 10     Recent Labs   Lab 01/21/24  1004 01/22/24  0438   WBC 15.08* 12.13   HGB 11.8* 11.0*   HCT 36.5* 35.6*   * 144*       Significant Imaging:  Imaging from past 24 hours reviewed  Assessment and Plan:         * Acute diastolic heart failure  Patient with evidence of congestion on chest-x ray and likely has element of acute diastolic dysfunction. Echo results from today below.  Suspect decreased EF is from high heart rate.    Echo from 1/22/24    Irregular rhyhm with tachycardia up to 138 bpm.    Left Ventricle: The left ventricle is normal in size. Normal wall thickness. Normal wall motion. There is moderately reduced systolic function. Ejection fraction by visual approximation is 35%. Unable to assess diastolic function due to atrial fibrillation.    Right Ventricle: Normal right ventricular cavity size. Wall thickness is normal. Right ventricle wall motion  is normal. Systolic function is normal.    Pulmonary Artery: The estimated pulmonary artery systolic pressure is 20 mmHg.    IVC/SVC: Normal venous pressure at 3 mmHg.    Plan:  -will hold lasix today  -volume status improving  -strict ins and outs  -monitor CMP    Atrial fibrillation  New Onset cardioversion. On AM of 1/22/24 patient went into atrial fibrillation with RVR.      [id number] Score: 2    6 High Risk:18.2% if no warfarin  5 High Risk: 12.5% if no warfarin  4 High Risk: 8.5% if no warfarin  3 High Risk: 5.9% if no warfarin  2 Intermediate Risk: 4% if no warfarin  1 Intermediate Risk: 2.8% if no warfarin        Plan:  --started  diltizam gtt--heart rate improving.  --started therapeutic enoxaparin  --EP consulted will follow up recommendations for possible cardioversion.  --goal heart rate less than 110-120  --CMP daily to check electrolytes  --telemetry      Acute hypoxic respiratory failure  Patient with Hypoxic Respiratory failure which is Acute.  she is not on home oxygen. Supplemental oxygen was provided and noted-      Signs/symptoms of respiratory failure include-  increased oxygen requirement . Contributing diagnoses includes - Pleural effusion Labs and images were reviewed. Patient Has not had a recent ABG. Will treat underlying causes and adjust management of respiratory failure as follows-     Plan:  --wean oxygen as tolerated  --Patient respiratory status improving. will hold lasix  --strict ins and outs  --left pleural effusion noted on echo and chest x-ray--improving on repeat cxr  --will cover for PNA with CAP coverage    Pleural effusion, left  See acute hypoxic resp failure    History of gout  Patient notes she gets gout in her toe. Is on Allopurinol for prophylaxis. Patient notes she is also on prednisone    --will continue allopurinol  --monitor kidney function.    Pericardial effusion  See PVCs    PVCs (premature ventricular contractions)  73 year-old that presented for frequent PVCs here for RFA with EP team. Status post RFA on 1/18/24. During procedure it was noted patient had new pericardial effusion with tamponade physiology. Emergent pericardiocentesis done with drainage of approximately 250 cc of blood. Will admit to CCU service for further monitoring.    Stat Echo ordered on 1/18/24 with results shown below:    2D echo only.    Left Ventricle: The left ventricle is normal in size. Ventricular mass is normal. Normal wall thickness. Normal wall motion. There is low normal systolic function with a visually estimated ejection fraction of 50 - 55%. There is normal diastolic function.    Right Ventricle: Normal right  ventricular cavity size. Wall thickness is normal. Right ventricle wall motion  is normal. Systolic function is normal.    Aortic Valve: The aortic valve is a trileaflet valve. There is mild aortic valve sclerosis. There is mild annular calcification present.    Aorta: Aortic root is normal in size measuring 2.20 cm. Ascending aorta is normal measuring 2.99 cm.    IVC/SVC: Normal venous pressure at 3 mmHg.    Pericardium: There is no pericardial effusion.    Plan:  --pericardial drain pulled 1/19  --Holding all anticoagulation for now  --trend CBC--platelets improved, Hgb stable  --telemetry       Stage 3 chronic kidney disease  Baseline creatinine of 1.5 to 1.6    Will obtain daily CMP to monitor kidney function    Thrombocytopenia  Will obtain CBC daily  Baseline ranges from 127 to 150    --stable    Essential hypertension  Hold anti-hypertensive medication in setting of pericardial effusion and hypotension during procedure.        VTE Risk Mitigation (From admission, onward)           Ordered     enoxaparin injection 80 mg  Every 24 hours (non-standard times)         01/22/24 1543     Reason for no Mechanical VTE Prophylaxis  Once        Question:  Reasons:  Answer:  Physician Provided (leave comment)  Comment:  Holding in setting of pericardial effusion s/p drainage    01/18/24 1358     IP VTE HIGH RISK PATIENT  Once         01/18/24 1358     Place sequential compression device  Until discontinued         01/18/24 1358                    Noel Ching MD  Cardiology  [facility] - [facility]

## 2024-01-22 NOTE — CARE UPDATE
RAPID RESPONSE NURSE PROACTIVE ROUNDING NOTE       Time of Visit: 1200    Admit Date: 2024  LOS: 4  Code Status: Full Code   Date of Visit: 2024  : 1950  Age: 73 y.o.  Sex: female  Race: Black or   Bed: 318/318 A:   MRN: [id number]  Was the patient discharged from an ICU this admission? Yes   Was the patient discharged from a PACU within last 24 hours? No   Did the patient receive conscious sedation/general anesthesia in last 24 hours? No  Was the patient in the ED within the past 24 hours? No  Was the patient on NIPPV within the past 24 hours? No   Attending Physician: Aurora Morales MD  Primary Service: Cardiology   Time spent at the bedside: 15 -30 min    SITUATION    Notified by bedside RN via phone call.  Reason for alert: SVT  Called to evaluate the patient for Dysrythmia    BACKGROUND     Why is the patient in the hospital?: Acute diastolic heart failure    Patient has a past medical history of Allergy, Diverticulitis, Fever blister, Hypertension, and Personal history of colonic polyps.    Last Vitals:  Temp: 98.3 °F (36.8 °C) ( 111)  Pulse: 146 ( 1215)  Resp: 20 ( 1116)  BP: 110/67 ( 1215)  SpO2: 96 % ( 111)    24 Hours Vitals Range:  Temp:  [97.3 °F (36.3 °C)-98.3 °F (36.8 °C)]   Pulse:  []   Resp:  [16-20]   BP: ()/(52-86)   SpO2:  [91 %-96 %]     Labs:  Recent Labs     24  0447 24  1004 24  0438   WBC 15.06* 15.08* 12.13   HGB 11.3* 11.8* 11.0*   HCT 35.4* 36.5* 35.6*   * 124* 144*       Recent Labs     24  0447 24  1338 24  1004 24  0438   * 138 142 144   K 3.5 3.4* 4.7 4.9    106 107 110   CO2 24 24 25 24   BUN 28* 32* 38* 50*   CREATININE 1.3 1.5* 1.5* 1.7*    133* 121* 89   MG 2.1  --  2.2 2.4          ASSESSMENT    Physical Exam  Vitals and nursing note reviewed.   Constitutional:       Appearance: Normal appearance.   Cardiovascular:      Rate and  Rhythm: Tachycardia present. Rhythm irregular.   Pulmonary:      Effort: Pulmonary effort is normal.   Neurological:      General: No focal deficit present.      Mental Status: She is alert and oriented to person, place, and time. Mental status is at baseline.   Psychiatric:         Mood and Affect: Mood normal.     Patient in bed denies chest pain and shortness of breath. Hr 128 Bp 110/67. Cardizem increased from 5mg/hr to 10mg/Hr. Patient is asymptomatic. Primary team adjusting medications     INTERVENTIONS    The patient was seen for Cardiac problem. Staff concerns included tachycardia. The following interventions were performed: EKG, continuous cardiac monitoring continued, and continuous pulse ox monitoring continued.    RECOMMENDATIONS  -Continuous telemetry  -Maintain IV access  -Increase diltiazem from 5mg/hr to 10mg/hr    PROVIDER ESCALATION    Yes/No  No        Disposition: Remain in room 318.    FOLLOW-UP    bedside RNDangelo   updated on plan of care. Instructed to call the Rapid Response Nurse, Noel Chapa RN at [phone] for additional questions or concerns.

## 2024-01-22 NOTE — ASSESSMENT & PLAN NOTE
73 year-old that presented for frequent PVCs here for RFA with EP team. Status post RFA on 1/18/24. During procedure it was noted patient had new pericardial effusion with tamponade physiology. Emergent pericardiocentesis done with drainage of approximately 250 cc of blood. Will admit to CCU service for further monitoring.    Stat Echo ordered on 1/18/24 with results shown below:    2D echo only.    Left Ventricle: The left ventricle is normal in size. Ventricular mass is normal. Normal wall thickness. Normal wall motion. There is low normal systolic function with a visually estimated ejection fraction of 50 - 55%. There is normal diastolic function.    Right Ventricle: Normal right ventricular cavity size. Wall thickness is normal. Right ventricle wall motion  is normal. Systolic function is normal.    Aortic Valve: The aortic valve is a trileaflet valve. There is mild aortic valve sclerosis. There is mild annular calcification present.    Aorta: Aortic root is normal in size measuring 2.20 cm. Ascending aorta is normal measuring 2.99 cm.    IVC/SVC: Normal venous pressure at 3 mmHg.    Pericardium: There is no pericardial effusion.    Plan:  --pericardial drain pulled 1/19  --Holding all anticoagulation for now  --trend CBC--platelets improved, Hgb stable  --telemetry

## 2024-01-23 ENCOUNTER — CLINICAL SUPPORT (OUTPATIENT)
Dept: CARDIOLOGY | Facility: HOSPITAL | Age: 74
DRG: 273 | End: 2024-01-23
Attending: INTERNAL MEDICINE
Payer: MEDICARE

## 2024-01-23 VITALS
TEMPERATURE: 98 F | WEIGHT: 166 LBS | HEART RATE: 85 BPM | RESPIRATION RATE: 18 BRPM | BODY MASS INDEX: 31.34 KG/M2 | DIASTOLIC BLOOD PRESSURE: 56 MMHG | SYSTOLIC BLOOD PRESSURE: 104 MMHG | OXYGEN SATURATION: 95 % | HEIGHT: 61 IN

## 2024-01-23 LAB
ALBUMIN SERPL BCP-MCNC: 2.8 G/DL (ref 3.5–5.2)
ALP SERPL-CCNC: 61 U/L (ref 55–135)
ALT SERPL W/O P-5'-P-CCNC: 30 U/L (ref 10–44)
ANION GAP SERPL CALC-SCNC: 8 MMOL/L (ref 8–16)
AST SERPL-CCNC: 33 U/L (ref 10–40)
BASOPHILS # BLD AUTO: 0.07 K/UL (ref 0–0.2)
BASOPHILS NFR BLD: 0.7 % (ref 0–1.9)
BILIRUB SERPL-MCNC: 0.5 MG/DL (ref 0.1–1)
BUN SERPL-MCNC: 51 MG/DL (ref 8–23)
CALCIUM SERPL-MCNC: 9.5 MG/DL (ref 8.7–10.5)
CHLORIDE SERPL-SCNC: 109 MMOL/L (ref 95–110)
CO2 SERPL-SCNC: 23 MMOL/L (ref 23–29)
CREAT SERPL-MCNC: 1.6 MG/DL (ref 0.5–1.4)
DIFFERENTIAL METHOD BLD: ABNORMAL
EOSINOPHIL # BLD AUTO: 0.3 K/UL (ref 0–0.5)
EOSINOPHIL NFR BLD: 3 % (ref 0–8)
ERYTHROCYTE [DISTWIDTH] IN BLOOD BY AUTOMATED COUNT: 14.3 % (ref 11.5–14.5)
EST. GFR  (NO RACE VARIABLE): 34.3 ML/MIN/1.73 M^2
GLUCOSE SERPL-MCNC: 96 MG/DL (ref 70–110)
HCT VFR BLD AUTO: 34.2 % (ref 37–48.5)
HGB BLD-MCNC: 11 G/DL (ref 12–16)
IMM GRANULOCYTES # BLD AUTO: 0.02 K/UL (ref 0–0.04)
IMM GRANULOCYTES NFR BLD AUTO: 0.2 % (ref 0–0.5)
LYMPHOCYTES # BLD AUTO: 2 K/UL (ref 1–4.8)
LYMPHOCYTES NFR BLD: 19.4 % (ref 18–48)
MAGNESIUM SERPL-MCNC: 2.5 MG/DL (ref 1.6–2.6)
MCH RBC QN AUTO: 30.2 PG (ref 27–31)
MCHC RBC AUTO-ENTMCNC: 32.2 G/DL (ref 32–36)
MCV RBC AUTO: 94 FL (ref 82–98)
MONOCYTES # BLD AUTO: 1 K/UL (ref 0.3–1)
MONOCYTES NFR BLD: 9.6 % (ref 4–15)
NEUTROPHILS # BLD AUTO: 6.8 K/UL (ref 1.8–7.7)
NEUTROPHILS NFR BLD: 67.1 % (ref 38–73)
NRBC BLD-RTO: 0 /100 WBC
PLATELET # BLD AUTO: 187 K/UL (ref 150–450)
PMV BLD AUTO: 13.5 FL (ref 9.2–12.9)
POTASSIUM SERPL-SCNC: 4.1 MMOL/L (ref 3.5–5.1)
PROT SERPL-MCNC: 7.1 G/DL (ref 6–8.4)
RBC # BLD AUTO: 3.64 M/UL (ref 4–5.4)
SODIUM SERPL-SCNC: 140 MMOL/L (ref 136–145)
WBC # BLD AUTO: 10.17 K/UL (ref 3.9–12.7)

## 2024-01-23 PROCEDURE — 93272 ECG/REVIEW INTERPRET ONLY: CPT | Mod: ,,, | Performed by: INTERNAL MEDICINE

## 2024-01-23 PROCEDURE — 25000003 PHARM REV CODE 250

## 2024-01-23 PROCEDURE — 93010 ELECTROCARDIOGRAM REPORT: CPT | Mod: ,,, | Performed by: INTERNAL MEDICINE

## 2024-01-23 PROCEDURE — 93271 ECG/MONITORING AND ANALYSIS: CPT

## 2024-01-23 PROCEDURE — 93005 ELECTROCARDIOGRAM TRACING: CPT

## 2024-01-23 PROCEDURE — 80053 COMPREHEN METABOLIC PANEL: CPT

## 2024-01-23 PROCEDURE — 85025 COMPLETE CBC W/AUTO DIFF WBC: CPT

## 2024-01-23 PROCEDURE — 83735 ASSAY OF MAGNESIUM: CPT

## 2024-01-23 PROCEDURE — 36415 COLL VENOUS BLD VENIPUNCTURE: CPT

## 2024-01-23 PROCEDURE — 1111F DSCHRG MED/CURRENT MED MERGE: CPT | Mod: CPTII,GC,, | Performed by: INTERNAL MEDICINE

## 2024-01-23 PROCEDURE — 25000003 PHARM REV CODE 250: Performed by: INTERNAL MEDICINE

## 2024-01-23 PROCEDURE — 99239 HOSP IP/OBS DSCHRG MGMT >30: CPT | Mod: GC,,, | Performed by: INTERNAL MEDICINE

## 2024-01-23 RX ORDER — AMOXICILLIN AND CLAVULANATE POTASSIUM 875; 125 MG/1; MG/1
1 TABLET, FILM COATED ORAL 2 TIMES DAILY
Qty: 4 TABLET | Refills: 0 | Status: SHIPPED | OUTPATIENT
Start: 2024-01-24 | End: 2024-01-26

## 2024-01-23 RX ORDER — TRIAMTERENE AND HYDROCHLOROTHIAZIDE 37.5; 25 MG/1; MG/1
1 CAPSULE ORAL EVERY MORNING
Qty: 90 CAPSULE | Refills: 3 | Status: SHIPPED | OUTPATIENT
Start: 2024-01-23 | End: 2024-01-31

## 2024-01-23 RX ORDER — METOPROLOL SUCCINATE 25 MG/1
25 TABLET, EXTENDED RELEASE ORAL DAILY
Status: DISCONTINUED | OUTPATIENT
Start: 2024-01-23 | End: 2024-01-23 | Stop reason: HOSPADM

## 2024-01-23 RX ORDER — METOPROLOL SUCCINATE 25 MG/1
25 TABLET, EXTENDED RELEASE ORAL DAILY
Qty: 30 TABLET | Refills: 11 | Status: SHIPPED | OUTPATIENT
Start: 2024-01-23 | End: 2025-01-22

## 2024-01-23 RX ORDER — LOSARTAN POTASSIUM 100 MG/1
100 TABLET ORAL DAILY
Qty: 90 TABLET | Refills: 3 | Status: SHIPPED | OUTPATIENT
Start: 2024-01-23 | End: 2024-01-31

## 2024-01-23 RX ADMIN — OXYBUTYNIN CHLORIDE 5 MG: 5 TABLET ORAL at 08:01

## 2024-01-23 RX ADMIN — METOPROLOL SUCCINATE 25 MG: 25 TABLET, EXTENDED RELEASE ORAL at 09:01

## 2024-01-23 RX ADMIN — ALLOPURINOL 300 MG: 300 TABLET ORAL at 08:01

## 2024-01-23 NOTE — ASSESSMENT & PLAN NOTE
New atrial fibrillation in a patient with recent intracardiac procedure, tamponade requiring pericardial drain placement. Stable at this time and was hemodynamically stable during her episode of AF RVR.    Her atrial fibrillation episode was caught on EKG and telemetry. Telemetry confirmed she was in atrial fibrillation for about 8 hours before spontaneously converting back to SR.     Recommendation:  With only 8 hours of new atrial fibrillation, would not recommend addition of rate control or rhythm control agents, or anticoagulation  Okay to discharge from our standpoint  Can send with a 30 event monitor  6 week follow up with Dr. Ellison in EP clinic

## 2024-01-23 NOTE — NURSING
Home Oxygen Evaluation    Date Performed:     1) Patient's Home O2 Sat on room air, while at rest: 86            If O2 sats on room air at rest are 88% or below, patient qualifies. No additional testing needed. Document N/A in steps 2 and 3. If 89% or above, complete steps 2.

## 2024-01-23 NOTE — SUBJECTIVE & OBJECTIVE
Interval History: No events overnight. Went into atrial fibrillation yesterday and was placed on a diltiazem drip for rate control. On telemetry review, she was in atrial fibrillation for about 8 hours prior to spontaneous cardioversion back to sinus rhythm.     Review of Systems   Constitutional: Negative for diaphoresis and fever.   Cardiovascular:  Negative for chest pain, dyspnea on exertion, leg swelling, near-syncope, orthopnea, palpitations, paroxysmal nocturnal dyspnea and syncope.   Respiratory:  Negative for cough and shortness of breath.    Gastrointestinal:  Negative for abdominal pain, diarrhea, nausea and vomiting.   Neurological:  Negative for light-headedness.   Psychiatric/Behavioral:  Negative for altered mental status and substance abuse.      Objective:     Vital Signs (Most Recent):  Temp: 97.7 °F (36.5 °C) (01/23/24 0804)  Pulse: 63 (01/23/24 0804)  Resp: 17 (01/23/24 0804)  BP: 109/60 (01/23/24 0804)  SpO2: (!) 91 % (01/23/24 0804) Vital Signs (24h Range):  Temp:  [97.7 °F (36.5 °C)-98.8 °F (37.1 °C)] 97.7 °F (36.5 °C)  Pulse:  [] 63  Resp:  [17-20] 17  SpO2:  [91 %-97 %] 91 %  BP: ()/(50-86) 109/60     Weight: 75.3 kg (166 lb)  Body mass index is 31.37 kg/m².     SpO2: (!) 91 %        Physical Exam  Constitutional:       Appearance: Normal appearance.   Cardiovascular:      Rate and Rhythm: Normal rate and regular rhythm.      Pulses: Normal pulses.   Pulmonary:      Effort: Pulmonary effort is normal. No respiratory distress.   Abdominal:      General: Abdomen is flat.      Palpations: Abdomen is soft.      Tenderness: There is no abdominal tenderness.   Musculoskeletal:      Right lower leg: No edema.      Left lower leg: No edema.   Skin:     General: Skin is warm and dry.   Neurological:      Mental Status: She is alert and oriented to person, place, and time. Mental status is at baseline.   Psychiatric:         Mood and Affect: Mood normal.         Behavior: Behavior normal.  "           Significant Labs: BMP:   Recent Labs   Lab 01/21/24  1004 01/22/24  0438 01/23/24  0523   * 89 96    144 140   K 4.7 4.9 4.1    110 109   CO2 25 24 23   BUN 38* 50* 51*   CREATININE 1.5* 1.7* 1.6*   CALCIUM 9.6 9.2 9.5   MG 2.2 2.4 2.5     , CMP:   Recent Labs   Lab 01/21/24  1004 01/22/24  0438 01/23/24  0523    144 140   K 4.7 4.9 4.1    110 109   CO2 25 24 23   * 89 96   BUN 38* 50* 51*   CREATININE 1.5* 1.7* 1.6*   CALCIUM 9.6 9.2 9.5   PROT 7.8 7.1 7.1   ALBUMIN 3.0* 2.7* 2.8*   BILITOT 0.9 0.7 0.5   ALKPHOS 62 61 61   AST 26 31 33   ALT 19 24 30   ANIONGAP 10 10 8     , CBC:   Recent Labs   Lab 01/21/24  1004 01/22/24  0438 01/23/24  0523   WBC 15.08* 12.13 10.17   HGB 11.8* 11.0* 11.0*   HCT 36.5* 35.6* 34.2*   * 144* 187     , and INR: No results for input(s): "INR", "PROTIME" in the last 48 hours.      "

## 2024-01-23 NOTE — PROGRESS NOTES
[facility] - [facility]  Cardiac Electrophysiology  Progress Note    Admission Date: 1/18/2024  Code Status: Full Code   Attending Physician: Aurora Morales MD   Expected Discharge Date: 1/23/2024  Principal Problem:Acute diastolic heart failure    Subjective:     Interval History: No events overnight. Went into atrial fibrillation yesterday and was placed on a diltiazem drip for rate control. On telemetry review, she was in atrial fibrillation for about 8 hours prior to spontaneous cardioversion back to sinus rhythm.     Review of Systems   Constitutional: Negative for diaphoresis and fever.   Cardiovascular:  Negative for chest pain, dyspnea on exertion, leg swelling, near-syncope, orthopnea, palpitations, paroxysmal nocturnal dyspnea and syncope.   Respiratory:  Negative for cough and shortness of breath.    Gastrointestinal:  Negative for abdominal pain, diarrhea, nausea and vomiting.   Neurological:  Negative for light-headedness.   Psychiatric/Behavioral:  Negative for altered mental status and substance abuse.      Objective:     Vital Signs (Most Recent):  Temp: 97.7 °F (36.5 °C) (01/23/24 0804)  Pulse: 63 (01/23/24 0804)  Resp: 17 (01/23/24 0804)  BP: 109/60 (01/23/24 0804)  SpO2: (!) 91 % (01/23/24 0804) Vital Signs (24h Range):  Temp:  [97.7 °F (36.5 °C)-98.8 °F (37.1 °C)] 97.7 °F (36.5 °C)  Pulse:  [] 63  Resp:  [17-20] 17  SpO2:  [91 %-97 %] 91 %  BP: ()/(50-86) 109/60     Weight: 75.3 kg (166 lb)  Body mass index is 31.37 kg/m².     SpO2: (!) 91 %        Physical Exam  Constitutional:       Appearance: Normal appearance.   Cardiovascular:      Rate and Rhythm: Normal rate and regular rhythm.      Pulses: Normal pulses.   Pulmonary:      Effort: Pulmonary effort is normal. No respiratory distress.   Abdominal:      General: Abdomen is flat.      Palpations: Abdomen is soft.      Tenderness: There is no abdominal tenderness.   Musculoskeletal:      Right lower leg: No edema.      " Left lower leg: No edema.   Skin:     General: Skin is warm and dry.   Neurological:      Mental Status: She is alert and oriented to person, place, and time. Mental status is at baseline.   Psychiatric:         Mood and Affect: Mood normal.         Behavior: Behavior normal.            Significant Labs: BMP:   Recent Labs   Lab 01/21/24  1004 01/22/24  0438 01/23/24  0523   * 89 96    144 140   K 4.7 4.9 4.1    110 109   CO2 25 24 23   BUN 38* 50* 51*   CREATININE 1.5* 1.7* 1.6*   CALCIUM 9.6 9.2 9.5   MG 2.2 2.4 2.5     , CMP:   Recent Labs   Lab 01/21/24  1004 01/22/24  0438 01/23/24  0523    144 140   K 4.7 4.9 4.1    110 109   CO2 25 24 23   * 89 96   BUN 38* 50* 51*   CREATININE 1.5* 1.7* 1.6*   CALCIUM 9.6 9.2 9.5   PROT 7.8 7.1 7.1   ALBUMIN 3.0* 2.7* 2.8*   BILITOT 0.9 0.7 0.5   ALKPHOS 62 61 61   AST 26 31 33   ALT 19 24 30   ANIONGAP 10 10 8     , CBC:   Recent Labs   Lab 01/21/24  1004 01/22/24  0438 01/23/24  0523   WBC 15.08* 12.13 10.17   HGB 11.8* 11.0* 11.0*   HCT 36.5* 35.6* 34.2*   * 144* 187     , and INR: No results for input(s): "INR", "PROTIME" in the last 48 hours.      Assessment and Plan:     Pericardial effusion  Following with CCU for post-ablation pericardial effusion  Pericardial drain pulled 01/19  Stable, several days post pericardial drain removal    Atrial fibrillation  New atrial fibrillation in a patient with recent intracardiac procedure, tamponade requiring pericardial drain placement. Stable at this time and was hemodynamically stable during her episode of AF RVR.    Her atrial fibrillation episode was caught on EKG and telemetry. Telemetry confirmed she was in atrial fibrillation for about 8 hours before spontaneously converting back to SR.     Recommendation:  With only 8 hours of new atrial fibrillation, would not recommend addition of rate control or rhythm control agents, or anticoagulation  Okay to discharge from our " standpoint  Can send with a 30 event monitor  6 week follow up with Dr. Ellison in EP clinic    PVCs (premature ventricular contractions)  S/p RFA on 1/19  - ASA 81mg K44sobs.         Connor M Gillies, MD  Cardiac Electrophysiology  [facility] - Cardiology Stepdown

## 2024-01-23 NOTE — ASSESSMENT & PLAN NOTE
Following with CCU for post-ablation pericardial effusion  Pericardial drain pulled 01/19  Stable, several days post pericardial drain removal

## 2024-01-23 NOTE — PLAN OF CARE
[facility] - Cardiology Stepdown  Discharge Final Note    Primary Care Provider: Gail Villarreal MD    Expected Discharge Date: 1/23/2024    Final Discharge Note (most recent)       Final Note - 01/23/24 1246          Final Note    Assessment Type Final Discharge Note     Anticipated Discharge Disposition Home or Self Care     Hospital Resources/Appts/Education Provided Appointments scheduled and added to AVS        Post-Acute Status    Post-Acute Authorization HME     HME Status Set-up Complete/Auth obtained                 Per [place] with [employer] pt is approved for home O2, co-pay collected, and e-tanks will be delivered to pt at bedside.        Gail Leon LMSW  [facility]  [id number]      Important Message from Medicare  Important Message from Medicare regarding Discharge Appeal Rights: Given to patient/caregiver, Explained to patient/caregiver, Signed/date by patient/caregiver     Date IMM was signed: 01/23/24  Time IMM was signed: 0948      Future Appointments   Date Time Provider Department Center   1/23/2024  1:00 PM MONITOR, ARRHYTHMIA EVENT [facility] ARRHPRO [facility]   1/26/2024  9:30 AM Rebeca Das PA-C [facility] CARDIO [facility]   2/5/2024  1:00 PM Huang He MD [facility] HC BMT [facility]   2/21/2024  9:20 AM LAB, APPOINTMENT [facility] INTMED [facility] LAB IM [facility]   3/11/2024  9:45 AM [facility] OIC-MAMMO1 [facility] MAMMOIC [facility]   7/9/2024  9:00 AM [facility]       Contact Info       Santi Ellison MD   Specialty: Electrophysiology, Cardiovascular Disease, Cardiology    [address] [address]   Phone: [phone]       Next Steps: Follow up in 6 week(s)

## 2024-01-23 NOTE — PLAN OF CARE
CHW met with patient/family at bedside. Patient experience rounding completed and reviewed the following.     Do you know your discharge plan? Yes or No,    If yes, what is the plan? (Home, Home Health, Rehab, SNF, LTAC, or Other) Yes Home    Have you discussed your needs and preferences with your SW/CM? Yes or No  Yes Home    If you are discharging home, do you have help at home? Yes or No  Yes (spouse)    Do you think you will need help additional at home at discharge? Yes or No  No    Do you currently have difficulty keeping up with bills, affording medicine or buying food? Yes or No No    Assigned SW/CM notified of any patient/family needs or concerns. Appropriate resources provided to address patient's needs.  Gail Toure CHW   Case Management   [id number]

## 2024-01-23 NOTE — NURSING
Patient being discharged home, AVS given with all questions/concerns answered, IV/heart monitor removed, dc meds sent to [facility] and have been delivered bedside, cardiac monitor event has been placed, patient awaiting home O2 tank to be delivered to bedside, will cont to [facility] for now.       1353: home O2 tank has been delivered, awaiting transport.

## 2024-01-23 NOTE — PLAN OF CARE
Problem: Adult Inpatient Plan of Care  Goal: Plan of Care Review  Outcome: Ongoing, Progressing  Goal: Patient-Specific Goal (Individualized)  Outcome: Ongoing, Progressing  Goal: Absence of Hospital-Acquired Illness or Injury  Outcome: Ongoing, Progressing  Goal: Optimal Comfort and Wellbeing  Outcome: Ongoing, Progressing  Goal: Readiness for Transition of Care  Outcome: Ongoing, Progressing     Problem: Fall Injury Risk  Goal: Absence of Fall and Fall-Related Injury  Outcome: Ongoing, Progressing     Problem: Cardiac Output Decreased  Goal: Effective Cardiac Output  Outcome: Ongoing, Progressing     Problem: Skin Injury Risk Increased  Goal: Skin Health and Integrity  Outcome: Ongoing, Progressing     Problem: Infection  Goal: Absence of Infection Signs and Symptoms  Outcome: Ongoing, Progressing

## 2024-01-24 ENCOUNTER — PATIENT OUTREACH (OUTPATIENT)
Dept: ADMINISTRATIVE | Facility: CLINIC | Age: 74
End: 2024-01-24
Payer: MEDICARE

## 2024-01-24 LAB — POCT GLUCOSE: 96 MG/DL (ref 70–110)

## 2024-01-26 ENCOUNTER — OFFICE VISIT (OUTPATIENT)
Dept: CARDIOLOGY | Facility: CLINIC | Age: 74
End: 2024-01-26
Payer: MEDICARE

## 2024-01-26 ENCOUNTER — HOSPITAL ENCOUNTER (OUTPATIENT)
Dept: RADIOLOGY | Facility: HOSPITAL | Age: 74
Discharge: HOME | End: 2024-01-26
Attending: PHYSICIAN ASSISTANT
Payer: MEDICARE

## 2024-01-26 ENCOUNTER — PATIENT MESSAGE (OUTPATIENT)
Dept: CARDIOLOGY | Facility: CLINIC | Age: 74
End: 2024-01-26

## 2024-01-26 VITALS
HEART RATE: 96 BPM | BODY MASS INDEX: 32.05 KG/M2 | HEIGHT: 61 IN | WEIGHT: 169.75 LBS | SYSTOLIC BLOOD PRESSURE: 114 MMHG | OXYGEN SATURATION: 96 % | DIASTOLIC BLOOD PRESSURE: 66 MMHG

## 2024-01-26 DIAGNOSIS — I49.3 PVCS (PREMATURE VENTRICULAR CONTRACTIONS): ICD-10-CM

## 2024-01-26 DIAGNOSIS — J90 PLEURAL EFFUSION: ICD-10-CM

## 2024-01-26 DIAGNOSIS — Z86.79 S/P RADIOFREQUENCY ABLATION OPERATION FOR ARRHYTHMIA: ICD-10-CM

## 2024-01-26 DIAGNOSIS — I31.39 PERICARDIAL EFFUSION: Primary | ICD-10-CM

## 2024-01-26 DIAGNOSIS — I10 ESSENTIAL HYPERTENSION: ICD-10-CM

## 2024-01-26 DIAGNOSIS — Z98.890 S/P RADIOFREQUENCY ABLATION OPERATION FOR ARRHYTHMIA: ICD-10-CM

## 2024-01-26 PROCEDURE — 71046 X-RAY EXAM CHEST 2 VIEWS: CPT | Mod: TC

## 2024-01-26 PROCEDURE — 1126F AMNT PAIN NOTED NONE PRSNT: CPT | Mod: CPTII,S$GLB,, | Performed by: PHYSICIAN ASSISTANT

## 2024-01-26 PROCEDURE — 71046 X-RAY EXAM CHEST 2 VIEWS: CPT | Mod: 26,,, | Performed by: RADIOLOGY

## 2024-01-26 PROCEDURE — 99999 PR PBB SHADOW E&M-EST. PATIENT-LVL V: CPT | Mod: PBBFAC,,, | Performed by: PHYSICIAN ASSISTANT

## 2024-01-26 PROCEDURE — 1159F MED LIST DOCD IN RCRD: CPT | Mod: CPTII,S$GLB,, | Performed by: PHYSICIAN ASSISTANT

## 2024-01-26 PROCEDURE — 3008F BODY MASS INDEX DOCD: CPT | Mod: CPTII,S$GLB,, | Performed by: PHYSICIAN ASSISTANT

## 2024-01-26 PROCEDURE — 99214 OFFICE O/P EST MOD 30 MIN: CPT | Mod: S$GLB,,, | Performed by: PHYSICIAN ASSISTANT

## 2024-01-26 PROCEDURE — 4010F ACE/ARB THERAPY RXD/TAKEN: CPT | Mod: CPTII,S$GLB,, | Performed by: PHYSICIAN ASSISTANT

## 2024-01-26 PROCEDURE — 1111F DSCHRG MED/CURRENT MED MERGE: CPT | Mod: CPTII,S$GLB,, | Performed by: PHYSICIAN ASSISTANT

## 2024-01-26 PROCEDURE — 1101F PT FALLS ASSESS-DOCD LE1/YR: CPT | Mod: CPTII,S$GLB,, | Performed by: PHYSICIAN ASSISTANT

## 2024-01-26 PROCEDURE — 3078F DIAST BP <80 MM HG: CPT | Mod: CPTII,S$GLB,, | Performed by: PHYSICIAN ASSISTANT

## 2024-01-26 PROCEDURE — 3288F FALL RISK ASSESSMENT DOCD: CPT | Mod: CPTII,S$GLB,, | Performed by: PHYSICIAN ASSISTANT

## 2024-01-26 PROCEDURE — 3074F SYST BP LT 130 MM HG: CPT | Mod: CPTII,S$GLB,, | Performed by: PHYSICIAN ASSISTANT

## 2024-01-26 NOTE — PATIENT INSTRUCTIONS
Please keep checking your blood pressure at home. If it goes over 130/80 more than 3 times in one week we will need to restart your old blood pressure medication losartan. Please call or message us for guidance if this occurs.

## 2024-01-26 NOTE — PROGRESS NOTES
Cardiology Clinic Note  Reason for Visit: Hospital follow up     HPI:     PMHx:  PVC s/p ablation 1/18/2024  - complicated by pericardial effusion   - follows with Dr. Scot ARANDA  HTN  CKD 3  Gout      Rajesh Mas is a 73 y.o. F, who presents for hospital follow up. She underwent ablation for PVCs 1/18, which was unfortunately complicated by pericardial and pleural effusion. She underwent pericardiocentesis 1/18. Repeat echo next day showed trivial pericardial effusion. Patient developed acute hypoxic resp failure from left pleural effusion and acute diastolic dysfunction. Improved with diuretic therapy. Treated for CAP with azithro and ceftriaxone. On 1/22 patient developed atrial fib with RVR and started on IV dilt. On 1/23/24 patient converted to normal sinus rhythm and was discharged that day. She is currently wearing 30 day event monitor and scheduled for follow up with EP 3/7. She was discharged with home O2, which she has been using, but she does not feel significantly short of breath and would like to stop using O2 as soon as it's safe to do so. She was discharged on metoprolol succinate 25 mg qD, and her previous BP meds (losartan 100 mg and triamterene/HCTZ) are still being held. Her BP has been low/normal at home.     ROS:    Pertinent ROS included in HPI and below.  PMH:     Past Medical History:   Diagnosis Date    Allergy     seasonal    Diverticulitis     Fever blister     Hypertension     Personal history of colonic polyps      Past Surgical History:   Procedure Laterality Date    ABLATION N/A 1/18/2024    Procedure: Ablation;  Surgeon: Santi Ellison MD;  Location: [facility] EP LAB;  Service: Cardiology;  Laterality: N/A;  PVC, RFA, PEPE, anes, MB, 3 Prep,*prepare to map LVOT and RVOT*    CHOLECYSTECTOMY  04/26/2017    COLON SURGERY      COLONOSCOPY N/A 12/06/2016    Procedure: COLONOSCOPY;  Surgeon: Fidel Mason MD;  Location: [facility] ENDO ([address] [place] [address]);  Service: Endoscopy;  Laterality: N/A;     COLONOSCOPY N/A 05/17/2022    Procedure: COLONOSCOPY;  Surgeon: RUSSELL Rolon MD;  Location: [facility] ENDO (4TH FLR);  Service: Endoscopy;  Laterality: N/A;  fully vaccinated, prep instr portal -ml    partial colectomy  1997    sigmoid removed due to diverticulitis    PERICARDIOCENTESIS N/A 1/18/2024    Procedure: Pericardiocentesis;  Surgeon: Santi Ellison MD;  Location: [facility] EP LAB;  Service: Cardiology;  Laterality: N/A;    SINUS SURGERY  2007    SMALL INTESTINE SURGERY  June 1997@ [facility]    Partial Sigm Colon /Divaticulitus     Allergies:     Review of patient's allergies indicates:   Allergen Reactions    Nickel sutures [surgical stainless steel]     Adhesive Rash     Medications:     Current Outpatient Medications on File Prior to Visit   Medication Sig Dispense Refill    allopurinoL (ZYLOPRIM) 300 MG tablet Take one tablet by mouth once daily in combination with 50mg tablets for a daily dose of 350mg 90 tablet 3    amoxicillin-clavulanate 875-125mg (AUGMENTIN) 875-125 mg per tablet Take 1 tablet by mouth 2 (two) times daily. for 2 days 4 tablet 0    aspirin (ECOTRIN) 81 MG EC tablet Take 1 tablet (81 mg total) by mouth once daily. 30 tablet 0    metoprolol succinate (TOPROL-XL) 25 MG 24 hr tablet Take 1 tablet (25 mg total) by mouth once daily. 30 tablet 11    oxybutynin (DITROPAN) 5 MG Tab Take 1 tablet (5 mg total) by mouth 2 (two) times daily. 180 tablet 1    predniSONE (DELTASONE) 2.5 MG tablet Take one tablet by mouth once daily 90 tablet 1    losartan (COZAAR) 100 MG tablet Take 1 tablet (100 mg total) by mouth once daily. Hold until outpatient follow-up (Patient not taking: Reported on 1/24/2024) 90 tablet 3    triamterene-hydrochlorothiazide 37.5-25 mg (DYAZIDE) 37.5-25 mg per capsule Take 1 capsule by mouth every morning. Hold until outpatient follow up with Cardiology (Patient not taking: Reported on 1/24/2024) 90 capsule 3     No current facility-administered medications on file prior to visit.  "    Social History:     Social History     Tobacco Use    Smoking status: Never    Smokeless tobacco: Never   Substance Use Topics    Alcohol use: No     Family History:     Family History   Problem Relation Age of Onset    Colon cancer Maternal Grandmother         colon cancer    Breast cancer Maternal Grandmother     Diabetes Mother     Heart failure Mother     Hypertension Mother     Heart disease Mother     Kidney disease Mother         Dialysis    Other Brother         prostate issue    No Known Problems Sister     No Known Problems Daughter     No Known Problems Daughter     No Known Problems Son     No Known Problems Son     Liver cancer Maternal Uncle     Melanoma Neg Hx     Ovarian cancer Neg Hx     Cancer Neg Hx      Physical Exam:   /66   Pulse 96   Ht 5' 1" (1.549 m)   Wt 77 kg (169 lb 12.1 oz)   LMP 11/28/1988 (Approximate)   SpO2 96%   BMI 32.07 kg/m²      Physical Exam  Vitals and nursing note reviewed.   Constitutional:       Appearance: Normal appearance.   HENT:      Head: Normocephalic and atraumatic.   Neck:      Vascular: Normal carotid pulses. No carotid bruit or hepatojugular reflux.   Cardiovascular:      Rate and Rhythm: Normal rate and regular rhythm.      Chest Wall: PMI is not displaced.      Pulses:           Radial pulses are 2+ on the right side and 2+ on the left side.        Dorsalis pedis pulses are 2+ on the right side and 2+ on the left side.        Posterior tibial pulses are 2+ on the right side and 2+ on the left side.      Heart sounds: No murmur heard.  Pulmonary:      Effort: Pulmonary effort is normal.      Breath sounds: Normal breath sounds. No wheezing, rhonchi or rales.   Abdominal:      General: Bowel sounds are normal. There is no abdominal bruit.      Palpations: Abdomen is soft. There is no pulsatile mass.      Tenderness: There is no abdominal tenderness.   Feet:      Right foot:      Skin integrity: Skin integrity normal.      Left foot:      Skin " integrity: Skin integrity normal.   Skin:     Capillary Refill: Capillary refill takes less than 2 seconds.   Neurological:      General: No focal deficit present.      Mental Status: She is alert.   Psychiatric:         Mood and Affect: Mood and affect normal.         Speech: Speech normal.         Behavior: Behavior is cooperative.         Thought Content: Thought content normal.          Labs:     Blood Tests:  Lab Results   Component Value Date    BNP 96 2024     2024    K 4.1 2024     2024    CO2 23 2024    BUN 51 (H) 2024    CREATININE 1.6 (H) 2024    GLU 96 2024    HGBA1C 5.2 2023    MG 2.5 2024    AST 33 2024    ALT 30 2024    ALBUMIN 2.8 (L) 2024    PROT 7.1 2024    BILITOT 0.5 2024    WBC 10.17 2024    HGB 11.0 (L) 2024    HCT 34.2 (L) 2024    MCV 94 2024     2024    INR 1.0 2024    TSH 4.749 (H) 2024       Lab Results   Component Value Date    CHOL 196 2023    CHOL 174 03/15/2017    HDL 62 2023    TRIG 174 (H) 2023       Lab Results   Component Value Date    LDLCALC 99.2 2023         Imaging:     Echocardiogram  TTE 2024    Irregular rhyhm with tachycardia up to 138 bpm.    Left Ventricle: The left ventricle is normal in size. Normal wall thickness. Normal wall motion. There is moderately reduced systolic function. Ejection fraction by visual approximation is 35%. Unable to assess diastolic function due to atrial fibrillation.    Right Ventricle: Normal right ventricular cavity size. Wall thickness is normal. Right ventricle wall motion  is normal. Systolic function is normal.    Pulmonary Artery: The estimated pulmonary artery systolic pressure is 20 mmHg.    IVC/SVC: Normal venous pressure at 3 mmHg.    Stress testing  None    Cath Lab  None    Other  None    EK2024  Normal sinus rhythm   ST elevation, consider early  repolarization, pericarditis, or injury   Nonspecific ST and T wave abnormality   Abnormal ECG   When compared with ECG of 22-JAN-2024 09:53,   Sinus rhythm has replaced Atrial fibrillation or SVT   Vent. rate has decreased BY  94 BPM     Assessment:     1. Pericardial effusion    2. Pleural effusion    3. PVCs (premature ventricular contractions)    4. S/P radiofrequency ablation operation for arrhythmia    5. Essential hypertension        Plan:     Pericardial effusion  -     Ambulatory referral/consult to Cardiology  -     Echo; Future; Expected date: 02/26/2024  Repeat echo in 1 month     Pleural effusion  -     X-Ray Chest PA And Lateral; Future; Expected date: 01/26/2024  No adventitious breath sounds on exam  I expect continued improvement     PVCs (premature ventricular contractions)  S/p RFA  Currently wearing event monitor   Follow up with EP as planned     Essential hypertension  Continue monitoring BP at home   Contact clinic if BP > 130/80 to discuss restarting losartan      Signed:  Rebeca Das PA-C  Cardiology     1/26/2024 9:42 AM    Follow-up:     Future Appointments   Date Time Provider Department Center   1/31/2024  1:00 PM Gail Villarreal MD [facility] [address] PC[facility]   2/5/2024  1:00 PM Huang He MD [facility] HC BMT [facility]   2/20/2024 11:00 AM ECHO, [facility] ECHOSTR [facility]   2/21/2024  9:20 AM LAB, APPOINTMENT [facility] INTMED [facility] LAB IM [address] PCW   3/7/2024  9:15 AM EKG, APPT [facility] EKG [facility]   3/7/2024 10:00 AM Gail May NP [facility] ARRHYTH [facility]   3/11/2024  9:45 AM [facility] OIC-MAMMO1 [facility] MAMMOIC [facility]   3/11/2024 10:30 AM Rebeca Das PA-C [facility] CARDIO [facility]   7/9/2024  9:00 AM [facility]

## 2024-01-30 DIAGNOSIS — Z00.00 ENCOUNTER FOR MEDICARE ANNUAL WELLNESS EXAM: ICD-10-CM

## 2024-01-31 ENCOUNTER — OFFICE VISIT (OUTPATIENT)
Dept: INTERNAL MEDICINE | Facility: CLINIC | Age: 74
End: 2024-01-31
Payer: MEDICARE

## 2024-01-31 VITALS
HEART RATE: 67 BPM | HEIGHT: 61 IN | OXYGEN SATURATION: 95 % | BODY MASS INDEX: 32.13 KG/M2 | SYSTOLIC BLOOD PRESSURE: 137 MMHG | WEIGHT: 170.19 LBS | DIASTOLIC BLOOD PRESSURE: 78 MMHG

## 2024-01-31 DIAGNOSIS — I49.3 PVCS (PREMATURE VENTRICULAR CONTRACTIONS): ICD-10-CM

## 2024-01-31 DIAGNOSIS — I10 ESSENTIAL HYPERTENSION: Primary | Chronic | ICD-10-CM

## 2024-01-31 DIAGNOSIS — Z09 HOSPITAL DISCHARGE FOLLOW-UP: ICD-10-CM

## 2024-01-31 DIAGNOSIS — N18.32 STAGE 3B CHRONIC KIDNEY DISEASE: ICD-10-CM

## 2024-01-31 PROCEDURE — 1126F AMNT PAIN NOTED NONE PRSNT: CPT | Mod: CPTII,S$GLB,, | Performed by: INTERNAL MEDICINE

## 2024-01-31 PROCEDURE — 1101F PT FALLS ASSESS-DOCD LE1/YR: CPT | Mod: CPTII,S$GLB,, | Performed by: INTERNAL MEDICINE

## 2024-01-31 PROCEDURE — 3288F FALL RISK ASSESSMENT DOCD: CPT | Mod: CPTII,S$GLB,, | Performed by: INTERNAL MEDICINE

## 2024-01-31 PROCEDURE — 3075F SYST BP GE 130 - 139MM HG: CPT | Mod: CPTII,S$GLB,, | Performed by: INTERNAL MEDICINE

## 2024-01-31 PROCEDURE — 99214 OFFICE O/P EST MOD 30 MIN: CPT | Mod: S$GLB,,, | Performed by: INTERNAL MEDICINE

## 2024-01-31 PROCEDURE — 99999 PR PBB SHADOW E&M-EST. PATIENT-LVL III: CPT | Mod: PBBFAC,,, | Performed by: INTERNAL MEDICINE

## 2024-01-31 PROCEDURE — 3008F BODY MASS INDEX DOCD: CPT | Mod: CPTII,S$GLB,, | Performed by: INTERNAL MEDICINE

## 2024-01-31 PROCEDURE — 3078F DIAST BP <80 MM HG: CPT | Mod: CPTII,S$GLB,, | Performed by: INTERNAL MEDICINE

## 2024-01-31 PROCEDURE — 1159F MED LIST DOCD IN RCRD: CPT | Mod: CPTII,S$GLB,, | Performed by: INTERNAL MEDICINE

## 2024-01-31 PROCEDURE — 1111F DSCHRG MED/CURRENT MED MERGE: CPT | Mod: CPTII,S$GLB,, | Performed by: INTERNAL MEDICINE

## 2024-01-31 PROCEDURE — 1160F RVW MEDS BY RX/DR IN RCRD: CPT | Mod: CPTII,S$GLB,, | Performed by: INTERNAL MEDICINE

## 2024-01-31 NOTE — PROGRESS NOTES
"Subjective:       Patient ID: Rajesh Mas is a 73 y.o. female.    Chief Complaint: Hospital Follow Up    HPI  73 y.o. female presents for a hospital follow up visit. I have reviewed discharge summary as well as all relevant laboratory and pathology results and imaging.     Patient was admitted 1/18/24 to 1/23/24for palpitations. She underwent RFA for PVCs. during the course of the procedure she was noted to have an acute drop in her blood pressure. Bedside echo during procedure confirmed a new moderate sized pericardial effusion with tamponade physiology. Patient underwent emergent pericardiocentesis on 1/18/24 with removal off approximately 250cc. developed acute hypoxic resp failure from Left pleural effusion and acute diastolic dysfunction. Upgraded to inpatient from observation. Improved with diuretic therapy. Treating for CAP with azithro and ceftriaxone. On 1/22 patient developed atrial fib with RVR and started on IV dilt. On 1/23/24 patient converted to normal sinus rhythm. Anti-coagulation held and started on metop xl 25mg daily.   Losasrtan and dyazide were held at d/c. Asa 81mg.    Readings since d/c:  130s/70s.     Allopurinol 350mg and pred 2.5mg daily.     She has home oxygen now following acute hypoxic resp failure. Readings are 93%; ef 35%.  Review of Systems   Constitutional:  Negative for fever.   Respiratory:  Negative for shortness of breath.    Cardiovascular:  Negative for chest pain.   Musculoskeletal: Negative.    Skin: Negative.        Objective:   /78 (BP Location: Right arm, Patient Position: Sitting, BP Method: Medium (Manual))   Pulse 67   Ht 5' 1" (1.549 m)   Wt 77.2 kg (170 lb 3.1 oz)   LMP 11/28/1988 (Approximate)   SpO2 95%   BMI 32.16 kg/m²      Physical Exam  Constitutional:       General: She is not in acute distress.     Appearance: She is well-developed. She is not diaphoretic.   HENT:      Head: Normocephalic and atraumatic.   Cardiovascular:      Rate and Rhythm: " Normal rate and regular rhythm.   Pulmonary:      Effort: Pulmonary effort is normal. No respiratory distress.      Breath sounds: No wheezing or rales.   Skin:     General: Skin is warm and dry.   Neurological:      Mental Status: She is alert and oriented to person, place, and time.   Psychiatric:         Behavior: Behavior normal.         Assessment:       1. Essential hypertension    2. Hospital discharge follow-up    3. PVCs (premature ventricular contractions)    4. Stage 3b chronic kidney disease        Plan:       1. Essential hypertension  - stable and controlled  - continue current regimen    2. Hospital discharge follow-up  Medicaitions reviewed    3. PVCs (premature ventricular contractions)  Sx stable at this time    4. Stage 3b chronic kidney disease  -     CBC Auto Differential; Future; Expected date: 01/31/2024           Health Maintenance Due   Topic    Mammogram

## 2024-02-02 PROCEDURE — 93010 ELECTROCARDIOGRAM REPORT: CPT | Mod: ,,, | Performed by: INTERNAL MEDICINE

## 2024-02-02 PROCEDURE — 96375 TX/PRO/DX INJ NEW DRUG ADDON: CPT

## 2024-02-02 PROCEDURE — 96366 THER/PROPH/DIAG IV INF ADDON: CPT

## 2024-02-02 PROCEDURE — 96368 THER/DIAG CONCURRENT INF: CPT

## 2024-02-02 PROCEDURE — 99285 EMERGENCY DEPT VISIT HI MDM: CPT | Mod: 25

## 2024-02-02 PROCEDURE — 96365 THER/PROPH/DIAG IV INF INIT: CPT

## 2024-02-02 PROCEDURE — 93005 ELECTROCARDIOGRAM TRACING: CPT

## 2024-02-03 ENCOUNTER — HOSPITAL ENCOUNTER (INPATIENT)
Facility: HOSPITAL | Age: 74
LOS: 5 days | Discharge: HOME | DRG: 194 | End: 2024-02-09
Attending: STUDENT IN AN ORGANIZED HEALTH CARE EDUCATION/TRAINING PROGRAM | Admitting: STUDENT IN AN ORGANIZED HEALTH CARE EDUCATION/TRAINING PROGRAM
Payer: MEDICARE

## 2024-02-03 DIAGNOSIS — R07.9 CHEST PAIN: ICD-10-CM

## 2024-02-03 DIAGNOSIS — I50.9 PLEURAL EFFUSION DUE TO CHF (CONGESTIVE HEART FAILURE): ICD-10-CM

## 2024-02-03 DIAGNOSIS — R00.0 TACHYCARDIA: ICD-10-CM

## 2024-02-03 DIAGNOSIS — R06.02 SOB (SHORTNESS OF BREATH): ICD-10-CM

## 2024-02-03 DIAGNOSIS — J90 PLEURAL EFFUSION, LEFT: ICD-10-CM

## 2024-02-03 DIAGNOSIS — N18.32 STAGE 3B CHRONIC KIDNEY DISEASE: ICD-10-CM

## 2024-02-03 DIAGNOSIS — Z87.39 HISTORY OF GOUT: ICD-10-CM

## 2024-02-03 DIAGNOSIS — I31.39 PERICARDIAL EFFUSION: ICD-10-CM

## 2024-02-03 DIAGNOSIS — I49.3 PVCS (PREMATURE VENTRICULAR CONTRACTIONS): ICD-10-CM

## 2024-02-03 DIAGNOSIS — J18.9 PNEUMONIA DUE TO INFECTIOUS ORGANISM, UNSPECIFIED LATERALITY, UNSPECIFIED PART OF LUNG: ICD-10-CM

## 2024-02-03 DIAGNOSIS — J90 PLEURAL EFFUSION: Primary | ICD-10-CM

## 2024-02-03 DIAGNOSIS — I51.89 DIASTOLIC DYSFUNCTION: ICD-10-CM

## 2024-02-03 PROBLEM — J96.11 CHRONIC HYPOXEMIC RESPIRATORY FAILURE: Status: ACTIVE | Noted: 2024-01-19

## 2024-02-03 LAB
ALBUMIN SERPL BCP-MCNC: 3.1 G/DL (ref 3.5–5.2)
ALP SERPL-CCNC: 57 U/L (ref 55–135)
ALT SERPL W/O P-5'-P-CCNC: 22 U/L (ref 10–44)
ANION GAP SERPL CALC-SCNC: 8 MMOL/L (ref 8–16)
AST SERPL-CCNC: 24 U/L (ref 10–40)
BACTERIA #/AREA URNS AUTO: ABNORMAL /HPF
BASOPHILS # BLD AUTO: 0.07 K/UL (ref 0–0.2)
BASOPHILS NFR BLD: 0.3 % (ref 0–1.9)
BILIRUB SERPL-MCNC: 0.7 MG/DL (ref 0.1–1)
BILIRUB UR QL STRIP: NEGATIVE
BNP SERPL-MCNC: 258 PG/ML (ref 0–99)
BUN SERPL-MCNC: 20 MG/DL (ref 8–23)
CALCIUM SERPL-MCNC: 9.5 MG/DL (ref 8.7–10.5)
CHLORIDE SERPL-SCNC: 108 MMOL/L (ref 95–110)
CLARITY UR REFRACT.AUTO: ABNORMAL
CO2 SERPL-SCNC: 23 MMOL/L (ref 23–29)
COLOR UR AUTO: YELLOW
CREAT SERPL-MCNC: 1 MG/DL (ref 0.5–1.4)
DIFFERENTIAL METHOD BLD: ABNORMAL
EJECTION FRACTION: 40 %
EOSINOPHIL # BLD AUTO: 0 K/UL (ref 0–0.5)
EOSINOPHIL NFR BLD: 0 % (ref 0–8)
ERYTHROCYTE [DISTWIDTH] IN BLOOD BY AUTOMATED COUNT: 13.7 % (ref 11.5–14.5)
EST. GFR  (NO RACE VARIABLE): >60 ML/MIN/1.73 M^2
FRACTIONAL SHORTENING: 17 % (ref 28–44)
GLUCOSE SERPL-MCNC: 130 MG/DL (ref 70–110)
GLUCOSE UR QL STRIP: NEGATIVE
HCT VFR BLD AUTO: 36.8 % (ref 37–48.5)
HCV AB SERPL QL IA: NORMAL
HGB BLD-MCNC: 11.8 G/DL (ref 12–16)
HGB UR QL STRIP: ABNORMAL
HIV 1+2 AB+HIV1 P24 AG SERPL QL IA: NORMAL
HYALINE CASTS UR QL AUTO: 0 /LPF
IMM GRANULOCYTES # BLD AUTO: 0.12 K/UL (ref 0–0.04)
IMM GRANULOCYTES NFR BLD AUTO: 0.6 % (ref 0–0.5)
INFLUENZA A, MOLECULAR: NEGATIVE
INFLUENZA B, MOLECULAR: NEGATIVE
INR PPP: 1.1 (ref 0.8–1.2)
KETONES UR QL STRIP: NEGATIVE
LEFT INTERNAL DIMENSION IN SYSTOLE: 3.22 CM (ref 2.1–4)
LEFT VENTRICLE DIASTOLIC VOLUME: 65.43 ML
LEFT VENTRICLE SYSTOLIC VOLUME: 41.47 ML
LEFT VENTRICULAR INTERNAL DIMENSION IN DIASTOLE: 3.89 CM (ref 3.5–6)
LEUKOCYTE ESTERASE UR QL STRIP: ABNORMAL
LYMPHOCYTES # BLD AUTO: 1 K/UL (ref 1–4.8)
LYMPHOCYTES NFR BLD: 4.6 % (ref 18–48)
MCH RBC QN AUTO: 29.4 PG (ref 27–31)
MCHC RBC AUTO-ENTMCNC: 32.1 G/DL (ref 32–36)
MCV RBC AUTO: 92 FL (ref 82–98)
MICROSCOPIC COMMENT: ABNORMAL
MONOCYTES # BLD AUTO: 1.1 K/UL (ref 0.3–1)
MONOCYTES NFR BLD: 5 % (ref 4–15)
NEUTROPHILS # BLD AUTO: 19.4 K/UL (ref 1.8–7.7)
NEUTROPHILS NFR BLD: 89.5 % (ref 38–73)
NITRITE UR QL STRIP: NEGATIVE
NRBC BLD-RTO: 0 /100 WBC
PH UR STRIP: 6 [PH] (ref 5–8)
PISA TR MAX VEL: 2.34 M/S
PLATELET # BLD AUTO: 338 K/UL (ref 150–450)
PMV BLD AUTO: 12.4 FL (ref 9.2–12.9)
POTASSIUM SERPL-SCNC: 4.2 MMOL/L (ref 3.5–5.1)
PROCALCITONIN SERPL IA-MCNC: 0.12 NG/ML
PROT SERPL-MCNC: 7.7 G/DL (ref 6–8.4)
PROT UR QL STRIP: ABNORMAL
PROTHROMBIN TIME: 11.4 SEC (ref 9–12.5)
RA PRESSURE ESTIMATED: 3 MMHG
RBC # BLD AUTO: 4.01 M/UL (ref 4–5.4)
RBC #/AREA URNS AUTO: 3 /HPF (ref 0–4)
RV TB RVSP: 5 MMHG
SARS-COV-2 RDRP RESP QL NAA+PROBE: NEGATIVE
SODIUM SERPL-SCNC: 139 MMOL/L (ref 136–145)
SP GR UR STRIP: >1.03 (ref 1–1.03)
SPECIMEN SOURCE: NORMAL
SQUAMOUS #/AREA URNS AUTO: 18 /HPF
TR MAX PG: 22 MMHG
TROPONIN I SERPL DL<=0.01 NG/ML-MCNC: 0.02 NG/ML (ref 0–0.03)
TSH SERPL DL<=0.005 MIU/L-ACNC: 2.96 UIU/ML (ref 0.4–4)
TV REST PULMONARY ARTERY PRESSURE: 25 MMHG
UNSPECIFIED CRY UR QL COMP ASSIST: 1
URN SPEC COLLECT METH UR: ABNORMAL
WBC # BLD AUTO: 21.73 K/UL (ref 3.9–12.7)
WBC #/AREA URNS AUTO: 10 /HPF (ref 0–5)
YEAST UR QL AUTO: ABNORMAL

## 2024-02-03 PROCEDURE — 94761 N-INVAS EAR/PLS OXIMETRY MLT: CPT

## 2024-02-03 PROCEDURE — 25000003 PHARM REV CODE 250: Performed by: STUDENT IN AN ORGANIZED HEALTH CARE EDUCATION/TRAINING PROGRAM

## 2024-02-03 PROCEDURE — G0378 HOSPITAL OBSERVATION PER HR: HCPCS

## 2024-02-03 PROCEDURE — 81001 URINALYSIS AUTO W/SCOPE: CPT | Performed by: STUDENT IN AN ORGANIZED HEALTH CARE EDUCATION/TRAINING PROGRAM

## 2024-02-03 PROCEDURE — 87502 INFLUENZA DNA AMP PROBE: CPT | Performed by: STUDENT IN AN ORGANIZED HEALTH CARE EDUCATION/TRAINING PROGRAM

## 2024-02-03 PROCEDURE — 25000003 PHARM REV CODE 250

## 2024-02-03 PROCEDURE — 63600175 PHARM REV CODE 636 W HCPCS

## 2024-02-03 PROCEDURE — 84484 ASSAY OF TROPONIN QUANT: CPT | Performed by: EMERGENCY MEDICINE

## 2024-02-03 PROCEDURE — 84145 PROCALCITONIN (PCT): CPT | Performed by: PHYSICIAN ASSISTANT

## 2024-02-03 PROCEDURE — 99900035 HC TECH TIME PER 15 MIN (STAT)

## 2024-02-03 PROCEDURE — 83880 ASSAY OF NATRIURETIC PEPTIDE: CPT | Performed by: EMERGENCY MEDICINE

## 2024-02-03 PROCEDURE — 86803 HEPATITIS C AB TEST: CPT | Performed by: PHYSICIAN ASSISTANT

## 2024-02-03 PROCEDURE — 63600175 PHARM REV CODE 636 W HCPCS: Performed by: STUDENT IN AN ORGANIZED HEALTH CARE EDUCATION/TRAINING PROGRAM

## 2024-02-03 PROCEDURE — 85610 PROTHROMBIN TIME: CPT | Performed by: EMERGENCY MEDICINE

## 2024-02-03 PROCEDURE — 87040 BLOOD CULTURE FOR BACTERIA: CPT | Performed by: STUDENT IN AN ORGANIZED HEALTH CARE EDUCATION/TRAINING PROGRAM

## 2024-02-03 PROCEDURE — 87389 HIV-1 AG W/HIV-1&-2 AB AG IA: CPT | Performed by: PHYSICIAN ASSISTANT

## 2024-02-03 PROCEDURE — U0002 COVID-19 LAB TEST NON-CDC: HCPCS | Performed by: STUDENT IN AN ORGANIZED HEALTH CARE EDUCATION/TRAINING PROGRAM

## 2024-02-03 PROCEDURE — 80053 COMPREHEN METABOLIC PANEL: CPT | Performed by: EMERGENCY MEDICINE

## 2024-02-03 PROCEDURE — 84443 ASSAY THYROID STIM HORMONE: CPT | Performed by: EMERGENCY MEDICINE

## 2024-02-03 PROCEDURE — 27000221 HC OXYGEN, UP TO 24 HOURS

## 2024-02-03 PROCEDURE — 85025 COMPLETE CBC W/AUTO DIFF WBC: CPT | Performed by: EMERGENCY MEDICINE

## 2024-02-03 RX ORDER — GLUCAGON 1 MG
1 KIT INJECTION
Status: DISCONTINUED | OUTPATIENT
Start: 2024-02-03 | End: 2024-02-09 | Stop reason: HOSPADM

## 2024-02-03 RX ORDER — PREDNISONE 2.5 MG/1
2.5 TABLET ORAL DAILY
Status: DISCONTINUED | OUTPATIENT
Start: 2024-02-03 | End: 2024-02-09 | Stop reason: HOSPADM

## 2024-02-03 RX ORDER — FUROSEMIDE 10 MG/ML
20 INJECTION INTRAMUSCULAR; INTRAVENOUS DAILY
Status: DISCONTINUED | OUTPATIENT
Start: 2024-02-03 | End: 2024-02-03

## 2024-02-03 RX ORDER — ASPIRIN 81 MG/1
81 TABLET ORAL DAILY
Status: DISCONTINUED | OUTPATIENT
Start: 2024-02-03 | End: 2024-02-09 | Stop reason: HOSPADM

## 2024-02-03 RX ORDER — NALOXONE HCL 0.4 MG/ML
0.02 VIAL (ML) INJECTION
Status: DISCONTINUED | OUTPATIENT
Start: 2024-02-03 | End: 2024-02-09 | Stop reason: HOSPADM

## 2024-02-03 RX ORDER — METOPROLOL SUCCINATE 25 MG/1
25 TABLET, EXTENDED RELEASE ORAL DAILY
Status: DISCONTINUED | OUTPATIENT
Start: 2024-02-03 | End: 2024-02-09 | Stop reason: HOSPADM

## 2024-02-03 RX ORDER — ACETAMINOPHEN 325 MG/1
650 TABLET ORAL EVERY 4 HOURS PRN
Status: DISCONTINUED | OUTPATIENT
Start: 2024-02-03 | End: 2024-02-09 | Stop reason: HOSPADM

## 2024-02-03 RX ORDER — IPRATROPIUM BROMIDE AND ALBUTEROL SULFATE 2.5; .5 MG/3ML; MG/3ML
3 SOLUTION RESPIRATORY (INHALATION) EVERY 4 HOURS PRN
Status: DISCONTINUED | OUTPATIENT
Start: 2024-02-03 | End: 2024-02-09 | Stop reason: HOSPADM

## 2024-02-03 RX ORDER — OXYBUTYNIN CHLORIDE 5 MG/1
5 TABLET ORAL 2 TIMES DAILY
Status: DISCONTINUED | OUTPATIENT
Start: 2024-02-03 | End: 2024-02-09 | Stop reason: HOSPADM

## 2024-02-03 RX ORDER — IBUPROFEN 200 MG
16 TABLET ORAL
Status: DISCONTINUED | OUTPATIENT
Start: 2024-02-03 | End: 2024-02-09 | Stop reason: HOSPADM

## 2024-02-03 RX ORDER — IBUPROFEN 200 MG
24 TABLET ORAL
Status: DISCONTINUED | OUTPATIENT
Start: 2024-02-03 | End: 2024-02-09 | Stop reason: HOSPADM

## 2024-02-03 RX ORDER — SODIUM CHLORIDE 0.9 % (FLUSH) 0.9 %
5 SYRINGE (ML) INJECTION
Status: DISCONTINUED | OUTPATIENT
Start: 2024-02-03 | End: 2024-02-09 | Stop reason: HOSPADM

## 2024-02-03 RX ADMIN — VANCOMYCIN HYDROCHLORIDE 1750 MG: 500 INJECTION, POWDER, LYOPHILIZED, FOR SOLUTION INTRAVENOUS at 09:02

## 2024-02-03 RX ADMIN — ASPIRIN 81 MG: 81 TABLET, COATED ORAL at 08:02

## 2024-02-03 RX ADMIN — PREDNISONE 2.5 MG: 2.5 TABLET ORAL at 08:02

## 2024-02-03 RX ADMIN — OXYBUTYNIN CHLORIDE 5 MG: 5 TABLET ORAL at 08:02

## 2024-02-03 RX ADMIN — FUROSEMIDE 20 MG: 10 INJECTION, SOLUTION INTRAMUSCULAR; INTRAVENOUS at 05:02

## 2024-02-03 RX ADMIN — ALLOPURINOL 350 MG: 300 TABLET ORAL at 08:02

## 2024-02-03 RX ADMIN — METOPROLOL SUCCINATE 25 MG: 25 TABLET, EXTENDED RELEASE ORAL at 08:02

## 2024-02-03 RX ADMIN — VANCOMYCIN HYDROCHLORIDE 2000 MG: 500 INJECTION, POWDER, LYOPHILIZED, FOR SOLUTION INTRAVENOUS at 04:02

## 2024-02-03 RX ADMIN — PIPERACILLIN SODIUM AND TAZOBACTAM SODIUM 4.5 G: 4; .5 INJECTION, POWDER, FOR SOLUTION INTRAVENOUS at 09:02

## 2024-02-03 RX ADMIN — OXYBUTYNIN CHLORIDE 5 MG: 5 TABLET ORAL at 09:02

## 2024-02-03 RX ADMIN — PIPERACILLIN SODIUM AND TAZOBACTAM SODIUM 4.5 G: 4; .5 INJECTION, POWDER, FOR SOLUTION INTRAVENOUS at 04:02

## 2024-02-03 NOTE — HPI
Rajesh Mas is a 73 y.o. female with PMHx of gout, obesity, CKD3, HTN, recent hospitalization for PVC s/p ablation 1/18/24 that was complicated by pericardial effusion with tamponade physiology s/p pericardiocentesis 1/18/24 and development of acute hypoxic respiratory failure from left pleural effusion and acute diastolic dysfunction. The pleural effusion improved with diuresis but she still During this hospital course she also was treated for CAP, placed on diltiazem gtt for development of Afib that resolved. She was discharged home on metoprolol and supplemental oxygen.    She presents to the ED today with her  for acute shortness of breath that began around 7 PM last night. She has been wearing oxygen at 2lpm via NC since last hospital discharge but up until last night had not feeling short of breath. She was walking around her house when the shortness of breath started, barely exerting herself. She also developed soreness/tightness in her bilateral shoulders and mild heaviness in her chest without chest pain. She has had a persistent dry cough for about one week. She also started requiring 3 pillows for sleep a few nights after developing orthopnea. She has poor appetite today. No fevers, chills, leg swelling, nausea, vomiting, palpitations, syncope.     In the ED, she was satting 91% on RA and 95% on 2L NC. Vitals otherwise stable. Labs reveal leukocytosis with WBC 21k, mild anemia with H/H 11.8/36.8. . Troponin 0.017. Flu and covid negative. CXR shows increased size of moderate to large left pleural effusion. Probable small right pleural effusion. She was started on vancomycin and zosyn and admitted to hospital medicine observation.

## 2024-02-03 NOTE — ASSESSMENT & PLAN NOTE
Patient with Hypoxic Respiratory failure which is Chronic, though only recently started on supplemental oxygen. she is on home oxygen at 2 LPM. Supplemental oxygen was provided and noted-    Signs/symptoms of respiratory failure include- increased work of breathing on exertion. Contributing diagnoses includes - CHF, Pleural effusion, and Pneumonia Labs and images were reviewed. Patient Has not had a recent ABG. Will treat underlying causes and adjust management of respiratory failure as follows- See pleural effusion

## 2024-02-03 NOTE — ED NOTES
Patient in bed, side rails are up, stretcher locked, NAD.  Patient AAOx4, currently on oxygen 2l/min via NC. Patient remains on a purewick with urinary output. Family is bed side.  No complaints, patient only requested ice chips at this time.

## 2024-02-03 NOTE — ED NOTES
Assumed care of this pt at this time   Patient identifiers verified and correct for Rajesh Mas   LOC: The patient is aao x 3   APPEARANCE: Patient appears comfortable and in no acute distress, patient is clean and well groomed.  SKIN: The skin is warm and dry, color consistent with ethnicity, patient has normal skin turgor and moist mucus membranes, skin intact  MUSCULOSKELETAL: Patient moving all extremities spontaneously  RESPIRATORY: Airway is open and patent, respirations are spontaneous, patient has a normal effort and rate, no accessory muscle use noted, pt placed on continuous pulse ox with O2 sats noted at 95% on 2L NC .  CARDIAC: Pt placed on cardiac monitor. Patient has a normal rate, capillary refill < 3 seconds.   GASTRO: Soft and non tender to palpation, no distention noted,  Pt states bowel movements have been regular.  : Pt denies any pain or frequency with urination. Pure wick noted   NEURO: Pt opens eyes spontaneously, behavior appropriate to situation, follows commands, facial expression symmetrical, bilateral hand grasp equal and even, purposeful motor response noted, normal sensation in all extremities when touched with a finger.

## 2024-02-03 NOTE — ASSESSMENT & PLAN NOTE
Chronic, controlled. Latest blood pressure and vitals reviewed-     Temp:  [98.3 °F (36.8 °C)]   Pulse:  []   Resp:  [18-20]   BP: (134-159)/(74-87)   SpO2:  [91 %-95 %] .   Home meds for hypertension were reviewed and noted below.   Hypertension Medications               metoprolol succinate (TOPROL-XL) 25 MG 24 hr tablet Take 1 tablet (25 mg total) by mouth once daily.          While in the hospital, will manage blood pressure as follows; Continue home antihypertensive regimen (metoprolol only). Currently her losartan 100mg/day and triamterene-hydrochlorothiazide 37.5-25 mg/day are on hold since last hospital admission due to concern for hypotension.     Will utilize p.r.n. blood pressure medication only if patient's blood pressure greater than 180/110 and she develops symptoms such as worsening chest pain or shortness of breath.

## 2024-02-03 NOTE — ED NOTES
Receiving floor nurse, Lillian, aware of needing to tube telemetry box to tube station [id number]. Awaiting tele box at this time. Care ongoing.

## 2024-02-03 NOTE — ED NOTES
Tele box #[id number] applied to pt. Will in war room states able to see pt on monitor, sinus rhythm with HR 64.

## 2024-02-03 NOTE — ASSESSMENT & PLAN NOTE
Creatine stable for now. BMP reviewed- noted Estimated Creatinine Clearance: 47.9 mL/min (based on SCr of 1 mg/dL). according to latest data. Based on current GFR, CKD stage is stage 3 - GFR 30-59.  Monitor UOP and serial BMP and adjust therapy as needed. Renally dose meds. Avoid nephrotoxic medications and procedures.

## 2024-02-03 NOTE — ASSESSMENT & PLAN NOTE
During recent hospitalization, pt noted to have pleural effusions and there was concern for acute diastolic dysfunction and a transient decreased EF related to high HR.  - Repeat TTE ordered  - Monitor I/Os  - Start lasix 20 mg IV daily for pleural effusion

## 2024-02-03 NOTE — ED PROVIDER NOTES
Encounter Date: 2/2/2024       History     Chief Complaint   Patient presents with    Shortness of Breath     Worsening SOB for the last few hrs. Hx of afib and CHF     HPI    73-year-old female significant medical history of hypertension, diverticulitis, pericardial effusion, hypothyroidism presenting for shortness of breath.    Patient presents with concerns of shortness of breath on exertion, generalized malaise and lightheadedness for the last 3 hours prior to presentation.  She notes these symptoms have made it difficult for her to walk around her house and she is unable to walk more than a few steps before she is short of breath.  She wears 2 L of oxygen via nasal cannula at baseline, has not needed more oxygen.  She endorses a mild cough, nonproductive and she notes some chest tightness but denies chest pain.  She denies orthopnea with her symptoms.   She denies fever, chills, nausea, vomiting, diarrhea, polyuria, headache.     On chart review, echo on 01/22/2024 showed a approximate ejection fraction of 35%.  She was recently admitted and discharged on 01/23/2020 for for pericardial effusion with subsequent tamponade physiology, underwent cardiac ablation and was noted to be in acute respiratory hypoxic failure from left pleural effusion.          Review of patient's allergies indicates:   Allergen Reactions    Nickel sutures [surgical stainless steel]     Adhesive Rash     Past Medical History:   Diagnosis Date    Allergy     seasonal    Diverticulitis     Fever blister     Hypertension     Personal history of colonic polyps      Past Surgical History:   Procedure Laterality Date    ABLATION N/A 1/18/2024    Procedure: Ablation;  Surgeon: Santi Ellison MD;  Location: [facility];  Service: Cardiology;  Laterality: N/A;  PVC, RFA, PEPE, anes, MB, 3 Prep,*prepare to map LVOT and RVOT*    CHOLECYSTECTOMY  04/26/2017    COLON SURGERY      COLONOSCOPY N/A 12/06/2016    Procedure: COLONOSCOPY;  Surgeon:  Fidel Mason MD;  Location: [facility] ENDO (4TH FLR);  Service: Endoscopy;  Laterality: N/A;    COLONOSCOPY N/A 05/17/2022    Procedure: COLONOSCOPY;  Surgeon: RUSSELL Rolon MD;  Location: [facility] ENDO ([facility] FLR);  Service: Endoscopy;  Laterality: N/A;  fully vaccinated, prep instr portal -ml    partial colectomy  1997    sigmoid removed due to diverticulitis    PERICARDIOCENTESIS N/A 1/18/2024    Procedure: Pericardiocentesis;  Surgeon: Santi Ellison MD;  Location: [facility] EP LAB;  Service: Cardiology;  Laterality: N/A;    SINUS SURGERY  2007    SMALL INTESTINE SURGERY  June 1997@ [facility]    Partial Sigm Colon /Divaticulitus     Family History   Problem Relation Age of Onset    Colon cancer Maternal Grandmother         colon cancer    Breast cancer Maternal Grandmother     Diabetes Mother     Heart failure Mother     Hypertension Mother     Heart disease Mother     Kidney disease Mother         Dialysis    Other Brother         prostate issue    No Known Problems Sister     No Known Problems Daughter     No Known Problems Daughter     No Known Problems Son     No Known Problems Son     Liver cancer Maternal Uncle     Melanoma Neg Hx     Ovarian cancer Neg Hx     Cancer Neg Hx      Social History     Tobacco Use    Smoking status: Never    Smokeless tobacco: Never   Substance Use Topics    Alcohol use: No    Drug use: Never     Review of Systems  See HPI for pertinent review of systems  Physical Exam     Initial Vitals   BP Pulse Resp Temp SpO2   02/02/24 2247 02/02/24 2246 02/02/24 2246 02/02/24 2246 02/02/24 2246   134/74 85 20 98.3 °F (36.8 °C) (!) 91 %      MAP       --                Physical Exam    Constitutional: She appears well-developed and well-nourished.   HENT:   Head: Normocephalic.   Eyes: Pupils are equal, round, and reactive to light.   Neck: No tracheal deviation present. No JVD present.   Cardiovascular:  Normal rate and regular rhythm.     Exam reveals no gallop and no friction rub.       No murmur  heard.  Pulmonary/Chest: No stridor. She has no wheezes. She has rhonchi (Soft crackles at the bases noted right-greater-than-left). She has no rales.   Speaking in 5 word sentences   Abdominal: Abdomen is soft. There is no abdominal tenderness. There is no rebound and no guarding.     Neurological: She is alert and oriented to person, place, and time. GCS score is 15. GCS eye subscore is 4. GCS verbal subscore is 5. GCS motor subscore is 6.   Skin: Skin is warm. Capillary refill takes less than 2 seconds.   Psychiatric: She has a normal mood and affect.         ED Course   Procedures  Labs Reviewed   CBC W/ AUTO DIFFERENTIAL - Abnormal; Notable for the following components:       Result Value    WBC 21.73 (*)     Hemoglobin 11.8 (*)     Hematocrit 36.8 (*)     Immature Granulocytes 0.6 (*)     Gran # (ANC) 19.4 (*)     Immature Grans (Abs) 0.12 (*)     Mono # 1.1 (*)     Gran % 89.5 (*)     Lymph % 4.6 (*)     All other components within normal limits   COMPREHENSIVE METABOLIC PANEL - Abnormal; Notable for the following components:    Glucose 130 (*)     Albumin 3.1 (*)     All other components within normal limits   B-TYPE NATRIURETIC PEPTIDE - Abnormal; Notable for the following components:     (*)     All other components within normal limits   URINALYSIS, REFLEX TO URINE CULTURE - Abnormal; Notable for the following components:    Appearance, UA Hazy (*)     Specific Gravity, UA >1.030 (*)     Protein, UA 1+ (*)     Occult Blood UA Trace (*)     Leukocytes, UA Trace (*)     All other components within normal limits    Narrative:     Specimen Source->Urine   URINALYSIS MICROSCOPIC - Abnormal; Notable for the following components:    WBC, UA 10 (*)     Yeast, UA Occasional (*)     All other components within normal limits    Narrative:     Specimen Source->Urine   INFLUENZA A & B BY MOLECULAR   CULTURE, BLOOD   CULTURE, BLOOD   HIV 1 / 2 ANTIBODY    Narrative:     Release to patient->Immediate   HEPATITIS  C ANTIBODY    Narrative:     Release to patient->Immediate   PROTIME-INR   TROPONIN I   TSH   SARS-COV-2 RNA AMPLIFICATION, QUAL   PROCALCITONIN   PROCALCITONIN    Narrative:     Release to patient->Immediate    add on order #[id number] PCAL per Morgan Causey MD 02/03/2024    06:24           Imaging Results              CT Chest Without Contrast (Final result)  Result time 02/03/24 06:35:48      Final result by Antonio Lewis MD (02/03/24 06:35:48)                   Impression:      LEFT pleural effusion with volume loss LEFT hemithorax.  Moderate RIGHT pleural effusion.  Small pericardial effusion.      Electronically signed by: Antonio Lewis MD  Date:    02/03/2024  Time:    06:35               Narrative:    EXAMINATION:  CT CHEST WITHOUT CONTRAST    CLINICAL HISTORY:  Pneumonia, unresolved;pleural effusion, CHF, possible PNA;    TECHNIQUE:  Low dose axial images, sagittal and coronal reformations were obtained from the thoracic inlet to the lung bases. Contrast was not administered.    COMPARISON:  None    FINDINGS:  Base of Neck: No significant abnormality.    Thoracic soft tissues: Normal.    Aorta: Left-sided aortic arch.  No aneurysm and no significant atherosclerosis    Heart: Normal size.  Small volume pericardial effusion.  Effusion.    Pulmonary vasculature: Pulmonary arteries distribute normally.  There are four pulmonary veins.    Alice/Mediastinum: No pathologic eliot enlargement.    Airways: Patent.    Lungs/Pleura: LEFT pleural effusion with consolidative change in volume loss in the LEFT hemithorax.  Smaller RIGHT pleural effusion.    Esophagus: Normal.    Upper Abdomen: No abnormality of the partially imaged upper abdomen.    Bones: No acute fracture. No suspicious lytic or sclerotic lesions.                                       X-Ray Chest AP Portable (Final result)  Result time 02/03/24 02:21:25      Final result by Jung Vega MD (02/03/24 02:21:25)                   Impression:  "     Increased size of moderate to large left pleural effusion.  Probable small right pleural effusion.      Electronically signed by: Jung Vega MD  Date:    02/03/2024  Time:    02:21               Narrative:    EXAMINATION:  XR CHEST AP PORTABLE    CLINICAL HISTORY:  Provided history is "shortness of breath;  ".    TECHNIQUE:  One view of the chest.    COMPARISON:  01/26/2024.    FINDINGS:  Cardiac wires overlie the chest.  Cardiomediastinal silhouette is stable.  Moderate to large left-sided pleural effusion, mildly worse compared to prior study.  Probable small right pleural effusion.  Passive atelectasis in the inferior lungs.  No new large focal consolidation.  No distinct pneumothorax.                                       Medications   sodium chloride 0.9% flush 5 mL (has no administration in time range)   albuterol-ipratropium 2.5 mg-0.5 mg/3 mL nebulizer solution 3 mL (has no administration in time range)   acetaminophen tablet 650 mg (has no administration in time range)   naloxone 0.4 mg/mL injection 0.02 mg (has no administration in time range)   glucose chewable tablet 16 g (has no administration in time range)   glucose chewable tablet 24 g (has no administration in time range)   glucagon (human recombinant) injection 1 mg (has no administration in time range)   dextrose 10% bolus 125 mL 125 mL (has no administration in time range)   dextrose 10% bolus 250 mL 250 mL (has no administration in time range)   furosemide injection 20 mg (20 mg Intravenous Given 2/3/24 0543)   allopurinol split tablet 350 mg (has no administration in time range)   aspirin EC tablet 81 mg (has no administration in time range)   oxybutynin tablet 5 mg (has no administration in time range)   predniSONE tablet 2.5 mg (has no administration in time range)   metoprolol succinate (TOPROL-XL) 24 hr tablet 25 mg (has no administration in time range)   vancomycin 2 g in dextrose 5 % 500 mL IVPB (0 mg Intravenous Stopped 2/3/24 " 0621)   piperacillin-tazobactam (ZOSYN) 4.5 g in dextrose 5 % in water (D5W) 100 mL IVPB (MB+) (0 g Intravenous Stopped 2/3/24 1229)     Medical Decision Making  Amount and/or Complexity of Data Reviewed  Labs: ordered. Decision-making details documented in ED Course.  Radiology:  Decision-making details documented in ED Course.    Risk  Prescription drug management.               ED Course as of 02/03/24 0753   Fri Feb 02, 2024   2321 No STEMI [AC]   Sat Feb 03, 2024   0212 CBC auto differential(!)  New leukocytosis, anemia baseline, no thrombocytopenia.  Left shift noted [KB]   0212 Comprehensive metabolic panel(!)  No LESA, no significant electrolyte abnormality,  no evidence of acute hepatobiliary obstruction.   [KB]   0212 Troponin I [KB]   0212 Hepatitis C Antibody [KB]   0212 Influenza A & B by Molecular [KB]   0212 Brain natriuretic peptide(!)  Consistent with mild fluid overload. [KB]   0230 X-Ray Chest AP Portable  mpression:     Increased size of moderate to large left pleural effusion.  Probable small right pleural effusion.      [KB]   0750 EKG: Sinus tachycardia with PACs noted, rate 109, no QTC prolongation no ST segment elevation poor R-wave progression noted, similar to previous [KB]      ED Course User Index  [AC] Alfie Yan, DO  [KB] Neeta Paez MD                         73-year-old female described as above presenting on recent discharge for recurrent shortness of breath.  On presentation, vital signs stable, she is afebrile, physical exam significant for crackles bilateral bases, chest x-ray concerning for significant worsening pleural effusions bilaterally. No new oxygen requirement she is at 2 L of oxygen at baseline and not hypoxic in the emergency department.  Given her significant leukocytosis and pleural effusion, she was started on broad-spectrum antibiotics, blood cultures x2 started.  She was admitted to medicine in stable condition pending further management for pleural  effusions and shortness of breath          Clinical Impression:  Final diagnoses:  [R07.9] Chest pain  [R06.02] SOB (shortness of breath)  [J90] Pleural effusion (Primary)  [J18.9] Pneumonia due to infectious organism, unspecified laterality, unspecified part of lung          ED Disposition Condition    Observation                 Neeta Paez MD  Resident  02/03/24 2308

## 2024-02-03 NOTE — PHARMACY MED REC
"Admission Medication History     The home medication history was taken by Geraldine Bradley.    You may go to "Admission" then "Reconcile Home Medications" tabs to review and/or act upon these items.     The home medication list has been updated by the Pharmacy department.   Please read ALL comments highlighted in yellow.   Please address this information as you see fit.    Feel free to contact us if you have any questions or require assistance.        Medications listed below were obtained from: Patient/family and Analytic software- [employer]  Current Outpatient Medications on File Prior to Encounter   Medication Sig    allopurinoL (ZYLOPRIM) 300 MG tablet   Take one tablet by mouth once daily in combination with 50 mg tablets for a daily dose of 350 mg      aspirin (ECOTRIN) 81 MG EC tablet   Take 1 tablet (81 mg total) by mouth once daily.    metoprolol succinate (TOPROL-XL) 25 MG 24 hr tablet   Take 1 tablet (25 mg total) by mouth once daily.    oxybutynin (DITROPAN) 5 MG Tab   Take 1 tablet (5 mg total) by mouth 2 (two) times daily.    predniSONE (DELTASONE) 2.5 MG tablet   Take one tablet by mouth once daily           Geraldine Bradley  EXT [id number]                 .          "

## 2024-02-03 NOTE — ASSESSMENT & PLAN NOTE
Body mass index is 31.37 kg/m². Morbid obesity complicates all aspects of disease management from diagnostic modalities to treatment. Weight loss encouraged and health benefits explained to patient.

## 2024-02-03 NOTE — ASSESSMENT & PLAN NOTE
- Recent history noted tamponade physiology, underwent pericardiocentesis 1/18 and a repeat echo next day showed trivial pericardial effusion.  - Repeat TTE

## 2024-02-03 NOTE — ED TRIAGE NOTES
Rajesh Mas, a 73 y.o. female presents to the ED w/ complaint of SOB for the last few hours on exertion. Hx of a-fib and CHF. Pt states that last time she was discharged from the hospital, they sent her home with oxygen that she has been using, 2L is her baseline. Pt states that she feels like she has fluid on her. Pt also complains of tightness in both of her shoulders.     Triage note:  Chief Complaint   Patient presents with    Shortness of Breath     Worsening SOB for the last few hrs. Hx of afib and CHF     Review of patient's allergies indicates:   Allergen Reactions    Nickel sutures [surgical stainless steel]     Adhesive Rash     Past Medical History:   Diagnosis Date    Allergy     seasonal    Diverticulitis     Fever blister     Hypertension     Personal history of colonic polyps

## 2024-02-03 NOTE — PLAN OF CARE
[facility] - Emergency Dept  Initial Discharge Assessment       Primary Care Provider: Gail Villarreal MD    Admission Diagnosis: Pleural effusion [J90]    Admission Date: 2/3/2024  Expected Discharge Date:     Pt stated she is independent with her ambulation and ADL's and does not require equipment or assistance.    Pt to d/c home with no needs when ready    Transition of Care Barriers: (P) None    Payor: [employer] MEDICARE ADVANTAGE / Plan: [employer] MCARE ADV / Product Type: Medicare Advantage /     Extended Emergency Contact Information  Primary Emergency Contact: Alfredo Mas  Address: [address] [place] [address] [facility]  Home Phone: [phone]  Mobile Phone: [phone]  Relation: Spouse  Preferred language: English    Discharge Plan A: (P) Home  Discharge Plan B: (P) Home      [facility]  [address]  [facility] [address]  Phone: [phone] Fax: [phone]      Initial Assessment (most recent)       Adult Discharge Assessment - 02/03/24 1054          Discharge Assessment    Assessment Type Discharge Planning Assessment (P)      Confirmed/corrected address, phone number and insurance Yes (P)      Confirmed Demographics Correct on Facesheet (P)      Source of Information patient (P)      Reason For Admission Pleural effusion, left (P)      People in Home spouse (P)      Facility Arrived From: home (P)      Do you expect to return to your current living situation? Yes (P)      Do you have help at home or someone to help you manage your care at home? No (P)      Prior to hospitilization cognitive status: Alert/Oriented (P)      Current cognitive status: No Deficits;Alert/Oriented (P)      Walking or Climbing Stairs Difficulty no (P)      Dressing/Bathing Difficulty no (P)      Home Accessibility not wheelchair accessible;stairs to enter home (P)      Number of Stairs, Main Entrance four (P)      Home Layout Able to  live on 1st floor (P)      Equipment Currently Used at Home none (P)      Patient currently being followed by outpatient case management? No (P)      Do you currently have service(s) that help you manage your care at home? No (P)      Do you have any problems affording any of your prescribed medications? No (P)      Is the patient taking medications as prescribed? yes (P)      Who is going to help you get home at discharge? family/friends (P)      How do you get to doctors appointments? car, drives self;family or friend will provide (P)      Are you on dialysis? No (P)      Do you take coumadin? No (P)      Discharge Plan A Home (P)      Discharge Plan B Home (P)      DME Needed Upon Discharge  none (P)      Discharge Plan discussed with: Patient (P)      Transition of Care Barriers None (P)         Financial Resource Strain    How hard is it for you to pay for the very basics like food, housing, medical care, and heating? Not very hard (P)         Housing Stability    In the last 12 months, was there a time when you were not able to pay the mortgage or rent on time? No (P)      In the last 12 months, was there a time when you did not have a steady place to sleep or slept in a shelter (including now)? No (P)         Transportation Needs    In the past 12 months, has lack of transportation kept you from medical appointments or from getting medications? No (P)      In the past 12 months, has lack of transportation kept you from meetings, work, or from getting things needed for daily living? No (P)         Food Insecurity    Within the past 12 months, you worried that your food would run out before you got the money to buy more. Never true (P)      Within the past 12 months, the food you bought just didn't last and you didn't have money to get more. Never true (P)         Stress    Do you feel stress - tense, restless, nervous, or anxious, or unable to sleep at night because your mind is troubled all the time - these  days? To some extent (P)         Social Connections    In a typical week, how many times do you talk on the phone with family, friends, or neighbors? More than three times a week (P)      How often do you get together with friends or relatives? More than three times a week (P)      How often do you attend Jehovah's witness or Tenriism services? More than 4 times per year (P)      Do you belong to any clubs or organizations such as Jehovah's witness groups, unions, fraternal or athletic groups, or school groups? Yes (P)      How often do you attend meetings of the clubs or organizations you belong to? More than 4 times per year (P)      Are you , , , , never , or living with a partner?  (P)         Alcohol Use    Q1: How often do you have a drink containing alcohol? Never (P)      Q2: How many drinks containing alcohol do you have on a typical day when you are drinking? Patient does not drink (P)      Q3: How often do you have six or more drinks on one occasion? Never (P)         OTHER    Name(s) of People in Home spouse Alfredo (P)                       Padmini Jauregui, BALWINDER, MSW, LMSW, RSW   Case Management  [facility]  Email: [email or web address]

## 2024-02-03 NOTE — ASSESSMENT & PLAN NOTE
Patient found to have  moderate to large left  pleural effusion and probably small right pleural effusion on imaging. I have personally reviewed and interpreted the following imaging: Xray. A thoracentesis was deferred. Most likely etiology includes Congestive Heart Failure and Pneumonia. Management to include Diuresis.    Previously treated with diuresis with improvement in effusion. She was not discharged home on diuretics but was discharged home on supplemental oxygen. She presents with acute worsening SOB.   - Moderate to large effusion noted to have re accumulated on CXR.   - Flu and covid negative.    - Leukocytosis on admission labs. No fever. Check procalcitonin  - S/p vancomycin and zosyn in ED  - Consider CAP coverage  - CT chest  - TTE  - IV lasix 20 mg daily for now

## 2024-02-03 NOTE — ASSESSMENT & PLAN NOTE
Patient with Paroxysmal (<7 days) atrial fibrillation which is controlled currently with Beta Blocker. Patient is currently in sinus rhythm.[id number] Score: 2. Anticoagulation not started.     Pt had 8 hour episode of new onset afib during last hospitalization. Was placed on dilt gtt and it resolved. Since only 8 hours, EP did not recommend anticoagulation or rate control. Discharged with 30 day event monitor and EP follow up. She was started on metoprolol at discharge.  - Monitor telemetry   - Continue home metoprolol

## 2024-02-03 NOTE — H&P
[facility] - Emergency Dept  [facility] Medicine  History & Physical    Patient Name: Rajesh Mas  MRN: [id number]  Patient Class: OP- Observation  Admission Date: 2/3/2024  Attending Physician: Morgan Causey MD   Primary Care Provider: Gail Villarreal MD         Patient information was obtained from patient, spouse/SO, past medical records, and ER records.     Subjective:     Principal Problem:Pleural effusion, left    Chief Complaint:   Chief Complaint   Patient presents with    Shortness of Breath     Worsening SOB for the last few hrs. Hx of afib and CHF        HPI: Rajesh Mas is a 73 y.o. female with PMHx of gout, obesity, CKD3, HTN, recent hospitalization for PVC s/p ablation 1/18/24 that was complicated by pericardial effusion with tamponade physiology s/p pericardiocentesis 1/18/24 and development of acute hypoxic respiratory failure from left pleural effusion and acute diastolic dysfunction. The pleural effusion improved with diuresis but she still During this hospital course she also was treated for CAP, placed on diltiazem gtt for development of Afib that resolved. She was discharged home on metoprolol and supplemental oxygen.    She presents to the ED today with her  for acute shortness of breath that began around 7 PM last night. She has been wearing oxygen at 2lpm via NC since last hospital discharge but up until last night had not feeling short of breath. She was walking around her house when the shortness of breath started, barely exerting herself. She also developed soreness/tightness in her bilateral shoulders and mild heaviness in her chest without chest pain. She has had a persistent dry cough for about one week. She also started requiring 3 pillows for sleep a few nights after developing orthopnea. She has poor appetite today. No fevers, chills, leg swelling, nausea, vomiting, palpitations, syncope.     In the ED, she was satting 91% on RA and 95% on 2L NC. Vitals otherwise  stable. Labs reveal leukocytosis with WBC 21k, mild anemia with H/H 11.8/36.8. . Troponin 0.017. Flu and covid negative. CXR shows increased size of moderate to large left pleural effusion. Probable small right pleural effusion. She was started on vancomycin and zosyn and admitted to hospital medicine observation.    Past Medical History:   Diagnosis Date    Allergy     seasonal    Diverticulitis     Fever blister     Hypertension     Personal history of colonic polyps        Past Surgical History:   Procedure Laterality Date    ABLATION N/A 1/18/2024    Procedure: Ablation;  Surgeon: Santi Ellison MD;  Location: [facility] EP LAB;  Service: Cardiology;  Laterality: N/A;  PVC, RFA, PEPE, anes, MB, 3 Prep,*prepare to map LVOT and RVOT*    CHOLECYSTECTOMY  04/26/2017    COLON SURGERY      COLONOSCOPY N/A 12/06/2016    Procedure: COLONOSCOPY;  Surgeon: Fidel Mason MD;  Location: [facility] ENDO (4TH FLR);  Service: Endoscopy;  Laterality: N/A;    COLONOSCOPY N/A 05/17/2022    Procedure: COLONOSCOPY;  Surgeon: RUSSELL Rolon MD;  Location: [facility] ENDO ([facility] FLR);  Service: Endoscopy;  Laterality: N/A;  fully vaccinated, prep instr portal -ml    partial colectomy  1997    sigmoid removed due to diverticulitis    PERICARDIOCENTESIS N/A 1/18/2024    Procedure: Pericardiocentesis;  Surgeon: Santi Ellison MD;  Location: [facility] EP LAB;  Service: Cardiology;  Laterality: N/A;    SINUS SURGERY  2007    SMALL INTESTINE SURGERY  June 1997@ [facility]    Partial Sigm Colon /Divaticulitus       Review of patient's allergies indicates:   Allergen Reactions    Nickel sutures [surgical stainless steel]     Adhesive Rash       No current facility-administered medications on file prior to encounter.     Current Outpatient Medications on File Prior to Encounter   Medication Sig    allopurinoL (ZYLOPRIM) 300 MG tablet Take one tablet by mouth once daily in combination with 50mg tablets for a daily dose of 350mg    aspirin (ECOTRIN) 81  MG EC tablet Take 1 tablet (81 mg total) by mouth once daily.    metoprolol succinate (TOPROL-XL) 25 MG 24 hr tablet Take 1 tablet (25 mg total) by mouth once daily.    oxybutynin (DITROPAN) 5 MG Tab Take 1 tablet (5 mg total) by mouth 2 (two) times daily.    predniSONE (DELTASONE) 2.5 MG tablet Take one tablet by mouth once daily     Family History       Problem Relation (Age of Onset)    Breast cancer Maternal Grandmother    Colon cancer Maternal Grandmother    Diabetes Mother    Heart disease Mother    Heart failure Mother    Hypertension Mother    Kidney disease Mother    Liver cancer Maternal Uncle    No Known Problems Sister, Daughter, Daughter, Son, Son    Other Brother          Tobacco Use    Smoking status: Never    Smokeless tobacco: Never   Substance and Sexual Activity    Alcohol use: No    Drug use: Never    Sexual activity: Yes     Partners: Male     Birth control/protection: None     Comment: , together since 1971     Review of Systems   Constitutional:  Negative for chills and fever.   HENT:  Negative for congestion and trouble swallowing.    Eyes:  Negative for visual disturbance.   Respiratory:  Positive for cough and shortness of breath.    Cardiovascular:  Negative for chest pain, palpitations and leg swelling.   Gastrointestinal:  Negative for abdominal pain, nausea and vomiting.   Genitourinary:  Negative for dysuria, frequency and urgency.   Musculoskeletal:  Positive for arthralgias (across the shoulders). Negative for gait problem.   Skin:  Negative for rash and wound.   Neurological:  Negative for syncope and headaches.   Psychiatric/Behavioral:  Negative for agitation and confusion.      Objective:     Vital Signs (Most Recent):  Temp: 98.3 °F (36.8 °C) (02/02/24 2246)  Pulse: 81 (02/03/24 0400)  Resp: 20 (02/03/24 0400)  BP: (!) 159/81 (02/03/24 0400)  SpO2: 95 % (02/03/24 0400) Vital Signs (24h Range):  Temp:  [98.3 °F (36.8 °C)] 98.3 °F (36.8 °C)  Pulse:  [] 81  Resp:   "[18-20] 20  SpO2:  [91 %-95 %] 95 %  BP: (134-159)/(74-87) 159/81     Weight: 75.3 kg (166 lb)  Body mass index is 31.37 kg/m².     Physical Exam  Vitals and nursing note reviewed.   Constitutional:       General: She is not in acute distress.  HENT:      Head: Normocephalic and atraumatic.      Nose: Nose normal.      Mouth/Throat:      Pharynx: No oropharyngeal exudate.   Eyes:      Extraocular Movements: Extraocular movements intact.      Conjunctiva/sclera: Conjunctivae normal.   Cardiovascular:      Rate and Rhythm: Normal rate and regular rhythm.      Heart sounds: Heart sounds are distant.   Pulmonary:      Effort: Pulmonary effort is normal. No respiratory distress.      Breath sounds: Examination of the left-middle field reveals decreased breath sounds. Examination of the left-lower field reveals decreased breath sounds. Decreased breath sounds present.      Comments: Saturating 91% on RA when nasal canula is removed    Abdominal:      General: Bowel sounds are normal.      Palpations: Abdomen is soft.      Tenderness: There is no abdominal tenderness.   Musculoskeletal:      Cervical back: Normal range of motion.      Right lower leg: No edema.      Left lower leg: No edema.   Skin:     General: Skin is warm and dry.   Neurological:      Mental Status: She is alert and oriented to person, place, and time.      Motor: No weakness.   Psychiatric:         Mood and Affect: Mood normal.         Behavior: Behavior normal.         Thought Content: Thought content normal.                Significant Labs: All pertinent labs within the past 24 hours have been reviewed.  Blood Culture: No results for input(s): "LABBLOO" in the last 48 hours.  CBC:   Recent Labs   Lab 02/03/24  0048   WBC 21.73*   HGB 11.8*   HCT 36.8*        CMP:   Recent Labs   Lab 02/03/24  0048      K 4.2      CO2 23   *   BUN 20   CREATININE 1.0   CALCIUM 9.5   PROT 7.7   ALBUMIN 3.1*   BILITOT 0.7   ALKPHOS 57   AST 24 " "  ALT 22   ANIONGAP 8     Cardiac Markers:   Recent Labs   Lab 02/03/24 0048   *     Coagulation:   Recent Labs   Lab 02/03/24 0048   INR 1.1     Troponin:   Recent Labs   Lab 02/03/24 0048   TROPONINI 0.017     TSH:   Recent Labs   Lab 02/03/24 0048   TSH 2.961       Significant Imaging: I have reviewed all pertinent imaging results/findings within the past 24 hours.  X-Ray Chest AP Portable  Narrative: EXAMINATION:  XR CHEST AP PORTABLE    CLINICAL HISTORY:  Provided history is "shortness of breath;  ".    TECHNIQUE:  One view of the chest.    COMPARISON:  01/26/2024.    FINDINGS:  Cardiac wires overlie the chest.  Cardiomediastinal silhouette is stable.  Moderate to large left-sided pleural effusion, mildly worse compared to prior study.  Probable small right pleural effusion.  Passive atelectasis in the inferior lungs.  No new large focal consolidation.  No distinct pneumothorax.  Impression: Increased size of moderate to large left pleural effusion.  Probable small right pleural effusion.    Electronically signed by: Jung Vega MD  Date:    02/03/2024  Time:    02:21     Assessment/Plan:     * Pleural effusion, left  Patient found to have  moderate to large left  pleural effusion and probably small right pleural effusion on imaging. I have personally reviewed and interpreted the following imaging: Xray. A thoracentesis was deferred. Most likely etiology includes Congestive Heart Failure and Pneumonia. Management to include Diuresis.    Previously treated with diuresis with improvement in effusion. She was not discharged home on diuretics but was discharged home on supplemental oxygen. She presents with acute worsening SOB.   - Moderate to large effusion noted to have re accumulated on CXR.   - Flu and covid negative.    - Leukocytosis on admission labs. No fever. Check procalcitonin  - S/p vancomycin and zosyn in ED  - Consider CAP coverage  - CT chest  - TTE  - IV lasix 20 mg daily for " now      Atrial fibrillation  Patient with Paroxysmal (<7 days) atrial fibrillation which is controlled currently with Beta Blocker. Patient is currently in sinus rhythm.[id number] Score: 2. Anticoagulation not started.     Pt had 8 hour episode of new onset afib during last hospitalization. Was placed on dilt gtt and it resolved. Since only 8 hours, EP did not recommend anticoagulation or rate control. Discharged with 30 day event monitor and EP follow up. She was started on metoprolol at discharge.  - Monitor telemetry   - Continue home metoprolol     Chronic hypoxemic respiratory failure  Patient with Hypoxic Respiratory failure which is Chronic, though only recently started on supplemental oxygen. she is on home oxygen at 2 LPM. Supplemental oxygen was provided and noted-    Signs/symptoms of respiratory failure include- increased work of breathing on exertion. Contributing diagnoses includes - CHF, Pleural effusion, and Pneumonia Labs and images were reviewed. Patient Has not had a recent ABG. Will treat underlying causes and adjust management of respiratory failure as follows- See pleural effusion     History of gout  - Continue home allopurinol and prednisone.     Pericardial effusion  - Recent history noted tamponade physiology, underwent pericardiocentesis 1/18 and a repeat echo next day showed trivial pericardial effusion.  - Repeat TTE    PVCs (premature ventricular contractions)  - S/p RFA on 1/18/24    Stage 3 chronic kidney disease  Creatine stable for now. BMP reviewed- noted Estimated Creatinine Clearance: 47.9 mL/min (based on SCr of 1 mg/dL). according to latest data. Based on current GFR, CKD stage is stage 3 - GFR 30-59.  Monitor UOP and serial BMP and adjust therapy as needed. Renally dose meds. Avoid nephrotoxic medications and procedures.    BMI 31.0-31.9,adult  Body mass index is 31.37 kg/m². Morbid obesity complicates all aspects of disease management from diagnostic modalities to treatment.  Weight loss encouraged and health benefits explained to patient.      Diastolic dysfunction  During recent hospitalization, pt noted to have pleural effusions and there was concern for acute diastolic dysfunction and a transient decreased EF related to high HR.  - Repeat TTE ordered  - Monitor I/Os  - Start lasix 20 mg IV daily for pleural effusion      Essential hypertension  Chronic, controlled. Latest blood pressure and vitals reviewed-     Temp:  [98.3 °F (36.8 °C)]   Pulse:  []   Resp:  [18-20]   BP: (134-159)/(74-87)   SpO2:  [91 %-95 %] .   Home meds for hypertension were reviewed and noted below.   Hypertension Medications               metoprolol succinate (TOPROL-XL) 25 MG 24 hr tablet Take 1 tablet (25 mg total) by mouth once daily.          While in the hospital, will manage blood pressure as follows; Continue home antihypertensive regimen (metoprolol only). Currently her losartan 100mg/day and triamterene-hydrochlorothiazide 37.5-25 mg/day are on hold since last hospital admission due to concern for hypotension.     Will utilize p.r.n. blood pressure medication only if patient's blood pressure greater than 180/110 and she develops symptoms such as worsening chest pain or shortness of breath.      VTE Risk Mitigation (From admission, onward)           Ordered     Reason for No Pharmacological VTE Prophylaxis  Once        Question:  Reasons:  Answer:  Physician Provided (leave comment)    02/03/24 0457     IP VTE HIGH RISK PATIENT  Once         02/03/24 0457     Place sequential compression device  Until discontinued         02/03/24 0457                         On 02/03/2024, patient should be placed in hospital observation services under my care in collaboration with DO. Marga Chau PA-C  Department of Hospital Medicine  [facility] - Emergency Dept

## 2024-02-03 NOTE — ED NOTES
Care assumed from NEPTALI Jacobo    APPEARANCE: awake, alert, and appears to be in NAD. Pain score 0/10. Bed in lowest and locked position w/ side rails up x2. Call light w/n pt reach and instructed on how to use. Supportive family members at bedside.  SKIN: warm, dry and intact. No visible breakdown or bruising noted to extremities.   MUSCULOSKELETAL: Patient moving all extremities spontaneously, no obvious swelling or deformities noted. Ambulates independently.  RESPIRATORY: Airway open and patent. Denies shortness of breath at this time. Respirations even, unlabored, equal bilaterally on inspiration and expiration. On 2L of O2 via NC  CARDIAC: Regular HR. Denies CP, 2+ distal pulses; no peripheral edema  ABDOMEN: S/ND/NT, Denies N/V/D or changes in appetite.   : Pt voids spontaneously, denies difficulty  NEUROLOGIC: AAO x 4; speaking and following commands appropriately. Equal strength in all extremities; denies numbness/tingling. +bifocal use

## 2024-02-03 NOTE — SUBJECTIVE & OBJECTIVE
Past Medical History:   Diagnosis Date    Allergy     seasonal    Diverticulitis     Fever blister     Hypertension     Personal history of colonic polyps        Past Surgical History:   Procedure Laterality Date    ABLATION N/A 1/18/2024    Procedure: Ablation;  Surgeon: Santi Ellison MD;  Location: [facility] EP LAB;  Service: Cardiology;  Laterality: N/A;  PVC, RFA, PEPE, anes, MB, 3 Prep,*prepare to map LVOT and RVOT*    CHOLECYSTECTOMY  04/26/2017    COLON SURGERY      COLONOSCOPY N/A 12/06/2016    Procedure: COLONOSCOPY;  Surgeon: Fidel Mason MD;  Location: [facility] ENDO (4TH FLR);  Service: Endoscopy;  Laterality: N/A;    COLONOSCOPY N/A 05/17/2022    Procedure: COLONOSCOPY;  Surgeon: RUSSELL Rolon MD;  Location: [facility] ENDO ([facility] FLR);  Service: Endoscopy;  Laterality: N/A;  fully vaccinated, prep instr portal -ml    partial colectomy  1997    sigmoid removed due to diverticulitis    PERICARDIOCENTESIS N/A 1/18/2024    Procedure: Pericardiocentesis;  Surgeon: Santi Ellison MD;  Location: [facility] EP LAB;  Service: Cardiology;  Laterality: N/A;    SINUS SURGERY  2007    [facility]    Partial Sigm Colon /Divaticulitus       Review of patient's allergies indicates:   Allergen Reactions    Nickel sutures [surgical stainless steel]     Adhesive Rash       No current facility-administered medications on file prior to encounter.     Current Outpatient Medications on File Prior to Encounter   Medication Sig    allopurinoL (ZYLOPRIM) 300 MG tablet Take one tablet by mouth once daily in combination with 50mg tablets for a daily dose of 350mg    aspirin (ECOTRIN) 81 MG EC tablet Take 1 tablet (81 mg total) by mouth once daily.    metoprolol succinate (TOPROL-XL) 25 MG 24 hr tablet Take 1 tablet (25 mg total) by mouth once daily.    oxybutynin (DITROPAN) 5 MG Tab Take 1 tablet (5 mg total) by mouth 2 (two) times daily.    predniSONE (DELTASONE) 2.5 MG tablet Take one tablet by mouth once  daily     Family History       Problem Relation (Age of Onset)    Breast cancer Maternal Grandmother    Colon cancer Maternal Grandmother    Diabetes Mother    Heart disease Mother    Heart failure Mother    Hypertension Mother    Kidney disease Mother    Liver cancer Maternal Uncle    No Known Problems Sister, Daughter, Daughter, Son, Son    Other Brother          Tobacco Use    Smoking status: Never    Smokeless tobacco: Never   Substance and Sexual Activity    Alcohol use: No    Drug use: Never    Sexual activity: Yes     Partners: Male     Birth control/protection: None     Comment: , together since 1971     Review of Systems   Constitutional:  Negative for chills and fever.   HENT:  Negative for congestion and trouble swallowing.    Eyes:  Negative for visual disturbance.   Respiratory:  Positive for cough and shortness of breath.    Cardiovascular:  Negative for chest pain, palpitations and leg swelling.   Gastrointestinal:  Negative for abdominal pain, nausea and vomiting.   Genitourinary:  Negative for dysuria, frequency and urgency.   Musculoskeletal:  Positive for arthralgias (across the shoulders). Negative for gait problem.   Skin:  Negative for rash and wound.   Neurological:  Negative for syncope and headaches.   Psychiatric/Behavioral:  Negative for agitation and confusion.      Objective:     Vital Signs (Most Recent):  Temp: 98.3 °F (36.8 °C) (02/02/24 2246)  Pulse: 81 (02/03/24 0400)  Resp: 20 (02/03/24 0400)  BP: (!) 159/81 (02/03/24 0400)  SpO2: 95 % (02/03/24 0400) Vital Signs (24h Range):  Temp:  [98.3 °F (36.8 °C)] 98.3 °F (36.8 °C)  Pulse:  [] 81  Resp:  [18-20] 20  SpO2:  [91 %-95 %] 95 %  BP: (134-159)/(74-87) 159/81     Weight: 75.3 kg (166 lb)  Body mass index is 31.37 kg/m².     Physical Exam  Vitals and nursing note reviewed.   Constitutional:       General: She is not in acute distress.  HENT:      Head: Normocephalic and atraumatic.      Nose: Nose normal.       "Mouth/Throat:      Pharynx: No oropharyngeal exudate.   Eyes:      Extraocular Movements: Extraocular movements intact.      Conjunctiva/sclera: Conjunctivae normal.   Cardiovascular:      Rate and Rhythm: Normal rate and regular rhythm.      Heart sounds: Heart sounds are distant.   Pulmonary:      Effort: Pulmonary effort is normal. No respiratory distress.      Breath sounds: Examination of the left-middle field reveals decreased breath sounds. Examination of the left-lower field reveals decreased breath sounds. Decreased breath sounds present.      Comments: Saturating 91% on RA when nasal canula is removed    Abdominal:      General: Bowel sounds are normal.      Palpations: Abdomen is soft.      Tenderness: There is no abdominal tenderness.   Musculoskeletal:      Cervical back: Normal range of motion.      Right lower leg: No edema.      Left lower leg: No edema.   Skin:     General: Skin is warm and dry.   Neurological:      Mental Status: She is alert and oriented to person, place, and time.      Motor: No weakness.   Psychiatric:         Mood and Affect: Mood normal.         Behavior: Behavior normal.         Thought Content: Thought content normal.                Significant Labs: All pertinent labs within the past 24 hours have been reviewed.  Blood Culture: No results for input(s): "LABBLOO" in the last 48 hours.  CBC:   Recent Labs   Lab 02/03/24 0048   WBC 21.73*   HGB 11.8*   HCT 36.8*        CMP:   Recent Labs   Lab 02/03/24 0048      K 4.2      CO2 23   *   BUN 20   CREATININE 1.0   CALCIUM 9.5   PROT 7.7   ALBUMIN 3.1*   BILITOT 0.7   ALKPHOS 57   AST 24   ALT 22   ANIONGAP 8     Cardiac Markers:   Recent Labs   Lab 02/03/24 0048   *     Coagulation:   Recent Labs   Lab 02/03/24 0048   INR 1.1     Troponin:   Recent Labs   Lab 02/03/24 0048   TROPONINI 0.017     TSH:   Recent Labs   Lab 02/03/24 0048   TSH 2.961       Significant Imaging: I have reviewed all " "pertinent imaging results/findings within the past 24 hours.  X-Ray Chest AP Portable  Narrative: EXAMINATION:  XR CHEST AP PORTABLE    CLINICAL HISTORY:  Provided history is "shortness of breath;  ".    TECHNIQUE:  One view of the chest.    COMPARISON:  01/26/2024.    FINDINGS:  Cardiac wires overlie the chest.  Cardiomediastinal silhouette is stable.  Moderate to large left-sided pleural effusion, mildly worse compared to prior study.  Probable small right pleural effusion.  Passive atelectasis in the inferior lungs.  No new large focal consolidation.  No distinct pneumothorax.  Impression: Increased size of moderate to large left pleural effusion.  Probable small right pleural effusion.    Electronically signed by: Jung Vega MD  Date:    02/03/2024  Time:    02:21     "

## 2024-02-04 LAB
ANION GAP SERPL CALC-SCNC: 7 MMOL/L (ref 8–16)
BASOPHILS # BLD AUTO: 0.07 K/UL (ref 0–0.2)
BASOPHILS NFR BLD: 0.5 % (ref 0–1.9)
BUN SERPL-MCNC: 19 MG/DL (ref 8–23)
CALCIUM SERPL-MCNC: 8.9 MG/DL (ref 8.7–10.5)
CHLORIDE SERPL-SCNC: 106 MMOL/L (ref 95–110)
CO2 SERPL-SCNC: 26 MMOL/L (ref 23–29)
CREAT SERPL-MCNC: 1.3 MG/DL (ref 0.5–1.4)
DIFFERENTIAL METHOD BLD: ABNORMAL
EOSINOPHIL # BLD AUTO: 0.3 K/UL (ref 0–0.5)
EOSINOPHIL NFR BLD: 2.1 % (ref 0–8)
ERYTHROCYTE [DISTWIDTH] IN BLOOD BY AUTOMATED COUNT: 13.4 % (ref 11.5–14.5)
EST. GFR  (NO RACE VARIABLE): 44 ML/MIN/1.73 M^2
GLUCOSE SERPL-MCNC: 91 MG/DL (ref 70–110)
HCT VFR BLD AUTO: 33.7 % (ref 37–48.5)
HGB BLD-MCNC: 10.8 G/DL (ref 12–16)
IMM GRANULOCYTES # BLD AUTO: 0.05 K/UL (ref 0–0.04)
IMM GRANULOCYTES NFR BLD AUTO: 0.3 % (ref 0–0.5)
LYMPHOCYTES # BLD AUTO: 2 K/UL (ref 1–4.8)
LYMPHOCYTES NFR BLD: 13.3 % (ref 18–48)
MAGNESIUM SERPL-MCNC: 2.2 MG/DL (ref 1.6–2.6)
MCH RBC QN AUTO: 30.1 PG (ref 27–31)
MCHC RBC AUTO-ENTMCNC: 32 G/DL (ref 32–36)
MCV RBC AUTO: 94 FL (ref 82–98)
MONOCYTES # BLD AUTO: 1.2 K/UL (ref 0.3–1)
MONOCYTES NFR BLD: 8.4 % (ref 4–15)
NEUTROPHILS # BLD AUTO: 11.1 K/UL (ref 1.8–7.7)
NEUTROPHILS NFR BLD: 75.4 % (ref 38–73)
NRBC BLD-RTO: 0 /100 WBC
PHOSPHATE SERPL-MCNC: 3.1 MG/DL (ref 2.7–4.5)
PLATELET # BLD AUTO: 287 K/UL (ref 150–450)
PMV BLD AUTO: 13 FL (ref 9.2–12.9)
POTASSIUM SERPL-SCNC: 4.2 MMOL/L (ref 3.5–5.1)
RBC # BLD AUTO: 3.59 M/UL (ref 4–5.4)
SODIUM SERPL-SCNC: 139 MMOL/L (ref 136–145)
VANCOMYCIN SERPL-MCNC: 21.9 UG/ML
WBC # BLD AUTO: 14.69 K/UL (ref 3.9–12.7)

## 2024-02-04 PROCEDURE — 94799 UNLISTED PULMONARY SVC/PX: CPT | Mod: XB

## 2024-02-04 PROCEDURE — 36415 COLL VENOUS BLD VENIPUNCTURE: CPT | Performed by: STUDENT IN AN ORGANIZED HEALTH CARE EDUCATION/TRAINING PROGRAM

## 2024-02-04 PROCEDURE — 27000221 HC OXYGEN, UP TO 24 HOURS

## 2024-02-04 PROCEDURE — 83735 ASSAY OF MAGNESIUM: CPT | Performed by: STUDENT IN AN ORGANIZED HEALTH CARE EDUCATION/TRAINING PROGRAM

## 2024-02-04 PROCEDURE — 84100 ASSAY OF PHOSPHORUS: CPT | Performed by: STUDENT IN AN ORGANIZED HEALTH CARE EDUCATION/TRAINING PROGRAM

## 2024-02-04 PROCEDURE — 80048 BASIC METABOLIC PNL TOTAL CA: CPT | Performed by: STUDENT IN AN ORGANIZED HEALTH CARE EDUCATION/TRAINING PROGRAM

## 2024-02-04 PROCEDURE — 25000003 PHARM REV CODE 250: Performed by: STUDENT IN AN ORGANIZED HEALTH CARE EDUCATION/TRAINING PROGRAM

## 2024-02-04 PROCEDURE — 25000003 PHARM REV CODE 250

## 2024-02-04 PROCEDURE — 94761 N-INVAS EAR/PLS OXIMETRY MLT: CPT

## 2024-02-04 PROCEDURE — 63600175 PHARM REV CODE 636 W HCPCS

## 2024-02-04 PROCEDURE — 80202 ASSAY OF VANCOMYCIN: CPT | Performed by: STUDENT IN AN ORGANIZED HEALTH CARE EDUCATION/TRAINING PROGRAM

## 2024-02-04 PROCEDURE — 63600175 PHARM REV CODE 636 W HCPCS: Performed by: STUDENT IN AN ORGANIZED HEALTH CARE EDUCATION/TRAINING PROGRAM

## 2024-02-04 PROCEDURE — 85025 COMPLETE CBC W/AUTO DIFF WBC: CPT | Performed by: STUDENT IN AN ORGANIZED HEALTH CARE EDUCATION/TRAINING PROGRAM

## 2024-02-04 PROCEDURE — 21400001 HC TELEMETRY ROOM

## 2024-02-04 RX ORDER — FUROSEMIDE 10 MG/ML
40 INJECTION INTRAMUSCULAR; INTRAVENOUS 2 TIMES DAILY
Status: DISCONTINUED | OUTPATIENT
Start: 2024-02-04 | End: 2024-02-05

## 2024-02-04 RX ADMIN — ACETAMINOPHEN 650 MG: 325 TABLET ORAL at 08:02

## 2024-02-04 RX ADMIN — PIPERACILLIN SODIUM AND TAZOBACTAM SODIUM 4.5 G: 4; .5 INJECTION, POWDER, FOR SOLUTION INTRAVENOUS at 12:02

## 2024-02-04 RX ADMIN — OXYBUTYNIN CHLORIDE 5 MG: 5 TABLET ORAL at 08:02

## 2024-02-04 RX ADMIN — PIPERACILLIN SODIUM AND TAZOBACTAM SODIUM 4.5 G: 4; .5 INJECTION, POWDER, FOR SOLUTION INTRAVENOUS at 03:02

## 2024-02-04 RX ADMIN — PIPERACILLIN SODIUM AND TAZOBACTAM SODIUM 4.5 G: 4; .5 INJECTION, POWDER, FOR SOLUTION INTRAVENOUS at 08:02

## 2024-02-04 RX ADMIN — VANCOMYCIN HYDROCHLORIDE 1250 MG: 1.25 INJECTION, POWDER, LYOPHILIZED, FOR SOLUTION INTRAVENOUS at 09:02

## 2024-02-04 RX ADMIN — OXYBUTYNIN CHLORIDE 5 MG: 5 TABLET ORAL at 09:02

## 2024-02-04 RX ADMIN — FUROSEMIDE 40 MG: 10 INJECTION, SOLUTION INTRAVENOUS at 09:02

## 2024-02-04 RX ADMIN — FUROSEMIDE 40 MG: 10 INJECTION, SOLUTION INTRAVENOUS at 06:02

## 2024-02-04 RX ADMIN — ASPIRIN 81 MG: 81 TABLET, COATED ORAL at 09:02

## 2024-02-04 RX ADMIN — PREDNISONE 2.5 MG: 2.5 TABLET ORAL at 09:02

## 2024-02-04 RX ADMIN — METOPROLOL SUCCINATE 25 MG: 25 TABLET, EXTENDED RELEASE ORAL at 09:02

## 2024-02-04 RX ADMIN — ALLOPURINOL 350 MG: 300 TABLET ORAL at 09:02

## 2024-02-04 NOTE — ASSESSMENT & PLAN NOTE
Patient has an enlarging pleural effusion noted on CXR and CT scan with worsening dyspnea on exertion. She was previously diuresed in the hospital with significant improvement, off oxygen, and did not require any intervention. She was sent home without any diuresis over the last week. She notes increased swelling in her lower legs, abdomen, and significant 4-5 pillow orthopnea and even dyspnea at rest. Because her effusion improved without intrevention and this is likely due to her volume status, pulmonary will recommend continued and aggressive BID to TID diuresis for this likely transudative effusion. This was discussed at the bedside with the patient this evening. We will plan to follow up tomorrow as well.  - recommend aggressive diuresis only 20 mg lasix IV x 1 given in ED so far  - no indication for thoracentesis at this time (likely related to her volume status)  - will follow up tomorrow

## 2024-02-04 NOTE — SUBJECTIVE & OBJECTIVE
Past Medical History:   Diagnosis Date    Allergy     seasonal    Diverticulitis     Fever blister     Hypertension     Personal history of colonic polyps        Past Surgical History:   Procedure Laterality Date    ABLATION N/A 1/18/2024    Procedure: Ablation;  Surgeon: Santi Ellison MD;  Location: [facility] EP LAB;  Service: Cardiology;  Laterality: N/A;  PVC, RFA, PEPE, anes, MB, 3 Prep,*prepare to map LVOT and RVOT*    CHOLECYSTECTOMY  04/26/2017    COLON SURGERY      COLONOSCOPY N/A 12/06/2016    Procedure: COLONOSCOPY;  Surgeon: Fidel Mason MD;  Location: [facility] ENDO ([facility] FLR);  Service: Endoscopy;  Laterality: N/A;    COLONOSCOPY N/A 05/17/2022    Procedure: COLONOSCOPY;  Surgeon: RUSSELL Rolon MD;  Location: [facility] ENDO ([facility] FLR);  Service: Endoscopy;  Laterality: N/A;  fully vaccinated, prep instr portal -ml    partial colectomy  1997    sigmoid removed due to diverticulitis    PERICARDIOCENTESIS N/A 1/18/2024    Procedure: Pericardiocentesis;  Surgeon: Santi Ellison MD;  Location: [facility] EP LAB;  Service: Cardiology;  Laterality: N/A;    SINUS SURGERY  2007    SMALL INTESTINE SURGERY  June 1997@ [facility]    Partial Sigm Colon /Divaticulitus       Review of patient's allergies indicates:   Allergen Reactions    Nickel sutures [surgical stainless steel]     Adhesive Rash       Family History       Problem Relation (Age of Onset)    Breast cancer Maternal Grandmother    Colon cancer Maternal Grandmother    Diabetes Mother    Heart disease Mother    Heart failure Mother    Hypertension Mother    Kidney disease Mother    Liver cancer Maternal Uncle    No Known Problems Sister, Daughter, Daughter, Son, Son    Other Brother          Tobacco Use    Smoking status: Never    Smokeless tobacco: Never   Substance and Sexual Activity    Alcohol use: No    Drug use: Never    Sexual activity: Yes     Partners: Male     Birth control/protection: None     Comment: , together since 1971         Review of Systems    Constitutional:  Positive for activity change and fatigue.   Respiratory:  Positive for cough and shortness of breath. Negative for wheezing.    Cardiovascular:  Positive for chest pain and leg swelling.   All other systems reviewed and are negative.    Objective:     Vital Signs (Most Recent):  Temp: 97.2 °F (36.2 °C) (02/03/24 2015)  Pulse: 72 (02/03/24 2015)  Resp: 18 (02/03/24 2015)  BP: (!) 147/66 (02/03/24 2015)  SpO2: (!) 93 % (02/03/24 2015) Vital Signs (24h Range):  Temp:  [97.2 °F (36.2 °C)-98.3 °F (36.8 °C)] 97.2 °F (36.2 °C)  Pulse:  [] 72  Resp:  [17-29] 18  SpO2:  [91 %-96 %] 93 %  BP: (132-159)/(66-89) 147/66     Weight: 75.3 kg (166 lb)  Body mass index is 31.37 kg/m².    No intake or output data in the 24 hours ending 02/03/24 2029     Physical Exam  Vitals and nursing note reviewed.   Constitutional:       Appearance: Normal appearance.   HENT:      Head: Normocephalic.      Nose:      Comments: 3L NC saturation 95%  Eyes:      General: No scleral icterus.  Cardiovascular:      Rate and Rhythm: Normal rate and regular rhythm.   Pulmonary:      Breath sounds: No wheezing.      Comments: Decreased breath sounds on L   Abdominal:      General: Abdomen is flat.      Palpations: Abdomen is soft.   Musculoskeletal:      Cervical back: Neck supple.      Right lower leg: Edema present.      Left lower leg: Edema present.   Skin:     General: Skin is warm and dry.      Coloration: Skin is not jaundiced.   Neurological:      General: No focal deficit present.      Mental Status: She is alert and oriented to person, place, and time.   Psychiatric:         Mood and Affect: Mood normal.         Behavior: Behavior normal.          Vents:       Lines/Drains/Airways       Peripheral Intravenous Line  Duration                  Peripheral IV - Single Lumen 02/03/24 0045 20 G Left Antecubital <1 day                    Significant Labs:    CBC/Anemia Profile:  Recent Labs   Lab 02/03/24  0048   WBC 21.73*    HGB 11.8*   HCT 36.8*      MCV 92   RDW 13.7        Chemistries:  Recent Labs   Lab 02/03/24  0048      K 4.2      CO2 23   BUN 20   CREATININE 1.0   CALCIUM 9.5   ALBUMIN 3.1*   PROT 7.7   BILITOT 0.7   ALKPHOS 57   ALT 22   AST 24       All pertinent labs within the past 24 hours have been reviewed.    BNP elevated      Significant Imaging:   I have reviewed all pertinent imaging results/findings within the past 24 hours.    No pericardial effusion on CT scan or TTE.

## 2024-02-04 NOTE — PROGRESS NOTES
"Pharmacokinetic Initial Assessment: IV Vancomycin    Assessment/Plan:    Initiate intravenous vancomycin with loading dose of 1750 mg once followed by a maintenance dose of vancomycin 1250 mg IV every 24 hours. Patient has CKD and appears to be at baseline renal function (Scr ~1-1.3) but will continue to monitor.   Desired empiric serum trough concentration is 15 to 20 mcg/mL.  Draw vancomycin trough level 60 min prior to third dose on 2/5/24 at approximately 2000 .  Pharmacy will continue to follow and monitor vancomycin.      Please contact pharmacy at extension [phone] with any questions regarding this assessment.     Thank you for the consult,   Shannan Ramos       Patient brief summary:  Rajesh Mas is a 73 y.o. female initiated on antimicrobial therapy with IV Vancomycin for treatment of suspected lower respiratory infection    Drug Allergies:   Review of patient's allergies indicates:   Allergen Reactions    Nickel sutures [surgical stainless steel]     Adhesive Rash       Actual Body Weight:   75.3 kg    Renal Function:   Estimated Creatinine Clearance: 47.9 mL/min (based on SCr of 1 mg/dL).,     Dialysis Method (if applicable):  N/A    CBC (last 72 hours):  Recent Labs   Lab Result Units 02/03/24  0048   WBC K/uL 21.73*   Hemoglobin g/dL 11.8*   Hematocrit % 36.8*   Platelets K/uL 338   Gran % % 89.5*   Lymph % % 4.6*   Mono % % 5.0   Eosinophil % % 0.0   Basophil % % 0.3   Differential Method  Automated       Metabolic Panel (last 72 hours):  Recent Labs   Lab Result Units 02/03/24  0048 02/03/24  0253   Sodium mmol/L 139  --    Potassium mmol/L 4.2  --    Chloride mmol/L 108  --    CO2 mmol/L 23  --    Glucose mg/dL 130*  --    Glucose, UA   --  Negative   BUN mg/dL 20  --    Creatinine mg/dL 1.0  --    Albumin g/dL 3.1*  --    Total Bilirubin mg/dL 0.7  --    Alkaline Phosphatase U/L 57  --    AST U/L 24  --    ALT U/L 22  --        Drug levels (last 3 results):  No results for input(s): "VANCOMYCINRA", " ""VANCORANDOM", "VANCOMYCINPE", "VANCOPEAK", "VANCOMYCINTR", "VANCOTROUGH" in the last 72 hours.    Microbiologic Results:  Microbiology Results (last 7 days)       Procedure Component Value Units Date/Time    Blood Culture #1 **CANNOT BE ORDERED STAT** [[id number]] Collected: 02/03/24 0336    Order Status: Completed Specimen: Blood from Peripheral, Hand, Right Updated: 02/03/24 1115     Blood Culture, Routine No Growth to date    Blood Culture #2 **CANNOT BE ORDERED STAT** [[id number]] Collected: 02/03/24 0336    Order Status: Completed Specimen: Blood from Peripheral, Hand, Left Updated: 02/03/24 1115     Blood Culture, Routine No Growth to date    Influenza A & B by Molecular [[id number]] Collected: 02/03/24 0204    Order Status: Completed Specimen: Nasopharyngeal Swab Updated: 02/03/24 0251     Influenza A, Molecular Negative     Influenza B, Molecular Negative     Flu A & B Source Nasal swab            "

## 2024-02-04 NOTE — ASSESSMENT & PLAN NOTE
Chronic, controlled. Latest blood pressure and vitals reviewed-     Temp:  [96.1 °F (35.6 °C)-98 °F (36.7 °C)]   Pulse:  [64-82]   Resp:  [17-20]   BP: (121-155)/(58-89)   SpO2:  [91 %-96 %] .   Home meds for hypertension were reviewed and noted below.   Hypertension Medications               metoprolol succinate (TOPROL-XL) 25 MG 24 hr tablet Take 1 tablet (25 mg total) by mouth once daily.          While in the hospital, will manage blood pressure as follows; Continue home antihypertensive regimen (metoprolol only). Currently her losartan 100mg/day and triamterene-hydrochlorothiazide 37.5-25 mg/day are on hold since last hospital admission due to concern for hypotension.     Will utilize p.r.n. blood pressure medication only if patient's blood pressure greater than 180/110 and she develops symptoms such as worsening chest pain or shortness of breath.

## 2024-02-04 NOTE — NURSING
Nurses Note -- 4 Eyes      2/3/2024   6:44 PM      Skin assessed during: Admit      [x] No Altered Skin Integrity Present    []Prevention Measures Documented      [] Yes- Altered Skin Integrity Present or Discovered   [] LDA Added if Not in Epic (Describe Wound)   [] New Altered Skin Integrity was Present on Admit and Documented in LDA   [] Wound Image Taken    Wound Care Consulted? No    Attending Nurse:  Lillian Bowen RN/Staff Member:   NEPTALI Gann

## 2024-02-04 NOTE — HPI
73 y.o. female with PMHx of gout, obesity, CKD3, HTN, recent hospitalization for PVC s/p ablation 1/18/24 that was complicated by pericardial effusion with tamponade physiology s/p pericardiocentesis 1/18/24 and development of acute hypoxic respiratory failure from left pleural effusion and acute diastolic dysfunction. The pleural effusion improved with diuresis but she still During this hospital course she also was treated for CAP, placed on diltiazem gtt for development of Afib that resolved. She was discharged home on metoprolol and supplemental oxygen.     She presents to the ED today with her  for acute shortness of breath that began around 7 PM last night. She has been wearing oxygen at 2lpm via NC since last hospital discharge but up until last night had not feeling short of breath. She was walking around her house when the shortness of breath started, barely exerting herself. She also developed soreness/tightness in her bilateral shoulders and mild heaviness in her chest without chest pain. She has had a persistent dry cough for about one week. She also started requiring 3 pillows for sleep a few nights after developing orthopnea. She has poor appetite today. No fevers, chills, leg swelling, nausea, vomiting, palpitations, syncope.     Pt was placed on NC oxygen and given lasix in the ED. CXR showed an enlarging L > R pleural effusion. Pulmonary was consulted for possible thoracentesis.

## 2024-02-04 NOTE — ASSESSMENT & PLAN NOTE
During recent hospitalization, pt noted to have pleural effusions and there was concern for acute diastolic dysfunction and a transient decreased EF related to high HR.  - TTE 2/3: 40%, global LV hypokinesis present, RV systolic function is mildly reduced   - Monitor I/Os  - Lasix as above

## 2024-02-04 NOTE — ASSESSMENT & PLAN NOTE
Patient found to have  moderate to large left  pleural effusion and probably small right pleural effusion on imaging. I have personally reviewed and interpreted the following imaging: Xray. A thoracentesis was deferred. Most likely etiology includes Congestive Heart Failure and Pneumonia. Management to include Diuresis.    Previously treated with diuresis with improvement in effusion. She was not discharged home on diuretics but was discharged home on supplemental oxygen. She presents with acute worsening SOB.   - Moderate to large effusion noted to have re accumulated on CXR.   - Flu and covid negative.    - Leukocytosis on admission labs. No fever. Procalcitonin 0.12  - S/p vancomycin and zosyn in ED  - Continue Vanc and Zosyn for Cap coverage   - CT Chest: Left pleural effusion with volume loss left hemithorax  - TTE 2/3: EF 40%, global hypokinesis, CVP 3, PASP 25, no evidence of significant pericardial effusion    - Pulmonology consulted  -- Thought is effusion likely transudative effsuion and 2/2 volume overload   -- Lasix 40mg IV BID  -- Deferring thoracentesis att his time

## 2024-02-04 NOTE — CONSULTS
[facility]  Pulmonology  Consult Note    Patient Name: Rajesh Mas  MRN: [id number]  Admission Date: 2/3/2024  Hospital Length of Stay: 0 days  Code Status: Full Code  Attending Physician: Morgan Causey MD  Primary Care Provider: Gail Villarreal MD   Principal Problem: Pleural effusion, left    Inpatient consult to Pulmonology  Consult performed by: Ellerman, Justin, MD  Consult ordered by: Morgan Causey MD        Subjective:     HPI:  73 y.o. female with PMHx of gout, obesity, CKD3, HTN, recent hospitalization for PVC s/p ablation 1/18/24 that was complicated by pericardial effusion with tamponade physiology s/p pericardiocentesis 1/18/24 and development of acute hypoxic respiratory failure from left pleural effusion and acute diastolic dysfunction. The pleural effusion improved with diuresis but she still During this hospital course she also was treated for CAP, placed on diltiazem gtt for development of Afib that resolved. She was discharged home on metoprolol and supplemental oxygen.     She presents to the ED today with her  for acute shortness of breath that began around 7 PM last night. She has been wearing oxygen at 2lpm via NC since last hospital discharge but up until last night had not feeling short of breath. She was walking around her house when the shortness of breath started, barely exerting herself. She also developed soreness/tightness in her bilateral shoulders and mild heaviness in her chest without chest pain. She has had a persistent dry cough for about one week. She also started requiring 3 pillows for sleep a few nights after developing orthopnea. She has poor appetite today. No fevers, chills, leg swelling, nausea, vomiting, palpitations, syncope.     Pt was placed on NC oxygen and given lasix in the ED. CXR showed an enlarging L > R pleural effusion. Pulmonary was consulted for possible thoracentesis.    Past Medical History:   Diagnosis Date    Allergy      seasonal    Diverticulitis     Fever blister     Hypertension     Personal history of colonic polyps        Past Surgical History:   Procedure Laterality Date    ABLATION N/A 1/18/2024    Procedure: Ablation;  Surgeon: Santi Ellison MD;  Location: [facility] EP LAB;  Service: Cardiology;  Laterality: N/A;  PVC, RFA, PEPE, anes, MB, 3 Prep,*prepare to map LVOT and RVOT*    CHOLECYSTECTOMY  04/26/2017    COLON SURGERY      COLONOSCOPY N/A 12/06/2016    Procedure: COLONOSCOPY;  Surgeon: Fidel Mason MD;  Location: [facility] ENDO ([facility] FLR);  Service: Endoscopy;  Laterality: N/A;    COLONOSCOPY N/A 05/17/2022    Procedure: COLONOSCOPY;  Surgeon: RUSSELL Rolon MD;  Location: [facility] ENDO ([facility] FLR);  Service: Endoscopy;  Laterality: N/A;  fully vaccinated, prep instr portal -ml    partial colectomy  1997    sigmoid removed due to diverticulitis    PERICARDIOCENTESIS N/A 1/18/2024    Procedure: Pericardiocentesis;  Surgeon: Santi Ellison MD;  Location: [facility] EP LAB;  Service: Cardiology;  Laterality: N/A;    SINUS SURGERY  2007    SMALL INTESTINE SURGERY  June 1997@ [facility]    Partial Sigm Colon /Divaticulitus       Review of patient's allergies indicates:   Allergen Reactions    Nickel sutures [surgical stainless steel]     Adhesive Rash       Family History       Problem Relation (Age of Onset)    Breast cancer Maternal Grandmother    Colon cancer Maternal Grandmother    Diabetes Mother    Heart disease Mother    Heart failure Mother    Hypertension Mother    Kidney disease Mother    Liver cancer Maternal Uncle    No Known Problems Sister, Daughter, Daughter, Son, Son    Other Brother          Tobacco Use    Smoking status: Never    Smokeless tobacco: Never   Substance and Sexual Activity    Alcohol use: No    Drug use: Never    Sexual activity: Yes     Partners: Male     Birth control/protection: None     Comment: , together since 1971         Review of Systems   Constitutional:  Positive for activity change and  fatigue.   Respiratory:  Positive for cough and shortness of breath. Negative for wheezing.    Cardiovascular:  Positive for chest pain and leg swelling.   All other systems reviewed and are negative.    Objective:     Vital Signs (Most Recent):  Temp: 97.2 °F (36.2 °C) (02/03/24 2015)  Pulse: 72 (02/03/24 2015)  Resp: 18 (02/03/24 2015)  BP: (!) 147/66 (02/03/24 2015)  SpO2: (!) 93 % (02/03/24 2015) Vital Signs (24h Range):  Temp:  [97.2 °F (36.2 °C)-98.3 °F (36.8 °C)] 97.2 °F (36.2 °C)  Pulse:  [] 72  Resp:  [17-29] 18  SpO2:  [91 %-96 %] 93 %  BP: (132-159)/(66-89) 147/66     Weight: 75.3 kg (166 lb)  Body mass index is 31.37 kg/m².    No intake or output data in the 24 hours ending 02/03/24 2029     Physical Exam  Vitals and nursing note reviewed.   Constitutional:       Appearance: Normal appearance.   HENT:      Head: Normocephalic.      Nose:      Comments: 3L NC saturation 95%  Eyes:      General: No scleral icterus.  Cardiovascular:      Rate and Rhythm: Normal rate and regular rhythm.   Pulmonary:      Breath sounds: No wheezing.      Comments: Decreased breath sounds on L   Abdominal:      General: Abdomen is flat.      Palpations: Abdomen is soft.   Musculoskeletal:      Cervical back: Neck supple.      Right lower leg: Edema present.      Left lower leg: Edema present.   Skin:     General: Skin is warm and dry.      Coloration: Skin is not jaundiced.   Neurological:      General: No focal deficit present.      Mental Status: She is alert and oriented to person, place, and time.   Psychiatric:         Mood and Affect: Mood normal.         Behavior: Behavior normal.          Vents:       Lines/Drains/Airways       Peripheral Intravenous Line  Duration                  Peripheral IV - Single Lumen 02/03/24 0045 20 G Left Antecubital <1 day                    Significant Labs:    CBC/Anemia Profile:  Recent Labs   Lab 02/03/24  0048   WBC 21.73*   HGB 11.8*   HCT 36.8*      MCV 92   RDW 13.7         Chemistries:  Recent Labs   Lab 02/03/24  0048      K 4.2      CO2 23   BUN 20   CREATININE 1.0   CALCIUM 9.5   ALBUMIN 3.1*   PROT 7.7   BILITOT 0.7   ALKPHOS 57   ALT 22   AST 24       All pertinent labs within the past 24 hours have been reviewed.    BNP elevated      Significant Imaging:   I have reviewed all pertinent imaging results/findings within the past 24 hours.    No pericardial effusion on CT scan or TTE.   Assessment/Plan:     Pulmonary  * Pleural effusion, left  Patient has an enlarging pleural effusion noted on CXR and CT scan with worsening dyspnea on exertion. She was previously diuresed in the hospital with significant improvement, off oxygen, and did not require any intervention. She was sent home without any diuresis over the last week. She notes increased swelling in her lower legs, abdomen, and significant 4-5 pillow orthopnea and even dyspnea at rest. Because her effusion improved without intrevention and this is likely due to her volume status, pulmonary will recommend continued and aggressive BID to TID diuresis for this likely transudative effusion. This was discussed at the bedside with the patient this evening. We will plan to follow up tomorrow as well.  - recommend aggressive diuresis only 20 mg lasix IV x 1 given in ED so far  - no indication for thoracentesis at this time (likely related to her volume status)  - will follow up tomorrow          Thank you for your consult. I will follow-up with patient. Please contact us if you have any additional questions.     Justin Ellerman, MD  Pulmonology  [facility]

## 2024-02-04 NOTE — PROGRESS NOTES
[facility]  Progress Note    Patient Name: Rajesh Mas  MRN: [id number]  Patient Class: OP- Observation   Admission Date: 2/3/2024  Length of Stay: 0 days  Attending Physician: Morgan Causey MD  Primary Care Provider: Gail Villarreal MD        Subjective:     Principal Problem:Pleural effusion, left        HPI:  Rajesh Mas is a 73 y.o. female with PMHx of gout, obesity, CKD3, HTN, recent hospitalization for PVC s/p ablation 1/18/24 that was complicated by pericardial effusion with tamponade physiology s/p pericardiocentesis 1/18/24 and development of acute hypoxic respiratory failure from left pleural effusion and acute diastolic dysfunction. The pleural effusion improved with diuresis but she still During this hospital course she also was treated for CAP, placed on diltiazem gtt for development of Afib that resolved. She was discharged home on metoprolol and supplemental oxygen.    She presents to the ED today with her  for acute shortness of breath that began around 7 PM last night. She has been wearing oxygen at 2lpm via NC since last hospital discharge but up until last night had not feeling short of breath. She was walking around her house when the shortness of breath started, barely exerting herself. She also developed soreness/tightness in her bilateral shoulders and mild heaviness in her chest without chest pain. She has had a persistent dry cough for about one week. She also started requiring 3 pillows for sleep a few nights after developing orthopnea. She has poor appetite today. No fevers, chills, leg swelling, nausea, vomiting, palpitations, syncope.     In the ED, she was satting 91% on RA and 95% on 2L NC. Vitals otherwise stable. Labs reveal leukocytosis with WBC 21k, mild anemia with H/H 11.8/36.8. . Troponin 0.017. Flu and covid negative. CXR shows increased size of moderate to large left pleural effusion. Probable small right pleural  effusion. She was started on vancomycin and zosyn and admitted to hospital medicine observation.    Overview/Hospital Course:  No notes on file    Interval History:   No events overnight. Continued dyspnea with activity. No other complaints. Continue IV diuresis. Pulmonology following. Continue IV abx.      Review of Systems   Constitutional:  Negative for chills and fever.   HENT:  Negative for congestion and trouble swallowing.    Eyes:  Negative for visual disturbance.   Respiratory:  Positive for shortness of breath. Negative for cough.    Cardiovascular:  Negative for chest pain, palpitations and leg swelling.   Gastrointestinal:  Negative for abdominal pain, nausea and vomiting.   Genitourinary:  Negative for dysuria, frequency and urgency.   Musculoskeletal:  Positive for arthralgias (across the shoulders). Negative for gait problem.   Skin:  Negative for rash and wound.   Neurological:  Negative for syncope and headaches.   Psychiatric/Behavioral:  Negative for agitation and confusion.      Objective:     Vital Signs (Most Recent):  Temp: 97 °F (36.1 °C) (02/04/24 0939)  Pulse: 78 (02/04/24 1130)  Resp: 18 (02/04/24 0939)  BP: 132/78 (02/04/24 0939)  SpO2: (!) 92 % (02/04/24 0939) Vital Signs (24h Range):  Temp:  [96.1 °F (35.6 °C)-98 °F (36.7 °C)] 97 °F (36.1 °C)  Pulse:  [64-82] 78  Resp:  [17-20] 18  SpO2:  [91 %-96 %] 92 %  BP: (121-155)/(58-89) 132/78     Weight: 75.3 kg (166 lb)  Body mass index is 31.37 kg/m².    Intake/Output Summary (Last 24 hours) at 2/4/2024 1210  Last data filed at 2/4/2024 0551  Gross per 24 hour   Intake 360 ml   Output 600 ml   Net -240 ml         Physical Exam  Constitutional:       General: She is not in acute distress.     Appearance: Normal appearance.   HENT:      Head: Normocephalic and atraumatic.      Mouth/Throat:      Mouth: Mucous membranes are moist.   Eyes:      Extraocular Movements: Extraocular movements intact.      Conjunctiva/sclera: Conjunctivae normal.    Cardiovascular:      Rate and Rhythm: Normal rate and regular rhythm.   Pulmonary:      Effort: Pulmonary effort is normal. No respiratory distress.      Breath sounds: Decreased breath sounds (left lung fields) present. No wheezing or rales.   Abdominal:      General: Abdomen is flat. Bowel sounds are normal.      Palpations: Abdomen is soft.      Tenderness: There is no abdominal tenderness. There is no guarding.   Musculoskeletal:         General: No swelling or tenderness.   Skin:     Findings: No rash.   Neurological:      General: No focal deficit present.      Mental Status: She is alert and oriented to person, place, and time. Mental status is at baseline.   Psychiatric:         Mood and Affect: Mood normal.         Behavior: Behavior normal.             Significant Labs: All pertinent labs within the past 24 hours have been reviewed.  CBC:   Recent Labs   Lab 02/03/24  0048 02/04/24  0500   WBC 21.73* 14.69*   HGB 11.8* 10.8*   HCT 36.8* 33.7*    287     CMP:   Recent Labs   Lab 02/03/24 0048 02/04/24  0500    139   K 4.2 4.2    106   CO2 23 26   * 91   BUN 20 19   CREATININE 1.0 1.3   CALCIUM 9.5 8.9   PROT 7.7  --    ALBUMIN 3.1*  --    BILITOT 0.7  --    ALKPHOS 57  --    AST 24  --    ALT 22  --    ANIONGAP 8 7*       Significant Imaging: I have reviewed all pertinent imaging results/findings within the past 24 hours.    Assessment/Plan:      * Pleural effusion, left  Patient found to have  moderate to large left  pleural effusion and probably small right pleural effusion on imaging. I have personally reviewed and interpreted the following imaging: Xray. A thoracentesis was deferred. Most likely etiology includes Congestive Heart Failure and Pneumonia. Management to include Diuresis.    Previously treated with diuresis with improvement in effusion. She was not discharged home on diuretics but was discharged home on supplemental oxygen. She presents with acute worsening SOB.    - Moderate to large effusion noted to have re accumulated on CXR.   - Flu and covid negative.    - Leukocytosis on admission labs. No fever. Procalcitonin 0.12  - S/p vancomycin and zosyn in ED  - Continue Vanc and Zosyn for Cap coverage   - CT Chest: Left pleural effusion with volume loss left hemithorax  - TTE 2/3: EF 40%, global hypokinesis, CVP 3, PASP 25, no evidence of significant pericardial effusion    - Pulmonology consulted  -- Thought is effusion likely transudative effsuion and 2/2 volume overload   -- Lasix 40mg IV BID  -- Deferring thoracentesis att his time    Atrial fibrillation  Patient with Paroxysmal (<7 days) atrial fibrillation which is controlled currently with Beta Blocker. Patient is currently in sinus rhythm.[id number] Score: 2. Anticoagulation not started.     Pt had 8 hour episode of new onset afib during last hospitalization. Was placed on dilt gtt and it resolved. Since only 8 hours, EP did not recommend anticoagulation or rate control. Discharged with 30 day event monitor and EP follow up. She was started on metoprolol at discharge.  - Monitor telemetry   - Continue home metoprolol     Chronic hypoxemic respiratory failure  Patient with Hypoxic Respiratory failure which is Chronic, though only recently started on supplemental oxygen. she is on home oxygen at 2 LPM. Supplemental oxygen was provided and noted-    Signs/symptoms of respiratory failure include- increased work of breathing on exertion. Contributing diagnoses includes - CHF, Pleural effusion, and Pneumonia Labs and images were reviewed. Patient Has not had a recent ABG. Will treat underlying causes and adjust management of respiratory failure as follows- See pleural effusion     History of gout  - Continue home allopurinol and prednisone.     Pericardial effusion  - Recent history noted tamponade physiology, underwent pericardiocentesis 1/18 and a repeat echo next day showed trivial pericardial effusion.  - TTE 2/3: EF 40%,  global hypokinesis, CVP 3, PASP 25, no evidence of significant pericardial effusion     PVCs (premature ventricular contractions)  - S/p RFA on 1/18/24    Stage 3 chronic kidney disease  Creatine stable for now. BMP reviewed- noted Estimated Creatinine Clearance: 36.8 mL/min (based on SCr of 1.3 mg/dL). according to latest data. Based on current GFR, CKD stage is stage 3 - GFR 30-59.  Monitor UOP and serial BMP and adjust therapy as needed. Renally dose meds. Avoid nephrotoxic medications and procedures.    BMI 31.0-31.9,adult  Body mass index is 31.37 kg/m². Morbid obesity complicates all aspects of disease management from diagnostic modalities to treatment. Weight loss encouraged and health benefits explained to patient.      Diastolic dysfunction  During recent hospitalization, pt noted to have pleural effusions and there was concern for acute diastolic dysfunction and a transient decreased EF related to high HR.  - TTE 2/3: 40%, global LV hypokinesis present, RV systolic function is mildly reduced   - Monitor I/Os  - Lasix as above      Essential hypertension  Chronic, controlled. Latest blood pressure and vitals reviewed-     Temp:  [96.1 °F (35.6 °C)-98 °F (36.7 °C)]   Pulse:  [64-82]   Resp:  [17-20]   BP: (121-155)/(58-89)   SpO2:  [91 %-96 %] .   Home meds for hypertension were reviewed and noted below.   Hypertension Medications               metoprolol succinate (TOPROL-XL) 25 MG 24 hr tablet Take 1 tablet (25 mg total) by mouth once daily.          While in the hospital, will manage blood pressure as follows; Continue home antihypertensive regimen (metoprolol only). Currently her losartan 100mg/day and triamterene-hydrochlorothiazide 37.5-25 mg/day are on hold since last hospital admission due to concern for hypotension.     Will utilize p.r.n. blood pressure medication only if patient's blood pressure greater than 180/110 and she develops symptoms such as worsening chest pain or shortness of  breath.      VTE Risk Mitigation (From admission, onward)           Ordered     Reason for No Pharmacological VTE Prophylaxis  Once        Question:  Reasons:  Answer:  Physician Provided (leave comment)    02/03/24 0457     IP VTE HIGH RISK PATIENT  Once         02/03/24 0457     Place sequential compression device  Until discontinued         02/03/24 0457                    Discharge Planning   MARJ:      Code Status: Full Code   Is the patient medically ready for discharge?: No    Reason for patient still in hospital (select all that apply): Patient trending condition, Laboratory test, Treatment, Consult recommendations, and Pending disposition  Discharge Plan A: Home                  Morgan Causey MD  Department of Hospital Medicine   [facility]

## 2024-02-04 NOTE — ASSESSMENT & PLAN NOTE
Creatine stable for now. BMP reviewed- noted Estimated Creatinine Clearance: 36.8 mL/min (based on SCr of 1.3 mg/dL). according to latest data. Based on current GFR, CKD stage is stage 3 - GFR 30-59.  Monitor UOP and serial BMP and adjust therapy as needed. Renally dose meds. Avoid nephrotoxic medications and procedures.

## 2024-02-04 NOTE — PLAN OF CARE
Problem: Adult Inpatient Plan of Care  Goal: Plan of Care Review  Outcome: Ongoing, Not Progressing  Goal: Patient-Specific Goal (Individualized)  Outcome: Ongoing, Not Progressing  Goal: Absence of Hospital-Acquired Illness or Injury  Outcome: Ongoing, Not Progressing  Goal: Optimal Comfort and Wellbeing  Outcome: Ongoing, Not Progressing  Goal: Readiness for Transition of Care  Outcome: Ongoing, Not Progressing     Problem: Infection  Goal: Absence of Infection Signs and Symptoms  Outcome: Ongoing, Not Progressing     Problem: Skin Injury Risk Increased  Goal: Skin Health and Integrity  Outcome: Ongoing, Not Progressing     Problem: Pain Acute  Goal: Acceptable Pain Control and Functional Ability  Outcome: Ongoing, Not Progressing

## 2024-02-04 NOTE — ASSESSMENT & PLAN NOTE
Patient with Paroxysmal (<7 days) atrial fibrillation which is controlled currently with Beta Blocker. Patient is currently in sinus rhythm.[id number] Score: 2. Anticoagulation not started.     Pt had 8 hour episode of new onset afib during last hospitalization. Was placed on dilt gtt and it resolved. Since only 8 hours, EP did not recommend anticoagulation or rate control. Discharged with 30 day event monitor and EP follow up. She was started on metoprolol at discharge.  - Monitor telemetry   - Continue home metoprolol

## 2024-02-04 NOTE — SUBJECTIVE & OBJECTIVE
Interval History:   No events overnight. Continued dyspnea with activity. No other complaints. Continue IV diuresis. Pulmonology following. Continue IV abx.      Review of Systems   Constitutional:  Negative for chills and fever.   HENT:  Negative for congestion and trouble swallowing.    Eyes:  Negative for visual disturbance.   Respiratory:  Positive for shortness of breath. Negative for cough.    Cardiovascular:  Negative for chest pain, palpitations and leg swelling.   Gastrointestinal:  Negative for abdominal pain, nausea and vomiting.   Genitourinary:  Negative for dysuria, frequency and urgency.   Musculoskeletal:  Positive for arthralgias (across the shoulders). Negative for gait problem.   Skin:  Negative for rash and wound.   Neurological:  Negative for syncope and headaches.   Psychiatric/Behavioral:  Negative for agitation and confusion.      Objective:     Vital Signs (Most Recent):  Temp: 97 °F (36.1 °C) (02/04/24 0939)  Pulse: 78 (02/04/24 1130)  Resp: 18 (02/04/24 0939)  BP: 132/78 (02/04/24 0939)  SpO2: (!) 92 % (02/04/24 0939) Vital Signs (24h Range):  Temp:  [96.1 °F (35.6 °C)-98 °F (36.7 °C)] 97 °F (36.1 °C)  Pulse:  [64-82] 78  Resp:  [17-20] 18  SpO2:  [91 %-96 %] 92 %  BP: (121-155)/(58-89) 132/78     Weight: 75.3 kg (166 lb)  Body mass index is 31.37 kg/m².    Intake/Output Summary (Last 24 hours) at 2/4/2024 1210  Last data filed at 2/4/2024 0551  Gross per 24 hour   Intake 360 ml   Output 600 ml   Net -240 ml         Physical Exam  Constitutional:       General: She is not in acute distress.     Appearance: Normal appearance.   HENT:      Head: Normocephalic and atraumatic.      Mouth/Throat:      Mouth: Mucous membranes are moist.   Eyes:      Extraocular Movements: Extraocular movements intact.      Conjunctiva/sclera: Conjunctivae normal.   Cardiovascular:      Rate and Rhythm: Normal rate and regular rhythm.   Pulmonary:      Effort: Pulmonary effort is normal. No respiratory distress.       Breath sounds: Decreased breath sounds (left lung fields) present. No wheezing or rales.   Abdominal:      General: Abdomen is flat. Bowel sounds are normal.      Palpations: Abdomen is soft.      Tenderness: There is no abdominal tenderness. There is no guarding.   Musculoskeletal:         General: No swelling or tenderness.   Skin:     Findings: No rash.   Neurological:      General: No focal deficit present.      Mental Status: She is alert and oriented to person, place, and time. Mental status is at baseline.   Psychiatric:         Mood and Affect: Mood normal.         Behavior: Behavior normal.             Significant Labs: All pertinent labs within the past 24 hours have been reviewed.  CBC:   Recent Labs   Lab 02/03/24  0048 02/04/24  0500   WBC 21.73* 14.69*   HGB 11.8* 10.8*   HCT 36.8* 33.7*    287     CMP:   Recent Labs   Lab 02/03/24  0048 02/04/24  0500    139   K 4.2 4.2    106   CO2 23 26   * 91   BUN 20 19   CREATININE 1.0 1.3   CALCIUM 9.5 8.9   PROT 7.7  --    ALBUMIN 3.1*  --    BILITOT 0.7  --    ALKPHOS 57  --    AST 24  --    ALT 22  --    ANIONGAP 8 7*       Significant Imaging: I have reviewed all pertinent imaging results/findings within the past 24 hours.

## 2024-02-04 NOTE — ASSESSMENT & PLAN NOTE
- Recent history noted tamponade physiology, underwent pericardiocentesis 1/18 and a repeat echo next day showed trivial pericardial effusion.  - TTE 2/3: EF 40%, global hypokinesis, CVP 3, PASP 25, no evidence of significant pericardial effusion

## 2024-02-05 ENCOUNTER — PATIENT MESSAGE (OUTPATIENT)
Dept: CARDIOLOGY | Facility: CLINIC | Age: 74
End: 2024-02-05
Payer: MEDICARE

## 2024-02-05 ENCOUNTER — PATIENT MESSAGE (OUTPATIENT)
Dept: INTERNAL MEDICINE | Facility: CLINIC | Age: 74
End: 2024-02-05
Payer: MEDICARE

## 2024-02-05 LAB
ANION GAP SERPL CALC-SCNC: 10 MMOL/L (ref 8–16)
BASOPHILS # BLD AUTO: 0.11 K/UL (ref 0–0.2)
BASOPHILS NFR BLD: 0.8 % (ref 0–1.9)
BUN SERPL-MCNC: 24 MG/DL (ref 8–23)
CALCIUM SERPL-MCNC: 8.8 MG/DL (ref 8.7–10.5)
CHLORIDE SERPL-SCNC: 104 MMOL/L (ref 95–110)
CO2 SERPL-SCNC: 26 MMOL/L (ref 23–29)
CREAT SERPL-MCNC: 1.5 MG/DL (ref 0.5–1.4)
DIFFERENTIAL METHOD BLD: ABNORMAL
EOSINOPHIL # BLD AUTO: 0.5 K/UL (ref 0–0.5)
EOSINOPHIL NFR BLD: 3.4 % (ref 0–8)
ERYTHROCYTE [DISTWIDTH] IN BLOOD BY AUTOMATED COUNT: 13.3 % (ref 11.5–14.5)
EST. GFR  (NO RACE VARIABLE): 37 ML/MIN/1.73 M^2
GLUCOSE SERPL-MCNC: 87 MG/DL (ref 70–110)
HCT VFR BLD AUTO: 32.6 % (ref 37–48.5)
HGB BLD-MCNC: 10.7 G/DL (ref 12–16)
IMM GRANULOCYTES # BLD AUTO: 0.05 K/UL (ref 0–0.04)
IMM GRANULOCYTES NFR BLD AUTO: 0.4 % (ref 0–0.5)
LYMPHOCYTES # BLD AUTO: 2.5 K/UL (ref 1–4.8)
LYMPHOCYTES NFR BLD: 17.9 % (ref 18–48)
MAGNESIUM SERPL-MCNC: 2.1 MG/DL (ref 1.6–2.6)
MCH RBC QN AUTO: 29.7 PG (ref 27–31)
MCHC RBC AUTO-ENTMCNC: 32.8 G/DL (ref 32–36)
MCV RBC AUTO: 91 FL (ref 82–98)
MONOCYTES # BLD AUTO: 1.2 K/UL (ref 0.3–1)
MONOCYTES NFR BLD: 8.5 % (ref 4–15)
NEUTROPHILS # BLD AUTO: 9.6 K/UL (ref 1.8–7.7)
NEUTROPHILS NFR BLD: 69 % (ref 38–73)
NRBC BLD-RTO: 0 /100 WBC
OHS QRS DURATION: 76 MS
OHS QTC CALCULATION: 477 MS
PHOSPHATE SERPL-MCNC: 3.6 MG/DL (ref 2.7–4.5)
PLATELET # BLD AUTO: 308 K/UL (ref 150–450)
PMV BLD AUTO: 13 FL (ref 9.2–12.9)
POTASSIUM SERPL-SCNC: 3.2 MMOL/L (ref 3.5–5.1)
RBC # BLD AUTO: 3.6 M/UL (ref 4–5.4)
SODIUM SERPL-SCNC: 140 MMOL/L (ref 136–145)
WBC # BLD AUTO: 13.84 K/UL (ref 3.9–12.7)

## 2024-02-05 PROCEDURE — 83735 ASSAY OF MAGNESIUM: CPT | Performed by: STUDENT IN AN ORGANIZED HEALTH CARE EDUCATION/TRAINING PROGRAM

## 2024-02-05 PROCEDURE — 85025 COMPLETE CBC W/AUTO DIFF WBC: CPT | Performed by: STUDENT IN AN ORGANIZED HEALTH CARE EDUCATION/TRAINING PROGRAM

## 2024-02-05 PROCEDURE — 21400001 HC TELEMETRY ROOM

## 2024-02-05 PROCEDURE — 93010 ELECTROCARDIOGRAM REPORT: CPT | Mod: ,,, | Performed by: INTERNAL MEDICINE

## 2024-02-05 PROCEDURE — 93005 ELECTROCARDIOGRAM TRACING: CPT

## 2024-02-05 PROCEDURE — 25000003 PHARM REV CODE 250

## 2024-02-05 PROCEDURE — 63600175 PHARM REV CODE 636 W HCPCS

## 2024-02-05 PROCEDURE — 80048 BASIC METABOLIC PNL TOTAL CA: CPT | Performed by: STUDENT IN AN ORGANIZED HEALTH CARE EDUCATION/TRAINING PROGRAM

## 2024-02-05 PROCEDURE — 36415 COLL VENOUS BLD VENIPUNCTURE: CPT | Performed by: STUDENT IN AN ORGANIZED HEALTH CARE EDUCATION/TRAINING PROGRAM

## 2024-02-05 PROCEDURE — 84100 ASSAY OF PHOSPHORUS: CPT | Performed by: STUDENT IN AN ORGANIZED HEALTH CARE EDUCATION/TRAINING PROGRAM

## 2024-02-05 PROCEDURE — 63600175 PHARM REV CODE 636 W HCPCS: Performed by: STUDENT IN AN ORGANIZED HEALTH CARE EDUCATION/TRAINING PROGRAM

## 2024-02-05 PROCEDURE — 25000003 PHARM REV CODE 250: Performed by: STUDENT IN AN ORGANIZED HEALTH CARE EDUCATION/TRAINING PROGRAM

## 2024-02-05 RX ORDER — POTASSIUM CHLORIDE 20 MEQ/1
40 TABLET, EXTENDED RELEASE ORAL ONCE
Status: COMPLETED | OUTPATIENT
Start: 2024-02-05 | End: 2024-02-05

## 2024-02-05 RX ADMIN — METOPROLOL SUCCINATE 25 MG: 25 TABLET, EXTENDED RELEASE ORAL at 11:02

## 2024-02-05 RX ADMIN — OXYBUTYNIN CHLORIDE 5 MG: 5 TABLET ORAL at 11:02

## 2024-02-05 RX ADMIN — ASPIRIN 81 MG: 81 TABLET, COATED ORAL at 11:02

## 2024-02-05 RX ADMIN — PIPERACILLIN SODIUM AND TAZOBACTAM SODIUM 4.5 G: 4; .5 INJECTION, POWDER, FOR SOLUTION INTRAVENOUS at 11:02

## 2024-02-05 RX ADMIN — POTASSIUM CHLORIDE 40 MEQ: 1500 TABLET, EXTENDED RELEASE ORAL at 11:02

## 2024-02-05 RX ADMIN — ALLOPURINOL 350 MG: 300 TABLET ORAL at 11:02

## 2024-02-05 RX ADMIN — PREDNISONE 2.5 MG: 2.5 TABLET ORAL at 11:02

## 2024-02-05 RX ADMIN — PIPERACILLIN SODIUM AND TAZOBACTAM SODIUM 4.5 G: 4; .5 INJECTION, POWDER, FOR SOLUTION INTRAVENOUS at 09:02

## 2024-02-05 RX ADMIN — PIPERACILLIN SODIUM AND TAZOBACTAM SODIUM 4.5 G: 4; .5 INJECTION, POWDER, FOR SOLUTION INTRAVENOUS at 03:02

## 2024-02-05 RX ADMIN — OXYBUTYNIN CHLORIDE 5 MG: 5 TABLET ORAL at 09:02

## 2024-02-05 NOTE — SUBJECTIVE & OBJECTIVE
Interval History:   No events overnight. Patient went to restroom this morning without NC and had episode of tachycardia to 170s and dyspnea. Vital and symptoms resolved with time after NC replaced.  BCx NGTD and vancomycin discontinue. Holding IV diuresis as patient euvolemic on exam and worsening renal function. Repeat CXR ordered.       Review of Systems   Constitutional:  Negative for chills and fever.   HENT:  Negative for congestion and trouble swallowing.    Eyes:  Negative for visual disturbance.   Respiratory:  Positive for shortness of breath. Negative for cough.    Cardiovascular:  Positive for palpitations. Negative for chest pain and leg swelling.   Gastrointestinal:  Negative for abdominal pain, nausea and vomiting.   Genitourinary:  Negative for dysuria, frequency and urgency.   Musculoskeletal:  Positive for arthralgias (across the shoulders). Negative for gait problem.   Skin:  Negative for rash and wound.   Neurological:  Positive for weakness. Negative for syncope and headaches.   Psychiatric/Behavioral:  Negative for agitation and confusion.      Objective:     Vital Signs (Most Recent):  Temp: 97.7 °F (36.5 °C) (02/05/24 1111)  Pulse: 72 (02/05/24 1210)  Resp: 18 (02/05/24 1111)  BP: 121/78 (02/05/24 1111)  SpO2: (!) 94 % (02/05/24 1111) Vital Signs (24h Range):  Temp:  [96.8 °F (36 °C)-98.2 °F (36.8 °C)] 97.7 °F (36.5 °C)  Pulse:  [] 72  Resp:  [18-20] 18  SpO2:  [93 %-95 %] 94 %  BP: (121-138)/(70-81) 121/78     Weight: 75.3 kg (166 lb)  Body mass index is 31.37 kg/m².    Intake/Output Summary (Last 24 hours) at 2/5/2024 1507  Last data filed at 2/5/2024 1018  Gross per 24 hour   Intake 600 ml   Output 2900 ml   Net -2300 ml         Physical Exam  Constitutional:       General: She is not in acute distress.     Appearance: Normal appearance.   HENT:      Head: Normocephalic and atraumatic.      Mouth/Throat:      Mouth: Mucous membranes are moist.   Eyes:      Extraocular Movements:  Extraocular movements intact.      Conjunctiva/sclera: Conjunctivae normal.   Cardiovascular:      Rate and Rhythm: Normal rate and regular rhythm.   Pulmonary:      Effort: Pulmonary effort is normal. No respiratory distress.      Breath sounds: Decreased breath sounds (Left) and rales (Left: lower, mid.  Right: lower.) present. No wheezing.   Abdominal:      General: Abdomen is flat. Bowel sounds are normal.      Palpations: Abdomen is soft.      Tenderness: There is no abdominal tenderness. There is no guarding.   Musculoskeletal:         General: No swelling or tenderness.   Skin:     Findings: No rash.   Neurological:      General: No focal deficit present.      Mental Status: She is alert and oriented to person, place, and time. Mental status is at baseline.   Psychiatric:         Mood and Affect: Mood normal.         Behavior: Behavior normal.             Significant Labs: All pertinent labs within the past 24 hours have been reviewed.  CBC:   Recent Labs   Lab 02/04/24  0500 02/05/24  0450   WBC 14.69* 13.84*   HGB 10.8* 10.7*   HCT 33.7* 32.6*    308     CMP:   Recent Labs   Lab 02/04/24  0500 02/05/24  0450    140   K 4.2 3.2*    104   CO2 26 26   GLU 91 87   BUN 19 24*   CREATININE 1.3 1.5*   CALCIUM 8.9 8.8   ANIONGAP 7* 10       Significant Imaging: I have reviewed all pertinent imaging results/findings within the past 24 hours.

## 2024-02-05 NOTE — PLAN OF CARE
[facility]  Discharge Reassessment    Primary Care Provider: Gail Villarreal MD    Expected Discharge Date: 2/6/2024      Patient remains inpatient due to continued need for medical management. Patient continues to receive IV antibiotics and is scheduled for an EKG to be conducted today. Will continue to follow for post acute needs. Discharge Plan A and Plan B have been determined by review of patient's clinical status, future medical and therapeutic needs, and coverage/benefits for post-acute care in coordination with multidisciplinary team members.  Reassessment (most recent)       Discharge Reassessment - 02/05/24 1509          Discharge Reassessment    Assessment Type Discharge Planning Reassessment (P)      Did the patient's condition or plan change since previous assessment? Yes (P)      Discharge Plan discussed with: Patient (P)      Communicated MARJ with patient/caregiver Date not available/Unable to determine (P)      Discharge Plan A Home with family (P)      Discharge Plan B Home (P)      DME Needed Upon Discharge  none (P)      Transition of Care Barriers None (P)      Why the patient remains in the hospital Requires continued medical care (P)         Post-Acute Status    Discharge Delays None known at this time (P)                      COLLIN Brown  Case Management  [phone]

## 2024-02-05 NOTE — PROGRESS NOTES
[facility] Medicine  Progress Note    Patient Name: Rajesh Mas  MRN: [id number]  Patient Class: IP- Inpatient   Admission Date: 2/3/2024  Length of Stay: 1 days  Attending Physician: Morgan Causey MD  Primary Care Provider: Gail Villarreal MD        Subjective:     Principal Problem:Pleural effusion, left        HPI:  Rajesh Mas is a 73 y.o. female with PMHx of gout, obesity, CKD3, HTN, recent hospitalization for PVC s/p ablation 1/18/24 that was complicated by pericardial effusion with tamponade physiology s/p pericardiocentesis 1/18/24 and development of acute hypoxic respiratory failure from left pleural effusion and acute diastolic dysfunction. The pleural effusion improved with diuresis but she still During this hospital course she also was treated for CAP, placed on diltiazem gtt for development of Afib that resolved. She was discharged home on metoprolol and supplemental oxygen.    She presents to the ED today with her  for acute shortness of breath that began around 7 PM last night. She has been wearing oxygen at 2lpm via NC since last hospital discharge but up until last night had not feeling short of breath. She was walking around her house when the shortness of breath started, barely exerting herself. She also developed soreness/tightness in her bilateral shoulders and mild heaviness in her chest without chest pain. She has had a persistent dry cough for about one week. She also started requiring 3 pillows for sleep a few nights after developing orthopnea. She has poor appetite today. No fevers, chills, leg swelling, nausea, vomiting, palpitations, syncope.     In the ED, she was satting 91% on RA and 95% on 2L NC. Vitals otherwise stable. Labs reveal leukocytosis with WBC 21k, mild anemia with H/H 11.8/36.8. . Troponin 0.017. Flu and covid negative. CXR shows increased size of moderate to large left pleural effusion. Probable small right pleural  effusion. She was started on vancomycin and zosyn and admitted to hospital medicine observation.    Overview/Hospital Course:  No notes on file    Interval History:   No events overnight. Patient went to restroom this morning without NC and had episode of tachycardia to 170s and dyspnea. Vital and symptoms resolved with time after NC replaced.  BCx NGTD and vancomycin discontinue. Holding IV diuresis as patient euvolemic on exam and worsening renal function. Repeat CXR ordered.       Review of Systems   Constitutional:  Negative for chills and fever.   HENT:  Negative for congestion and trouble swallowing.    Eyes:  Negative for visual disturbance.   Respiratory:  Positive for shortness of breath. Negative for cough.    Cardiovascular:  Positive for palpitations. Negative for chest pain and leg swelling.   Gastrointestinal:  Negative for abdominal pain, nausea and vomiting.   Genitourinary:  Negative for dysuria, frequency and urgency.   Musculoskeletal:  Positive for arthralgias (across the shoulders). Negative for gait problem.   Skin:  Negative for rash and wound.   Neurological:  Positive for weakness. Negative for syncope and headaches.   Psychiatric/Behavioral:  Negative for agitation and confusion.      Objective:     Vital Signs (Most Recent):  Temp: 97.7 °F (36.5 °C) (02/05/24 1111)  Pulse: 72 (02/05/24 1210)  Resp: 18 (02/05/24 1111)  BP: 121/78 (02/05/24 1111)  SpO2: (!) 94 % (02/05/24 1111) Vital Signs (24h Range):  Temp:  [96.8 °F (36 °C)-98.2 °F (36.8 °C)] 97.7 °F (36.5 °C)  Pulse:  [] 72  Resp:  [18-20] 18  SpO2:  [93 %-95 %] 94 %  BP: (121-138)/(70-81) 121/78     Weight: 75.3 kg (166 lb)  Body mass index is 31.37 kg/m².    Intake/Output Summary (Last 24 hours) at 2/5/2024 1507  Last data filed at 2/5/2024 1018  Gross per 24 hour   Intake 600 ml   Output 2900 ml   Net -2300 ml         Physical Exam  Constitutional:       General: She is not in acute distress.     Appearance: Normal appearance.    HENT:      Head: Normocephalic and atraumatic.      Mouth/Throat:      Mouth: Mucous membranes are moist.   Eyes:      Extraocular Movements: Extraocular movements intact.      Conjunctiva/sclera: Conjunctivae normal.   Cardiovascular:      Rate and Rhythm: Normal rate and regular rhythm.   Pulmonary:      Effort: Pulmonary effort is normal. No respiratory distress.      Breath sounds: Decreased breath sounds (Left) and rales (Left: lower, mid.  Right: lower.) present. No wheezing.   Abdominal:      General: Abdomen is flat. Bowel sounds are normal.      Palpations: Abdomen is soft.      Tenderness: There is no abdominal tenderness. There is no guarding.   Musculoskeletal:         General: No swelling or tenderness.   Skin:     Findings: No rash.   Neurological:      General: No focal deficit present.      Mental Status: She is alert and oriented to person, place, and time. Mental status is at baseline.   Psychiatric:         Mood and Affect: Mood normal.         Behavior: Behavior normal.             Significant Labs: All pertinent labs within the past 24 hours have been reviewed.  CBC:   Recent Labs   Lab 02/04/24  0500 02/05/24  0450   WBC 14.69* 13.84*   HGB 10.8* 10.7*   HCT 33.7* 32.6*    308     CMP:   Recent Labs   Lab 02/04/24  0500 02/05/24  0450    140   K 4.2 3.2*    104   CO2 26 26   GLU 91 87   BUN 19 24*   CREATININE 1.3 1.5*   CALCIUM 8.9 8.8   ANIONGAP 7* 10       Significant Imaging: I have reviewed all pertinent imaging results/findings within the past 24 hours.    Assessment/Plan:      * Pleural effusion, left  Patient found to have  moderate to large left  pleural effusion and probably small right pleural effusion on imaging. I have personally reviewed and interpreted the following imaging: Xray. A thoracentesis was deferred. Most likely etiology includes Congestive Heart Failure and Pneumonia. Management to include Diuresis.    Previously treated with diuresis with  improvement in effusion. She was not discharged home on diuretics but was discharged home on supplemental oxygen. She presents with acute worsening SOB.   - Moderate to large effusion noted to have re accumulated on CXR.   - Flu and covid negative.    - Leukocytosis on admission labs. No fever. Procalcitonin 0.12  - S/p vancomycin and zosyn in ED  - CT Chest: Left pleural effusion with volume loss left hemithorax  - TTE 2/3: EF 40%, global hypokinesis, CVP 3, PASP 25, no evidence of significant pericardial effusion    - Pulmonology consulted  -- Thought is effusion likely transudative effsuion and 2/2 volume overload   -- Deferring thoracentesis att his time  -- Holding diuresis given small in caliber IVC that collapses fully with inspiration on TTE and worsening renal function with diuresis on 2/5  - Repeat CXR 2/5  - BCX NGTD  - Continue Zosyn for HAP coverage     Atrial fibrillation  Patient with Paroxysmal (<7 days) atrial fibrillation which is controlled currently with Beta Blocker. Patient is currently in sinus rhythm.[id number] Score: 2. Anticoagulation not started.     Pt had 8 hour episode of new onset afib during last hospitalization. Was placed on dilt gtt and it resolved. Since only 8 hours, EP did not recommend anticoagulation or rate control. Discharged with 30 day event monitor and EP follow up. She was started on metoprolol at discharge.  - Monitor telemetry   - Continue home metoprolol     Chronic hypoxemic respiratory failure  Patient with Hypoxic Respiratory failure which is Chronic, though only recently started on supplemental oxygen. she is on home oxygen at 2 LPM. Supplemental oxygen was provided and noted-    Signs/symptoms of respiratory failure include- increased work of breathing on exertion. Contributing diagnoses includes - CHF, Pleural effusion, and Pneumonia Labs and images were reviewed. Patient Has not had a recent ABG. Will treat underlying causes and adjust management of respiratory  failure as follows- See pleural effusion     History of gout  - Continue home allopurinol and prednisone.     Pericardial effusion  - Recent history noted tamponade physiology, underwent pericardiocentesis 1/18 and a repeat echo next day showed trivial pericardial effusion.  - TTE 2/3: EF 40%, global hypokinesis, CVP 3, PASP 25, no evidence of significant pericardial effusion     PVCs (premature ventricular contractions)  - S/p RFA on 1/18/24    Stage 3 chronic kidney disease  Creatine stable for now. BMP reviewed- noted Estimated Creatinine Clearance: 36.8 mL/min (based on SCr of 1.3 mg/dL). according to latest data. Based on current GFR, CKD stage is stage 3 - GFR 30-59.  Monitor UOP and serial BMP and adjust therapy as needed. Renally dose meds. Avoid nephrotoxic medications and procedures.    BMI 31.0-31.9,adult  Body mass index is 31.37 kg/m². Morbid obesity complicates all aspects of disease management from diagnostic modalities to treatment. Weight loss encouraged and health benefits explained to patient.      Diastolic dysfunction  During recent hospitalization, pt noted to have pleural effusions and there was concern for acute diastolic dysfunction and a transient decreased EF related to high HR.  - TTE 2/3: 40%, global LV hypokinesis present, RV systolic function is mildly reduced   - Monitor I/Os  - Lasix as above      Essential hypertension  Chronic, controlled. Latest blood pressure and vitals reviewed-     Temp:  [96.1 °F (35.6 °C)-98 °F (36.7 °C)]   Pulse:  [64-82]   Resp:  [17-20]   BP: (121-155)/(58-89)   SpO2:  [91 %-96 %] .   Home meds for hypertension were reviewed and noted below.   Hypertension Medications               metoprolol succinate (TOPROL-XL) 25 MG 24 hr tablet Take 1 tablet (25 mg total) by mouth once daily.          While in the hospital, will manage blood pressure as follows; Continue home antihypertensive regimen (metoprolol only). Currently her losartan 100mg/day and  triamterene-hydrochlorothiazide 37.5-25 mg/day are on hold since last hospital admission due to concern for hypotension.     Will utilize p.r.n. blood pressure medication only if patient's blood pressure greater than 180/110 and she develops symptoms such as worsening chest pain or shortness of breath.      VTE Risk Mitigation (From admission, onward)           Ordered     Reason for No Pharmacological VTE Prophylaxis  Once        Question:  Reasons:  Answer:  Physician Provided (leave comment)    02/03/24 0457     IP VTE HIGH RISK PATIENT  Once         02/03/24 0457     Place sequential compression device  Until discontinued         02/03/24 0457                    Discharge Planning   MARJ: 2/6/2024     Code Status: Full Code   Is the patient medically ready for discharge?: No    Reason for patient still in hospital (select all that apply): Patient trending condition, Laboratory test, Treatment, Imaging, and Pending disposition  Discharge Plan A: Home with family   Discharge Delays: None known at this time              Morgan Causey MD  Department of Hospital Medicine   [facility]

## 2024-02-05 NOTE — PROGRESS NOTES
VANCOMYCIN DOSING BY PHARMACY DISCONTINUATION NOTE    Rajesh Mas is a 73 y.o. female who had been consulted for vancomycin dosing.    The pharmacy consult for vancomycin dosing has been discontinued.     Vancomycin Dosing by Pharmacy Consult will sign-off. Please reconsult if necessary. Thank you for allowing us to participate in this patient's care.       Karina Perez, PharmD  [id number]

## 2024-02-05 NOTE — PLAN OF CARE
Problem: Adult Inpatient Plan of Care  Goal: Plan of Care Review  Outcome: Ongoing, Progressing  Goal: Patient-Specific Goal (Individualized)  Outcome: Ongoing, Progressing  Goal: Absence of Hospital-Acquired Illness or Injury  Outcome: Ongoing, Progressing  Goal: Optimal Comfort and Wellbeing  Outcome: Ongoing, Progressing  Goal: Readiness for Transition of Care  Outcome: Ongoing, Progressing     Problem: Infection  Goal: Absence of Infection Signs and Symptoms  Outcome: Ongoing, Progressing     Problem: Skin Injury Risk Increased  Goal: Skin Health and Integrity  Outcome: Ongoing, Progressing     Problem: Pain Acute  Goal: Acceptable Pain Control and Functional Ability  Outcome: Ongoing, Progressing     Problem: Gas Exchange Impaired  Goal: Optimal Gas Exchange  Outcome: Ongoing, Progressing     Problem: Airway Clearance Ineffective  Goal: Effective Airway Clearance  Outcome: Ongoing, Progressing

## 2024-02-05 NOTE — ASSESSMENT & PLAN NOTE
Patient found to have  moderate to large left  pleural effusion and probably small right pleural effusion on imaging. I have personally reviewed and interpreted the following imaging: Xray. A thoracentesis was deferred. Most likely etiology includes Congestive Heart Failure and Pneumonia. Management to include Diuresis.    Previously treated with diuresis with improvement in effusion. She was not discharged home on diuretics but was discharged home on supplemental oxygen. She presents with acute worsening SOB.   - Moderate to large effusion noted to have re accumulated on CXR.   - Flu and covid negative.    - Leukocytosis on admission labs. No fever. Procalcitonin 0.12  - S/p vancomycin and zosyn in ED  - CT Chest: Left pleural effusion with volume loss left hemithorax  - TTE 2/3: EF 40%, global hypokinesis, CVP 3, PASP 25, no evidence of significant pericardial effusion    - Pulmonology consulted  -- Thought is effusion likely transudative effsuion and 2/2 volume overload   -- Deferring thoracentesis att his time  -- Holding diuresis given small in caliber IVC that collapses fully with inspiration on TTE and worsening renal function with diuresis on 2/5  - Repeat CXR 2/5  - BCX NGTD  - Continue Zosyn for HAP coverage

## 2024-02-05 NOTE — PROGRESS NOTES
Pharmacokinetic Assessment Follow Up: IV Vancomycin    Vancomycin serum concentration assessment(s):    The random level was drawn correctly and can be used to guide therapy at this time. The measurement is above the desired definitive target range of 15 to 20 mcg/mL.    Vancomycin Regimen Plan:    Discontinue the scheduled vancomycin regimen and re-dose when the random level is less than 20 mcg/mL, next level to be drawn at 2100 on 2/5.    Drug levels (last 3 results):  Recent Labs   Lab Result Units 02/04/24  2051   Vancomycin, Random ug/mL 21.9       Pharmacy will continue to follow and monitor vancomycin.    Please contact pharmacy at extension [phone] for questions regarding this assessment.    Thank you for the consult,   Karina Perez       Patient brief summary:  Rajesh Mas is a 73 y.o. female initiated on antimicrobial therapy with IV Vancomycin for treatment of lower respiratory infection    The patient's current regimen is pulse dosing.     Drug Allergies:   Review of patient's allergies indicates:   Allergen Reactions    Nickel sutures [surgical stainless steel]     Adhesive Rash       Actual Body Weight:   75.3 kg     Renal Function:   Estimated Creatinine Clearance: 36.8 mL/min (based on SCr of 1.3 mg/dL).,     Dialysis Method (if applicable):  N/A    CBC (last 72 hours):  Recent Labs   Lab Result Units 02/03/24  0048 02/04/24  0500   WBC K/uL 21.73* 14.69*   Hemoglobin g/dL 11.8* 10.8*   Hematocrit % 36.8* 33.7*   Platelets K/uL 338 287   Gran % % 89.5* 75.4*   Lymph % % 4.6* 13.3*   Mono % % 5.0 8.4   Eosinophil % % 0.0 2.1   Basophil % % 0.3 0.5   Differential Method  Automated Automated       Metabolic Panel (last 72 hours):  Recent Labs   Lab Result Units 02/03/24  0048 02/03/24  0253 02/04/24  0500   Sodium mmol/L 139  --  139   Potassium mmol/L 4.2  --  4.2   Chloride mmol/L 108  --  106   CO2 mmol/L 23  --  26   Glucose mg/dL 130*  --  91   Glucose, UA   --  Negative  --    BUN mg/dL 20  --   19   Creatinine mg/dL 1.0  --  1.3   Albumin g/dL 3.1*  --   --    Total Bilirubin mg/dL 0.7  --   --    Alkaline Phosphatase U/L 57  --   --    AST U/L 24  --   --    ALT U/L 22  --   --    Magnesium mg/dL  --   --  2.2   Phosphorus mg/dL  --   --  3.1       Vancomycin Administrations:  vancomycin given in the last 96 hours                     vancomycin 1,250 mg in dextrose 5 % (D5W) 250 mL IVPB (Vial-Mate) (mg) 1,250 mg New Bag 02/04/24 2119    vancomycin 1.75 g in 5 % dextrose 500 mL IVPB (mg) 1,750 mg New Bag 02/03/24 2128    vancomycin 2 g in dextrose 5 % 500 mL IVPB (mg) 2,000 mg New Bag 02/03/24 0421                    Microbiologic Results:  Microbiology Results (last 7 days)       Procedure Component Value Units Date/Time    Blood Culture #1 **CANNOT BE ORDERED STAT** [[id number]] Collected: 02/03/24 0336    Order Status: Completed Specimen: Blood from Peripheral, Hand, Right Updated: 02/04/24 0613     Blood Culture, Routine No Growth to date      No Growth to date    Blood Culture #2 **CANNOT BE ORDERED STAT** [[id number]] Collected: 02/03/24 0336    Order Status: Completed Specimen: Blood from Peripheral, Hand, Left Updated: 02/04/24 0613     Blood Culture, Routine No Growth to date      No Growth to date    Influenza A & B by Molecular [[id number]] Collected: 02/03/24 0204    Order Status: Completed Specimen: Nasopharyngeal Swab Updated: 02/03/24 0251     Influenza A, Molecular Negative     Influenza B, Molecular Negative     Flu A & B Source Nasal swab

## 2024-02-05 NOTE — CARE UPDATE
RAPID RESPONSE NURSE PROACTIVE ROUNDING NOTE       Time of Visit: 930     Admit Date: 2/3/2024  LOS: 1  Code Status: Full Code   Date of Visit: 2024  : 1950  Age: 73 y.o.  Sex: female  Race: Black or   Bed: 625/625 A:   MRN: [id number]  Was the patient discharged from an ICU this admission? No   Was the patient discharged from a PACU within last 24 hours? No   Did the patient receive conscious sedation/general anesthesia in last 24 hours? No  Was the patient in the ED within the past 24 hours? No  Was the patient on NIPPV within the past 24 hours? No   Attending Physician: Morgan Causey MD  Primary Service[place] [facility] [place] [facility]    Time spent at the bedside: 15 -30 min    SITUATION    Notified by  noted on telemetry during rounds .  Reason for alert: tachycardia  Called to evaluate the patient for Dysrythmia    BACKGROUND     Why is the patient in the hospital?: Pleural effusion, left    Patient has a past medical history of Allergy, Diverticulitis, Fever blister, Hypertension, and Personal history of colonic polyps.    Last Vitals:  Temp: 98.2 °F (36.8 °C) (1048)  Pulse: 60 (1048)  Resp: 18 (1048)  BP: 127/77 (1048)  SpO2: 95 % (1048)    24 Hours Vitals Range:  Temp:  [96.8 °F (36 °C)-98.2 °F (36.8 °C)]   Pulse:  []   Resp:  [18-20]   BP: (123-138)/(70-81)   SpO2:  [93 %-95 %]     Labs:  Recent Labs     24  0048 24  0500 24  0450   WBC 21.73* 14.69* 13.84*   HGB 11.8* 10.8* 10.7*   HCT 36.8* 33.7* 32.6*    287 308       Recent Labs     24  0048 24  0500 24  0450    139 140   K 4.2 4.2 3.2*    106 104   CO2 23 26 26   BUN 20 19 24*   CREATININE 1.0 1.3 1.5*   * 91 87   PHOS  --  3.1 3.6   MG  --  2.2 2.1          ASSESSMENT    While rounding on MSU, noted that patient's telemetry was showing a HR of 180, with what appears to be a regular rhythm. Upon entering patient's room, noted that she  was in the restroom without her oxygen. Patient was assisted back into bed and placed on oxygen. SPO2 92% on 2L/NC. HR is improving.     INTERVENTIONS    The patient was seen for Cardiac problem. Staff concerns included tachycardia. The following interventions were performed: continuous cardiac monitoring continued and No additional interventions needed at this time..    RECOMMENDATIONS    - Continuous cardiac monitoring  - Extension tubing for oxygen  - Ambulate with assistance    PROVIDER ESCALATION    Yes/No  No    Orders received and case discussed with NA.    Disposition: Remain in room 625.    FOLLOW-UP    Charge Pricila GUNDERSON  updated on plan of care. Instructed to call the Rapid Response Nurse, Delores Cuello RN at [phone] for additional questions or concerns.

## 2024-02-06 LAB
ANION GAP SERPL CALC-SCNC: 8 MMOL/L (ref 8–16)
APPEARANCE FLD: NORMAL
BASOPHILS # BLD AUTO: 0.11 K/UL (ref 0–0.2)
BASOPHILS NFR BLD: 0.8 % (ref 0–1.9)
BODY FLD TYPE: NORMAL
BODY FLUID SOURCE, LDH: NORMAL
BUN SERPL-MCNC: 25 MG/DL (ref 8–23)
CALCIUM SERPL-MCNC: 9.2 MG/DL (ref 8.7–10.5)
CHLORIDE SERPL-SCNC: 106 MMOL/L (ref 95–110)
CO2 SERPL-SCNC: 26 MMOL/L (ref 23–29)
COLOR FLD: NORMAL
CREAT SERPL-MCNC: 1.5 MG/DL (ref 0.5–1.4)
DIFFERENTIAL METHOD BLD: ABNORMAL
EOSINOPHIL # BLD AUTO: 0.4 K/UL (ref 0–0.5)
EOSINOPHIL NFR BLD: 3.3 % (ref 0–8)
ERYTHROCYTE [DISTWIDTH] IN BLOOD BY AUTOMATED COUNT: 13.2 % (ref 11.5–14.5)
EST. GFR  (NO RACE VARIABLE): 37 ML/MIN/1.73 M^2
GLUCOSE FLD-MCNC: 115 MG/DL
GLUCOSE SERPL-MCNC: 86 MG/DL (ref 70–110)
HCT VFR BLD AUTO: 33.6 % (ref 37–48.5)
HGB BLD-MCNC: 10.9 G/DL (ref 12–16)
IMM GRANULOCYTES # BLD AUTO: 0.05 K/UL (ref 0–0.04)
IMM GRANULOCYTES NFR BLD AUTO: 0.4 % (ref 0–0.5)
LDH FLD L TO P-CCNC: 267 U/L
LYMPHOCYTES # BLD AUTO: 2.3 K/UL (ref 1–4.8)
LYMPHOCYTES NFR BLD: 17.2 % (ref 18–48)
LYMPHOCYTES NFR FLD MANUAL: 60 %
MAGNESIUM SERPL-MCNC: 2.3 MG/DL (ref 1.6–2.6)
MCH RBC QN AUTO: 30.4 PG (ref 27–31)
MCHC RBC AUTO-ENTMCNC: 32.4 G/DL (ref 32–36)
MCV RBC AUTO: 94 FL (ref 82–98)
MESOTHL CELL NFR FLD MANUAL: 3 %
MONOCYTES # BLD AUTO: 0.9 K/UL (ref 0.3–1)
MONOCYTES NFR BLD: 6.9 % (ref 4–15)
MONOS+MACROS NFR FLD MANUAL: 30 %
NEUTROPHILS # BLD AUTO: 9.6 K/UL (ref 1.8–7.7)
NEUTROPHILS NFR BLD: 71.4 % (ref 38–73)
NEUTROPHILS NFR FLD MANUAL: 7 %
NRBC BLD-RTO: 0 /100 WBC
PHOSPHATE SERPL-MCNC: 3 MG/DL (ref 2.7–4.5)
PLATELET # BLD AUTO: 312 K/UL (ref 150–450)
PMV BLD AUTO: 12.8 FL (ref 9.2–12.9)
POTASSIUM SERPL-SCNC: 4 MMOL/L (ref 3.5–5.1)
PROT FLD-MCNC: 4.6 G/DL
RBC # BLD AUTO: 3.59 M/UL (ref 4–5.4)
SODIUM SERPL-SCNC: 140 MMOL/L (ref 136–145)
SPECIMEN SOURCE: NORMAL
SPECIMEN SOURCE: NORMAL
WBC # BLD AUTO: 13.39 K/UL (ref 3.9–12.7)
WBC # FLD: 1458 /CU MM

## 2024-02-06 PROCEDURE — 99900035 HC TECH TIME PER 15 MIN (STAT)

## 2024-02-06 PROCEDURE — 0W9B3ZX DRAINAGE OF LEFT PLEURAL CAVITY, PERCUTANEOUS APPROACH, DIAGNOSTIC: ICD-10-PCS | Performed by: EMERGENCY MEDICINE

## 2024-02-06 PROCEDURE — 87116 MYCOBACTERIA CULTURE: CPT | Performed by: STUDENT IN AN ORGANIZED HEALTH CARE EDUCATION/TRAINING PROGRAM

## 2024-02-06 PROCEDURE — 84100 ASSAY OF PHOSPHORUS: CPT | Performed by: STUDENT IN AN ORGANIZED HEALTH CARE EDUCATION/TRAINING PROGRAM

## 2024-02-06 PROCEDURE — 63600175 PHARM REV CODE 636 W HCPCS

## 2024-02-06 PROCEDURE — 94799 UNLISTED PULMONARY SVC/PX: CPT | Mod: XB

## 2024-02-06 PROCEDURE — 94761 N-INVAS EAR/PLS OXIMETRY MLT: CPT

## 2024-02-06 PROCEDURE — 27000221 HC OXYGEN, UP TO 24 HOURS

## 2024-02-06 PROCEDURE — 80048 BASIC METABOLIC PNL TOTAL CA: CPT | Performed by: STUDENT IN AN ORGANIZED HEALTH CARE EDUCATION/TRAINING PROGRAM

## 2024-02-06 PROCEDURE — 83615 LACTATE (LD) (LDH) ENZYME: CPT | Performed by: STUDENT IN AN ORGANIZED HEALTH CARE EDUCATION/TRAINING PROGRAM

## 2024-02-06 PROCEDURE — 82945 GLUCOSE OTHER FLUID: CPT | Performed by: STUDENT IN AN ORGANIZED HEALTH CARE EDUCATION/TRAINING PROGRAM

## 2024-02-06 PROCEDURE — 25000003 PHARM REV CODE 250: Performed by: STUDENT IN AN ORGANIZED HEALTH CARE EDUCATION/TRAINING PROGRAM

## 2024-02-06 PROCEDURE — 87206 SMEAR FLUORESCENT/ACID STAI: CPT | Performed by: STUDENT IN AN ORGANIZED HEALTH CARE EDUCATION/TRAINING PROGRAM

## 2024-02-06 PROCEDURE — 84157 ASSAY OF PROTEIN OTHER: CPT | Performed by: STUDENT IN AN ORGANIZED HEALTH CARE EDUCATION/TRAINING PROGRAM

## 2024-02-06 PROCEDURE — 87102 FUNGUS ISOLATION CULTURE: CPT | Performed by: STUDENT IN AN ORGANIZED HEALTH CARE EDUCATION/TRAINING PROGRAM

## 2024-02-06 PROCEDURE — 63600175 PHARM REV CODE 636 W HCPCS: Performed by: STUDENT IN AN ORGANIZED HEALTH CARE EDUCATION/TRAINING PROGRAM

## 2024-02-06 PROCEDURE — 25000003 PHARM REV CODE 250

## 2024-02-06 PROCEDURE — 85025 COMPLETE CBC W/AUTO DIFF WBC: CPT | Performed by: STUDENT IN AN ORGANIZED HEALTH CARE EDUCATION/TRAINING PROGRAM

## 2024-02-06 PROCEDURE — 32554 ASPIRATE PLEURA W/O IMAGING: CPT | Mod: LT,GC,, | Performed by: EMERGENCY MEDICINE

## 2024-02-06 PROCEDURE — 83735 ASSAY OF MAGNESIUM: CPT | Performed by: STUDENT IN AN ORGANIZED HEALTH CARE EDUCATION/TRAINING PROGRAM

## 2024-02-06 PROCEDURE — 87070 CULTURE OTHR SPECIMN AEROBIC: CPT | Performed by: STUDENT IN AN ORGANIZED HEALTH CARE EDUCATION/TRAINING PROGRAM

## 2024-02-06 PROCEDURE — 21400001 HC TELEMETRY ROOM

## 2024-02-06 PROCEDURE — 36415 COLL VENOUS BLD VENIPUNCTURE: CPT | Performed by: STUDENT IN AN ORGANIZED HEALTH CARE EDUCATION/TRAINING PROGRAM

## 2024-02-06 PROCEDURE — 99233 SBSQ HOSP IP/OBS HIGH 50: CPT | Mod: 25,GC,, | Performed by: EMERGENCY MEDICINE

## 2024-02-06 PROCEDURE — 89051 BODY FLUID CELL COUNT: CPT | Performed by: STUDENT IN AN ORGANIZED HEALTH CARE EDUCATION/TRAINING PROGRAM

## 2024-02-06 RX ADMIN — PREDNISONE 2.5 MG: 2.5 TABLET ORAL at 09:02

## 2024-02-06 RX ADMIN — OXYBUTYNIN CHLORIDE 5 MG: 5 TABLET ORAL at 09:02

## 2024-02-06 RX ADMIN — ASPIRIN 81 MG: 81 TABLET, COATED ORAL at 09:02

## 2024-02-06 RX ADMIN — PIPERACILLIN SODIUM AND TAZOBACTAM SODIUM 4.5 G: 4; .5 INJECTION, POWDER, FOR SOLUTION INTRAVENOUS at 09:02

## 2024-02-06 RX ADMIN — PIPERACILLIN SODIUM AND TAZOBACTAM SODIUM 4.5 G: 4; .5 INJECTION, POWDER, FOR SOLUTION INTRAVENOUS at 03:02

## 2024-02-06 RX ADMIN — ALLOPURINOL 350 MG: 300 TABLET ORAL at 09:02

## 2024-02-06 RX ADMIN — PIPERACILLIN SODIUM AND TAZOBACTAM SODIUM 4.5 G: 4; .5 INJECTION, POWDER, FOR SOLUTION INTRAVENOUS at 01:02

## 2024-02-06 RX ADMIN — METOPROLOL SUCCINATE 25 MG: 25 TABLET, EXTENDED RELEASE ORAL at 09:02

## 2024-02-06 NOTE — MEDICAL/APP STUDENT
[facility] - Med Surg  Consult Note    Patient Name: Rajesh Mas  MRN: [id number]  Admission Date: 2/3/2024  Hospital Length of Stay: 2 days  Attending Physician: Morgan Causey MD   Primary Care Provider: Gail Villarreal MD     Patient information was obtained from patient, spouse/SO, and primary team.     Inpatient consult to Pulmonology  Consult performed by: Morgan Causey MD  Consult ordered by: Morgan Causey MD  Reason for consult: L pleural effusion and thoracentesis        Subjective:   History of Present Illness:  Rajesh Mas is a 73 y.o.  female who has a relevant medical history of HTN, gout, CKD and gout. Of note, she was recently hospitalized for an ablation for PVC (1/18/24) that was complicated by pericardial effusion with tamponade physiology s/p pericardiocentesis 1/18/24 and acute hypoxic respiratory failure. Her AHRF was attributed to a L pleural effusion and acute diastolic dysfunction. She was diuresed with some improvement in the effusion and also received CAP tx. She acutely developed Afib that resolved with diltiazem and was discharged on 2L home O2 and abx on 1/19.     She presented to the ED 2/3 with SOB and chest heaviness but no pain. She had been wearing her home O2 (2L) since her discharge. She noted significant JONES and new onset orthopnea. She also condones pressure in her shoulders and an initial dry cough that has been now productive of cloudy sputum for one week. Her labs on admission were notable for leukocytosis (WBC 21). She was started on vanc and zosyn. Echo from 2/3 shows an EF of 40%. CT Chest 2/3 shows left pleural effusion with volume loss and a moderate right effusion as well as a small pericardial effusion. She denies any fevers, chills, or sick contacts.     CXR from last night showing improved R pleural effusion and stable L pleural effusion. Patient is not on any anticoagulation. Vanc d/c per ID. Lasix stopped per primary team due to worsening  renal function.    Pulmonology was consulted for worsened L pleural effusion that persisted despite diuresis.     Past Medical History:  Past Medical History:   Diagnosis Date    Allergy     seasonal    Diverticulitis     Fever blister     Hypertension     Personal history of colonic polyps        Past Surgical History:  Past Surgical History:   Procedure Laterality Date    ABLATION N/A 1/18/2024    Procedure: Ablation;  Surgeon: Santi Ellison MD;  Location: [facility] EP LAB;  Service: Cardiology;  Laterality: N/A;  PVC, RFA, PEPE, anes, MB, 3 Prep,*prepare to map LVOT and RVOT*    CHOLECYSTECTOMY  04/26/2017    COLON SURGERY      COLONOSCOPY N/A 12/06/2016    Procedure: COLONOSCOPY;  Surgeon: Fidel Mason MD;  Location: [facility] ENDO (4TH FLR);  Service: Endoscopy;  Laterality: N/A;    COLONOSCOPY N/A 05/17/2022    Procedure: COLONOSCOPY;  Surgeon: RUSSELL Rolon MD;  Location: [facility] ENDO (4TH FLR);  Service: Endoscopy;  Laterality: N/A;  fully vaccinated, prep instr portal -ml    partial colectomy  1997    sigmoid removed due to diverticulitis    PERICARDIOCENTESIS N/A 1/18/2024    Procedure: Pericardiocentesis;  Surgeon: Santi Ellison MD;  Location: [facility] EP LAB;  Service: Cardiology;  Laterality: N/A;    SINUS SURGERY  2007    SMALL INTESTINE SURGERY  June 1997@ [facility]    Partial Sigm Colon /Divaticulitus       Allergies:  Review of patient's allergies indicates:   Allergen Reactions    Nickel sutures [surgical stainless steel]     Adhesive Rash       Medications:   In-Hospital Scheduled Medications:   allopurinoL  350 mg Oral Daily    aspirin  81 mg Oral Daily    metoprolol succinate  25 mg Oral Daily    oxybutynin  5 mg Oral BID    piperacillin-tazobactam (Zosyn) IV (PEDS and ADULTS) (extended infusion is not appropriate)  4.5 g Intravenous Q8H    predniSONE  2.5 mg Oral Daily      In-Hospital PRN Medications:  acetaminophen, albuterol-ipratropium, dextrose 10%, dextrose 10%, glucagon (human recombinant),  glucose, glucose, naloxone, sodium chloride 0.9%   In-Hospital IV Infusion Medications:     Home Medications:  Prior to Admission medications    Medication Sig Start Date End Date Taking? Authorizing Provider   allopurinoL (ZYLOPRIM) 300 MG tablet Take one tablet by mouth once daily in combination with 50mg tablets for a daily dose of 350mg 9/18/23  Yes Fly Boss MD   aspirin (ECOTRIN) 81 MG EC tablet Take 1 tablet (81 mg total) by mouth once daily. 1/22/24 1/21/25 Yes Noel Ching MD   metoprolol succinate (TOPROL-XL) 25 MG 24 hr tablet Take 1 tablet (25 mg total) by mouth once daily. 1/23/24 1/22/25 Yes Noel Ching MD   oxybutynin (DITROPAN) 5 MG Tab Take 1 tablet (5 mg total) by mouth 2 (two) times daily. 9/21/23  Yes Sharmila Kelley MD   predniSONE (DELTASONE) 2.5 MG tablet Take one tablet by mouth once daily 9/18/23  Yes Fly Boss MD       Family History:  Family History   Problem Relation Age of Onset    Colon cancer Maternal Grandmother         colon cancer    Breast cancer Maternal Grandmother     Diabetes Mother     Heart failure Mother     Hypertension Mother     Heart disease Mother     Kidney disease Mother         Dialysis    Other Brother         prostate issue    No Known Problems Sister     No Known Problems Daughter     No Known Problems Daughter     No Known Problems Son     No Known Problems Son     Liver cancer Maternal Uncle     Melanoma Neg Hx     Ovarian cancer Neg Hx     Cancer Neg Hx        Social History:  Social History     Tobacco Use    Smoking status: Never    Smokeless tobacco: Never   Substance Use Topics    Alcohol use: No    Drug use: Never       Review of Systems:  Review of Systems   Constitutional:  Negative for chills and fever.   Respiratory:  Positive for cough, sputum production and shortness of breath.    Cardiovascular:  Positive for orthopnea. Negative for chest pain and leg swelling.          Objective:   Last  24 Hour Vital Signs:  BP  Min: 121/78  Max: 156/78  Temp  Av °F (36.7 °C)  Min: 97.6 °F (36.4 °C)  Max: 98.5 °F (36.9 °C)  Pulse  Av.3  Min: 59  Max: 190  Resp  Av.7  Min: 16  Max: 18  SpO2  Av.1 %  Min: 91 %  Max: 95 %  I/O last 3 completed shifts:  In: 600 [P.O.:600]  Out: 2175 [Urine:2175]    Physical Examination:  Physical Exam  Constitutional:       Appearance: Normal appearance.   HENT:      Head: Normocephalic and atraumatic.   Eyes:      Extraocular Movements: Extraocular movements intact.      Conjunctiva/sclera: Conjunctivae normal.      Pupils: Pupils are equal, round, and reactive to light.   Cardiovascular:      Rate and Rhythm: Normal rate and regular rhythm.   Pulmonary:      Breath sounds: Examination of the left-upper field reveals decreased breath sounds. Examination of the left-middle field reveals decreased breath sounds. Examination of the left-lower field reveals decreased breath sounds and rales. Decreased breath sounds and rales present.      Comments: 2L NC  Musculoskeletal:         General: Normal range of motion.   Skin:     General: Skin is warm and dry.   Neurological:      General: No focal deficit present.      Mental Status: She is alert and oriented to person, place, and time.   Psychiatric:         Mood and Affect: Mood normal.         Thought Content: Thought content normal.           Laboratory:  Trended Lab Data:  Recent Labs     24  0500 24  0450 24  0400   WBC 14.69* 13.84* 13.39*   HGB 10.8* 10.7* 10.9*   HCT 33.7* 32.6* 33.6*    308 312    140 140   K 4.2 3.2* 4.0    104 106   CO2 26 26 26   BUN 19 24* 25*   CREATININE 1.3 1.5* 1.5*   GLU 91 87 86   CALCIUM 8.9 8.8 9.2   MG 2.2 2.1 2.3   PHOS 3.1 3.6 3.0       Cardiac:   Recent Labs   Lab 24  0048   TROPONINI 0.017   *       Urinalysis:   Lab Results   Component Value Date    LABURIN ESCHERICHIA COLI  >100,000 cfu/ml   (A) 2022    COLORU Yellow  02/03/2024    SPECGRAV >1.030 (A) 02/03/2024    NITRITE Negative 02/03/2024    KETONESU Negative 02/03/2024    UROBILINOGEN normal 07/08/2023       Microbiology:  Microbiology Results (last 7 days)       Procedure Component Value Units Date/Time    Blood Culture #1 **CANNOT BE ORDERED STAT** [[id number]] Collected: 02/03/24 0336    Order Status: Completed Specimen: Blood from Peripheral, Hand, Right Updated: 02/06/24 0612     Blood Culture, Routine No Growth to date      No Growth to date      No Growth to date      No Growth to date    Blood Culture #2 **CANNOT BE ORDERED STAT** [[id number]] Collected: 02/03/24 0336    Order Status: Completed Specimen: Blood from Peripheral, Hand, Left Updated: 02/06/24 0612     Blood Culture, Routine No Growth to date      No Growth to date      No Growth to date      No Growth to date    Influenza A & B by Molecular [[id number]] Collected: 02/03/24 0204    Order Status: Completed Specimen: Nasopharyngeal Swab Updated: 02/03/24 0251     Influenza A, Molecular Negative     Influenza B, Molecular Negative     Flu A & B Source Nasal swab            Radiology:  X-Ray Chest AP Portable  Narrative: EXAMINATION:  XR CHEST AP PORTABLE    CLINICAL HISTORY:  follow up pleural effusions;    TECHNIQUE:  Single frontal view of the chest was performed.    COMPARISON:  02/03/2024    FINDINGS:  The cardiomediastinal silhouette is prominent, magnified by technique..  There is obscuration of the left costophrenic angle suggesting effusion.  The degree of left pleural fluid appears similar to possibly slightly increased as compared to the previous exam.  Right pleural effusion has improved.  The trachea is midline.  The lungs are symmetrically expanded bilaterally with coarse interstitial attenuation, suggesting edema..  Underlying left lower lung zone consolidation not excluded.  There is no pneumothorax.  The osseous structures are unchanged..  Impression: As above    Electronically signed  by: Nathaniel Perez MD  Date:    02/05/2024  Time:    18:48    Results for orders placed or performed during the hospital encounter of 02/03/24 (from the past 2160 hour(s))   CT Chest Without Contrast    Narrative    EXAMINATION:  CT CHEST WITHOUT CONTRAST    CLINICAL HISTORY:  Pneumonia, unresolved;pleural effusion, CHF, possible PNA;    TECHNIQUE:  Low dose axial images, sagittal and coronal reformations were obtained from the thoracic inlet to the lung bases. Contrast was not administered.    COMPARISON:  None    FINDINGS:  Base of Neck: No significant abnormality.    Thoracic soft tissues: Normal.    Aorta: Left-sided aortic arch.  No aneurysm and no significant atherosclerosis    Heart: Normal size.  Small volume pericardial effusion.  Effusion.    Pulmonary vasculature: Pulmonary arteries distribute normally.  There are four pulmonary veins.    Alice/Mediastinum: No pathologic eliot enlargement.    Airways: Patent.    Lungs/Pleura: LEFT pleural effusion with consolidative change in volume loss in the LEFT hemithorax.  Smaller RIGHT pleural effusion.    Esophagus: Normal.    Upper Abdomen: No abnormality of the partially imaged upper abdomen.    Bones: No acute fracture. No suspicious lytic or sclerotic lesions.      Impression    LEFT pleural effusion with volume loss LEFT hemithorax.  Moderate RIGHT pleural effusion.  Small pericardial effusion.      Electronically signed by: Antonio Lewis MD  Date:    02/03/2024  Time:    06:35         I have personally reviewed the above labs and imaging.    Current Medications:     Infusions:       Scheduled:   allopurinoL  350 mg Oral Daily    aspirin  81 mg Oral Daily    metoprolol succinate  25 mg Oral Daily    oxybutynin  5 mg Oral BID    piperacillin-tazobactam (Zosyn) IV (PEDS and ADULTS) (extended infusion is not appropriate)  4.5 g Intravenous Q8H    predniSONE  2.5 mg Oral Daily        PRN:  acetaminophen, albuterol-ipratropium, dextrose 10%, dextrose 10%,  glucagon (human recombinant), glucose, glucose, naloxone, sodium chloride 0.9%      Assessment/Plan:     # L pleural effusion unclear etiology  # Chronic hypoxemic respiratory failure  Current pleural effusion could be due to a variety of factors. Patient has an EF of 40% with concurrent orthopnea. She was getting diuresed during this admission and is net negative 4L today with no resolution of the left pleural effusion. However, this could be a parapneumonic effusion as patient also recently recovered from a pneumonia and has a current leukocyotsis. POCUS showed a large sized pocket of fluid that could be tapped. Performed therapeutic and diagnostic thoracentesis this afternoon and drained about 900mL of sanguinous fluid.             Recommendations:  - Will send for cultures and cytology.   - Continue abx per ID  - Patient on 3L NC and breathing comfortably, hopefully this will continue to improve after drainage of pleural fluid       Active Diagnoses:    Diagnosis Date Noted POA    PRINCIPAL PROBLEM:  Pleural effusion, left [J90] 01/20/2024 Yes    Atrial fibrillation [I48.91] 01/22/2024 Yes    Chronic hypoxemic respiratory failure [J96.11] 01/19/2024 Yes    Pericardial effusion [I31.39] 01/18/2024 Yes    History of gout [Z87.39] 01/18/2024 Yes    PVCs (premature ventricular contractions) [I49.3] 10/10/2023 Yes    Stage 3 chronic kidney disease [N18.30] 01/12/2022 Yes    BMI 31.0-31.9,adult [Z68.31] 12/30/2020 Not Applicable    Diastolic dysfunction [I51.89] 05/19/2017 Yes    Essential hypertension [I10] 02/25/2016 Yes     Chronic      Problems Resolved During this Admission:     VTE Risk Mitigation (From admission, onward)           Ordered     Reason for No Pharmacological VTE Prophylaxis  Once        Question:  Reasons:  Answer:  Physician Provided (leave comment)    02/03/24 0457     IP VTE HIGH RISK PATIENT  Once         02/03/24 0457     Place sequential compression device  Until discontinued          02/03/24 9177                    Thank you for your consult. I will follow-up with patient. Please contact us if you have any additional questions.    Rebecca Schiff [facility] MS4  [facility] - Med Surg    This is a medical student note written for educational purposes. Please see note from our team from today for outline of the plan.     Ranjith Gordon MD  [facility] Pulmonary Critical Care Medicine Fellow

## 2024-02-06 NOTE — MEDICAL/APP STUDENT
[facility] Consult Note    Primary Attending Physician: ***  Consultant Attending: ***  Consultant Fellow: ***    Reason for Consult:     ***    Subjective:      History of Present Illness:  Rajesh Mas is a 73 y.o.  female who has a relevant medical history of HTN, gout, CKD and gout. Of note, she was recently hospitalized for an ablation for PVC (1/18/24) that was complicated by pericardial effusion with tamponade physiology s/p pericardiocentesis 1/18/24 and acute hypoxic respiratory failure. Her AHRF was attributed to a L pleural effusion and acute diastolic dysfunction. She was diuresed with some improvement in the effusion and also received CAP tx. She acutely developed Afib that resolved with diltiazem and was discharged on 2L home O2 and abx on 1/19.     She presented to the ED 2/3 with SOB and chest heaviness but no pain. She had been wearing her home O2 (2L) since her discharge. She noted significant JONES and new onset orthopnea. She also condones pressure in her shoulders and an initial dry cough that has been now productive of cloudy sputum for one week. Her labs on admission were notable for leukocytosis (WBC 21). She was started on vanc and zosyn. Echo from 2/3 shows an EF of 40%. CT Chest 2/3 shows left pleural effusion with volume loss and a moderate right effusion as well as a small pericardial effusion. She denies any fevers, chills, or sick contacts.     CXR from last night showing improved R pleural effusion and stable L pleural effusion. Patient is not on any anticoagulation. Vanc d/c per ID. Lasix stopped per primary team due to worsening renal function.    Pulmonology was consulted for worsened L pleural effusion that persisted despite diuresis.     Past Medical History:  Past Medical History:   Diagnosis Date    Allergy     seasonal    Diverticulitis     Fever blister     Hypertension     Personal history of colonic polyps        Past Surgical History:  Past  Surgical History:   Procedure Laterality Date    ABLATION N/A 1/18/2024    Procedure: Ablation;  Surgeon: Santi Ellison MD;  Location: [facility] EP LAB;  Service: Cardiology;  Laterality: N/A;  PVC, RFA, PEPE, anes, MB, 3 Prep,*prepare to map LVOT and RVOT*    CHOLECYSTECTOMY  04/26/2017    COLON SURGERY      COLONOSCOPY N/A 12/06/2016    Procedure: COLONOSCOPY;  Surgeon: Fidel Mason MD;  Location: [facility] ENDO (4TH FLR);  Service: Endoscopy;  Laterality: N/A;    COLONOSCOPY N/A 05/17/2022    Procedure: COLONOSCOPY;  Surgeon: RUSSELL Rolon MD;  Location: [facility] ENDO (4TH FLR);  Service: Endoscopy;  Laterality: N/A;  fully vaccinated, prep instr portal -ml    partial colectomy  1997    sigmoid removed due to diverticulitis    PERICARDIOCENTESIS N/A 1/18/2024    Procedure: Pericardiocentesis;  Surgeon: Santi Ellison MD;  Location: [facility] EP LAB;  Service: Cardiology;  Laterality: N/A;    SINUS SURGERY  2007    SMALL INTESTINE SURGERY  June 1997@ [facility]    Partial Sigm Colon /Divaticulitus       Allergies:  Review of patient's allergies indicates:   Allergen Reactions    Nickel sutures [surgical stainless steel]     Adhesive Rash       Medications:   In-Hospital Scheduled Medications:   allopurinoL  350 mg Oral Daily    aspirin  81 mg Oral Daily    metoprolol succinate  25 mg Oral Daily    oxybutynin  5 mg Oral BID    piperacillin-tazobactam (Zosyn) IV (PEDS and ADULTS) (extended infusion is not appropriate)  4.5 g Intravenous Q8H    predniSONE  2.5 mg Oral Daily      In-Hospital PRN Medications:  acetaminophen, albuterol-ipratropium, dextrose 10%, dextrose 10%, glucagon (human recombinant), glucose, glucose, naloxone, sodium chloride 0.9%   In-Hospital IV Infusion Medications:     Home Medications:  Prior to Admission medications    Medication Sig Start Date End Date Taking? Authorizing Provider   allopurinoL (ZYLOPRIM) 300 MG tablet Take one tablet by mouth once daily in combination with 50mg tablets for a daily  dose of 350mg 23  Yes Fly Boss MD   aspirin (ECOTRIN) 81 MG EC tablet Take 1 tablet (81 mg total) by mouth once daily. 24 Yes Noel Ching MD   metoprolol succinate (TOPROL-XL) 25 MG 24 hr tablet Take 1 tablet (25 mg total) by mouth once daily. 24 Yes Noel Ching MD   oxybutynin (DITROPAN) 5 MG Tab Take 1 tablet (5 mg total) by mouth 2 (two) times daily. 23  Yes Sharmila Kelley MD   predniSONE (DELTASONE) 2.5 MG tablet Take one tablet by mouth once daily 23  Yes Fly Boss MD       Family History:  Family History   Problem Relation Age of Onset    Colon cancer Maternal Grandmother         colon cancer    Breast cancer Maternal Grandmother     Diabetes Mother     Heart failure Mother     Hypertension Mother     Heart disease Mother     Kidney disease Mother         Dialysis    Other Brother         prostate issue    No Known Problems Sister     No Known Problems Daughter     No Known Problems Daughter     No Known Problems Son     No Known Problems Son     Liver cancer Maternal Uncle     Melanoma Neg Hx     Ovarian cancer Neg Hx     Cancer Neg Hx        Social History:  Social History     Tobacco Use    Smoking status: Never    Smokeless tobacco: Never   Substance Use Topics    Alcohol use: No    Drug use: Never       Review of Systems:  Review of Systems   Constitutional:  Negative for chills and fever.   Respiratory:  Positive for cough, sputum production and shortness of breath.    Cardiovascular:  Positive for orthopnea. Negative for chest pain and leg swelling.          Objective:   Last 24 Hour Vital Signs:  BP  Min: 121/78  Max: 156/78  Temp  Av °F (36.7 °C)  Min: 97.6 °F (36.4 °C)  Max: 98.5 °F (36.9 °C)  Pulse  Av.3  Min: 59  Max: 190  Resp  Av.7  Min: 16  Max: 18  SpO2  Av.1 %  Min: 91 %  Max: 95 %  I/O last 3 completed shifts:  In: 600 [P.O.:600]  Out: 2175 [Urine:2175]    Physical  Examination:  Physical Exam  Constitutional:       Appearance: Normal appearance.   HENT:      Head: Normocephalic and atraumatic.   Eyes:      Extraocular Movements: Extraocular movements intact.      Conjunctiva/sclera: Conjunctivae normal.      Pupils: Pupils are equal, round, and reactive to light.   Cardiovascular:      Rate and Rhythm: Normal rate and regular rhythm.   Pulmonary:      Breath sounds: Examination of the left-upper field reveals decreased breath sounds. Examination of the left-middle field reveals decreased breath sounds. Examination of the left-lower field reveals decreased breath sounds and rales. Decreased breath sounds and rales present.      Comments: 2L NC  Musculoskeletal:         General: Normal range of motion.   Skin:     General: Skin is warm and dry.   Neurological:      General: No focal deficit present.      Mental Status: She is alert and oriented to person, place, and time.   Psychiatric:         Mood and Affect: Mood normal.         Thought Content: Thought content normal.           Laboratory:  Trended Lab Data:  Recent Labs     02/04/24  0500 02/05/24  0450 02/06/24  0400   WBC 14.69* 13.84* 13.39*   HGB 10.8* 10.7* 10.9*   HCT 33.7* 32.6* 33.6*    308 312    140 140   K 4.2 3.2* 4.0    104 106   CO2 26 26 26   BUN 19 24* 25*   CREATININE 1.3 1.5* 1.5*   GLU 91 87 86   CALCIUM 8.9 8.8 9.2   MG 2.2 2.1 2.3   PHOS 3.1 3.6 3.0       Cardiac:   Recent Labs   Lab 02/03/24  0048   TROPONINI 0.017   *       Urinalysis:   Lab Results   Component Value Date    LABURIN ESCHERICHIA COLI  >100,000 cfu/ml   (A) 09/12/2022    COLORU Yellow 02/03/2024    SPECGRAV >1.030 (A) 02/03/2024    NITRITE Negative 02/03/2024    KETONESU Negative 02/03/2024    UROBILINOGEN normal 07/08/2023       Microbiology:  Microbiology Results (last 7 days)       Procedure Component Value Units Date/Time    Blood Culture #1 **CANNOT BE ORDERED STAT** [[id number]] Collected: 02/03/24 4477     Order Status: Completed Specimen: Blood from Peripheral, Hand, Right Updated: 02/06/24 0612     Blood Culture, Routine No Growth to date      No Growth to date      No Growth to date      No Growth to date    Blood Culture #2 **CANNOT BE ORDERED STAT** [[id number]] Collected: 02/03/24 0336    Order Status: Completed Specimen: Blood from Peripheral, Hand, Left Updated: 02/06/24 0612     Blood Culture, Routine No Growth to date      No Growth to date      No Growth to date      No Growth to date    Influenza A & B by Molecular [[id number]] Collected: 02/03/24 0204    Order Status: Completed Specimen: Nasopharyngeal Swab Updated: 02/03/24 0251     Influenza A, Molecular Negative     Influenza B, Molecular Negative     Flu A & B Source Nasal swab            Radiology:  X-Ray Chest AP Portable  Narrative: EXAMINATION:  XR CHEST AP PORTABLE    CLINICAL HISTORY:  follow up pleural effusions;    TECHNIQUE:  Single frontal view of the chest was performed.    COMPARISON:  02/03/2024    FINDINGS:  The cardiomediastinal silhouette is prominent, magnified by technique..  There is obscuration of the left costophrenic angle suggesting effusion.  The degree of left pleural fluid appears similar to possibly slightly increased as compared to the previous exam.  Right pleural effusion has improved.  The trachea is midline.  The lungs are symmetrically expanded bilaterally with coarse interstitial attenuation, suggesting edema..  Underlying left lower lung zone consolidation not excluded.  There is no pneumothorax.  The osseous structures are unchanged..  Impression: As above    Electronically signed by: Nathaniel Perez MD  Date:    02/05/2024  Time:    18:48    Results for orders placed or performed during the hospital encounter of 02/03/24 (from the past 2160 hour(s))   CT Chest Without Contrast    Narrative    EXAMINATION:  CT CHEST WITHOUT CONTRAST    CLINICAL HISTORY:  Pneumonia, unresolved;pleural effusion, CHF, possible  PNA;    TECHNIQUE:  Low dose axial images, sagittal and coronal reformations were obtained from the thoracic inlet to the lung bases. Contrast was not administered.    COMPARISON:  None    FINDINGS:  Base of Neck: No significant abnormality.    Thoracic soft tissues: Normal.    Aorta: Left-sided aortic arch.  No aneurysm and no significant atherosclerosis    Heart: Normal size.  Small volume pericardial effusion.  Effusion.    Pulmonary vasculature: Pulmonary arteries distribute normally.  There are four pulmonary veins.    Alice/Mediastinum: No pathologic eliot enlargement.    Airways: Patent.    Lungs/Pleura: LEFT pleural effusion with consolidative change in volume loss in the LEFT hemithorax.  Smaller RIGHT pleural effusion.    Esophagus: Normal.    Upper Abdomen: No abnormality of the partially imaged upper abdomen.    Bones: No acute fracture. No suspicious lytic or sclerotic lesions.      Impression    LEFT pleural effusion with volume loss LEFT hemithorax.  Moderate RIGHT pleural effusion.  Small pericardial effusion.      Electronically signed by: Antonio Lewis MD  Date:    02/03/2024  Time:    06:35         I have personally reviewed the above labs and imaging.    Current Medications:     Infusions:       Scheduled:   allopurinoL  350 mg Oral Daily    aspirin  81 mg Oral Daily    metoprolol succinate  25 mg Oral Daily    oxybutynin  5 mg Oral BID    piperacillin-tazobactam (Zosyn) IV (PEDS and ADULTS) (extended infusion is not appropriate)  4.5 g Intravenous Q8H    predniSONE  2.5 mg Oral Daily        PRN:  acetaminophen, albuterol-ipratropium, dextrose 10%, dextrose 10%, glucagon (human recombinant), glucose, glucose, naloxone, sodium chloride 0.9%     Assessment:     Rajesh Mas is a 73 y.o. female with:  Patient Active Problem List    Diagnosis Date Noted    Atrial fibrillation 01/22/2024    Acute diastolic heart failure 01/20/2024    Pleural effusion, left 01/20/2024    Chronic hypoxemic  respiratory failure 01/19/2024    Pericardial effusion 01/18/2024    History of gout 01/18/2024    PVCs (premature ventricular contractions) 10/10/2023    Chest pain of unknown etiology 10/05/2023    Left hip pain 08/02/2023    Subclinical hypothyroidism 07/08/2023    Stage 3 chronic kidney disease 01/12/2022    BMI 31.0-31.9,adult 12/30/2020    Thrombocytopenia 02/22/2018    Diastolic dysfunction 05/19/2017    Essential hypertension 02/25/2016        Plan:     # L pleural effusion unclear etiology  # Chronic hypoxemic respiratory failure  Current pleural effusion could be due to a variety of factors. Patient has an EF of 40% with concurrent orthopnea. She was getting diuresed during this admission and is net negative 4L today with no resolution of the left pleural effusion. However, this could be a parapneumonic effusion as patient also recently recovered from a pneumonia and has a current leukocyotsis. POCUS showed a large sized pocket of fluid that could be tapped. Performed therapeutic and diagnostic thoracentesis this afternoon and drained about 900mL of sanguinous fluid.             Recommendations:  - Will send for cultures and cytology.   - Continue abx per ID  - Patient on 3L NC and breathing comfortably, hopefully this will continue to improve after drainage of pleural fluid      Thank you for allowing us to participate in the care of this patient. Please contact me if you have any questions regarding this consult.    Taryn Morales

## 2024-02-06 NOTE — PROCEDURES
"Rajesh Mas is a 73 y.o. female patient.    Temp: 98.1 °F (36.7 °C) (24)  Pulse: 61 (24)  Resp: 16 (24)  BP: 128/77 (24)  SpO2: 95 % (24)  Weight: 75.3 kg (166 lb) (24)  Height: 5' 1" (154.9 cm) (24)       Thoracentesis    Date/Time: 2024 5:20 PM  Location procedure was performed: [facility] PULMONARY MEDICINE    Performed by: Ranjith Dangelo MD  Authorized by: Reuben Guerra MD  Assisting provider: Reuben Guerra MD  Pre-operative diagnosis: Left pleural effusion  Post-operative diagnosis: Same  Consent Done: Yes  Consent: Verbal consent obtained. Written consent obtained.  Risks and benefits: risks, benefits and alternatives were discussed  Consent given by: patient  Patient understanding: patient states understanding of the procedure being performed  Patient consent: the patient's understanding of the procedure matches consent given  Procedure consent: procedure consent matches procedure scheduled  Relevant documents: relevant documents present and verified  Test results: test results available and properly labeled  Site marked: the operative site was marked  Imaging studies: imaging studies available  Required items: required blood products, implants, devices, and special equipment available  Patient identity confirmed: , MRN, name, provided demographic data and verbally with patient  Time out: Immediately prior to procedure a "time out" was called to verify the correct patient, procedure, equipment, support staff and site/side marked as required.  Procedure purpose: diagnostic and therapeutic  Indications: pleural effusion  Preparation: Patient was prepped and draped in the usual sterile fashion.  Local anesthesia used: yes  Anesthesia: local infiltration    Anesthesia:  Local anesthesia used: yes  Local Anesthetic: lidocaine 1% without epinephrine  Anesthetic total: 10 mL  Preparation: skin prepped with " ChloraPrep  Patient position: sitting  Ultrasound guidance: used to sonam spot.  Location: left lateral  Puncture method: over-the-needle catheter  Needle size: 14  Catheter size: 8 Uzbek  Number of attempts: 1  Drainage amount: 800 ml  Drainage characteristics: bloody and serosanguinous  Patient tolerance: Patient tolerated the procedure well with no immediate complications  Chest x-ray performed: yes  Technical procedures used: -  Significant surgical tasks conducted by the assistant(s): supervised  Complications: No  Estimated blood loss (mL): 10  Specimens: Yes  Implants: No  Comments: Tolerated procedure well. See note from today for details on assessment and plan.  Drainage Tube: removed        2/6/2024    Ranjith Gordon MD  [facility] Fellow

## 2024-02-06 NOTE — ASSESSMENT & PLAN NOTE
Patient found to have  moderate to large left  pleural effusion and probably small right pleural effusion on imaging. I have personally reviewed and interpreted the following imaging: Xray. A thoracentesis was deferred. Most likely etiology includes Congestive Heart Failure and Pneumonia. Management to include Diuresis.    Previously treated with diuresis with improvement in effusion. She was not discharged home on diuretics but was discharged home on supplemental oxygen. She presents with acute worsening SOB.   - Moderate to large effusion noted to have re accumulated on CXR.   - Flu and covid negative.    - Leukocytosis on admission labs. No fever. Procalcitonin 0.12  - S/p vancomycin and zosyn in ED  - CT Chest: Left pleural effusion with volume loss left hemithorax  - TTE 2/3: EF 40%, global hypokinesis, CVP 3, PASP 25, no evidence of significant pericardial effusion    - Pulmonology consulted  -- Repeat CXR 2/5 with continue left sided opacity, but improved right small effusion  -- Planing for thoracentesis on 2/6  - BCX NGTD  - Continue Zosyn for HAP coverage

## 2024-02-06 NOTE — PLAN OF CARE
Problem: Adult Inpatient Plan of Care  Goal: Plan of Care Review  Outcome: Ongoing, Not Progressing  Goal: Patient-Specific Goal (Individualized)  Outcome: Ongoing, Not Progressing  Goal: Absence of Hospital-Acquired Illness or Injury  Outcome: Ongoing, Not Progressing  Goal: Optimal Comfort and Wellbeing  Outcome: Ongoing, Not Progressing  Goal: Readiness for Transition of Care  Outcome: Ongoing, Not Progressing     Problem: Infection  Goal: Absence of Infection Signs and Symptoms  Outcome: Ongoing, Not Progressing     Problem: Skin Injury Risk Increased  Goal: Skin Health and Integrity  Outcome: Ongoing, Not Progressing     Problem: Pain Acute  Goal: Acceptable Pain Control and Functional Ability  Outcome: Ongoing, Not Progressing     Problem: Gas Exchange Impaired  Goal: Optimal Gas Exchange  Outcome: Ongoing, Not Progressing     Problem: Airway Clearance Ineffective  Goal: Effective Airway Clearance  Outcome: Ongoing, Not Progressing

## 2024-02-06 NOTE — SUBJECTIVE & OBJECTIVE
Interval History:   No events overnight. Patient with no change in symptoms. Pulmonology re consulted. Planning for thoracentesis today.      Review of Systems   Constitutional:  Negative for chills and fever.   HENT:  Negative for congestion and trouble swallowing.    Eyes:  Negative for visual disturbance.   Respiratory:  Negative for cough and shortness of breath.    Cardiovascular:  Negative for chest pain, palpitations and leg swelling.   Gastrointestinal:  Negative for abdominal pain, nausea and vomiting.   Genitourinary:  Negative for dysuria, frequency and urgency.   Musculoskeletal:  Positive for arthralgias (across the shoulders). Negative for gait problem.   Skin:  Negative for rash and wound.   Neurological:  Positive for weakness. Negative for syncope and headaches.   Psychiatric/Behavioral:  Negative for agitation and confusion.      Objective:     Vital Signs (Most Recent):  Temp: 98.2 °F (36.8 °C) (02/06/24 1107)  Pulse: 66 (02/06/24 1107)  Resp: 16 (02/06/24 1107)  BP: (!) 142/78 (02/06/24 1107)  SpO2: (!) 94 % (02/06/24 1107) Vital Signs (24h Range):  Temp:  [97.6 °F (36.4 °C)-98.5 °F (36.9 °C)] 98.2 °F (36.8 °C)  Pulse:  [59-73] 66  Resp:  [16-18] 16  SpO2:  [91 %-94 %] 94 %  BP: (132-156)/(68-85) 142/78     Weight: 75.3 kg (166 lb)  Body mass index is 31.37 kg/m².    Intake/Output Summary (Last 24 hours) at 2/6/2024 1327  Last data filed at 2/6/2024 0948  Gross per 24 hour   Intake 120 ml   Output 475 ml   Net -355 ml         Physical Exam  Constitutional:       General: She is not in acute distress.     Appearance: Normal appearance.   HENT:      Head: Normocephalic and atraumatic.      Mouth/Throat:      Mouth: Mucous membranes are moist.   Eyes:      Extraocular Movements: Extraocular movements intact.      Conjunctiva/sclera: Conjunctivae normal.   Cardiovascular:      Rate and Rhythm: Normal rate and regular rhythm.   Pulmonary:      Effort: Pulmonary effort is normal. No respiratory  distress.      Breath sounds: Decreased breath sounds (Left) and rales (Left: lower, mid.  Right: lower.) present. No wheezing.   Abdominal:      General: Abdomen is flat. Bowel sounds are normal.      Palpations: Abdomen is soft.      Tenderness: There is no abdominal tenderness. There is no guarding.   Musculoskeletal:         General: No swelling or tenderness.   Skin:     Findings: No rash.   Neurological:      General: No focal deficit present.      Mental Status: She is alert and oriented to person, place, and time. Mental status is at baseline.   Psychiatric:         Mood and Affect: Mood normal.         Behavior: Behavior normal.             Significant Labs: All pertinent labs within the past 24 hours have been reviewed.  CBC:   Recent Labs   Lab 02/05/24  0450 02/06/24  0400   WBC 13.84* 13.39*   HGB 10.7* 10.9*   HCT 32.6* 33.6*    312     CMP:   Recent Labs   Lab 02/05/24  0450 02/06/24  0400    140   K 3.2* 4.0    106   CO2 26 26   GLU 87 86   BUN 24* 25*   CREATININE 1.5* 1.5*   CALCIUM 8.8 9.2   ANIONGAP 10 8       Significant Imaging: I have reviewed all pertinent imaging results/findings within the past 24 hours.

## 2024-02-06 NOTE — CONSULTS
[facility]  Pulmonology Consult Note    Patient Name: Rajesh Mas  MRN: [id number]  Admission Date: 2/3/2024  Hospital Length of Stay: 2 days  Attending Physician: Morgan Causey MD   Primary Care Provider: Gail Villarreal MD     Patient information was obtained from patient, spouse/SO, and primary team.     Inpatient consult to Pulmonology  Consult performed by: Morgan Causey MD  Consult ordered by: Morgan Causey MD  Reason for consult: L pleural effusion and thoracentesis        Subjective:   History of Present Illness:  Rajesh Mas is a 73 y.o.  female who has a relevant medical history of HTN, gout, CKD and gout. Of note, she was recently hospitalized for an ablation for PVC (1/18/24) that was complicated by pericardial effusion with tamponade physiology s/p pericardiocentesis 1/18/24 and acute hypoxic respiratory failure. Her AHRF was attributed to a L pleural effusion and acute diastolic dysfunction. She was diuresed with some improvement in the effusion and also received CAP tx. She acutely developed Afib that resolved with diltiazem and was discharged on 2L home O2 and abx on 1/19.     She presented to the ED 2/3 with SOB and chest heaviness but no pain. She had been wearing her home O2 (2L) since her discharge. She noted significant JONES and new onset orthopnea. She also condones pressure in her shoulders and an initial dry cough that has been now productive of cloudy sputum for one week. Her labs on admission were notable for leukocytosis (WBC 21). She was started on vanc and zosyn. Echo from 2/3 shows an EF of 40%. CT Chest 2/3 shows left pleural effusion with volume loss and a moderate right effusion as well as a small pericardial effusion. She denies any fevers, chills, or sick contacts.     CXR from last night showing improved R pleural effusion and stable L pleural effusion. Patient is not on any anticoagulation. Vanc d/c per ID. Lasix stopped per primary team due to  worsening renal function.    Pulmonology was consulted for worsened L pleural effusion that persisted despite diuresis.     Past Medical History:  Past Medical History:   Diagnosis Date    Allergy     seasonal    Diverticulitis     Fever blister     Hypertension     Personal history of colonic polyps        Past Surgical History:  Past Surgical History:   Procedure Laterality Date    ABLATION N/A 1/18/2024    Procedure: Ablation;  Surgeon: Santi Ellison MD;  Location: [facility] EP LAB;  Service: Cardiology;  Laterality: N/A;  PVC, RFA, PEPE, anes, MB, 3 Prep,*prepare to map LVOT and RVOT*    CHOLECYSTECTOMY  04/26/2017    COLON SURGERY      COLONOSCOPY N/A 12/06/2016    Procedure: COLONOSCOPY;  Surgeon: Fidel Mason MD;  Location: [facility] ENDO (4TH FLR);  Service: Endoscopy;  Laterality: N/A;    COLONOSCOPY N/A 05/17/2022    Procedure: COLONOSCOPY;  Surgeon: RUSSELL Rolon MD;  Location: [facility] ENDO (4TH FLR);  Service: Endoscopy;  Laterality: N/A;  fully vaccinated, prep instr portal -ml    partial colectomy  1997    sigmoid removed due to diverticulitis    PERICARDIOCENTESIS N/A 1/18/2024    Procedure: Pericardiocentesis;  Surgeon: Santi Ellison MD;  Location: [facility] EP LAB;  Service: Cardiology;  Laterality: N/A;    SINUS SURGERY  2007    SMALL INTESTINE SURGERY  June 1997@ [facility]    Partial Sigm Colon /Divaticulitus       Allergies:  Review of patient's allergies indicates:   Allergen Reactions    Nickel sutures [surgical stainless steel]     Adhesive Rash       Medications:   In-Hospital Scheduled Medications:   allopurinoL  350 mg Oral Daily    aspirin  81 mg Oral Daily    metoprolol succinate  25 mg Oral Daily    oxybutynin  5 mg Oral BID    piperacillin-tazobactam (Zosyn) IV (PEDS and ADULTS) (extended infusion is not appropriate)  4.5 g Intravenous Q8H    predniSONE  2.5 mg Oral Daily      In-Hospital PRN Medications:  acetaminophen, albuterol-ipratropium, dextrose 10%, dextrose 10%, glucagon (human  recombinant), glucose, glucose, naloxone, sodium chloride 0.9%   In-Hospital IV Infusion Medications:     Home Medications:  Prior to Admission medications    Medication Sig Start Date End Date Taking? Authorizing Provider   allopurinoL (ZYLOPRIM) 300 MG tablet Take one tablet by mouth once daily in combination with 50mg tablets for a daily dose of 350mg 9/18/23  Yes Fly Boss MD   aspirin (ECOTRIN) 81 MG EC tablet Take 1 tablet (81 mg total) by mouth once daily. 1/22/24 1/21/25 Yes Noel Ching MD   metoprolol succinate (TOPROL-XL) 25 MG 24 hr tablet Take 1 tablet (25 mg total) by mouth once daily. 1/23/24 1/22/25 Yes Noel Ching MD   oxybutynin (DITROPAN) 5 MG Tab Take 1 tablet (5 mg total) by mouth 2 (two) times daily. 9/21/23  Yes Sharmila Kelley MD   predniSONE (DELTASONE) 2.5 MG tablet Take one tablet by mouth once daily 9/18/23  Yes Fly Boss MD       Family History:  Family History   Problem Relation Age of Onset    Colon cancer Maternal Grandmother         colon cancer    Breast cancer Maternal Grandmother     Diabetes Mother     Heart failure Mother     Hypertension Mother     Heart disease Mother     Kidney disease Mother         Dialysis    Other Brother         prostate issue    No Known Problems Sister     No Known Problems Daughter     No Known Problems Daughter     No Known Problems Son     No Known Problems Son     Liver cancer Maternal Uncle     Melanoma Neg Hx     Ovarian cancer Neg Hx     Cancer Neg Hx        Social History:  Social History     Tobacco Use    Smoking status: Never    Smokeless tobacco: Never   Substance Use Topics    Alcohol use: No    Drug use: Never       Review of Systems:  Review of Systems   Constitutional:  Negative for chills and fever.   Respiratory:  Positive for cough, sputum production and shortness of breath.    Cardiovascular:  Positive for orthopnea. Negative for chest pain and leg swelling.           Objective:   Last 24 Hour Vital Signs:  BP  Min: 121/78  Max: 156/78  Temp  Av °F (36.7 °C)  Min: 97.6 °F (36.4 °C)  Max: 98.5 °F (36.9 °C)  Pulse  Av.3  Min: 59  Max: 190  Resp  Av.7  Min: 16  Max: 18  SpO2  Av.1 %  Min: 91 %  Max: 95 %  I/O last 3 completed shifts:  In: 600 [P.O.:600]  Out: 2175 [Urine:2175]    Physical Examination:  Physical Exam  Constitutional:       Appearance: Normal appearance.   HENT:      Head: Normocephalic and atraumatic.   Eyes:      Extraocular Movements: Extraocular movements intact.      Conjunctiva/sclera: Conjunctivae normal.      Pupils: Pupils are equal, round, and reactive to light.   Cardiovascular:      Rate and Rhythm: Normal rate and regular rhythm.   Pulmonary:      Breath sounds: Examination of the left-upper field reveals decreased breath sounds. Examination of the left-middle field reveals decreased breath sounds. Examination of the left-lower field reveals decreased breath sounds and rales. Decreased breath sounds and rales present.      Comments: 2L NC  Musculoskeletal:         General: Normal range of motion.   Skin:     General: Skin is warm and dry.   Neurological:      General: No focal deficit present.      Mental Status: She is alert and oriented to person, place, and time.   Psychiatric:         Mood and Affect: Mood normal.         Thought Content: Thought content normal.           Laboratory:  Trended Lab Data:  Recent Labs     24  0500 24  0450 24  0400   WBC 14.69* 13.84* 13.39*   HGB 10.8* 10.7* 10.9*   HCT 33.7* 32.6* 33.6*    308 312    140 140   K 4.2 3.2* 4.0    104 106   CO2 26 26 26   BUN 19 24* 25*   CREATININE 1.3 1.5* 1.5*   GLU 91 87 86   CALCIUM 8.9 8.8 9.2   MG 2.2 2.1 2.3   PHOS 3.1 3.6 3.0       Cardiac:   Recent Labs   Lab 24  0048   TROPONINI 0.017   *       Urinalysis:   Lab Results   Component Value Date    LABURIN ESCHERICHIA COLI  >100,000 cfu/ml   (A) 2022     COLORU Yellow 02/03/2024    SPECGRAV >1.030 (A) 02/03/2024    NITRITE Negative 02/03/2024    KETONESU Negative 02/03/2024    UROBILINOGEN normal 07/08/2023       Microbiology:  Microbiology Results (last 7 days)       Procedure Component Value Units Date/Time    Blood Culture #1 **CANNOT BE ORDERED STAT** [[id number]] Collected: 02/03/24 0336    Order Status: Completed Specimen: Blood from Peripheral, Hand, Right Updated: 02/06/24 0612     Blood Culture, Routine No Growth to date      No Growth to date      No Growth to date      No Growth to date    Blood Culture #2 **CANNOT BE ORDERED STAT** [[id number]] Collected: 02/03/24 0336    Order Status: Completed Specimen: Blood from Peripheral, Hand, Left Updated: 02/06/24 0612     Blood Culture, Routine No Growth to date      No Growth to date      No Growth to date      No Growth to date    Influenza A & B by Molecular [[id number]] Collected: 02/03/24 0204    Order Status: Completed Specimen: Nasopharyngeal Swab Updated: 02/03/24 0251     Influenza A, Molecular Negative     Influenza B, Molecular Negative     Flu A & B Source Nasal swab            Radiology:  X-Ray Chest AP Portable  Narrative: EXAMINATION:  XR CHEST AP PORTABLE    CLINICAL HISTORY:  follow up pleural effusions;    TECHNIQUE:  Single frontal view of the chest was performed.    COMPARISON:  02/03/2024    FINDINGS:  The cardiomediastinal silhouette is prominent, magnified by technique..  There is obscuration of the left costophrenic angle suggesting effusion.  The degree of left pleural fluid appears similar to possibly slightly increased as compared to the previous exam.  Right pleural effusion has improved.  The trachea is midline.  The lungs are symmetrically expanded bilaterally with coarse interstitial attenuation, suggesting edema..  Underlying left lower lung zone consolidation not excluded.  There is no pneumothorax.  The osseous structures are unchanged..  Impression: As above    Electronically  signed by: Nathaniel Perez MD  Date:    02/05/2024  Time:    18:48    Results for orders placed or performed during the hospital encounter of 02/03/24 (from the past 2160 hour(s))   CT Chest Without Contrast    Narrative    EXAMINATION:  CT CHEST WITHOUT CONTRAST    CLINICAL HISTORY:  Pneumonia, unresolved;pleural effusion, CHF, possible PNA;    TECHNIQUE:  Low dose axial images, sagittal and coronal reformations were obtained from the thoracic inlet to the lung bases. Contrast was not administered.    COMPARISON:  None    FINDINGS:  Base of Neck: No significant abnormality.    Thoracic soft tissues: Normal.    Aorta: Left-sided aortic arch.  No aneurysm and no significant atherosclerosis    Heart: Normal size.  Small volume pericardial effusion.  Effusion.    Pulmonary vasculature: Pulmonary arteries distribute normally.  There are four pulmonary veins.    Alice/Mediastinum: No pathologic eliot enlargement.    Airways: Patent.    Lungs/Pleura: LEFT pleural effusion with consolidative change in volume loss in the LEFT hemithorax.  Smaller RIGHT pleural effusion.    Esophagus: Normal.    Upper Abdomen: No abnormality of the partially imaged upper abdomen.    Bones: No acute fracture. No suspicious lytic or sclerotic lesions.      Impression    LEFT pleural effusion with volume loss LEFT hemithorax.  Moderate RIGHT pleural effusion.  Small pericardial effusion.      Electronically signed by: Antonio Lewis MD  Date:    02/03/2024  Time:    06:35         I have personally reviewed the above labs and imaging.    Current Medications:     Infusions:       Scheduled:   allopurinoL  350 mg Oral Daily    aspirin  81 mg Oral Daily    metoprolol succinate  25 mg Oral Daily    oxybutynin  5 mg Oral BID    piperacillin-tazobactam (Zosyn) IV (PEDS and ADULTS) (extended infusion is not appropriate)  4.5 g Intravenous Q8H    predniSONE  2.5 mg Oral Daily        PRN:  acetaminophen, albuterol-ipratropium, dextrose 10%, dextrose 10%,  glucagon (human recombinant), glucose, glucose, naloxone, sodium chloride 0.9%      Assessment/Plan:     # L pleural effusion unclear etiology  # Chronic hypoxemic respiratory failure  Current pleural effusion could be due to a variety of factors. Patient has an EF of 40% with concurrent orthopnea. She was getting diuresed during this admission and is net negative 4L today with no resolution of the left pleural effusion. However, this could be a parapneumonic effusion as patient also recently reportedly recovered from a pneumonia and has a current leukocyotsis. POCUS showed a large sized pocket of fluid that could be tapped. Performed therapeutic and diagnostic thoracentesis this afternoon and drained about 800mL of sanguinous fluid.               Recommendations:  - Sent for bacterial, fungal and afb cultures and cytology.  - Follow up WBC and diff; fluid LDH, protein and glucose.   - Ordered post-procedure CXR to rule-out PTX.   - Abx per ID  - Patient on 3L NC and breathing comfortably, hopefully this will continue to improve after drainage of pleural fluid       Active Diagnoses:    Diagnosis Date Noted POA    PRINCIPAL PROBLEM:  Pleural effusion, left [J90] 01/20/2024 Yes    Atrial fibrillation [I48.91] 01/22/2024 Yes    Chronic hypoxemic respiratory failure [J96.11] 01/19/2024 Yes    Pericardial effusion [I31.39] 01/18/2024 Yes    History of gout [Z87.39] 01/18/2024 Yes    PVCs (premature ventricular contractions) [I49.3] 10/10/2023 Yes    Stage 3 chronic kidney disease [N18.30] 01/12/2022 Yes    BMI 31.0-31.9,adult [Z68.31] 12/30/2020 Not Applicable    Diastolic dysfunction [I51.89] 05/19/2017 Yes    Essential hypertension [I10] 02/25/2016 Yes     Chronic      Problems Resolved During this Admission:     VTE Risk Mitigation (From admission, onward)           Ordered     Reason for No Pharmacological VTE Prophylaxis  Once        Question:  Reasons:  Answer:  Physician Provided (leave comment)    02/03/24 [id number]      IP VTE HIGH RISK PATIENT  Once         02/03/24 0457     Place sequential compression device  Until discontinued         02/03/24 0457                    Thank you for your consult. I will follow-up with patient. Please contact us if you have any additional questions.    Rebecca Schiff [facility] MS4  [facility]    This note was written in concert with Medical Student Taryn Morales.. Addends/changes have been made as deemed necessary. Patient seen and examined independently.     Ranjith Gordon MD  [employer] Pulmonary Critical Care Medicine Fellow

## 2024-02-06 NOTE — PROGRESS NOTES
[facility] Medicine  Progress Note    Patient Name: Rajesh Mas  MRN: [id number]  Patient Class: IP- Inpatient   Admission Date: 2/3/2024  Length of Stay: 2 days  Attending Physician: Morgan Causey MD  Primary Care Provider: Gail Villarreal MD        Subjective:     Principal Problem:Pleural effusion, left        HPI:  Rajesh Mas is a 73 y.o. female with PMHx of gout, obesity, CKD3, HTN, recent hospitalization for PVC s/p ablation 1/18/24 that was complicated by pericardial effusion with tamponade physiology s/p pericardiocentesis 1/18/24 and development of acute hypoxic respiratory failure from left pleural effusion and acute diastolic dysfunction. The pleural effusion improved with diuresis but she still During this hospital course she also was treated for CAP, placed on diltiazem gtt for development of Afib that resolved. She was discharged home on metoprolol and supplemental oxygen.    She presents to the ED today with her  for acute shortness of breath that began around 7 PM last night. She has been wearing oxygen at 2lpm via NC since last hospital discharge but up until last night had not feeling short of breath. She was walking around her house when the shortness of breath started, barely exerting herself. She also developed soreness/tightness in her bilateral shoulders and mild heaviness in her chest without chest pain. She has had a persistent dry cough for about one week. She also started requiring 3 pillows for sleep a few nights after developing orthopnea. She has poor appetite today. No fevers, chills, leg swelling, nausea, vomiting, palpitations, syncope.     In the ED, she was satting 91% on RA and 95% on 2L NC. Vitals otherwise stable. Labs reveal leukocytosis with WBC 21k, mild anemia with H/H 11.8/36.8. . Troponin 0.017. Flu and covid negative. CXR shows increased size of moderate to large left pleural effusion. Probable small right pleural  effusion. She was started on vancomycin and zosyn and admitted to hospital medicine observation.    Overview/Hospital Course:  No notes on file    Interval History:   No events overnight. Patient with no change in symptoms. Pulmonology re consulted. Planning for thoracentesis today.      Review of Systems   Constitutional:  Negative for chills and fever.   HENT:  Negative for congestion and trouble swallowing.    Eyes:  Negative for visual disturbance.   Respiratory:  Negative for cough and shortness of breath.    Cardiovascular:  Negative for chest pain, palpitations and leg swelling.   Gastrointestinal:  Negative for abdominal pain, nausea and vomiting.   Genitourinary:  Negative for dysuria, frequency and urgency.   Musculoskeletal:  Positive for arthralgias (across the shoulders). Negative for gait problem.   Skin:  Negative for rash and wound.   Neurological:  Positive for weakness. Negative for syncope and headaches.   Psychiatric/Behavioral:  Negative for agitation and confusion.      Objective:     Vital Signs (Most Recent):  Temp: 98.2 °F (36.8 °C) (02/06/24 1107)  Pulse: 66 (02/06/24 1107)  Resp: 16 (02/06/24 1107)  BP: (!) 142/78 (02/06/24 1107)  SpO2: (!) 94 % (02/06/24 1107) Vital Signs (24h Range):  Temp:  [97.6 °F (36.4 °C)-98.5 °F (36.9 °C)] 98.2 °F (36.8 °C)  Pulse:  [59-73] 66  Resp:  [16-18] 16  SpO2:  [91 %-94 %] 94 %  BP: (132-156)/(68-85) 142/78     Weight: 75.3 kg (166 lb)  Body mass index is 31.37 kg/m².    Intake/Output Summary (Last 24 hours) at 2/6/2024 1327  Last data filed at 2/6/2024 0948  Gross per 24 hour   Intake 120 ml   Output 475 ml   Net -355 ml         Physical Exam  Constitutional:       General: She is not in acute distress.     Appearance: Normal appearance.   HENT:      Head: Normocephalic and atraumatic.      Mouth/Throat:      Mouth: Mucous membranes are moist.   Eyes:      Extraocular Movements: Extraocular movements intact.      Conjunctiva/sclera: Conjunctivae normal.    Cardiovascular:      Rate and Rhythm: Normal rate and regular rhythm.   Pulmonary:      Effort: Pulmonary effort is normal. No respiratory distress.      Breath sounds: Decreased breath sounds (Left) and rales (Left: lower, mid.  Right: lower.) present. No wheezing.   Abdominal:      General: Abdomen is flat. Bowel sounds are normal.      Palpations: Abdomen is soft.      Tenderness: There is no abdominal tenderness. There is no guarding.   Musculoskeletal:         General: No swelling or tenderness.   Skin:     Findings: No rash.   Neurological:      General: No focal deficit present.      Mental Status: She is alert and oriented to person, place, and time. Mental status is at baseline.   Psychiatric:         Mood and Affect: Mood normal.         Behavior: Behavior normal.             Significant Labs: All pertinent labs within the past 24 hours have been reviewed.  CBC:   Recent Labs   Lab 02/05/24 0450 02/06/24  0400   WBC 13.84* 13.39*   HGB 10.7* 10.9*   HCT 32.6* 33.6*    312     CMP:   Recent Labs   Lab 02/05/24 0450 02/06/24  0400    140   K 3.2* 4.0    106   CO2 26 26   GLU 87 86   BUN 24* 25*   CREATININE 1.5* 1.5*   CALCIUM 8.8 9.2   ANIONGAP 10 8       Significant Imaging: I have reviewed all pertinent imaging results/findings within the past 24 hours.    Assessment/Plan:      * Pleural effusion, left  Patient found to have  moderate to large left  pleural effusion and probably small right pleural effusion on imaging. I have personally reviewed and interpreted the following imaging: Xray. A thoracentesis was deferred. Most likely etiology includes Congestive Heart Failure and Pneumonia. Management to include Diuresis.    Previously treated with diuresis with improvement in effusion. She was not discharged home on diuretics but was discharged home on supplemental oxygen. She presents with acute worsening SOB.   - Moderate to large effusion noted to have re accumulated on CXR.   -  Flu and covid negative.    - Leukocytosis on admission labs. No fever. Procalcitonin 0.12  - S/p vancomycin and zosyn in ED  - CT Chest: Left pleural effusion with volume loss left hemithorax  - TTE 2/3: EF 40%, global hypokinesis, CVP 3, PASP 25, no evidence of significant pericardial effusion    - Pulmonology consulted  -- Repeat CXR 2/5 with continue left sided opacity, but improved right small effusion  -- Planing for thoracentesis on 2/6  - BCX NGTD  - Continue Zosyn for HAP coverage     Atrial fibrillation  Patient with Paroxysmal (<7 days) atrial fibrillation which is controlled currently with Beta Blocker. Patient is currently in sinus rhythm.[id number] Score: 2. Anticoagulation not started.     Pt had 8 hour episode of new onset afib during last hospitalization. Was placed on dilt gtt and it resolved. Since only 8 hours, EP did not recommend anticoagulation or rate control. Discharged with 30 day event monitor and EP follow up. She was started on metoprolol at discharge.  - Monitor telemetry   - Continue home metoprolol     Chronic hypoxemic respiratory failure  Patient with Hypoxic Respiratory failure which is Chronic, though only recently started on supplemental oxygen. she is on home oxygen at 2 LPM. Supplemental oxygen was provided and noted-    Signs/symptoms of respiratory failure include- increased work of breathing on exertion. Contributing diagnoses includes - CHF, Pleural effusion, and Pneumonia Labs and images were reviewed. Patient Has not had a recent ABG. Will treat underlying causes and adjust management of respiratory failure as follows- See pleural effusion     History of gout  - Continue home allopurinol and prednisone.     Pericardial effusion  - Recent history noted tamponade physiology, underwent pericardiocentesis 1/18 and a repeat echo next day showed trivial pericardial effusion.  - TTE 2/3: EF 40%, global hypokinesis, CVP 3, PASP 25, no evidence of significant pericardial effusion      PVCs (premature ventricular contractions)  - S/p RFA on 1/18/24    Stage 3 chronic kidney disease  Creatine stable for now. BMP reviewed- noted Estimated Creatinine Clearance: 36.8 mL/min (based on SCr of 1.3 mg/dL). according to latest data. Based on current GFR, CKD stage is stage 3 - GFR 30-59.  Monitor UOP and serial BMP and adjust therapy as needed. Renally dose meds. Avoid nephrotoxic medications and procedures.    BMI 31.0-31.9,adult  Body mass index is 31.37 kg/m². Morbid obesity complicates all aspects of disease management from diagnostic modalities to treatment. Weight loss encouraged and health benefits explained to patient.      Diastolic dysfunction  During recent hospitalization, pt noted to have pleural effusions and there was concern for acute diastolic dysfunction and a transient decreased EF related to high HR.  - TTE 2/3: 40%, global LV hypokinesis present, RV systolic function is mildly reduced   - Monitor I/Os  - Lasix as above      Essential hypertension  Chronic, controlled. Latest blood pressure and vitals reviewed-     Temp:  [96.1 °F (35.6 °C)-98 °F (36.7 °C)]   Pulse:  [64-82]   Resp:  [17-20]   BP: (121-155)/(58-89)   SpO2:  [91 %-96 %] .   Home meds for hypertension were reviewed and noted below.   Hypertension Medications               metoprolol succinate (TOPROL-XL) 25 MG 24 hr tablet Take 1 tablet (25 mg total) by mouth once daily.          While in the hospital, will manage blood pressure as follows; Continue home antihypertensive regimen (metoprolol only). Currently her losartan 100mg/day and triamterene-hydrochlorothiazide 37.5-25 mg/day are on hold since last hospital admission due to concern for hypotension.     Will utilize p.r.n. blood pressure medication only if patient's blood pressure greater than 180/110 and she develops symptoms such as worsening chest pain or shortness of breath.      VTE Risk Mitigation (From admission, onward)           Ordered     Reason for No  Pharmacological VTE Prophylaxis  Once        Question:  Reasons:  Answer:  Physician Provided (leave comment)    02/03/24 0457     IP VTE HIGH RISK PATIENT  Once         02/03/24 0457     Place sequential compression device  Until discontinued         02/03/24 0457                    Discharge Planning   MARJ: 2/6/2024     Code Status: Full Code   Is the patient medically ready for discharge?: No    Reason for patient still in hospital (select all that apply): Patient trending condition, Laboratory test, Treatment, Consult recommendations, and Pending disposition  Discharge Plan A: Home with family   Discharge Delays: None known at this time              Mrogan Causey MD  Department of Hospital Medicine   [facility]

## 2024-02-07 LAB
ANION GAP SERPL CALC-SCNC: 10 MMOL/L (ref 8–16)
BASOPHILS # BLD AUTO: 0.11 K/UL (ref 0–0.2)
BASOPHILS NFR BLD: 0.9 % (ref 0–1.9)
BUN SERPL-MCNC: 20 MG/DL (ref 8–23)
CALCIUM SERPL-MCNC: 9.1 MG/DL (ref 8.7–10.5)
CHLORIDE SERPL-SCNC: 105 MMOL/L (ref 95–110)
CO2 SERPL-SCNC: 24 MMOL/L (ref 23–29)
CREAT SERPL-MCNC: 1.2 MG/DL (ref 0.5–1.4)
DIFFERENTIAL METHOD BLD: ABNORMAL
EOSINOPHIL # BLD AUTO: 0.4 K/UL (ref 0–0.5)
EOSINOPHIL NFR BLD: 3 % (ref 0–8)
ERYTHROCYTE [DISTWIDTH] IN BLOOD BY AUTOMATED COUNT: 13.2 % (ref 11.5–14.5)
EST. GFR  (NO RACE VARIABLE): 48.4 ML/MIN/1.73 M^2
GLUCOSE SERPL-MCNC: 79 MG/DL (ref 70–110)
HCT VFR BLD AUTO: 34.8 % (ref 37–48.5)
HGB BLD-MCNC: 11.3 G/DL (ref 12–16)
IMM GRANULOCYTES # BLD AUTO: 0.03 K/UL (ref 0–0.04)
IMM GRANULOCYTES NFR BLD AUTO: 0.2 % (ref 0–0.5)
LYMPHOCYTES # BLD AUTO: 2.2 K/UL (ref 1–4.8)
LYMPHOCYTES NFR BLD: 17.2 % (ref 18–48)
MAGNESIUM SERPL-MCNC: 2.3 MG/DL (ref 1.6–2.6)
MCH RBC QN AUTO: 29.6 PG (ref 27–31)
MCHC RBC AUTO-ENTMCNC: 32.5 G/DL (ref 32–36)
MCV RBC AUTO: 91 FL (ref 82–98)
MONOCYTES # BLD AUTO: 1 K/UL (ref 0.3–1)
MONOCYTES NFR BLD: 7.7 % (ref 4–15)
NEUTROPHILS # BLD AUTO: 8.9 K/UL (ref 1.8–7.7)
NEUTROPHILS NFR BLD: 71 % (ref 38–73)
NRBC BLD-RTO: 0 /100 WBC
PHOSPHATE SERPL-MCNC: 2.3 MG/DL (ref 2.7–4.5)
PLATELET # BLD AUTO: 308 K/UL (ref 150–450)
PMV BLD AUTO: 12.5 FL (ref 9.2–12.9)
POTASSIUM SERPL-SCNC: 3.9 MMOL/L (ref 3.5–5.1)
PROT SERPL-MCNC: 6.8 G/DL (ref 6–8.4)
RBC # BLD AUTO: 3.82 M/UL (ref 4–5.4)
SODIUM SERPL-SCNC: 139 MMOL/L (ref 136–145)
WBC # BLD AUTO: 12.53 K/UL (ref 3.9–12.7)

## 2024-02-07 PROCEDURE — 85025 COMPLETE CBC W/AUTO DIFF WBC: CPT | Performed by: STUDENT IN AN ORGANIZED HEALTH CARE EDUCATION/TRAINING PROGRAM

## 2024-02-07 PROCEDURE — 25000003 PHARM REV CODE 250: Performed by: STUDENT IN AN ORGANIZED HEALTH CARE EDUCATION/TRAINING PROGRAM

## 2024-02-07 PROCEDURE — 99223 1ST HOSP IP/OBS HIGH 75: CPT | Mod: ,,, | Performed by: STUDENT IN AN ORGANIZED HEALTH CARE EDUCATION/TRAINING PROGRAM

## 2024-02-07 PROCEDURE — 80048 BASIC METABOLIC PNL TOTAL CA: CPT | Performed by: STUDENT IN AN ORGANIZED HEALTH CARE EDUCATION/TRAINING PROGRAM

## 2024-02-07 PROCEDURE — 88305 TISSUE EXAM BY PATHOLOGIST: CPT | Performed by: PATHOLOGY

## 2024-02-07 PROCEDURE — 88112 CYTOPATH CELL ENHANCE TECH: CPT | Performed by: PATHOLOGY

## 2024-02-07 PROCEDURE — 84155 ASSAY OF PROTEIN SERUM: CPT | Performed by: STUDENT IN AN ORGANIZED HEALTH CARE EDUCATION/TRAINING PROGRAM

## 2024-02-07 PROCEDURE — 36415 COLL VENOUS BLD VENIPUNCTURE: CPT | Performed by: STUDENT IN AN ORGANIZED HEALTH CARE EDUCATION/TRAINING PROGRAM

## 2024-02-07 PROCEDURE — 21400001 HC TELEMETRY ROOM

## 2024-02-07 PROCEDURE — 63600175 PHARM REV CODE 636 W HCPCS: Performed by: STUDENT IN AN ORGANIZED HEALTH CARE EDUCATION/TRAINING PROGRAM

## 2024-02-07 PROCEDURE — 88305 TISSUE EXAM BY PATHOLOGIST: CPT | Mod: 26,,, | Performed by: PATHOLOGY

## 2024-02-07 PROCEDURE — 88112 CYTOPATH CELL ENHANCE TECH: CPT | Mod: 26,,, | Performed by: PATHOLOGY

## 2024-02-07 PROCEDURE — 84100 ASSAY OF PHOSPHORUS: CPT | Performed by: STUDENT IN AN ORGANIZED HEALTH CARE EDUCATION/TRAINING PROGRAM

## 2024-02-07 PROCEDURE — 25000003 PHARM REV CODE 250

## 2024-02-07 PROCEDURE — 99233 SBSQ HOSP IP/OBS HIGH 50: CPT | Mod: GC,,, | Performed by: EMERGENCY MEDICINE

## 2024-02-07 PROCEDURE — 83735 ASSAY OF MAGNESIUM: CPT | Performed by: STUDENT IN AN ORGANIZED HEALTH CARE EDUCATION/TRAINING PROGRAM

## 2024-02-07 PROCEDURE — 63600175 PHARM REV CODE 636 W HCPCS

## 2024-02-07 RX ADMIN — PIPERACILLIN SODIUM AND TAZOBACTAM SODIUM 4.5 G: 4; .5 INJECTION, POWDER, FOR SOLUTION INTRAVENOUS at 12:02

## 2024-02-07 RX ADMIN — ALLOPURINOL 350 MG: 300 TABLET ORAL at 09:02

## 2024-02-07 RX ADMIN — PIPERACILLIN SODIUM AND TAZOBACTAM SODIUM 4.5 G: 4; .5 INJECTION, POWDER, FOR SOLUTION INTRAVENOUS at 05:02

## 2024-02-07 RX ADMIN — PIPERACILLIN SODIUM AND TAZOBACTAM SODIUM 4.5 G: 4; .5 INJECTION, POWDER, FOR SOLUTION INTRAVENOUS at 08:02

## 2024-02-07 RX ADMIN — OXYBUTYNIN CHLORIDE 5 MG: 5 TABLET ORAL at 09:02

## 2024-02-07 RX ADMIN — ASPIRIN 81 MG: 81 TABLET, COATED ORAL at 09:02

## 2024-02-07 RX ADMIN — OXYBUTYNIN CHLORIDE 5 MG: 5 TABLET ORAL at 08:02

## 2024-02-07 RX ADMIN — PREDNISONE 2.5 MG: 2.5 TABLET ORAL at 09:02

## 2024-02-07 RX ADMIN — METOPROLOL SUCCINATE 25 MG: 25 TABLET, EXTENDED RELEASE ORAL at 09:02

## 2024-02-07 NOTE — ASSESSMENT & PLAN NOTE
I have reviewed hospital notes from   service and other specialty providers. I have also reviewed CBC, CMP/BMP,  cultures and imaging with my interpretation as documented.      73F with HTN, BMI 31, CKD3, gout, and recent hospitalization for an ablation for PVC (01/18/24) that was complicated by pericardial effusion with tamponade physiology s/p pericardiocentesis 01/18/24 and acute hypoxic respiratory failure, which was attributed to a L pleural effusion and acute diastolic dysfunction. She was diuresed with some improvement in the effusion and also completed CAP tx while inpatient with Azithro / CTX 3-5d (ended 01/23). Pt discharged with supplied home O2 NC, but no outpt abx per chart review.     -- Now pt re-admitted (02/03) for 1wk onset of new / worsening productive cough with SOB, JONES, and orthopnea while continuing her home O2 (2L) NC. She denies fever, chills, sweats, or known sick contacts. Pt arrived AF, VSS, HDS, wbc 21, Hg 11.8, procalc 0.12, .  Flu / Covid-19 negative. Bld cxs (02/03) NGTD. Started on empiric Iv-vanc / zosyn; and per ID, rec'd stopping vanc 02/05 but no prior ID consult notes seen. Pt maintained on Iv-zosyn.    Ct-chest (02/03): left pleural effusion with volume loss and a moderate right effusion as well as a small pericardial effusion.    Diagnostic / therapeutic thoracentesis completed 02/06; drained 800mL sanguineous fluid. Samples sent for cytology, analysis, and cx. Studies thus far c/w exudative effusion.   -- Pleural fluid cx (02/06) NGTD (however had received 2-3d vanc / zosyn prior; reducing yield) .  Pleural fluid: , Protein 4.6; wbc 1500 (60% Lymph, 30% mono, 7% segs).  Cytology of pleural fluid still in-process.    CXR after procedure showed improvement in the L pleural effusion and no ptx; and bedside U/S (02/07) some fluid persists but markedly improved.  Repeat chest-CT (02/07) pending. -- Pending cytology and repeat chest-CT; pulm considering  thoracostomy tube placement vs. outpt serial imaging.   -- Pt improving on Iv-zosyn (started 02/03); remains AF, VSS, wbc normalized; and pt reports feeling improved s/p thoracentesis (02/06). Pt breathing comfortably on 2L NC (o2 sats 92-94%, stable)  --  Though, pt still reports a productive cough, pulm will send sputum sample for culture.  ID consulted for abx recs of suspected infectious exudative pleural effusion.       Recommendations / Plan:  Continue empiric Iv-zosyn (started 02/03).   Will f/u pleural fluid cxs and cytology in process  Will f/u tentative sputum cxs (to be collected)  Will f/u repeat chest-CT and pulm recs / plans.   Anticipate abx duration ultimately until pleural effusion resolved on repeat imaging.    -- Discussed with ID staff and primary team   -- ID will continue to follow w/ further recs.

## 2024-02-07 NOTE — CONSULTS
[facility] [place] [facility] Surg  Infectious Disease  Consult Note    Patient Name: Rajesh Mas  MRN: [id number]  Admission Date: 2/3/2024  Hospital Length of Stay: 3 days  Attending Physician: Morgan Causey MD  Primary Care Provider: Gail Villarreal MD     Isolation Status: No active isolations    Patient information was obtained from patient, relative(s), and ER records.      Inpatient consult to Infectious Diseases  Consult performed by: Santi Neves PA-C  Consult ordered by: Morgan Causey MD        Assessment/Plan:     Pulmonary  * Pleural effusion, left  I have reviewed hospital notes from   service and other specialty providers. I have also reviewed CBC, CMP/BMP,  cultures and imaging with my interpretation as documented.      73F with HTN, BMI 31, CKD3, gout, and recent hospitalization for an ablation for PVC (01/18/24) that was complicated by pericardial effusion with tamponade physiology s/p pericardiocentesis 01/18/24 and acute hypoxic respiratory failure, which was attributed to a L pleural effusion and acute diastolic dysfunction. She was diuresed with some improvement in the effusion and also completed CAP tx while inpatient with Azithro / CTX 3-5d (ended 01/23). Pt discharged with supplied home O2 NC, but no outpt abx per chart review.     -- Now pt re-admitted (02/03) for 1wk onset of new / worsening productive cough with SOB, JONES, and orthopnea while continuing her home O2 (2L) NC. She denies fever, chills, sweats, or known sick contacts. Pt arrived AF, VSS, HDS, wbc 21, Hg 11.8, procalc 0.12, .  Flu / Covid-19 negative. Bld cxs (02/03) NGTD. Started on empiric Iv-vanc / zosyn; and per ID, rec'd stopping vanc 02/05 but no prior ID consult notes seen. Pt maintained on Iv-zosyn.    Ct-chest (02/03): left pleural effusion with volume loss and a moderate right effusion as well as a small pericardial effusion.    Diagnostic / therapeutic thoracentesis completed 02/06; drained 800mL  sanguineous fluid. Samples sent for cytology, analysis, and cx. Studies thus far c/w exudative effusion.   -- Pleural fluid cx (02/06) NGTD (however had received 2-3d vanc / zosyn prior; reducing yield) .  Pleural fluid: , Protein 4.6; wbc 1500 (60% Lymph, 30% mono, 7% segs).  Cytology of pleural fluid still in-process.    CXR after procedure showed improvement in the L pleural effusion and no ptx; and bedside U/S (02/07) some fluid persists but markedly improved.  Repeat chest-CT (02/07) pending. -- Pending cytology and repeat chest-CT; pulm considering thoracostomy tube placement vs. outpt serial imaging.   -- Pt improving on Iv-zosyn (started 02/03); remains AF, VSS, wbc normalized; and pt reports feeling improved s/p thoracentesis (02/06). Pt breathing comfortably on 2L NC (o2 sats 92-94%, stable)  --  Though, pt still reports a productive cough, pulm will send sputum sample for culture.  ID consulted for abx recs of suspected infectious exudative pleural effusion.       Recommendations / Plan:  Continue empiric Iv-zosyn (started 02/03).   Will f/u pleural fluid cxs and cytology in process  Will f/u tentative sputum cxs (to be collected)  Will f/u repeat chest-CT and pulm recs / plans.   Anticipate abx duration ultimately until pleural effusion resolved on repeat imaging.    -- Discussed with ID staff and primary team   -- ID will continue to follow w/ further recs.        Thank you for your consult. I will follow-up with patient. Please contact us if you have any additional questions.    Santi Neves PA-C  Infectious Disease  [facility] - Med Surg    Subjective:     Principal Problem: Pleural effusion, left    HPI: 73F with HTN, BMI 31, CKD3, gout, and recent hospitalization for an ablation for PVC (01/18/24) which was c/b pericardial effusion with tamponade physiology, s/p pericardiocentesis 01/18/24, and acute hypoxic respiratory failure, which was attributed to a L pleural effusion and acute  diastolic dysfunction. She was diuresed with some improvement in the effusion and also completed inpatient CAP tx with Azithro / CTX 3-5d (ended 01/23). She acutely developed Afib that resolved with diltiazem and was discharged 01/23 on 2L home O2; and without diuretics or oral abx per chart review.     -- Now pt re-admitted (02/03) for resp sxs for past 1wk, c/o productive cough, with SOB, JONES, and new onset of orthopnea while continuing her home O2 (2L) NC. She denies fever, chills, sweats, or known sick contacts.  Pt arrived AF, VSS, HDS, wbc 21, Hg 11.8, procalc 0.12, .  Flu / Covid-19 negative. Bld cxs (02/03) NGTD. Started on empiric Iv-vanc / zosyn; and per ID, rec'd stopping vanc 02/05 but no prior ID consult notes seen. Pt maintained on Iv-zosyn.    Ct-chest (02/03): left pleural effusion with volume loss and a moderate right effusion as well as a small pericardial effusion.    Pulmonology was consulted for worsened L pleural effusion that persisted despite diuresis.     Diagnostic / therapeutic Thoracentesis completed 02/06; drained 800mL sanguineous fluid. samples sent for cytology, analysis, and cx. Studies thus far c/w exudative effusion. Pleural fluid cx (02/06) NGTD.  Pleural fluid: , Protein 4.6; wbc 1500 (60% Lymph, 30% mono, 7% segs).  Cytology of pleural fluid still in-process.    CXR after procedure showed improvement in the L pleural effusion and no ptx.   On bedside U/S (02/07) some fluid persists but markedly improved. Repeat chest-CT (02/07) pending.   -- Pending cytology and repeat chest-CT; pulm considering thoracostomy tube placement vs. outpt serial imaging.     Patient reports relief from the procedure and feels her breathing is better. Patient breathing comfortably on 2L NC.  She still reports a productive cough, pulm will send for sputum culture.    Per pulm eval; Given onset post ablation a PCIS (postcardiac injury syndrome) remains most likely etiology; but lower  suspicion for infectious or malignant process.     ID consulted for abx recs of suspected infectious exudative pleural effusion.   While maintained on Iv-zosyn since 02/03, pt remains AF, VSS, HDS, wbc normalized (down from 21 index), O2 sats 92-94% on NC 2L.       Past Medical History:   Diagnosis Date    Allergy     seasonal    Diverticulitis     Fever blister     Hypertension     Personal history of colonic polyps        Past Surgical History:   Procedure Laterality Date    ABLATION N/A 1/18/2024    Procedure: Ablation;  Surgeon: Santi Ellison MD;  Location: [facility] EP LAB;  Service: Cardiology;  Laterality: N/A;  PVC, RFA, PEPE, anes, MB, 3 Prep,*prepare to map LVOT and RVOT*    CHOLECYSTECTOMY  04/26/2017    COLON SURGERY      COLONOSCOPY N/A 12/06/2016    Procedure: COLONOSCOPY;  Surgeon: Fidel Mason MD;  Location: [facility] ENDO (4TH FLR);  Service: Endoscopy;  Laterality: N/A;    COLONOSCOPY N/A 05/17/2022    Procedure: COLONOSCOPY;  Surgeon: RUSSELL Rolon MD;  Location: [facility] ENDO (4TH FLR);  Service: Endoscopy;  Laterality: N/A;  fully vaccinated, prep instr portal -ml    partial colectomy  1997    sigmoid removed due to diverticulitis    PERICARDIOCENTESIS N/A 1/18/2024    Procedure: Pericardiocentesis;  Surgeon: Santi Ellison MD;  Location: [facility] EP LAB;  Service: Cardiology;  Laterality: N/A;    SINUS SURGERY  2007    SMALL INTESTINE SURGERY  June 1997@ [facility]    Partial Sigm Colon /Divaticulitus       Review of patient's allergies indicates:   Allergen Reactions    Nickel sutures [surgical stainless steel]     Adhesive Rash       Medications:  Medications Prior to Admission   Medication Sig    allopurinoL (ZYLOPRIM) 300 MG tablet Take one tablet by mouth once daily in combination with 50mg tablets for a daily dose of 350mg    aspirin (ECOTRIN) 81 MG EC tablet Take 1 tablet (81 mg total) by mouth once daily.    metoprolol succinate (TOPROL-XL) 25 MG 24 hr tablet Take 1 tablet (25 mg total) by  mouth once daily.    oxybutynin (DITROPAN) 5 MG Tab Take 1 tablet (5 mg total) by mouth 2 (two) times daily.    predniSONE (DELTASONE) 2.5 MG tablet Take one tablet by mouth once daily     Antibiotics (From admission, onward)      Start     Stop Route Frequency Ordered    02/03/24 2015  piperacillin-tazobactam (ZOSYN) 4.5 g in dextrose 5 % in water (D5W) 100 mL IVPB (MB+)         -- IV Every 8 hours (non-standard times) 02/03/24 1905          Antifungals (From admission, onward)      None          Antivirals (From admission, onward)      None             Immunization History   Administered Date(s) Administered    COVID-19, MRNA, LN-S, PF (Pfizer) (Gray Cap) 05/03/2022    COVID-19, MRNA, LN-S, PF (Pfizer) (Purple Cap) 02/18/2021, 03/11/2021, 10/26/2021, 05/03/2022    Influenza 01/06/2010    Influenza (FLUAD) - Quadrivalent - Adjuvanted - PF *Preferred* (65+) 01/12/2022, 11/03/2022    Influenza - High Dose - PF (65 years and older) 12/20/2018, 10/17/2019    Influenza - Quadrivalent - PF *Preferred* (6 months and older) 11/02/2016, 12/07/2017, 12/08/2023    Pneumococcal Conjugate - 13 Valent 08/03/2018    Pneumococcal Polysaccharide - 23 Valent 12/04/2019    Tdap 11/02/2016    Zoster 04/07/2016    Zoster Recombinant 01/12/2022, 08/29/2022       Family History       Problem Relation (Age of Onset)    Breast cancer Maternal Grandmother    Colon cancer Maternal Grandmother    Diabetes Mother    Heart disease Mother    Heart failure Mother    Hypertension Mother    Kidney disease Mother    Liver cancer Maternal Uncle    No Known Problems Sister, Daughter, Daughter, Son, Son    Other Brother          Social History     Socioeconomic History    Marital status:    Tobacco Use    Smoking status: Never    Smokeless tobacco: Never   Substance and Sexual Activity    Alcohol use: No    Drug use: Never    Sexual activity: Yes     Partners: Male     Birth control/protection: None     Comment: , together since 1971      Social Determinants of Health     Financial Resource Strain: Low Risk  (2/3/2024)    Overall Financial Resource Strain (CARDIA)     Difficulty of Paying Living Expenses: Not very hard   Recent Concern: Financial Resource Strain - Medium Risk (12/16/2023)    Overall Financial Resource Strain (CARDIA)     Difficulty of Paying Living Expenses: Somewhat hard   Food Insecurity: No Food Insecurity (2/3/2024)    Hunger Vital Sign     Worried About Running Out of Food in the Last Year: Never true     Ran Out of Food in the Last Year: Never true   Recent Concern: Food Insecurity - Food Insecurity Present (12/16/2023)    Hunger Vital Sign     Worried About Running Out of Food in the Last Year: Sometimes true     Ran Out of Food in the Last Year: Sometimes true   Transportation Needs: No Transportation Needs (2/3/2024)    PRAPARE - Transportation     Lack of Transportation (Medical): No     Lack of Transportation (Non-Medical): No   Physical Activity: Sufficiently Active (12/16/2023)    Exercise Vital Sign     Days of Exercise per Week: 4 days     Minutes of Exercise per Session: 50 min   Recent Concern: Physical Activity - Insufficiently Active (10/6/2023)    Exercise Vital Sign     Days of Exercise per Week: 3 days     Minutes of Exercise per Session: 40 min   Stress: Stress Concern Present (2/3/2024)    [employer] [place] [employer] - Occupational Stress Questionnaire     Feeling of Stress : To some extent   Social Connections: Socially Integrated (2/3/2024)    Social Connection and Isolation Panel [NHANES]     Frequency of Communication with Friends and Family: More than three times a week     Frequency of Social Gatherings with Friends and Family: More than three times a week     Attends Orthodox Services: More than 4 times per year     Active Member of Clubs or Organizations: Yes     Attends Club or Organization Meetings: More than 4 times per year     Marital Status:    Housing Stability: Low Risk   (2/3/2024)    Housing Stability Vital Sign     Unable to Pay for Housing in the Last Year: No     Number of Places Lived in the Last Year: 1     Unstable Housing in the Last Year: No     Review of Systems   Constitutional:  Positive for fatigue. Negative for chills, diaphoresis and fever.   HENT:  Negative for congestion, rhinorrhea, sore throat and trouble swallowing.    Eyes:  Negative for pain.   Respiratory:  Positive for cough, chest tightness and shortness of breath.    Cardiovascular:  Negative for chest pain and leg swelling.   Gastrointestinal:  Negative for abdominal distention, abdominal pain, diarrhea, nausea and vomiting.   Genitourinary:  Negative for dysuria and flank pain.   Musculoskeletal:  Negative for arthralgias, back pain and joint swelling.   Skin:  Negative for color change, rash and wound.   Neurological:  Negative for seizures and syncope.   Psychiatric/Behavioral:  Negative for behavioral problems and confusion.      Objective:     Vital Signs (Most Recent):  Temp: 97.4 °F (36.3 °C) (02/07/24 1525)  Pulse: 63 (02/07/24 1525)  Resp: 16 (02/07/24 1525)  BP: (!) 150/79 (02/07/24 1525)  SpO2: (!) 94 % (02/07/24 1525) Vital Signs (24h Range):  Temp:  [97.1 °F (36.2 °C)-98.5 °F (36.9 °C)] 97.4 °F (36.3 °C)  Pulse:  [50-75] 63  Resp:  [16-18] 16  SpO2:  [87 %-94 %] 94 %  BP: (140-160)/(73-82) 150/79     Weight: 75.3 kg (166 lb)  Body mass index is 31.37 kg/m².    Estimated Creatinine Clearance: 39.9 mL/min (based on SCr of 1.2 mg/dL).     Physical Exam  Vitals and nursing note reviewed.   Constitutional:       General: She is not in acute distress.     Appearance: She is not toxic-appearing.   HENT:      Nose: No congestion.      Mouth/Throat:      Pharynx: Oropharynx is clear.   Eyes:      General: No scleral icterus.     Conjunctiva/sclera: Conjunctivae normal.   Cardiovascular:      Rate and Rhythm: Normal rate.      Pulses: Normal pulses.      Heart sounds: Normal heart sounds.   Pulmonary:       Effort: Pulmonary effort is normal. No respiratory distress.      Breath sounds: Rales present.      Comments: Course lung sounds mid lobes b/L (L>R), with reduced lung sounds lower lobes b/L (L>R).  Abdominal:      General: Abdomen is flat. Bowel sounds are normal. There is no distension.      Palpations: Abdomen is soft.      Tenderness: There is no abdominal tenderness.   Musculoskeletal:         General: No swelling or tenderness. Normal range of motion.      Cervical back: No rigidity.      Right lower leg: No edema.      Left lower leg: No edema.   Skin:     General: Skin is warm.      Coloration: Skin is not jaundiced.      Findings: No erythema or rash.   Neurological:      Mental Status: She is alert and oriented to person, place, and time. Mental status is at baseline.   Psychiatric:         Mood and Affect: Mood normal.         Behavior: Behavior normal.         Thought Content: Thought content normal.          Significant Labs: Blood Culture:   Recent Labs   Lab 02/03/24  0336   LABBLOO No Growth to date  No Growth to date  No Growth to date  No Growth to date  No Growth to date  No Growth to date  No Growth to date  No Growth to date  No Growth to date  No Growth to date     CBC:   Recent Labs   Lab 02/06/24  0400 02/07/24  0350   WBC 13.39* 12.53   HGB 10.9* 11.3*   HCT 33.6* 34.8*    308     CMP:   Recent Labs   Lab 02/06/24  0400 02/07/24  0350    139   K 4.0 3.9    105   CO2 26 24   GLU 86 79   BUN 25* 20   CREATININE 1.5* 1.2   CALCIUM 9.2 9.1   PROT  --  6.8   ANIONGAP 8 10     Microbiology Results (last 7 days)       Procedure Component Value Units Date/Time    AFB culture (includes stain) [[id number]] Collected: 02/06/24 1714    Order Status: Completed Specimen: Body Fluid from Pleural Fluid Updated: 02/07/24 1350     AFB CULTURE STAIN No acid fast bacilli seen.    Fungus culture [[id number]] Collected: 02/06/24 1714    Order Status: Completed Specimen: Body  Fluid from Pleural Fluid Updated: 02/07/24 0930     Fungus (Mycology) Culture Culture in progress    Blood Culture #2 **CANNOT BE ORDERED STAT** [[id number]] Collected: 02/03/24 0336    Order Status: Completed Specimen: Blood from Peripheral, Hand, Left Updated: 02/07/24 0612     Blood Culture, Routine No Growth to date      No Growth to date      No Growth to date      No Growth to date      No Growth to date    Blood Culture #1 **CANNOT BE ORDERED STAT** [[id number]] Collected: 02/03/24 0336    Order Status: Completed Specimen: Blood from Peripheral, Hand, Right Updated: 02/07/24 0612     Blood Culture, Routine No Growth to date      No Growth to date      No Growth to date      No Growth to date      No Growth to date    Culture, Body Fluid - Bactec [[id number]] Collected: 02/06/24 1714    Order Status: Completed Specimen: Body Fluid from Pleural Fluid Updated: 02/07/24 0315     Body Fluid Culture, Sterile No Growth to date    Influenza A & B by Molecular [[id number]] Collected: 02/03/24 0204    Order Status: Completed Specimen: Nasopharyngeal Swab Updated: 02/03/24 0251     Influenza A, Molecular Negative     Influenza B, Molecular Negative     Flu A & B Source Nasal swab          Respiratory Culture:   Recent Labs   Lab 01/20/24  1445   GSRESP <10 epithelial cells per low power field.  Few WBC's  Many Gram positive cocci  Many Gram negative rods  Rare Gram positive rods   RESPIRATORYC Normal respiratory emmanuel  No S aureus or Pseudomonas isolated.     All pertinent labs within the past 24 hours have been reviewed.    Significant Imaging: I have reviewed all pertinent imaging results/findings within the past 24 hours.    I have personally reviewed records / hospital notes from   service and other specialty providers. I have also reviewed CBC, CMP/BMP,  cultures and imaging with my interpretation as documented in my assessment / plan.    Patient is high risk for infectious complications given pt's age,  multiple co-morbidities, and case complexity.      Time: 75 minutes   50% of time spent on face-to-face counseling and coordination of care. Counseling included review of test results, diagnosis, and treatment plan with patient and/or family.

## 2024-02-07 NOTE — HPI
73F with HTN, BMI 31, CKD3, gout, and recent hospitalization for an ablation for PVC (01/18/24) which was c/b pericardial effusion with tamponade physiology, s/p pericardiocentesis 01/18/24, and acute hypoxic respiratory failure, which was attributed to a L pleural effusion and acute diastolic dysfunction. She was diuresed with some improvement in the effusion and also completed inpatient CAP tx with Azithro / CTX 3-5d (ended 01/23). She acutely developed Afib that resolved with diltiazem and was discharged 01/23 on 2L home O2; and without diuretics or oral abx per chart review.     -- Now pt re-admitted (02/03) for resp sxs for past 1wk, c/o productive cough, with SOB, JONES, and new onset of orthopnea while continuing her home O2 (2L) NC. She denies fever, chills, sweats, or known sick contacts.  Pt arrived AF, VSS, HDS, wbc 21, Hg 11.8, procalc 0.12, .  Flu / Covid-19 negative. Bld cxs (02/03) NGTD. Started on empiric Iv-vanc / zosyn; and per ID, rec'd stopping vanc 02/05 but no prior ID consult notes seen. Pt maintained on Iv-zosyn.    Ct-chest (02/03): left pleural effusion with volume loss and a moderate right effusion as well as a small pericardial effusion.    Pulmonology was consulted for worsened L pleural effusion that persisted despite diuresis.     Diagnostic / therapeutic Thoracentesis completed 02/06; drained 800mL sanguineous fluid. samples sent for cytology, analysis, and cx. Studies thus far c/w exudative effusion. Pleural fluid cx (02/06) NGTD.  Pleural fluid: , Protein 4.6; wbc 1500 (60% Lymph, 30% mono, 7% segs).  Cytology of pleural fluid still in-process.    CXR after procedure showed improvement in the L pleural effusion and no ptx.   On bedside U/S (02/07) some fluid persists but markedly improved. Repeat chest-CT (02/07) pending.   -- Pending cytology and repeat chest-CT; pulm considering thoracostomy tube placement vs. outpt serial imaging.     Patient reports relief from the  procedure and feels her breathing is better. Patient breathing comfortably on 2L NC.  She still reports a productive cough, pulm will send for sputum culture.    Per pulm eval; Given onset post ablation a PCIS (postcardiac injury syndrome) remains most likely etiology; but lower suspicion for infectious or malignant process.     ID consulted for abx recs of suspected infectious exudative pleural effusion.   While maintained on Iv-zosyn since 02/03, pt remains AF, VSS, HDS, wbc normalized (down from 21 index), O2 sats 92-94% on NC 2L.

## 2024-02-07 NOTE — PLAN OF CARE
Problem: Adult Inpatient Plan of Care  Goal: Patient-Specific Goal (Individualized)  Outcome: Ongoing, Progressing     Problem: Adult Inpatient Plan of Care  Goal: Patient-Specific Goal (Individualized)  Outcome: Ongoing, Progressing

## 2024-02-07 NOTE — PLAN OF CARE
[facility]  Discharge Reassessment    Primary Care Provider: Gail Villarreal MD    Expected Discharge Date: 2/7/2024      Patient remains inpatient due to need for continued medical management. Patient underwent thoracentesis and is on IV antibiotics. She also continues to require supplemental oxygen. Will continue to follow for post acute needs. Discharge Plan A and Plan B have been determined by review of patient's clinical status, future medical and therapeutic needs, and coverage/benefits for post-acute care in coordination with multidisciplinary team members.  Reassessment (most recent)       Discharge Reassessment - 02/07/24 1235          Discharge Reassessment    Assessment Type Discharge Planning Reassessment     Did the patient's condition or plan change since previous assessment? Yes     Discharge Plan discussed with: Patient     Communicated MARJ with patient/caregiver Date not available/Unable to determine     Discharge Plan A Home with family (P)      Discharge Plan B Home (P)      DME Needed Upon Discharge  none (P)      Transition of Care Barriers None (P)      Why the patient remains in the hospital Requires continued medical care (P)         Post-Acute Status    Discharge Delays None known at this time (P)                        COLLIN Brown  Case Management  [phone]

## 2024-02-07 NOTE — PROGRESS NOTES
[facility] Medicine Student   Progress Note  Pulmonary     Admit Date: 2/3/2024  Hospital Day: 3  02/07/2024  8:30 AM     SUBJECTIVE:   Interval history: 2/7 Diagnostic and therapeutic thoracentesis yesterday. Drained 800mL of sanguinous fluid. Patient reports relief from the procedure and feels her breathing is better. CXR after procedure showed improvement in the L pleural effusion and no ptx. Patient breathing comfortably on 2L NC. She still reports a productive cough, will send for sputum culture.            OBJECTIVE:      Vital Signs Recent:  Temp: 97.1 °F (36.2 °C) (02/07/24 1122)  Pulse: 69 (02/07/24 1122)  Resp: 16 (02/07/24 1122)  BP: (!) 145/80 (02/07/24 1122)  SpO2: (!) 94 % (02/07/24 1122)  Oxygen Documentation:     Flow (L/min): 2  Oxygen Concentration (%): 2  Device (Oxygen Therapy): nasal cannula  $ Is the patient on Low Flow Oxygen?: Yes    Vital Signs Range (Last 24H):  Temp:  [97.1 °F (36.2 °C)-98.5 °F (36.9 °C)]   Pulse:  [50-75]   Resp:  [16-18]   BP: (128-160)/(73-82)   SpO2:  [87 %-95 %]   Oxygen Concentration (%): 2 (02/06/24 0900)    I & O (Last 24H):  Intake/Output Summary (Last 24 hours) at 2/7/2024 1153  Last data filed at 2/7/2024 0923      Gross per 24 hour   Intake 120 ml   Output 750 ml   Net -630 ml         Physical Exam:  Physical Exam  Vitals and nursing note reviewed.   Constitutional:       General: She is not in acute distress.     Appearance: Normal appearance.   HENT:      Head: Normocephalic and atraumatic.      Mouth/Throat:      Mouth: Mucous membranes are moist.      Pharynx: Oropharynx is clear.   Eyes:      Extraocular Movements: Extraocular movements intact.      Conjunctiva/sclera: Conjunctivae normal.      Pupils: Pupils are equal, round, and reactive to light.   Cardiovascular:      Rate and Rhythm: Normal rate and regular rhythm.      Pulses: Normal pulses.      Heart sounds: Normal heart sounds.   Pulmonary:      Effort: Pulmonary effort is normal.      Breath  sounds: Examination of the left-middle field reveals rales. Examination of the left-lower field reveals decreased breath sounds. Decreased breath sounds and rales present.      Comments: 2L NC  Musculoskeletal:         General: Normal range of motion.      Cervical back: Normal range of motion.   Skin:     General: Skin is warm and dry.   Neurological:      General: No focal deficit present.      Mental Status: She is alert and oriented to person, place, and time.   Psychiatric:         Mood and Affect: Mood normal.         Thought Content: Thought content normal.            Labs:         Recent Labs   Lab 02/05/24  0450 02/06/24  0400 02/07/24  0350    140 139   K 3.2* 4.0 3.9    106 105   CO2 26 26 24   BUN 24* 25* 20   CREATININE 1.5* 1.5* 1.2   GLU 87 86 79   CALCIUM 8.8 9.2 9.1   MG 2.1 2.3 2.3   PHOS 3.6 3.0 2.3*           Recent Labs   Lab 02/03/24  0048 02/07/24  0350   ALKPHOS 57  --    ALT 22  --    AST 24  --    ALBUMIN 3.1*  --    PROT 7.7 6.8   BILITOT 0.7  --    INR 1.1  --             Recent Labs   Lab 02/05/24 0450 02/06/24  0400 02/07/24  0350   WBC 13.84* 13.39* 12.53   HGB 10.7* 10.9* 11.3*   HCT 32.6* 33.6* 34.8*    312 308         Diagnostic Results:  X-Ray Chest AP Portable  Narrative: EXAMINATION:  XR CHEST AP PORTABLE     CLINICAL HISTORY:  Pleural effusion; status post thoracentesis;     TECHNIQUE:  Single frontal view of the chest was performed.     COMPARISON:  02/05/2024     FINDINGS:  Moderate pleural effusion on the left improved from the prior study.  Mild left associated atelectasis.     Right lung is clear.     No pneumothorax is detected.     Degenerative changes of the thoracic spine.  No acute osseous abnormality.  Impression: Left basilar pleural effusion with associated atelectasis, improved from the prior study status post thoracentesis.  See above comments.  Follow-up recommended.     Electronically signed by:         Arnav Weathers  Date:                                         02/06/2024  Time:                                       19:54           ASSESSMENT/PLAN:      # L pleural effusion unclear etiology  # Chronic hypoxic respiratory failure  Patient had 800mL of sanguinous fluid drained 2/6 with symptomatic improvement. Pleural fluid studies: protein 4.6, , glucose 115, WBC 1458, lymphocyte predominate. Fluid is exudative due to LDH being greater than 2/3 the upper limit of normal. There is evidence that post cardiac injury syndrome can cause an exudative pleural effusion that is usually left sided, bloody, has high LDH, and is eosinophilic predominate. Given the patient's clinical history of a complicated PVC ablation in January, and the characteristics of the pleural fluid, this is the likely causation. POCUS showed some fluid posteriorly. We will repeat CT Chest to evaluate remaining fluid for further management.     Recommendations:  - We POCUSed her lungs again and fluid pocket is much smaller today.   - Follow up CT Chest without contrast to assess change in size and whether there are any loculations present that would warrant chest tube placement.   - Follow up bacterial, fungal and afb cultures and   Cytology from pleural fluid  - Abx per ID  - Patient on 2L NC and breathing comfortably, wean as able    This note was written in concert with Medical Student Taryn Morales. Addends/changes have been made as deemed necessary.     Ranjith Gordon MD  [employer] Pulmonary Critical Care Medicine Fellow

## 2024-02-07 NOTE — MEDICAL/APP STUDENT
[facility] Medicine Student   Progress Note  Pulmonary    Admit Date: 2/3/2024  Hospital Day: 3  02/07/2024  8:30 AM    SUBJECTIVE:   Interval history: 2/7 Diagnostic and therapeutic thoracentesis yesterday. Drained 800mL of sanguinous fluid. Patient reports relief from the procedure and feels her breathing is better. CXR after procedure showed improvement in the L pleural effusion and no ptx. Patient breathing comfortably on 2L NC. She still reports a productive cough, will send for sputum culture.          OBJECTIVE:     Vital Signs Recent:  Temp: 97.1 °F (36.2 °C) (02/07/24 1122)  Pulse: 69 (02/07/24 1122)  Resp: 16 (02/07/24 1122)  BP: (!) 145/80 (02/07/24 1122)  SpO2: (!) 94 % (02/07/24 1122)  Oxygen Documentation:    Flow (L/min): 2  Oxygen Concentration (%): 2        Device (Oxygen Therapy): nasal cannula  $ Is the patient on Low Flow Oxygen?: Yes      Vital Signs Range (Last 24H):  Temp:  [97.1 °F (36.2 °C)-98.5 °F (36.9 °C)]   Pulse:  [50-75]   Resp:  [16-18]   BP: (128-160)/(73-82)   SpO2:  [87 %-95 %]   Oxygen Concentration (%): 2 (02/06/24 0900)    I & O (Last 24H):  Intake/Output Summary (Last 24 hours) at 2/7/2024 1153  Last data filed at 2/7/2024 0923  Gross per 24 hour   Intake 120 ml   Output 750 ml   Net -630 ml        Physical Exam:  Physical Exam  Vitals and nursing note reviewed.   Constitutional:       General: She is not in acute distress.     Appearance: Normal appearance.   HENT:      Head: Normocephalic and atraumatic.      Mouth/Throat:      Mouth: Mucous membranes are moist.      Pharynx: Oropharynx is clear.   Eyes:      Extraocular Movements: Extraocular movements intact.      Conjunctiva/sclera: Conjunctivae normal.      Pupils: Pupils are equal, round, and reactive to light.   Cardiovascular:      Rate and Rhythm: Normal rate and regular rhythm.      Pulses: Normal pulses.      Heart sounds: Normal heart sounds.   Pulmonary:      Effort: Pulmonary effort is normal.      Breath sounds:  Examination of the left-middle field reveals rales. Examination of the left-lower field reveals decreased breath sounds. Decreased breath sounds and rales present.      Comments: 2L NC  Musculoskeletal:         General: Normal range of motion.      Cervical back: Normal range of motion.   Skin:     General: Skin is warm and dry.   Neurological:      General: No focal deficit present.      Mental Status: She is alert and oriented to person, place, and time.   Psychiatric:         Mood and Affect: Mood normal.         Thought Content: Thought content normal.         Labs:   Recent Labs   Lab 02/05/24 0450 02/06/24  0400 02/07/24  0350    140 139   K 3.2* 4.0 3.9    106 105   CO2 26 26 24   BUN 24* 25* 20   CREATININE 1.5* 1.5* 1.2   GLU 87 86 79   CALCIUM 8.8 9.2 9.1   MG 2.1 2.3 2.3   PHOS 3.6 3.0 2.3*     Recent Labs   Lab 02/03/24  0048 02/07/24  0350   ALKPHOS 57  --    ALT 22  --    AST 24  --    ALBUMIN 3.1*  --    PROT 7.7 6.8   BILITOT 0.7  --    INR 1.1  --      Recent Labs   Lab 02/05/24 0450 02/06/24  0400 02/07/24  0350   WBC 13.84* 13.39* 12.53   HGB 10.7* 10.9* 11.3*   HCT 32.6* 33.6* 34.8*    312 308       Diagnostic Results:  X-Ray Chest AP Portable  Narrative: EXAMINATION:  XR CHEST AP PORTABLE    CLINICAL HISTORY:  Pleural effusion; status post thoracentesis;    TECHNIQUE:  Single frontal view of the chest was performed.    COMPARISON:  02/05/2024    FINDINGS:  Moderate pleural effusion on the left improved from the prior study.  Mild left associated atelectasis.    Right lung is clear.    No pneumothorax is detected.    Degenerative changes of the thoracic spine.  No acute osseous abnormality.  Impression: Left basilar pleural effusion with associated atelectasis, improved from the prior study status post thoracentesis.  See above comments.  Follow-up recommended.    Electronically signed by: Arnav Weathers  Date:    02/06/2024  Time:    19:54        ASSESSMENT/PLAN:     # L  pleural effusion unclear etiology  # Chronic hypoxic respiratory failure  Patient had 800mL of sanguinous fluid drained 2/6 with symptomatic improvement. Pleural fluid studies: protein 4.6, , glucose 115, WBC 1458, lymphocyte predominate. Fluid is exudative due to LDH being greater than 2/3 the upper limit of normal. There is evidence that post cardiac injury syndrome can cause an exudative pleural effusion that is usually left sided, bloody, has high LDH, and is eosinophilic predominate. Given the patient's clinical history of a complicated PVC ablation in January, and the characteristics of the pleural fluid, this is the likely causation. POCUS showed some fluid posteriorly. We will repeat CT Chest to evaluate remaining fluid for further management.      Recommendations:  - Follow up CT Chest  - Follow up bacterial, fungal and afb cultures and   cytology.  - Abx per ID  - Patient on 2L NC and breathing comfortably      Taryn [facility] MS4

## 2024-02-07 NOTE — SUBJECTIVE & OBJECTIVE
Past Medical History:   Diagnosis Date    Allergy     seasonal    Diverticulitis     Fever blister     Hypertension     Personal history of colonic polyps        Past Surgical History:   Procedure Laterality Date    ABLATION N/A 1/18/2024    Procedure: Ablation;  Surgeon: Santi Ellison MD;  [place] [facility] [place];  Service: Cardiology;  Laterality: N/A;  PVC, RFA, PEPE, anes, MB, 3 Prep,*prepare to map LVOT and RVOT*    CHOLECYSTECTOMY  04/26/2017    COLON SURGERY      COLONOSCOPY N/A 12/06/2016    Procedure: COLONOSCOPY;  Surgeon: Fidel Mason MD;  [place] [facility] [place] [facility] (4TH FLR);  Service: Endoscopy;  Laterality: N/A;    COLONOSCOPY N/A 05/17/2022    Procedure: COLONOSCOPY;  Surgeon: RUSSELL Rolon MD;  Location: [facility] [place] (4TH FLR);  Service: Endoscopy;  Laterality: N/A;  fully vaccinated, prep instr portal -ml    partial colectomy  1997    sigmoid removed due to diverticulitis    PERICARDIOCENTESIS N/A 1/18/2024    Procedure: Pericardiocentesis;  Surgeon: Santi Ellison MD;  Location: [facility];  Service: Cardiology;  Laterality: N/A;    SINUS SURGERY  2007    SMALL INTESTINE SURGERY  June 1997@ [facility]    Partial Sigm Colon /Divaticulitus       Review of patient's allergies indicates:   Allergen Reactions    Nickel sutures [surgical stainless steel]     Adhesive Rash       Medications:  Medications Prior to Admission   Medication Sig    allopurinoL (ZYLOPRIM) 300 MG tablet Take one tablet by mouth once daily in combination with 50mg tablets for a daily dose of 350mg    aspirin (ECOTRIN) 81 MG EC tablet Take 1 tablet (81 mg total) by mouth once daily.    metoprolol succinate (TOPROL-XL) 25 MG 24 hr tablet Take 1 tablet (25 mg total) by mouth once daily.    oxybutynin (DITROPAN) 5 MG Tab Take 1 tablet (5 mg total) by mouth 2 (two) times daily.    predniSONE (DELTASONE) 2.5 MG tablet Take one tablet by mouth once daily     Antibiotics (From admission, onward)      Start     Stop Route Frequency Ordered     02/03/24 2015  piperacillin-tazobactam (ZOSYN) 4.5 g in dextrose 5 % in water (D5W) 100 mL IVPB (MB+)         -- IV Every 8 hours (non-standard times) 02/03/24 1905          Antifungals (From admission, onward)      None          Antivirals (From admission, onward)      None             Immunization History   Administered Date(s) Administered    COVID-19, MRNA, LN-S, PF (Pfizer) (Gray Cap) 05/03/2022    COVID-19, MRNA, LN-S, PF (Pfizer) (Purple Cap) 02/18/2021, 03/11/2021, 10/26/2021, 05/03/2022    Influenza 01/06/2010    Influenza (FLUAD) - Quadrivalent - Adjuvanted - PF *Preferred* (65+) 01/12/2022, 11/03/2022    Influenza - High Dose - PF (65 years and older) 12/20/2018, 10/17/2019    Influenza - Quadrivalent - PF *Preferred* (6 months and older) 11/02/2016, 12/07/2017, 12/08/2023    Pneumococcal Conjugate - 13 Valent 08/03/2018    Pneumococcal Polysaccharide - 23 Valent 12/04/2019    Tdap 11/02/2016    Zoster 04/07/2016    Zoster Recombinant 01/12/2022, 08/29/2022       Family History       Problem Relation (Age of Onset)    Breast cancer Maternal Grandmother    Colon cancer Maternal Grandmother    Diabetes Mother    Heart disease Mother    Heart failure Mother    Hypertension Mother    Kidney disease Mother    Liver cancer Maternal Uncle    No Known Problems Sister, Daughter, Daughter, Son, Son    Other Brother          Social History     Socioeconomic History    Marital status:    Tobacco Use    Smoking status: Never    Smokeless tobacco: Never   Substance and Sexual Activity    Alcohol use: No    Drug use: Never    Sexual activity: Yes     Partners: Male     Birth control/protection: None     Comment: , together since 1971     Social Determinants of Health     Financial Resource Strain: Low Risk  (2/3/2024)    Overall Financial Resource Strain (CARDIA)     Difficulty of Paying Living Expenses: Not very hard   Recent Concern: Financial Resource Strain - Medium Risk (12/16/2023)    Overall  Financial Resource Strain (CARDIA)     Difficulty of Paying Living Expenses: Somewhat hard   Food Insecurity: No Food Insecurity (2/3/2024)    Hunger Vital Sign     Worried About Running Out of Food in the Last Year: Never true     Ran Out of Food in the Last Year: Never true   Recent Concern: Food Insecurity - Food Insecurity Present (12/16/2023)    Hunger Vital Sign     Worried About Running Out of Food in the Last Year: Sometimes true     Ran Out of Food in the Last Year: Sometimes true   Transportation Needs: No Transportation Needs (2/3/2024)    PRAPARE - Transportation     Lack of Transportation (Medical): No     Lack of Transportation (Non-Medical): No   Physical Activity: Sufficiently Active (12/16/2023)    Exercise Vital Sign     Days of Exercise per Week: 4 days     Minutes of Exercise per Session: 50 min   Recent Concern: Physical Activity - Insufficiently Active (10/6/2023)    Exercise Vital Sign     Days of Exercise per Week: 3 days     Minutes of Exercise per Session: 40 min   Stress: Stress Concern Present (2/3/2024)    [employer] - Occupational Stress Questionnaire     Feeling of Stress : To some extent   Social Connections: Socially Integrated (2/3/2024)    Social Connection and Isolation Panel [NHANES]     Frequency of Communication with Friends and Family: More than three times a week     Frequency of Social Gatherings with Friends and Family: More than three times a week     Attends Zoroastrian Services: More than 4 times per year     Active Member of Clubs or Organizations: Yes     Attends Club or Organization Meetings: More than 4 times per year     Marital Status:    Housing Stability: Low Risk  (2/3/2024)    Housing Stability Vital Sign     Unable to Pay for Housing in the Last Year: No     Number of Places Lived in the Last Year: 1     Unstable Housing in the Last Year: No     Review of Systems   Constitutional:  Positive for fatigue. Negative for chills,  diaphoresis and fever.   HENT:  Negative for congestion, rhinorrhea, sore throat and trouble swallowing.    Eyes:  Negative for pain.   Respiratory:  Positive for cough, chest tightness and shortness of breath.    Cardiovascular:  Negative for chest pain and leg swelling.   Gastrointestinal:  Negative for abdominal distention, abdominal pain, diarrhea, nausea and vomiting.   Genitourinary:  Negative for dysuria and flank pain.   Musculoskeletal:  Negative for arthralgias, back pain and joint swelling.   Skin:  Negative for color change, rash and wound.   Neurological:  Negative for seizures and syncope.   Psychiatric/Behavioral:  Negative for behavioral problems and confusion.      Objective:     Vital Signs (Most Recent):  Temp: 97.4 °F (36.3 °C) (02/07/24 1525)  Pulse: 63 (02/07/24 1525)  Resp: 16 (02/07/24 1525)  BP: (!) 150/79 (02/07/24 1525)  SpO2: (!) 94 % (02/07/24 1525) Vital Signs (24h Range):  Temp:  [97.1 °F (36.2 °C)-98.5 °F (36.9 °C)] 97.4 °F (36.3 °C)  Pulse:  [50-75] 63  Resp:  [16-18] 16  SpO2:  [87 %-94 %] 94 %  BP: (140-160)/(73-82) 150/79     Weight: 75.3 kg (166 lb)  Body mass index is 31.37 kg/m².    Estimated Creatinine Clearance: 39.9 mL/min (based on SCr of 1.2 mg/dL).     Physical Exam  Vitals and nursing note reviewed.   Constitutional:       General: She is not in acute distress.     Appearance: She is not toxic-appearing.   HENT:      Nose: No congestion.      Mouth/Throat:      Pharynx: Oropharynx is clear.   Eyes:      General: No scleral icterus.     Conjunctiva/sclera: Conjunctivae normal.   Cardiovascular:      Rate and Rhythm: Normal rate.      Pulses: Normal pulses.      Heart sounds: Normal heart sounds.   Pulmonary:      Effort: Pulmonary effort is normal. No respiratory distress.      Breath sounds: Rales present.      Comments: Course lung sounds mid lobes b/L (L>R), with reduced lung sounds lower lobes b/L (L>R).  Abdominal:      General: Abdomen is flat. Bowel sounds are  normal. There is no distension.      Palpations: Abdomen is soft.      Tenderness: There is no abdominal tenderness.   Musculoskeletal:         General: No swelling or tenderness. Normal range of motion.      Cervical back: No rigidity.      Right lower leg: No edema.      Left lower leg: No edema.   Skin:     General: Skin is warm.      Coloration: Skin is not jaundiced.      Findings: No erythema or rash.   Neurological:      Mental Status: She is alert and oriented to person, place, and time. Mental status is at baseline.   Psychiatric:         Mood and Affect: Mood normal.         Behavior: Behavior normal.         Thought Content: Thought content normal.          Significant Labs: Blood Culture:   Recent Labs   Lab 02/03/24 0336   LABBLOO No Growth to date  No Growth to date  No Growth to date  No Growth to date  No Growth to date  No Growth to date  No Growth to date  No Growth to date  No Growth to date  No Growth to date     CBC:   Recent Labs   Lab 02/06/24  0400 02/07/24  0350   WBC 13.39* 12.53   HGB 10.9* 11.3*   HCT 33.6* 34.8*    308     CMP:   Recent Labs   Lab 02/06/24  0400 02/07/24  0350    139   K 4.0 3.9    105   CO2 26 24   GLU 86 79   BUN 25* 20   CREATININE 1.5* 1.2   CALCIUM 9.2 9.1   PROT  --  6.8   ANIONGAP 8 10     Microbiology Results (last 7 days)       Procedure Component Value Units Date/Time    AFB culture (includes stain) [[id number]] Collected: 02/06/24 1714    Order Status: Completed Specimen: Body Fluid from Pleural Fluid Updated: 02/07/24 1350     AFB CULTURE STAIN No acid fast bacilli seen.    Fungus culture [[id number]] Collected: 02/06/24 1714    Order Status: Completed Specimen: Body Fluid from Pleural Fluid Updated: 02/07/24 0930     Fungus (Mycology) Culture Culture in progress    Blood Culture #2 **CANNOT BE ORDERED STAT** [[id number]] Collected: 02/03/24 0336    Order Status: Completed Specimen: Blood from Peripheral, Hand, Left Updated:  02/07/24 0612     Blood Culture, Routine No Growth to date      No Growth to date      No Growth to date      No Growth to date      No Growth to date    Blood Culture #1 **CANNOT BE ORDERED STAT** [[id number]] Collected: 02/03/24 0336    Order Status: Completed Specimen: Blood from Peripheral, Hand, Right Updated: 02/07/24 0612     Blood Culture, Routine No Growth to date      No Growth to date      No Growth to date      No Growth to date      No Growth to date    Culture, Body Fluid - Bactec [[id number]] Collected: 02/06/24 1714    Order Status: Completed Specimen: Body Fluid from Pleural Fluid Updated: 02/07/24 0315     Body Fluid Culture, Sterile No Growth to date    Influenza A & B by Molecular [[id number]] Collected: 02/03/24 0204    Order Status: Completed Specimen: Nasopharyngeal Swab Updated: 02/03/24 0251     Influenza A, Molecular Negative     Influenza B, Molecular Negative     Flu A & B Source Nasal swab          Respiratory Culture:   Recent Labs   Lab 01/20/24  1445   GSRESP <10 epithelial cells per low power field.  Few WBC's  Many Gram positive cocci  Many Gram negative rods  Rare Gram positive rods   RESPIRATORYC Normal respiratory emmanuel  No S aureus or Pseudomonas isolated.     All pertinent labs within the past 24 hours have been reviewed.    Significant Imaging: I have reviewed all pertinent imaging results/findings within the past 24 hours.    I have personally reviewed records / hospital notes from   service and other specialty providers. I have also reviewed CBC, CMP/BMP,  cultures and imaging with my interpretation as documented in my assessment / plan.    Patient is high risk for infectious complications given pt's age, multiple co-morbidities, and case complexity.      Time: 75 minutes   50% of time spent on face-to-face counseling and coordination of care. Counseling included review of test results, diagnosis, and treatment plan with patient and/or family.

## 2024-02-08 DIAGNOSIS — R06.02 SOB (SHORTNESS OF BREATH): Primary | ICD-10-CM

## 2024-02-08 LAB
ANION GAP SERPL CALC-SCNC: 11 MMOL/L (ref 8–16)
BACTERIA BLD CULT: NORMAL
BACTERIA BLD CULT: NORMAL
BASOPHILS # BLD AUTO: 0.1 K/UL (ref 0–0.2)
BASOPHILS NFR BLD: 0.8 % (ref 0–1.9)
BUN SERPL-MCNC: 24 MG/DL (ref 8–23)
CALCIUM SERPL-MCNC: 9.1 MG/DL (ref 8.7–10.5)
CHLORIDE SERPL-SCNC: 107 MMOL/L (ref 95–110)
CO2 SERPL-SCNC: 26 MMOL/L (ref 23–29)
CREAT SERPL-MCNC: 1.5 MG/DL (ref 0.5–1.4)
DIFFERENTIAL METHOD BLD: ABNORMAL
EOSINOPHIL # BLD AUTO: 0.4 K/UL (ref 0–0.5)
EOSINOPHIL NFR BLD: 3 % (ref 0–8)
ERYTHROCYTE [DISTWIDTH] IN BLOOD BY AUTOMATED COUNT: 13.2 % (ref 11.5–14.5)
EST. GFR  (NO RACE VARIABLE): 37 ML/MIN/1.73 M^2
GLUCOSE SERPL-MCNC: 89 MG/DL (ref 70–110)
HCT VFR BLD AUTO: 33.2 % (ref 37–48.5)
HGB BLD-MCNC: 10.6 G/DL (ref 12–16)
IMM GRANULOCYTES # BLD AUTO: 0.04 K/UL (ref 0–0.04)
IMM GRANULOCYTES NFR BLD AUTO: 0.3 % (ref 0–0.5)
LYMPHOCYTES # BLD AUTO: 2.4 K/UL (ref 1–4.8)
LYMPHOCYTES NFR BLD: 19.6 % (ref 18–48)
MAGNESIUM SERPL-MCNC: 2.4 MG/DL (ref 1.6–2.6)
MCH RBC QN AUTO: 29.5 PG (ref 27–31)
MCHC RBC AUTO-ENTMCNC: 31.9 G/DL (ref 32–36)
MCV RBC AUTO: 93 FL (ref 82–98)
MONOCYTES # BLD AUTO: 1 K/UL (ref 0.3–1)
MONOCYTES NFR BLD: 8.2 % (ref 4–15)
NEUTROPHILS # BLD AUTO: 8.3 K/UL (ref 1.8–7.7)
NEUTROPHILS NFR BLD: 68.1 % (ref 38–73)
NRBC BLD-RTO: 0 /100 WBC
PHOSPHATE SERPL-MCNC: 2.8 MG/DL (ref 2.7–4.5)
PLATELET # BLD AUTO: 279 K/UL (ref 150–450)
PMV BLD AUTO: 13 FL (ref 9.2–12.9)
POTASSIUM SERPL-SCNC: 4.6 MMOL/L (ref 3.5–5.1)
RBC # BLD AUTO: 3.59 M/UL (ref 4–5.4)
SODIUM SERPL-SCNC: 144 MMOL/L (ref 136–145)
WBC # BLD AUTO: 12.15 K/UL (ref 3.9–12.7)

## 2024-02-08 PROCEDURE — 63600175 PHARM REV CODE 636 W HCPCS

## 2024-02-08 PROCEDURE — 63600175 PHARM REV CODE 636 W HCPCS: Performed by: STUDENT IN AN ORGANIZED HEALTH CARE EDUCATION/TRAINING PROGRAM

## 2024-02-08 PROCEDURE — 85025 COMPLETE CBC W/AUTO DIFF WBC: CPT | Performed by: STUDENT IN AN ORGANIZED HEALTH CARE EDUCATION/TRAINING PROGRAM

## 2024-02-08 PROCEDURE — 80048 BASIC METABOLIC PNL TOTAL CA: CPT | Performed by: STUDENT IN AN ORGANIZED HEALTH CARE EDUCATION/TRAINING PROGRAM

## 2024-02-08 PROCEDURE — 25000003 PHARM REV CODE 250: Performed by: STUDENT IN AN ORGANIZED HEALTH CARE EDUCATION/TRAINING PROGRAM

## 2024-02-08 PROCEDURE — 99233 SBSQ HOSP IP/OBS HIGH 50: CPT | Mod: GC,,, | Performed by: EMERGENCY MEDICINE

## 2024-02-08 PROCEDURE — 21400001 HC TELEMETRY ROOM

## 2024-02-08 PROCEDURE — 36415 COLL VENOUS BLD VENIPUNCTURE: CPT | Performed by: STUDENT IN AN ORGANIZED HEALTH CARE EDUCATION/TRAINING PROGRAM

## 2024-02-08 PROCEDURE — 84100 ASSAY OF PHOSPHORUS: CPT | Performed by: STUDENT IN AN ORGANIZED HEALTH CARE EDUCATION/TRAINING PROGRAM

## 2024-02-08 PROCEDURE — 83735 ASSAY OF MAGNESIUM: CPT | Performed by: STUDENT IN AN ORGANIZED HEALTH CARE EDUCATION/TRAINING PROGRAM

## 2024-02-08 PROCEDURE — 87106 FUNGI IDENTIFICATION YEAST: CPT | Performed by: HOSPITALIST

## 2024-02-08 PROCEDURE — 87205 SMEAR GRAM STAIN: CPT | Performed by: HOSPITALIST

## 2024-02-08 PROCEDURE — 87070 CULTURE OTHR SPECIMN AEROBIC: CPT | Performed by: HOSPITALIST

## 2024-02-08 PROCEDURE — 25000003 PHARM REV CODE 250

## 2024-02-08 RX ADMIN — OXYBUTYNIN CHLORIDE 5 MG: 5 TABLET ORAL at 08:02

## 2024-02-08 RX ADMIN — PIPERACILLIN SODIUM AND TAZOBACTAM SODIUM 4.5 G: 4; .5 INJECTION, POWDER, FOR SOLUTION INTRAVENOUS at 12:02

## 2024-02-08 RX ADMIN — METOPROLOL SUCCINATE 25 MG: 25 TABLET, EXTENDED RELEASE ORAL at 08:02

## 2024-02-08 RX ADMIN — OXYBUTYNIN CHLORIDE 5 MG: 5 TABLET ORAL at 09:02

## 2024-02-08 RX ADMIN — PREDNISONE 2.5 MG: 2.5 TABLET ORAL at 08:02

## 2024-02-08 RX ADMIN — ASPIRIN 81 MG: 81 TABLET, COATED ORAL at 08:02

## 2024-02-08 RX ADMIN — PIPERACILLIN SODIUM AND TAZOBACTAM SODIUM 4.5 G: 4; .5 INJECTION, POWDER, FOR SOLUTION INTRAVENOUS at 09:02

## 2024-02-08 RX ADMIN — ALLOPURINOL 350 MG: 300 TABLET ORAL at 08:02

## 2024-02-08 RX ADMIN — PIPERACILLIN SODIUM AND TAZOBACTAM SODIUM 4.5 G: 4; .5 INJECTION, POWDER, FOR SOLUTION INTRAVENOUS at 04:02

## 2024-02-08 NOTE — ASSESSMENT & PLAN NOTE
Patient with Paroxysmal (<7 days) atrial fibrillation which is controlled currently with Beta Blocker. Patient is currently in sinus rhythm.[id number] Score: 2. Anticoagulation not started.     Pt had 8 hour episode of new onset afib during last hospitalization. Was placed on dilt gtt and it resolved. Since only 8 hours, EP did not recommend anticoagulation or rate control. Discharged with 30 day event monitor and EP follow up. She was started on metoprolol at discharge.  - Monitor telemetry   - Continue home metoprolol

## 2024-02-08 NOTE — PROGRESS NOTES
"[facility]) nurse navigator notified of [facility] candidate in need of education and introduction to 4-6 week program.      PT aao x 3 while lying in bed  with  at bedside. Introduced self to pt as [facility] nurse navigator.     Patient given "[facility] Home Care Guide" which includes "Daily weight and symptom tracker".  Encouraged pt and  to review information.    Reviewed the following key points of [facility] program with pt and :              1.) Take your medications as directed. Call Ms Wendy if any health Care Professional changes your Heart/Fluid medications.               2.) Weight yourself daily. Daily Dry Weights. Upon waking ,empty your bladder, weigh with as little clothes as possible before eating/drinking. Record weight in Symptom Tracker and compare these weights for fluid gain. Weight gain overnight of 3-4 lbs is Fluid, also a gain of 5 lbs in 3 days is Fluid as well. Call US!              3.) Follow low salt and limited fluid diet. Salt/Sodium Restriction 8315-3910 mg, see page 4. Sodium makes you hold onto Fluid. High Sodium Foods;Deli Meat/Cheese, Sausages/Hot Dogs, Fast Food/Restaurant Food, Anything in a box, bottle or can. Cook with Fresh or Frozen ingredients and use seasonings that are labeled NO SALT. Check Portion Sizes, Salt is reported for 1 portion. A can may contain 2-3 portions. Fluid Restriction 1.5 -2 Liters/Day, see page 6, measure all of your Fluid, the milk in your cereal, broth in your soup and ice cream because anything that melts at room temp is a liquid.              4.) Stop smoking and start exercising. Brisk walking is good, don't walk like you are going to the [employer] and DON"T WALK IN THE HEAT! Start low and increase as tolerated. Remember if you don't use it you lose it.              5.) Go to your appointments and call your team. Have your weight and BP/P ready when we call to do the Phone Check " ins and call us if you have fluid gain or questions  Pt reminded to follow Symptom tracker and to call at the onset of symptoms according to tracker.        Reviewed plan for follow up once discharged to include phone calls, in person and virtual visits to assist pt optimizing their heart failure medication regimen and encouraging healthy lifestyle modifications.  Reminded pt / that program will assist them over the next 4-6 weeks and then patient will be transferred to long term care provider .  Reminded pt/ how to contact [facility] navigator via phone and or via [employer].     Pt instructed appointment will be printed on hospital discharge paperwork.     Pt also reminded RN will call 48-72 hours after discharge to check on them.     PT and  verbalize read back of some of the information given.  Encouraged pt and  to read over information often and contact team with any questions or concerns.     PT prefers mid-morning appts.

## 2024-02-08 NOTE — PROGRESS NOTES
[facility] Medicine  Progress Note    Patient Name: Rajesh Mas  MRN: [id number]  Patient Class: IP- Inpatient   Admission Date: 2/3/2024  Length of Stay: 4 days  Attending Physician: Zia Archer MD  Primary Care Provider: Gail Villarreal MD        Subjective:     Principal Problem:Pleural effusion, left        HPI:  Rajesh Mas is a 73 y.o. female with PMHx of gout, obesity, CKD3, HTN, recent hospitalization for PVC s/p ablation 1/18/24 that was complicated by pericardial effusion with tamponade physiology s/p pericardiocentesis 1/18/24 and development of acute hypoxic respiratory failure from left pleural effusion and acute diastolic dysfunction. The pleural effusion improved with diuresis but she still During this hospital course she also was treated for CAP, placed on diltiazem gtt for development of Afib that resolved. She was discharged home on metoprolol and supplemental oxygen.    She presents to the ED today with her  for acute shortness of breath that began around 7 PM last night. She has been wearing oxygen at 2lpm via NC since last hospital discharge but up until last night had not feeling short of breath. She was walking around her house when the shortness of breath started, barely exerting herself. She also developed soreness/tightness in her bilateral shoulders and mild heaviness in her chest without chest pain. She has had a persistent dry cough for about one week. She also started requiring 3 pillows for sleep a few nights after developing orthopnea. She has poor appetite today. No fevers, chills, leg swelling, nausea, vomiting, palpitations, syncope.     In the ED, she was satting 91% on RA and 95% on 2L NC. Vitals otherwise stable. Labs reveal leukocytosis with WBC 21k, mild anemia with H/H 11.8/36.8. . Troponin 0.017. Flu and covid negative. CXR shows increased size of moderate to large left pleural effusion. Probable small right pleural  effusion. She was started on vancomycin and zosyn and admitted to hospital medicine observation.    Overview/Hospital Course:  Pulmonology consulted given large left-sided pleural effusion and recommended diuresis.  Patient continued on Zosyn for hospital-acquired pneumonia coverage with improvement of leukocytosis.  Patient diuresed with improvement of right-sided minimal pleural effusion but continued unchanged left-sided pleural effusion.  Pulmonary reconsulted inpatient underwent thoracentesis with bloody fluid drained.  Fluid consistent with exudative etiology.  Cardiology consulted and recommended continued management with pulmonology and is unlikely to be secondary to pericardial effusion from previous ablation on 01/18/2024.  Infectious Disease consulted for antibiotic management.  2/8  recent hospitalization for an ablation for PVC (01/18/24) that was complicated by pericardial effusion with tamponade physiology s/p pericardiocentesis 01/18/24 and acute hypoxic respiratory failure, which was attributed to a Leftpleural effusion and acute diastolic dysfunction. She was diuresed with some improvement in the effusion and also completed CAP tx while inpatient with Azithro / CTX 3-5d (ended 01/23). re-admitted 02/03)- CT-chest-  left pleural effusion with volume loss and a moderate right effusion as well as a small pericardial effusion.  Diagnostic / therapeutic thoracentesis completed 02/06; drained 800mL sanguineous fluid. Samples sent for cytology, analysis, and cx. Studies  exudative effusion. Continue empiric Iv-zosyn - started 02/03. pleural fluid cxs and cytology in process   repeat chest-CT - Left upper lobe ground-glass opacity versus coalescing consolidation, suggestive of atelectasis with probable superimposed infectious pneumonia.Interval improvement of bilateral pleural effusions and left lung volume loss.  Relaxation atelectasis in the bilateral lower lobes.Stable small volume pericardial  effusion.         Review of Systems:   Pain scale:   Constitutional:  fever,  chills, headache, vision loss, hearing loss, weight loss, Generalized weakness, falls, loss of smell, loss of taste, poor appetite,  sore throat  Respiratory: cough, shortness of breath.   Cardiovascular: chest pain, dizziness, palpitations, orthopnea, swelling of feet, syncope  Gastrointestinal: nausea, vomiting, abdominal pain, diarrhea, black stool,  blood in stool, change in bowel habits, constipation  Genitourinary: hematuria, dysuria, urgency, frequency  Integument/Breast: rash,  pruritis  Hematologic/Lymphatic: easy bruising, lymphadenopathy  Musculoskeletal: arthralgia- shoulders , myalgias, back pain, neck pain, knee pain  Neurological: confusion, seizures, tremors, slurred speech  Behavioral/Psych:  depression, anxiety, auditory or visual hallucinations     OBJECTIVE:     Physical Exam:  Body mass index is 31.37 kg/m².    Constitutional: Appears well-developed and well-nourished. obesity   Head: Normocephalic and atraumatic.   Neck: Normal range of motion. Neck supple.   Cardiovascular: Normal heart rate.  Regular heart rhythm.  Pulmonary/Chest: Effort normal.   Abdominal: No distension.  No tenderness  Musculoskeletal: Normal range of motion. No edema.   Neurological: Alert and oriented to person, place, and time.   Skin: Skin is warm and dry.   Psychiatric: Normal mood and affect. Behavior is normal.                  Vital Signs  Temp: 98.3 °F (36.8 °C) (02/08/24 0415)  Pulse: 74 (02/08/24 0415)  Resp: 18 (02/07/24 2343)  BP: (Abnormal) 149/81 (02/08/24 0415)  SpO2: (Abnormal) 92 % (02/08/24 0415)     24 Hour VS Range    Temp:  [97.1 °F (36.2 °C)-98.3 °F (36.8 °C)]   Pulse:  [53-75]   Resp:  [16-18]   BP: (137-150)/(77-82)   SpO2:  [92 %-94 %]     Intake/Output Summary (Last 24 hours) at 2/8/2024 0786  Last data filed at 2/7/2024 0946  Gross per 24 hour   Intake 120 ml   Output 750 ml   Net -630 ml         I/O This Shift:  No  "intake/output data recorded.    Wt Readings from Last 3 Encounters:   02/04/24 75.3 kg (166 lb)   01/31/24 77.2 kg (170 lb 3.1 oz)   01/26/24 77 kg (169 lb 12.1 oz)       I have personally reviewed the vitals and recorded Intake/Output     Laboratory/Diagnostic Data:    CBC/Anemia Labs: Coags:    Recent Labs   Lab 02/06/24  0400 02/07/24  0350 02/08/24  0255   WBC 13.39* 12.53 12.15   HGB 10.9* 11.3* 10.6*   HCT 33.6* 34.8* 33.2*    308 279   MCV 94 91 93   RDW 13.2 13.2 13.2    Recent Labs   Lab 02/03/24  0048   INR 1.1        Chemistries: ABG:   Recent Labs   Lab 02/03/24  0048 02/04/24  0500 02/06/24  0400 02/07/24  0350 02/08/24  0255      < > 140 139 144   K 4.2   < > 4.0 3.9 4.6      < > 106 105 107   CO2 23   < > 26 24 26   BUN 20   < > 25* 20 24*   CREATININE 1.0   < > 1.5* 1.2 1.5*   CALCIUM 9.5   < > 9.2 9.1 9.1   PROT 7.7  --   --  6.8  --    BILITOT 0.7  --   --   --   --    ALKPHOS 57  --   --   --   --    ALT 22  --   --   --   --    AST 24  --   --   --   --    MG  --    < > 2.3 2.3 2.4   PHOS  --    < > 3.0 2.3* 2.8    < > = values in this interval not displayed.    No results for input(s): "PH", "PCO2", "PO2", "HCO3", "POCSATURATED", "BE" in the last 168 hours.     POCT Glucose: HbA1c:    No results for input(s): "POCTGLUCOSE" in the last 168 hours. Hemoglobin A1C   Date Value Ref Range Status   01/13/2023 5.2 4.0 - 5.6 % Final     Comment:     ADA Screening Guidelines:  5.7-6.4%  Consistent with prediabetes  >or=6.5%  Consistent with diabetes    High levels of fetal hemoglobin interfere with the HbA1C  assay. Heterozygous hemoglobin variants (HbS, HgC, etc)do  not significantly interfere with this assay.   However, presence of multiple variants may affect accuracy.          Cardiac Enzymes: Ejection Fractions:    No results for input(s): "CPK", "CPKMB", "MB", "TROPONINI" in the last 72 hours. EF   Date Value Ref Range Status   02/03/2024 40 % Final   01/22/2024 35 % Final    " "      No results for input(s): "COLORU", "APPEARANCEUA", "PHUR", "SPECGRAV", "PROTEINUA", "GLUCUA", "KETONESU", "BILIRUBINUA", "OCCULTUA", "NITRITE", "UROBILINOGEN", "LEUKOCYTESUR", "RBCUA", "WBCUA", "BACTERIA", "SQUAMEPITHEL", "HYALINECASTS" in the last 48 hours.    Invalid input(s): "WRIGHTSUR"    Procalcitonin (ng/mL)   Date Value   02/03/2024 0.12   01/20/2024 0.55 (H)     BNP (pg/mL)   Date Value   02/03/2024 258 (H)   01/20/2024 96   09/12/2022 78     CRP (mg/L)   Date Value   09/18/2023 8.0     Sed Rate (mm/Hr)   Date Value   09/18/2023 90 (H)     No results found for: "DDIMER"  No results found for: "FERRITIN"  No results found for: "LDH"  Troponin I (ng/mL)   Date Value   02/03/2024 0.017   09/12/2022 0.013   09/12/2022 0.010     CPK (U/L)   Date Value   08/30/2023 191 (H)     No results found for this or any previous visit.  SARS-CoV-2 RNA, Amplification, Qual (no units)   Date Value   02/03/2024 Negative       Microbiology labs for the last week  Microbiology Results (last 7 days)       Procedure Component Value Units Date/Time    Blood Culture #1 **CANNOT BE ORDERED STAT** [[id number]] Collected: 02/03/24 0336    Order Status: Completed Specimen: Blood from Peripheral, Hand, Right Updated: 02/08/24 0612     Blood Culture, Routine No growth after 5 days.    Blood Culture #2 **CANNOT BE ORDERED STAT** [[id number]] Collected: 02/03/24 0336    Order Status: Completed Specimen: Blood from Peripheral, Hand, Left Updated: 02/08/24 0612     Blood Culture, Routine No growth after 5 days.    Culture, Body Fluid - Bactec [[id number]] Collected: 02/06/24 1714    Order Status: Completed Specimen: Body Fluid from Pleural Fluid Updated: 02/07/24 2212     Body Fluid Culture, Sterile No Growth to date      No Growth to date    AFB culture (includes stain) [[id number]] Collected: 02/06/24 1714    Order Status: Completed Specimen: Body Fluid from Pleural Fluid Updated: 02/07/24 2127     AFB Culture & Smear Culture in " progress     AFB CULTURE STAIN No acid fast bacilli seen.    Fungus culture [[id number]] Collected: 02/06/24 1714    Order Status: Completed Specimen: Body Fluid from Pleural Fluid Updated: 02/07/24 0930     Fungus (Mycology) Culture Culture in progress    Influenza A & B by Molecular [[id number]] Collected: 02/03/24 0204    Order Status: Completed Specimen: Nasopharyngeal Swab Updated: 02/03/24 0251     Influenza A, Molecular Negative     Influenza B, Molecular Negative     Flu A & B Source Nasal swab            Reviewed and noted in plan where applicable- Please see chart for full lab data.    Lines/Drains:       Peripheral IV - Single Lumen 02/03/24 0045 20 G Left Antecubital (Active)   Site Assessment Clean;Dry;Intact 02/08/24 0600   Extremity Assessment Distal to IV No abnormal discoloration 02/06/24 0800   Line Status Flushed 02/08/24 0600   Dressing Status Clean;Dry;Intact 02/08/24 0600   Dressing Intervention Integrity maintained 02/08/24 0600   Reason Not Rotated Not due 02/05/24 2000   Number of days: 5            Peripheral IV - Single Lumen 02/03/24 2130 22 G Anterior;Right Forearm (Active)   Site Assessment Clean;Dry;Intact 02/08/24 0600   Extremity Assessment Distal to IV No abnormal discoloration 02/06/24 0800   Line Status Flushed 02/08/24 0600   Dressing Status Clean;Intact;Dry 02/08/24 0600   Dressing Intervention Integrity maintained 02/08/24 0600   Reason Not Rotated Not due 02/05/24 2000   Number of days: 4       Imaging  ECG Results              EKG 12-lead (Final result)  Result time 02/03/24 10:43:23      Final result by Interface, Lab In [facility] (02/03/24 10:43:23)               Narrative:    Test Reason : R07.9,    Vent. Rate : 109 BPM     Atrial Rate : 109 BPM     P-R Int : 118 ms          QRS Dur : 070 ms      QT Int : 322 ms       P-R-T Axes : 073 025 181 degrees     QTc Int : 433 ms    Sinus tachycardia with Premature atrial complexes  Cannot rule out Anterior infarct ,age  undetermined  T wave abnormality, consider inferolateral ischemia  Abnormal ECG  When compared with ECG of 23-JAN-2024 08:11,  Premature atrial complexes are now Present  ST elevation is no longer present  Confirmed by CHAPARRITA PIPER MD ([id number]) on 2/3/2024 10:43:16 AM    Referred By: ALANA   SELF           Confirmed By:CHAPARRITA PIPER MD                                  Results for orders placed during the hospital encounter of 02/03/24    Echo    Interpretation Summary    Left Ventricle: The left ventricle is normal in size. Normal wall thickness. Global hypokinesis present. There is moderately reduced systolic function. Ejection fraction by visual approximation is 40%.    Right Ventricle: Normal right ventricular cavity size. Wall thickness is normal. Right ventricle wall motion  is normal. Systolic function is mildly reduced.    Pulmonary Artery: The estimated pulmonary artery systolic pressure is 25 mmHg.    IVC/SVC: Normal venous pressure at 3 mmHg.  The IVC is small in caliber and collapses fully with inspiration.    Bilateral pleural effusions.  The left pleural effusion is large.    There is likely some pericardial thickening and pericardial fat, but there is no evidence of significant pericardial effusion      CT Chest Without Contrast  Narrative: EXAMINATION:  CT CHEST WITHOUT CONTRAST    CLINICAL HISTORY:  Pleural effusion, malignancy suspected;    TECHNIQUE:  Volumetric data acquisition of the chest from the lung apices to the adrenals was obtained without intravenous contrast. Sagittal and coronal multiplanar reconstructions were performed. Lack of IV contrast material limits the assessment of mediastinal and abdominal structures.    COMPARISON:  CT chest 02/03/2024    FINDINGS:  Thoracic soft tissue: No significant abnormality.    Aorta: Normal caliber with no aneurysm.    Pulmonary vasculature: No acute abnormality.    Heart: Normal size. Small volume pericardial effusion similar to  prior.    Alice/Mediastinum: No pathologic eliot enlargement.    Central airways: Patent.    Esophagus: No significant abnormality.    Lungs/Pleura: Left upper lobe ground-glass opacity versus coalescing consolidation.  Left upper lobe subsegmental linear atelectasis improved compared to prior.Small volume bilateral pleural effusion more on the left associated with bilateral relaxation atelectasis, improved compared to prior.    Upper abdomen:    Cholecystectomy.  Symmetric moderate bilateral perinephric fat stranding.    Bones:Degenerative changes with no acute abnormality.  Impression: Left upper lobe ground-glass opacity versus coalescing consolidation, suggestive of atelectasis with probable superimposed infectious pneumonia.    Interval improvement of bilateral pleural effusions and left lung volume loss.  Relaxation atelectasis in the bilateral lower lobes.    Stable small volume pericardial effusion.    Additional findings as above.    Electronically signed by resident: Isabel Cuello  Date:    02/07/2024  Time:    14:19    Electronically signed by: Brendan Crews  Date:    02/07/2024  Time:    19:52      Labs, Imaging, EKG and Diagnostic results from 2/8/2024 were reviewed.    Medications:  Medication list was reviewed and changes noted under Assessment/Plan.  No current facility-administered medications on file prior to encounter.     Current Outpatient Medications on File Prior to Encounter   Medication Sig Dispense Refill    allopurinoL (ZYLOPRIM) 300 MG tablet Take one tablet by mouth once daily in combination with 50mg tablets for a daily dose of 350mg 90 tablet 3    aspirin (ECOTRIN) 81 MG EC tablet Take 1 tablet (81 mg total) by mouth once daily. 30 tablet 0    metoprolol succinate (TOPROL-XL) 25 MG 24 hr tablet Take 1 tablet (25 mg total) by mouth once daily. 30 tablet 11    oxybutynin (DITROPAN) 5 MG Tab Take 1 tablet (5 mg total) by mouth 2 (two) times daily. 180 tablet 1    predniSONE (DELTASONE) 2.5  MG tablet Take one tablet by mouth once daily 90 tablet 1     Scheduled Medications:  allopurinoL, 350 mg, Oral, Daily  aspirin, 81 mg, Oral, Daily  metoprolol succinate, 25 mg, Oral, Daily  oxybutynin, 5 mg, Oral, BID  piperacillin-tazobactam (Zosyn) IV (PEDS and ADULTS) (extended infusion is not appropriate), 4.5 g, Intravenous, Q8H  predniSONE, 2.5 mg, Oral, Daily      PRN: acetaminophen, albuterol-ipratropium, dextrose 10%, dextrose 10%, glucagon (human recombinant), glucose, glucose, naloxone, sodium chloride 0.9%  Infusions:   Estimated Creatinine Clearance: 31.9 mL/min (A) (based on SCr of 1.5 mg/dL (H)).             Assessment/Plan:      * Pleural effusion, left  Patient found to have  moderate to large left  pleural effusion and probably small right pleural effusion on imaging. I have personally reviewed and interpreted the following imaging: Xray. A thoracentesis was deferred. Most likely etiology includes Congestive Heart Failure and Pneumonia. Management to include Diuresis.    Previously treated with diuresis with improvement in effusion. She was not discharged home on diuretics but was discharged home on supplemental oxygen. She presents with acute worsening SOB.   - Moderate to large effusion noted to have re accumulated on CXR.   - Flu and covid negative.    - Leukocytosis on admission labs. No fever. Procalcitonin 0.12  - S/p vancomycin and zosyn in ED  - CT Chest: Left pleural effusion with volume loss left hemithorax  - TTE 2/3: EF 40%, global hypokinesis, CVP 3, PASP 25, no evidence of significant pericardial effusion    - Pulmonology consulted  -- Repeat CXR 2/5 with continue left sided opacity, but improved right small effusion  -- Thoracentesis on 2/6 with bloody drainage  -- Concern for post ablation a PCIS (postcardiac injury syndrome)   -- Follow up thoracentesis cultures and cytology  -- Infectious disease consulted  -- BCX NGTD  -- Continue Zosyn for HAP coverage   2/8.  Diagnostic /  therapeutic thoracentesis completed 02/06; drained 800mL sanguineous fluid. Samples sent for cytology, analysis, and cx. Studies  exudative effusion. Continue empiric Iv-zosyn - started 02/03. pleural fluid cxs and cytology in process   repeat chest-CT - Left upper lobe ground-glass opacity versus coalescing consolidation, suggestive of atelectasis with probable superimposed infectious pneumonia.Interval improvement of bilateral pleural effusions and left lung volume loss.  Relaxation atelectasis in the bilateral lower lobes.Stable small volume pericardial effusion.    Atrial fibrillation  Patient with Paroxysmal (<7 days) atrial fibrillation which is controlled currently with Beta Blocker. Patient is currently in sinus rhythm.[id number] Score: 2. Anticoagulation not started.     Pt had 8 hour episode of new onset afib during last hospitalization. Was placed on dilt gtt and it resolved. Since only 8 hours, EP did not recommend anticoagulation or rate control. Discharged with 30 day event monitor and EP follow up. She was started on metoprolol at discharge.  - Monitor telemetry   - Continue home metoprolol     Chronic hypoxemic respiratory failure  Patient with Hypoxic Respiratory failure which is Chronic, though only recently started on supplemental oxygen. she is on home oxygen at 2 LPM. Supplemental oxygen was provided and noted-    Signs/symptoms of respiratory failure include- increased work of breathing on exertion. Contributing diagnoses includes - CHF, Pleural effusion, and Pneumonia Labs and images were reviewed. Patient Has not had a recent ABG. Will treat underlying causes and adjust management of respiratory failure as follows- See pleural effusion     History of gout  - Continue home allopurinol and prednisone.     Pericardial effusion  - Recent history noted tamponade physiology, underwent pericardiocentesis 1/18 and a repeat echo next day showed trivial pericardial effusion.  - TTE 2/3: EF 40%, global  hypokinesis, CVP 3, PASP 25, no evidence of significant pericardial effusion     PVCs (premature ventricular contractions)  - S/p RFA on 1/18/24    Stage 3 chronic kidney disease  Creatine stable for now. BMP reviewed- noted Estimated Creatinine Clearance: 31.9 mL/min (A) (based on SCr of 1.5 mg/dL (H)). according to latest data. Based on current GFR, CKD stage is stage 3 - GFR 30-59.  Monitor UOP and serial BMP and adjust therapy as needed. Renally dose meds. Avoid nephrotoxic medications and procedures.      Recent Labs   Lab 02/06/24  0400 02/07/24  0350 02/08/24  0255   BUN 25* 20 24*   CREATININE 1.5* 1.2 1.5*       I & O     Intake/Output Summary (Last 24 hours) at 2/8/2024 0706  Last data filed at 2/7/2024 0923  Gross per 24 hour   Intake 120 ml   Output 750 ml   Net -630 ml        BMI 31.0-31.9,adult  Body mass index is 31.37 kg/m². Morbid obesity complicates all aspects of disease management from diagnostic modalities to treatment. Weight loss encouraged and health benefits explained to patient.      Diastolic dysfunction  During recent hospitalization, pt noted to have pleural effusions and there was concern for acute diastolic dysfunction and a transient decreased EF related to high HR.  - TTE 2/3: 40%, global LV hypokinesis present, RV systolic function is mildly reduced   - Monitor I/Os  - Lasix as above      Essential hypertension  Chronic, controlled. Latest blood pressure and vitals reviewed-     Temp:  [97.1 °F (36.2 °C)-98.3 °F (36.8 °C)]   Pulse:  [53-75]   Resp:  [16-18]   BP: (137-150)/(77-82)   SpO2:  [92 %-94 %] .   Home meds for hypertension were reviewed and noted below.   Hypertension Medications               metoprolol succinate (TOPROL-XL) 25 MG 24 hr tablet Take 1 tablet (25 mg total) by mouth once daily.          While in the hospital, will manage blood pressure as follows; Continue home antihypertensive regimen (metoprolol only). Currently her losartan 100mg/day and  triamterene-hydrochlorothiazide 37.5-25 mg/day are on hold since last hospital admission due to concern for hypotension.     Will utilize p.r.n. blood pressure medication only if patient's blood pressure greater than 180/110 and she develops symptoms such as worsening chest pain or shortness of breath.      VTE Risk Mitigation (From admission, onward)           Ordered     Reason for No Pharmacological VTE Prophylaxis  Once        Question:  Reasons:  Answer:  Physician Provided (leave comment)    02/03/24 0457     IP VTE HIGH RISK PATIENT  Once         02/03/24 0457     Place sequential compression device  Until discontinued         02/03/24 0457                    Discharge Planning   MARJ: 2/8/2024     Code Status: Full Code   Is the patient medically ready for discharge?: No    Reason for patient still in hospital (select all that apply): Treatment  Discharge Plan A: Home with family   Discharge Delays: None known at this time              Zia Archer MD  Department of Hospital Medicine   [facility] - Med Surg

## 2024-02-08 NOTE — PLAN OF CARE
Problem: Adult Inpatient Plan of Care  Goal: Plan of Care Review  2/8/2024 0237 by Shakila Jensen RN  Outcome: Ongoing, Progressing  2/8/2024 0231 by Shakila Jensen RN  Outcome: Ongoing, Progressing  Goal: Patient-Specific Goal (Individualized)  2/8/2024 0237 by Shakila Jensen RN  Outcome: Ongoing, Progressing  2/8/2024 0231 by Shakila Jensen RN  Outcome: Ongoing, Progressing  Goal: Absence of Hospital-Acquired Illness or Injury  2/8/2024 0237 by Shakila Jensen RN  Outcome: Ongoing, Progressing  2/8/2024 0231 by Shakila Jensen RN  Outcome: Ongoing, Progressing  Goal: Optimal Comfort and Wellbeing  2/8/2024 0237 by Shakila Jensen RN  Outcome: Ongoing, Progressing  2/8/2024 0231 by Shakila Jensen RN  Outcome: Ongoing, Progressing  Goal: Readiness for Transition of Care  2/8/2024 0237 by Shakila Jensen RN  Outcome: Ongoing, Progressing  2/8/2024 0231 by Shakila Jensen RN  Outcome: Ongoing, Progressing     Problem: Infection  Goal: Absence of Infection Signs and Symptoms  2/8/2024 0237 by Shakila Jensen RN  Outcome: Ongoing, Progressing  2/8/2024 0231 by Shakila Jensen RN  Outcome: Ongoing, Progressing     Problem: Skin Injury Risk Increased  Goal: Skin Health and Integrity  2/8/2024 0237 by Shakila Jensen RN  Outcome: Ongoing, Progressing  2/8/2024 0231 by Shakila Jensen RN  Outcome: Ongoing, Progressing     Problem: Pain Acute  Goal: Acceptable Pain Control and Functional Ability  2/8/2024 0237 by Shakila Jensen RN  Outcome: Ongoing, Progressing  2/8/2024 0231 by Shakila Jensen RN  Outcome: Ongoing, Progressing     Problem: Gas Exchange Impaired  Goal: Optimal Gas Exchange  2/8/2024 0237 by Shakila Jensen RN  Outcome: Ongoing, Progressing  2/8/2024 0231 by Shakila Jensen RN  Outcome: Ongoing, Progressing     Problem: Airway Clearance Ineffective  Goal: Effective Airway Clearance  2/8/2024 0237 by Shakila Jensen RN  Outcome: Ongoing, Progressing  2/8/2024 0231  by Shakila Jensen, RN  Outcome: Ongoing, Progressing

## 2024-02-08 NOTE — ASSESSMENT & PLAN NOTE
Patient found to have  moderate to large left  pleural effusion and probably small right pleural effusion on imaging. I have personally reviewed and interpreted the following imaging: Xray. A thoracentesis was deferred. Most likely etiology includes Congestive Heart Failure and Pneumonia. Management to include Diuresis.    Previously treated with diuresis with improvement in effusion. She was not discharged home on diuretics but was discharged home on supplemental oxygen. She presents with acute worsening SOB.   - Moderate to large effusion noted to have re accumulated on CXR.   - Flu and covid negative.    - Leukocytosis on admission labs. No fever. Procalcitonin 0.12  - S/p vancomycin and zosyn in ED  - CT Chest: Left pleural effusion with volume loss left hemithorax  - TTE 2/3: EF 40%, global hypokinesis, CVP 3, PASP 25, no evidence of significant pericardial effusion    - Pulmonology consulted  -- Repeat CXR 2/5 with continue left sided opacity, but improved right small effusion  -- Thoracentesis on 2/6 with bloody drainage  -- Concern for post ablation a PCIS (postcardiac injury syndrome)   -- Follow up thoracentesis cultures and cytology  -- Infectious disease consulted  -- BCX NGTD  -- Continue Zosyn for HAP coverage

## 2024-02-08 NOTE — ASSESSMENT & PLAN NOTE
Creatine stable for now. BMP reviewed- noted Estimated Creatinine Clearance: 31.9 mL/min (A) (based on SCr of 1.5 mg/dL (H)). according to latest data. Based on current GFR, CKD stage is stage 3 - GFR 30-59.  Monitor UOP and serial BMP and adjust therapy as needed. Renally dose meds. Avoid nephrotoxic medications and procedures.      Recent Labs   Lab 02/06/24  0400 02/07/24  0350 02/08/24  0255   BUN 25* 20 24*   CREATININE 1.5* 1.2 1.5*       I & O     Intake/Output Summary (Last 24 hours) at 2/8/2024 0706  Last data filed at 2/7/2024 0923  Gross per 24 hour   Intake 120 ml   Output 750 ml   Net -630 ml

## 2024-02-08 NOTE — PLAN OF CARE
[facility]  Discharge Reassessment    Primary Care Provider: Gail Villarreal MD    Expected Discharge Date: 2/9/2024    Patient remains inpatient due to need for continued medical management. Patient awaiting sputum culture results and is receiving IV antibiotics. Will continue to follow. Discharge Plan A and Plan B have been determined by review of patient's clinical status, future medical and therapeutic needs, and coverage/benefits for post-acute care in coordination with multidisciplinary team members.  Reassessment (most recent)       Discharge Reassessment - 02/08/24 1133          Discharge Reassessment    Assessment Type Discharge Planning Reassessment (P)      Did the patient's condition or plan change since previous assessment? Yes (P)      Discharge Plan discussed with: Patient (P)      Communicated MARJ with patient/caregiver Date not available/Unable to determine (P)      Discharge Plan A Home (P)      Discharge Plan B Home (P)      DME Needed Upon Discharge  none (P)      Transition of Care Barriers None (P)      Why the patient remains in the hospital Requires continued medical care (P)         Post-Acute Status    Discharge Delays None known at this time (P)                      COLLIN Brown  Case Management  [phone]

## 2024-02-08 NOTE — DISCHARGE SUMMARY
[facility]  Discharge Summary      Patient Name: Rajesh Mas  MRN: [id number]  [facility]: [id number]  Patient Class: IP- Inpatient  Admission Date: 2/3/2024  Hospital Length of Stay: 5 days  Discharge Date and Time:  02/09/2024 7:09 AM  Attending Physician: Zia Archer MD   Discharging Provider: Zia Archer MD  Primary Care Provider: Gail Villarreal MD  [facility] Medicine Team: [facility] D Zia Archer MD  Primary Care Team: [facility] D    HPI:   Rajesh Mas is a 73 y.o. female with PMHx of gout, obesity, CKD3, HTN, recent hospitalization for PVC s/p ablation 1/18/24 that was complicated by pericardial effusion with tamponade physiology s/p pericardiocentesis 1/18/24 and development of acute hypoxic respiratory failure from left pleural effusion and acute diastolic dysfunction. The pleural effusion improved with diuresis but she still During this hospital course she also was treated for CAP, placed on diltiazem gtt for development of Afib that resolved. She was discharged home on metoprolol and supplemental oxygen.    She presents to the ED today with her  for acute shortness of breath that began around 7 PM last night. She has been wearing oxygen at 2lpm via NC since last hospital discharge but up until last night had not feeling short of breath. She was walking around her house when the shortness of breath started, barely exerting herself. She also developed soreness/tightness in her bilateral shoulders and mild heaviness in her chest without chest pain. She has had a persistent dry cough for about one week. She also started requiring 3 pillows for sleep a few nights after developing orthopnea. She has poor appetite today. No fevers, chills, leg swelling, nausea, vomiting, palpitations, syncope.     In the ED, she was satting 91% on RA and 95% on 2L NC. Vitals otherwise stable. Labs reveal leukocytosis with WBC 21k, mild anemia with H/H 11.8/36.8. BNP  258. Troponin 0.017. Flu and covid negative. CXR shows increased size of moderate to large left pleural effusion. Probable small right pleural effusion. She was started on vancomycin and zosyn and admitted to hospital medicine observation.    * No surgery found *      Hospital Course:   Pulmonology consulted given large left-sided pleural effusion and recommended diuresis.  Patient continued on Zosyn for hospital-acquired pneumonia coverage with improvement of leukocytosis.  Patient diuresed with improvement of right-sided minimal pleural effusion but continued unchanged left-sided pleural effusion.  Pulmonary reconsulted inpatient underwent thoracentesis with bloody fluid drained.  Fluid consistent with exudative etiology.  Cardiology consulted and recommended continued management with pulmonology and is unlikely to be secondary to pericardial effusion from previous ablation on 01/18/2024.  Infectious Disease consulted for antibiotic management.  2/8  recent hospitalization for an ablation for PVC (01/18/24) that was complicated by pericardial effusion with tamponade physiology s/p pericardiocentesis 01/18/24 and acute hypoxic respiratory failure, which was attributed to a Leftpleural effusion and acute diastolic dysfunction. She was diuresed with some improvement in the effusion and also completed CAP tx while inpatient with Azithro / CTX 3-5d (ended 01/23). re-admitted 02/03)- CT-chest-  left pleural effusion with volume loss and a moderate right effusion as well as a small pericardial effusion.  Diagnostic / therapeutic thoracentesis completed 02/06; drained 800mL sanguineous fluid. Samples sent for cytology, analysis, and cx. Studies  exudative effusion. Continue empiric Iv-zosyn - started 02/03. pleural fluid cxs and cytology in process   repeat chest-CT - Left upper lobe ground-glass opacity versus coalescing consolidation, suggestive of atelectasis with probable superimposed infectious pneumonia.Interval  improvement of bilateral pleural  2/9  transition to oral Levo 750mg Q48h for 6days ( end date  02/15/24) f/u pleural fluid cytology in proces and  sputum cxs . continue with 2L oxygen at home. f/u with pulmonoloyg  in 4-6wks, with repeat imaging. discharge home today     Goals of Care Treatment Preferences:  Code Status: Full Code      Consults:   Consults (From admission, onward)          Status Ordering Provider     Inpatient consult to Infectious Diseases  Once        Provider:  (Not yet assigned)    Completed DANUTA QUINTERO     Inpatient consult to Pulmonology  Once        Provider:  (Not yet assigned)    Completed DANUTA QUINTERO     Inpatient consult to Pulmonology  Once        Provider:  (Not yet assigned)    Completed ADNUTA QUINTERO                     Assessment/Plan:      * Pleural effusion, left  Patient found to have  moderate to large left  pleural effusion and probably small right pleural effusion on imaging. I have personally reviewed and interpreted the following imaging: Xray. A thoracentesis was deferred. Most likely etiology includes Congestive Heart Failure and Pneumonia. Management to include Diuresis.     Previously treated with diuresis with improvement in effusion. She was not discharged home on diuretics but was discharged home on supplemental oxygen. She presents with acute worsening SOB.   - Moderate to large effusion noted to have re accumulated on CXR.   - Flu and covid negative.    - Leukocytosis on admission labs. No fever. Procalcitonin 0.12  - S/p vancomycin and zosyn in ED  - CT Chest: Left pleural effusion with volume loss left hemithorax  - TTE 2/3: EF 40%, global hypokinesis, CVP 3, PASP 25, no evidence of significant pericardial effusion    - Pulmonology consulted  -- Repeat CXR 2/5 with continue left sided opacity, but improved right small effusion  -- Thoracentesis on 2/6 with bloody drainage  -- Concern for post ablation a PCIS (postcardiac injury syndrome)   --  Follow up thoracentesis cultures and cytology  -- Infectious disease consulted  -- BCX NGTD  -- Continue Zosyn for HAP coverage   2/8.  Diagnostic / therapeutic thoracentesis completed 02/06; drained 800mL sanguineous fluid. Samples sent for cytology, analysis, and cx. Studies  exudative effusion. Continue empiric Iv-zosyn - started 02/03. pleural fluid cxs and cytology in process   repeat chest-CT - Left upper lobe ground-glass opacity versus coalescing consolidation, suggestive of atelectasis with probable superimposed infectious pneumonia.Interval improvement of bilateral pleural effusions and left lung volume loss.  Relaxation atelectasis in the bilateral lower lobes.Stable small volume pericardial effusion.  2/9  transition to oral Levo 750mg Q48h for 6days ( end date  02/15/24) f/u pleural fluid cytology in proces and  sputum cxs .  f/u with pulmonoloyg  in 4-6wks, with repeat imaging. discharge home today     Atrial fibrillation  Patient with Paroxysmal (<7 days) atrial fibrillation which is controlled currently with Beta Blocker. Patient is currently in sinus rhythm.[id number] Score: 2. Anticoagulation not started.      Pt had 8 hour episode of new onset afib during last hospitalization. Was placed on dilt gtt and it resolved. Since only 8 hours, EP did not recommend anticoagulation or rate control. Discharged with 30 day event monitor and EP follow up. She was started on metoprolol at discharge.  - Monitor telemetry   - Continue home metoprolol      Chronic hypoxemic respiratory failure  Patient with Hypoxic Respiratory failure which is Chronic, though only recently started on supplemental oxygen. she is on home oxygen at 2 LPM. Supplemental oxygen was provided and noted-    Signs/symptoms of respiratory failure include- increased work of breathing on exertion. Contributing diagnoses includes - CHF, Pleural effusion, and Pneumonia Labs and images were reviewed. Patient Has not had a recent ABG. Will treat  underlying causes and adjust management of respiratory failure as follows- See pleural effusion      History of gout  - Continue home allopurinol and prednisone.      Pericardial effusion  - Recent history noted tamponade physiology, underwent pericardiocentesis 1/18 and a repeat echo next day showed trivial pericardial effusion.  - TTE 2/3: EF 40%, global hypokinesis, CVP 3, PASP 25, no evidence of significant pericardial effusion      PVCs (premature ventricular contractions)  - S/p RFA on 1/18/24     Stage 3 chronic kidney disease  Creatine stable for now. BMP reviewed- noted Estimated Creatinine Clearance: 31.9 mL/min (A) (based on SCr of 1.5 mg/dL (H)). according to latest data. Based on current GFR, CKD stage is stage 3 - GFR 30-59.  Monitor UOP and serial BMP and adjust therapy as needed. Renally dose meds. Avoid nephrotoxic medications and procedures.              Recent Labs   Lab 02/06/24  0400 02/07/24  0350 02/08/24  0255   BUN 25* 20 24*   CREATININE 1.5* 1.2 1.5*         I & O      Intake/Output Summary (Last 24 hours) at 2/8/2024 0706  Last data filed at 2/7/2024 0923      Gross per 24 hour   Intake 120 ml   Output 750 ml   Net -630 ml         BMI 31.0-31.9,adult  Body mass index is 31.37 kg/m². Morbid obesity complicates all aspects of disease management from diagnostic modalities to treatment. Weight loss encouraged and health benefits explained to patient.            Diastolic dysfunction  During recent hospitalization, pt noted to have pleural effusions and there was concern for acute diastolic dysfunction and a transient decreased EF related to high HR.  - TTE 2/3: 40%, global LV hypokinesis present, RV systolic function is mildly reduced   - Monitor I/Os  - Lasix as above        Essential hypertension  Chronic, controlled. Latest blood pressure and vitals reviewed-      Temp:  [97.1 °F (36.2 °C)-98.3 °F (36.8 °C)]   Pulse:  [53-75]   Resp:  [16-18]   BP: (137-150)/(77-82)   SpO2:  [92 %-94 %] .    Home meds for hypertension were reviewed and noted below.   Hypertension Medications                    metoprolol succinate (TOPROL-XL) 25 MG 24 hr tablet Take 1 tablet (25 mg total) by mouth once daily.             While in the hospital, will manage blood pressure as follows; Continue home antihypertensive regimen (metoprolol only). Currently her losartan 100mg/day and triamterene-hydrochlorothiazide 37.5-25 mg/day are on hold since last hospital admission due to concern for hypotension.      Will utilize p.r.n. blood pressure medication only if patient's blood pressure greater than 180/110 and she develops symptoms such as worsening chest pain or shortness of breath.     Final Active Diagnoses:    Diagnosis Date Noted POA    PRINCIPAL PROBLEM:  Pleural effusion, left [J90] 01/20/2024 Yes    Atrial fibrillation [I48.91] 01/22/2024 Yes    Chronic hypoxemic respiratory failure [J96.11] 01/19/2024 Yes    Pericardial effusion [I31.39] 01/18/2024 Yes    History of gout [Z87.39] 01/18/2024 Yes    PVCs (premature ventricular contractions) [I49.3] 10/10/2023 Yes    Stage 3 chronic kidney disease [N18.30] 01/12/2022 Yes    BMI 31.0-31.9,adult [Z68.31] 12/30/2020 Not Applicable    Diastolic dysfunction [I51.89] 05/19/2017 Yes    Essential hypertension [I10] 02/25/2016 Yes     Chronic      Problems Resolved During this Admission:       Discharged Condition: fair    Disposition: Home or Self CareHome    Follow Up:   Follow-up Information       Gail Villarreal MD Follow up.    Specialty: Internal Medicine  [facility] information[facility]  [facility] [address]  [place] [address]  [phone]                           Patient Instructions:      Ambulatory referral/consult to Pulmonology   Standing Status: Future   Referral Priority: Routine Referral Type: Consultation   Referral Reason: Specialty Services Required   Requested Specialty: Pulmonary Disease   Number of Visits Requested: 1       Significant Diagnostic Studies: Labs:  "BMP:   Recent Labs   Lab 02/08/24  0255 02/09/24  0411   GLU 89 79    140   K 4.6 3.3*    107   CO2 26 26   BUN 24* 20   CREATININE 1.5* 1.2   CALCIUM 9.1 8.6*   MG 2.4 2.3   , CMP   Recent Labs   Lab 02/08/24  0255 02/09/24  0411    140   K 4.6 3.3*    107   CO2 26 26   GLU 89 79   BUN 24* 20   CREATININE 1.5* 1.2   CALCIUM 9.1 8.6*   ANIONGAP 11 7*   , CBC   Recent Labs   Lab 02/08/24  0255 02/09/24  0411   WBC 12.15 12.07   HGB 10.6* 10.7*   HCT 33.2* 33.5*    246   , INR   Lab Results   Component Value Date    INR 1.1 02/03/2024    INR 1.0 01/08/2024   , Lipid Panel   Lab Results   Component Value Date    CHOL 196 06/29/2023    HDL 62 06/29/2023    LDLCALC 99.2 06/29/2023    TRIG 174 (H) 06/29/2023    CHOLHDL 31.6 06/29/2023   , Troponin   Recent Labs   Lab 02/03/24  0048   TROPONINI 0.017   , A1C: No results for input(s): "HGBA1C" in the last 4320 hours., and All labs within the past 24 hours have been reviewed  Microbiology: Blood Culture   Lab Results   Component Value Date    LABBLOO No growth after 5 days. 02/03/2024    LABBLOO No growth after 5 days. 02/03/2024   , Sputum Culture   Lab Results   Component Value Date    GSRESP <10 epithelial cells per low power field. 01/20/2024    GSRESP Few WBC's 01/20/2024    GSRESP Many Gram positive cocci 01/20/2024    GSRESP Many Gram negative rods 01/20/2024    GSRESP Rare Gram positive rods 01/20/2024    RESPIRATORYC Normal respiratory emmanuel 01/20/2024    RESPIRATORYC No S aureus or Pseudomonas isolated. 01/20/2024   , and Urine Culture    Lab Results   Component Value Date    LABURIN ESCHERICHIA COLI  >100,000 cfu/ml   (A) 09/12/2022         CT Chest Without Contrast  Narrative: EXAMINATION:  CT CHEST WITHOUT CONTRAST    CLINICAL HISTORY:  Pleural effusion, malignancy suspected;    TECHNIQUE:  Volumetric data acquisition of the chest from the lung apices to the adrenals was obtained without intravenous contrast. Sagittal and coronal " multiplanar reconstructions were performed. Lack of IV contrast material limits the assessment of mediastinal and abdominal structures.    COMPARISON:  CT chest 02/03/2024    FINDINGS:  Thoracic soft tissue: No significant abnormality.    Aorta: Normal caliber with no aneurysm.    Pulmonary vasculature: No acute abnormality.    Heart: Normal size. Small volume pericardial effusion similar to prior.    Alice/Mediastinum: No pathologic eliot enlargement.    Central airways: Patent.    Esophagus: No significant abnormality.    Lungs/Pleura: Left upper lobe ground-glass opacity versus coalescing consolidation.  Left upper lobe subsegmental linear atelectasis improved compared to prior.Small volume bilateral pleural effusion more on the left associated with bilateral relaxation atelectasis, improved compared to prior.    Upper abdomen:    Cholecystectomy.  Symmetric moderate bilateral perinephric fat stranding.    Bones:Degenerative changes with no acute abnormality.  Impression: Left upper lobe ground-glass opacity versus coalescing consolidation, suggestive of atelectasis with probable superimposed infectious pneumonia.    Interval improvement of bilateral pleural effusions and left lung volume loss.  Relaxation atelectasis in the bilateral lower lobes.    Stable small volume pericardial effusion.    Additional findings as above.    Electronically signed by resident: Isabel Cuello  Date:    02/07/2024  Time:    14:19    Electronically signed by: Brendan Crews  Date:    02/07/2024  Time:    19:52       Pending Diagnostic Studies:       Procedure Component Value Units Date/Time    Cytology, Pulmonary [[id number]] Collected: 02/07/24 0231    Order Status: Sent Lab Status: In process Updated: 02/07/24 0238           Medications:  Reconciled Home Medications:      Medication List        Start taking these medications      levoFLOXacin 750 MG tablet  Commonly known as: LEVAQUIN  Take 1 tablet (750 mg total) by mouth every other  day. for 6 days  Start taking on: February 10, 2024            Continue taking these medications      allopurinoL 300 MG tablet  Commonly known as: ZYLOPRIM  Take one tablet by mouth once daily in combination with 50mg tablets for a daily dose of 350mg     aspirin 81 MG EC tablet  Commonly known as: ECOTRIN  Take 1 tablet (81 mg total) by mouth once daily.     metoprolol succinate 25 MG 24 hr tablet  Commonly known as: TOPROL-XL  Take 1 tablet (25 mg total) by mouth once daily.     oxybutynin 5 MG Tab  Commonly known as: DITROPAN  Take 1 tablet (5 mg total) by mouth 2 (two) times daily.     predniSONE 2.5 MG tablet  Commonly known as: DELTASONE  Take one tablet by mouth once daily              Indwelling Lines/Drains at time of discharge:   Lines/Drains/Airways       None                   Time spent on the discharge of patient: 40 minutes         Zia Archer MD  Department of Hospital Medicine  [facility] S[facility]

## 2024-02-08 NOTE — ASSESSMENT & PLAN NOTE
Chronic, controlled. Latest blood pressure and vitals reviewed-     Temp:  [97.1 °F (36.2 °C)-98.3 °F (36.8 °C)]   Pulse:  [53-75]   Resp:  [16-18]   BP: (137-150)/(77-82)   SpO2:  [92 %-94 %] .   Home meds for hypertension were reviewed and noted below.   Hypertension Medications               metoprolol succinate (TOPROL-XL) 25 MG 24 hr tablet Take 1 tablet (25 mg total) by mouth once daily.          While in the hospital, will manage blood pressure as follows; Continue home antihypertensive regimen (metoprolol only). Currently her losartan 100mg/day and triamterene-hydrochlorothiazide 37.5-25 mg/day are on hold since last hospital admission due to concern for hypotension.     Will utilize p.r.n. blood pressure medication only if patient's blood pressure greater than 180/110 and she develops symptoms such as worsening chest pain or shortness of breath.

## 2024-02-08 NOTE — PROGRESS NOTES
[facility] Medicine Student   Progress Note  [facility]     Admit Date: 2/3/2024  Hospital Day: 4  02/08/2024  9:39 AM     SUBJECTIVE:   Interval history:   2/8 CT Chest repeated yesterday afternoon, showing improved bilateral pleural effusions. Pleural effusions are not large enough to consider another thoracentesis or chest tube. Discussed with her that we would like her to follow up in 4-6 weeks in clinic with Dr. Guerra and to get a repeat CT before her visit. Also discussed that she should use her pulse ox at home and she does not need to use her home oxygen while resting with sats >90. She should continue to wear oxygen during activity and while sleeping. Will reevaluate in clinic. No AFB seen. Will follow up rest of cultures and cytology.      OBJECTIVE:      Vital Signs Recent:  Temp: 97.9 °F (36.6 °C) (02/08/24 1036)  Pulse: 64 (02/08/24 1107)  Resp: 16 (02/08/24 1036)  BP: (!) 143/70 (02/08/24 1036)  SpO2: 96 % (02/08/24 1036)  Oxygen Documentation:     Flow (L/min): 2  Oxygen Concentration (%): 2  Device (Oxygen Therapy): nasal cannula  $ Is the patient on Low Flow Oxygen?: Yes    Vital Signs Range (Last 24H):  Temp:  [97.4 °F (36.3 °C)-98.3 °F (36.8 °C)]   Pulse:  [53-74]   Resp:  [16-18]   BP: (137-150)/(70-82)   SpO2:  [92 %-96 %]   Oxygen Concentration (%): 2 (02/06/24 0900)    I & O (Last 24H):No intake or output data in the 24 hours ending 02/08/24 1216      Physical Exam:  Physical Exam  Vitals and nursing note reviewed.   Constitutional:       Appearance: Normal appearance.   HENT:      Head: Normocephalic.   Eyes:      Extraocular Movements: Extraocular movements intact.      Pupils: Pupils are equal, round, and reactive to light.   Cardiovascular:      Rate and Rhythm: Normal rate and regular rhythm.      Pulses: Normal pulses.      Heart sounds: Normal heart sounds.   Pulmonary:      Effort: Pulmonary effort is normal.      Breath sounds: Examination of the left-middle field reveals  rales. Examination of the left-lower field reveals rales. Rales present.      Comments: 2L NC  Musculoskeletal:         General: Normal range of motion.   Skin:     General: Skin is warm and dry.   Neurological:      General: No focal deficit present.      Mental Status: She is alert and oriented to person, place, and time.   Psychiatric:         Mood and Affect: Mood normal.         Thought Content: Thought content normal.            Labs:         Recent Labs   Lab 02/06/24  0400 02/07/24  0350 02/08/24  0255    139 144   K 4.0 3.9 4.6    105 107   CO2 26 24 26   BUN 25* 20 24*   CREATININE 1.5* 1.2 1.5*   GLU 86 79 89   CALCIUM 9.2 9.1 9.1   MG 2.3 2.3 2.4   PHOS 3.0 2.3* 2.8           Recent Labs   Lab 02/03/24  0048 02/07/24  0350   ALKPHOS 57  --    ALT 22  --    AST 24  --    ALBUMIN 3.1*  --    PROT 7.7 6.8   BILITOT 0.7  --    INR 1.1  --             Recent Labs   Lab 02/06/24  0400 02/07/24  0350 02/08/24  0255   WBC 13.39* 12.53 12.15   HGB 10.9* 11.3* 10.6*   HCT 33.6* 34.8* 33.2*    308 279             Results for orders placed or performed during the hospital encounter of 02/03/24 (from the past 2160 hour(s))   CT Chest Without Contrast     Narrative     EXAMINATION:  CT CHEST WITHOUT CONTRAST     CLINICAL HISTORY:  Pleural effusion, malignancy suspected;     TECHNIQUE:  Volumetric data acquisition of the chest from the lung apices to the adrenals was obtained without intravenous contrast. Sagittal and coronal multiplanar reconstructions were performed. Lack of IV contrast material limits the assessment of mediastinal and abdominal structures.     COMPARISON:  CT chest 02/03/2024     FINDINGS:  Thoracic soft tissue: No significant abnormality.     Aorta: Normal caliber with no aneurysm.     Pulmonary vasculature: No acute abnormality.     Heart: Normal size. Small volume pericardial effusion similar to prior.     Alice/Mediastinum: No pathologic eliot enlargement.     Central airways:  Patent.     Esophagus: No significant abnormality.     Lungs/Pleura: Left upper lobe ground-glass opacity versus coalescing consolidation.  Left upper lobe subsegmental linear atelectasis improved compared to prior.Small volume bilateral pleural effusion more on the left associated with bilateral relaxation atelectasis, improved compared to prior.     Upper abdomen:     Cholecystectomy.  Symmetric moderate bilateral perinephric fat stranding.     Bones:Degenerative changes with no acute abnormality.        Impression     Left upper lobe ground-glass opacity versus coalescing consolidation, suggestive of atelectasis with probable superimposed infectious pneumonia.     Interval improvement of bilateral pleural effusions and left lung volume loss.  Relaxation atelectasis in the bilateral lower lobes.     Stable small volume pericardial effusion.     Additional findings as above.     Electronically signed by resident: Isabel Cuello  Date:                                                02/07/2024  Time:                                                14:19     Electronically signed by:       Brendan Crews  Date:                                                02/07/2024  Time:                                                19:52            Scheduled Meds:   allopurinoL  350 mg Oral Daily    aspirin  81 mg Oral Daily    metoprolol succinate  25 mg Oral Daily    oxybutynin  5 mg Oral BID    piperacillin-tazobactam (Zosyn) IV (PEDS and ADULTS) (extended infusion is not appropriate)  4.5 g Intravenous Q8H    predniSONE  2.5 mg Oral Daily      Continuous Infusions:  PRN Meds:acetaminophen, albuterol-ipratropium, dextrose 10%, dextrose 10%, glucagon (human recombinant), glucose, glucose, naloxone, sodium chloride 0.9%     ASSESSMENT/PLAN:      # L pleural effusion unclear etiology  # Chronic hypoxic respiratory failure  There is evidence that post cardiac injury syndrome can cause an exudative pleural effusion that is usually left  sided, bloody, has high LDH, and is eosinophilic predominate. Given the patient's clinical history of a complicated PVC ablation in January, and the characteristics of the pleural fluid, this is the likely causation. On repeat CT Chest showed improvement in bilateral plural effusions.     Recommendations:  - Follow up with Dr. Guerra in clinic in 4-6 weeks  - Repeat CT Chest before visit  - Counseled patient on home oxygen management, including when to use it. Will reassess in clinic  - Will follow up remaining pleural studies (cultures and cytology)  - Abx per ID  - Patient on 2L NC and breathing comfortably     Pulmonology will sign off. Please let us know if you have any other questions.        Taryn Morales  -[facility]    This note was written in concert with Medical Student Taryn Morales. Addends/changes have been made as deemed necessary.       Ranjith Gordon MD  [facility] Fellow

## 2024-02-08 NOTE — PROGRESS NOTES
[facility] Medicine  Progress Note    Patient Name: Rajesh Mas  MRN: [id number]  Patient Class: IP- Inpatient   Admission Date: 2/3/2024  Length of Stay: 3 days  Attending Physician: Morgan Causey MD  Primary Care Provider: Gail Villarreal MD        Subjective:     Principal Problem:Pleural effusion, left        HPI:  Rajesh Mas is a 73 y.o. female with PMHx of gout, obesity, CKD3, HTN, recent hospitalization for PVC s/p ablation 1/18/24 that was complicated by pericardial effusion with tamponade physiology s/p pericardiocentesis 1/18/24 and development of acute hypoxic respiratory failure from left pleural effusion and acute diastolic dysfunction. The pleural effusion improved with diuresis but she still During this hospital course she also was treated for CAP, placed on diltiazem gtt for development of Afib that resolved. She was discharged home on metoprolol and supplemental oxygen.    She presents to the ED today with her  for acute shortness of breath that began around 7 PM last night. She has been wearing oxygen at 2lpm via NC since last hospital discharge but up until last night had not feeling short of breath. She was walking around her house when the shortness of breath started, barely exerting herself. She also developed soreness/tightness in her bilateral shoulders and mild heaviness in her chest without chest pain. She has had a persistent dry cough for about one week. She also started requiring 3 pillows for sleep a few nights after developing orthopnea. She has poor appetite today. No fevers, chills, leg swelling, nausea, vomiting, palpitations, syncope.     In the ED, she was satting 91% on RA and 95% on 2L NC. Vitals otherwise stable. Labs reveal leukocytosis with WBC 21k, mild anemia with H/H 11.8/36.8. . Troponin 0.017. Flu and covid negative. CXR shows increased size of moderate to large left pleural effusion. Probable small right pleural  effusion. She was started on vancomycin and zosyn and admitted to hospital medicine observation.    Overview/Hospital Course:  No notes on file    Interval History:   No events overnight.  Patient reports improvement of respiratory status after thoracentesis with pulmonology yesterday.  Continue Zosyn.  Infectious Disease consulted.  Discussed case with Cardiology who recommended continued pulmonology, Infectious, and malignancy evaluation.      Review of Systems   Constitutional:  Negative for chills and fever.   HENT:  Negative for congestion and trouble swallowing.    Eyes:  Negative for visual disturbance.   Respiratory:  Negative for cough and shortness of breath.    Cardiovascular:  Negative for chest pain, palpitations and leg swelling.   Gastrointestinal:  Negative for abdominal pain, nausea and vomiting.   Genitourinary:  Negative for dysuria, frequency and urgency.   Musculoskeletal:  Positive for arthralgias (across the shoulders). Negative for gait problem.   Skin:  Negative for rash and wound.   Neurological:  Positive for weakness. Negative for syncope and headaches.   Psychiatric/Behavioral:  Negative for agitation and confusion.      Objective:     Vital Signs (Most Recent):  Temp: 97.4 °F (36.3 °C) (02/07/24 1525)  Pulse: 63 (02/07/24 1525)  Resp: 16 (02/07/24 1525)  BP: (!) 150/79 (02/07/24 1525)  SpO2: (!) 94 % (02/07/24 1525) Vital Signs (24h Range):  Temp:  [97.1 °F (36.2 °C)-98.5 °F (36.9 °C)] 97.4 °F (36.3 °C)  Pulse:  [50-75] 63  Resp:  [16-18] 16  SpO2:  [87 %-94 %] 94 %  BP: (140-160)/(73-82) 150/79     Weight: 75.3 kg (166 lb)  Body mass index is 31.37 kg/m².    Intake/Output Summary (Last 24 hours) at 2/7/2024 1814  Last data filed at 2/7/2024 0923  Gross per 24 hour   Intake 120 ml   Output 750 ml   Net -630 ml         Physical Exam  Constitutional:       General: She is not in acute distress.     Appearance: Normal appearance.   HENT:      Head: Normocephalic and atraumatic.       Mouth/Throat:      Mouth: Mucous membranes are moist.   Eyes:      Extraocular Movements: Extraocular movements intact.      Conjunctiva/sclera: Conjunctivae normal.   Cardiovascular:      Rate and Rhythm: Normal rate and regular rhythm.   Pulmonary:      Effort: Pulmonary effort is normal. No respiratory distress.      Breath sounds: Rales present. No decreased breath sounds (Left) or wheezing.   Abdominal:      General: Abdomen is flat. Bowel sounds are normal.      Palpations: Abdomen is soft.      Tenderness: There is no abdominal tenderness. There is no guarding.   Musculoskeletal:         General: No swelling or tenderness.   Skin:     Findings: No rash.   Neurological:      General: No focal deficit present.      Mental Status: She is alert and oriented to person, place, and time. Mental status is at baseline.   Psychiatric:         Mood and Affect: Mood normal.         Behavior: Behavior normal.             Significant Labs: All pertinent labs within the past 24 hours have been reviewed.  CBC:   Recent Labs   Lab 02/06/24  0400 02/07/24  0350   WBC 13.39* 12.53   HGB 10.9* 11.3*   HCT 33.6* 34.8*    308     CMP:   Recent Labs   Lab 02/06/24  0400 02/07/24  0350    139   K 4.0 3.9    105   CO2 26 24   GLU 86 79   BUN 25* 20   CREATININE 1.5* 1.2   CALCIUM 9.2 9.1   PROT  --  6.8   ANIONGAP 8 10       Significant Imaging: I have reviewed all pertinent imaging results/findings within the past 24 hours.    Assessment/Plan:      * Pleural effusion, left  Patient found to have  moderate to large left  pleural effusion and probably small right pleural effusion on imaging. I have personally reviewed and interpreted the following imaging: Xray. A thoracentesis was deferred. Most likely etiology includes Congestive Heart Failure and Pneumonia. Management to include Diuresis.    Previously treated with diuresis with improvement in effusion. She was not discharged home on diuretics but was discharged  home on supplemental oxygen. She presents with acute worsening SOB.   - Moderate to large effusion noted to have re accumulated on CXR.   - Flu and covid negative.    - Leukocytosis on admission labs. No fever. Procalcitonin 0.12  - S/p vancomycin and zosyn in ED  - CT Chest: Left pleural effusion with volume loss left hemithorax  - TTE 2/3: EF 40%, global hypokinesis, CVP 3, PASP 25, no evidence of significant pericardial effusion    - Pulmonology consulted  -- Repeat CXR 2/5 with continue left sided opacity, but improved right small effusion  -- Thoracentesis on 2/6 with bloody drainage  -- Concern for post ablation a PCIS (postcardiac injury syndrome)   -- Follow up thoracentesis cultures and cytology  -- Infectious disease consulted  -- BCX NGTD  -- Continue Zosyn for HAP coverage     Atrial fibrillation  Patient with Paroxysmal (<7 days) atrial fibrillation which is controlled currently with Beta Blocker. Patient is currently in sinus rhythm.[id number] Score: 2. Anticoagulation not started.     Pt had 8 hour episode of new onset afib during last hospitalization. Was placed on dilt gtt and it resolved. Since only 8 hours, EP did not recommend anticoagulation or rate control. Discharged with 30 day event monitor and EP follow up. She was started on metoprolol at discharge.  - Monitor telemetry   - Continue home metoprolol     Chronic hypoxemic respiratory failure  Patient with Hypoxic Respiratory failure which is Chronic, though only recently started on supplemental oxygen. she is on home oxygen at 2 LPM. Supplemental oxygen was provided and noted-    Signs/symptoms of respiratory failure include- increased work of breathing on exertion. Contributing diagnoses includes - CHF, Pleural effusion, and Pneumonia Labs and images were reviewed. Patient Has not had a recent ABG. Will treat underlying causes and adjust management of respiratory failure as follows- See pleural effusion     History of gout  - Continue home  allopurinol and prednisone.     Pericardial effusion  - Recent history noted tamponade physiology, underwent pericardiocentesis 1/18 and a repeat echo next day showed trivial pericardial effusion.  - TTE 2/3: EF 40%, global hypokinesis, CVP 3, PASP 25, no evidence of significant pericardial effusion     PVCs (premature ventricular contractions)  - S/p RFA on 1/18/24    Stage 3 chronic kidney disease  Creatine stable for now. BMP reviewed- noted Estimated Creatinine Clearance: 36.8 mL/min (based on SCr of 1.3 mg/dL). according to latest data. Based on current GFR, CKD stage is stage 3 - GFR 30-59.  Monitor UOP and serial BMP and adjust therapy as needed. Renally dose meds. Avoid nephrotoxic medications and procedures.    BMI 31.0-31.9,adult  Body mass index is 31.37 kg/m². Morbid obesity complicates all aspects of disease management from diagnostic modalities to treatment. Weight loss encouraged and health benefits explained to patient.      Diastolic dysfunction  During recent hospitalization, pt noted to have pleural effusions and there was concern for acute diastolic dysfunction and a transient decreased EF related to high HR.  - TTE 2/3: 40%, global LV hypokinesis present, RV systolic function is mildly reduced   - Monitor I/Os  - Lasix as above      Essential hypertension  Chronic, controlled. Latest blood pressure and vitals reviewed-     Temp:  [96.1 °F (35.6 °C)-98 °F (36.7 °C)]   Pulse:  [64-82]   Resp:  [17-20]   BP: (121-155)/(58-89)   SpO2:  [91 %-96 %] .   Home meds for hypertension were reviewed and noted below.   Hypertension Medications               metoprolol succinate (TOPROL-XL) 25 MG 24 hr tablet Take 1 tablet (25 mg total) by mouth once daily.          While in the hospital, will manage blood pressure as follows; Continue home antihypertensive regimen (metoprolol only). Currently her losartan 100mg/day and triamterene-hydrochlorothiazide 37.5-25 mg/day are on hold since last hospital admission  due to concern for hypotension.     Will utilize p.r.n. blood pressure medication only if patient's blood pressure greater than 180/110 and she develops symptoms such as worsening chest pain or shortness of breath.      VTE Risk Mitigation (From admission, onward)           Ordered     Reason for No Pharmacological VTE Prophylaxis  Once        Question:  Reasons:  Answer:  Physician Provided (leave comment)    02/03/24 0457     IP VTE HIGH RISK PATIENT  Once         02/03/24 0457     Place sequential compression device  Until discontinued         02/03/24 0457                    Discharge Planning   MARJ: 2/8/2024     Code Status: Full Code   Is the patient medically ready for discharge?: No    Reason for patient still in hospital (select all that apply): Patient trending condition, Laboratory test, Treatment, Consult recommendations, and Pending disposition  Discharge Plan A: Home with family   Discharge Delays: None known at this time              Morgan Causey MD  Department of Hospital Medicine   [facility]

## 2024-02-08 NOTE — SUBJECTIVE & OBJECTIVE
Interval History:   No events overnight.  Patient reports improvement of respiratory status after thoracentesis with pulmonology yesterday.  Continue Zosyn.  Infectious Disease consulted.  Discussed case with Cardiology who recommended continued pulmonology, Infectious, and malignancy evaluation.      Review of Systems   Constitutional:  Negative for chills and fever.   HENT:  Negative for congestion and trouble swallowing.    Eyes:  Negative for visual disturbance.   Respiratory:  Negative for cough and shortness of breath.    Cardiovascular:  Negative for chest pain, palpitations and leg swelling.   Gastrointestinal:  Negative for abdominal pain, nausea and vomiting.   Genitourinary:  Negative for dysuria, frequency and urgency.   Musculoskeletal:  Positive for arthralgias (across the shoulders). Negative for gait problem.   Skin:  Negative for rash and wound.   Neurological:  Positive for weakness. Negative for syncope and headaches.   Psychiatric/Behavioral:  Negative for agitation and confusion.      Objective:     Vital Signs (Most Recent):  Temp: 97.4 °F (36.3 °C) (02/07/24 1525)  Pulse: 63 (02/07/24 1525)  Resp: 16 (02/07/24 1525)  BP: (!) 150/79 (02/07/24 1525)  SpO2: (!) 94 % (02/07/24 1525) Vital Signs (24h Range):  Temp:  [97.1 °F (36.2 °C)-98.5 °F (36.9 °C)] 97.4 °F (36.3 °C)  Pulse:  [50-75] 63  Resp:  [16-18] 16  SpO2:  [87 %-94 %] 94 %  BP: (140-160)/(73-82) 150/79     Weight: 75.3 kg (166 lb)  Body mass index is 31.37 kg/m².    Intake/Output Summary (Last 24 hours) at 2/7/2024 1814  Last data filed at 2/7/2024 0923  Gross per 24 hour   Intake 120 ml   Output 750 ml   Net -630 ml         Physical Exam  Constitutional:       General: She is not in acute distress.     Appearance: Normal appearance.   HENT:      Head: Normocephalic and atraumatic.      Mouth/Throat:      Mouth: Mucous membranes are moist.   Eyes:      Extraocular Movements: Extraocular movements intact.      Conjunctiva/sclera:  Conjunctivae normal.   Cardiovascular:      Rate and Rhythm: Normal rate and regular rhythm.   Pulmonary:      Effort: Pulmonary effort is normal. No respiratory distress.      Breath sounds: Rales present. No decreased breath sounds (Left) or wheezing.   Abdominal:      General: Abdomen is flat. Bowel sounds are normal.      Palpations: Abdomen is soft.      Tenderness: There is no abdominal tenderness. There is no guarding.   Musculoskeletal:         General: No swelling or tenderness.   Skin:     Findings: No rash.   Neurological:      General: No focal deficit present.      Mental Status: She is alert and oriented to person, place, and time. Mental status is at baseline.   Psychiatric:         Mood and Affect: Mood normal.         Behavior: Behavior normal.             Significant Labs: All pertinent labs within the past 24 hours have been reviewed.  CBC:   Recent Labs   Lab 02/06/24  0400 02/07/24  0350   WBC 13.39* 12.53   HGB 10.9* 11.3*   HCT 33.6* 34.8*    308     CMP:   Recent Labs   Lab 02/06/24  0400 02/07/24  0350    139   K 4.0 3.9    105   CO2 26 24   GLU 86 79   BUN 25* 20   CREATININE 1.5* 1.2   CALCIUM 9.2 9.1   PROT  --  6.8   ANIONGAP 8 10       Significant Imaging: I have reviewed all pertinent imaging results/findings within the past 24 hours.

## 2024-02-08 NOTE — ASSESSMENT & PLAN NOTE
Patient found to have  moderate to large left  pleural effusion and probably small right pleural effusion on imaging. I have personally reviewed and interpreted the following imaging: Xray. A thoracentesis was deferred. Most likely etiology includes Congestive Heart Failure and Pneumonia. Management to include Diuresis.    Previously treated with diuresis with improvement in effusion. She was not discharged home on diuretics but was discharged home on supplemental oxygen. She presents with acute worsening SOB.   - Moderate to large effusion noted to have re accumulated on CXR.   - Flu and covid negative.    - Leukocytosis on admission labs. No fever. Procalcitonin 0.12  - S/p vancomycin and zosyn in ED  - CT Chest: Left pleural effusion with volume loss left hemithorax  - TTE 2/3: EF 40%, global hypokinesis, CVP 3, PASP 25, no evidence of significant pericardial effusion    - Pulmonology consulted  -- Repeat CXR 2/5 with continue left sided opacity, but improved right small effusion  -- Thoracentesis on 2/6 with bloody drainage  -- Concern for post ablation a PCIS (postcardiac injury syndrome)   -- Follow up thoracentesis cultures and cytology  -- Infectious disease consulted  -- BCX NGTD  -- Continue Zosyn for HAP coverage   2/8.  Diagnostic / therapeutic thoracentesis completed 02/06; drained 800mL sanguineous fluid. Samples sent for cytology, analysis, and cx. Studies  exudative effusion. Continue empiric Iv-zosyn - started 02/03. pleural fluid cxs and cytology in process   repeat chest-CT - Left upper lobe ground-glass opacity versus coalescing consolidation, suggestive of atelectasis with probable superimposed infectious pneumonia.Interval improvement of bilateral pleural effusions and left lung volume loss.  Relaxation atelectasis in the bilateral lower lobes.Stable small volume pericardial effusion.

## 2024-02-08 NOTE — MEDICAL/APP STUDENT
[facility] Medicine Student   Progress Note  [facility] HOSP MED D    Admit Date: 2/3/2024  Hospital Day: 4  02/08/2024  9:39 AM    SUBJECTIVE:   Interval history:   2/8 CT Chest repeated yesterday afternoon, showing improved bilateral pleural effusions. Pleural effusions are not large enough to consider another thoracentesis or chest tube. Discussed with her that we would like her to follow up in 4-6 weeks in clinic with Dr. Guerra and to get a repeat CT before her visit. Also discussed that she should use her pulse ox at home and she does not need to use her home oxygen while resting with sats >90. She should continue to wear oxygen during activity and while sleeping. Will reevaluate in clinic. No AFB seen. Will follow up rest of cultures and cytology.     OBJECTIVE:     Vital Signs Recent:  Temp: 97.9 °F (36.6 °C) (02/08/24 1036)  Pulse: 64 (02/08/24 1107)  Resp: 16 (02/08/24 1036)  BP: (!) 143/70 (02/08/24 1036)  SpO2: 96 % (02/08/24 1036)  Oxygen Documentation:    Flow (L/min): 2  Oxygen Concentration (%): 2        Device (Oxygen Therapy): nasal cannula  $ Is the patient on Low Flow Oxygen?: Yes      Vital Signs Range (Last 24H):  Temp:  [97.4 °F (36.3 °C)-98.3 °F (36.8 °C)]   Pulse:  [53-74]   Resp:  [16-18]   BP: (137-150)/(70-82)   SpO2:  [92 %-96 %]   Oxygen Concentration (%): 2 (02/06/24 0900)    I & O (Last 24H):No intake or output data in the 24 hours ending 02/08/24 1216     Physical Exam:  Physical Exam  Vitals and nursing note reviewed.   Constitutional:       Appearance: Normal appearance.   HENT:      Head: Normocephalic.   Eyes:      Extraocular Movements: Extraocular movements intact.      Pupils: Pupils are equal, round, and reactive to light.   Cardiovascular:      Rate and Rhythm: Normal rate and regular rhythm.      Pulses: Normal pulses.      Heart sounds: Normal heart sounds.   Pulmonary:      Effort: Pulmonary effort is normal.      Breath sounds: Examination of the left-middle field reveals  rales. Examination of the left-lower field reveals rales. Rales present.      Comments: 2L NC  Musculoskeletal:         General: Normal range of motion.   Skin:     General: Skin is warm and dry.   Neurological:      General: No focal deficit present.      Mental Status: She is alert and oriented to person, place, and time.   Psychiatric:         Mood and Affect: Mood normal.         Thought Content: Thought content normal.         Labs:   Recent Labs   Lab 02/06/24  0400 02/07/24  0350 02/08/24  0255    139 144   K 4.0 3.9 4.6    105 107   CO2 26 24 26   BUN 25* 20 24*   CREATININE 1.5* 1.2 1.5*   GLU 86 79 89   CALCIUM 9.2 9.1 9.1   MG 2.3 2.3 2.4   PHOS 3.0 2.3* 2.8     Recent Labs   Lab 02/03/24  0048 02/07/24  0350   ALKPHOS 57  --    ALT 22  --    AST 24  --    ALBUMIN 3.1*  --    PROT 7.7 6.8   BILITOT 0.7  --    INR 1.1  --      Recent Labs   Lab 02/06/24  0400 02/07/24  0350 02/08/24  0255   WBC 13.39* 12.53 12.15   HGB 10.9* 11.3* 10.6*   HCT 33.6* 34.8* 33.2*    308 279       Results for orders placed or performed during the hospital encounter of 02/03/24 (from the past 2160 hour(s))   CT Chest Without Contrast    Narrative    EXAMINATION:  CT CHEST WITHOUT CONTRAST    CLINICAL HISTORY:  Pleural effusion, malignancy suspected;    TECHNIQUE:  Volumetric data acquisition of the chest from the lung apices to the adrenals was obtained without intravenous contrast. Sagittal and coronal multiplanar reconstructions were performed. Lack of IV contrast material limits the assessment of mediastinal and abdominal structures.    COMPARISON:  CT chest 02/03/2024    FINDINGS:  Thoracic soft tissue: No significant abnormality.    Aorta: Normal caliber with no aneurysm.    Pulmonary vasculature: No acute abnormality.    Heart: Normal size. Small volume pericardial effusion similar to prior.    Alice/Mediastinum: No pathologic eliot enlargement.    Central airways: Patent.    Esophagus: No significant  abnormality.    Lungs/Pleura: Left upper lobe ground-glass opacity versus coalescing consolidation.  Left upper lobe subsegmental linear atelectasis improved compared to prior.Small volume bilateral pleural effusion more on the left associated with bilateral relaxation atelectasis, improved compared to prior.    Upper abdomen:    Cholecystectomy.  Symmetric moderate bilateral perinephric fat stranding.    Bones:Degenerative changes with no acute abnormality.      Impression    Left upper lobe ground-glass opacity versus coalescing consolidation, suggestive of atelectasis with probable superimposed infectious pneumonia.    Interval improvement of bilateral pleural effusions and left lung volume loss.  Relaxation atelectasis in the bilateral lower lobes.    Stable small volume pericardial effusion.    Additional findings as above.    Electronically signed by resident: Isabel Cuello  Date:    02/07/2024  Time:    14:19    Electronically signed by: Brendan Crews  Date:    02/07/2024  Time:    19:52         Scheduled Meds:   allopurinoL  350 mg Oral Daily    aspirin  81 mg Oral Daily    metoprolol succinate  25 mg Oral Daily    oxybutynin  5 mg Oral BID    piperacillin-tazobactam (Zosyn) IV (PEDS and ADULTS) (extended infusion is not appropriate)  4.5 g Intravenous Q8H    predniSONE  2.5 mg Oral Daily     Continuous Infusions:  PRN Meds:acetaminophen, albuterol-ipratropium, dextrose 10%, dextrose 10%, glucagon (human recombinant), glucose, glucose, naloxone, sodium chloride 0.9%    ASSESSMENT/PLAN:     # L pleural effusion unclear etiology  # Chronic hypoxic respiratory failure  There is evidence that post cardiac injury syndrome can cause an exudative pleural effusion that is usually left sided, bloody, has high LDH, and is eosinophilic predominate. Given the patient's clinical history of a complicated PVC ablation in January, and the characteristics of the pleural fluid, this is the likely causation. On repeat CT Chest  showed improvement in bilateral plural effusions.     Recommendations:  - Follow up with Dr. Guerra in clinic in 4-6 weeks  - Repeat CT Chest before visit  - Counseled patient on home oxygen management, including when to use it. Will reassess in clinic  - Will follow up remaining pleural studies (cultures and cytology)  - Abx per ID  - Patient on 2L NC and breathing comfortably     Pulmonology will sign off. Please let us know if you have any other questions.      Taryn [facility] MS4

## 2024-02-09 VITALS
HEIGHT: 61 IN | OXYGEN SATURATION: 92 % | RESPIRATION RATE: 18 BRPM | SYSTOLIC BLOOD PRESSURE: 145 MMHG | HEART RATE: 64 BPM | WEIGHT: 166 LBS | BODY MASS INDEX: 31.34 KG/M2 | DIASTOLIC BLOOD PRESSURE: 80 MMHG | TEMPERATURE: 99 F

## 2024-02-09 LAB
ANION GAP SERPL CALC-SCNC: 7 MMOL/L (ref 8–16)
BASOPHILS # BLD AUTO: 0.11 K/UL (ref 0–0.2)
BASOPHILS NFR BLD: 0.9 % (ref 0–1.9)
BUN SERPL-MCNC: 20 MG/DL (ref 8–23)
CALCIUM SERPL-MCNC: 8.6 MG/DL (ref 8.7–10.5)
CHLORIDE SERPL-SCNC: 107 MMOL/L (ref 95–110)
CO2 SERPL-SCNC: 26 MMOL/L (ref 23–29)
CREAT SERPL-MCNC: 1.2 MG/DL (ref 0.5–1.4)
DIFFERENTIAL METHOD BLD: ABNORMAL
EOSINOPHIL # BLD AUTO: 0.3 K/UL (ref 0–0.5)
EOSINOPHIL NFR BLD: 2.8 % (ref 0–8)
ERYTHROCYTE [DISTWIDTH] IN BLOOD BY AUTOMATED COUNT: 13.3 % (ref 11.5–14.5)
EST. GFR  (NO RACE VARIABLE): 48.4 ML/MIN/1.73 M^2
FINAL PATHOLOGIC DIAGNOSIS: NORMAL
GLUCOSE SERPL-MCNC: 79 MG/DL (ref 70–110)
HCT VFR BLD AUTO: 33.5 % (ref 37–48.5)
HGB BLD-MCNC: 10.7 G/DL (ref 12–16)
IMM GRANULOCYTES # BLD AUTO: 0.04 K/UL (ref 0–0.04)
IMM GRANULOCYTES NFR BLD AUTO: 0.3 % (ref 0–0.5)
LYMPHOCYTES # BLD AUTO: 2.3 K/UL (ref 1–4.8)
LYMPHOCYTES NFR BLD: 18.7 % (ref 18–48)
Lab: NORMAL
MAGNESIUM SERPL-MCNC: 2.3 MG/DL (ref 1.6–2.6)
MCH RBC QN AUTO: 29.7 PG (ref 27–31)
MCHC RBC AUTO-ENTMCNC: 31.9 G/DL (ref 32–36)
MCV RBC AUTO: 93 FL (ref 82–98)
MONOCYTES # BLD AUTO: 1 K/UL (ref 0.3–1)
MONOCYTES NFR BLD: 8.1 % (ref 4–15)
NEUTROPHILS # BLD AUTO: 8.3 K/UL (ref 1.8–7.7)
NEUTROPHILS NFR BLD: 69.2 % (ref 38–73)
NRBC BLD-RTO: 0 /100 WBC
PHOSPHATE SERPL-MCNC: 2.5 MG/DL (ref 2.7–4.5)
PLATELET # BLD AUTO: 246 K/UL (ref 150–450)
PMV BLD AUTO: 12.8 FL (ref 9.2–12.9)
POTASSIUM SERPL-SCNC: 3.3 MMOL/L (ref 3.5–5.1)
RBC # BLD AUTO: 3.6 M/UL (ref 4–5.4)
SODIUM SERPL-SCNC: 140 MMOL/L (ref 136–145)
WBC # BLD AUTO: 12.07 K/UL (ref 3.9–12.7)

## 2024-02-09 PROCEDURE — 63600175 PHARM REV CODE 636 W HCPCS: Performed by: STUDENT IN AN ORGANIZED HEALTH CARE EDUCATION/TRAINING PROGRAM

## 2024-02-09 PROCEDURE — 63600175 PHARM REV CODE 636 W HCPCS

## 2024-02-09 PROCEDURE — 84100 ASSAY OF PHOSPHORUS: CPT | Performed by: STUDENT IN AN ORGANIZED HEALTH CARE EDUCATION/TRAINING PROGRAM

## 2024-02-09 PROCEDURE — 25000003 PHARM REV CODE 250

## 2024-02-09 PROCEDURE — 80048 BASIC METABOLIC PNL TOTAL CA: CPT | Performed by: STUDENT IN AN ORGANIZED HEALTH CARE EDUCATION/TRAINING PROGRAM

## 2024-02-09 PROCEDURE — 85025 COMPLETE CBC W/AUTO DIFF WBC: CPT | Performed by: STUDENT IN AN ORGANIZED HEALTH CARE EDUCATION/TRAINING PROGRAM

## 2024-02-09 PROCEDURE — 83735 ASSAY OF MAGNESIUM: CPT | Performed by: STUDENT IN AN ORGANIZED HEALTH CARE EDUCATION/TRAINING PROGRAM

## 2024-02-09 PROCEDURE — 25000003 PHARM REV CODE 250: Performed by: STUDENT IN AN ORGANIZED HEALTH CARE EDUCATION/TRAINING PROGRAM

## 2024-02-09 PROCEDURE — 36415 COLL VENOUS BLD VENIPUNCTURE: CPT | Performed by: STUDENT IN AN ORGANIZED HEALTH CARE EDUCATION/TRAINING PROGRAM

## 2024-02-09 RX ORDER — LEVOFLOXACIN 750 MG/1
750 TABLET ORAL EVERY OTHER DAY
Qty: 3 TABLET | Refills: 0 | Status: SHIPPED | OUTPATIENT
Start: 2024-02-10 | End: 2024-02-19

## 2024-02-09 RX ADMIN — PREDNISONE 2.5 MG: 2.5 TABLET ORAL at 10:02

## 2024-02-09 RX ADMIN — OXYBUTYNIN CHLORIDE 5 MG: 5 TABLET ORAL at 10:02

## 2024-02-09 RX ADMIN — PIPERACILLIN SODIUM AND TAZOBACTAM SODIUM 4.5 G: 4; .5 INJECTION, POWDER, FOR SOLUTION INTRAVENOUS at 04:02

## 2024-02-09 RX ADMIN — ASPIRIN 81 MG: 81 TABLET, COATED ORAL at 10:02

## 2024-02-09 RX ADMIN — METOPROLOL SUCCINATE 25 MG: 25 TABLET, EXTENDED RELEASE ORAL at 10:02

## 2024-02-09 RX ADMIN — ALLOPURINOL 350 MG: 300 TABLET ORAL at 10:02

## 2024-02-09 NOTE — NURSING
Patient Discharged. Discharge instructions explained, verbalized understanding. IV removed, catheter tip intact. Patient waiting on medication and transportation. Will continue to monitor patient.

## 2024-02-09 NOTE — SUBJECTIVE & OBJECTIVE
Interval History: Remains AF, VSS, Hds. O2 sats stable 94% on NC 2L. Improving on Iv-zosyn. Repeat ct-chest shows improved b/L small pleural effusions; but with new JOSE ALBERTO GGO vs. Consolidation c/f PNA.  Sputum cx pending collection.     Review of Systems   Constitutional:  Positive for fatigue. Negative for chills, diaphoresis and fever.   HENT:  Negative for congestion, rhinorrhea, sore throat and trouble swallowing.    Eyes:  Negative for pain.   Respiratory:  Positive for cough, chest tightness and shortness of breath.    Cardiovascular:  Negative for chest pain and leg swelling.   Gastrointestinal:  Negative for abdominal distention, abdominal pain, diarrhea, nausea and vomiting.   Genitourinary:  Negative for dysuria and flank pain.   Musculoskeletal:  Negative for arthralgias, back pain and joint swelling.   Skin:  Negative for color change, rash and wound.   Neurological:  Negative for seizures and syncope.   Psychiatric/Behavioral:  Negative for behavioral problems and confusion.      Objective:     Vital Signs (Most Recent):  Temp: 97.5 °F (36.4 °C) (02/08/24 1638)  Pulse: 65 (02/08/24 1638)  Resp: 16 (02/08/24 1638)  BP: (!) 142/75 (02/08/24 1638)  SpO2: (!) 94 % (02/08/24 1638) Vital Signs (24h Range):  Temp:  [97.5 °F (36.4 °C)-98.3 °F (36.8 °C)] 97.5 °F (36.4 °C)  Pulse:  [] 65  Resp:  [16-18] 16  SpO2:  [92 %-96 %] 94 %  BP: (137-150)/(70-82) 142/75     Weight: 75.3 kg (166 lb)  Body mass index is 31.37 kg/m².    Estimated Creatinine Clearance: 31.9 mL/min (A) (based on SCr of 1.5 mg/dL (H)).     Physical Exam  Vitals and nursing note reviewed.   Constitutional:       General: She is not in acute distress.     Appearance: She is not toxic-appearing.   HENT:      Nose: No congestion.      Mouth/Throat:      Pharynx: Oropharynx is clear.   Eyes:      General: No scleral icterus.     Conjunctiva/sclera: Conjunctivae normal.   Cardiovascular:      Rate and Rhythm: Normal rate.      Pulses: Normal  pulses.      Heart sounds: Normal heart sounds.   Pulmonary:      Effort: Pulmonary effort is normal. No respiratory distress.      Breath sounds: Rales present.      Comments: Course lung sounds mid lobes b/L (L>R), with reduced lung sounds lower lobes b/L (L>R).  Abdominal:      General: Abdomen is flat. Bowel sounds are normal. There is no distension.      Palpations: Abdomen is soft.      Tenderness: There is no abdominal tenderness.   Musculoskeletal:         General: No swelling or tenderness. Normal range of motion.      Cervical back: No rigidity.      Right lower leg: No edema.      Left lower leg: No edema.   Skin:     General: Skin is warm.      Coloration: Skin is not jaundiced.      Findings: No erythema or rash.   Neurological:      Mental Status: She is alert and oriented to person, place, and time. Mental status is at baseline.   Psychiatric:         Mood and Affect: Mood normal.         Behavior: Behavior normal.         Thought Content: Thought content normal.          Significant Labs: Blood Culture:   Recent Labs   Lab 02/03/24 0336   LABBLOO No growth after 5 days.  No growth after 5 days.     CBC:   Recent Labs   Lab 02/07/24  0350 02/08/24  0255   WBC 12.53 12.15   HGB 11.3* 10.6*   HCT 34.8* 33.2*    279     CMP:   Recent Labs   Lab 02/07/24  0350 02/08/24  0255    144   K 3.9 4.6    107   CO2 24 26   GLU 79 89   BUN 20 24*   CREATININE 1.2 1.5*   CALCIUM 9.1 9.1   PROT 6.8  --    ANIONGAP 10 11     Microbiology Results (last 7 days)       Procedure Component Value Units Date/Time    Culture, Respiratory with Gram Stain [[id number]]     Order Status: No result Specimen: Respiratory     Blood Culture #1 **CANNOT BE ORDERED STAT** [[id number]] Collected: 02/03/24 0336    Order Status: Completed Specimen: Blood from Peripheral, Hand, Right Updated: 02/08/24 0612     Blood Culture, Routine No growth after 5 days.    Blood Culture #2 **CANNOT BE ORDERED STAT** [[id number]]  Collected: 02/03/24 0336    Order Status: Completed Specimen: Blood from Peripheral, Hand, Left Updated: 02/08/24 0612     Blood Culture, Routine No growth after 5 days.    Culture, Body Fluid - Bactec [[id number]] Collected: 02/06/24 1714    Order Status: Completed Specimen: Body Fluid from Pleural Fluid Updated: 02/07/24 2212     Body Fluid Culture, Sterile No Growth to date      No Growth to date    AFB culture (includes stain) [[id number]] Collected: 02/06/24 1714    Order Status: Completed Specimen: Body Fluid from Pleural Fluid Updated: 02/07/24 2127     AFB Culture & Smear Culture in progress     AFB CULTURE STAIN No acid fast bacilli seen.    Fungus culture [[id number]] Collected: 02/06/24 1714    Order Status: Completed Specimen: Body Fluid from Pleural Fluid Updated: 02/07/24 0930     Fungus (Mycology) Culture Culture in progress    Influenza A & B by Molecular [[id number]] Collected: 02/03/24 0204    Order Status: Completed Specimen: Nasopharyngeal Swab Updated: 02/03/24 0251     Influenza A, Molecular Negative     Influenza B, Molecular Negative     Flu A & B Source Nasal swab          Respiratory Culture:   Recent Labs   Lab 01/20/24  1445   GSRESP <10 epithelial cells per low power field.  Few WBC's  Many Gram positive cocci  Many Gram negative rods  Rare Gram positive rods   RESPIRATORYC Normal respiratory emmanuel  No S aureus or Pseudomonas isolated.     All pertinent labs within the past 24 hours have been reviewed.    Significant Imaging: I have reviewed all pertinent imaging results/findings within the past 24 hours.    I have personally reviewed records / hospital notes from   service and other specialty providers. I have also reviewed CBC, CMP/BMP,  cultures and imaging with my interpretation as documented in my assessment / plan.    Patient is high risk for infectious complications given pt's age, multiple co-morbidities, and case complexity.      Time: 50 minutes   50% of time spent on  face-to-face counseling and coordination of care. Counseling included review of test results, diagnosis, and treatment plan with patient and/or family.

## 2024-02-09 NOTE — PLAN OF CARE
Problem: Adult Inpatient Plan of Care  Goal: Plan of Care Review  Outcome: Met  Goal: Patient-Specific Goal (Individualized)  Outcome: Met  Goal: Absence of Hospital-Acquired Illness or Injury  Outcome: Met  Goal: Optimal Comfort and Wellbeing  Outcome: Met  Goal: Readiness for Transition of Care  Outcome: Met     Problem: Infection  Goal: Absence of Infection Signs and Symptoms  Outcome: Met     Problem: Skin Injury Risk Increased  Goal: Skin Health and Integrity  Outcome: Met     Problem: Pain Acute  Goal: Acceptable Pain Control and Functional Ability  Outcome: Met     Problem: Gas Exchange Impaired  Goal: Optimal Gas Exchange  Outcome: Met     Problem: Airway Clearance Ineffective  Goal: Effective Airway Clearance  Outcome: Met

## 2024-02-09 NOTE — PROGRESS NOTES
[facility] - Med Surg  Infectious Disease  Progress Note    Patient Name: Rajesh Mas  MRN: [id number]  Admission Date: 2/3/2024  Length of Stay: 4 days  Attending Physician: Zia Archer MD  Primary Care Provider: Gail Villarreal MD    Isolation Status: No active isolations  Assessment/Plan:      Pulmonary  * Pleural effusion, left  I have reviewed hospital notes from   service and other specialty providers. I have also reviewed CBC, CMP/BMP,  cultures and imaging with my interpretation as documented.      73F with HTN, BMI 31, CKD3, gout, and recent hospitalization for an ablation for PVC (01/18/24) that was complicated by pericardial effusion with tamponade physiology s/p pericardiocentesis 01/18/24 and acute hypoxic respiratory failure, which was attributed to a L pleural effusion and acute diastolic dysfunction. She was diuresed with some improvement in the effusion and also completed CAP tx while inpatient with Azithro / CTX 3-5d (ended 01/23). Pt discharged with supplied home O2 NC, but no outpt abx per chart review.     -- Now pt re-admitted (02/03) for 1wk onset of new / worsening productive cough with SOB, JONES, and orthopnea while continuing her home O2 (2L) NC. She denies fever, chills, sweats, or known sick contacts. Pt arrived AF, VSS, HDS, wbc 21, Hg 11.8, procalc 0.12, .  Flu / Covid-19 negative. Bld cxs (02/03) NGTD. Started on empiric Iv-vanc / zosyn; and per ID, rec'd stopping vanc 02/05 but no prior ID consult notes seen. Pt maintained on Iv-zosyn.    Ct-chest (02/03): left pleural effusion with volume loss and a moderate right effusion as well as a small pericardial effusion.    Diagnostic / therapeutic thoracentesis completed 02/06; drained 800mL sanguineous fluid. Samples sent for cytology, analysis, and cx. Studies thus far c/w exudative effusion.   -- Pleural fluid cx (02/06) NGTD (however had received 2-3d vanc / zosyn prior; potentially reducing yield) .  Pleural  fluid: , Protein 4.6; wbc 1500 (60% Lymph, 30% mono, 7% segs).  Cytology of pleural fluid still in-process. Per Pulm; suspect pleural effusion d/t post-cardiac injury syndrome > infectious empyema.    CXR after procedure showed improvement in the L pleural effusion and no ptx; and bedside U/S (02/07) some fluid persists but markedly improved.  Repeat chest-CT (02/07) showed improvement of B/L pleural effusions; but with new JOSE ALBERTO GGO vs. Consolidation c/f PNA.  -- Pt improving on Iv-zosyn (started 02/03); remains AF, VSS, wbc normalized; and pt reports feeling improved s/p thoracentesis (02/06). Pt breathing comfortably on 2L NC (o2 sats 92-94%, stable)  --  Though, pt still reports a productive cough, pulm will send sputum sample for culture.  ID consulted for abx recs of exudative sanguienous pleural effusion.   -- Pulm plans to f/u with pt in 4-6wks, with repeat imaging     Recommendations / Plan:  Continue empiric Iv-zosyn (started 02/03). Tomorrow, may transition to oral Levo 750mg Q48h for 6d; THANH 02/15/24 -- for PNA (HCAP coverage). [Qtc 477 on EKG 02/05].  Will f/u pleural fluid cytology in process  Will f/u tentative sputum cxs (to be collected). If sputum cxs speciate organism (after hosp d/c) that is not susc to Levo, please secure chat ID. Tnk you.   Pt to f/u with pulm in 4-6wks, with repeat imaging prior to.      -- ID will schedule pt for ID clinic f/u appt   -- Discussed with ID staff and primary team.  -- ID will sign off at this time.           Thank you for your consult. I will sign off. Please contact us if you have any additional questions.    Santi Neves PA-C  Infectious Disease  [facility] - Med Surg    Subjective:     Principal Problem:Pleural effusion, left    HPI: 73F with HTN, BMI 31, CKD3, gout, and recent hospitalization for an ablation for PVC (01/18/24) which was c/b pericardial effusion with tamponade physiology, s/p pericardiocentesis 01/18/24, and acute hypoxic respiratory  failure, which was attributed to a L pleural effusion and acute diastolic dysfunction. She was diuresed with some improvement in the effusion and also completed inpatient CAP tx with Azithro / CTX 3-5d (ended 01/23). She acutely developed Afib that resolved with diltiazem and was discharged 01/23 on 2L home O2; and without diuretics or oral abx per chart review.     -- Now pt re-admitted (02/03) for resp sxs for past 1wk, c/o productive cough, with SOB, JONES, and new onset of orthopnea while continuing her home O2 (2L) NC. She denies fever, chills, sweats, or known sick contacts.  Pt arrived AF, VSS, HDS, wbc 21, Hg 11.8, procalc 0.12, .  Flu / Covid-19 negative. Bld cxs (02/03) NGTD. Started on empiric Iv-vanc / zosyn; and per ID, rec'd stopping vanc 02/05 but no prior ID consult notes seen. Pt maintained on Iv-zosyn.    Ct-chest (02/03): left pleural effusion with volume loss and a moderate right effusion as well as a small pericardial effusion.    Pulmonology was consulted for worsened L pleural effusion that persisted despite diuresis.     Diagnostic / therapeutic Thoracentesis completed 02/06; drained 800mL sanguineous fluid. samples sent for cytology, analysis, and cx. Studies thus far c/w exudative effusion. Pleural fluid cx (02/06) NGTD.  Pleural fluid: , Protein 4.6; wbc 1500 (60% Lymph, 30% mono, 7% segs).  Cytology of pleural fluid still in-process.    CXR after procedure showed improvement in the L pleural effusion and no ptx.   On bedside U/S (02/07) some fluid persists but markedly improved. Repeat chest-CT (02/07) pending.   -- Pending cytology and repeat chest-CT; pulm considering thoracostomy tube placement vs. outpt serial imaging.     Patient reports relief from the procedure and feels her breathing is better. Patient breathing comfortably on 2L NC.  She still reports a productive cough, pulm will send for sputum culture.    Per pulm eval; Given onset post ablation a PCIS  (postcardiac injury syndrome) remains most likely etiology; but lower suspicion for infectious or malignant process.     ID consulted for abx recs of suspected infectious exudative pleural effusion.   While maintained on Iv-zosyn since 02/03, pt remains AF, VSS, HDS, wbc normalized (down from 21 index), O2 sats 92-94% on NC 2L.     Interval History: Remains AF, VSS, Hds. O2 sats stable 94% on NC 2L. Improving on Iv-zosyn. Repeat ct-chest shows improved b/L small pleural effusions; but with new JOSE ALBERTO GGO vs. Consolidation c/f PNA.  Sputum cx pending collection.     Review of Systems   Constitutional:  Positive for fatigue. Negative for chills, diaphoresis and fever.   HENT:  Negative for congestion, rhinorrhea, sore throat and trouble swallowing.    Eyes:  Negative for pain.   Respiratory:  Positive for cough, chest tightness and shortness of breath.    Cardiovascular:  Negative for chest pain and leg swelling.   Gastrointestinal:  Negative for abdominal distention, abdominal pain, diarrhea, nausea and vomiting.   Genitourinary:  Negative for dysuria and flank pain.   Musculoskeletal:  Negative for arthralgias, back pain and joint swelling.   Skin:  Negative for color change, rash and wound.   Neurological:  Negative for seizures and syncope.   Psychiatric/Behavioral:  Negative for behavioral problems and confusion.      Objective:     Vital Signs (Most Recent):  Temp: 97.5 °F (36.4 °C) (02/08/24 1638)  Pulse: 65 (02/08/24 1638)  Resp: 16 (02/08/24 1638)  BP: (!) 142/75 (02/08/24 1638)  SpO2: (!) 94 % (02/08/24 1638) Vital Signs (24h Range):  Temp:  [97.5 °F (36.4 °C)-98.3 °F (36.8 °C)] 97.5 °F (36.4 °C)  Pulse:  [] 65  Resp:  [16-18] 16  SpO2:  [92 %-96 %] 94 %  BP: (137-150)/(70-82) 142/75     Weight: 75.3 kg (166 lb)  Body mass index is 31.37 kg/m².    Estimated Creatinine Clearance: 31.9 mL/min (A) (based on SCr of 1.5 mg/dL (H)).     Physical Exam  Vitals and nursing note reviewed.   Constitutional:        General: She is not in acute distress.     Appearance: She is not toxic-appearing.   HENT:      Nose: No congestion.      Mouth/Throat:      Pharynx: Oropharynx is clear.   Eyes:      General: No scleral icterus.     Conjunctiva/sclera: Conjunctivae normal.   Cardiovascular:      Rate and Rhythm: Normal rate.      Pulses: Normal pulses.      Heart sounds: Normal heart sounds.   Pulmonary:      Effort: Pulmonary effort is normal. No respiratory distress.      Breath sounds: Rales present.      Comments: Course lung sounds mid lobes b/L (L>R), with reduced lung sounds lower lobes b/L (L>R).  Abdominal:      General: Abdomen is flat. Bowel sounds are normal. There is no distension.      Palpations: Abdomen is soft.      Tenderness: There is no abdominal tenderness.   Musculoskeletal:         General: No swelling or tenderness. Normal range of motion.      Cervical back: No rigidity.      Right lower leg: No edema.      Left lower leg: No edema.   Skin:     General: Skin is warm.      Coloration: Skin is not jaundiced.      Findings: No erythema or rash.   Neurological:      Mental Status: She is alert and oriented to person, place, and time. Mental status is at baseline.   Psychiatric:         Mood and Affect: Mood normal.         Behavior: Behavior normal.         Thought Content: Thought content normal.          Significant Labs: Blood Culture:   Recent Labs   Lab 02/03/24  0336   LABBLOO No growth after 5 days.  No growth after 5 days.     CBC:   Recent Labs   Lab 02/07/24  0350 02/08/24  0255   WBC 12.53 12.15   HGB 11.3* 10.6*   HCT 34.8* 33.2*    279     CMP:   Recent Labs   Lab 02/07/24  0350 02/08/24  0255    144   K 3.9 4.6    107   CO2 24 26   GLU 79 89   BUN 20 24*   CREATININE 1.2 1.5*   CALCIUM 9.1 9.1   PROT 6.8  --    ANIONGAP 10 11     Microbiology Results (last 7 days)       Procedure Component Value Units Date/Time    Culture, Respiratory with Gram Stain [[id number]]     Order  Status: No result Specimen: Respiratory     Blood Culture #1 **CANNOT BE ORDERED STAT** [[id number]] Collected: 02/03/24 0336    Order Status: Completed Specimen: Blood from Peripheral, Hand, Right Updated: 02/08/24 0612     Blood Culture, Routine No growth after 5 days.    Blood Culture #2 **CANNOT BE ORDERED STAT** [[id number]] Collected: 02/03/24 0336    Order Status: Completed Specimen: Blood from Peripheral, Hand, Left Updated: 02/08/24 0612     Blood Culture, Routine No growth after 5 days.    Culture, Body Fluid - Bactec [[id number]] Collected: 02/06/24 1714    Order Status: Completed Specimen: Body Fluid from Pleural Fluid Updated: 02/07/24 2212     Body Fluid Culture, Sterile No Growth to date      No Growth to date    AFB culture (includes stain) [[id number]] Collected: 02/06/24 1714    Order Status: Completed Specimen: Body Fluid from Pleural Fluid Updated: 02/07/24 2127     AFB Culture & Smear Culture in progress     AFB CULTURE STAIN No acid fast bacilli seen.    Fungus culture [[id number]] Collected: 02/06/24 1714    Order Status: Completed Specimen: Body Fluid from Pleural Fluid Updated: 02/07/24 0930     Fungus (Mycology) Culture Culture in progress    Influenza A & B by Molecular [[id number]] Collected: 02/03/24 0204    Order Status: Completed Specimen: Nasopharyngeal Swab Updated: 02/03/24 0251     Influenza A, Molecular Negative     Influenza B, Molecular Negative     Flu A & B Source Nasal swab          Respiratory Culture:   Recent Labs   Lab 01/20/24  1445   GSRESP <10 epithelial cells per low power field.  Few WBC's  Many Gram positive cocci  Many Gram negative rods  Rare Gram positive rods   RESPIRATORYC Normal respiratory emmanuel  No S aureus or Pseudomonas isolated.     All pertinent labs within the past 24 hours have been reviewed.    Significant Imaging: I have reviewed all pertinent imaging results/findings within the past 24 hours.    I have personally reviewed records / hospital  notes from  HM service and other specialty providers. I have also reviewed CBC, CMP/BMP,  cultures and imaging with my interpretation as documented in my assessment / plan.    Patient is high risk for infectious complications given pt's age, multiple co-morbidities, and case complexity.      Time: 50 minutes   50% of time spent on face-to-face counseling and coordination of care. Counseling included review of test results, diagnosis, and treatment plan with patient and/or family.

## 2024-02-09 NOTE — PROGRESS NOTES
[facility] Medicine  Progress Note    Patient Name: Rajesh Mas  MRN: [id number]  Patient Class: IP- Inpatient   Admission Date: 2/3/2024  Length of Stay: 5 days  Attending Physician: Zia Archer MD  Primary Care Provider: Gail Villarreal MD        Subjective:     Principal Problem:Pleural effusion, left        HPI:  Rajesh Mas is a 73 y.o. female with PMHx of gout, obesity, CKD3, HTN, recent hospitalization for PVC s/p ablation 1/18/24 that was complicated by pericardial effusion with tamponade physiology s/p pericardiocentesis 1/18/24 and development of acute hypoxic respiratory failure from left pleural effusion and acute diastolic dysfunction. The pleural effusion improved with diuresis but she still During this hospital course she also was treated for CAP, placed on diltiazem gtt for development of Afib that resolved. She was discharged home on metoprolol and supplemental oxygen.    She presents to the ED today with her  for acute shortness of breath that began around 7 PM last night. She has been wearing oxygen at 2lpm via NC since last hospital discharge but up until last night had not feeling short of breath. She was walking around her house when the shortness of breath started, barely exerting herself. She also developed soreness/tightness in her bilateral shoulders and mild heaviness in her chest without chest pain. She has had a persistent dry cough for about one week. She also started requiring 3 pillows for sleep a few nights after developing orthopnea. She has poor appetite today. No fevers, chills, leg swelling, nausea, vomiting, palpitations, syncope.     In the ED, she was satting 91% on RA and 95% on 2L NC. Vitals otherwise stable. Labs reveal leukocytosis with WBC 21k, mild anemia with H/H 11.8/36.8. . Troponin 0.017. Flu and covid negative. CXR shows increased size of moderate to large left pleural effusion. Probable small right pleural  effusion. She was started on vancomycin and zosyn and admitted to hospital medicine observation.    Overview/Hospital Course:  Pulmonology consulted given large left-sided pleural effusion and recommended diuresis.  Patient continued on Zosyn for hospital-acquired pneumonia coverage with improvement of leukocytosis.  Patient diuresed with improvement of right-sided minimal pleural effusion but continued unchanged left-sided pleural effusion.  Pulmonary reconsulted inpatient underwent thoracentesis with bloody fluid drained.  Fluid consistent with exudative etiology.  Cardiology consulted and recommended continued management with pulmonology and is unlikely to be secondary to pericardial effusion from previous ablation on 01/18/2024.  Infectious Disease consulted for antibiotic management.  2/8  recent hospitalization for an ablation for PVC (01/18/24) that was complicated by pericardial effusion with tamponade physiology s/p pericardiocentesis 01/18/24 and acute hypoxic respiratory failure, which was attributed to a Leftpleural effusion and acute diastolic dysfunction. She was diuresed with some improvement in the effusion and also completed CAP tx while inpatient with Azithro / CTX 3-5d (ended 01/23). re-admitted 02/03)- CT-chest-  left pleural effusion with volume loss and a moderate right effusion as well as a small pericardial effusion.  Diagnostic / therapeutic thoracentesis completed 02/06; drained 800mL sanguineous fluid. Samples sent for cytology, analysis, and cx. Studies  exudative effusion. Continue empiric Iv-zosyn - started 02/03. pleural fluid cxs and cytology in process   repeat chest-CT - Left upper lobe ground-glass opacity versus coalescing consolidation, suggestive of atelectasis with probable superimposed infectious pneumonia.Interval improvement of bilateral pleural  2/9  transition to oral Levo 750mg Q48h for 6days ( end date  02/15/24) f/u pleural fluid cytology in proces and  sputum cxs .   f/u with pulmonoloyg  in 4-6wks, with repeat imaging. discharge home today      Review of Systems:   Pain scale:   Constitutional:  fever,  chills, headache, vision loss, hearing loss, weight loss, Generalized weakness, falls, loss of smell, loss of taste, poor appetite,  sore throat  Respiratory: cough, shortness of breath.   Cardiovascular: chest pain, dizziness, palpitations, orthopnea, swelling of feet, syncope  Gastrointestinal: nausea, vomiting, abdominal pain, diarrhea, black stool,  blood in stool, change in bowel habits, constipation  Genitourinary: hematuria, dysuria, urgency, frequency  Integument/Breast: rash,  pruritis  Hematologic/Lymphatic: easy bruising, lymphadenopathy  Musculoskeletal: arthralgia- shoulders , myalgias, back pain, neck pain, knee pain  Neurological: confusion, seizures, tremors, slurred speech  Behavioral/Psych:  depression, anxiety, auditory or visual hallucinations     OBJECTIVE:     Physical Exam:  Body mass index is 31.37 kg/m².    Constitutional: Appears well-developed and well-nourished. obesity   Head: Normocephalic and atraumatic.   Neck: Normal range of motion. Neck supple.   Cardiovascular: Normal heart rate.  Regular heart rhythm.  Pulmonary/Chest: Effort normal.   Abdominal: No distension.  No tenderness  Musculoskeletal: Normal range of motion. No edema.   Neurological: Alert and oriented to person, place, and time.   Skin: Skin is warm and dry.   Psychiatric: Normal mood and affect. Behavior is normal.                  Vital Signs  Temp: 98.4 °F (36.9 °C) (02/09/24 0324)  Pulse: (Abnormal) 54 (02/09/24 0324)  Resp: 18 (02/09/24 0324)  BP: (Abnormal) 159/82 (02/09/24 0324)  SpO2: (Abnormal) 94 % (02/09/24 0324)     24 Hour VS Range    Temp:  [97.5 °F (36.4 °C)-98.9 °F (37.2 °C)]   Pulse:  []   Resp:  [16-18]   BP: (135-162)/(70-82)   SpO2:  [94 %-96 %]     Intake/Output Summary (Last 24 hours) at 2/9/2024 7355  Last data filed at 2/9/2024 0656  Gross per 24 hour  "  Intake no documentation   Output 500 ml   Net -500 ml         I/O This Shift:  No intake/output data recorded.    Wt Readings from Last 3 Encounters:   02/04/24 75.3 kg (166 lb)   01/31/24 77.2 kg (170 lb 3.1 oz)   01/26/24 77 kg (169 lb 12.1 oz)       I have personally reviewed the vitals and recorded Intake/Output     Laboratory/Diagnostic Data:    CBC/Anemia Labs: Coags:    Recent Labs   Lab 02/07/24  0350 02/08/24  0255 02/09/24  0411   WBC 12.53 12.15 12.07   HGB 11.3* 10.6* 10.7*   HCT 34.8* 33.2* 33.5*    279 246   MCV 91 93 93   RDW 13.2 13.2 13.3    Recent Labs   Lab 02/03/24  0048   INR 1.1        Chemistries: ABG:   Recent Labs   Lab 02/03/24  0048 02/04/24  0500 02/07/24  0350 02/08/24 0255 02/09/24 0411      < > 139 144 140   K 4.2   < > 3.9 4.6 3.3*      < > 105 107 107   CO2 23   < > 24 26 26   BUN 20   < > 20 24* 20   CREATININE 1.0   < > 1.2 1.5* 1.2   CALCIUM 9.5   < > 9.1 9.1 8.6*   PROT 7.7  --  6.8  --   --    BILITOT 0.7  --   --   --   --    ALKPHOS 57  --   --   --   --    ALT 22  --   --   --   --    AST 24  --   --   --   --    MG  --    < > 2.3 2.4 2.3   PHOS  --    < > 2.3* 2.8 2.5*    < > = values in this interval not displayed.    No results for input(s): "PH", "PCO2", "PO2", "HCO3", "POCSATURATED", "BE" in the last 168 hours.     POCT Glucose: HbA1c:    No results for input(s): "POCTGLUCOSE" in the last 168 hours. Hemoglobin A1C   Date Value Ref Range Status   01/13/2023 5.2 4.0 - 5.6 % Final     Comment:     ADA Screening Guidelines:  5.7-6.4%  Consistent with prediabetes  >or=6.5%  Consistent with diabetes    High levels of fetal hemoglobin interfere with the HbA1C  assay. Heterozygous hemoglobin variants (HbS, HgC, etc)do  not significantly interfere with this assay.   However, presence of multiple variants may affect accuracy.          Cardiac Enzymes: Ejection Fractions:    No results for input(s): "CPK", "CPKMB", "MB", "TROPONINI" in the last 72 hours. EF " "  Date Value Ref Range Status   02/03/2024 40 % Final   01/22/2024 35 % Final          No results for input(s): "COLORU", "APPEARANCEUA", "PHUR", "SPECGRAV", "PROTEINUA", "GLUCUA", "KETONESU", "BILIRUBINUA", "OCCULTUA", "NITRITE", "UROBILINOGEN", "LEUKOCYTESUR", "RBCUA", "WBCUA", "BACTERIA", "SQUAMEPITHEL", "HYALINECASTS" in the last 48 hours.    Invalid input(s): "WRIGHTSUR"    Procalcitonin (ng/mL)   Date Value   02/03/2024 0.12   01/20/2024 0.55 (H)     BNP (pg/mL)   Date Value   02/03/2024 258 (H)   01/20/2024 96   09/12/2022 78     CRP (mg/L)   Date Value   09/18/2023 8.0     Sed Rate (mm/Hr)   Date Value   09/18/2023 90 (H)     No results found for: "DDIMER"  No results found for: "FERRITIN"  No results found for: "LDH"  Troponin I (ng/mL)   Date Value   02/03/2024 0.017   09/12/2022 0.013   09/12/2022 0.010     CPK (U/L)   Date Value   08/30/2023 191 (H)     No results found for this or any previous visit.  SARS-CoV-2 RNA, Amplification, Qual (no units)   Date Value   02/03/2024 Negative       Microbiology labs for the last week  Microbiology Results (last 7 days)       Procedure Component Value Units Date/Time    Culture, Respiratory with Gram Stain [[id number]] Collected: 02/08/24 2151    Order Status: Sent Specimen: Respiratory from Sputum Updated: 02/09/24 0548    Culture, Body Fluid - Bactec [[id number]] Collected: 02/06/24 1714    Order Status: Completed Specimen: Body Fluid from Pleural Fluid Updated: 02/08/24 2212     Body Fluid Culture, Sterile No Growth to date      No Growth to date      No Growth to date    Blood Culture #1 **CANNOT BE ORDERED STAT** [[id number]] Collected: 02/03/24 0336    Order Status: Completed Specimen: Blood from Peripheral, Hand, Right Updated: 02/08/24 0612     Blood Culture, Routine No growth after 5 days.    Blood Culture #2 **CANNOT BE ORDERED STAT** [[id number]] Collected: 02/03/24 0336    Order Status: Completed Specimen: Blood from Peripheral, Hand, Left Updated: " 02/08/24 0612     Blood Culture, Routine No growth after 5 days.    AFB culture (includes stain) [[id number]] Collected: 02/06/24 1714    Order Status: Completed Specimen: Body Fluid from Pleural Fluid Updated: 02/07/24 2127     AFB Culture & Smear Culture in progress     AFB CULTURE STAIN No acid fast bacilli seen.    Fungus culture [[id number]] Collected: 02/06/24 1714    Order Status: Completed Specimen: Body Fluid from Pleural Fluid Updated: 02/07/24 0930     Fungus (Mycology) Culture Culture in progress    Influenza A & B by Molecular [[id number]] Collected: 02/03/24 0204    Order Status: Completed Specimen: Nasopharyngeal Swab Updated: 02/03/24 0251     Influenza A, Molecular Negative     Influenza B, Molecular Negative     Flu A & B Source Nasal swab            Reviewed and noted in plan where applicable- Please see chart for full lab data.    Lines/Drains:       Peripheral IV - Single Lumen 02/03/24 0045 20 G Left Antecubital (Active)   Site Assessment Clean;Dry;Intact 02/08/24 0600   Extremity Assessment Distal to IV No abnormal discoloration 02/06/24 0800   Line Status Flushed 02/08/24 0600   Dressing Status Clean;Dry;Intact 02/08/24 0600   Dressing Intervention Integrity maintained 02/08/24 0600   Reason Not Rotated Not due 02/05/24 2000   Number of days: 5            Peripheral IV - Single Lumen 02/03/24 2130 22 G Anterior;Right Forearm (Active)   Site Assessment Clean;Dry;Intact 02/08/24 0600   Extremity Assessment Distal to IV No abnormal discoloration 02/06/24 0800   Line Status Flushed 02/08/24 0600   Dressing Status Clean;Intact;Dry 02/08/24 0600   Dressing Intervention Integrity maintained 02/08/24 0600   Reason Not Rotated Not due 02/05/24 2000   Number of days: 4       Imaging  ECG Results              EKG 12-lead (Final result)  Result time 02/03/24 10:43:23      Final result by Interface, Lab In [facility] (02/03/24 10:43:23)               Narrative:    Test Reason : R07.9,    Vent. Rate : 109 BPM      Atrial Rate : 109 BPM     P-R Int : 118 ms          QRS Dur : 070 ms      QT Int : 322 ms       P-R-T Axes : 073 025 181 degrees     QTc Int : 433 ms    Sinus tachycardia with Premature atrial complexes  Cannot rule out Anterior infarct ,age undetermined  T wave abnormality, consider inferolateral ischemia  Abnormal ECG  When compared with ECG of 23-JAN-2024 08:11,  Premature atrial complexes are now Present  ST elevation is no longer present  Confirmed by CHAPARRITA PIPER MD ([id number]) on 2/3/2024 10:43:16 AM    Referred By: ALANA   SELF           Confirmed By:CHAPARRITA PIPER MD                                  Results for orders placed during the hospital encounter of 02/03/24    Echo    Interpretation Summary    Left Ventricle: The left ventricle is normal in size. Normal wall thickness. Global hypokinesis present. There is moderately reduced systolic function. Ejection fraction by visual approximation is 40%.    Right Ventricle: Normal right ventricular cavity size. Wall thickness is normal. Right ventricle wall motion  is normal. Systolic function is mildly reduced.    Pulmonary Artery: The estimated pulmonary artery systolic pressure is 25 mmHg.    IVC/SVC: Normal venous pressure at 3 mmHg.  The IVC is small in caliber and collapses fully with inspiration.    Bilateral pleural effusions.  The left pleural effusion is large.    There is likely some pericardial thickening and pericardial fat, but there is no evidence of significant pericardial effusion      CT Chest Without Contrast  Narrative: EXAMINATION:  CT CHEST WITHOUT CONTRAST    CLINICAL HISTORY:  Pleural effusion, malignancy suspected;    TECHNIQUE:  Volumetric data acquisition of the chest from the lung apices to the adrenals was obtained without intravenous contrast. Sagittal and coronal multiplanar reconstructions were performed. Lack of IV contrast material limits the assessment of mediastinal and abdominal structures.    COMPARISON:  CT chest  02/03/2024    FINDINGS:  Thoracic soft tissue: No significant abnormality.    Aorta: Normal caliber with no aneurysm.    Pulmonary vasculature: No acute abnormality.    Heart: Normal size. Small volume pericardial effusion similar to prior.    Alice/Mediastinum: No pathologic eliot enlargement.    Central airways: Patent.    Esophagus: No significant abnormality.    Lungs/Pleura: Left upper lobe ground-glass opacity versus coalescing consolidation.  Left upper lobe subsegmental linear atelectasis improved compared to prior.Small volume bilateral pleural effusion more on the left associated with bilateral relaxation atelectasis, improved compared to prior.    Upper abdomen:    Cholecystectomy.  Symmetric moderate bilateral perinephric fat stranding.    Bones:Degenerative changes with no acute abnormality.  Impression: Left upper lobe ground-glass opacity versus coalescing consolidation, suggestive of atelectasis with probable superimposed infectious pneumonia.    Interval improvement of bilateral pleural effusions and left lung volume loss.  Relaxation atelectasis in the bilateral lower lobes.    Stable small volume pericardial effusion.    Additional findings as above.    Electronically signed by resident: Isabel Cuello  Date:    02/07/2024  Time:    14:19    Electronically signed by: Brendan Crews  Date:    02/07/2024  Time:    19:52      Labs, Imaging, EKG and Diagnostic results from 2/9/2024 were reviewed.    Medications:  Medication list was reviewed and changes noted under Assessment/Plan.  No current facility-administered medications on file prior to encounter.     Current Outpatient Medications on File Prior to Encounter   Medication Sig Dispense Refill    allopurinoL (ZYLOPRIM) 300 MG tablet Take one tablet by mouth once daily in combination with 50mg tablets for a daily dose of 350mg 90 tablet 3    aspirin (ECOTRIN) 81 MG EC tablet Take 1 tablet (81 mg total) by mouth once daily. 30 tablet 0    metoprolol  succinate (TOPROL-XL) 25 MG 24 hr tablet Take 1 tablet (25 mg total) by mouth once daily. 30 tablet 11    oxybutynin (DITROPAN) 5 MG Tab Take 1 tablet (5 mg total) by mouth 2 (two) times daily. 180 tablet 1    predniSONE (DELTASONE) 2.5 MG tablet Take one tablet by mouth once daily 90 tablet 1     Scheduled Medications:  allopurinoL, 350 mg, Oral, Daily  aspirin, 81 mg, Oral, Daily  [START ON 2/10/2024] levoFLOXacin, 750 mg, Oral, Every other day  metoprolol succinate, 25 mg, Oral, Daily  oxybutynin, 5 mg, Oral, BID  predniSONE, 2.5 mg, Oral, Daily      PRN: acetaminophen, albuterol-ipratropium, dextrose 10%, dextrose 10%, glucagon (human recombinant), glucose, glucose, naloxone, sodium chloride 0.9%  Infusions:   Estimated Creatinine Clearance: 39.9 mL/min (based on SCr of 1.2 mg/dL).             Assessment/Plan:      * Pleural effusion, left  Patient found to have  moderate to large left  pleural effusion and probably small right pleural effusion on imaging. I have personally reviewed and interpreted the following imaging: Xray. A thoracentesis was deferred. Most likely etiology includes Congestive Heart Failure and Pneumonia. Management to include Diuresis.    Previously treated with diuresis with improvement in effusion. She was not discharged home on diuretics but was discharged home on supplemental oxygen. She presents with acute worsening SOB.   - Moderate to large effusion noted to have re accumulated on CXR.   - Flu and covid negative.    - Leukocytosis on admission labs. No fever. Procalcitonin 0.12  - S/p vancomycin and zosyn in ED  - CT Chest: Left pleural effusion with volume loss left hemithorax  - TTE 2/3: EF 40%, global hypokinesis, CVP 3, PASP 25, no evidence of significant pericardial effusion    - Pulmonology consulted  -- Repeat CXR 2/5 with continue left sided opacity, but improved right small effusion  -- Thoracentesis on 2/6 with bloody drainage  -- Concern for post ablation a PCIS  (postcardiac injury syndrome)   -- Follow up thoracentesis cultures and cytology  -- Infectious disease consulted  -- BCX NGTD  -- Continue Zosyn for HAP coverage   2/8.  Diagnostic / therapeutic thoracentesis completed 02/06; drained 800mL sanguineous fluid. Samples sent for cytology, analysis, and cx. Studies  exudative effusion. Continue empiric Iv-zosyn - started 02/03. pleural fluid cxs and cytology in process   repeat chest-CT - Left upper lobe ground-glass opacity versus coalescing consolidation, suggestive of atelectasis with probable superimposed infectious pneumonia.Interval improvement of bilateral pleural effusions and left lung volume loss.  Relaxation atelectasis in the bilateral lower lobes.Stable small volume pericardial effusion.  2/9  transition to oral Levo 750mg Q48h for 6days ( end date  02/15/24) f/u pleural fluid cytology in proces and  sputum cxs .  f/u with pulmonoloyg  in 4-6wks, with repeat imaging. discharge home today    Atrial fibrillation  Patient with Paroxysmal (<7 days) atrial fibrillation which is controlled currently with Beta Blocker. Patient is currently in sinus rhythm.[id number] Score: 2. Anticoagulation not started.     Pt had 8 hour episode of new onset afib during last hospitalization. Was placed on dilt gtt and it resolved. Since only 8 hours, EP did not recommend anticoagulation or rate control. Discharged with 30 day event monitor and EP follow up. She was started on metoprolol at discharge.  - Monitor telemetry   - Continue home metoprolol     Chronic hypoxemic respiratory failure  Patient with Hypoxic Respiratory failure which is Chronic, though only recently started on supplemental oxygen. she is on home oxygen at 2 LPM. Supplemental oxygen was provided and noted-    Signs/symptoms of respiratory failure include- increased work of breathing on exertion. Contributing diagnoses includes - CHF, Pleural effusion, and Pneumonia Labs and images were reviewed. Patient Has  not had a recent ABG. Will treat underlying causes and adjust management of respiratory failure as follows- See pleural effusion     History of gout  - Continue home allopurinol and prednisone.     Pericardial effusion  - Recent history noted tamponade physiology, underwent pericardiocentesis 1/18 and a repeat echo next day showed trivial pericardial effusion.  - TTE 2/3: EF 40%, global hypokinesis, CVP 3, PASP 25, no evidence of significant pericardial effusion     PVCs (premature ventricular contractions)  - S/p RFA on 1/18/24    Stage 3 chronic kidney disease  Creatine stable for now. BMP reviewed- noted Estimated Creatinine Clearance: 31.9 mL/min (A) (based on SCr of 1.5 mg/dL (H)). according to latest data. Based on current GFR, CKD stage is stage 3 - GFR 30-59.  Monitor UOP and serial BMP and adjust therapy as needed. Renally dose meds. Avoid nephrotoxic medications and procedures.      Recent Labs   Lab 02/06/24  0400 02/07/24  0350 02/08/24  0255   BUN 25* 20 24*   CREATININE 1.5* 1.2 1.5*       I & O     Intake/Output Summary (Last 24 hours) at 2/8/2024 0706  Last data filed at 2/7/2024 0923  Gross per 24 hour   Intake 120 ml   Output 750 ml   Net -630 ml        BMI 31.0-31.9,adult  Body mass index is 31.37 kg/m². Morbid obesity complicates all aspects of disease management from diagnostic modalities to treatment. Weight loss encouraged and health benefits explained to patient.      Diastolic dysfunction  During recent hospitalization, pt noted to have pleural effusions and there was concern for acute diastolic dysfunction and a transient decreased EF related to high HR.  - TTE 2/3: 40%, global LV hypokinesis present, RV systolic function is mildly reduced   - Monitor I/Os  - Lasix as above      Essential hypertension  Chronic, controlled. Latest blood pressure and vitals reviewed-     Temp:  [97.1 °F (36.2 °C)-98.3 °F (36.8 °C)]   Pulse:  [53-75]   Resp:  [16-18]   BP: (137-150)/(77-82)   SpO2:  [92 %-94  %] .   Home meds for hypertension were reviewed and noted below.   Hypertension Medications               metoprolol succinate (TOPROL-XL) 25 MG 24 hr tablet Take 1 tablet (25 mg total) by mouth once daily.          While in the hospital, will manage blood pressure as follows; Continue home antihypertensive regimen (metoprolol only). Currently her losartan 100mg/day and triamterene-hydrochlorothiazide 37.5-25 mg/day are on hold since last hospital admission due to concern for hypotension.     Will utilize p.r.n. blood pressure medication only if patient's blood pressure greater than 180/110 and she develops symptoms such as worsening chest pain or shortness of breath.      VTE Risk Mitigation (From admission, onward)           Ordered     Reason for No Pharmacological VTE Prophylaxis  Once        Question:  Reasons:  Answer:  Physician Provided (leave comment)    02/03/24 0457     IP VTE HIGH RISK PATIENT  Once         02/03/24 0457     Place sequential compression device  Until discontinued         02/03/24 0457                    Discharge Planning   MARJ: 2/9/2024     Code Status: Full Code   Is the patient medically ready for discharge?: No    Reason for patient still in hospital (select all that apply): Treatment  Discharge Plan A: Home   Discharge Delays: None known at this time              Zia Archer MD  Department of Hospital Medicine   [facility]

## 2024-02-09 NOTE — ASSESSMENT & PLAN NOTE
I have reviewed hospital notes from   service and other specialty providers. I have also reviewed CBC, CMP/BMP,  cultures and imaging with my interpretation as documented.      73F with HTN, BMI 31, CKD3, gout, and recent hospitalization for an ablation for PVC (01/18/24) that was complicated by pericardial effusion with tamponade physiology s/p pericardiocentesis 01/18/24 and acute hypoxic respiratory failure, which was attributed to a L pleural effusion and acute diastolic dysfunction. She was diuresed with some improvement in the effusion and also completed CAP tx while inpatient with Azithro / CTX 3-5d (ended 01/23). Pt discharged with supplied home O2 NC, but no outpt abx per chart review.     -- Now pt re-admitted (02/03) for 1wk onset of new / worsening productive cough with SOB, JONES, and orthopnea while continuing her home O2 (2L) NC. She denies fever, chills, sweats, or known sick contacts. Pt arrived AF, VSS, HDS, wbc 21, Hg 11.8, procalc 0.12, .  Flu / Covid-19 negative. Bld cxs (02/03) NGTD. Started on empiric Iv-vanc / zosyn; and per ID, rec'd stopping vanc 02/05 but no prior ID consult notes seen. Pt maintained on Iv-zosyn.    Ct-chest (02/03): left pleural effusion with volume loss and a moderate right effusion as well as a small pericardial effusion.    Diagnostic / therapeutic thoracentesis completed 02/06; drained 800mL sanguineous fluid. Samples sent for cytology, analysis, and cx. Studies thus far c/w exudative effusion.   -- Pleural fluid cx (02/06) NGTD (however had received 2-3d vanc / zosyn prior; potentially reducing yield) .  Pleural fluid: , Protein 4.6; wbc 1500 (60% Lymph, 30% mono, 7% segs).  Cytology of pleural fluid still in-process. Per Pulm; suspect pleural effusion d/t post-cardiac injury syndrome > infectious empyema.    CXR after procedure showed improvement in the L pleural effusion and no ptx; and bedside U/S (02/07) some fluid persists but markedly  improved.  Repeat chest-CT (02/07) showed improvement of B/L pleural effusions; but with new JOSE ALBERTO GGO vs. Consolidation c/f PNA.  -- Pt improving on Iv-zosyn (started 02/03); remains AF, VSS, wbc normalized; and pt reports feeling improved s/p thoracentesis (02/06). Pt breathing comfortably on 2L NC (o2 sats 92-94%, stable)  --  Though, pt still reports a productive cough, pulm will send sputum sample for culture.  ID consulted for abx recs of exudative sanguienous pleural effusion.   -- Pulm plans to f/u with pt in 4-6wks, with repeat imaging     Recommendations / Plan:  Continue empiric Iv-zosyn (started 02/03). Tomorrow, may transition to oral Levo 750mg Q48h for 6d; THANH 02/15/24 -- for PNA (HCAP coverage). [Qtc 477 on EKG 02/05].  Will f/u pleural fluid cytology in process  Will f/u tentative sputum cxs (to be collected). If sputum cxs speciate organism (after hosp d/c) that is not susc to Levo, please secure chat ID. Tnk you.   Pt to f/u with pulm in 4-6wks, with repeat imaging prior to.      -- ID will schedule pt for ID clinic f/u appt   -- Discussed with ID staff and primary team.  -- ID will sign off at this time.

## 2024-02-09 NOTE — ASSESSMENT & PLAN NOTE
Patient found to have  moderate to large left  pleural effusion and probably small right pleural effusion on imaging. I have personally reviewed and interpreted the following imaging: Xray. A thoracentesis was deferred. Most likely etiology includes Congestive Heart Failure and Pneumonia. Management to include Diuresis.    Previously treated with diuresis with improvement in effusion. She was not discharged home on diuretics but was discharged home on supplemental oxygen. She presents with acute worsening SOB.   - Moderate to large effusion noted to have re accumulated on CXR.   - Flu and covid negative.    - Leukocytosis on admission labs. No fever. Procalcitonin 0.12  - S/p vancomycin and zosyn in ED  - CT Chest: Left pleural effusion with volume loss left hemithorax  - TTE 2/3: EF 40%, global hypokinesis, CVP 3, PASP 25, no evidence of significant pericardial effusion    - Pulmonology consulted  -- Repeat CXR 2/5 with continue left sided opacity, but improved right small effusion  -- Thoracentesis on 2/6 with bloody drainage  -- Concern for post ablation a PCIS (postcardiac injury syndrome)   -- Follow up thoracentesis cultures and cytology  -- Infectious disease consulted  -- BCX NGTD  -- Continue Zosyn for HAP coverage   2/8.  Diagnostic / therapeutic thoracentesis completed 02/06; drained 800mL sanguineous fluid. Samples sent for cytology, analysis, and cx. Studies  exudative effusion. Continue empiric Iv-zosyn - started 02/03. pleural fluid cxs and cytology in process   repeat chest-CT - Left upper lobe ground-glass opacity versus coalescing consolidation, suggestive of atelectasis with probable superimposed infectious pneumonia.Interval improvement of bilateral pleural effusions and left lung volume loss.  Relaxation atelectasis in the bilateral lower lobes.Stable small volume pericardial effusion.  2/9  transition to oral Levo 750mg Q48h for 6days ( end date  02/15/24) f/u pleural fluid cytology in proces  and  sputum cxs .  f/u with pulmonoloyg  in 4-6wks, with repeat imaging. discharge home today

## 2024-02-09 NOTE — PLAN OF CARE
Problem: Infection  Goal: Absence of Infection Signs and Symptoms  Outcome: Ongoing, Progressing     Problem: Infection  Goal: Absence of Infection Signs and Symptoms  Outcome: Ongoing, Progressing     Problem: Skin Injury Risk Increased  Goal: Skin Health and Integrity  Outcome: Ongoing, Progressing

## 2024-02-09 NOTE — PLAN OF CARE
[facility] - Med Surg  Discharge Final Note    Primary Care Provider: Gail Villarreal MD    Expected Discharge Date: 2/9/2024      Patient discharged to home with no needs. Follow up appointment scheduled and placed in AVS. Discharge Plan A and Plan B have been determined by review of patient's clinical status, future medical and therapeutic needs, and coverage/benefits for post-acute care in coordination with multidisciplinary team members.  Final Discharge Note (most recent)       Final Note - 02/09/24 1614          Final Note    Assessment Type Final Discharge Note (P)      Anticipated Discharge Disposition Home or Self Care (P)      Hospital Resources/Appts/Education Provided Appointments scheduled and added to AVS (P)         Post-Acute Status    Discharge Delays None known at this time (P)                      Important Message from Medicare  Important Message from Medicare regarding Discharge Appeal Rights: Signed/date by patient/caregiver, Explained to patient/caregiver, Given to patient/caregiver     Date IMM was signed: 02/07/24  Time IMM was signed: 1510    Contact Info       Gali Villarreal MD   Specialty: Internal Medicine   Relationship: PCP - General    [address]  [facility] [address]   Phone: [phone]       Next Steps: Follow up            COLLIN Brown  Case Management  [phone]

## 2024-02-11 LAB — BACTERIA FLD CULT: NORMAL

## 2024-02-12 ENCOUNTER — TELEPHONE (OUTPATIENT)
Dept: CARDIOLOGY | Facility: CLINIC | Age: 74
End: 2024-02-12
Payer: MEDICARE

## 2024-02-12 LAB
BACTERIA SPEC AEROBE CULT: ABNORMAL
BACTERIA SPEC AEROBE CULT: ABNORMAL
GRAM STN SPEC: ABNORMAL

## 2024-02-12 NOTE — TELEPHONE ENCOUNTER
"[facility] Failure [facility]    Attempted to call pt to complete 24-72hour post discharge "check in" call. Unable to reach pt at listed phone numbers.  Was able to leave message on voicemail [facility]     Will continue to try to reach patient.    "

## 2024-02-12 NOTE — TELEPHONE ENCOUNTER
"Heart Failure [facility]    Attempted to call pt to complete 24-72hour post discharge "check in" call. Unable to reach pt at listed phone numbers.  Was able to leave message on voicemail [facility]     Will continue to try to reach patient.    "

## 2024-02-14 NOTE — PROGRESS NOTES
[facility] Provider Note (Initial Clinic) Consult Note    Age: 73 y.o.  Gender: female  Ethnicity: Black or   Type of Congestive Heart Failure: Diastolic   Etiology: Non-ischemic    Social history: denies  Enrolled in Infusion suite: no    Diagnostic Labs:   EKG - 02/05/2024  CXR - 02/06/2024  ECHO - 02/03/2024  Stress test - 09/19/2022  Stress echo -   Pharmacologic stress -   Cardiac catheterization - 01/18/2024   Cardiac MRI -     Lab Results   Component Value Date     02/09/2024     02/08/2024    K 3.3 (L) 02/09/2024    K 4.6 02/08/2024     02/09/2024     02/08/2024    CO2 26 02/09/2024    CO2 26 02/08/2024    GLU 79 02/09/2024    GLU 89 02/08/2024    BUN 20 02/09/2024    BUN 24 (H) 02/08/2024    CREATININE 1.2 02/09/2024    CREATININE 1.5 (H) 02/08/2024    CALCIUM 8.6 (L) 02/09/2024    CALCIUM 9.1 02/08/2024    PROT 6.8 02/07/2024    PROT 7.7 02/03/2024    ALBUMIN 3.1 (L) 02/03/2024    ALBUMIN 2.8 (L) 01/23/2024    BILITOT 0.7 02/03/2024    BILITOT 0.5 01/23/2024    ALKPHOS 57 02/03/2024    ALKPHOS 61 01/23/2024    AST 24 02/03/2024    AST 33 01/23/2024    ALT 22 02/03/2024    ALT 30 01/23/2024    ANIONGAP 7 (L) 02/09/2024    ANIONGAP 11 02/08/2024    ESTGFRAFRICA 53.3 (A) 05/03/2022    ESTGFRAFRICA 53.7 (A) 04/27/2021    EGFRNONAA 46.2 (A) 05/03/2022    EGFRNONAA 46.5 (A) 04/27/2021       Lab Results   Component Value Date    WBC 12.07 02/09/2024    WBC 12.15 02/08/2024    RBC 3.60 (L) 02/09/2024    RBC 3.59 (L) 02/08/2024    HGB 10.7 (L) 02/09/2024    HGB 10.6 (L) 02/08/2024    HCT 33.5 (L) 02/09/2024    HCT 33.2 (L) 02/08/2024    MCV 93 02/09/2024    MCV 93 02/08/2024    MCH 29.7 02/09/2024    MCH 29.5 02/08/2024    MCHC 31.9 (L) 02/09/2024    MCHC 31.9 (L) 02/08/2024    RDW 13.3 02/09/2024    RDW 13.2 02/08/2024     02/09/2024     02/08/2024    MPV 12.8 02/09/2024    MPV 13.0 (H) 02/08/2024    IMMGR 0.3 02/09/2024    IMMGR 0.3 02/08/2024    IGABS 0.04  02/09/2024    IGABS 0.04 02/08/2024    LYMPH 2.3 02/09/2024    LYMPH 18.7 02/09/2024    MONO 1.0 02/09/2024    MONO 8.1 02/09/2024    EOS 0.3 02/09/2024    EOS 0.4 02/08/2024    BASO 0.11 02/09/2024    BASO 0.10 02/08/2024    NRBC 0 02/09/2024    NRBC 0 02/08/2024    GRAN 8.3 (H) 02/09/2024    GRAN 69.2 02/09/2024    EOSINOPHIL 2.8 02/09/2024    EOSINOPHIL 3.0 02/08/2024    BASOPHIL 0.9 02/09/2024    BASOPHIL 0.8 02/08/2024    PLTEST Decreased (A) 01/19/2024    PLTEST Decreased (A) 01/19/2024    ANISO Slight 09/12/2022       Lab Results   Component Value Date     (H) 02/03/2024    BNP 96 01/20/2024    MG 2.3 02/09/2024    MG 2.4 02/08/2024    PHOS 2.5 (L) 02/09/2024    PHOS 2.8 02/08/2024    TROPONINI 0.017 02/03/2024    TROPONINI 0.013 09/12/2022    HGBA1C 5.2 01/13/2023    TSH 2.961 02/03/2024    TSH 4.749 (H) 01/08/2024    FREET4 0.82 01/08/2024    FREET4 0.81 06/29/2023       Lab Results   Component Value Date    CHOL 196 06/29/2023    TRIG 174 (H) 06/29/2023    HDL 62 06/29/2023    LDLCALC 99.2 06/29/2023    CHOLHDL 31.6 06/29/2023    TOTALCHOLEST 3.2 06/29/2023    NONHDLCHOL 134 06/29/2023    COLORU Yellow 02/03/2024    APPEARANCEUA Hazy (A) 02/03/2024    PHUR 6.0 02/03/2024    SPECGRAV >1.030 (A) 02/03/2024    PROTEINUA 1+ (A) 02/03/2024    GLUCUA Negative 02/03/2024    KETONESU Negative 02/03/2024    BILIRUBINUA Negative 02/03/2024    OCCULTUA Trace (A) 02/03/2024    NITRITE Negative 02/03/2024    LEUKOCYTESUR Trace (A) 02/03/2024       No implanted cardiac devices    Current Outpatient Medications on File Prior to Visit   Medication Sig Dispense Refill    allopurinoL (ZYLOPRIM) 300 MG tablet Take one tablet by mouth once daily in combination with 50mg tablets for a daily dose of 350mg 90 tablet 3    aspirin (ECOTRIN) 81 MG EC tablet Take 1 tablet (81 mg total) by mouth once daily. 30 tablet 0    levoFLOXacin (LEVAQUIN) 750 MG tablet Take 1 tablet (750 mg total) by mouth every other day. for 6 days 3  tablet 0    metoprolol succinate (TOPROL-XL) 25 MG 24 hr tablet Take 1 tablet (25 mg total) by mouth once daily. 30 tablet 11    oxybutynin (DITROPAN) 5 MG Tab Take 1 tablet (5 mg total) by mouth 2 (two) times daily. 180 tablet 1    predniSONE (DELTASONE) 2.5 MG tablet Take one tablet by mouth once daily 90 tablet 1     No current facility-administered medications on file prior to visit.         HPI:  Patient unable to quantify distance walked before SOB and pace normal. Reports breathing improved from prior. Ambulating to clinic today   Patient sleeps on 2 number of pillows at baseline   Patient wakes up SOB, has to get out of bed, associated cough- denies PND symptoms, endorses cough when laying on side   Palpitations - denies   Dizzy, light-headed, pre-syncope or syncope- denies   Since discharge frequency of performing weights, home weight and weight change- home weight steady 165-166lbs   Other information felt pertinent to HPI: Ms. Rajesh Mas is a 74 yo female with PMHx of gout, obesity, CKD3, HTN, recent hospitalization for PVC ablation 1/18/24 that was complicated by pericardial effusion with tamponade physiology s/p pericardiocentesis 1/18/24 and development of acute hypoxic respiratory failure from left pleural effusion and acute diastolic dysfunction who presents to first [facility] visit following recent admission for pleural effusion. Presented to ED with c/o acute SOB. She had been wearing oxygen at 2lpm via NC since last hospital discharge but up until last night had not feeling short of breath. She was walking around her house when the shortness of breath started, barely exerting herself. In the ED, she was satting 91% on RA and 95% on 2L NC. Vitals otherwise stable. Labs reveal leukocytosis with WBC 21k, mild anemia with H/H 11.8/36.8. . Troponin 0.017. Flu and covid negative. CXR shows increased size of moderate to large left pleural effusion. Probable small right pleural effusion. She was  started on vancomycin and zosyn and admitted to hospital medicine. Pulmonology consulted given large left-sided pleural effusion and recommended diuresis. Patient continued on Zosyn for hospital-acquired pneumonia coverage with improvement of leukocytosis. Patient diuresed with improvement of right-sided minimal pleural effusion but continued unchanged left-sided pleural effusion. Pulmonary reconsulted inpatient underwent thoracentesis with bloody fluid drained. Fluid consistent with exudative etiology. Cardiology consulted and recommended continued management with pulmonology and is unlikely to be secondary to pericardial effusion from previous ablation on 01/18/2024. Repeat chest CT revealed left upper lobe ground-glass opacity versus coalescing consolidation, suggestive of atelectasis with probable superimposed infectious pneumonia. Infectious Disease consulted for assistance with antibiotic management. Pleural fluid cxs and cytology in process. On 2/9 transitioned to oral Levo 750mg Q48h for 6days (end date 02/15/24). Continue with 2L oxygen at home. Plan for f/u with pulmonoloyg in 4-6wks with repeat imaging.      PHYSICAL:   Vitals:    02/16/24 1021   BP: (!) 146/82   Pulse: (!) 59      @[id number](3)@    JVD: no   Heart rhythm: regular  Cardiac murmur: No    S3: no  S4: no  Lungs: crackles in LLL base  Hepatojugular reflux: no  Edema: yes, trace edema      Echo 2/3/24:    Left Ventricle: The left ventricle is normal in size. Normal wall thickness. Global hypokinesis present. There is moderately reduced systolic function. Ejection fraction by visual approximation is 40%.    Right Ventricle: Normal right ventricular cavity size. Wall thickness is normal. Right ventricle wall motion  is normal. Systolic function is mildly reduced.    Pulmonary Artery: The estimated pulmonary artery systolic pressure is 25 mmHg.    IVC/SVC: Normal venous pressure at 3 mmHg.  The IVC is small in caliber and collapses fully with  inspiration.    Bilateral pleural effusions.  The left pleural effusion is large.    There is likely some pericardial thickening and pericardial fat, but there is no evidence of significant pericardial effusion    ASSESSMENT: HFpEF    PLAN:      Patient Instructions:   Instruct the patient to notify this clinic if HH, a physician or an advanced care provider wants to change medication one of their HF medications   Activity and Diet restrictions:   Recommend 2-3 gram sodium restriction and 1500cc- 2000cc fluid restriction.  Encourage physical activity with graded exercise program.  Requested patient to weigh themselves daily, and to notify us if their weight increases by more than 3 lbs in 1 day or 5 lbs in 3 days.    Assigned dry weight on home scale: 165lbs on hospital discharge  Medication changes (include current dose and changed dose): NYHA Class II symptoms. CXR yesterday with improving L pleural effusion. Pleural fluid analysis from recent thoracentesis negative for infection or malignancy. Minimal fluid volume on exam. Lasix 20mg daily prn ordered with instructions to take prn dose today. Repeat TTE scheduled 2/20. Pending results, plan will be to resume losartan vs entresto. Will need close follow up with Pulm.   Upcoming labs and date anticipated: TTE 2/20, tentative RTC in 2 weeks or sooner prn    Padmini Nguyen PA-C

## 2024-02-15 ENCOUNTER — HOSPITAL ENCOUNTER (OUTPATIENT)
Dept: RADIOLOGY | Facility: HOSPITAL | Age: 74
Discharge: HOME | End: 2024-02-15
Attending: INTERNAL MEDICINE
Payer: MEDICARE

## 2024-02-15 ENCOUNTER — OFFICE VISIT (OUTPATIENT)
Dept: INFECTIOUS DISEASES | Facility: CLINIC | Age: 74
End: 2024-02-15
Payer: MEDICARE

## 2024-02-15 VITALS
SYSTOLIC BLOOD PRESSURE: 167 MMHG | WEIGHT: 166 LBS | BODY MASS INDEX: 31.34 KG/M2 | HEIGHT: 61 IN | TEMPERATURE: 98 F | HEART RATE: 60 BPM | DIASTOLIC BLOOD PRESSURE: 81 MMHG

## 2024-02-15 DIAGNOSIS — J18.9 PNEUMONIA OF BOTH LUNGS DUE TO INFECTIOUS ORGANISM, UNSPECIFIED PART OF LUNG: ICD-10-CM

## 2024-02-15 DIAGNOSIS — J90 PLEURAL EFFUSION, LEFT: Primary | ICD-10-CM

## 2024-02-15 DIAGNOSIS — J90 PLEURAL EFFUSION, LEFT: ICD-10-CM

## 2024-02-15 PROCEDURE — 1126F AMNT PAIN NOTED NONE PRSNT: CPT | Mod: CPTII,S$GLB,, | Performed by: INTERNAL MEDICINE

## 2024-02-15 PROCEDURE — 3288F FALL RISK ASSESSMENT DOCD: CPT | Mod: CPTII,S$GLB,, | Performed by: INTERNAL MEDICINE

## 2024-02-15 PROCEDURE — 1101F PT FALLS ASSESS-DOCD LE1/YR: CPT | Mod: CPTII,S$GLB,, | Performed by: INTERNAL MEDICINE

## 2024-02-15 PROCEDURE — 1160F RVW MEDS BY RX/DR IN RCRD: CPT | Mod: CPTII,S$GLB,, | Performed by: INTERNAL MEDICINE

## 2024-02-15 PROCEDURE — 99999 PR PBB SHADOW E&M-EST. PATIENT-LVL III: CPT | Mod: PBBFAC,,, | Performed by: INTERNAL MEDICINE

## 2024-02-15 PROCEDURE — 1111F DSCHRG MED/CURRENT MED MERGE: CPT | Mod: CPTII,S$GLB,, | Performed by: INTERNAL MEDICINE

## 2024-02-15 PROCEDURE — 71046 X-RAY EXAM CHEST 2 VIEWS: CPT | Mod: 26,,, | Performed by: RADIOLOGY

## 2024-02-15 PROCEDURE — 3008F BODY MASS INDEX DOCD: CPT | Mod: CPTII,S$GLB,, | Performed by: INTERNAL MEDICINE

## 2024-02-15 PROCEDURE — 3077F SYST BP >= 140 MM HG: CPT | Mod: CPTII,S$GLB,, | Performed by: INTERNAL MEDICINE

## 2024-02-15 PROCEDURE — 1159F MED LIST DOCD IN RCRD: CPT | Mod: CPTII,S$GLB,, | Performed by: INTERNAL MEDICINE

## 2024-02-15 PROCEDURE — 71046 X-RAY EXAM CHEST 2 VIEWS: CPT | Mod: TC

## 2024-02-15 PROCEDURE — 3079F DIAST BP 80-89 MM HG: CPT | Mod: CPTII,S$GLB,, | Performed by: INTERNAL MEDICINE

## 2024-02-15 PROCEDURE — 99213 OFFICE O/P EST LOW 20 MIN: CPT | Mod: S$GLB,,, | Performed by: INTERNAL MEDICINE

## 2024-02-15 NOTE — PROGRESS NOTES
Subjective:      Patient ID: Rajesh Mas is a 73 y.o. female.    Chief Complaint:Hospital Follow Up      History of Present Illness    States that her SOB is better but not at baseline. Finished levaquin yesterday for possible pneumonia. Appointment with heart failure tomorrow.     Review of Systems   Constitutional: Negative for chills, decreased appetite, fever, malaise/fatigue, night sweats, weight gain and weight loss.   HENT:  Negative for congestion, ear pain, hearing loss, hoarse voice, sore throat and tinnitus.    Eyes:  Negative for blurred vision, redness and visual disturbance.   Cardiovascular:  Negative for chest pain, leg swelling and palpitations.   Respiratory:  Negative for cough, hemoptysis, shortness of breath, sputum production and wheezing.    Hematologic/Lymphatic: Negative for adenopathy. Does not bruise/bleed easily.   Skin:  Negative for dry skin, itching, rash and suspicious lesions.   Musculoskeletal:  Negative for back pain, joint pain, myalgias and neck pain.   Gastrointestinal:  Negative for abdominal pain, constipation, diarrhea, heartburn, nausea and vomiting.   Genitourinary:  Negative for dysuria, flank pain, frequency, hematuria, hesitancy and urgency.   Neurological:  Negative for dizziness, headaches, numbness, paresthesias and weakness.   Psychiatric/Behavioral:  Negative for depression and memory loss. The patient does not have insomnia and is not nervous/anxious.    Objective:   Physical Exam  Vitals and nursing note reviewed.   HENT:      Head: Normocephalic and atraumatic.   Eyes:      Extraocular Movements: Extraocular movements intact.      Conjunctiva/sclera: Conjunctivae normal.      Pupils: Pupils are equal, round, and reactive to light.   Cardiovascular:      Rate and Rhythm: Normal rate and regular rhythm.   Pulmonary:      Effort: Pulmonary effort is normal.      Breath sounds: Normal breath sounds. No rhonchi.      Comments: No decreased breath sounds  bilaterally. No dullness to percussion at bases.  Musculoskeletal:      Left lower leg: Edema (mild left ankle edema) present.   Skin:     General: Skin is warm and dry.   Assessment:     1. Pleural effusion, left    2. Pneumonia of both lungs due to infectious organism, unspecified part of lung        Plan:      Long discussion about etiologies of effusion and heart failure. Possible pneumonia has been treated with levaquin. Effusion appears to be exudative, possibly from heart failure. No evidence of recurrence on exam, CXR today to follow effusion. Cytology negative for malignancy.     CXR today to evaluate effusion - will see heart failure tomorrow.    Stop levaquin. Follow up prn.    I spent over 20 minutes on this encounter today, reviewing tests and images, as well as counseling about heart failure and parapneumonic effusions. All questions answered and discussed to satisfaction.

## 2024-02-16 ENCOUNTER — OFFICE VISIT (OUTPATIENT)
Dept: CARDIOLOGY | Facility: CLINIC | Age: 74
End: 2024-02-16
Payer: MEDICARE

## 2024-02-16 ENCOUNTER — LAB VISIT (OUTPATIENT)
Dept: LAB | Facility: HOSPITAL | Age: 74
End: 2024-02-16
Payer: MEDICARE

## 2024-02-16 VITALS — DIASTOLIC BLOOD PRESSURE: 82 MMHG | SYSTOLIC BLOOD PRESSURE: 146 MMHG | HEART RATE: 59 BPM

## 2024-02-16 DIAGNOSIS — I50.32 CHRONIC DIASTOLIC HEART FAILURE: Primary | ICD-10-CM

## 2024-02-16 DIAGNOSIS — I70.0 AORTIC ATHEROSCLEROSIS: ICD-10-CM

## 2024-02-16 DIAGNOSIS — J90 PLEURAL EFFUSION, LEFT: ICD-10-CM

## 2024-02-16 DIAGNOSIS — Z98.890 S/P RADIOFREQUENCY ABLATION OPERATION FOR ARRHYTHMIA: ICD-10-CM

## 2024-02-16 DIAGNOSIS — I49.3 PVCS (PREMATURE VENTRICULAR CONTRACTIONS): ICD-10-CM

## 2024-02-16 DIAGNOSIS — I10 ESSENTIAL HYPERTENSION: Chronic | ICD-10-CM

## 2024-02-16 DIAGNOSIS — N18.32 STAGE 3B CHRONIC KIDNEY DISEASE: ICD-10-CM

## 2024-02-16 DIAGNOSIS — R06.02 SOB (SHORTNESS OF BREATH): ICD-10-CM

## 2024-02-16 DIAGNOSIS — Z86.79 S/P RADIOFREQUENCY ABLATION OPERATION FOR ARRHYTHMIA: ICD-10-CM

## 2024-02-16 LAB
ALBUMIN SERPL BCP-MCNC: 3 G/DL (ref 3.5–5.2)
ALP SERPL-CCNC: 52 U/L (ref 55–135)
ALT SERPL W/O P-5'-P-CCNC: 16 U/L (ref 10–44)
ANION GAP SERPL CALC-SCNC: 8 MMOL/L (ref 8–16)
AST SERPL-CCNC: 16 U/L (ref 10–40)
BASOPHILS # BLD AUTO: 0.06 K/UL (ref 0–0.2)
BASOPHILS NFR BLD: 0.5 % (ref 0–1.9)
BILIRUB SERPL-MCNC: 0.4 MG/DL (ref 0.1–1)
BNP SERPL-MCNC: 127 PG/ML (ref 0–99)
BUN SERPL-MCNC: 22 MG/DL (ref 8–23)
CALCIUM SERPL-MCNC: 9.6 MG/DL (ref 8.7–10.5)
CHLORIDE SERPL-SCNC: 109 MMOL/L (ref 95–110)
CO2 SERPL-SCNC: 28 MMOL/L (ref 23–29)
CREAT SERPL-MCNC: 1.2 MG/DL (ref 0.5–1.4)
DIFFERENTIAL METHOD BLD: ABNORMAL
EOSINOPHIL # BLD AUTO: 0.2 K/UL (ref 0–0.5)
EOSINOPHIL NFR BLD: 2 % (ref 0–8)
ERYTHROCYTE [DISTWIDTH] IN BLOOD BY AUTOMATED COUNT: 14.2 % (ref 11.5–14.5)
EST. GFR  (NO RACE VARIABLE): 48.4 ML/MIN/1.73 M^2
GLUCOSE SERPL-MCNC: 101 MG/DL (ref 70–110)
HCT VFR BLD AUTO: 35.4 % (ref 37–48.5)
HGB BLD-MCNC: 11.2 G/DL (ref 12–16)
IMM GRANULOCYTES # BLD AUTO: 0.03 K/UL (ref 0–0.04)
IMM GRANULOCYTES NFR BLD AUTO: 0.3 % (ref 0–0.5)
LYMPHOCYTES # BLD AUTO: 2.4 K/UL (ref 1–4.8)
LYMPHOCYTES NFR BLD: 21 % (ref 18–48)
MAGNESIUM SERPL-MCNC: 2.3 MG/DL (ref 1.6–2.6)
MCH RBC QN AUTO: 30 PG (ref 27–31)
MCHC RBC AUTO-ENTMCNC: 31.6 G/DL (ref 32–36)
MCV RBC AUTO: 95 FL (ref 82–98)
MONOCYTES # BLD AUTO: 0.8 K/UL (ref 0.3–1)
MONOCYTES NFR BLD: 7.1 % (ref 4–15)
NEUTROPHILS # BLD AUTO: 7.8 K/UL (ref 1.8–7.7)
NEUTROPHILS NFR BLD: 69.1 % (ref 38–73)
NRBC BLD-RTO: 0 /100 WBC
PHOSPHATE SERPL-MCNC: 2.6 MG/DL (ref 2.7–4.5)
PLATELET # BLD AUTO: 219 K/UL (ref 150–450)
PMV BLD AUTO: 13 FL (ref 9.2–12.9)
POTASSIUM SERPL-SCNC: 4.3 MMOL/L (ref 3.5–5.1)
PROT SERPL-MCNC: 7.1 G/DL (ref 6–8.4)
RBC # BLD AUTO: 3.73 M/UL (ref 4–5.4)
SODIUM SERPL-SCNC: 145 MMOL/L (ref 136–145)
WBC # BLD AUTO: 11.23 K/UL (ref 3.9–12.7)

## 2024-02-16 PROCEDURE — 99204 OFFICE O/P NEW MOD 45 MIN: CPT | Mod: S$GLB,,,

## 2024-02-16 PROCEDURE — 1159F MED LIST DOCD IN RCRD: CPT | Mod: CPTII,S$GLB,,

## 2024-02-16 PROCEDURE — 1111F DSCHRG MED/CURRENT MED MERGE: CPT | Mod: CPTII,S$GLB,,

## 2024-02-16 PROCEDURE — 1101F PT FALLS ASSESS-DOCD LE1/YR: CPT | Mod: CPTII,S$GLB,,

## 2024-02-16 PROCEDURE — 83735 ASSAY OF MAGNESIUM: CPT

## 2024-02-16 PROCEDURE — 85025 COMPLETE CBC W/AUTO DIFF WBC: CPT

## 2024-02-16 PROCEDURE — 3077F SYST BP >= 140 MM HG: CPT | Mod: CPTII,S$GLB,,

## 2024-02-16 PROCEDURE — 36415 COLL VENOUS BLD VENIPUNCTURE: CPT

## 2024-02-16 PROCEDURE — 83880 ASSAY OF NATRIURETIC PEPTIDE: CPT

## 2024-02-16 PROCEDURE — 84100 ASSAY OF PHOSPHORUS: CPT

## 2024-02-16 PROCEDURE — 1160F RVW MEDS BY RX/DR IN RCRD: CPT | Mod: CPTII,S$GLB,,

## 2024-02-16 PROCEDURE — 3288F FALL RISK ASSESSMENT DOCD: CPT | Mod: CPTII,S$GLB,,

## 2024-02-16 PROCEDURE — 3079F DIAST BP 80-89 MM HG: CPT | Mod: CPTII,S$GLB,,

## 2024-02-16 PROCEDURE — 80053 COMPREHEN METABOLIC PANEL: CPT

## 2024-02-16 PROCEDURE — 99999 PR PBB SHADOW E&M-EST. PATIENT-LVL IV: CPT | Mod: PBBFAC,,,

## 2024-02-16 RX ORDER — FUROSEMIDE 20 MG/1
20 TABLET ORAL
Qty: 30 TABLET | Refills: 11 | Status: SHIPPED | OUTPATIENT
Start: 2024-02-16 | End: 2024-03-07

## 2024-02-19 ENCOUNTER — OFFICE VISIT (OUTPATIENT)
Dept: INTERNAL MEDICINE | Facility: CLINIC | Age: 74
End: 2024-02-19
Payer: MEDICARE

## 2024-02-19 VITALS
HEART RATE: 64 BPM | OXYGEN SATURATION: 95 % | DIASTOLIC BLOOD PRESSURE: 72 MMHG | HEIGHT: 61 IN | BODY MASS INDEX: 31.23 KG/M2 | SYSTOLIC BLOOD PRESSURE: 126 MMHG | WEIGHT: 165.38 LBS

## 2024-02-19 DIAGNOSIS — E03.8 SUBCLINICAL HYPOTHYROIDISM: ICD-10-CM

## 2024-02-19 DIAGNOSIS — Z87.39 HISTORY OF GOUT: ICD-10-CM

## 2024-02-19 DIAGNOSIS — J90 PLEURAL EFFUSION, LEFT: Primary | ICD-10-CM

## 2024-02-19 DIAGNOSIS — N18.32 STAGE 3B CHRONIC KIDNEY DISEASE: ICD-10-CM

## 2024-02-19 PROCEDURE — 3288F FALL RISK ASSESSMENT DOCD: CPT | Mod: CPTII,S$GLB,, | Performed by: INTERNAL MEDICINE

## 2024-02-19 PROCEDURE — 1111F DSCHRG MED/CURRENT MED MERGE: CPT | Mod: CPTII,S$GLB,, | Performed by: INTERNAL MEDICINE

## 2024-02-19 PROCEDURE — 1126F AMNT PAIN NOTED NONE PRSNT: CPT | Mod: CPTII,S$GLB,, | Performed by: INTERNAL MEDICINE

## 2024-02-19 PROCEDURE — 1160F RVW MEDS BY RX/DR IN RCRD: CPT | Mod: CPTII,S$GLB,, | Performed by: INTERNAL MEDICINE

## 2024-02-19 PROCEDURE — 3074F SYST BP LT 130 MM HG: CPT | Mod: CPTII,S$GLB,, | Performed by: INTERNAL MEDICINE

## 2024-02-19 PROCEDURE — 1159F MED LIST DOCD IN RCRD: CPT | Mod: CPTII,S$GLB,, | Performed by: INTERNAL MEDICINE

## 2024-02-19 PROCEDURE — 1101F PT FALLS ASSESS-DOCD LE1/YR: CPT | Mod: CPTII,S$GLB,, | Performed by: INTERNAL MEDICINE

## 2024-02-19 PROCEDURE — 99214 OFFICE O/P EST MOD 30 MIN: CPT | Mod: S$GLB,,, | Performed by: INTERNAL MEDICINE

## 2024-02-19 PROCEDURE — 3008F BODY MASS INDEX DOCD: CPT | Mod: CPTII,S$GLB,, | Performed by: INTERNAL MEDICINE

## 2024-02-19 PROCEDURE — 3078F DIAST BP <80 MM HG: CPT | Mod: CPTII,S$GLB,, | Performed by: INTERNAL MEDICINE

## 2024-02-19 PROCEDURE — 99999 PR PBB SHADOW E&M-EST. PATIENT-LVL III: CPT | Mod: PBBFAC,,, | Performed by: INTERNAL MEDICINE

## 2024-02-19 NOTE — PROGRESS NOTES
"Subjective:       Patient ID: Rajesh Mas is a 73 y.o. female.    Chief Complaint: Hospital Follow Up    HPI  73 y.o. female presents for a hospital follow up visit. I have reviewed discharge summary as well as all relevant laboratory and pathology results and imaging.     Patient was admitted 2/3/24 to 2/9/24 "for PVC s/p ablation 1/18/24 that was complicated by pericardial effusion with tamponade physiology s/p pericardiocentesis 1/18/24 and development of acute hypoxic respiratory failure from left pleural effusion and acute diastolic dysfunction. The pleural effusion improved with diuresis but she still During this hospital course she also was treated for CAP, placed on diltiazem gtt for development of Afib that resolved. She was discharged home on metoprolol and supplemental oxygen."  Oral levo 750mg q48h x 6 days.   2LNC  Follow up pulm in 4-6 weeks    She is using oxygen intermittently now. Does not have on in office and 95%.  Lasix started on saturday    Off losartan and hctz due to hypotension.     Review of Systems   Constitutional:  Negative for fever.   Respiratory:  Negative for shortness of breath.    Cardiovascular:  Negative for chest pain.   Musculoskeletal: Negative.    Skin: Negative.        Objective:   /72   Pulse 64   Ht 5' 1" (1.549 m)   Wt 75 kg (165 lb 5.5 oz)   LMP 11/28/1988 (Approximate)   SpO2 95%   BMI 31.24 kg/m²      Physical Exam  Constitutional:       General: She is not in acute distress.     Appearance: She is well-developed. She is not diaphoretic.   HENT:      Head: Normocephalic and atraumatic.   Cardiovascular:      Rate and Rhythm: Normal rate and regular rhythm.   Pulmonary:      Effort: Pulmonary effort is normal. No respiratory distress.      Breath sounds: No wheezing or rales.   Skin:     General: Skin is warm and dry.   Neurological:      Mental Status: She is alert and oriented to person, place, and time.   Psychiatric:         Behavior: Behavior normal. "       Left lower lung sound decreased  Assessment:       1. Pleural effusion, left    2. Stage 3b chronic kidney disease    3. Subclinical hypothyroidism    4. BMI 31.0-31.9,adult    5. History of gout        Plan:       Pleural effusion, left  Stable, not requiring oxygen at this time with o2 sat 97%; has for use prn. Has follow up scheduled with pulmonary clinic.  Has completed abx.    Stage 3b chronic kidney disease  Subclinical hypothyroidism  - stable and controlled  - continue current regimen    BMI 31.0-31.9,adult  Stable    History of gout  Continue allopurinol    Hx a fib w acute HF  Keep scheduled followed ups  Lasix prn  for worsening shortness of breath, swelling, or 3lb weight gain on home scale.   Has taken lasix once. Is on prn basis. If K drops will add prn lasix.       Health Maintenance Due   Topic    Mammogram - scheduled

## 2024-02-20 ENCOUNTER — HOSPITAL ENCOUNTER (OUTPATIENT)
Dept: CARDIOLOGY | Facility: HOSPITAL | Age: 74
Discharge: HOME | End: 2024-02-20
Attending: PHYSICIAN ASSISTANT
Payer: MEDICARE

## 2024-02-20 ENCOUNTER — TELEPHONE (OUTPATIENT)
Dept: CARDIOLOGY | Facility: CLINIC | Age: 74
End: 2024-02-20
Payer: MEDICARE

## 2024-02-20 VITALS
HEIGHT: 61 IN | WEIGHT: 165 LBS | BODY MASS INDEX: 31.15 KG/M2 | DIASTOLIC BLOOD PRESSURE: 72 MMHG | HEART RATE: 70 BPM | SYSTOLIC BLOOD PRESSURE: 126 MMHG

## 2024-02-20 DIAGNOSIS — I31.39 PERICARDIAL EFFUSION: ICD-10-CM

## 2024-02-20 LAB
ASCENDING AORTA: 3.29 CM
AV INDEX (PROSTH): 0.76
AV MEAN GRADIENT: 4 MMHG
AV PEAK GRADIENT: 6 MMHG
AV VALVE AREA BY VELOCITY RATIO: 2.28 CM²
AV VALVE AREA: 2.37 CM²
AV VELOCITY RATIO: 0.73
BSA FOR ECHO PROCEDURE: 1.79 M2
CV ECHO LV RWT: 0.37 CM
DOP CALC AO PEAK VEL: 1.27 M/S
DOP CALC AO VTI: 25.32 CM
DOP CALC LVOT AREA: 3.1 CM2
DOP CALC LVOT DIAMETER: 1.99 CM
DOP CALC LVOT PEAK VEL: 0.93 M/S
DOP CALC LVOT STROKE VOLUME: 60.03 CM3
DOP CALCLVOT PEAK VEL VTI: 19.31 CM
E WAVE DECELERATION TIME: 260.05 MSEC
E/A RATIO: 0.52
E/E' RATIO: 7.33 M/S
ECHO LV POSTERIOR WALL: 0.92 CM (ref 0.6–1.1)
FRACTIONAL SHORTENING: 29 % (ref 28–44)
GLOBAL LONGITUIDAL STRAIN: 11 %
INTERVENTRICULAR SEPTUM: 0.69 CM (ref 0.6–1.1)
IVRT: 131.3 MSEC
LA MAJOR: 5 CM
LA MINOR: 5.04 CM
LA WIDTH: 4.07 CM
LEFT ATRIUM SIZE: 4 CM
LEFT ATRIUM VOLUME INDEX MOD: 40.6 ML/M2
LEFT ATRIUM VOLUME INDEX: 39.9 ML/M2
LEFT ATRIUM VOLUME MOD: 70.66 CM3
LEFT ATRIUM VOLUME: 69.47 CM3
LEFT INTERNAL DIMENSION IN SYSTOLE: 3.56 CM (ref 2.1–4)
LEFT VENTRICLE DIASTOLIC VOLUME INDEX: 68.86 ML/M2
LEFT VENTRICLE DIASTOLIC VOLUME: 119.82 ML
LEFT VENTRICLE MASS INDEX: 79 G/M2
LEFT VENTRICLE SYSTOLIC VOLUME INDEX: 30.4 ML/M2
LEFT VENTRICLE SYSTOLIC VOLUME: 52.97 ML
LEFT VENTRICULAR INTERNAL DIMENSION IN DIASTOLE: 5.03 CM (ref 3.5–6)
LEFT VENTRICULAR MASS: 138.29 G
LV LATERAL E/E' RATIO: 6.29 M/S
LV SEPTAL E/E' RATIO: 8.8 M/S
MV A" WAVE DURATION": 15.7 MSEC
MV PEAK A VEL: 0.84 M/S
MV PEAK E VEL: 0.44 M/S
MV STENOSIS PRESSURE HALF TIME: 75.41 MS
MV VALVE AREA P 1/2 METHOD: 2.92 CM2
OHS LV EJECTION FRACTION SIMPSONS BIPLANE MOD: 41 %
PISA TR MAX VEL: 2.34 M/S
PULM VEIN S/D RATIO: 1.66
PV PEAK D VEL: 0.32 M/S
PV PEAK S VEL: 0.53 M/S
RA MAJOR: 4.26 CM
RA PRESSURE ESTIMATED: 3 MMHG
RA WIDTH: 3.21 CM
RIGHT VENTRICULAR END-DIASTOLIC DIMENSION: 2.66 CM
RV TB RVSP: 5 MMHG
SINUS: 3.38 CM
STJ: 2.68 CM
TDI LATERAL: 0.07 M/S
TDI SEPTAL: 0.05 M/S
TDI: 0.06 M/S
TR MAX PG: 22 MMHG
TRICUSPID ANNULAR PLANE SYSTOLIC EXCURSION: 1.9 CM
TV REST PULMONARY ARTERY PRESSURE: 25 MMHG
Z-SCORE OF LEFT VENTRICULAR DIMENSION IN END DIASTOLE: 0.43
Z-SCORE OF LEFT VENTRICULAR DIMENSION IN END SYSTOLE: 1.4

## 2024-02-20 PROCEDURE — 93356 MYOCRD STRAIN IMG SPCKL TRCK: CPT | Mod: ,,, | Performed by: INTERNAL MEDICINE

## 2024-02-20 PROCEDURE — 93306 TTE W/DOPPLER COMPLETE: CPT

## 2024-02-20 PROCEDURE — 93306 TTE W/DOPPLER COMPLETE: CPT | Mod: 26,,, | Performed by: INTERNAL MEDICINE

## 2024-02-20 NOTE — TELEPHONE ENCOUNTER
[facility] RN contacted pt to review test results and give PA-C recommendations as follows:    PA-C recommends a f/u visit next week to review lab results. PA-C will change losartan to Entresto.    RN left message for pt to call back r/t new plan of care updates.

## 2024-02-23 ENCOUNTER — TELEPHONE (OUTPATIENT)
Dept: CARDIOLOGY | Facility: CLINIC | Age: 74
End: 2024-02-23
Payer: MEDICARE

## 2024-02-23 NOTE — TELEPHONE ENCOUNTER
PT calls re a missed call from the [facility];      · Left Ventricle: Global hypokinesis present. There is moderately reduced systolic function with a visually estimated ejection fraction of 35 - 40%. Biplane (2D) method of discs ejection fraction is 41%. Global longitudinal strain is -11.0%. Grade I diastolic dysfunction.  · Right Ventricle: Normal right ventricular cavity size. Wall thickness is normal. Right ventricle wall motion is normal. Systolic function is normal.  · Left Atrium: Left atrium is mildly dilated. There is an aneurysm along the interatrial septum.  · Aortic Valve: The aortic valve is a trileaflet valve. There is mild aortic valve sclerosis.  · Pulmonary Artery: The estimated pulmonary artery systolic pressure is 25 mmHg.  · IVC/SVC: Normal venous pressure at 3 mmHg.  · Pericardium: There is a trivial circumferential effusion.    Okay, can we schedule a follow up for next week. I'd like to send in entresto in place of her losartan. ( Note of 2-26-24 PT instructed to Hold Losartan)    I left a message for pt, I will call again today   Okay, she just has to let me know which pharmacy for the entresto   will do     Above is message left for PT on Wednesday 2-21.    PT calls requesting Echo results and when read the above message states she is not on Valsartan as it was stopped in the Hospital. PT asks if Entresto is costly and I tell her we have a Patient Pharmacy Assistance Plan that we can put her in touch with. PT uses the Pharmacy across the street at [facility].    Explained that we will call her Tuesday as RIGOBERTO is not in today.

## 2024-02-23 NOTE — TELEPHONE ENCOUNTER
Call from PT who states she forgot to get her appt Date/Time when we spoke earlier. PT has appt for March 1st with 10:30 Labs and 11:00 PA-C appt.

## 2024-02-23 NOTE — TELEPHONE ENCOUNTER
"[facility]) weekly phone follow up / triage call completed.     TCC RN Navigator spoke with PT (she called in response to a missed [facility] call)    Current Patient reported weight: 156 lbs   Patient Goal Weight: (165 lbs on Hosp D/C)  Recent Patient reported blood pressure and heart rate:  BP 2-22-24 135/79 P- Doesn't remember    Pt reports the following:  []  Shortness of Breath with Activity  []  Shortness of Breath at rest   []  Fatigue  [x]  Edema a little in Left ankle  [] Chest pain or tightness  [] Weight Increase since discharge  [] None of the above    Pt reports using "Daily weight and symptom tracker".    Pt reports being in the Green(color) Zone. If in yellow/red, reminded that they should be calling [facility] today or now.     Medications:   Medication compliance reviewed with pt.  Pt reports having medication list available and has all medications at home for use per list. PT still holding Valsartan as instructed    Education:   Confirmed pt still has "[facility] Home Care Guide"  . YES    Reminded of key points as listed below.     Recommend 2 -3 gram sodium restriction and 1500 cc-2000 cc fluid restriction.  Encourage physical activity with graded exercise program.  Requested patient to weigh themselves daily, and to notify us if their weight increases by more than 3 lbs in 1 day or 5 lbs in 3 days.   Reminded to use "Daily weight and symptom tracker".  Even if pt does not have a scale, to use symptom tracker.       Watch for these Signs and Symptoms: If any of these occur, contact [facility] immediately:   Increase in shortness of breath with movement   Increase in swelling in your legs and ankles   Weight gain of more than 3 pounds in a day or 5 pounds in 3 days.   Difficulty breathing when you are lying down   Worsening fatigue or tiredness   Stomach bloating, a full feeling or a loss of appetite   Increased coughing--especially when you are " lying down      Pt was able to verbalize back to RN in their own words correct diet/fluid restrictions, necessity for exercise, warning signs and symptoms, when and how to contact their [facility] team.      Pt reminded of upcoming appointment.  PT reports they will attend.  Appt to be set for the week of the 21st through the 1st, message to SHYAM Damon MA      Pt reminded of how and when to contact [facility]:  [phone] (Mon-Fri, 8a-5p) & for urgent issues on the weekend to page the Heart Transplant MD on call.  Pt also encouraged utilize [facility] messaging as well.      Pt  verbalized understanding and in agreement of plan.       Will follow up with pt at next clinic visit and RN navigator available for pt questions, issues or concerns.

## 2024-02-23 NOTE — PATIENT INSTRUCTIONS
Take lasix 20mg daily as needed for worsening shortness of breath, swelling, or 3lb weight gain on home scale.    Take as needed lasix dose today when you get home.    Notify us ([phone]) if you need to take lasix multiple days in a row.    Continue to hold losartan for now. Will call next week with Echo results and further losartan recommendations.  
yes

## 2024-02-27 ENCOUNTER — PATIENT MESSAGE (OUTPATIENT)
Dept: NEPHROLOGY | Facility: CLINIC | Age: 74
End: 2024-02-27
Payer: MEDICARE

## 2024-02-29 RX ORDER — SACUBITRIL AND VALSARTAN 24; 26 MG/1; MG/1
1 TABLET, FILM COATED ORAL 2 TIMES DAILY
Qty: 60 TABLET | Refills: 11 | Status: SHIPPED | OUTPATIENT
Start: 2024-02-29 | End: 2024-04-08

## 2024-02-29 NOTE — PROGRESS NOTES
HF [facility] Provider Note (Follow-up) Consult Note      HPI:     Patient estimates can walk 50 feet or so and pace normal before SOB. Endorses continued JONES with house chores. Ambulating to clinic today              Patient sleeps on 2 number of pillows at baseline              Patient wakes up SOB, has to get out of bed, associated cough- denies PND symptoms, endorses continued nighttime dry cough when laying on side              Palpitations - denies              Dizzy, light-headed, pre-syncope or syncope- denies              Since discharge frequency of performing weights, home weight and weight change- home weight steady 166-167lbs.               Other information felt pertinent to HPI: Ms. Rajesh Mas is a 72 yo female with PMHx of gout, obesity, CKD3, HTN, recent hospitalization for PVC ablation 1/18/24 that was complicated by pericardial effusion with tamponade physiology s/p pericardiocentesis 1/18/24 and development of acute hypoxic respiratory failure from left pleural effusion and acute diastolic dysfunction who presents to second [facility] visit following recent admission for pleural effusion. Presented to ED with c/o acute SOB. She had been wearing oxygen at 2lpm via NC since last hospital discharge but up until last night had not feeling short of breath. She was walking around her house when the shortness of breath started, barely exerting herself. In the ED, she was satting 91% on RA and 95% on 2L NC. Vitals otherwise stable. Labs reveal leukocytosis with WBC 21k, mild anemia with H/H 11.8/36.8. . Troponin 0.017. Flu and covid negative. CXR shows increased size of moderate to large left pleural effusion. Probable small right pleural effusion. She was started on vancomycin and zosyn and admitted to hospital medicine. Pulmonology consulted given large left-sided pleural effusion and recommended diuresis. Patient continued on Zosyn for hospital-acquired pneumonia coverage with improvement of  leukocytosis. Patient diuresed with improvement of right-sided minimal pleural effusion but continued unchanged left-sided pleural effusion. Pulmonary reconsulted inpatient underwent thoracentesis with bloody fluid drained. Fluid consistent with exudative etiology. Cardiology consulted and recommended continued management with pulmonology and is unlikely to be secondary to pericardial effusion from previous ablation on 01/18/2024. Repeat chest CT revealed left upper lobe ground-glass opacity versus coalescing consolidation, suggestive of atelectasis with probable superimposed infectious pneumonia. Infectious Disease consulted for assistance with antibiotic management. Pleural fluid cxs and cytology in process. On 2/9 transitioned to oral Levo 750mg Q48h for 6days (end date 02/15/24). Continue with 2L oxygen at home. Plan for f/u with pulmonoloyg in 4-6wks with repeat imaging.     3/1/24: Repeat TTE 2/20 with EF 35-40%, trivial pericardial effusion. Low dose entresto sent to pharmacy; she plans to start today. Otherwise endorses stable SOB/SOB with household chores. Primary complaint today is intermittent upper shoulder/neck tightness/pain for the past few weeks. Denies association with activity/exertion. More prominent when she lays in bed at night. Reports taking prn lasix dose a few days last week.     PHYSICAL:   Vitals:    03/01/24 1118   BP: (!) 141/78   Pulse: 71      @[id number](3)@    JVD: no   Heart rhythm: regular  Cardiac murmur: No    S3: no  S4: no  Lungs: decreased breath sounds in LLL base  Hepatojugular reflux: no  Edema: no    Lab Results   Component Value Date     02/20/2024    K 4.1 02/20/2024    MG 2.3 02/16/2024     02/20/2024    CO2 28 02/20/2024    BUN 19 02/20/2024    CREATININE 1.1 02/20/2024     (H) 02/20/2024    CALCIUM 9.6 02/20/2024    AST 16 02/16/2024    ALT 16 02/16/2024    ALBUMIN 3.0 (L) 02/16/2024    PROT 7.1 02/16/2024    BILITOT 0.4 02/16/2024     Lab Results    Component Value Date     (H) 02/16/2024     (H) 02/03/2024    BNP 96 01/20/2024     Echo 2/20/24:    Left Ventricle: Global hypokinesis present. There is moderately reduced systolic function with a visually estimated ejection fraction of 35 - 40%. Biplane (2D) method of discs ejection fraction is 41%. Global longitudinal strain is -11.0%. Grade I diastolic dysfunction.    Right Ventricle: Normal right ventricular cavity size. Wall thickness is normal. Right ventricle wall motion  is normal. Systolic function is normal.    Left Atrium: Left atrium is mildly dilated. There is an aneurysm along the interatrial septum.    Aortic Valve: The aortic valve is a trileaflet valve. There is mild aortic valve sclerosis.    Pulmonary Artery: The estimated pulmonary artery systolic pressure is 25 mmHg.    IVC/SVC: Normal venous pressure at 3 mmHg.    Pericardium: There is a trivial circumferential effusion.    ASSESSMENT: HFpEF     PLAN:      Patient Instructions:   Instruct the patient to notify this clinic if HH, a physician or an advanced care provider wants to change medication one of their HF medications   Activity and Diet restrictions:   Recommend 2-3 gram sodium restriction and 1500cc- 2000cc fluid restriction.  Encourage physical activity with graded exercise program.  Requested patient to weigh themselves daily, and to notify us if their weight increases by more than 3 lbs in 1 day or 5 lbs in 3 days.    Assigned dry weight on home scale: 166lbs   Medication changes (include current dose and changed dose): NYHA Class II-III symptoms. Well compensated on exam. Repeat labs pending. Continue Lasix 20mg daily prn. Start low dose entresto with repeat labs next week. Pulm follow up scheduled today. Suspect shoulder/cervical neck pain MSK in nature. If persistent may need further evaluation/imaging per PCP.    RD education provided during visit.    Upcoming labs and date anticipated: repeat labs next week with  starting deshaun Nguyen PA-C

## 2024-03-01 ENCOUNTER — LAB VISIT (OUTPATIENT)
Dept: LAB | Facility: HOSPITAL | Age: 74
End: 2024-03-01
Payer: MEDICARE

## 2024-03-01 ENCOUNTER — OFFICE VISIT (OUTPATIENT)
Dept: PULMONOLOGY | Facility: CLINIC | Age: 74
End: 2024-03-01
Payer: MEDICARE

## 2024-03-01 ENCOUNTER — OFFICE VISIT (OUTPATIENT)
Dept: CARDIOLOGY | Facility: CLINIC | Age: 74
End: 2024-03-01
Payer: MEDICARE

## 2024-03-01 ENCOUNTER — NUTRITION (OUTPATIENT)
Dept: TRANSPLANT | Facility: CLINIC | Age: 74
End: 2024-03-01
Payer: MEDICARE

## 2024-03-01 ENCOUNTER — TELEPHONE (OUTPATIENT)
Dept: CARDIOLOGY | Facility: CLINIC | Age: 74
End: 2024-03-01

## 2024-03-01 VITALS
SYSTOLIC BLOOD PRESSURE: 141 MMHG | WEIGHT: 166.69 LBS | BODY MASS INDEX: 31.47 KG/M2 | HEIGHT: 61 IN | DIASTOLIC BLOOD PRESSURE: 78 MMHG | HEART RATE: 71 BPM | OXYGEN SATURATION: 93 %

## 2024-03-01 VITALS
OXYGEN SATURATION: 94 % | BODY MASS INDEX: 31.51 KG/M2 | HEART RATE: 81 BPM | SYSTOLIC BLOOD PRESSURE: 140 MMHG | HEIGHT: 61 IN | WEIGHT: 166.88 LBS | DIASTOLIC BLOOD PRESSURE: 82 MMHG

## 2024-03-01 DIAGNOSIS — Z98.890 S/P RADIOFREQUENCY ABLATION OPERATION FOR ARRHYTHMIA: ICD-10-CM

## 2024-03-01 DIAGNOSIS — I10 ESSENTIAL HYPERTENSION: ICD-10-CM

## 2024-03-01 DIAGNOSIS — J90 PLEURAL EFFUSION, LEFT: ICD-10-CM

## 2024-03-01 DIAGNOSIS — Z86.79 S/P RADIOFREQUENCY ABLATION OPERATION FOR ARRHYTHMIA: ICD-10-CM

## 2024-03-01 DIAGNOSIS — J96.11 CHRONIC HYPOXEMIC RESPIRATORY FAILURE: ICD-10-CM

## 2024-03-01 DIAGNOSIS — I49.3 PVC'S (PREMATURE VENTRICULAR CONTRACTIONS): ICD-10-CM

## 2024-03-01 DIAGNOSIS — I50.31 ACUTE DIASTOLIC HEART FAILURE: Primary | ICD-10-CM

## 2024-03-01 DIAGNOSIS — I50.22 HEART FAILURE WITH MILDLY REDUCED EJECTION FRACTION (HFMREF): Primary | ICD-10-CM

## 2024-03-01 DIAGNOSIS — R06.02 SOB (SHORTNESS OF BREATH): ICD-10-CM

## 2024-03-01 DIAGNOSIS — N18.32 STAGE 3B CHRONIC KIDNEY DISEASE: ICD-10-CM

## 2024-03-01 DIAGNOSIS — I70.0 AORTIC ATHEROSCLEROSIS: ICD-10-CM

## 2024-03-01 DIAGNOSIS — J90 PLEURAL EFFUSION: Primary | ICD-10-CM

## 2024-03-01 LAB
ALBUMIN SERPL BCP-MCNC: 2.9 G/DL (ref 3.5–5.2)
ALP SERPL-CCNC: 66 U/L (ref 55–135)
ALT SERPL W/O P-5'-P-CCNC: 7 U/L (ref 10–44)
ANION GAP SERPL CALC-SCNC: 9 MMOL/L (ref 8–16)
AST SERPL-CCNC: 13 U/L (ref 10–40)
BILIRUB SERPL-MCNC: 0.7 MG/DL (ref 0.1–1)
BNP SERPL-MCNC: 127 PG/ML (ref 0–99)
BUN SERPL-MCNC: 17 MG/DL (ref 8–23)
CALCIUM SERPL-MCNC: 9.4 MG/DL (ref 8.7–10.5)
CHLORIDE SERPL-SCNC: 106 MMOL/L (ref 95–110)
CO2 SERPL-SCNC: 26 MMOL/L (ref 23–29)
CREAT SERPL-MCNC: 1.1 MG/DL (ref 0.5–1.4)
EST. GFR  (NO RACE VARIABLE): 53.7 ML/MIN/1.73 M^2
GLUCOSE SERPL-MCNC: 141 MG/DL (ref 70–110)
MAGNESIUM SERPL-MCNC: 2.2 MG/DL (ref 1.6–2.6)
PHOSPHATE SERPL-MCNC: 2.3 MG/DL (ref 2.7–4.5)
POTASSIUM SERPL-SCNC: 4.2 MMOL/L (ref 3.5–5.1)
PROT SERPL-MCNC: 7.4 G/DL (ref 6–8.4)
SODIUM SERPL-SCNC: 141 MMOL/L (ref 136–145)

## 2024-03-01 PROCEDURE — 1111F DSCHRG MED/CURRENT MED MERGE: CPT | Mod: CPTII,S$GLB,, | Performed by: INTERNAL MEDICINE

## 2024-03-01 PROCEDURE — 3077F SYST BP >= 140 MM HG: CPT | Mod: CPTII,S$GLB,, | Performed by: INTERNAL MEDICINE

## 2024-03-01 PROCEDURE — 1126F AMNT PAIN NOTED NONE PRSNT: CPT | Mod: CPTII,S$GLB,,

## 2024-03-01 PROCEDURE — 4010F ACE/ARB THERAPY RXD/TAKEN: CPT | Mod: CPTII,S$GLB,,

## 2024-03-01 PROCEDURE — 3078F DIAST BP <80 MM HG: CPT | Mod: CPTII,S$GLB,,

## 2024-03-01 PROCEDURE — 1160F RVW MEDS BY RX/DR IN RCRD: CPT | Mod: CPTII,S$GLB,,

## 2024-03-01 PROCEDURE — 3288F FALL RISK ASSESSMENT DOCD: CPT | Mod: CPTII,S$GLB,, | Performed by: INTERNAL MEDICINE

## 2024-03-01 PROCEDURE — 99214 OFFICE O/P EST MOD 30 MIN: CPT | Mod: S$GLB,,, | Performed by: INTERNAL MEDICINE

## 2024-03-01 PROCEDURE — 1159F MED LIST DOCD IN RCRD: CPT | Mod: CPTII,S$GLB,,

## 2024-03-01 PROCEDURE — 3077F SYST BP >= 140 MM HG: CPT | Mod: CPTII,S$GLB,,

## 2024-03-01 PROCEDURE — 4010F ACE/ARB THERAPY RXD/TAKEN: CPT | Mod: CPTII,S$GLB,, | Performed by: INTERNAL MEDICINE

## 2024-03-01 PROCEDURE — 99999 PR PBB SHADOW E&M-EST. PATIENT-LVL IV: CPT | Mod: PBBFAC,,,

## 2024-03-01 PROCEDURE — 3288F FALL RISK ASSESSMENT DOCD: CPT | Mod: CPTII,S$GLB,,

## 2024-03-01 PROCEDURE — 97802 MEDICAL NUTRITION INDIV IN: CPT | Mod: S$GLB,,,

## 2024-03-01 PROCEDURE — 3079F DIAST BP 80-89 MM HG: CPT | Mod: CPTII,S$GLB,, | Performed by: INTERNAL MEDICINE

## 2024-03-01 PROCEDURE — 83735 ASSAY OF MAGNESIUM: CPT

## 2024-03-01 PROCEDURE — 3008F BODY MASS INDEX DOCD: CPT | Mod: CPTII,S$GLB,,

## 2024-03-01 PROCEDURE — 1101F PT FALLS ASSESS-DOCD LE1/YR: CPT | Mod: CPTII,S$GLB,, | Performed by: INTERNAL MEDICINE

## 2024-03-01 PROCEDURE — 1111F DSCHRG MED/CURRENT MED MERGE: CPT | Mod: CPTII,S$GLB,,

## 2024-03-01 PROCEDURE — 80053 COMPREHEN METABOLIC PANEL: CPT

## 2024-03-01 PROCEDURE — 1159F MED LIST DOCD IN RCRD: CPT | Mod: CPTII,S$GLB,, | Performed by: INTERNAL MEDICINE

## 2024-03-01 PROCEDURE — 99999 PR PBB SHADOW E&M-EST. PATIENT-LVL III: CPT | Mod: PBBFAC,,, | Performed by: STUDENT IN AN ORGANIZED HEALTH CARE EDUCATION/TRAINING PROGRAM

## 2024-03-01 PROCEDURE — 1101F PT FALLS ASSESS-DOCD LE1/YR: CPT | Mod: CPTII,S$GLB,,

## 2024-03-01 PROCEDURE — 83880 ASSAY OF NATRIURETIC PEPTIDE: CPT

## 2024-03-01 PROCEDURE — 99214 OFFICE O/P EST MOD 30 MIN: CPT | Mod: S$GLB,,,

## 2024-03-01 PROCEDURE — 84100 ASSAY OF PHOSPHORUS: CPT

## 2024-03-01 PROCEDURE — 36415 COLL VENOUS BLD VENIPUNCTURE: CPT

## 2024-03-01 PROCEDURE — 3008F BODY MASS INDEX DOCD: CPT | Mod: CPTII,S$GLB,, | Performed by: INTERNAL MEDICINE

## 2024-03-01 NOTE — ASSESSMENT & PLAN NOTE
Patient with pleural effusion first identified after EP procedure in January complicated by pericardial effusion / tamponade requiring pericardiocentesis. Found to have bilateral pleural effusions L>R. Improved with diuresis on right but left was persistent, she was discharged with home O2. She represented about 2-3 weeks later with persistent left sided effusion. Thoracentesis performed with exudate. Patient was nontoxic and culture / cyto returned negative.    She has had persistent dyspnea on minimal exertion that she reports has only been since the beginning of this year. Left simple pleural effusion present on ultrasound in clinic today present, no effusion on right. Patient's dyspnea and hypoxemia is out of proportion to the size of her effusion, though. Suspect this fluid is due to post-cardiac intervention / likely fluid overload today given orthopnea.     - Will increase patient's diuresis, 20mg does not seem to work anyway so instructed patient to take 40mg qd for 1 week. Repeat labs ordered. Patient will see what her response to entresto is and then increase diuretic.  - Will continue daily diuresis pending patient's symptoms, she will reach out to me in 1 week to touch base.  - Will follow up in clinic in 1 month, if effusion still present will likely pursue thoracentesis in clinic and then repeat CT.

## 2024-03-01 NOTE — PROGRESS NOTES
TRANSPLANT NUTRITIONAL ASSESSMENT    Referring Provider: Sheryl Johnson MD      Reason for Visit: CHF diet education    Age: 73 y.o.  Sex: female    Patient Active Problem List   Diagnosis    Essential hypertension    Diastolic dysfunction    Thrombocytopenia    BMI 31.0-31.9,adult    Stage 3 chronic kidney disease    Subclinical hypothyroidism    Left hip pain    Chest pain of unknown etiology    PVCs (premature ventricular contractions)    Pericardial effusion    History of gout    Chronic hypoxemic respiratory failure    Acute diastolic heart failure    Pleural effusion, left    Atrial fibrillation    Pneumonia of both lungs due to infectious organism    Aortic atherosclerosis     Past Medical History:   Diagnosis Date    Allergy     seasonal    Diverticulitis     Fever blister     Hypertension     Personal history of colonic polyps      Lab Results   Component Value Date     (H) 03/01/2024    K 4.2 03/01/2024    PHOS 2.3 (L) 03/01/2024    MG 2.2 03/01/2024    CHOL 196 06/29/2023    CHOL 174 03/15/2017    HDL 62 06/29/2023    TRIG 174 (H) 06/29/2023    ALBUMIN 2.9 (L) 03/01/2024    HGBA1C 5.2 01/13/2023    CALCIUM 9.4 03/01/2024     Other Pertinent Labs: ALT: 7 (L), GFR: 53.7 (L)    Current Outpatient Medications   Medication Sig    allopurinoL (ZYLOPRIM) 300 MG tablet Take one tablet by mouth once daily in combination with 50mg tablets for a daily dose of 350mg    aspirin (ECOTRIN) 81 MG EC tablet Take 1 tablet (81 mg total) by mouth once daily.    furosemide (LASIX) 20 MG tablet Take 1 tablet (20 mg total) by mouth as needed (Take daily as needed for worsening shortness of breath, swelling, or 3lb weight gain on home scale).    metoprolol succinate (TOPROL-XL) 25 MG 24 hr tablet Take 1 tablet (25 mg total) by mouth once daily.    oxybutynin (DITROPAN) 5 MG Tab Take 1 tablet (5 mg total) by mouth 2 (two) times daily.    predniSONE (DELTASONE) 2.5 MG tablet Take one tablet by mouth once daily     "sacubitriL-valsartan (ENTRESTO) 24-26 mg per tablet Take 1 tablet by mouth 2 (two) times daily.     No current facility-administered medications for this visit.     Allergies: Nickel sutures [surgical stainless steel] and Adhesive    Ht Readings from Last 1 Encounters:   03/01/24 5' 1" (1.549 m)     Wt Readings from Last 1 Encounters:   03/01/24 75.7 kg (166 lb 14.2 oz)      BMI: 31.53 kg/m²     Usual Weight: 75 kg  Weight Change/Time: none   Current Diet: OSCAR, 1500ml fluid res   Appetite/Current Intake: good   Exercise/Physical Activity: sedentary   Nutritional/Herbal Supplements: none   Potential Food/Medication Interactions: Entresto: avoid high k+ intake,   Chewing/Swallowing Problems: none  Symptoms: none  Assessment of Lab Values: CHF, CKD 3  Support System: family  ABW: 120 lbs (54.7 kg)  Estimated Kcal Need: 1367 kcals (25 kcal/kg ABW)  Estimated Protein Need: 60g (0.8 g/kg CBW)    Nutritional History: three meals a day. Breakfast is egg with sausage. Lunch/dinner are salad (lettuce, tomato, italian dressing, cheese), sandwich (ham, lettuce, tomato), baked potato, or piece of lean meat with vegetables. Pt drinks 40oz of fluid per day     Nutritional Diagnoses  Problem: excessive mineral intake: sodium  Etiology: cardiac dysfunction  Symptoms: nutr hist     Educational Need? yes  Barriers: none identified  Discussed with: patient  Interventions: Patient taught nutrition information regarding CHF diet education  .  Pt was educated on how to read nutrition labels to understand food has natural sodium present. Recc'd of 600mg Na per meal. Pt recc drink no more than 1500ml  but more than 1000ml per day. pt given contact info if questions or needs arise     Goals/Recommendations: diet adherence and limit fluid intake  Actions Taken: instruct/provide written information  Strategies Used: problem solving, goal setting, motivational interviewing  Patient and/or family comprehend instructions: yes , adherence " expected  Outcome: Verbalizes understanding  Monitoring: Contact information provided, will f/u in clinic and communicate with the care team as needed.     Counseling Time: 15 minutes

## 2024-03-01 NOTE — PROGRESS NOTES
I have seen the patient, reviewed the Fellow's assessment, plan, and progress note. I have personally interviewed and examined the patient at bedside and agree with the findings.     Patient with history of pericardial effusion and tamponade, reduced ejection fraction heart failure, who underwent recent pericardiocentesis and was found to have bilateral pleural effusions.  These were treated with diuresis and the patient was discharged home from the hospital.  She came back with shortness of breath and underwent thoracentesis on the left.  Pleural fluid consistent with exudate but cytology was negative.  Discharged with some residual right pleural effusion.  She is a never smoker and has a low risk of cancer.  She returns today to follow up for her pleural effusions.  She had a chest x-ray which showed very little right-sided pleural effusion and some residual left-sided pleural effusion.  It seems that these have been responsive to diuresis and we will increase this to 40 mg q.day.  We will repeat labs in 1 week's time to follow up potassium and creatinine levels.      Return to clinic in 1 month to re-evaluate residual pleural fluid and if still present we will consider Thora and/or CT scan.  She was instructed to contact us if her condition worsens.    Jair Woods MD  Pulmonary Disease  [facility]

## 2024-03-01 NOTE — TELEPHONE ENCOUNTER
[facility] RN contacted pt to review lab results and Pa-C recommendations as follows:      Pt KF stable, electrolytes in normal range, BNP stable. No medication changes at this time. Pt verbalized understanding.

## 2024-03-01 NOTE — PROGRESS NOTES
[facility]    SUBJECTIVE:     Chief Complaint: Shortness of breath    History of Present Illness:  Rajesh Mas is a 73 y.o. female who presents for initial evaluation of shortness of breath. The patient has not been seen in this clinic, she was evaluated inpatient by the pulmonary consult team previously. The patient underwent EP ablation on 1/18/2024 which was complicated by a pericardial effusion with tamponade physiology. She underwent pericardiocentesis at that time. Was noted to have a left sided pleural effusion. She was diuresed with improvement and discharged on 1/19 with home O2. She represented with shortness of breath 2-3 weeks later. She had a cough and leukocytosis. Repeat CXR showed improvement in right pleural effusion and stable left pleural effusion. She underwent thoracentesis and was treated for CAP and discharged with improvement. Pleural fluid studies were exudative and micro / cytology were negative.     Today, the patient reports that since discharge she is feeling better but still has some dyspnea on exertion. She now gets short of breath after sweeping and showering when previously she did not. She continues to have a dry cough which hasn't changed. She denies fevers, chills, night sweats, or weight loss. The patient continues to endorse orthopnea, sleeping on 2 pillows since she was discharged, stating she can't sleep on less or she'll get short of breath. She is taking her lasix PRN and is keeping a detailed log of her weights. She has taken 3 doses of lasix since discharge but reports she does not really urinate any more with the lasix dosing, she's currently taking 20mg qd.    Patient endorses post nasal drip sometimes. Minimal GERD symptoms that she controls with diet.    Smoking: Never  Other substances: None  Inhalers: None  Travel: None   Incarceration/homelessness: None  Occupational/environmental exposures: Administration in [employer]  Pets/hobbies: None  Recent  illness: None  B-Symptoms: None  Autoimmune symptoms/features:   Family Hx: Lupus 1st cousin      Review of patient's allergies indicates:   Allergen Reactions    Nickel sutures [surgical stainless steel]     Adhesive Rash       Past Medical History:   Diagnosis Date    Allergy     seasonal    Diverticulitis     Fever blister     Hypertension     Personal history of colonic polyps      Past Surgical History:   Procedure Laterality Date    ABLATION N/A 1/18/2024    Procedure: Ablation;  Surgeon: Santi Ellison MD;  Location: [facility] EP LAB;  Service: Cardiology;  Laterality: N/A;  PVC, RFA, PEPE, anes, MB, 3 Prep,*prepare to map LVOT and RVOT*    CHOLECYSTECTOMY  04/26/2017    COLON SURGERY      COLONOSCOPY N/A 12/06/2016    Procedure: COLONOSCOPY;  Surgeon: Fidel Mason MD;  Location: [facility] ENDO ([facility] FLR);  Service: Endoscopy;  Laterality: N/A;    COLONOSCOPY N/A 05/17/2022    Procedure: COLONOSCOPY;  Surgeon: RUSSELL Rolon MD;  Location: [facility] ENDO ([facility] FLR);  Service: Endoscopy;  Laterality: N/A;  fully vaccinated, prep instr portal -ml    partial colectomy  1997    sigmoid removed due to diverticulitis    PERICARDIOCENTESIS N/A 1/18/2024    Procedure: Pericardiocentesis;  Surgeon: Santi Ellison MD;  Location: [facility] EP LAB;  Service: Cardiology;  Laterality: N/A;    SINUS SURGERY  2007    SMALL INTESTINE SURGERY  June 1997@ [facility]    Partial Sigm Colon /Divaticulitus     Family History   Problem Relation Age of Onset    Colon cancer Maternal Grandmother         colon cancer    Breast cancer Maternal Grandmother     Diabetes Mother     Heart failure Mother     Hypertension Mother     Heart disease Mother     Kidney disease Mother         Dialysis    Other Brother         prostate issue    No Known Problems Sister     No Known Problems Daughter     No Known Problems Daughter     No Known Problems Son     No Known Problems Son     Liver cancer Maternal Uncle     Melanoma Neg Hx     Ovarian cancer Neg Hx      Cancer Neg Hx      Social History     Socioeconomic History    Marital status:    Tobacco Use    Smoking status: Never    Smokeless tobacco: Never   Substance and Sexual Activity    Alcohol use: No    Drug use: Never    Sexual activity: Yes     Partners: Male     Birth control/protection: None     Comment: , together since 1971     Social Determinants of Health     Financial Resource Strain: Low Risk  (2/3/2024)    Overall Financial Resource Strain (CARDIA)     Difficulty of Paying Living Expenses: Not very hard   Recent Concern: Financial Resource Strain - Medium Risk (12/16/2023)    Overall Financial Resource Strain (CARDIA)     Difficulty of Paying Living Expenses: Somewhat hard   Food Insecurity: No Food Insecurity (2/3/2024)    Hunger Vital Sign     Worried About Running Out of Food in the Last Year: Never true     Ran Out of Food in the Last Year: Never true   Recent Concern: Food Insecurity - Food Insecurity Present (12/16/2023)    Hunger Vital Sign     Worried About Running Out of Food in the Last Year: Sometimes true     Ran Out of Food in the Last Year: Sometimes true   Transportation Needs: No Transportation Needs (2/3/2024)    PRAPARE - Transportation     Lack of Transportation (Medical): No     Lack of Transportation (Non-Medical): No   Physical Activity: Sufficiently Active (12/16/2023)    Exercise Vital Sign     Days of Exercise per Week: 4 days     Minutes of Exercise per Session: 50 min   Recent Concern: Physical Activity - Insufficiently Active (10/6/2023)    Exercise Vital Sign     Days of Exercise per Week: 3 days     Minutes of Exercise per Session: 40 min   Stress: Stress Concern Present (2/3/2024)    [place] [employer] - Occupational Stress Questionnaire     Feeling of Stress : To some extent   Social Connections: Socially Integrated (2/3/2024)    Social Connection and Isolation Panel [NHANES]     Frequency of Communication with Friends and Family: More than  "three times a week     Frequency of Social Gatherings with Friends and Family: More than three times a week     Attends Shinto Services: More than 4 times per year     Active Member of Clubs or Organizations: Yes     Attends Club or Organization Meetings: More than 4 times per year     Marital Status:    Housing Stability: Low Risk  (2/3/2024)    Housing Stability Vital Sign     Unable to Pay for Housing in the Last Year: No     Number of Places Lived in the Last Year: 1     Unstable Housing in the Last Year: No       Review of Systems:  ROS  CV: no syncope  ENT: no sore throat  Resp: per hpi  Eyes: no eye pain  Gastrointestinal: no nausea or vomiting  Integument/Breast: no rash  Musculoskeletal: no arthralgias  Neurological: no headaches  Behavioral/Psych: no confusion or depression  Heme: no bleeding      OBJECTIVE:     Vital Signs  Vitals:    03/01/24 1309   BP: (!) 140/82   BP Location: Right arm   Patient Position: Sitting   BP Method: Medium (Manual)   Pulse: 81   SpO2: (!) 94%   Weight: 75.7 kg (166 lb 14.2 oz)   Height: 5' 1" (1.549 m)     Body mass index is 31.53 kg/m².    Physical Exam:  Physical Exam  General: no distress  Eyes:  conjunctivae/corneas clear  Nose: no discharge  Neck: trachea midline with no masses appreciated  Lungs:  normal respiratory effort, no wheezes, no rales. Decreased breath sounds left lower lung zone, egophany present.  Heart: regular rate and rhythm and no murmur  Abdomen: non-distended  Extremities: no cyanosis, no edema, no clubbing  Skin: No rashes or lesions. good skin turgor  Neurologic: alert, oriented, thought content appropriate    Laboratory:  CBC  Lab Results   Component Value Date    WBC 12.01 02/20/2024    HGB 11.6 (L) 02/20/2024    HCT 37.4 02/20/2024     02/20/2024    MCV 96 02/20/2024    RDW 14.7 (H) 02/20/2024     BMP  Lab Results   Component Value Date     02/20/2024    K 4.1 02/20/2024     02/20/2024    CO2 28 02/20/2024    BUN 19 " 02/20/2024    CREATININE 1.1 02/20/2024     (H) 02/20/2024    CALCIUM 9.6 02/20/2024    MG 2.3 02/16/2024    PHOS 2.6 (L) 02/16/2024     LFTs  Lab Results   Component Value Date    PROT 7.1 02/16/2024    ALBUMIN 3.0 (L) 02/16/2024    BILITOT 0.4 02/16/2024    AST 16 02/16/2024    ALKPHOS 52 (L) 02/16/2024    ALT 16 02/16/2024       Chest Imaging, My Impression:   CT Chest 2/7/2024:  Interval decrease in left sided pleural effusion post thoractensis. Area of atelectasis and consolidation with air bronchograms in the lingular segment. Mild right pleural effusion appears improved.     Diagnostic Results:  Labs: Reviewed  Echo: Reviewed    Pleural fluid studies: 2/6/2024  Bloody, 1458 WBC (60% lymphs, 30% monocytes)  LDH: 267  Protein 4.6  Glucose 115  Cytology and Microbiology negative    ASSESSMENT/PLAN:     No problems updated.  Problem List Items Addressed This Visit          Pulmonary    Chronic hypoxemic respiratory failure    Current Assessment & Plan     As under Pleural effusion         Pleural effusion, left    Current Assessment & Plan     Patient with pleural effusion first identified after EP procedure in January complicated by pericardial effusion / tamponade requiring pericardiocentesis. Found to have bilateral pleural effusions L>R. Improved with diuresis on right but left was persistent, she was discharged with home O2. She represented about 2-3 weeks later with persistent left sided effusion. Thoracentesis performed with exudate. Patient was nontoxic and culture / cyto returned negative.    She has had persistent dyspnea on minimal exertion that she reports has only been since the beginning of this year. Left simple pleural effusion present on ultrasound in clinic today present, no effusion on right. Patient's dyspnea and hypoxemia is out of proportion to the size of her effusion, though. Suspect this fluid is due to post-cardiac intervention / likely fluid overload today given orthopnea.     -  Will increase patient's diuresis, 20mg does not seem to work anyway so instructed patient to take 40mg qd for 1 week. Repeat labs ordered. Patient will see what her response to entresto is and then increase diuretic.  - Will continue daily diuresis pending patient's symptoms, she will reach out to me in 1 week to touch base.  - Will follow up in clinic in 1 month, if effusion still present will likely pursue thoracentesis in clinic and then repeat CT.            Other Visit Diagnoses       Pleural effusion    -  Primary    Relevant Orders    Basic Metabolic Panel            Walter Carr M.D.  [facility]

## 2024-03-02 ENCOUNTER — PATIENT MESSAGE (OUTPATIENT)
Dept: RHEUMATOLOGY | Facility: CLINIC | Age: 74
End: 2024-03-02
Payer: MEDICARE

## 2024-03-02 ENCOUNTER — PATIENT MESSAGE (OUTPATIENT)
Dept: PULMONOLOGY | Facility: CLINIC | Age: 74
End: 2024-03-02
Payer: MEDICARE

## 2024-03-06 NOTE — H&P (VIEW-ONLY)
Ms. Mas is a patient of Dr. Ellison and was last seen in clinic 12/19/2023.      Subjective:   Patient ID:  Rajesh Mas is a 73 y.o. female who presents for follow up of PVC  .     HPI:    Ms. Mas is a 73 y.o. female with PVCs, pAF here for follow up after ablation.    Background:    10/23: She has had palpitations for several months/years. She had normal EF noted last year. She feels heart palpitations more recently. EF normal in 2022. No syncope or near syncope. No prior monitor. She has OT PVCs on ECG today. She is not on AVN or AAD agents.     Holter 11/23 with 11% PVC burden. PVC ablation set for 1/18/24 12/2023: Continued PVC symptoms. She is here with her  to discuss the ablation procedure.   73 yoF here for PVC management. Questions answered regarding PVC ablation. Prepare for arterial access and LVOT mapping. Extensive discussion regarding risks, benefits, and alternative approaches to the procedure was had with the patient.      Update (03/07/2024):    1/18/2024:    Posterior/septal RVOT PVC source    PVC ablation.    3D mapping performed with Ensite.    Case terminated early due to pericardial effusion, IC performed emergent pericardiocentesis with removal of <250 cc venous blood. Pericardial drain left in place with plans to observe overnight in the ICU. See IC note for pericardiocentesis details.  subsequently developed pericardial effusion with tamponade physiology that required emergent pericardiocentesis (250 cc drained). Patient had pericardial drain that was placed and monitored while in CCU. Echocardiogram on 1/18 after procedure showed no pericardial effusion. Pericardial drain with approximately 80 cc of total drainage of serosanguinous fluid. Pericardial drain with less than 5 cc's of drainage overnight and was pulled on 1/19/24 without complication. Repeat echo was done on AM of 1/19 with only trivial pericardial effusion, EF of 50-55%.   During this hospital course she also  was treated for CAP, placed on diltiazem gtt for development of Afib that resolved. She was discharged home on metoprolol and supplemental oxygen.     Event monitor 1/23/2024:  This Wireless Event study reveals a Sinus Rhythm and Atrial Fibrillation with rates ranging from 67 bpm to 151 bpm. AF was observed on day of application while patient was still in hospital (1/23/24) and none since.  PACs were noted.  PVCs were present.    2/8/2024 hospital visit: Hospitalized with [facility] [place]  Pulmonology consulted given large left-sided pleural effusion and recommended diuresis. Patient continued on Zosyn for hospital-acquired pneumonia coverage with improvement of leukocytosis. Patient diuresed with improvement of right-sided minimal pleural effusion but continued unchanged left-sided pleural effusion. Pulmonary reconsulted inpatient underwent thoracentesis with bloody fluid drained. Fluid consistent with exudative etiology. Cardiology consulted and recommended continued management with pulmonology and is unlikely to be secondary to pericardial effusion from previous ablation on 01/18/2024. Infectious Disease consulted for antibiotic management.     Today she says her symptoms continue to improve. She is breathing better. No palps, CP, LH, syncope reported.    On ASA 81mg daily.     I have personally reviewed the patient's EKG today, which shows sinus rhythm at 62bpm. GA interval is 138. QRS is 76. QTc is 430. No PVCs.    Relevant Cardiac Test Results:    2D Echo (2/20/2024):    Left Ventricle: Global hypokinesis present. There is moderately reduced systolic function with a visually estimated ejection fraction of 35 - 40%. Biplane (2D) method of discs ejection fraction is 41%. Global longitudinal strain is -11.0%. Grade I diastolic dysfunction.    Right Ventricle: Normal right ventricular cavity size. Wall thickness is normal. Right ventricle wall motion  is normal. Systolic function is normal.    Left Atrium: Left atrium is  mildly dilated. There is an aneurysm along the interatrial septum.    Aortic Valve: The aortic valve is a trileaflet valve. There is mild aortic valve sclerosis.    Pulmonary Artery: The estimated pulmonary artery systolic pressure is 25 mmHg.    IVC/SVC: Normal venous pressure at 3 mmHg.    Pericardium: There is a trivial circumferential effusion.    Current Outpatient Medications   Medication Sig    allopurinoL (ZYLOPRIM) 300 MG tablet Take one tablet by mouth once daily in combination with 50mg tablets for a daily dose of 350mg    aspirin (ECOTRIN) 81 MG EC tablet Take 1 tablet (81 mg total) by mouth once daily.    furosemide (LASIX) 20 MG tablet Take 1 tablet (20 mg total) by mouth as needed (Take daily as needed for worsening shortness of breath, swelling, or 3lb weight gain on home scale).    metoprolol succinate (TOPROL-XL) 25 MG 24 hr tablet Take 1 tablet (25 mg total) by mouth once daily.    oxybutynin (DITROPAN) 5 MG Tab Take 1 tablet (5 mg total) by mouth 2 (two) times daily.    predniSONE (DELTASONE) 2.5 MG tablet Take one tablet by mouth once daily    sacubitriL-valsartan (ENTRESTO) 24-26 mg per tablet Take 1 tablet by mouth 2 (two) times daily.     No current facility-administered medications for this visit.       Review of Systems   Constitutional: Negative for malaise/fatigue.   Cardiovascular:  Positive for dyspnea on exertion (improving). Negative for chest pain, irregular heartbeat, leg swelling and palpitations.   Respiratory:  Negative for shortness of breath.    Hematologic/Lymphatic: Negative for bleeding problem.   Skin:  Negative for rash.   Musculoskeletal:  Negative for myalgias.   Gastrointestinal:  Negative for hematemesis, hematochezia and nausea.   Genitourinary:  Negative for hematuria.   Neurological:  Negative for light-headedness.   Psychiatric/Behavioral:  Negative for altered mental status.    Allergic/Immunologic: Negative for persistent infections.       Objective:          BP  "131/78   Pulse 67   Ht 5' 1" (1.549 m)   Wt 74.2 kg (163 lb 9.3 oz)   LMP 11/28/1988 (Approximate)   BMI 30.91 kg/m²     Physical Exam  Vitals and nursing note reviewed.   Constitutional:       Appearance: Normal appearance. She is well-developed.   HENT:      Head: Normocephalic.      Nose: Nose normal.   Eyes:      Pupils: Pupils are equal, round, and reactive to light.   Cardiovascular:      Rate and Rhythm: Normal rate and regular rhythm.   Pulmonary:      Effort: No respiratory distress.      Breath sounds: Normal breath sounds.   Musculoskeletal:         General: Normal range of motion.   Skin:     General: Skin is warm and dry.      Findings: No erythema.   Neurological:      Mental Status: She is alert and oriented to person, place, and time.   Psychiatric:         Speech: Speech normal.         Behavior: Behavior normal.           Lab Results   Component Value Date     03/07/2024     03/07/2024    K 4.2 03/07/2024    K 4.2 03/07/2024    MG 2.2 03/01/2024    BUN 24 (H) 03/07/2024    BUN 24 (H) 03/07/2024    CREATININE 1.2 03/07/2024    CREATININE 1.2 03/07/2024    ALT 13 03/07/2024    AST 16 03/07/2024    HGB 11.6 (L) 02/20/2024    HCT 37.4 02/20/2024    TSH 3.418 02/20/2024    LDLCALC 99.2 06/29/2023       Recent Labs   Lab 01/08/24  0910 02/03/24  0048   INR 1.0 1.1       Assessment:     1. PVCs (premature ventricular contractions)    2. Persistent atrial fibrillation    3. Essential hypertension    4. H/O cardiac radiofrequency ablation - PVC      Plan:     In summary, Ms. Mas is a 73 y.o. female with PVCs, pAF here for follow up after ablation.  She is 2 mo s/p successful PVC ablation. She did experience pericardial effusion after procedure and underwent pericardiocentesis. AF identified during that hospital stay and event monitor was applied. AF identified on monitor day of application and none since. She is also 1 mo s/p another hospitalization for pleural effusion and  underwent " thoracentesis. She reports significant symptom improvement. No PVCs on ECG or rhythm strip. Not on OAC. CHADSVASc 2. Discussed AF on event monitor (only present 1st day while still in the hospital). Asymptomatic. [place] unknown. She may benefit from a loop recorder for long term AF surveillance to determine management and appropriateness of starting blood thinner in the future. Pt is willing to proceed.    Proceed with loop recorder to monitor for AF given provoked nature of prior episode  RTC as scheduled      *A copy of this note has been sent to Dr. Ellison*    Follow up as scheduled.    ------------------------------------------------------------------    TITO Schmitz, NP-C  Cardiac Electrophysiology

## 2024-03-06 NOTE — PROGRESS NOTES
Ms. Mas is a patient of Dr. Ellison and was last seen in clinic 12/19/2023.      Subjective:   Patient ID:  Rajesh Mas is a 73 y.o. female who presents for follow up of PVC  .     HPI:    Ms. Mas is a 73 y.o. female with PVCs, pAF here for follow up after ablation.    Background:    10/23: She has had palpitations for several months/years. She had normal EF noted last year. She feels heart palpitations more recently. EF normal in 2022. No syncope or near syncope. No prior monitor. She has OT PVCs on ECG today. She is not on AVN or AAD agents.     Holter 11/23 with 11% PVC burden. PVC ablation set for 1/18/24 12/2023: Continued PVC symptoms. She is here with her  to discuss the ablation procedure.   73 yoF here for PVC management. Questions answered regarding PVC ablation. Prepare for arterial access and LVOT mapping. Extensive discussion regarding risks, benefits, and alternative approaches to the procedure was had with the patient.      Update (03/07/2024):    1/18/2024:    Posterior/septal RVOT PVC source    PVC ablation.    3D mapping performed with Ensite.    Case terminated early due to pericardial effusion, IC performed emergent pericardiocentesis with removal of <250 cc venous blood. Pericardial drain left in place with plans to observe overnight in the ICU. See IC note for pericardiocentesis details.  subsequently developed pericardial effusion with tamponade physiology that required emergent pericardiocentesis (250 cc drained). Patient had pericardial drain that was placed and monitored while in CCU. Echocardiogram on 1/18 after procedure showed no pericardial effusion. Pericardial drain with approximately 80 cc of total drainage of serosanguinous fluid. Pericardial drain with less than 5 cc's of drainage overnight and was pulled on 1/19/24 without complication. Repeat echo was done on AM of 1/19 with only trivial pericardial effusion, EF of 50-55%.   During this hospital course she also  was treated for CAP, placed on diltiazem gtt for development of Afib that resolved. She was discharged home on metoprolol and supplemental oxygen.     Event monitor 1/23/2024:  This Wireless Event study reveals a Sinus Rhythm and Atrial Fibrillation with rates ranging from 67 bpm to 151 bpm. AF was observed on day of application while patient was still in hospital (1/23/24) and none since.  PACs were noted.  PVCs were present.    2[place]2024 [place] [facility] [place]ology consulted given large left-sided pleural effusion and recommended diuresis. Patient continued on Zosyn for hospital-acquired pneumonia coverage with improvement of leukocytosis. Patient diuresed with improvement of right-sided minimal pleural effusion but continued unchanged left-sided pleural effusion. Pulmonary reconsulted inpatient underwent thoracentesis with bloody fluid drained. Fluid consistent with exudative etiology. Cardiology consulted and recommended continued management with pulmonology and is unlikely to be secondary to pericardial effusion from previous ablation on 01/18/2024. Infectious Disease consulted for antibiotic management.     Today she says her symptoms continue to improve. She is breathing better. No palps, CP, LH, syncope reported.    On ASA 81mg daily.     I have personally reviewed the patient's EKG today, which shows sinus rhythm at 62bpm. LA interval is 138. QRS is 76. QTc is 430. No PVCs.    Relevant Cardiac Test Results:    2D Echo (2/20/2024):    Left Ventricle: Global hypokinesis present. There is moderately reduced systolic function with a visually estimated ejection fraction of 35 - 40%. Biplane (2D) method of discs ejection fraction is 41%. Global longitudinal strain is -11.0%. Grade I diastolic dysfunction.    Right Ventricle: Normal right ventricular cavity size. Wall thickness is normal. Right ventricle wall motion  is normal. Systolic function is normal.    Left Atrium: Left atrium is  mildly dilated. There is an aneurysm along the interatrial septum.    Aortic Valve: The aortic valve is a trileaflet valve. There is mild aortic valve sclerosis.    Pulmonary Artery: The estimated pulmonary artery systolic pressure is 25 mmHg.    IVC/SVC: Normal venous pressure at 3 mmHg.    Pericardium: There is a trivial circumferential effusion.    Current Outpatient Medications   Medication Sig    allopurinoL (ZYLOPRIM) 300 MG tablet Take one tablet by mouth once daily in combination with 50mg tablets for a daily dose of 350mg    aspirin (ECOTRIN) 81 MG EC tablet Take 1 tablet (81 mg total) by mouth once daily.    furosemide (LASIX) 20 MG tablet Take 1 tablet (20 mg total) by mouth as needed (Take daily as needed for worsening shortness of breath, swelling, or 3lb weight gain on home scale).    metoprolol succinate (TOPROL-XL) 25 MG 24 hr tablet Take 1 tablet (25 mg total) by mouth once daily.    oxybutynin (DITROPAN) 5 MG Tab Take 1 tablet (5 mg total) by mouth 2 (two) times daily.    predniSONE (DELTASONE) 2.5 MG tablet Take one tablet by mouth once daily    sacubitriL-valsartan (ENTRESTO) 24-26 mg per tablet Take 1 tablet by mouth 2 (two) times daily.     No current facility-administered medications for this visit.       Review of Systems   Constitutional: Negative for malaise/fatigue.   Cardiovascular:  Positive for dyspnea on exertion (improving). Negative for chest pain, irregular heartbeat, leg swelling and palpitations.   Respiratory:  Negative for shortness of breath.    Hematologic/Lymphatic: Negative for bleeding problem.   Skin:  Negative for rash.   Musculoskeletal:  Negative for myalgias.   Gastrointestinal:  Negative for hematemesis, hematochezia and nausea.   Genitourinary:  Negative for hematuria.   Neurological:  Negative for light-headedness.   Psychiatric/Behavioral:  Negative for altered mental status.    Allergic/Immunologic: Negative for persistent infections.       Objective:          BP  "131/78   Pulse 67   Ht 5' 1" (1.549 m)   Wt 74.2 kg (163 lb 9.3 oz)   LMP 11/28/1988 (Approximate)   BMI 30.91 kg/m²     Physical Exam  Vitals and nursing note reviewed.   Constitutional:       Appearance: Normal appearance. She is well-developed.   HENT:      Head: Normocephalic.      Nose: Nose normal.   Eyes:      Pupils: Pupils are equal, round, and reactive to light.   Cardiovascular:      Rate and Rhythm: Normal rate and regular rhythm.   Pulmonary:      Effort: No respiratory distress.      Breath sounds: Normal breath sounds.   Musculoskeletal:         General: Normal range of motion.   Skin:     General: Skin is warm and dry.      Findings: No erythema.   Neurological:      Mental Status: She is alert and oriented to person, place, and time.   Psychiatric:         Speech: Speech normal.         Behavior: Behavior normal.           Lab Results   Component Value Date     03/07/2024     03/07/2024    K 4.2 03/07/2024    K 4.2 03/07/2024    MG 2.2 03/01/2024    BUN 24 (H) 03/07/2024    BUN 24 (H) 03/07/2024    CREATININE 1.2 03/07/2024    CREATININE 1.2 03/07/2024    ALT 13 03/07/2024    AST 16 03/07/2024    HGB 11.6 (L) 02/20/2024    HCT 37.4 02/20/2024    TSH 3.418 02/20/2024    LDLCALC 99.2 06/29/2023       Recent Labs   Lab 01/08/24  0910 02/03/24  0048   INR 1.0 1.1       Assessment:     1. PVCs (premature ventricular contractions)    2. Persistent atrial fibrillation    3. Essential hypertension    4. H/O cardiac radiofrequency ablation - PVC      Plan:     In summary, Ms. Mas is a 73 y.o. female with PVCs, pAF here for follow up after ablation.  She is 2 mo s/p successful PVC ablation. She did experience pericardial effusion after procedure and underwent pericardiocentesis. AF identified during that hospital stay and event monitor was applied. AF identified on monitor day of application and none since. She is also 1 mo s/p another hospitalization for pleural effusion and  underwent " thoracentesis. She reports significant symptom improvement. No PVCs on ECG or rhythm strip. Not on OAC. CHADSVASc 2. Discussed AF on event monitor (only present 1st day while still in the hospital). Asymptomatic. [place] unknown. She may benefit from a loop recorder for long term AF surveillance to determine management and appropriateness of starting blood thinner in the future. Pt is willing to proceed.    Proceed with loop recorder to monitor for AF given provoked nature of prior episode  RTC as scheduled      *A copy of this note has been sent to Dr. Ellison*    Follow up as scheduled.    ------------------------------------------------------------------    TITO Schmitz, NP-C  Cardiac Electrophysiology

## 2024-03-07 ENCOUNTER — LAB VISIT (OUTPATIENT)
Dept: LAB | Facility: HOSPITAL | Age: 74
End: 2024-03-07
Payer: MEDICARE

## 2024-03-07 ENCOUNTER — TELEPHONE (OUTPATIENT)
Dept: CARDIOLOGY | Facility: CLINIC | Age: 74
End: 2024-03-07
Payer: MEDICARE

## 2024-03-07 ENCOUNTER — OFFICE VISIT (OUTPATIENT)
Dept: ELECTROPHYSIOLOGY | Facility: CLINIC | Age: 74
End: 2024-03-07
Payer: MEDICARE

## 2024-03-07 ENCOUNTER — PATIENT MESSAGE (OUTPATIENT)
Dept: CARDIOLOGY | Facility: CLINIC | Age: 74
End: 2024-03-07
Payer: MEDICARE

## 2024-03-07 ENCOUNTER — HOSPITAL ENCOUNTER (OUTPATIENT)
Dept: CARDIOLOGY | Facility: CLINIC | Age: 74
Discharge: HOME | End: 2024-03-07
Payer: MEDICARE

## 2024-03-07 VITALS
SYSTOLIC BLOOD PRESSURE: 131 MMHG | HEART RATE: 67 BPM | DIASTOLIC BLOOD PRESSURE: 78 MMHG | HEIGHT: 61 IN | BODY MASS INDEX: 30.88 KG/M2 | WEIGHT: 163.56 LBS

## 2024-03-07 DIAGNOSIS — I49.3 PVCS (PREMATURE VENTRICULAR CONTRACTIONS): ICD-10-CM

## 2024-03-07 DIAGNOSIS — J90 PLEURAL EFFUSION: ICD-10-CM

## 2024-03-07 DIAGNOSIS — R06.02 SOB (SHORTNESS OF BREATH): ICD-10-CM

## 2024-03-07 DIAGNOSIS — I48.19 PERSISTENT ATRIAL FIBRILLATION: ICD-10-CM

## 2024-03-07 DIAGNOSIS — Z98.890 H/O CARDIAC RADIOFREQUENCY ABLATION: ICD-10-CM

## 2024-03-07 DIAGNOSIS — I49.3 PVCS (PREMATURE VENTRICULAR CONTRACTIONS): Primary | ICD-10-CM

## 2024-03-07 DIAGNOSIS — I10 ESSENTIAL HYPERTENSION: Chronic | ICD-10-CM

## 2024-03-07 LAB
ALBUMIN SERPL BCP-MCNC: 3.1 G/DL (ref 3.5–5.2)
ALP SERPL-CCNC: 57 U/L (ref 55–135)
ALT SERPL W/O P-5'-P-CCNC: 13 U/L (ref 10–44)
ANION GAP SERPL CALC-SCNC: 8 MMOL/L (ref 8–16)
ANION GAP SERPL CALC-SCNC: 8 MMOL/L (ref 8–16)
AST SERPL-CCNC: 16 U/L (ref 10–40)
BILIRUB SERPL-MCNC: 0.3 MG/DL (ref 0.1–1)
BNP SERPL-MCNC: 47 PG/ML (ref 0–99)
BUN SERPL-MCNC: 24 MG/DL (ref 8–23)
BUN SERPL-MCNC: 24 MG/DL (ref 8–23)
CALCIUM SERPL-MCNC: 9.6 MG/DL (ref 8.7–10.5)
CALCIUM SERPL-MCNC: 9.6 MG/DL (ref 8.7–10.5)
CHLORIDE SERPL-SCNC: 104 MMOL/L (ref 95–110)
CHLORIDE SERPL-SCNC: 104 MMOL/L (ref 95–110)
CO2 SERPL-SCNC: 30 MMOL/L (ref 23–29)
CO2 SERPL-SCNC: 30 MMOL/L (ref 23–29)
CREAT SERPL-MCNC: 1.2 MG/DL (ref 0.5–1.4)
CREAT SERPL-MCNC: 1.2 MG/DL (ref 0.5–1.4)
EST. GFR  (NO RACE VARIABLE): 48.4 ML/MIN/1.73 M^2
EST. GFR  (NO RACE VARIABLE): 48.4 ML/MIN/1.73 M^2
GLUCOSE SERPL-MCNC: 86 MG/DL (ref 70–110)
GLUCOSE SERPL-MCNC: 86 MG/DL (ref 70–110)
OHS QRS DURATION: 76 MS
OHS QTC CALCULATION: 430 MS
POTASSIUM SERPL-SCNC: 4.2 MMOL/L (ref 3.5–5.1)
POTASSIUM SERPL-SCNC: 4.2 MMOL/L (ref 3.5–5.1)
PROT SERPL-MCNC: 7.7 G/DL (ref 6–8.4)
SODIUM SERPL-SCNC: 142 MMOL/L (ref 136–145)
SODIUM SERPL-SCNC: 142 MMOL/L (ref 136–145)

## 2024-03-07 PROCEDURE — 93010 ELECTROCARDIOGRAM REPORT: CPT | Mod: S$GLB,,, | Performed by: INTERNAL MEDICINE

## 2024-03-07 PROCEDURE — 1159F MED LIST DOCD IN RCRD: CPT | Mod: CPTII,S$GLB,, | Performed by: NURSE PRACTITIONER

## 2024-03-07 PROCEDURE — 83880 ASSAY OF NATRIURETIC PEPTIDE: CPT

## 2024-03-07 PROCEDURE — 1160F RVW MEDS BY RX/DR IN RCRD: CPT | Mod: CPTII,S$GLB,, | Performed by: NURSE PRACTITIONER

## 2024-03-07 PROCEDURE — 93005 ELECTROCARDIOGRAM TRACING: CPT | Mod: S$GLB,,, | Performed by: INTERNAL MEDICINE

## 2024-03-07 PROCEDURE — 99214 OFFICE O/P EST MOD 30 MIN: CPT | Mod: S$GLB,,, | Performed by: NURSE PRACTITIONER

## 2024-03-07 PROCEDURE — 36415 COLL VENOUS BLD VENIPUNCTURE: CPT

## 2024-03-07 PROCEDURE — 3078F DIAST BP <80 MM HG: CPT | Mod: CPTII,S$GLB,, | Performed by: NURSE PRACTITIONER

## 2024-03-07 PROCEDURE — 3008F BODY MASS INDEX DOCD: CPT | Mod: CPTII,S$GLB,, | Performed by: NURSE PRACTITIONER

## 2024-03-07 PROCEDURE — 3288F FALL RISK ASSESSMENT DOCD: CPT | Mod: CPTII,S$GLB,, | Performed by: NURSE PRACTITIONER

## 2024-03-07 PROCEDURE — 1111F DSCHRG MED/CURRENT MED MERGE: CPT | Mod: CPTII,S$GLB,, | Performed by: NURSE PRACTITIONER

## 2024-03-07 PROCEDURE — 80053 COMPREHEN METABOLIC PANEL: CPT

## 2024-03-07 PROCEDURE — 99999 PR PBB SHADOW E&M-EST. PATIENT-LVL III: CPT | Mod: PBBFAC,,, | Performed by: NURSE PRACTITIONER

## 2024-03-07 PROCEDURE — 3075F SYST BP GE 130 - 139MM HG: CPT | Mod: CPTII,S$GLB,, | Performed by: NURSE PRACTITIONER

## 2024-03-07 PROCEDURE — 4010F ACE/ARB THERAPY RXD/TAKEN: CPT | Mod: CPTII,S$GLB,, | Performed by: NURSE PRACTITIONER

## 2024-03-07 PROCEDURE — 1101F PT FALLS ASSESS-DOCD LE1/YR: CPT | Mod: CPTII,S$GLB,, | Performed by: NURSE PRACTITIONER

## 2024-03-07 PROCEDURE — 1126F AMNT PAIN NOTED NONE PRSNT: CPT | Mod: CPTII,S$GLB,, | Performed by: NURSE PRACTITIONER

## 2024-03-07 RX ORDER — FUROSEMIDE 40 MG/1
40 TABLET ORAL DAILY PRN
Qty: 30 TABLET | Refills: 11 | Status: SHIPPED | OUTPATIENT
Start: 2024-03-07 | End: 2025-03-07

## 2024-03-07 NOTE — Clinical Note
Pt is 2 mo s/p PVC ablation, complicated by pericardial effusion. AF identified during hospitalization and she was given an event monitor, which shows AF day she got the monitor and none after. Would you be ok with loop recorder since that AF could still be seen as provoked? thx

## 2024-03-07 NOTE — TELEPHONE ENCOUNTER
"[facility]) weekly phone follow up / triage call completed.     T[facility] RN Navigator spoke with PT    Current Patient reported weight: 164.0 Lbs   Patient Goal Weight: (165.0 lbs)  Recent Patient reported blood pressure and heart rate:  BP-132/78 P-66    Pt reports the following:  []  Shortness of Breath with Activity  []  Shortness of Breath at rest   []  Fatigue  [x]  Edema a little to left foot  but not always, instructed to elevate higher than heart  [] Chest pain or tightness  [] Weight Increase since discharge  [] None of the above    Pt reports using "Daily weight and symptom tracker".    Pt reports being in the GREEN(color) Zone. If in yellow/red, reminded that they should be calling [facility] today or now.     Medications:   Medication compliance reviewed with pt.  Pt reports having medication list available and has all medications at home for use per list. RIGOBERTO Nguyen called in a new Rx for fluid med    Education:   Confirmed pt still has "[facility] Home Care Guide"  . YES    Reminded of key points as listed below.     Recommend 2 -3 gram sodium restriction and 1500 cc-2000 cc fluid restriction.  Encourage physical activity with graded exercise program.  Requested patient to weigh themselves daily, and to notify us if their weight increases by more than 3 lbs in 1 day or 5 lbs in 3 days.   Reminded to use "Daily weight and symptom tracker".  Even if pt does not have a scale, to use symptom tracker.       Watch for these Signs and Symptoms: If any of these occur, contact [facility] immediately:   Increase in shortness of breath with movement   Increase in swelling in your legs and ankles   Weight gain of more than 3 pounds in a day or 5 pounds in 3 days.   Difficulty breathing when you are lying down   Worsening fatigue or tiredness   Stomach bloating, a full feeling or a loss of appetite   Increased coughing--especially when you are lying down      Pt was " able to verbalize back to RN in their own words correct diet/fluid restrictions, necessity for exercise, warning signs and symptoms, when and how to contact their [facility] team.      Pt reminded of upcoming appointment.  PT reports they will attend. [facility] weekly call set for 3-14-24      Pt reminded of how and when to contact [facility]:  [phone] (Mon-Fri, 8a-5p) & for urgent issues on the weekend to page the Heart Transplant MD on call.  Pt also encouraged utilize [facility] messaging as well.      Pt  verbalized understanding and in agreement of plan.       Will follow up with pt at next clinic visit and RN navigator available for pt questions, issues or concerns.   Lab results reported to Pt as follows;  Labs stable with starting Entresto. RIGOBERTO placed message in portal/chart as well.

## 2024-03-08 ENCOUNTER — PATIENT OUTREACH (OUTPATIENT)
Dept: ADMINISTRATIVE | Facility: HOSPITAL | Age: 74
End: 2024-03-08
Payer: MEDICARE

## 2024-03-08 NOTE — PROGRESS NOTES
Health Maintenance Due   Topic Date Due    High Dose Statin  Never done    RSV Vaccine (Age 60+ and Pregnant patients) (1 - 1-dose 60+ series) Never done    COVID-19 Vaccine (6 - 2023-24 season) 09/01/2023      Chart reviewed. Triggered LINKS. Updated Care Everywhere.     Alisha Richardson CMA  Population Health Care Coordinator  Primary Care Team

## 2024-03-11 ENCOUNTER — PATIENT MESSAGE (OUTPATIENT)
Dept: ELECTROPHYSIOLOGY | Facility: CLINIC | Age: 74
End: 2024-03-11
Payer: MEDICARE

## 2024-03-11 ENCOUNTER — OFFICE VISIT (OUTPATIENT)
Dept: CARDIOLOGY | Facility: CLINIC | Age: 74
End: 2024-03-11
Payer: MEDICARE

## 2024-03-11 VITALS
OXYGEN SATURATION: 95 % | BODY MASS INDEX: 30.51 KG/M2 | SYSTOLIC BLOOD PRESSURE: 150 MMHG | WEIGHT: 161.63 LBS | DIASTOLIC BLOOD PRESSURE: 84 MMHG | HEIGHT: 61 IN

## 2024-03-11 DIAGNOSIS — J90 PLEURAL EFFUSION, LEFT: ICD-10-CM

## 2024-03-11 DIAGNOSIS — I10 ESSENTIAL HYPERTENSION: ICD-10-CM

## 2024-03-11 DIAGNOSIS — I48.91 ATRIAL FIBRILLATION, UNSPECIFIED TYPE: ICD-10-CM

## 2024-03-11 DIAGNOSIS — I48.20 CHRONIC ATRIAL FIBRILLATION: Primary | ICD-10-CM

## 2024-03-11 DIAGNOSIS — I31.39 PERICARDIAL EFFUSION: ICD-10-CM

## 2024-03-11 DIAGNOSIS — I50.20 HFREF (HEART FAILURE WITH REDUCED EJECTION FRACTION): Primary | ICD-10-CM

## 2024-03-11 LAB — FUNGUS SPEC CULT: NORMAL

## 2024-03-11 PROCEDURE — 4010F ACE/ARB THERAPY RXD/TAKEN: CPT | Mod: CPTII,S$GLB,, | Performed by: PHYSICIAN ASSISTANT

## 2024-03-11 PROCEDURE — 1101F PT FALLS ASSESS-DOCD LE1/YR: CPT | Mod: CPTII,S$GLB,, | Performed by: PHYSICIAN ASSISTANT

## 2024-03-11 PROCEDURE — 3079F DIAST BP 80-89 MM HG: CPT | Mod: CPTII,S$GLB,, | Performed by: PHYSICIAN ASSISTANT

## 2024-03-11 PROCEDURE — 99999 PR PBB SHADOW E&M-EST. PATIENT-LVL III: CPT | Mod: PBBFAC,,, | Performed by: PHYSICIAN ASSISTANT

## 2024-03-11 PROCEDURE — 1159F MED LIST DOCD IN RCRD: CPT | Mod: CPTII,S$GLB,, | Performed by: PHYSICIAN ASSISTANT

## 2024-03-11 PROCEDURE — 3077F SYST BP >= 140 MM HG: CPT | Mod: CPTII,S$GLB,, | Performed by: PHYSICIAN ASSISTANT

## 2024-03-11 PROCEDURE — 99214 OFFICE O/P EST MOD 30 MIN: CPT | Mod: S$GLB,,, | Performed by: PHYSICIAN ASSISTANT

## 2024-03-11 PROCEDURE — 1126F AMNT PAIN NOTED NONE PRSNT: CPT | Mod: CPTII,S$GLB,, | Performed by: PHYSICIAN ASSISTANT

## 2024-03-11 PROCEDURE — 3008F BODY MASS INDEX DOCD: CPT | Mod: CPTII,S$GLB,, | Performed by: PHYSICIAN ASSISTANT

## 2024-03-11 PROCEDURE — 3288F FALL RISK ASSESSMENT DOCD: CPT | Mod: CPTII,S$GLB,, | Performed by: PHYSICIAN ASSISTANT

## 2024-03-11 NOTE — PROGRESS NOTES
Cardiology Clinic Note  Reason for Visit: Hospital follow up     HPI:     PMHx:  PVC s/p ablation 1/18/2024  - complicated by pericardial effusion   - follows with Dr. Scot ARANDA  HTN  CKD 3  Gout      Rajesh Mas is a 73 y.o. F, who presents for follow up. Since her last visit she returned to the hospital (2/3-2/9) due to worsened SOB. She was diuresed for left sided pleural effusion, and was treated for pneumonia. An echo during that time showed an EF of 35-40%. She has seen [facility] twice. She started entresto 24-26 mg BID on 3/1. She was advised by pulmonology to increase furosemide to 40 mg qD on 3/1 as well. Her shortness of breath is much improved. She checks weight and BP daily at home. BP is slightly elevated. Renal function is stable. She is having an ILR placed 4/5 secondary to episode of afib during initial hospitalization.     CHATA Das HPI 1/26/2024  Presents for hospital follow up. She underwent ablation for PVCs 1/18, which was unfortunately complicated by pericardial and pleural effusion. She underwent pericardiocentesis 1/18. Repeat echo next day showed trivial pericardial effusion. Patient developed acute hypoxic resp failure from left pleural effusion and acute diastolic dysfunction. Improved with diuretic therapy. Treated for CAP with azithro and ceftriaxone. On 1/22 patient developed atrial fib with RVR and started on IV dilt. On 1/23/24 patient converted to normal sinus rhythm and was discharged that day. She is currently wearing 30 day event monitor and scheduled for follow up with EP 3/7. She was discharged with home O2, which she has been using, but she does not feel significantly short of breath and would like to stop using O2 as soon as it's safe to do so. She was discharged on metoprolol succinate 25 mg qD, and her previous BP meds (losartan 100 mg and triamterene/HCTZ) are still being held. Her BP has been low/normal at home.     ROS:    Pertinent ROS included in HPI and  below.  PMH:     Past Medical History:   Diagnosis Date    Allergy     seasonal    Diverticulitis     Fever blister     Hypertension     Personal history of colonic polyps      Past Surgical History:   Procedure Laterality Date    ABLATION N/A 1/18/2024    Procedure: Ablation;  Surgeon: Santi Ellison MD;  Location: [facility] EP LAB;  Service: Cardiology;  Laterality: N/A;  PVC, RFA, PEPE, anes, MB, 3 Prep,*prepare to map LVOT and RVOT*    CHOLECYSTECTOMY  04/26/2017    COLON SURGERY      COLONOSCOPY N/A 12/06/2016    Procedure: COLONOSCOPY;  Surgeon: Fidel Mason MD;  Location: [facility] ENDO (4TH FLR);  Service: Endoscopy;  Laterality: N/A;    COLONOSCOPY N/A 05/17/2022    Procedure: COLONOSCOPY;  Surgeon: RUSSELL Rolon MD;  Location: [facility] ENDO (4TH FLR);  Service: Endoscopy;  Laterality: N/A;  fully vaccinated, prep instr portal -ml    partial colectomy  1997    sigmoid removed due to diverticulitis    PERICARDIOCENTESIS N/A 1/18/2024    Procedure: Pericardiocentesis;  Surgeon: Santi Ellison MD;  Location: [facility] EP LAB;  Service: Cardiology;  Laterality: N/A;    SINUS SURGERY  2007    SMALL INTESTINE SURGERY  June 1997@ [facility]    Partial Sigm Colon /Divaticulitus     Allergies:     Review of patient's allergies indicates:   Allergen Reactions    Nickel sutures [surgical stainless steel]     Adhesive Rash     Medications:     Current Outpatient Medications on File Prior to Visit   Medication Sig Dispense Refill    allopurinoL (ZYLOPRIM) 300 MG tablet Take one tablet by mouth once daily in combination with 50mg tablets for a daily dose of 350mg 90 tablet 3    aspirin (ECOTRIN) 81 MG EC tablet Take 1 tablet (81 mg total) by mouth once daily. 30 tablet 0    furosemide (LASIX) 40 MG tablet Take 1 tablet (40 mg total) by mouth daily as needed (for shortness of breath, swelling or 3lb weight gain on home scale). 30 tablet 11    metoprolol succinate (TOPROL-XL) 25 MG 24 hr tablet Take 1 tablet (25 mg total) by mouth  "once daily. 30 tablet 11    oxybutynin (DITROPAN) 5 MG Tab Take 1 tablet (5 mg total) by mouth 2 (two) times daily. 180 tablet 1    predniSONE (DELTASONE) 2.5 MG tablet Take one tablet by mouth once daily 90 tablet 1    sacubitriL-valsartan (ENTRESTO) 24-26 mg per tablet Take 1 tablet by mouth 2 (two) times daily. 60 tablet 11     No current facility-administered medications on file prior to visit.     Social History:     Social History     Tobacco Use    Smoking status: Never    Smokeless tobacco: Never   Substance Use Topics    Alcohol use: No     Family History:     Family History   Problem Relation Age of Onset    Colon cancer Maternal Grandmother         colon cancer    Breast cancer Maternal Grandmother     Diabetes Mother     Heart failure Mother     Hypertension Mother     Heart disease Mother     Kidney disease Mother         Dialysis    Other Brother         prostate issue    No Known Problems Sister     No Known Problems Daughter     No Known Problems Daughter     No Known Problems Son     No Known Problems Son     Liver cancer Maternal Uncle     Melanoma Neg Hx     Ovarian cancer Neg Hx     Cancer Neg Hx      Physical Exam:   BP (!) 150/84   Ht 5' 1" (1.549 m)   Wt 73.3 kg (161 lb 9.6 oz)   LMP 11/28/1988 (Approximate)   SpO2 95%   BMI 30.53 kg/m²      Physical Exam  Vitals and nursing note reviewed.   Constitutional:       Appearance: Normal appearance.   HENT:      Head: Normocephalic and atraumatic.   Neck:      Vascular: Normal carotid pulses. No carotid bruit or hepatojugular reflux.   Cardiovascular:      Rate and Rhythm: Normal rate and regular rhythm.      Chest Wall: PMI is not displaced.      Pulses:           Radial pulses are 2+ on the right side and 2+ on the left side.        Dorsalis pedis pulses are 2+ on the right side and 2+ on the left side.        Posterior tibial pulses are 2+ on the right side and 2+ on the left side.      Heart sounds: No murmur heard.  Pulmonary:      Effort: " Pulmonary effort is normal.      Breath sounds: Normal breath sounds. No wheezing, rhonchi or rales.   Abdominal:      General: Bowel sounds are normal. There is no abdominal bruit.      Palpations: Abdomen is soft. There is no pulsatile mass.      Tenderness: There is no abdominal tenderness.   Feet:      Right foot:      Skin integrity: Skin integrity normal.      Left foot:      Skin integrity: Skin integrity normal.   Skin:     Capillary Refill: Capillary refill takes less than 2 seconds.   Neurological:      General: No focal deficit present.      Mental Status: She is alert.   Psychiatric:         Mood and Affect: Mood and affect normal.         Speech: Speech normal.         Behavior: Behavior is cooperative.         Thought Content: Thought content normal.          Labs:     Blood Tests:  Lab Results   Component Value Date    BNP 47 03/07/2024     03/07/2024     03/07/2024    K 4.2 03/07/2024    K 4.2 03/07/2024     03/07/2024     03/07/2024    CO2 30 (H) 03/07/2024    CO2 30 (H) 03/07/2024    BUN 24 (H) 03/07/2024    BUN 24 (H) 03/07/2024    CREATININE 1.2 03/07/2024    CREATININE 1.2 03/07/2024    GLU 86 03/07/2024    GLU 86 03/07/2024    HGBA1C 5.2 01/13/2023    MG 2.2 03/01/2024    AST 16 03/07/2024    ALT 13 03/07/2024    ALBUMIN 3.1 (L) 03/07/2024    PROT 7.7 03/07/2024    BILITOT 0.3 03/07/2024    WBC 12.01 02/20/2024    HGB 11.6 (L) 02/20/2024    HCT 37.4 02/20/2024    MCV 96 02/20/2024     02/20/2024    INR 1.1 02/03/2024    TSH 3.418 02/20/2024       Lab Results   Component Value Date    CHOL 196 06/29/2023    CHOL 174 03/15/2017    HDL 62 06/29/2023    TRIG 174 (H) 06/29/2023       Lab Results   Component Value Date    LDLCALC 99.2 06/29/2023         Imaging:     Echocardiogram  TTE 2/20/2024    Left Ventricle: Global hypokinesis present. There is moderately reduced systolic function with a visually estimated ejection fraction of 35 - 40%. Biplane (2D) method of  discs ejection fraction is 41%. Global longitudinal strain is -11.0%. Grade I diastolic dysfunction.    Right Ventricle: Normal right ventricular cavity size. Wall thickness is normal. Right ventricle wall motion  is normal. Systolic function is normal.    Left Atrium: Left atrium is mildly dilated. There is an aneurysm along the interatrial septum.    Aortic Valve: The aortic valve is a trileaflet valve. There is mild aortic valve sclerosis.    Pulmonary Artery: The estimated pulmonary artery systolic pressure is 25 mmHg.    IVC/SVC: Normal venous pressure at 3 mmHg.    Pericardium: There is a trivial circumferential effusion.     TTE 2024    Irregular rhyhm with tachycardia up to 138 bpm.    Left Ventricle: The left ventricle is normal in size. Normal wall thickness. Normal wall motion. There is moderately reduced systolic function. Ejection fraction by visual approximation is 35%. Unable to assess diastolic function due to atrial fibrillation.    Right Ventricle: Normal right ventricular cavity size. Wall thickness is normal. Right ventricle wall motion  is normal. Systolic function is normal.    Pulmonary Artery: The estimated pulmonary artery systolic pressure is 20 mmHg.    IVC/SVC: Normal venous pressure at 3 mmHg.    Stress testing  None    Cath Lab  None    Other  None    [id number]  Normal sinus rhythm   ST elevation, consider early repolarization, pericarditis, or injury   Nonspecific ST and T wave abnormality   Abnormal ECG   When compared with ECG of 2024 09:53,   Sinus rhythm has replaced Atrial fibrillation or SVT   Vent. rate has decreased BY  94 BPM     Assessment:     1. HFrEF (heart failure with reduced ejection fraction)    2. Pericardial effusion    3. Pleural effusion, left    4. Essential hypertension    5. Atrial fibrillation, unspecified type          Plan:     HFrEF (heart failure with reduced ejection fraction)  -     Echo; Future; Expected date: 2024  Check echo  again in 3 months     Pericardial effusion  Resolved, continue to monitor     Pleural effusion, left  Message sent to pulmonology asking how long they plan to recommend that she continue furosemide 40 mg once daily. Ideally her dose of entresto should be increased, however I'd like to understand the plan for diuresis prior to making that change.     Essential hypertension  Slightly elevated at home, planning to increase entresto as above.     Atrial fibrillation, unspecified type  ILR with Dr. Ellison 4/5        Signed:  Rebeca Das PA-C  Cardiology     3/11/2024 9:42 AM    Follow-up:     Future Appointments   Date Time Provider Department Center   3/21/2024  9:30 AM Christie Walker PA-C [facility] PCW   3/25/2024  3:00 PM Christy Hernandez MD [facility]AREK [place] [facility]   4/5/2024  9:30 AM [facility] OIC-MAMMO1 [facility] MAMMOIC Imaging [facility]   4/5/2024  1:30 PM 3PREP, KEIR[facility] SSCU [facility]   4/8/2024  8:30 AM Rebeca Das PA-C [facility] CARDIO [facility]   4/9/2024 11:30 AM Barbra Woodard, NP [facility] NEPHRO [facility]   4/26/2024  4:00 PM Walter Carr MD [facility]   5/3/2024 10:00 AM Fly Boss MD [facility] RHEUM [facility]   5/21/2024 11:00 AM Gail Villarreal MD [facility] IM [facility] PCW   6/17/2024  9:30 AM ECHO, [facility] ECHOSTR [facility]

## 2024-03-14 ENCOUNTER — TELEPHONE (OUTPATIENT)
Dept: CARDIOLOGY | Facility: CLINIC | Age: 74
End: 2024-03-14
Payer: MEDICARE

## 2024-03-14 NOTE — TELEPHONE ENCOUNTER
"[facility]) DISCHARGE VISIT - PHONE     Called and spoke to Ms Mena    Most Recent Hospital Discharge Date:  February 9, 2024    Last admission Diagnosis/chief complaint:  SOB BLEE    Pt discharge completed by phone related to: pt stable on HF medications, no reported sxs to clinic    Pt dis enroll weight 162 lbs BP: 120s/ 70s P: 64    Pt reports the following:  []  Shortness of Breath with activity  []  Shortness of Breath at rest   []  Fatigue  [x]  Edema: minimal "happens at end of the day"  [] Chest pain or tightness  [] Weight Increase since discharge  [] None of the above    Medications:   Medication reconciliation completed today per RN.  Pt reports having all medications available and understands how to take them appropriately. Reminded pt to call prior to making any changes to medications.   Pt reports not having to take PRN Lasix. RN instructed pt to contact [employer] provider RIGOBERTO Das if she takes Lasix 3 dsys or more in  a row. Pt verbalized understanding.    Education:   [x] Confirmed pt still has  "[facility] Home Care Guide" .   Reviewed key points as listed below.     Recommend 2 gram sodium restriction and 1500cc fluid restriction.  Encourage physical activity with graded exercise program.  Requested patient to weigh themselves daily, and to notify us if their weight increases by more than 3 lbs in 1 day or 5 lbs in 3 days.     [x] Reviewed completed "Daily Weight and Symptom Tracker".  Reviewed with patient when and how to call HFT[facility] according to "Yellow Zone" and "Red Zone".       Watch for these Signs and Symptoms: If any of these occur, contact HFT[facility] immediately:   Increase in shortness of breath with movement   Increase in swelling in your legs and ankles   Weight gain of more than 3 pounds in a night or 5 pounds in 3 days.   Difficulty breathing when you are lying down   Worsening fatigue or tiredness   Stomach bloating, a " full feeling or a loss of appetite   Increased coughing--especially when you are lying down    MyChart and Care Companion:   Patient active on myChart? Yes, patient uses regularly.    HF TCC Program Plan:  Pt has successfully completed HFT[facility] program.  Pt care to be transferred to [facility]/ PCP for long term care.     Pt educated on how to call their offices and how to call [facility] On call in the event of an after hour issue.    PT reminded to continue to follow recommendations made during the H[facility] program to include monitoring daily weights, taking medications according to list, following up to appointments per provider recommendations, stop smoking/ start exercising and following a heart friendly low salt, low fluid diet.      Pt was able to verbalize back to RN in their own words correct diet/fluid restrictions, necessity for exercise, warning signs and symptoms, when and how to contact their  Long term care team .      Plan:     Per Epic note. Pt to f/u with RIGOBERTO Das if she takes PRN Lasix 3 or more days in  row with no improvement in sxs. Pt has already established care in [facility] since DC.    [x]  Discussed upcoming appointments and/or plan for follow-up care with his/her PCP/Cardiology Pt seen by [facility] provider on March 11, 2024        Please refer to provider note for additional details and assessment.

## 2024-03-18 ENCOUNTER — HOSPITAL ENCOUNTER (OUTPATIENT)
Dept: RADIOLOGY | Facility: OTHER | Age: 74
Discharge: HOME | End: 2024-03-18
Attending: INTERNAL MEDICINE
Payer: MEDICARE

## 2024-03-18 DIAGNOSIS — Z12.31 ENCOUNTER FOR SCREENING MAMMOGRAM FOR BREAST CANCER: ICD-10-CM

## 2024-03-18 PROCEDURE — 77063 BREAST TOMOSYNTHESIS BI: CPT | Mod: 26,,, | Performed by: RADIOLOGY

## 2024-03-18 PROCEDURE — 77067 SCR MAMMO BI INCL CAD: CPT | Mod: TC

## 2024-03-18 PROCEDURE — 77067 SCR MAMMO BI INCL CAD: CPT | Mod: 26,,, | Performed by: RADIOLOGY

## 2024-03-19 DIAGNOSIS — R35.0 URINARY FREQUENCY: ICD-10-CM

## 2024-03-20 RX ORDER — OXYBUTYNIN CHLORIDE 5 MG/1
5 TABLET ORAL 2 TIMES DAILY
Qty: 180 TABLET | Refills: 3 | Status: SHIPPED | OUTPATIENT
Start: 2024-03-20

## 2024-03-20 NOTE — TELEPHONE ENCOUNTER
Refill Decision Note   Rajesh Mas  is requesting a refill authorization.  Brief Assessment and Rationale for Refill:  Approve     Medication Therapy Plan:         Comments:     Note composed:10:26 AM 03/20/2024

## 2024-03-21 ENCOUNTER — OFFICE VISIT (OUTPATIENT)
Dept: INTERNAL MEDICINE | Facility: CLINIC | Age: 74
End: 2024-03-21
Payer: MEDICARE

## 2024-03-21 VITALS
SYSTOLIC BLOOD PRESSURE: 122 MMHG | HEIGHT: 61 IN | WEIGHT: 163.38 LBS | OXYGEN SATURATION: 99 % | HEART RATE: 59 BPM | DIASTOLIC BLOOD PRESSURE: 78 MMHG | BODY MASS INDEX: 30.85 KG/M2

## 2024-03-21 DIAGNOSIS — Z00.00 ENCOUNTER FOR MEDICARE ANNUAL WELLNESS EXAM: Primary | ICD-10-CM

## 2024-03-21 DIAGNOSIS — N18.32 STAGE 3B CHRONIC KIDNEY DISEASE: ICD-10-CM

## 2024-03-21 DIAGNOSIS — I31.39 PERICARDIAL EFFUSION: ICD-10-CM

## 2024-03-21 DIAGNOSIS — J96.11 CHRONIC HYPOXEMIC RESPIRATORY FAILURE: ICD-10-CM

## 2024-03-21 DIAGNOSIS — D69.6 THROMBOCYTOPENIA: ICD-10-CM

## 2024-03-21 DIAGNOSIS — Z99.81 DEPENDENCE ON SUPPLEMENTAL OXYGEN: ICD-10-CM

## 2024-03-21 DIAGNOSIS — I10 ESSENTIAL HYPERTENSION: Chronic | ICD-10-CM

## 2024-03-21 DIAGNOSIS — I48.91 ATRIAL FIBRILLATION, UNSPECIFIED TYPE: ICD-10-CM

## 2024-03-21 DIAGNOSIS — E03.8 SUBCLINICAL HYPOTHYROIDISM: ICD-10-CM

## 2024-03-21 DIAGNOSIS — I70.0 AORTIC ATHEROSCLEROSIS: ICD-10-CM

## 2024-03-21 DIAGNOSIS — J90 PLEURAL EFFUSION, LEFT: ICD-10-CM

## 2024-03-21 DIAGNOSIS — I51.89 DIASTOLIC DYSFUNCTION: ICD-10-CM

## 2024-03-21 DIAGNOSIS — Z87.39 HISTORY OF GOUT: ICD-10-CM

## 2024-03-21 DIAGNOSIS — I50.22 HEART FAILURE WITH MILDLY REDUCED EJECTION FRACTION: ICD-10-CM

## 2024-03-21 PROCEDURE — G0439 PPPS, SUBSEQ VISIT: HCPCS | Mod: S$GLB,,, | Performed by: PHYSICIAN ASSISTANT

## 2024-03-21 PROCEDURE — 3288F FALL RISK ASSESSMENT DOCD: CPT | Mod: CPTII,S$GLB,, | Performed by: PHYSICIAN ASSISTANT

## 2024-03-21 PROCEDURE — 1125F AMNT PAIN NOTED PAIN PRSNT: CPT | Mod: CPTII,S$GLB,, | Performed by: PHYSICIAN ASSISTANT

## 2024-03-21 PROCEDURE — 4010F ACE/ARB THERAPY RXD/TAKEN: CPT | Mod: CPTII,S$GLB,, | Performed by: PHYSICIAN ASSISTANT

## 2024-03-21 PROCEDURE — 3078F DIAST BP <80 MM HG: CPT | Mod: CPTII,S$GLB,, | Performed by: PHYSICIAN ASSISTANT

## 2024-03-21 PROCEDURE — 99999 PR PBB SHADOW E&M-EST. PATIENT-LVL V: CPT | Mod: PBBFAC,,, | Performed by: PHYSICIAN ASSISTANT

## 2024-03-21 PROCEDURE — 1170F FXNL STATUS ASSESSED: CPT | Mod: CPTII,S$GLB,, | Performed by: PHYSICIAN ASSISTANT

## 2024-03-21 PROCEDURE — 1160F RVW MEDS BY RX/DR IN RCRD: CPT | Mod: CPTII,S$GLB,, | Performed by: PHYSICIAN ASSISTANT

## 2024-03-21 PROCEDURE — 1101F PT FALLS ASSESS-DOCD LE1/YR: CPT | Mod: CPTII,S$GLB,, | Performed by: PHYSICIAN ASSISTANT

## 2024-03-21 PROCEDURE — 3074F SYST BP LT 130 MM HG: CPT | Mod: CPTII,S$GLB,, | Performed by: PHYSICIAN ASSISTANT

## 2024-03-21 PROCEDURE — 1159F MED LIST DOCD IN RCRD: CPT | Mod: CPTII,S$GLB,, | Performed by: PHYSICIAN ASSISTANT

## 2024-03-21 NOTE — PATIENT INSTRUCTIONS
Counseling and Referral of Other Preventative  (Italic type indicates deductible and co-insurance are waived)    Patient Name: Rajesh Mas  Today's Date: 3/21/2024    Health Maintenance       Date Due Completion Date    High Dose Statin Never done ---    RSV Vaccine (Age 60+ and Pregnant patients) (1 - 1-dose 60+ series) Never done ---    COVID-19 Vaccine (6 - 2023-24 season) 09/01/2023 5/3/2022    Mammogram 03/18/2025 3/18/2024    Override on 10/1/2015: Done    TETANUS VACCINE 11/02/2026 11/2/2016    Colorectal Cancer Screening 05/17/2027 5/17/2022    Override on 12/6/2016: Done    DEXA Scan 02/23/2028 2/23/2018    Lipid Panel 06/29/2028 6/29/2023        No orders of the defined types were placed in this encounter.    The following information is provided to all patients.  This information is to help you find resources for any of the problems found today that may be affecting your health:                  Living healthy guide: www.[facility].[place].gov      Understanding Diabetes: www.diabetes.org      Eating healthy: www.cdc.gov/healthyweight      [facility] home safety checklist: www.cdc.gov/steadi/patient.html      Agency on Aging: www.goea.[place].gov      Alcoholics anonymous (AA): www.aa.org      Physical Activity: www.ilir.nih.gov/mc8gbhi      Tobacco use: www.quitwithusla.org

## 2024-03-21 NOTE — PROGRESS NOTES
"I offered to discuss advanced care planning, including how to pick a person who would make decisions for you if you were unable to make them for yourself, called a health care power of , and what kind of decisions you might make such as use of life sustaining treatments such as ventilators and tube feeding when faced with a life limiting illness recorded on a living will that they will need to know. (How you want to be cared for as you near the end of your natural life)     X Patient is interested in learning more about how to make advanced directives.  I provided them paperwork and offered to discuss this with them.    Rajesh Mas presented for a  Medicare AWV and comprehensive Health Risk Assessment today. The following components were reviewed and updated:    Medical history  Family History  Social history  Allergies and Current Medications  Health Risk Assessment  Health Maintenance  Care Team         ** See Completed Assessments for Annual Wellness Visit within the encounter summary.**         The following assessments were completed:  Living Situation  CAGE  Depression Screening  Timed Get Up and Go  Whisper Test  Cognitive Function Screening  Nutrition Screening  ADL Screening  PAQ Screening      Opioid documentation:      Patient does not have a current opioid prescription.        Vitals:    03/21/24 0928   BP: 122/78   BP Location: Right arm   Patient Position: Sitting   BP Method: Medium (Manual)   Pulse: (!) 59   SpO2: 99%   Weight: 74.1 kg (163 lb 5.8 oz)   Height: 5' 1" (1.549 m)     Body mass index is 30.87 kg/m².  Physical Exam  Vitals and nursing note reviewed.   Constitutional:       Appearance: Normal appearance. She is well-developed.   HENT:      Head: Normocephalic.      Right Ear: External ear normal.      Left Ear: External ear normal.   Eyes:      Pupils: Pupils are equal, round, and reactive to light.   Cardiovascular:      Rate and Rhythm: Normal rate and regular rhythm.      " Heart sounds: Normal heart sounds. No murmur heard.     No friction rub. No gallop.   Pulmonary:      Effort: Pulmonary effort is normal. No respiratory distress.      Breath sounds: Normal breath sounds.   Abdominal:      Palpations: Abdomen is soft.      Tenderness: There is no abdominal tenderness.   Skin:     General: Skin is warm and dry.   Neurological:      Mental Status: She is alert.               Diagnoses and health risks identified today and associated recommendations/orders:    1. Encounter for Medicare annual wellness exam  Assessments completed.  Preventative health recommendations reviewed.    - Ambulatory Referral/Consult to Enhanced Annual Wellness Visit (eAWV)    2. Dependence on supplemental oxygen  Stable, controlled on current medical therapy, followed by PCP.    3. Chronic hypoxemic respiratory failure  Stable, controlled on current medical therapy, followed by PCP.    4. Pleural effusion, left  Stable, controlled on current medical therapy, followed by PCP and pulmonology.    5. Essential hypertension  Stable, controlled on current medical therapy, followed by PCP.    6. Diastolic dysfunction  Stable, controlled on current medical therapy, followed by PCP and cardiology.    7. Pericardial effusion  Stable, controlled on current medical therapy, followed by PCP and pulmonology.    8. Atrial fibrillation, unspecified type  Stable, controlled on current medical therapy, followed by PCP and cardiology.    9. Aortic atherosclerosis  Stable, controlled on current medical therapy, followed by PCP.    10. Heart failure with mildly reduced ejection fraction  Stable, controlled on current medical therapy, followed by PCP and cardiology.  Using lasix only prn, 1/week typically.    11. Stage 3b chronic kidney disease  Stable, controlled on current medical therapy, followed by PCP.    12. Thrombocytopenia  Stable, controlled on current medical therapy, followed by PCP.    13. Subclinical  hypothyroidism  Stable, controlled on current medical therapy, followed by PCP.    14. History of gout  Stable, controlled on current medical therapy, followed by PCP.    RSV vaccine today    Provided V[place] with a 5-10 year written screening schedule and personal prevention plan. Recommendations were developed using the USPSTF age appropriate recommendations. Education, counseling, and referrals were provided as needed. After Visit Summary printed and given to patient which includes a list of additional screenings\tests needed.    Follow up if symptoms worsen or fail to improve.    Christie Walker PA-C

## 2024-03-25 ENCOUNTER — PATIENT MESSAGE (OUTPATIENT)
Dept: INTERNAL MEDICINE | Facility: CLINIC | Age: 74
End: 2024-03-25
Payer: MEDICARE

## 2024-03-25 ENCOUNTER — OFFICE VISIT (OUTPATIENT)
Dept: HEMATOLOGY/ONCOLOGY | Facility: CLINIC | Age: 74
End: 2024-03-25
Payer: MEDICARE

## 2024-03-25 VITALS
TEMPERATURE: 98 F | OXYGEN SATURATION: 95 % | WEIGHT: 166.25 LBS | RESPIRATION RATE: 18 BRPM | HEART RATE: 61 BPM | DIASTOLIC BLOOD PRESSURE: 90 MMHG | BODY MASS INDEX: 31.39 KG/M2 | HEIGHT: 61 IN | SYSTOLIC BLOOD PRESSURE: 145 MMHG

## 2024-03-25 DIAGNOSIS — R80.3 FREE MONOCLONAL LIGHT CHAIN: ICD-10-CM

## 2024-03-25 PROCEDURE — 3288F FALL RISK ASSESSMENT DOCD: CPT | Mod: CPTII,S$GLB,, | Performed by: INTERNAL MEDICINE

## 2024-03-25 PROCEDURE — 1159F MED LIST DOCD IN RCRD: CPT | Mod: CPTII,S$GLB,, | Performed by: INTERNAL MEDICINE

## 2024-03-25 PROCEDURE — 99999 PR PBB SHADOW E&M-EST. PATIENT-LVL IV: CPT | Mod: PBBFAC,,, | Performed by: INTERNAL MEDICINE

## 2024-03-25 PROCEDURE — 3080F DIAST BP >= 90 MM HG: CPT | Mod: CPTII,S$GLB,, | Performed by: INTERNAL MEDICINE

## 2024-03-25 PROCEDURE — 1101F PT FALLS ASSESS-DOCD LE1/YR: CPT | Mod: CPTII,S$GLB,, | Performed by: INTERNAL MEDICINE

## 2024-03-25 PROCEDURE — 4010F ACE/ARB THERAPY RXD/TAKEN: CPT | Mod: CPTII,S$GLB,, | Performed by: INTERNAL MEDICINE

## 2024-03-25 PROCEDURE — 1126F AMNT PAIN NOTED NONE PRSNT: CPT | Mod: CPTII,S$GLB,, | Performed by: INTERNAL MEDICINE

## 2024-03-25 PROCEDURE — 3008F BODY MASS INDEX DOCD: CPT | Mod: CPTII,S$GLB,, | Performed by: INTERNAL MEDICINE

## 2024-03-25 PROCEDURE — 99203 OFFICE O/P NEW LOW 30 MIN: CPT | Mod: S$GLB,,, | Performed by: INTERNAL MEDICINE

## 2024-03-25 PROCEDURE — 3077F SYST BP >= 140 MM HG: CPT | Mod: CPTII,S$GLB,, | Performed by: INTERNAL MEDICINE

## 2024-03-25 NOTE — PROGRESS NOTES
Subjective:       Patient ID: Rajesh Mas is a 73 y.o. female.    Chief Complaint: Abnormal Lab    HPI    Referral for abnormal paraprotein labs.  Slight elevation in kappa free light chain, IgG 1662, normal M protein.  No CRAB criteria  No lytic bone lesions on recent imaging.    Review of Systems   Constitutional:  Negative for appetite change and unexpected weight change.   HENT:  Negative for mouth sores.    Eyes:  Negative for visual disturbance.   Respiratory:  Positive for cough. Negative for shortness of breath.    Cardiovascular:  Negative for chest pain.   Gastrointestinal:  Negative for abdominal pain and diarrhea.   Musculoskeletal:  Negative for back pain.   Integumentary:  Negative for rash.   Neurological:  Negative for headaches.   Hematological:  Negative for adenopathy.   Psychiatric/Behavioral:  The patient is not nervous/anxious.          Objective:      Physical Exam  Vitals and nursing note reviewed.   Constitutional:       Appearance: She is well-developed.   HENT:      Head: Normocephalic and atraumatic.   Eyes:      General: No scleral icterus.     Conjunctiva/sclera: Conjunctivae normal.   Cardiovascular:      Rate and Rhythm: Normal rate.   Pulmonary:      Effort: Pulmonary effort is normal. No respiratory distress.   Abdominal:      General: There is no distension.      Tenderness: There is no abdominal tenderness.   Musculoskeletal:         General: Normal range of motion.      Cervical back: Normal range of motion and neck supple.   Skin:     General: Skin is warm and dry.   Neurological:      Mental Status: She is alert and oriented to person, place, and time.      Cranial Nerves: No cranial nerve deficit.   Psychiatric:         Behavior: Behavior normal.         Current Outpatient Medications   Medication Sig Dispense Refill    allopurinoL (ZYLOPRIM) 300 MG tablet Take one tablet by mouth once daily in combination with 50mg tablets for a daily dose of 350mg 90 tablet 3     furosemide (LASIX) 40 MG tablet Take 1 tablet (40 mg total) by mouth daily as needed (for shortness of breath, swelling or 3lb weight gain on home scale). 30 tablet 11    metoprolol succinate (TOPROL-XL) 25 MG 24 hr tablet Take 1 tablet (25 mg total) by mouth once daily. 30 tablet 11    oxybutynin (DITROPAN) 5 MG Tab Take 1 tablet (5 mg total) by mouth 2 (two) times daily. 180 tablet 3    predniSONE (DELTASONE) 2.5 MG tablet Take one tablet by mouth once daily 90 tablet 1    sacubitriL-valsartan (ENTRESTO) 24-26 mg per tablet Take 1 tablet by mouth 2 (two) times daily. 60 tablet 11    aspirin (ECOTRIN) 81 MG EC tablet Take 1 tablet (81 mg total) by mouth once daily. 30 tablet 0     No current facility-administered medications for this visit.      Lab Results   Component Value Date    WBC 12.01 02/20/2024    HGB 11.6 (L) 02/20/2024    HCT 37.4 02/20/2024    MCV 96 02/20/2024     02/20/2024        CMP  Sodium   Date Value Ref Range Status   03/07/2024 142 136 - 145 mmol/L Final   03/07/2024 142 136 - 145 mmol/L Final     Potassium   Date Value Ref Range Status   03/07/2024 4.2 3.5 - 5.1 mmol/L Final   03/07/2024 4.2 3.5 - 5.1 mmol/L Final     Chloride   Date Value Ref Range Status   03/07/2024 104 95 - 110 mmol/L Final   03/07/2024 104 95 - 110 mmol/L Final     CO2   Date Value Ref Range Status   03/07/2024 30 (H) 23 - 29 mmol/L Final   03/07/2024 30 (H) 23 - 29 mmol/L Final     Glucose   Date Value Ref Range Status   03/07/2024 86 70 - 110 mg/dL Final   03/07/2024 86 70 - 110 mg/dL Final     BUN   Date Value Ref Range Status   03/07/2024 24 (H) 8 - 23 mg/dL Final   03/07/2024 24 (H) 8 - 23 mg/dL Final     Creatinine   Date Value Ref Range Status   03/07/2024 1.2 0.5 - 1.4 mg/dL Final   03/07/2024 1.2 0.5 - 1.4 mg/dL Final     Calcium   Date Value Ref Range Status   03/07/2024 9.6 8.7 - 10.5 mg/dL Final   03/07/2024 9.6 8.7 - 10.5 mg/dL Final     Total Protein   Date Value Ref Range Status   03/07/2024 7.7 6.0  - 8.4 g/dL Final     Albumin   Date Value Ref Range Status   03/07/2024 3.1 (L) 3.5 - 5.2 g/dL Final     Total Bilirubin   Date Value Ref Range Status   03/07/2024 0.3 0.1 - 1.0 mg/dL Final     Comment:     For infants and newborns, interpretation of results should be based  on gestational age, weight and in agreement with clinical  observations.    Premature Infant recommended reference ranges:  Up to 24 hours.............<8.0 mg/dL  Up to 48 hours............<12.0 mg/dL  3-5 days..................<15.0 mg/dL  6-29 days.................<15.0 mg/dL       Alkaline Phosphatase   Date Value Ref Range Status   03/07/2024 57 55 - 135 U/L Final     AST   Date Value Ref Range Status   03/07/2024 16 10 - 40 U/L Final     ALT   Date Value Ref Range Status   03/07/2024 13 10 - 44 U/L Final     Anion Gap   Date Value Ref Range Status   03/07/2024 8 8 - 16 mmol/L Final   03/07/2024 8 8 - 16 mmol/L Final     eGFR if    Date Value Ref Range Status   05/03/2022 53.3 (A) >60 mL/min/1.73 m^2 Final     eGFR if non    Date Value Ref Range Status   05/03/2022 46.2 (A) >60 mL/min/1.73 m^2 Final     Comment:     Calculation used to obtain the estimated glomerular filtration  rate (eGFR) is the CKD-EPI equation.             Assessment:       Problem List Items Addressed This Visit    None  Visit Diagnoses       Free monoclonal light chain                Plan:       Reactive pattern on paraprotein labs.  No concern for malignancy.  Ok for ongoing observation.  Return to hematology as needed.      A total of 20 minutes was spent in pre-visit chart review, personal interpretation of labs and imaging, and medication review. Total visit time 30 minutes, >50 % counseling.

## 2024-03-26 LAB
ACID FAST MOD KINY STN SPEC: NORMAL
MYCOBACTERIUM SPEC QL CULT: NORMAL

## 2024-04-01 DIAGNOSIS — M1A.0720 IDIOPATHIC CHRONIC GOUT OF LEFT FOOT WITHOUT TOPHUS: ICD-10-CM

## 2024-04-01 RX ORDER — PREDNISONE 2.5 MG/1
TABLET ORAL
Qty: 90 TABLET | Refills: 1 | Status: SHIPPED | OUTPATIENT
Start: 2024-04-01 | End: 2024-04-18

## 2024-04-01 RX ORDER — ALLOPURINOL 300 MG/1
TABLET ORAL
Qty: 90 TABLET | Refills: 3 | Status: SHIPPED | OUTPATIENT
Start: 2024-04-01

## 2024-04-01 RX ORDER — ALLOPURINOL 100 MG/1
TABLET ORAL
Qty: 45 TABLET | Refills: 1 | Status: SHIPPED | OUTPATIENT
Start: 2024-04-01

## 2024-04-03 ENCOUNTER — TELEPHONE (OUTPATIENT)
Dept: ELECTROPHYSIOLOGY | Facility: CLINIC | Age: 74
End: 2024-04-03
Payer: MEDICARE

## 2024-04-03 NOTE — TELEPHONE ENCOUNTER
Spoke to patient    CONFIRMED procedure arrival time of 1:30pm    Reiterated instructions including:  -Directions to check in desk  -Confirmed absence or presence of implanted device/stimulator no devices or stimulators  -Confirmed no fever, cough, or shortness of breath in the past 30 days  -Confirmed no redness, rash, irritation, or yeast infection to groin area.   -Bathe night prior and morning prior to procedure with Hibiclens solution or an antibacterial soap    Patient verbalized understanding of above and appreciated the call.

## 2024-04-05 ENCOUNTER — HOSPITAL ENCOUNTER (OUTPATIENT)
Facility: HOSPITAL | Age: 74
Discharge: HOME | End: 2024-04-05
Attending: INTERNAL MEDICINE | Admitting: INTERNAL MEDICINE
Payer: MEDICARE

## 2024-04-05 VITALS
HEART RATE: 57 BPM | OXYGEN SATURATION: 96 % | RESPIRATION RATE: 18 BRPM | SYSTOLIC BLOOD PRESSURE: 159 MMHG | TEMPERATURE: 98 F | DIASTOLIC BLOOD PRESSURE: 75 MMHG

## 2024-04-05 DIAGNOSIS — Z95.9 CARDIAC DEVICE IN SITU: ICD-10-CM

## 2024-04-05 DIAGNOSIS — I48.20 CHRONIC ATRIAL FIBRILLATION: ICD-10-CM

## 2024-04-05 LAB
OHS QRS DURATION: 80 MS
OHS QTC CALCULATION: 435 MS

## 2024-04-05 PROCEDURE — 93010 ELECTROCARDIOGRAM REPORT: CPT | Mod: ,,, | Performed by: INTERNAL MEDICINE

## 2024-04-05 PROCEDURE — 33285 INSJ SUBQ CAR RHYTHM MNTR: CPT | Performed by: INTERNAL MEDICINE

## 2024-04-05 PROCEDURE — 93005 ELECTROCARDIOGRAM TRACING: CPT

## 2024-04-05 PROCEDURE — 33285 INSJ SUBQ CAR RHYTHM MNTR: CPT | Mod: ,,, | Performed by: INTERNAL MEDICINE

## 2024-04-05 PROCEDURE — 25000003 PHARM REV CODE 250: Performed by: INTERNAL MEDICINE

## 2024-04-05 PROCEDURE — 63600175 PHARM REV CODE 636 W HCPCS: Performed by: INTERNAL MEDICINE

## 2024-04-05 PROCEDURE — C1764 EVENT RECORDER, CARDIAC: HCPCS | Performed by: INTERNAL MEDICINE

## 2024-04-05 DEVICE — LUX-DX™ INSERTABLE CARDIAC MONITOR
Type: IMPLANTABLE DEVICE | Site: CHEST | Status: FUNCTIONAL
Brand: LUX-DX™ INSERTABLE CARDIAC MONITOR

## 2024-04-05 RX ORDER — LIDOCAINE HYDROCHLORIDE AND EPINEPHRINE 10; 10 MG/ML; UG/ML
INJECTION, SOLUTION INFILTRATION; PERINEURAL
Status: DISCONTINUED | OUTPATIENT
Start: 2024-04-05 | End: 2024-04-05 | Stop reason: HOSPADM

## 2024-04-05 RX ORDER — ACETAMINOPHEN 325 MG/1
650 TABLET ORAL EVERY 4 HOURS PRN
OUTPATIENT
Start: 2024-04-05

## 2024-04-05 RX ORDER — CEFAZOLIN SODIUM 1 G/3ML
INJECTION, POWDER, FOR SOLUTION INTRAMUSCULAR; INTRAVENOUS
Status: DISCONTINUED | OUTPATIENT
Start: 2024-04-05 | End: 2024-04-05 | Stop reason: HOSPADM

## 2024-04-05 NOTE — NURSING
Pt arrived to [facility], Blanca GUNDERSON bedside. Pt is AAOx4. Room air. VSS.  Dermabond in place to left chest wall.  Device representative bedside.

## 2024-04-05 NOTE — NURSING
IV removed. NAD noted. Mepilex placed on incision site.  DC paperwork given and reviewed w pt.  Pt verbalized DC instructions.  Pt voided without any difficulty.    Spouse bedside. Pt walking off unit w spouse.

## 2024-04-05 NOTE — DISCHARGE INSTRUCTIONS
Post-Procedure Patient Discharge Instructions  Loop Recorders     Wound Care  You are discharged with a standard dressing over the incision, you may remove the dressing after 24 hours.   There is Dermabond (clear glue) over your incision, do not scrub it off. It acts as a barrier and will eventually disappear.  Do not get the incision wet for 48 hours following the procedure. You may sponge bath during this period, working around the incision. After 48 hours, you may shower, but you should still try to keep this area as dry as possible, and avoid direct water contact to the incision (allow the water to hit back of your shoulder rather than directly on the incision). Gently pat the incision dry if it does get wet.  You may take regular showers after 2 weeks, unless otherwise indicated at your follow-up visit.  Do not submerge the incision in a tub, pool, or body of water for 6 weeks.  If you notice unusual swelling, redness, drainage, have more device site pain, chest pain, shortness of breath, or have a fever greater than 100 degrees, call our device clinic immediately: [phone] or [phone] during normal office hours. You may call [phone] after-hours or on weekends and ask for the electrophysiologist on call.     Any need to reschedule or cancel procedures, or any questions regarding your procedures should be addressed directly with the Arrhythmia Department Nurses at the following phone number: [phone].

## 2024-04-08 ENCOUNTER — LAB VISIT (OUTPATIENT)
Dept: LAB | Facility: HOSPITAL | Age: 74
End: 2024-04-08
Payer: MEDICARE

## 2024-04-08 ENCOUNTER — OFFICE VISIT (OUTPATIENT)
Dept: CARDIOLOGY | Facility: CLINIC | Age: 74
End: 2024-04-08
Payer: MEDICARE

## 2024-04-08 VITALS
BODY MASS INDEX: 30.96 KG/M2 | WEIGHT: 164 LBS | HEART RATE: 56 BPM | DIASTOLIC BLOOD PRESSURE: 79 MMHG | SYSTOLIC BLOOD PRESSURE: 151 MMHG | OXYGEN SATURATION: 95 % | HEIGHT: 61 IN

## 2024-04-08 DIAGNOSIS — I10 ESSENTIAL HYPERTENSION: ICD-10-CM

## 2024-04-08 DIAGNOSIS — I48.91 ATRIAL FIBRILLATION, UNSPECIFIED TYPE: ICD-10-CM

## 2024-04-08 DIAGNOSIS — N18.32 STAGE 3B CHRONIC KIDNEY DISEASE: ICD-10-CM

## 2024-04-08 DIAGNOSIS — I50.22 HEART FAILURE WITH MILDLY REDUCED EJECTION FRACTION: Primary | ICD-10-CM

## 2024-04-08 DIAGNOSIS — Z98.890 S/P RADIOFREQUENCY ABLATION OPERATION FOR ARRHYTHMIA: ICD-10-CM

## 2024-04-08 DIAGNOSIS — Z95.818 IMPLANTABLE LOOP RECORDER PRESENT: ICD-10-CM

## 2024-04-08 DIAGNOSIS — J90 PLEURAL EFFUSION, LEFT: ICD-10-CM

## 2024-04-08 DIAGNOSIS — I31.39 PERICARDIAL EFFUSION: ICD-10-CM

## 2024-04-08 DIAGNOSIS — I49.3 PVCS (PREMATURE VENTRICULAR CONTRACTIONS): ICD-10-CM

## 2024-04-08 DIAGNOSIS — Z86.79 S/P RADIOFREQUENCY ABLATION OPERATION FOR ARRHYTHMIA: ICD-10-CM

## 2024-04-08 LAB
BILIRUB UR QL STRIP: NEGATIVE
CLARITY UR REFRACT.AUTO: CLEAR
COLOR UR AUTO: YELLOW
CREAT UR-MCNC: 71 MG/DL (ref 15–325)
GLUCOSE UR QL STRIP: NEGATIVE
HGB UR QL STRIP: NEGATIVE
KETONES UR QL STRIP: NEGATIVE
LEUKOCYTE ESTERASE UR QL STRIP: NEGATIVE
NITRITE UR QL STRIP: NEGATIVE
PH UR STRIP: 6 [PH] (ref 5–8)
PROT UR QL STRIP: NEGATIVE
PROT UR-MCNC: <7 MG/DL (ref 0–15)
PROT/CREAT UR: NORMAL MG/G{CREAT} (ref 0–0.2)
SP GR UR STRIP: 1.01 (ref 1–1.03)
URN SPEC COLLECT METH UR: NORMAL

## 2024-04-08 PROCEDURE — 3077F SYST BP >= 140 MM HG: CPT | Mod: CPTII,S$GLB,, | Performed by: PHYSICIAN ASSISTANT

## 2024-04-08 PROCEDURE — 1159F MED LIST DOCD IN RCRD: CPT | Mod: CPTII,S$GLB,, | Performed by: PHYSICIAN ASSISTANT

## 2024-04-08 PROCEDURE — 99214 OFFICE O/P EST MOD 30 MIN: CPT | Mod: S$GLB,,, | Performed by: PHYSICIAN ASSISTANT

## 2024-04-08 PROCEDURE — 3288F FALL RISK ASSESSMENT DOCD: CPT | Mod: CPTII,S$GLB,, | Performed by: PHYSICIAN ASSISTANT

## 2024-04-08 PROCEDURE — 99999 PR PBB SHADOW E&M-EST. PATIENT-LVL III: CPT | Mod: PBBFAC,,, | Performed by: PHYSICIAN ASSISTANT

## 2024-04-08 PROCEDURE — 1126F AMNT PAIN NOTED NONE PRSNT: CPT | Mod: CPTII,S$GLB,, | Performed by: PHYSICIAN ASSISTANT

## 2024-04-08 PROCEDURE — 3078F DIAST BP <80 MM HG: CPT | Mod: CPTII,S$GLB,, | Performed by: PHYSICIAN ASSISTANT

## 2024-04-08 PROCEDURE — 84156 ASSAY OF PROTEIN URINE: CPT | Performed by: NURSE PRACTITIONER

## 2024-04-08 PROCEDURE — 81003 URINALYSIS AUTO W/O SCOPE: CPT | Performed by: NURSE PRACTITIONER

## 2024-04-08 PROCEDURE — 3008F BODY MASS INDEX DOCD: CPT | Mod: CPTII,S$GLB,, | Performed by: PHYSICIAN ASSISTANT

## 2024-04-08 PROCEDURE — 4010F ACE/ARB THERAPY RXD/TAKEN: CPT | Mod: CPTII,S$GLB,, | Performed by: PHYSICIAN ASSISTANT

## 2024-04-08 PROCEDURE — 1101F PT FALLS ASSESS-DOCD LE1/YR: CPT | Mod: CPTII,S$GLB,, | Performed by: PHYSICIAN ASSISTANT

## 2024-04-08 RX ORDER — SACUBITRIL AND VALSARTAN 49; 51 MG/1; MG/1
1 TABLET, FILM COATED ORAL 2 TIMES DAILY
Qty: 180 TABLET | Refills: 3 | Status: SHIPPED | OUTPATIENT
Start: 2024-04-08 | End: 2025-04-08

## 2024-04-08 NOTE — DISCHARGE SUMMARY
[facility] - Cardiology  Cardiac Electrophysiology  Discharge Summary      Patient Name: Rajesh Mas  MRN: [id number]  Admission Date: 4/5/2024  Hospital Length of Stay: 0 days  Discharge Date and Time: 4/5/2024  4:35 PM  Attending Physician: No att. providers found    Discharging Provider: Jamel Lucas MD  Primary Care Physician: Gail Villarreal MD    HPI:   No notes on file    Procedure(s) (LRB):  Insertion, Implantable Loop Recorder (N/A)     Indwelling Lines/Drains at time of discharge:  Lines/Drains/Airways       None                   Hospital Course:  S/p ILR implant.     Goals of Care Treatment Preferences:  Code Status: Full Code      Consults:     Significant Diagnostic Studies: Labs: All labs within the past 24 hours have been reviewed    Pending Diagnostic Studies:       None            There are no hospital problems to display for this patient.    No new Assessment & Plan notes have been filed under this hospital service since the last note was generated.  Service: Arrhythmia      Discharged Condition: stable    Disposition: Home or Self Care    Follow Up:    Patient Instructions:   No discharge procedures on file.  Medications:  Reconciled Home Medications:      Medication List        CONTINUE taking these medications      * allopurinoL 300 MG tablet  Commonly known as: ZYLOPRIM  Take one tablet by mouth once daily in combination with 50mg tablets for a daily dose of 350mg     * allopurinoL 100 MG tablet  Commonly known as: ZYLOPRIM  Take ½ tablet (50mg) with 300mg tablet for a total of 350 mg daily.     aspirin 81 MG EC tablet  Commonly known as: ECOTRIN  Take 1 tablet (81 mg total) by mouth once daily.     furosemide 40 MG tablet  Commonly known as: LASIX  Take 1 tablet (40 mg total) by mouth daily as needed (for shortness of breath, swelling or 3lb weight gain on home scale).     metoprolol succinate 25 MG 24 hr tablet  Commonly known as: TOPROL-XL  Take 1 tablet (25 mg total) by mouth once  daily.     oxybutynin 5 MG Tab  Commonly known as: DITROPAN  Take 1 tablet (5 mg total) by mouth 2 (two) times daily.     predniSONE 2.5 MG tablet  Commonly known as: DELTASONE  Take one tablet by mouth once daily           * This list has 2 medication(s) that are the same as other medications prescribed for you. Read the directions carefully, and ask your doctor or other care provider to review them with you.                  Time spent on the discharge of patient: 15 minutes    Jamel Lucas MD  Cardiac Electrophysiology  [facility] - Cardiology

## 2024-04-08 NOTE — PROGRESS NOTES
Cardiology Clinic Note  Reason for Visit: HFrEF    HPI:     PMHx:  PVC s/p ablation 1/18/2024  - complicated by pericardial effusion   - follows with Dr. Scot ARANDA  Paroxysmal afib  - s/p ILR  HTN  CKD 3  Gout      Rajesh Mas is a 73 y.o. F, who presents for follow up. Since her last visit she has had improvement in shortness of breath. She is taking furosemide 40 mg only as needed for leg swelling. She last needed a dose this past Wednesday. She had ILR device implanted this past Friday. The incision appears to be healing well. Her weight is stable at home. Her BP is normal. She continues to monitor her vitals daily at home and is maintaining a low sodium diet.     CHATA Das HPI 3/11/2024  Presents for follow up. Since her last visit she returned to the hospital (2/3-2/9) due to worsened SOB. She was diuresed for left sided pleural effusion, and was treated for pneumonia. An echo during that time showed an EF of 35-40%. She has seen [facility] twice. She started entresto 24-26 mg BID on 3/1. She was advised by pulmonology to increase furosemide to 40 mg qD on 3/1 as well. Her shortness of breath is much improved. She checks weight and BP daily at home. BP is slightly elevated. Renal function is stable. She is having an ILR placed 4/5 secondary to episode of afib during initial hospitalization.     CHATA Das HPI 1/26/2024  Presents for hospital follow up. She underwent ablation for PVCs 1/18, which was unfortunately complicated by pericardial and pleural effusion. She underwent pericardiocentesis 1/18. Repeat echo next day showed trivial pericardial effusion. Patient developed acute hypoxic resp failure from left pleural effusion and acute diastolic dysfunction. Improved with diuretic therapy. Treated for CAP with azithro and ceftriaxone. On 1/22 patient developed atrial fib with RVR and started on IV dilt. On 1/23/24 patient converted to normal sinus rhythm and was discharged that day. She is currently  wearing 30 day event monitor and scheduled for follow up with EP 3/7. She was discharged with home O2, which she has been using, but she does not feel significantly short of breath and would like to stop using O2 as soon as it's safe to do so. She was discharged on metoprolol succinate 25 mg qD, and her previous BP meds (losartan 100 mg and triamterene/HCTZ) are still being held. Her BP has been low/normal at home.     ROS:    Pertinent ROS included in HPI and below.  PMH:     Past Medical History:   Diagnosis Date    Allergy     seasonal    Diverticulitis     Fever blister     Hypertension     Personal history of colonic polyps      Past Surgical History:   Procedure Laterality Date    ABLATION N/A 1/18/2024    Procedure: Ablation;  Surgeon: Santi Ellison MD;  Location: [facility] EP LAB;  Service: Cardiology;  Laterality: N/A;  PVC, RFA, PEPE, anes, MB, 3 Prep,*prepare to map LVOT and RVOT*    CHOLECYSTECTOMY  04/26/2017    COLON SURGERY      COLONOSCOPY N/A 12/06/2016    Procedure: COLONOSCOPY;  Surgeon: Fidel Mason MD;  Location: [facility] ENDO (4TH FLR);  Service: Endoscopy;  Laterality: N/A;    COLONOSCOPY N/A 05/17/2022    Procedure: COLONOSCOPY;  Surgeon: RUSSELL Rolon MD;  Location: [facility] ENDO (4TH FLR);  Service: Endoscopy;  Laterality: N/A;  fully vaccinated, prep instr portal -ml    INSERTION OF IMPLANTABLE LOOP RECORDER N/A 4/5/2024    Procedure: Insertion, Implantable Loop Recorder;  Surgeon: Santi Ellison MD;  Location: [facility] EP LAB;  Service: Cardiology;  Laterality: N/A;  a fib, ILR, BSCI, Local, MB, 3 Prep    partial colectomy  1997    sigmoid removed due to diverticulitis    PERICARDIOCENTESIS N/A 1/18/2024    Procedure: Pericardiocentesis;  Surgeon: Santi Ellison MD;  Location: [facility] EP LAB;  Service: Cardiology;  Laterality: N/A;    SINUS SURGERY  2007    SMALL INTESTINE SURGERY  June 1997@ [facility]    Partial Sigm Colon /Divaticulitus     Allergies:     Review of patient's allergies  indicates:   Allergen Reactions    Adhesive Rash    Nickel sutures [surgical stainless steel] Itching and Rash     Medications:     Current Outpatient Medications on File Prior to Visit   Medication Sig Dispense Refill    allopurinoL (ZYLOPRIM) 100 MG tablet Take ½ tablet (50mg) with 300mg tablet for a total of 350 mg daily. 45 tablet 1    allopurinoL (ZYLOPRIM) 300 MG tablet Take one tablet by mouth once daily in combination with 50mg tablets for a daily dose of 350mg 90 tablet 3    aspirin (ECOTRIN) 81 MG EC tablet Take 1 tablet (81 mg total) by mouth once daily. 30 tablet 0    furosemide (LASIX) 40 MG tablet Take 1 tablet (40 mg total) by mouth daily as needed (for shortness of breath, swelling or 3lb weight gain on home scale). 30 tablet 11    metoprolol succinate (TOPROL-XL) 25 MG 24 hr tablet Take 1 tablet (25 mg total) by mouth once daily. 30 tablet 11    oxybutynin (DITROPAN) 5 MG Tab Take 1 tablet (5 mg total) by mouth 2 (two) times daily. 180 tablet 3    predniSONE (DELTASONE) 2.5 MG tablet Take one tablet by mouth once daily 90 tablet 1    [DISCONTINUED] sacubitriL-valsartan (ENTRESTO) 24-26 mg per tablet Take 1 tablet by mouth 2 (two) times daily. 60 tablet 11     Current Facility-Administered Medications on File Prior to Visit   Medication Dose Route Frequency Provider Last Rate Last Admin    ceFAZolin 2 g in dextrose 5 % in water (D5W) 50 mL IVPB (MB+)  2 g Intravenous On Call Procedure Graciela Guerin NP         Social History:     Social History     Tobacco Use    Smoking status: Never    Smokeless tobacco: Never   Substance Use Topics    Alcohol use: No     Family History:     Family History   Problem Relation Age of Onset    Colon cancer Maternal Grandmother         colon cancer    Breast cancer Maternal Grandmother     Diabetes Mother     Heart failure Mother     Hypertension Mother     Heart disease Mother     Kidney disease Mother         Dialysis    Other Brother         prostate issue  "   No Known Problems Sister     No Known Problems Daughter     No Known Problems Daughter     No Known Problems Son     No Known Problems Son     Liver cancer Maternal Uncle     Melanoma Neg Hx     Ovarian cancer Neg Hx     Cancer Neg Hx      Physical Exam:   BP (!) 151/79   Pulse (!) 56   Ht 5' 1" (1.549 m)   Wt 74.4 kg (164 lb 0.4 oz)   LMP 11/28/1988 (Approximate)   SpO2 95%   BMI 30.99 kg/m²      Physical Exam  Vitals and nursing note reviewed.   Constitutional:       Appearance: Normal appearance.   HENT:      Head: Normocephalic and atraumatic.   Neck:      Vascular: Normal carotid pulses. No carotid bruit or hepatojugular reflux.   Cardiovascular:      Rate and Rhythm: Normal rate and regular rhythm.      Chest Wall: PMI is not displaced.      Pulses:           Radial pulses are 2+ on the right side and 2+ on the left side.        Dorsalis pedis pulses are 2+ on the right side and 2+ on the left side.        Posterior tibial pulses are 2+ on the right side and 2+ on the left side.      Heart sounds: No murmur heard.  Pulmonary:      Effort: Pulmonary effort is normal.      Breath sounds: Normal breath sounds. No wheezing, rhonchi or rales.   Abdominal:      General: Bowel sounds are normal. There is no abdominal bruit.      Palpations: Abdomen is soft. There is no pulsatile mass.      Tenderness: There is no abdominal tenderness.   Feet:      Right foot:      Skin integrity: Skin integrity normal.      Left foot:      Skin integrity: Skin integrity normal.   Skin:     Capillary Refill: Capillary refill takes less than 2 seconds.      Comments: ILR incision healing well    Neurological:      General: No focal deficit present.      Mental Status: She is alert.   Psychiatric:         Mood and Affect: Mood and affect normal.         Speech: Speech normal.         Behavior: Behavior is cooperative.         Thought Content: Thought content normal.          Labs:     Blood Tests:  Lab Results   Component Value " Date    BNP 47 03/07/2024     04/08/2024    K 4.3 04/08/2024     04/08/2024    CO2 29 04/08/2024    BUN 25 (H) 04/08/2024    CREATININE 1.2 04/08/2024    GLU 90 04/08/2024    HGBA1C 5.2 01/13/2023    MG 2.2 03/01/2024    AST 16 03/07/2024    ALT 13 03/07/2024    ALBUMIN 3.5 04/08/2024    PROT 7.7 03/07/2024    BILITOT 0.3 03/07/2024    WBC 7.13 04/08/2024    HGB 12.7 04/08/2024    HCT 40.1 04/08/2024    MCV 93 04/08/2024     04/08/2024    INR 1.1 02/03/2024    TSH 3.418 02/20/2024       Lab Results   Component Value Date    CHOL 196 06/29/2023    CHOL 174 03/15/2017    HDL 62 06/29/2023    TRIG 174 (H) 06/29/2023       Lab Results   Component Value Date    LDLCALC 99.2 06/29/2023         Imaging:     Echocardiogram  TTE 2/20/2024    Left Ventricle: Global hypokinesis present. There is moderately reduced systolic function with a visually estimated ejection fraction of 35 - 40%. Biplane (2D) method of discs ejection fraction is 41%. Global longitudinal strain is -11.0%. Grade I diastolic dysfunction.    Right Ventricle: Normal right ventricular cavity size. Wall thickness is normal. Right ventricle wall motion  is normal. Systolic function is normal.    Left Atrium: Left atrium is mildly dilated. There is an aneurysm along the interatrial septum.    Aortic Valve: The aortic valve is a trileaflet valve. There is mild aortic valve sclerosis.    Pulmonary Artery: The estimated pulmonary artery systolic pressure is 25 mmHg.    IVC/SVC: Normal venous pressure at 3 mmHg.    Pericardium: There is a trivial circumferential effusion.     TTE 1/22/2024    Irregular rhyhm with tachycardia up to 138 bpm.    Left Ventricle: The left ventricle is normal in size. Normal wall thickness. Normal wall motion. There is moderately reduced systolic function. Ejection fraction by visual approximation is 35%. Unable to assess diastolic function due to atrial fibrillation.    Right Ventricle: Normal right ventricular cavity  size. Wall thickness is normal. Right ventricle wall motion  is normal. Systolic function is normal.    Pulmonary Artery: The estimated pulmonary artery systolic pressure is 20 mmHg.    IVC/SVC: Normal venous pressure at 3 mmHg.    Stress testing  None    Cath Lab  None    Other  None    [id number]  Sinus bradycardia with occasional Premature ventricular complexes   T wave abnormality, consider inferolateral ischemia   Abnormal ECG   When compared with ECG of 07-MAR-2024 09:22,   Premature ventricular complexes are now Present     Assessment:     1. Heart failure with mildly reduced ejection fraction    2. Pericardial effusion    3. Pleural effusion, left    4. Essential hypertension    5. Atrial fibrillation, unspecified type    6. Implantable loop recorder present    7. PVCs (premature ventricular contractions)    8. S/P radiofrequency ablation operation for arrhythmia            Plan:     HFrEF (heart failure with reduced ejection fraction)  Echo planned for    Increase entresto to 49-51 mg BID  BMP in 2 weeks   Continue metoprolol succinate 25 mg qD  Continue furosemide 40 mg PRN for weight gain   Limit dietary sodium to < 2g/day   Limit fluid intake to <1500 cc/day   Daily weights     Pericardial effusion  Pleural effusion, left  Resolved, continue to monitor     Essential hypertension  Slightly elevated at home, planning to increase entresto as above.     Atrial fibrillation  S/p ILR  PVCs  S/p RFA  ILR with Dr. Ellison   Will be seen by EP clinic 4/15 for incision check  Appears to be healing well today        Signed:  Rebeca Das PA-C  Cardiology     2024 9:42 AM    Follow-up:     Future Appointments   Date Time Provider Department Center   2024 11:30 AM Barbra Woodard NP [facility] NEPHRO [facility]   4/15/2024  2:40 PM PACEMAKER, ICD [facility] ARRHPRO [facility]   2024  9:10 AM LAB, APPOINTMENT [facility] INTMED [facility] LAB IM [facility] PCW   2024  4:00 PM Walter Carr MD [facility] PULMSVC  [facility]   5/3/2024 10:00 AM Fly Boss MD [facility]   5/21/2024 11:00 AM Gail Villarreal MD [facility] PCW   6/17/2024  9:30 AM ECHO, [facility] NOM ECHOSTR [facility]

## 2024-04-09 ENCOUNTER — OFFICE VISIT (OUTPATIENT)
Dept: NEPHROLOGY | Facility: CLINIC | Age: 74
End: 2024-04-09
Payer: MEDICARE

## 2024-04-09 VITALS
OXYGEN SATURATION: 98 % | BODY MASS INDEX: 30.8 KG/M2 | SYSTOLIC BLOOD PRESSURE: 136 MMHG | HEART RATE: 62 BPM | WEIGHT: 163 LBS | DIASTOLIC BLOOD PRESSURE: 79 MMHG

## 2024-04-09 DIAGNOSIS — Z87.39 HISTORY OF GOUT: ICD-10-CM

## 2024-04-09 DIAGNOSIS — N18.31 STAGE 3A CHRONIC KIDNEY DISEASE: Primary | ICD-10-CM

## 2024-04-09 DIAGNOSIS — I10 ESSENTIAL HYPERTENSION: ICD-10-CM

## 2024-04-09 DIAGNOSIS — I50.22 HEART FAILURE WITH MILDLY REDUCED EJECTION FRACTION: ICD-10-CM

## 2024-04-09 DIAGNOSIS — I51.89 DIASTOLIC DYSFUNCTION: ICD-10-CM

## 2024-04-09 DIAGNOSIS — N25.81 SECONDARY HYPERPARATHYROIDISM OF RENAL ORIGIN: ICD-10-CM

## 2024-04-09 DIAGNOSIS — E79.0 HYPERURICEMIA: ICD-10-CM

## 2024-04-09 DIAGNOSIS — R76.8 POSITIVE ANA (ANTINUCLEAR ANTIBODY): ICD-10-CM

## 2024-04-09 PROCEDURE — 3066F NEPHROPATHY DOC TX: CPT | Mod: CPTII,S$GLB,, | Performed by: NURSE PRACTITIONER

## 2024-04-09 PROCEDURE — 3008F BODY MASS INDEX DOCD: CPT | Mod: CPTII,S$GLB,, | Performed by: NURSE PRACTITIONER

## 2024-04-09 PROCEDURE — 4010F ACE/ARB THERAPY RXD/TAKEN: CPT | Mod: CPTII,S$GLB,, | Performed by: NURSE PRACTITIONER

## 2024-04-09 PROCEDURE — 3078F DIAST BP <80 MM HG: CPT | Mod: CPTII,S$GLB,, | Performed by: NURSE PRACTITIONER

## 2024-04-09 PROCEDURE — 1101F PT FALLS ASSESS-DOCD LE1/YR: CPT | Mod: CPTII,S$GLB,, | Performed by: NURSE PRACTITIONER

## 2024-04-09 PROCEDURE — 3075F SYST BP GE 130 - 139MM HG: CPT | Mod: CPTII,S$GLB,, | Performed by: NURSE PRACTITIONER

## 2024-04-09 PROCEDURE — 99999 PR PBB SHADOW E&M-EST. PATIENT-LVL III: CPT | Mod: PBBFAC,,, | Performed by: NURSE PRACTITIONER

## 2024-04-09 PROCEDURE — 1126F AMNT PAIN NOTED NONE PRSNT: CPT | Mod: CPTII,S$GLB,, | Performed by: NURSE PRACTITIONER

## 2024-04-09 PROCEDURE — 3288F FALL RISK ASSESSMENT DOCD: CPT | Mod: CPTII,S$GLB,, | Performed by: NURSE PRACTITIONER

## 2024-04-09 PROCEDURE — 99214 OFFICE O/P EST MOD 30 MIN: CPT | Mod: S$GLB,,, | Performed by: NURSE PRACTITIONER

## 2024-04-09 RX ORDER — ERGOCALCIFEROL 1.25 MG/1
50000 CAPSULE ORAL
Qty: 4 CAPSULE | Refills: 2 | Status: SHIPPED | OUTPATIENT
Start: 2024-04-09 | End: 2024-07-04

## 2024-04-09 NOTE — PROGRESS NOTES
Subjective:       Patient ID: Rajesh Mas is a 73 y.o. Black or  female who presents for evaluation of renal dysfunction.    HPI     Patient is new to me. No prior nephrologist.  Prior pertinent chart reviewed since this is patient's first appointment with me.    Patient presents for new evaluation of CKD 3b.  Baseline creatinine of ~1-1.3 since . Now trending up to 1.5--> 1.9 on last labs.     Significant other medical problems include HTN (reports dx ), diastolic dysfunction, HLD.  The patient denies taking NSAIDs, herbal supplements, or new antibiotics, recreational drugs, recent episode of dehydration, diarrhea, nausea or vomiting, acute illness, hospitalization or exposure to IV radiocontrast. She reports start atorvastatin at the beginning of July and stopped 2 weeks later due to myalgias. She drinks about 40 oz of water per day. She has been spending more time outside with increased perspiration. Bps at home range 126-130s/60. She denies recent gout flares and has been maintained on allopurinol 300mg qd. Uric acid trended up to 9.9 in . Reports crawfish intake during this time.     Significant family hx includes:   Paternal Grandfather ()    Paternal Grandmother ()    Maternal Grandmother () Colon cancer     Breast cancer   Maternal Grandfather ()    Father ( at age 60)    Mother ( at age 79) Diabetes    Heart failure    Hypertension    Heart disease    Kidney disease    Brother Other    Brother ()    Brother ()    Sister No Known Problems   Daughter No Known Problems   Daughter No Known Problems   Son No Known Problems   Son No Known Problems   Maternal Uncle Liver cancer       Last renal US: None available , reviewed.    Update 10/10/23:  - Presents for follow-up of CKD  - Now following with rheumatology. Allopurinol increased to 350mg qd.   - Denies gout flares or new issues today.     Update 24:  - Presents  for follow-up of CKD. sCr 1.2.  - S/p admission in January for palpitations where she underwent RFA for PVCs complicated by moderate pericardial effusion requiring emergency pericardiocentesis (250cc drained). Admission further complicated by acute resp failure, A fib with RVR requiring cardizem gtt (converted to NSR), and diastolic dysfunction requiring diuresis. Treated for CAP as well.    - Admitted February 2024 for left pleural effusion requiring thoracentesis   - She is doing well. Has not required home O2.       Review of Systems   Respiratory:  Negative for shortness of breath.    Cardiovascular:  Negative for chest pain and leg swelling.   Gastrointestinal:  Negative for diarrhea, nausea and vomiting.   Genitourinary:  Negative for difficulty urinating, dysuria and hematuria.   All other systems reviewed and are negative.      Objective:       Blood pressure 136/79, pulse 62, weight 73.9 kg (163 lb), last menstrual period 11/28/1988, SpO2 98 %.  Physical Exam  Vitals reviewed.   Constitutional:       Appearance: Normal appearance.   HENT:      Head: Normocephalic and atraumatic.   Eyes:      General: No scleral icterus.  Cardiovascular:      Rate and Rhythm: Normal rate.   Pulmonary:      Effort: Pulmonary effort is normal. No respiratory distress.   Musculoskeletal:      Right lower leg: No edema.      Left lower leg: No edema.   Skin:     General: Skin is warm and dry.   Neurological:      Mental Status: She is alert and oriented to person, place, and time.   Psychiatric:         Mood and Affect: Mood normal.         Behavior: Behavior normal.         Lab Results   Component Value Date    CREATININE 1.2 04/08/2024    URICACID 4.1 12/28/2023     Prot/Creat Ratio, Urine   Date Value Ref Range Status   04/08/2024 Unable to calculate 0.00 - 0.20 Final   08/15/2023 0.11 0.00 - 0.20 Final     Lab Results   Component Value Date     04/08/2024    K 4.3 04/08/2024    CO2 29 04/08/2024     04/08/2024      Lab Results   Component Value Date    .3 (H) 04/08/2024    CALCIUM 9.6 04/08/2024    PHOS 2.8 04/08/2024     Lab Results   Component Value Date    HGB 12.7 04/08/2024    WBC 7.13 04/08/2024    HCT 40.1 04/08/2024      Lab Results   Component Value Date    HGBA1C 5.2 01/13/2023     04/08/2024    BUN 25 (H) 04/08/2024     Lab Results   Component Value Date    LDLCALC 99.2 06/29/2023         Assessment:       1. Stage 3a chronic kidney disease    2. Secondary hyperparathyroidism of renal origin    3. Hyperuricemia    4. Positive MICHELLE (antinuclear antibody)    5. Essential hypertension    6. Heart failure with mildly reduced ejection fraction    7. Diastolic dysfunction    8. History of gout            Plan:   CKD stage 3a -  - Baseline sCr 1-1.3 since 2016 (last at baseline on labs September 2022). Previously trending up to 1.5--> 1.9  - CKD clinically related to longstanding HTN +/- uric acid nephropathy  - Continue adequate hydration. Avoid NSAIDs. Monitor Bps at home.     UPCR Nonproteinuric; continue ARNi   Acid-base WNL   Renal osteodystrophy Ca and phos stable. PTH elevated. Vit D low.   Start ergo qwk   Anemia WNL   DM No history   Lipid Management Myalgias with statin. CPK okay.    ESRD planning Provided education about the stages of CKD, usual progression, and need to monitor for and treat CKD-related disease in an effort to delay progression of CKD.     Defer advanced planning     HTN - Controlled on Entresto, metoprolol, lasix 40 PRN    Hyperuricemia  - Allopurinol 350mg qd  - Following with rheumatology   - No recent gout flares  - Discussed low purine diet. Recommend holding HCTZ.     Positive MICHELLE  - W/u per rheumatology    HFrEF/ diastolic dysfunction - now on entresto, lasix PRN. Defer to cardiology.     All questions patient had were answered.  Asked if further questions. None. F/u in clinic with labs and urine prior to next visit or sooner if needed.  ER for emergency  concerns.    Summary of Plan:  - Rx ergo qwk  - RTC 4 months with labs

## 2024-04-12 ENCOUNTER — CLINICAL SUPPORT (OUTPATIENT)
Dept: CARDIOLOGY | Facility: HOSPITAL | Age: 74
End: 2024-04-12
Attending: INTERNAL MEDICINE
Payer: MEDICARE

## 2024-04-12 DIAGNOSIS — I48.20 CHRONIC ATRIAL FIBRILLATION: ICD-10-CM

## 2024-04-12 DIAGNOSIS — I48.0 PAROXYSMAL ATRIAL FIBRILLATION: ICD-10-CM

## 2024-04-17 ENCOUNTER — PATIENT MESSAGE (OUTPATIENT)
Dept: INTERNAL MEDICINE | Facility: CLINIC | Age: 74
End: 2024-04-17
Payer: MEDICARE

## 2024-04-17 ENCOUNTER — PATIENT MESSAGE (OUTPATIENT)
Dept: CARDIOLOGY | Facility: CLINIC | Age: 74
End: 2024-04-17
Payer: MEDICARE

## 2024-04-17 ENCOUNTER — PATIENT MESSAGE (OUTPATIENT)
Dept: ELECTROPHYSIOLOGY | Facility: CLINIC | Age: 74
End: 2024-04-17
Payer: MEDICARE

## 2024-04-17 DIAGNOSIS — J96.11 CHRONIC HYPOXEMIC RESPIRATORY FAILURE: Primary | ICD-10-CM

## 2024-04-17 DIAGNOSIS — I50.31 ACUTE DIASTOLIC HEART FAILURE: ICD-10-CM

## 2024-04-18 ENCOUNTER — NURSE TRIAGE (OUTPATIENT)
Dept: ADMINISTRATIVE | Facility: CLINIC | Age: 74
End: 2024-04-18
Payer: MEDICARE

## 2024-04-18 ENCOUNTER — HOSPITAL ENCOUNTER (OUTPATIENT)
Dept: RADIOLOGY | Facility: HOSPITAL | Age: 74
Discharge: HOME | End: 2024-04-18
Attending: INTERNAL MEDICINE
Payer: MEDICARE

## 2024-04-18 ENCOUNTER — PATIENT MESSAGE (OUTPATIENT)
Dept: INTERNAL MEDICINE | Facility: CLINIC | Age: 74
End: 2024-04-18

## 2024-04-18 ENCOUNTER — OFFICE VISIT (OUTPATIENT)
Dept: INTERNAL MEDICINE | Facility: CLINIC | Age: 74
End: 2024-04-18
Payer: MEDICARE

## 2024-04-18 VITALS
BODY MASS INDEX: 31.23 KG/M2 | TEMPERATURE: 99 F | HEIGHT: 61 IN | OXYGEN SATURATION: 96 % | HEART RATE: 88 BPM | DIASTOLIC BLOOD PRESSURE: 76 MMHG | SYSTOLIC BLOOD PRESSURE: 128 MMHG | WEIGHT: 165.38 LBS

## 2024-04-18 DIAGNOSIS — R68.89 FLU-LIKE SYMPTOMS: ICD-10-CM

## 2024-04-18 DIAGNOSIS — R59.0 CERVICAL ADENOPATHY: ICD-10-CM

## 2024-04-18 DIAGNOSIS — Z87.09 HISTORY OF PLEURAL EFFUSION: ICD-10-CM

## 2024-04-18 DIAGNOSIS — R50.9 FEVER, UNSPECIFIED FEVER CAUSE: Primary | ICD-10-CM

## 2024-04-18 DIAGNOSIS — R50.9 FEVER, UNSPECIFIED FEVER CAUSE: ICD-10-CM

## 2024-04-18 LAB
CTP QC/QA: YES
POC MOLECULAR INFLUENZA A AGN: NEGATIVE
POC MOLECULAR INFLUENZA B AGN: NEGATIVE
S PYO RRNA THROAT QL PROBE: NEGATIVE
SARS-COV-2 RDRP RESP QL NAA+PROBE: NEGATIVE

## 2024-04-18 PROCEDURE — 3078F DIAST BP <80 MM HG: CPT | Mod: CPTII,S$GLB,, | Performed by: INTERNAL MEDICINE

## 2024-04-18 PROCEDURE — 87502 INFLUENZA DNA AMP PROBE: CPT | Mod: QW,S$GLB,, | Performed by: INTERNAL MEDICINE

## 2024-04-18 PROCEDURE — 3066F NEPHROPATHY DOC TX: CPT | Mod: CPTII,S$GLB,, | Performed by: INTERNAL MEDICINE

## 2024-04-18 PROCEDURE — 1159F MED LIST DOCD IN RCRD: CPT | Mod: CPTII,S$GLB,, | Performed by: INTERNAL MEDICINE

## 2024-04-18 PROCEDURE — 3288F FALL RISK ASSESSMENT DOCD: CPT | Mod: CPTII,S$GLB,, | Performed by: INTERNAL MEDICINE

## 2024-04-18 PROCEDURE — 87635 SARS-COV-2 COVID-19 AMP PRB: CPT | Mod: QW,S$GLB,, | Performed by: INTERNAL MEDICINE

## 2024-04-18 PROCEDURE — 3008F BODY MASS INDEX DOCD: CPT | Mod: CPTII,S$GLB,, | Performed by: INTERNAL MEDICINE

## 2024-04-18 PROCEDURE — 71046 X-RAY EXAM CHEST 2 VIEWS: CPT | Mod: 26,,, | Performed by: RADIOLOGY

## 2024-04-18 PROCEDURE — 3074F SYST BP LT 130 MM HG: CPT | Mod: CPTII,S$GLB,, | Performed by: INTERNAL MEDICINE

## 2024-04-18 PROCEDURE — 1125F AMNT PAIN NOTED PAIN PRSNT: CPT | Mod: CPTII,S$GLB,, | Performed by: INTERNAL MEDICINE

## 2024-04-18 PROCEDURE — 99214 OFFICE O/P EST MOD 30 MIN: CPT | Mod: S$GLB,,, | Performed by: INTERNAL MEDICINE

## 2024-04-18 PROCEDURE — 1101F PT FALLS ASSESS-DOCD LE1/YR: CPT | Mod: CPTII,S$GLB,, | Performed by: INTERNAL MEDICINE

## 2024-04-18 PROCEDURE — 4010F ACE/ARB THERAPY RXD/TAKEN: CPT | Mod: CPTII,S$GLB,, | Performed by: INTERNAL MEDICINE

## 2024-04-18 PROCEDURE — 87880 STREP A ASSAY W/OPTIC: CPT | Mod: QW,S$GLB,, | Performed by: INTERNAL MEDICINE

## 2024-04-18 PROCEDURE — 1160F RVW MEDS BY RX/DR IN RCRD: CPT | Mod: CPTII,S$GLB,, | Performed by: INTERNAL MEDICINE

## 2024-04-18 PROCEDURE — 71046 X-RAY EXAM CHEST 2 VIEWS: CPT | Mod: TC

## 2024-04-18 PROCEDURE — 99999 PR PBB SHADOW E&M-EST. PATIENT-LVL IV: CPT | Mod: PBBFAC,,, | Performed by: INTERNAL MEDICINE

## 2024-04-18 NOTE — TELEPHONE ENCOUNTER
Pt with fever for 2 days, currently 100.2, highest was 101.9 2 days ago.  States swelling to her lymph nodes in her neck area.  Care advice states to see a provider within 4 hours.  ODVV offered and noted.  Dr. Villarreal had no availability, appointment made for 9:20 with Dr. Gupta at [facility].  Patient verbally understands, all questions answered, advised to call back for any worsening symptoms or further needs.     Reason for Disposition   [1] Fever > 101 F (38.3 C) AND [2] age > 60 years    Additional Information   Negative: Shock suspected (e.g., cold/pale/clammy skin, too weak to stand, low BP, rapid pulse)   Negative: Difficult to awaken or acting confused (e.g., disoriented, slurred speech)   Negative: [1] Difficulty breathing AND [2] bluish lips, tongue or face   Negative: New-onset rash with multiple purple (or blood-colored) spots or dots   Negative: Sounds like a life-threatening emergency to the triager   Negative: Fever in a cancer patient who is currently (or recently) receiving chemotherapy or radiation therapy, or cancer patient who has metastatic or end-stage cancer and is receiving palliative care   Negative: [1] Headache AND [2] stiff neck (can't touch chin to chest)   Negative: Difficulty breathing   Negative: IV Drug Use (IVDU)   Negative: [1] Drinking very little AND [2] dehydration suspected (e.g., no urine > 12 hours, very dry mouth, very lightheaded)   Negative: Widespread rash and cause unknown   Negative: Patient sounds very sick or weak to the triager  (Exception: Mild weakness and hasn't taken fever medicine.)   Negative: Fever > 104 F (40 C)    Protocols used: Fever-A-AH

## 2024-04-18 NOTE — PROGRESS NOTES
Subjective:       Patient ID: Rajesh Mas is a 73 y.o. female.    Chief Complaint:   Cough, Chest Congestion, Fever, and Generalized Body Aches    HPI - two days of flu-like symptoms with fever to 101.9, postnasal drip, fatigue and myalgias.  She has tender cervical adenopathy.  (+)cough, but this isn't prominent in the symptom complex.  No dyspnea.  She was admitted earlier this year for pneumonia and a large left pleural effusion that complicated a cardiac procedure.  She is not a smoker.    PMH/Meds:  Reviewed and reconciled in EPIC with patient during visit today.    Review of Systems   Constitutional:  Positive for fatigue and fever.   HENT:  Positive for congestion and postnasal drip.    Respiratory:  Negative for shortness of breath.    Cardiovascular:  Negative for chest pain.   Gastrointestinal:  Negative for abdominal pain.   Genitourinary:  Negative for difficulty urinating.   Musculoskeletal:  Negative for arthralgias.   Skin:  Negative for rash.   Neurological:  Negative for headaches.   Psychiatric/Behavioral:  Negative for sleep disturbance.        Objective:      Physical Exam  Vitals reviewed.   Constitutional:       Appearance: She is well-developed.   HENT:      Head: Normocephalic and atraumatic.      Right Ear: Ear canal normal. There is no impacted cerumen.      Left Ear: Tympanic membrane and ear canal normal. There is no impacted cerumen.      Ears:      Comments: Left TM opaque, but no erythema     Mouth/Throat:      Mouth: Mucous membranes are moist.      Pharynx: Posterior oropharyngeal erythema present. No oropharyngeal exudate.   Cardiovascular:      Rate and Rhythm: Normal rate and regular rhythm.      Heart sounds: Normal heart sounds. No murmur heard.     No friction rub. No gallop.   Pulmonary:      Effort: Pulmonary effort is normal. No respiratory distress.      Breath sounds: Normal breath sounds. No wheezing or rales.   Chest:      Chest wall: No tenderness.   Lymphadenopathy:       Cervical: Cervical adenopathy present.   Skin:     General: Skin is warm and dry.      Findings: No erythema.   Neurological:      General: No focal deficit present.      Mental Status: She is alert.   Psychiatric:         Mood and Affect: Mood normal.         Assessment:       1. Fever, unspecified fever cause    2. Flu-like symptoms    3. Cervical adenopathy        Plan:       Rajesh was seen today for cough, chest congestion, fever and generalized body aches.    Diagnoses and all orders for this visit:    Fever, unspecified fever cause - seems like a URI, but with her admissions earlier this year for effusions and pneumonia, I'm concerned.  All viral testing was negative.  CXR today.  -     POCT COVID-19 Rapid Screening  -     POCT Rapid Strep A  -     POCT Influenza A/B Molecular  -     X-Ray Chest PA And Lateral; Future    Flu-like symptoms - treating as a URI.   some cough syrup.  Stay warm, dry.  Fluids, tylenol for aches and pains.  Rest, stay home until recovered.  -     POCT COVID-19 Rapid Screening  -     POCT Influenza A/B Molecular    Cervical adenopathy - on exam.  Monitor.  -     POCT Rapid Strep A    History of pleural effusion  -     X-Ray Chest PA And Lateral; Future    Rtc prn    PATRICIO Gupta MD MPH  Staff Internist

## 2024-04-22 ENCOUNTER — TELEPHONE (OUTPATIENT)
Dept: ELECTROPHYSIOLOGY | Facility: CLINIC | Age: 74
End: 2024-04-22
Payer: MEDICARE

## 2024-04-22 ENCOUNTER — CLINICAL SUPPORT (OUTPATIENT)
Dept: CARDIOLOGY | Facility: HOSPITAL | Age: 74
End: 2024-04-22
Attending: INTERNAL MEDICINE
Payer: MEDICARE

## 2024-04-22 DIAGNOSIS — I48.0 PAROXYSMAL ATRIAL FIBRILLATION: ICD-10-CM

## 2024-04-22 PROCEDURE — 93298 REM INTERROG DEV EVAL SCRMS: CPT | Mod: 26,,, | Performed by: INTERNAL MEDICINE

## 2024-04-22 PROCEDURE — 93298 REM INTERROG DEV EVAL SCRMS: CPT | Performed by: INTERNAL MEDICINE

## 2024-04-22 NOTE — TELEPHONE ENCOUNTER
ILR remote transmission received for AF episodes x 4, max duration of 1 hour and 42 min and tachy episodes x 26, max duration of 54 minutes.  All episodes occurred on 4/21/24 between 1627 and 1934.  Presenting rhythm  ST, 107 bpm.      Hx:  PVC RFA 1/2024, pericardial effusion and AF while in hospital, pleural effusion 2/2024 1/23/24 event monitor:  AF noted on 1/23/24  ILR implanted for long term AF surveillance to determine management    Pt had clinic visit on 4/18/2024 for URI, pt c/o of cough congestion and fever.      New onset atrial fibrillation noted during device interrogation/device remote check:    Overall burden:  1%    Max duration seen: 1 hour 42 min    Most recent episode: all occurring on 4/21/24    Ventricular rates:     Needs improvement    Anticoagulation status:  None    Patient symptoms:  Heart racing, tired    Pt taking Toprol XL 25 mg daily     Bp 137/80                      ...

## 2024-04-23 ENCOUNTER — TELEPHONE (OUTPATIENT)
Dept: ELECTROPHYSIOLOGY | Facility: CLINIC | Age: 74
End: 2024-04-23
Payer: MEDICARE

## 2024-04-23 ENCOUNTER — LAB VISIT (OUTPATIENT)
Dept: LAB | Facility: HOSPITAL | Age: 74
End: 2024-04-23
Payer: MEDICARE

## 2024-04-23 DIAGNOSIS — I50.22 HEART FAILURE WITH MILDLY REDUCED EJECTION FRACTION: ICD-10-CM

## 2024-04-23 LAB
ANION GAP SERPL CALC-SCNC: 8 MMOL/L (ref 8–16)
BUN SERPL-MCNC: 30 MG/DL (ref 8–23)
CALCIUM SERPL-MCNC: 9.6 MG/DL (ref 8.7–10.5)
CHLORIDE SERPL-SCNC: 109 MMOL/L (ref 95–110)
CO2 SERPL-SCNC: 25 MMOL/L (ref 23–29)
CREAT SERPL-MCNC: 1.3 MG/DL (ref 0.5–1.4)
EST. GFR  (NO RACE VARIABLE): 44 ML/MIN/1.73 M^2
GLUCOSE SERPL-MCNC: 95 MG/DL (ref 70–110)
POTASSIUM SERPL-SCNC: 4.9 MMOL/L (ref 3.5–5.1)
SODIUM SERPL-SCNC: 142 MMOL/L (ref 136–145)

## 2024-04-23 PROCEDURE — 80048 BASIC METABOLIC PNL TOTAL CA: CPT | Performed by: PHYSICIAN ASSISTANT

## 2024-04-23 PROCEDURE — 36415 COLL VENOUS BLD VENIPUNCTURE: CPT | Performed by: PHYSICIAN ASSISTANT

## 2024-04-23 NOTE — TELEPHONE ENCOUNTER
Loop alert received for AF, RVR + symptomatic    Implanted for Suspected AF 4/5/2024  Presenting shows SR    AF prev escalated yesterday max 1 hr 42 mins.  Not on OAC    AF carmelo today is 0.7%, max duration 40 mins  @ 0118 4/23/24. Average v rates 160 bpm.  Tachy episodes x 20, max 2mins 15 secs c/w RVR  Pt reports she got up to go to BR and when laying in bed felt increased heart rates.    Compliant with Toprol XL 25 mg po daily.    Will forward to Dr. Ellison for review.                _________

## 2024-04-23 NOTE — TELEPHONE ENCOUNTER
Dr. Ellison wants pt to be seen in clinic.    Called pt and notified her she will be contacted to set up an appt by the MA staff.  Pt verbalizes understanding.

## 2024-04-25 ENCOUNTER — PATIENT MESSAGE (OUTPATIENT)
Dept: CARDIOLOGY | Facility: CLINIC | Age: 74
End: 2024-04-25
Payer: MEDICARE

## 2024-04-26 ENCOUNTER — OFFICE VISIT (OUTPATIENT)
Dept: PULMONOLOGY | Facility: CLINIC | Age: 74
End: 2024-04-26
Payer: MEDICARE

## 2024-04-26 VITALS
HEIGHT: 61 IN | SYSTOLIC BLOOD PRESSURE: 118 MMHG | DIASTOLIC BLOOD PRESSURE: 70 MMHG | OXYGEN SATURATION: 98 % | HEART RATE: 74 BPM | BODY MASS INDEX: 30.6 KG/M2 | WEIGHT: 162.06 LBS

## 2024-04-26 DIAGNOSIS — J90 PLEURAL EFFUSION, LEFT: Primary | ICD-10-CM

## 2024-04-26 LAB
OHS CV AF BURDEN PERCENT: < 1
OHS CV DC REMOTE DEVICE TYPE: NORMAL
OHS CV DC REMOTE DEVICE TYPE: NORMAL
OHS CV ICD SHOCK: NO

## 2024-04-26 PROCEDURE — 1159F MED LIST DOCD IN RCRD: CPT | Mod: CPTII,S$GLB,, | Performed by: STUDENT IN AN ORGANIZED HEALTH CARE EDUCATION/TRAINING PROGRAM

## 2024-04-26 PROCEDURE — 3288F FALL RISK ASSESSMENT DOCD: CPT | Mod: CPTII,S$GLB,, | Performed by: STUDENT IN AN ORGANIZED HEALTH CARE EDUCATION/TRAINING PROGRAM

## 2024-04-26 PROCEDURE — 99213 OFFICE O/P EST LOW 20 MIN: CPT | Mod: S$GLB,,, | Performed by: STUDENT IN AN ORGANIZED HEALTH CARE EDUCATION/TRAINING PROGRAM

## 2024-04-26 PROCEDURE — 4010F ACE/ARB THERAPY RXD/TAKEN: CPT | Mod: CPTII,S$GLB,, | Performed by: STUDENT IN AN ORGANIZED HEALTH CARE EDUCATION/TRAINING PROGRAM

## 2024-04-26 PROCEDURE — 99999 PR PBB SHADOW E&M-EST. PATIENT-LVL III: CPT | Mod: PBBFAC,,, | Performed by: STUDENT IN AN ORGANIZED HEALTH CARE EDUCATION/TRAINING PROGRAM

## 2024-04-26 PROCEDURE — 3074F SYST BP LT 130 MM HG: CPT | Mod: CPTII,S$GLB,, | Performed by: STUDENT IN AN ORGANIZED HEALTH CARE EDUCATION/TRAINING PROGRAM

## 2024-04-26 PROCEDURE — 3008F BODY MASS INDEX DOCD: CPT | Mod: CPTII,S$GLB,, | Performed by: STUDENT IN AN ORGANIZED HEALTH CARE EDUCATION/TRAINING PROGRAM

## 2024-04-26 PROCEDURE — 1101F PT FALLS ASSESS-DOCD LE1/YR: CPT | Mod: CPTII,S$GLB,, | Performed by: STUDENT IN AN ORGANIZED HEALTH CARE EDUCATION/TRAINING PROGRAM

## 2024-04-26 PROCEDURE — 1126F AMNT PAIN NOTED NONE PRSNT: CPT | Mod: CPTII,S$GLB,, | Performed by: STUDENT IN AN ORGANIZED HEALTH CARE EDUCATION/TRAINING PROGRAM

## 2024-04-26 PROCEDURE — 3078F DIAST BP <80 MM HG: CPT | Mod: CPTII,S$GLB,, | Performed by: STUDENT IN AN ORGANIZED HEALTH CARE EDUCATION/TRAINING PROGRAM

## 2024-04-26 PROCEDURE — 3066F NEPHROPATHY DOC TX: CPT | Mod: CPTII,S$GLB,, | Performed by: STUDENT IN AN ORGANIZED HEALTH CARE EDUCATION/TRAINING PROGRAM

## 2024-04-26 NOTE — PROGRESS NOTES
[facility]    SUBJECTIVE:     Chief Complaint: Shortness of breath    History of Present Illness:  Rajesh Mas is a 73 y.o. female who presents for follow up evaluation of shortness of breath. The patient initially presented for evaluation of pleural effusion following an EP ablation that was complicated by pericardial effusion with tamponade requiring pericardiocentesis. She had bilateral effusions that improved with diuresis but a persistent left sided effusion that was drained. She was last seen in clinic on 3/1/2024 at which time she was still having shortness of breath with her house chores. Diuretics were increased for a week and she presents for follow up.    The patient reports that her breathing improved with diuresis. She continued lasix 40mg daily for 1 week and then went back down to PRN dosing based on daily weights. She reports that she feels her dry weight is around 162lbs on home scale. She saw her PCP 1 week ago with fevers and cough, swabs for COVID, FLU, and strep were negative. A CXR was performed for completeness and there was no consolidation, and there was resolution of her effusions. She reports that her fevers and cough resolved, and she's close to her baseline again. She watches her weights and knows when she's becoming more edematous. When she takes lasix at those times her breathing improves significantly.    Review of patient's allergies indicates:   Allergen Reactions    Adhesive Rash    Nickel sutures [surgical stainless steel] Itching and Rash       Past Medical History:   Diagnosis Date    Allergy     seasonal    Diverticulitis     Fever blister     Hypertension     Personal history of colonic polyps      Past Surgical History:   Procedure Laterality Date    ABLATION N/A 1/18/2024    Procedure: Ablation;  Surgeon: Santi Ellison MD;  Location: [facility] EP LAB;  Service: Cardiology;  Laterality: N/A;  PVC, RFA, PEPE, anes, MB, 3 Prep,*prepare to map LVOT and RVOT*     CHOLECYSTECTOMY  04/26/2017    COLON SURGERY      COLONOSCOPY N/A 12/06/2016    Procedure: COLONOSCOPY;  Surgeon: Fidel Mason MD;  Location: [facility] ENDO (4TH FLR);  Service: Endoscopy;  Laterality: N/A;    COLONOSCOPY N/A 05/17/2022    Procedure: COLONOSCOPY;  Surgeon: RUSSELL Rolon MD;  Location: [facility] ENDO (4TH FLR);  Service: Endoscopy;  Laterality: N/A;  fully vaccinated, prep instr portal -ml    INSERTION OF IMPLANTABLE LOOP RECORDER N/A 4/5/2024    Procedure: Insertion, Implantable Loop Recorder;  Surgeon: Santi Ellison MD;  Location: [facility] EP LAB;  Service: Cardiology;  Laterality: N/A;  a fib, ILR, BSCI, Local, MB, 3 Prep    partial colectomy  1997    sigmoid removed due to diverticulitis    PERICARDIOCENTESIS N/A 1/18/2024    Procedure: Pericardiocentesis;  Surgeon: Santi Ellison MD;  Location: [facility] EP LAB;  Service: Cardiology;  Laterality: N/A;    SINUS SURGERY  2007    SMALL INTESTINE SURGERY  June 1997@ [facility]    Partial Sigm Colon /Divaticulitus     Family History   Problem Relation Name Age of Onset    Colon cancer Maternal Grandmother          colon cancer    Breast cancer Maternal Grandmother      Diabetes Mother Lia Post     Heart failure Mother Lia Post     Hypertension Mother Lia Post     Heart disease Mother Lia Post     Kidney disease Mother Lia Post         Dialysis    Other Brother          prostate issue    No Known Problems Sister      No Known Problems Daughter      No Known Problems Daughter      No Known Problems Son      No Known Problems Son      Liver cancer Maternal Uncle      Melanoma Neg Hx      Ovarian cancer Neg Hx      Cancer Neg Hx       Social History     Socioeconomic History    Marital status:    Tobacco Use    Smoking status: Never    Smokeless tobacco: Never   Substance and Sexual Activity    Alcohol use: No    Drug use: Never    Sexual activity: Yes     Partners: Male     Birth control/protection: None      Comment: , together since 1971     Social Determinants of Health     Financial Resource Strain: Low Risk  (3/21/2024)    Overall Financial Resource Strain (CARDIA)     Difficulty of Paying Living Expenses: Not very hard   Food Insecurity: No Food Insecurity (3/21/2024)    Hunger Vital Sign     Worried About Running Out of Food in the Last Year: Never true     Ran Out of Food in the Last Year: Never true   Transportation Needs: No Transportation Needs (3/21/2024)    PRAPARE - Transportation     Lack of Transportation (Medical): No     Lack of Transportation (Non-Medical): No   Physical Activity: Inactive (3/21/2024)    Exercise Vital Sign     Days of Exercise per Week: 0 days     Minutes of Exercise per Session: 0 min   Stress: Stress Concern Present (3/21/2024)    [employer] [place] [employer] - Occupational Stress Questionnaire     Feeling of Stress : To some extent   Social Connections: Socially Integrated (3/21/2024)    Social Connection and Isolation Panel [NHANES]     Frequency of Communication with Friends and Family: More than three times a week     Frequency of Social Gatherings with Friends and Family: More than three times a week     Attends Taoism Services: More than 4 times per year     Active Member of Clubs or Organizations: Yes     Attends Club or Organization Meetings: More than 4 times per year     Marital Status:    Housing Stability: Low Risk  (3/21/2024)    Housing Stability Vital Sign     Unable to Pay for Housing in the Last Year: No     Number of Places Lived in the Last Year: 1     Unstable Housing in the Last Year: No       Review of Systems:  ROS  CV: no syncope  ENT: no sore throat  Resp: per hpi  Eyes: no eye pain  Gastrointestinal: no nausea or vomiting  Integument/Breast: no rash  Musculoskeletal: no arthralgias  Neurological: no headaches  Behavioral/Psych: no confusion or depression  Heme: no bleeding      OBJECTIVE:     Vital Signs  Vitals:    04/26/24  "1605   BP: 118/70   BP Location: Right arm   Patient Position: Sitting   BP Method: Medium (Manual)   Pulse: 74   SpO2: 98%   Weight: 73.5 kg (162 lb 0.6 oz)   Height: 5' 1" (1.549 m)     Body mass index is 30.62 kg/m².    Physical Exam:  Physical Exam  General: no distress  Eyes:  conjunctivae/corneas clear  Nose: no discharge  Neck: trachea midline with no masses appreciated  Lungs:  normal respiratory effort, no wheezes, no rales. Decreased breath sounds left lower lung zone, egophany present.  Heart: regular rate and rhythm and no murmur  Abdomen: non-distended  Extremities: no cyanosis, no edema, no clubbing  Skin: No rashes or lesions. good skin turgor  Neurologic: alert, oriented, thought content appropriate    Laboratory:  CBC  Lab Results   Component Value Date    WBC 7.13 04/08/2024    HGB 12.7 04/08/2024    HCT 40.1 04/08/2024     04/08/2024    MCV 93 04/08/2024    RDW 15.4 (H) 04/08/2024     BMP  Lab Results   Component Value Date     04/23/2024    K 4.9 04/23/2024     04/23/2024    CO2 25 04/23/2024    BUN 30 (H) 04/23/2024    CREATININE 1.3 04/23/2024    GLU 95 04/23/2024    CALCIUM 9.6 04/23/2024    MG 2.2 03/01/2024    PHOS 2.8 04/08/2024     LFTs  Lab Results   Component Value Date    PROT 7.7 03/07/2024    ALBUMIN 3.5 04/08/2024    BILITOT 0.3 03/07/2024    AST 16 03/07/2024    ALKPHOS 57 03/07/2024    ALT 13 03/07/2024       Chest Imaging, My Impression:   CT Chest 2/7/2024:  Interval decrease in left sided pleural effusion post thoractensis. Area of atelectasis and consolidation with air bronchograms in the lingular segment. Mild right pleural effusion appears improved.     CXR: 4/18/2024:  No focal consolidation or abnormality. Complete resolution of prior identified pleural effusions.    Diagnostic Results:  Labs: Reviewed  Echo: Reviewed    Pleural fluid studies: 2/6/2024  Bloody, 1458 WBC (60% lymphs, 30% monocytes)  LDH: 267  Protein 4.6  Glucose 115  Cytology and " Microbiology negative    ASSESSMENT/PLAN:     Problem Noted   Pleural Effusion, Left 1/20/2024   Chronic Hypoxemic Respiratory Failure 1/19/2024     Problem List Items Addressed This Visit          Pulmonary    Pleural effusion, left - Primary    Current Assessment & Plan     Patient with pleural effusion first identified after EP procedure in January complicated by pericardial effusion / tamponade requiring pericardiocentesis. Found to have bilateral pleural effusions L>R. Improved with diuresis on right but left was persistent, she was discharged with home O2. EF 35-40%, she represented about 2-3 weeks later with persistent left sided effusion. Thoracentesis performed with exudate. Patient was nontoxic and culture / cyto returned negative.    At last visit patient appeared overloaded. Lasix was increased with improvement in symptoms. She is now back to PRN lasix based of daily weights and doing well. CXR performed last week due to URI with resolution of effusions.    - Effusions related to volume status, no further diagnostics needed. Likely was exudative in the past due to concentration from diuresis.   - Counseled on RSV vaccination, patient to follow up with PCP regarding this.  - Continue lasix 40mg prn based on daily weights.          Patient no follow up needed, patient can follow up in pulmonary clinic PRN. Patient will reach out if she has further concerns or we can be of any assistance to her.      Walter Carr M.D.  [facility] Pulmonary & Critical Care Fellow

## 2024-04-26 NOTE — ASSESSMENT & PLAN NOTE
Patient with pleural effusion first identified after EP procedure in January complicated by pericardial effusion / tamponade requiring pericardiocentesis. Found to have bilateral pleural effusions L>R. Improved with diuresis on right but left was persistent, she was discharged with home O2. EF 35-40%, she represented about 2-3 weeks later with persistent left sided effusion. Thoracentesis performed with exudate. Patient was nontoxic and culture / cyto returned negative.    At last visit patient appeared overloaded. Lasix was increased with improvement in symptoms. She is now back to PRN lasix based of daily weights and doing well. CXR performed last week due to URI with resolution of effusions.    - Effusions related to volume status, no further diagnostics needed. Likely was exudative in the past due to concentration from diuresis.   - Counseled on RSV vaccination, patient to follow up with PCP regarding this.  - Continue lasix 40mg prn based on daily weights.

## 2024-05-03 ENCOUNTER — OFFICE VISIT (OUTPATIENT)
Dept: RHEUMATOLOGY | Facility: CLINIC | Age: 74
End: 2024-05-03
Payer: MEDICARE

## 2024-05-03 VITALS
BODY MASS INDEX: 30.39 KG/M2 | HEIGHT: 61 IN | DIASTOLIC BLOOD PRESSURE: 66 MMHG | WEIGHT: 160.94 LBS | HEART RATE: 66 BPM | SYSTOLIC BLOOD PRESSURE: 112 MMHG

## 2024-05-03 DIAGNOSIS — M1A.0720 IDIOPATHIC CHRONIC GOUT OF LEFT FOOT WITHOUT TOPHUS: ICD-10-CM

## 2024-05-03 DIAGNOSIS — M06.9 RHEUMATOID ARTHRITIS, INVOLVING UNSPECIFIED SITE, UNSPECIFIED WHETHER RHEUMATOID FACTOR PRESENT: Primary | ICD-10-CM

## 2024-05-03 DIAGNOSIS — M81.8 OTHER OSTEOPOROSIS WITHOUT CURRENT PATHOLOGICAL FRACTURE: ICD-10-CM

## 2024-05-03 DIAGNOSIS — R76.8 POSITIVE ANA (ANTINUCLEAR ANTIBODY): ICD-10-CM

## 2024-05-03 DIAGNOSIS — N18.4 CKD (CHRONIC KIDNEY DISEASE) STAGE 4, GFR 15-29 ML/MIN: ICD-10-CM

## 2024-05-03 DIAGNOSIS — Z13.820 SCREENING FOR OSTEOPOROSIS: ICD-10-CM

## 2024-05-03 PROCEDURE — 99999 PR PBB SHADOW E&M-EST. PATIENT-LVL III: CPT | Mod: PBBFAC,,, | Performed by: INTERNAL MEDICINE

## 2024-05-03 PROCEDURE — 3066F NEPHROPATHY DOC TX: CPT | Mod: CPTII,S$GLB,, | Performed by: INTERNAL MEDICINE

## 2024-05-03 PROCEDURE — 1160F RVW MEDS BY RX/DR IN RCRD: CPT | Mod: CPTII,S$GLB,, | Performed by: INTERNAL MEDICINE

## 2024-05-03 PROCEDURE — 3074F SYST BP LT 130 MM HG: CPT | Mod: CPTII,S$GLB,, | Performed by: INTERNAL MEDICINE

## 2024-05-03 PROCEDURE — 3078F DIAST BP <80 MM HG: CPT | Mod: CPTII,S$GLB,, | Performed by: INTERNAL MEDICINE

## 2024-05-03 PROCEDURE — 1159F MED LIST DOCD IN RCRD: CPT | Mod: CPTII,S$GLB,, | Performed by: INTERNAL MEDICINE

## 2024-05-03 PROCEDURE — 99214 OFFICE O/P EST MOD 30 MIN: CPT | Mod: S$GLB,,, | Performed by: INTERNAL MEDICINE

## 2024-05-03 PROCEDURE — 1101F PT FALLS ASSESS-DOCD LE1/YR: CPT | Mod: CPTII,S$GLB,, | Performed by: INTERNAL MEDICINE

## 2024-05-03 PROCEDURE — 4010F ACE/ARB THERAPY RXD/TAKEN: CPT | Mod: CPTII,S$GLB,, | Performed by: INTERNAL MEDICINE

## 2024-05-03 PROCEDURE — 3288F FALL RISK ASSESSMENT DOCD: CPT | Mod: CPTII,S$GLB,, | Performed by: INTERNAL MEDICINE

## 2024-05-03 PROCEDURE — 3008F BODY MASS INDEX DOCD: CPT | Mod: CPTII,S$GLB,, | Performed by: INTERNAL MEDICINE

## 2024-05-03 RX ORDER — PREDNISONE 2.5 MG/1
2.5 TABLET ORAL DAILY
Qty: 90 TABLET | Refills: 1 | Status: SHIPPED | OUTPATIENT
Start: 2024-05-03 | End: 2024-10-30

## 2024-05-03 NOTE — PROGRESS NOTES
4/27/2024     1:36 PM   Rapid3 Question Responses and Scores   MDHAQ Score 0.2   Psychologic Score 0   Pain Score 0   When you awakened in the morning OVER THE LAST WEEK, did you feel stiff? No   Fatigue Score 1   Global Health Score 1   RAPID3 Score 0.56     Answers submitted by the patient for this visit:  Rheumatology Questionnaire (Submitted on 4/27/2024)  fever: No  eye redness: No  mouth sores: No  headaches: No  shortness of breath: Yes  chest pain: No  trouble swallowing: No  diarrhea: No  constipation: No  unexpected weight change: No  genital sore: No  dysuria: No  During the last 3 days, have you had a skin rash?: No  Bruises or bleeds easily: No  cough: No

## 2024-05-03 NOTE — PROGRESS NOTES
Chief Complaint   Patient presents with    Disease Management         History of Presenting Illness    73 y.o. year old female with history of gout, CKD 4, HTN (reports dx 1998), HLD, and diverticulitis.     Pain to joints to b/l hands, R>L for last few year. Every few days she gets cramps in her hands for about 30 minutes; has to shake hand to release cramp. No known triggers. Does not take medication for pain. 6/10 pain when hand is cramping, otherwise 0/10 on pain scale. Denies any erythema, edema or warmth to joints. Denies having trouble with daily activities.    Right hip discomfort for the last 7 months which occurs intermittently. Notices discomfort more when lying on her right side, will resolve once she puts a pillow under right hip. Denies pain or any erythema, edema or warmth. Denies any morning stiffness.    Hyperuricemia: gout to right great toe. On Allopurinol 300mg once daily since about 2018. Last gout attack was around 2 years ago. Patient states fish and crawfish trigger gout attacks. Denies any gout pain currently.     If she overexerts herself she feels slight muscle weakness at the end of the day which occurs twice a week. Mainly on the day she looks after granddaughter. Denies taking any medication for pain. Denies morning stiffness. No hematuria.     Past history: HTN (reports dx 1998), HLD, and diverticulitis.  Family history: mother- HTN, diabetes, CHF; cousin- lupus  Social history:  Tobacco- no   Alcohol- no  Surgery: partial colectomy, colonoscopy, cholecystectomy    No skin rashes,malar rash, photosensitivity   No telangiectasias   No calcinosis   No psoriasis   No patchy alopecia   No oral and nasal ulcers   No dry eyes and dry mouth   No pleurisy or any cardiopulmonary complaints   No dysphagia, diplopia and dysphonia and muscle weakness   No n/v/d/c   No acid reflux+   No raynaud's+   No digital ulcers   No cytopenias   + renal issues: CKD stage IV   No blood clots   No  fever,chills,night sweats,weight loss and loss of appetite   No pre term deliveries /pregnancy complications   - (+) 1 pregnancy loss  No new onset headaches   No recurrent conjunctivitis or uveitis or scleritis or episcleritis   No chronic or bloody diarrhea with no u colitis or crohn's/ inflammatory bowel disease   No vaginal and urethral  d/c/STDs/no ulcers   No unexplained lymphadenopathy, parotitis   No seizures,strokes,psychosis  No sclerodactyly  No puffy hands  No perioral tightness     Review of Systems   Constitutional:  Negative for appetite change, fatigue, fever and unexpected weight change.   HENT:  Negative for mouth sores and trouble swallowing.    Eyes:  Negative for pain, discharge and redness.   Respiratory:  Negative for cough and shortness of breath.    Cardiovascular:  Negative for chest pain.   Gastrointestinal:  Negative for abdominal pain, blood in stool, constipation, diarrhea, nausea and vomiting.   Genitourinary:  Negative for dysuria, genital sores, hematuria and vaginal discharge.   Musculoskeletal:  Positive for arthralgias and myalgias. Negative for back pain, gait problem and joint swelling.   Skin:  Negative for rash.   Neurological:  Negative for headaches.   Hematological:  Does not bruise/bleed easily.       There is currently no information documented on the homunculus. Go to the Rheumatology activity and complete the homunculus joint exam.    Physical Exam   Constitutional: She is oriented to person, place, and time. normal appearance. No distress.   HENT:   Head: Normocephalic.   Cardiovascular: Normal rate and regular rhythm.   Pulmonary/Chest: Effort normal and breath sounds normal.   Abdominal: She exhibits no distension.   Musculoskeletal:         General: Tenderness present. No swelling. Normal range of motion.      Right shoulder: Normal.      Left shoulder: Normal.      Right elbow: Normal.      Left elbow: Normal.      Right wrist: Normal.      Left wrist: Normal.       Right knee: Normal.      Left knee: Normal.      Right lower leg: No edema.      Left lower leg: No edema.   Neurological: She is alert and oriented to person, place, and time.   Skin: Skin is warm.   Psychiatric: Mood normal.       Right Side Rheumatological Exam     Examination finds the shoulder, elbow, wrist, knee, 1st PIP, 1st MCP, 2nd PIP, 3rd PIP, 3rd MCP, 4th PIP, 4th MCP, 5th PIP and 5th MCP normal.    The patient is tender to palpation of the 2nd MCP    Shoulder Exam   Tenderness Location: no tenderness    Range of Motion   The patient has normal right shoulder ROM.    Hip Exam   Tenderness Location: no tenderness    Range of Motion   The patient has normal right hip ROM.  Abduction:  normal   Adduction:  normal   Extension:  normal   Flexion:  normal     Elbow/Wrist Exam   Tenderness Location: no elbow tenderness and no wrist tenderness    Range of Motion   Elbow   The patient has normal right elbow ROM.    Range of Motion   Wrist   The patient has normal right wrist ROM.  Extension:  normal   Flexion:  normal     Elbow Warmth: negative  Elbow Swelling: negative    Wrist Warmth: negative  Wrist Swelling: negative    Foot Exam   Right foot exam exhibits signs of no podagra    Range of Motion   The patient has normal right ankle ROM.    Ankle Warmth: negative  Ankle Swelling: negative    Left Side Rheumatological Exam     Examination finds the shoulder, elbow, wrist, knee, 1st PIP, 1st MCP, 2nd PIP, 2nd MCP, 3rd PIP, 3rd MCP, 4th PIP, 4th MCP and 5th PIP normal.    The patient is tender to palpation of the 5th MCP.    Shoulder Exam   Tenderness Location: no tenderness    Range of Motion   The patient has normal left shoulder ROM.    Hip Exam   Tenderness Location: no tenderness    Range of Motion   The patient has normal left hip ROM.  Abduction:  normal   Adduction:  normal   Extension:  normal   Flexion:  normal     Elbow/Wrist Exam   Tenderness Location: no elbow tenderness and no wrist  tenderness    Range of Motion   Elbow   The patient has normal left elbow ROM.    Range of Motion   Wrist   The patient has normal left wrist ROM.  Extension:  normal   Flexion:  normal     Elbow Warmth: negative  Elbow Swelling: negative    Wrist Warmth: negative  Wrist Swelling: negative    Foot Exam   Left foot exam exhibits signs of no podagra    Range of Motion   The patient has normal left ankle ROM.     Ankle Warmth: negative  Ankle Swelling: negative      Labs Reviewed:    Labs:   8/30/2023  Component Ref Range & Units 2 wk ago  (8/30/23) 2 mo ago  (6/29/23) 1 yr ago  (5/3/22) 2 yr ago  (4/27/21) 4 yr ago  (7/5/19) 4 yr ago  (2/4/19) 5 yr ago  (8/3/18)   Uric Acid 2.4 - 5.7 mg/dL 8.5 High   9.9 High   6.8 High   5.8 High   7.8 High   5.1  6.2 High       Component Ref Range & Units 2 wk ago   Anti Sm Antibody 0.00 - 0.99 Ratio 0.10    Anti-Sm Interpretation Negative Negative    Anti-SSA Antibody 0.00 - 0.99 Ratio 5.49 High     Anti-SSA Interpretation Negative Positive Abnormal     Anti-SSB Antibody 0.00 - 0.99 Ratio 0.44    Anti-SSB Interpretation Negative Negative    ds DNA Ab Negative 1:10 Negative 1:10    Comment: Performed by fluorescent crithidia assay.   Anti Sm/RNP Antibody 0.00 - 0.99 Ratio 0.13    Anti-Sm/RNP Interpretation Negative Negative        MICHELLE positive   MICHELLE titer 1 1:320  [place] free light chain elevated-4.89  Kappa lambda ratio elevated 1.94  SPEP and BISHNU nml    8/15/2023    Protein Urine positive  CBC nml   BUN 30  Cr 1.5  eGFR 37.3    6/29/2023  TSH 5.720    RF,CCP neg    Cryos neg  APLAS neg  SUMA neg  Igg elevated 1803  Complements nml  ESR 90  CRP nml    Assessment     1. Rheumatoid arthritis, involving unspecified site, unspecified whether rheumatoid factor present    2. Screening for osteoporosis    3. Other osteoporosis without current pathological fracture    4. Idiopathic chronic gout of left foot without tophus    5. CKD (chronic kidney disease) stage 4, GFR 15-29 ml/min     6. Positive MICHELLE (antinuclear antibody)        Reviewed medications  Ordered labs /imaging    New problem       73 year old female  HTN,HLD,diverticulitis  Portion of colon removed  MICHELLE positive  CK high  No specific symptoms that led to the lab     MICHELLE 1: 320 positive  No lupus symptoms  SSA 5.49  No sicca symptoms  Less likely she has sjogren's  Completed the lupus and sjogrens panel  Cryos neg  APLAS neg  SUMA neg  Igg elevated 1803  Complements nml  ESR 90  CRP nml     Hands hurt right> left for few years  Cramps in hands for 30 minutes  Not on medications  6/10 cramp  0/10 pain  No functional limitations  RF,CCP neg  Right hand OA on xrays  OT    Right hip pain for 7 months  When she lays on right side  Pain resolves when she puts a pillow  No morning stiffness  Right hip bursitis possibly  PT     Gout-uric acid 9.9/8.5/6.8/5.8/7.8  On allopurinol 350 mg daily,prednisone 2.5 mg daily  Last attack 2 years ago  Craw fish and sugars cause the attack  Uric acid 4.1  Check CBC,CMP,uric acid - June 2024  If the current uric acid is normal then d/c prednisone      Muscle weakness when she overexerts  Twice a week    No classic symptoms of polymyositis,dermatomyositis  This ck is not elevated  No need to worry           Kappa free light chain elevated-4.89  Kappa lambda ratio elevated 1.94  Nml SPEP,BISHNU  Hematology -Repeat quantitative immunoglobulins and free light chains in 3 months. Appears to be reactive or age related as such low level. Normal BISHNU and SPEP   If light chain or IgG trend up on next check needs clinical hematology consult   I will send immunoglobulins and free light chains today  We sent her to hematology  They wrote-  Slight elevation in kappa free light chain, IgG 1662, normal M protein.  No CRAB criteria  No lytic bone lesions on recent imaging.  Reactive pattern on paraprotein labs.  No concern for malignancy.  Ok for ongoing observation.  Return to hematology as needed      This patient  has high ESR,elevated free light chain ratio and elevated Igg  She doesn't have anything actively autoimmune going on  She does have gout and no symptoms for 2 years  She has MICHELLE,SSA but has no symptoms of lupus or sjogrens    GFR worse since June 2023  UPCR nml,no hematuria,no proteinuria  RENAL US chronic kidney disease  Baseline sCr 1-1.3 since 2016 (last at baseline on labs September 2022). Now trending up to 1.5--> 1.9  UA,UPCR normal in April 2024  - CKD clinically related to longstanding HTN +/- uric acid nephropathy  Less likely this is lupus related but if we have to confirm or rule out lupus vs sjogrens nephritis we need a kidney biopsy     Nml white count  Nml hb  Did have thrombocytopenia but never less than 100 thousand  She used to be anemic  More recently counts are stable     TSH elevated 5.720 in the past, it is normal now  Nml T4  She will talk to her PCP    Vit d deficiency  On ergocalciferol 50,000 units weekly  Daily vit d 5000 units daily  Dexa 2018  Will need a new one       She is seeing cardiology and pulmonary    She had PVCs needing ablation  She has a loop recorder    Patient with pleural effusion first identified after EP procedure in January complicated by pericardial effusion / tamponade requiring pericardiocentesis. Found to have bilateral pleural effusions L>R. Improved with diuresis on right but left was persistent, she was discharged with home O2. EF 35-40%, she represented about 2-3 weeks later with persistent left sided effusion. Thoracentesis performed with exudate. Patient was nontoxic and culture / cyto returned negative.     At last visit patient appeared overloaded. Lasix was increased with improvement in symptoms. She is now back to PRN lasix based of daily weights and doing well. CXR performed last week due to URI with resolution of effusions.     - Effusions related to volume status, no further diagnostics needed. Likely was exudative in the past due to concentration from  diuresis.   - Counseled on RSV vaccination, patient to follow up with PCP regarding this.  - Continue lasix 40mg prn based on daily weights.     RTC in 6 MONTHS                        Sarah  Rajesh was seen today for disease management.    Diagnoses and all orders for this visit:    Rheumatoid arthritis, involving unspecified site, unspecified whether rheumatoid factor present  -     Comprehensive Metabolic Panel; Future    Screening for osteoporosis  -     DXA Bone Density Axial Skeleton 1 or more sites; Future    Other osteoporosis without current pathological fracture  -     DXA Bone Density Axial Skeleton 1 or more sites; Future  -     Comprehensive Metabolic Panel; Future    Idiopathic chronic gout of left foot without tophus  -     Uric Acid; Future  -     Comprehensive Metabolic Panel; Future    CKD (chronic kidney disease) stage 4, GFR 15-29 ml/min  -     Comprehensive Metabolic Panel; Future    Positive MICHELLE (antinuclear antibody)  -     Comprehensive Metabolic Panel; Future    Other orders  -     predniSONE (DELTASONE) 2.5 MG tablet; Take 1 tablet (2.5 mg total) by mouth once daily.

## 2024-05-06 PROBLEM — J96.11 CHRONIC HYPOXEMIC RESPIRATORY FAILURE: Status: RESOLVED | Noted: 2024-01-19 | Resolved: 2024-05-06

## 2024-05-08 ENCOUNTER — CLINICAL SUPPORT (OUTPATIENT)
Dept: CARDIOLOGY | Facility: HOSPITAL | Age: 74
End: 2024-05-08
Payer: MEDICARE

## 2024-05-08 DIAGNOSIS — I48.0 PAROXYSMAL ATRIAL FIBRILLATION: ICD-10-CM

## 2024-05-10 ENCOUNTER — HOSPITAL ENCOUNTER (OUTPATIENT)
Dept: RADIOLOGY | Facility: CLINIC | Age: 74
Discharge: HOME | End: 2024-05-10
Attending: INTERNAL MEDICINE
Payer: MEDICARE

## 2024-05-10 DIAGNOSIS — M81.8 OTHER OSTEOPOROSIS WITHOUT CURRENT PATHOLOGICAL FRACTURE: ICD-10-CM

## 2024-05-10 DIAGNOSIS — Z13.820 SCREENING FOR OSTEOPOROSIS: ICD-10-CM

## 2024-05-10 PROCEDURE — 77080 DXA BONE DENSITY AXIAL: CPT | Mod: TC

## 2024-05-10 PROCEDURE — 77080 DXA BONE DENSITY AXIAL: CPT | Mod: 26,,, | Performed by: INTERNAL MEDICINE

## 2024-05-18 ENCOUNTER — PATIENT MESSAGE (OUTPATIENT)
Dept: RHEUMATOLOGY | Facility: CLINIC | Age: 74
End: 2024-05-18
Payer: MEDICARE

## 2024-05-20 PROBLEM — J18.9 PNEUMONIA OF BOTH LUNGS DUE TO INFECTIOUS ORGANISM: Status: RESOLVED | Noted: 2024-02-15 | Resolved: 2024-05-20

## 2024-05-21 ENCOUNTER — OFFICE VISIT (OUTPATIENT)
Dept: INTERNAL MEDICINE | Facility: CLINIC | Age: 74
End: 2024-05-21
Payer: MEDICARE

## 2024-05-21 VITALS
BODY MASS INDEX: 30.17 KG/M2 | HEART RATE: 55 BPM | OXYGEN SATURATION: 97 % | WEIGHT: 159.81 LBS | SYSTOLIC BLOOD PRESSURE: 130 MMHG | HEIGHT: 61 IN | DIASTOLIC BLOOD PRESSURE: 70 MMHG

## 2024-05-21 DIAGNOSIS — Z78.9 STATIN INTOLERANCE: ICD-10-CM

## 2024-05-21 DIAGNOSIS — N18.32 STAGE 3B CHRONIC KIDNEY DISEASE: ICD-10-CM

## 2024-05-21 DIAGNOSIS — I50.22 HEART FAILURE WITH MILDLY REDUCED EJECTION FRACTION: ICD-10-CM

## 2024-05-21 DIAGNOSIS — I10 ESSENTIAL HYPERTENSION: Primary | Chronic | ICD-10-CM

## 2024-05-21 DIAGNOSIS — Z87.39 HISTORY OF GOUT: ICD-10-CM

## 2024-05-21 DIAGNOSIS — E03.8 SUBCLINICAL HYPOTHYROIDISM: ICD-10-CM

## 2024-05-21 PROBLEM — I50.31 ACUTE DIASTOLIC HEART FAILURE: Status: RESOLVED | Noted: 2024-01-20 | Resolved: 2024-05-21

## 2024-05-21 PROCEDURE — 1101F PT FALLS ASSESS-DOCD LE1/YR: CPT | Mod: CPTII,S$GLB,, | Performed by: INTERNAL MEDICINE

## 2024-05-21 PROCEDURE — 1160F RVW MEDS BY RX/DR IN RCRD: CPT | Mod: CPTII,S$GLB,, | Performed by: INTERNAL MEDICINE

## 2024-05-21 PROCEDURE — 3288F FALL RISK ASSESSMENT DOCD: CPT | Mod: CPTII,S$GLB,, | Performed by: INTERNAL MEDICINE

## 2024-05-21 PROCEDURE — 3075F SYST BP GE 130 - 139MM HG: CPT | Mod: CPTII,S$GLB,, | Performed by: INTERNAL MEDICINE

## 2024-05-21 PROCEDURE — 1159F MED LIST DOCD IN RCRD: CPT | Mod: CPTII,S$GLB,, | Performed by: INTERNAL MEDICINE

## 2024-05-21 PROCEDURE — 99214 OFFICE O/P EST MOD 30 MIN: CPT | Mod: S$GLB,,, | Performed by: INTERNAL MEDICINE

## 2024-05-21 PROCEDURE — 3066F NEPHROPATHY DOC TX: CPT | Mod: CPTII,S$GLB,, | Performed by: INTERNAL MEDICINE

## 2024-05-21 PROCEDURE — 99999 PR PBB SHADOW E&M-EST. PATIENT-LVL IV: CPT | Mod: PBBFAC,,, | Performed by: INTERNAL MEDICINE

## 2024-05-21 PROCEDURE — 3078F DIAST BP <80 MM HG: CPT | Mod: CPTII,S$GLB,, | Performed by: INTERNAL MEDICINE

## 2024-05-21 PROCEDURE — 1126F AMNT PAIN NOTED NONE PRSNT: CPT | Mod: CPTII,S$GLB,, | Performed by: INTERNAL MEDICINE

## 2024-05-21 PROCEDURE — 3008F BODY MASS INDEX DOCD: CPT | Mod: CPTII,S$GLB,, | Performed by: INTERNAL MEDICINE

## 2024-05-21 PROCEDURE — 4010F ACE/ARB THERAPY RXD/TAKEN: CPT | Mod: CPTII,S$GLB,, | Performed by: INTERNAL MEDICINE

## 2024-05-21 NOTE — PROGRESS NOTES
"Subjective:       Patient ID: Rajesh Mas is a 73 y.o. female.    Chief Complaint: Follow-up    HPI  73 y.o. female here for follow up of    Pleural effusion; HFrEF  Metop xl 25mg  Entresto 49-51  EF: 35%  Breathing has improved with diuresis. Dry weight around 162 lbs on home scale. Lasix 40mg prn based on home weights.  Last took it Thursday due to some swelling in ankle.     She was previously admitted 2/3/24 to 2/9/24 for PVC s/p ablation 1/18/24 that was complicated by pericardial effusion with tamponade physiology s/p pericardiocentesis 1/18/24 and development of acute hypoxic respiratory failure from left pleural effusion and acute diastolic dysfunction. The pleural effusion improved with diuresis and she also was treated for CAP, placed on diltiazem gtt for development of Afib that resolved. She was discharged home on metoprolol and supplemental oxygen.    CKD 3b - gfr 44      History of gout on allopurinol 350mg  On prednisone 5mg (will d/c if uric acid at goal; labs scheduled 6/3)      The 10-year ASCVD risk score (Sandra AGUERO, et al., 2019) is: 12.9%    Values used to calculate the score:      Age: 73 years      Sex: Female      Is Non- : Yes      Diabetic: No      Tobacco smoker: No      Systolic Blood Pressure: 130 mmHg      Is BP treated: Yes      HDL Cholesterol: 62 mg/dL      Total Cholesterol: 196 mg/dL    Previously on atorvastatin 40mg but unbearable cramping in legs.   Review of Systems   Constitutional:  Negative for fever.   Respiratory:  Negative for shortness of breath.    Cardiovascular:  Negative for chest pain.   Musculoskeletal: Negative.    Skin: Negative.        Objective:   /70   Pulse (!) 55   Ht 5' 1" (1.549 m)   Wt 72.5 kg (159 lb 13.3 oz)   LMP 11/28/1988 (Approximate)   SpO2 97%   BMI 30.20 kg/m²      Physical Exam  Constitutional:       General: She is not in acute distress.     Appearance: She is well-developed. She is not diaphoretic. "   HENT:      Head: Normocephalic and atraumatic.   Cardiovascular:      Rate and Rhythm: Normal rate and regular rhythm.   Pulmonary:      Effort: Pulmonary effort is normal. No respiratory distress.      Breath sounds: No wheezing or rales.   Skin:     General: Skin is warm and dry.   Neurological:      Mental Status: She is alert and oriented to person, place, and time.   Psychiatric:         Behavior: Behavior normal.         Assessment:       1. Essential hypertension    2. Heart failure with mildly reduced ejection fraction    3. Stage 3b chronic kidney disease    4. Subclinical hypothyroidism    5. History of gout    6. Statin intolerance        Plan:       Essential hypertension  -     Lipid Panel; Future; Expected date: 05/21/2024    Heart failure with mildly reduced ejection fraction  -     Lipid Panel; Future; Expected date: 05/21/2024    Stage 3b chronic kidney disease  Subclinical hypothyroidism  History of gout  - stable and controlled  - continue current regimen    Statin intolerance  Unable to tolerate previously; will follow up lab result        You are up to date for your primary preventive health care, and there are no reminders at this time.

## 2024-05-23 ENCOUNTER — CLINICAL SUPPORT (OUTPATIENT)
Dept: CARDIOLOGY | Facility: HOSPITAL | Age: 74
End: 2024-05-23
Attending: INTERNAL MEDICINE
Payer: MEDICARE

## 2024-05-23 DIAGNOSIS — I48.0 PAROXYSMAL ATRIAL FIBRILLATION: ICD-10-CM

## 2024-05-23 PROCEDURE — 93298 REM INTERROG DEV EVAL SCRMS: CPT | Mod: 26,,, | Performed by: INTERNAL MEDICINE

## 2024-05-23 PROCEDURE — 93298 REM INTERROG DEV EVAL SCRMS: CPT | Performed by: INTERNAL MEDICINE

## 2024-05-30 LAB
OHS CV AF BURDEN PERCENT: < 1
OHS CV DC REMOTE DEVICE TYPE: NORMAL

## 2024-06-03 ENCOUNTER — LAB VISIT (OUTPATIENT)
Dept: LAB | Facility: HOSPITAL | Age: 74
End: 2024-06-03
Payer: MEDICARE

## 2024-06-03 DIAGNOSIS — M81.8 OTHER OSTEOPOROSIS WITHOUT CURRENT PATHOLOGICAL FRACTURE: ICD-10-CM

## 2024-06-03 DIAGNOSIS — N18.4 CKD (CHRONIC KIDNEY DISEASE) STAGE 4, GFR 15-29 ML/MIN: ICD-10-CM

## 2024-06-03 DIAGNOSIS — R76.8 POSITIVE ANA (ANTINUCLEAR ANTIBODY): ICD-10-CM

## 2024-06-03 DIAGNOSIS — I50.22 HEART FAILURE WITH MILDLY REDUCED EJECTION FRACTION: ICD-10-CM

## 2024-06-03 DIAGNOSIS — M06.9 RHEUMATOID ARTHRITIS, INVOLVING UNSPECIFIED SITE, UNSPECIFIED WHETHER RHEUMATOID FACTOR PRESENT: ICD-10-CM

## 2024-06-03 DIAGNOSIS — M1A.0720 IDIOPATHIC CHRONIC GOUT OF LEFT FOOT WITHOUT TOPHUS: ICD-10-CM

## 2024-06-03 DIAGNOSIS — I10 ESSENTIAL HYPERTENSION: Chronic | ICD-10-CM

## 2024-06-03 LAB
ALBUMIN SERPL BCP-MCNC: 3.4 G/DL (ref 3.5–5.2)
ALP SERPL-CCNC: 54 U/L (ref 55–135)
ALT SERPL W/O P-5'-P-CCNC: 14 U/L (ref 10–44)
ANION GAP SERPL CALC-SCNC: 8 MMOL/L (ref 8–16)
AST SERPL-CCNC: 19 U/L (ref 10–40)
BILIRUB SERPL-MCNC: 0.4 MG/DL (ref 0.1–1)
BUN SERPL-MCNC: 20 MG/DL (ref 8–23)
CALCIUM SERPL-MCNC: 9.6 MG/DL (ref 8.7–10.5)
CHLORIDE SERPL-SCNC: 107 MMOL/L (ref 95–110)
CHOLEST SERPL-MCNC: 185 MG/DL (ref 120–199)
CHOLEST/HDLC SERPL: 2.7 {RATIO} (ref 2–5)
CO2 SERPL-SCNC: 26 MMOL/L (ref 23–29)
CREAT SERPL-MCNC: 1.2 MG/DL (ref 0.5–1.4)
EST. GFR  (NO RACE VARIABLE): 48.4 ML/MIN/1.73 M^2
GLUCOSE SERPL-MCNC: 87 MG/DL (ref 70–110)
HDLC SERPL-MCNC: 68 MG/DL (ref 40–75)
HDLC SERPL: 36.8 % (ref 20–50)
LDLC SERPL CALC-MCNC: 91.8 MG/DL (ref 63–159)
NONHDLC SERPL-MCNC: 117 MG/DL
POTASSIUM SERPL-SCNC: 4.5 MMOL/L (ref 3.5–5.1)
PROT SERPL-MCNC: 7.4 G/DL (ref 6–8.4)
SODIUM SERPL-SCNC: 141 MMOL/L (ref 136–145)
TRIGL SERPL-MCNC: 126 MG/DL (ref 30–150)
URATE SERPL-MCNC: 4 MG/DL (ref 2.4–5.7)

## 2024-06-03 PROCEDURE — 36415 COLL VENOUS BLD VENIPUNCTURE: CPT | Performed by: INTERNAL MEDICINE

## 2024-06-03 PROCEDURE — 80061 LIPID PANEL: CPT | Performed by: INTERNAL MEDICINE

## 2024-06-03 PROCEDURE — 84550 ASSAY OF BLOOD/URIC ACID: CPT | Performed by: INTERNAL MEDICINE

## 2024-06-03 PROCEDURE — 80053 COMPREHEN METABOLIC PANEL: CPT | Performed by: INTERNAL MEDICINE

## 2024-06-04 ENCOUNTER — PATIENT MESSAGE (OUTPATIENT)
Dept: RHEUMATOLOGY | Facility: CLINIC | Age: 74
End: 2024-06-04
Payer: MEDICARE

## 2024-06-10 ENCOUNTER — PATIENT MESSAGE (OUTPATIENT)
Dept: INTERNAL MEDICINE | Facility: CLINIC | Age: 74
End: 2024-06-10
Payer: MEDICARE

## 2024-06-13 NOTE — PROGRESS NOTES
Subjective   Patient ID:  Rajesh Mas is a 73 y.o. female who presents for follow-up of Atrial Fibrillation      73 yoM here for evaluation of palpitations.     10/23: She has had palpitations for several months/years. She had normal EF noted last year. She feels heart palpitations more recently. EF normal in 2022. No syncope or near syncope. No prior monitor. She has OT PVCs on ECG today. She is not on AVN or AAD agents.     Holter 11/23 with 11% PVC burden. PVC ablation set for 1/18/24 12/23: Continued PVC symptoms. She is here with her  to discuss the ablation procedure.     Interval history: PVC ablation 1/18/24 with associated pericardial effusion needing pericardiocentesis. She has had no further symptomatic PVCs. She had pneumonia with L pleural effusion requiring pleural drain. EF was 35-40% during this time. She had AF during that time. ILR was implanted 4/5/24 for AF management. Since implant the longest episode was <2h 4/21/24 when she had a respiratory illness. Overall she feels much better, no PVC recurrence.     Echo 2/24:  ·  Left Ventricle: Global hypokinesis present. There is moderately reduced systolic function with a visually estimated ejection fraction of 35 - 40%. Biplane (2D) method of discs ejection fraction is 41%. Global longitudinal strain is -11.0%. Grade I diastolic dysfunction.  ·  Right Ventricle: Normal right ventricular cavity size. Wall thickness is normal. Right ventricle wall motion  is normal. Systolic function is normal.  ·  Left Atrium: Left atrium is mildly dilated. There is an aneurysm along the interatrial septum.  ·  Aortic Valve: The aortic valve is a trileaflet valve. There is mild aortic valve sclerosis.  ·  Pulmonary Artery: The estimated pulmonary artery systolic pressure is 25 mmHg.  ·  IVC/SVC: Normal venous pressure at 3 mmHg.  ·  Pericardium: There is a trivial circumferential effusion.      Echo 9/22:  · The left ventricle is normal in size with normal  systolic function. The estimated ejection fraction is 55%.  · Normal right ventricular size with normal right ventricular systolic function.  · Normal left ventricular diastolic function.  · The estimated PA systolic pressure is 28 mmHg.  · Normal central venous pressure (3 mmHg).  · Cannot exclude PFO vs small ASD.    Past Medical History:  No date: Allergy      Comment:  seasonal  No date: Diverticulitis  No date: Fever blister  No date: Hypertension  No date: Personal history of colonic polyps    Past Surgical History:  1/18/2024: ABLATION; N/A      Comment:  Procedure: Ablation;  Surgeon: Santi Ellison MD;                Location: [facility] EP LAB;  Service: Cardiology;  Laterality:               N/A;  PVC, RFA, PEPE, anes, MB, 3 Prep,*prepare to map                LVOT and RVOT*  04/26/2017: CHOLECYSTECTOMY  No date: COLON SURGERY  12/06/2016: COLONOSCOPY; N/A      Comment:  Procedure: COLONOSCOPY;  Surgeon: Fidel Mason MD;                 [facility]  Service: Endoscopy;                 Laterality: N/A;  05/17/2022: COLONOSCOPY; N/A      Comment:  Procedure: COLONOSCOPY;  Surgeon: RUSSELL Rolon MD;                 Location: [facility] ENDO ([facility]);  Service: Endoscopy;                 Laterality: N/A;  fully vaccinated, prep instr portal -ml  4/5/2024: INSERTION OF IMPLANTABLE LOOP RECORDER; N/A      Comment:  Procedure: Insertion, Implantable Loop Recorder;                 Surgeon: Santi Ellison MD;  Location: [facility] EP LAB;               Service: Cardiology;  Laterality: N/A;  a fib, ILR, BSCI,               Local, MB, 3 Prep  1997: partial colectomy      Comment:  sigmoid removed due to diverticulitis  1/18/2024: PERICARDIOCENTESIS; N/A      Comment:  Procedure: Pericardiocentesis;  Surgeon: Santi Ellison MD;  Location: [facility] EP LAB;  Service:                Cardiology;  Laterality: N/A;  2007: SINUS SURGERY  June 1997@ [facility]: SMALL INTESTINE SURGERY       Comment:  Partial Sigm Colon /Divaticulitus    Social History    Socioeconomic History      Marital status:     Tobacco Use      Smoking status: Never      Smokeless tobacco: Never    Substance and Sexual Activity      Alcohol use: No      Drug use: Never      Sexual activity: Yes        Partners: Male        Birth control/protection: None        Comment: , together since 1971    Social Determinants of Health  Financial Resource Strain: Low Risk  (3/21/2024)      Overall Financial Resource Strain (CARDIA)          Difficulty of Paying Living Expenses: Not very hard  Food Insecurity: No Food Insecurity (3/21/2024)      Hunger Vital Sign          Worried About Running Out of Food in the Last Year: Never true          Ran Out of Food in the Last Year: Never true  Transportation Needs: No Transportation Needs (3/21/2024)      PRAPARE - Transportation          Lack of Transportation (Medical): No          Lack of Transportation (Non-Medical): No  Physical Activity: Inactive (3/21/2024)      Exercise Vital Sign          Days of Exercise per Week: 0 days          Minutes of Exercise per Session: 0 min  Stress: Stress Concern Present (3/21/2024)      [employer] - Occupational Stress Questionnaire          Feeling of Stress : To some extent  Housing Stability: Low Risk  (3/21/2024)      Housing Stability Vital Sign          Unable to Pay for Housing in the Last Year: No          Number of Places Lived in the Last Year: 1          Unstable Housing in the Last Year: No    Review of patient's family history indicates:  Problem: Colon cancer      Relation: Maternal Grandmother          Name:               Age of Onset: (Not Specified)              Comment: colon cancer  Problem: Breast cancer      Relation: Maternal Grandmother          Name:               Age of Onset: (Not Specified)  Problem: Diabetes      Relation: Mother          Name: Lia Post              Age of Onset:  (Not Specified)  Problem: Heart failure      Relation: Mother          Name: Lia Post              Age of Onset: (Not Specified)  Problem: Hypertension      Relation: Mother          Name: Lia Post              Age of Onset: (Not Specified)  Problem: Heart disease      Relation: Mother          Name: Lia Post              Age of Onset: (Not Specified)  Problem: Kidney disease      Relation: Mother          Name: Lia Post              Age of Onset: (Not Specified)              Comment: Dialysis  Problem: Other      Relation: Brother          Name:               Age of Onset: (Not Specified)              Comment: prostate issue  Problem: No Known Problems      Relation: Sister          Name:               Age of Onset: (Not Specified)  Problem: No Known Problems      Relation: Daughter          Name:               Age of Onset: (Not Specified)  Problem: No Known Problems      Relation: Daughter          Name:               Age of Onset: (Not Specified)  Problem: No Known Problems      Relation: Son          Name:               Age of Onset: (Not Specified)  Problem: No Known Problems      Relation: Son          Name:               Age of Onset: (Not Specified)  Problem: Liver cancer      Relation: Maternal Uncle          Name:               Age of Onset: (Not Specified)  Problem: Melanoma      Relation: Neg Hx          Name:               Age of Onset: (Not Specified)  Problem: Ovarian cancer      Relation: Neg Hx          Name:               Age of Onset: (Not Specified)  Problem: Cancer      Relation: Neg Hx          Name:               Age of Onset: (Not Specified)      Current Outpatient Medications:  allopurinoL (ZYLOPRIM) 100 MG tablet, Take ½ tablet (50mg) with 300mg tablet for a total of 350 mg daily., Disp: 45 tablet, Rfl: 1  allopurinoL (ZYLOPRIM) 300 MG tablet, Take one tablet by mouth once daily in combination with 50mg tablets for a daily dose of 350mg, Disp: 90 tablet, Rfl:  3  ergocalciferol (ERGOCALCIFEROL) 50,000 unit Cap, Take 1 capsule (50,000 Units total) by mouth every 7 days. for 12 doses, Disp: 4 capsule, Rfl: 2  furosemide (LASIX) 40 MG tablet, Take 1 tablet (40 mg total) by mouth daily as needed (for shortness of breath, swelling or 3lb weight gain on home scale)., Disp: 30 tablet, Rfl: 11  metoprolol succinate (TOPROL-XL) 25 MG 24 hr tablet, Take 1 tablet (25 mg total) by mouth once daily., Disp: 30 tablet, Rfl: 11  oxybutynin (DITROPAN) 5 MG Tab, Take 1 tablet (5 mg total) by mouth 2 (two) times daily., Disp: 180 tablet, Rfl: 3  sacubitriL-valsartan (ENTRESTO) 49-51 mg per tablet, Take 1 tablet by mouth 2 (two) times daily., Disp: 180 tablet, Rfl: 3  predniSONE (DELTASONE) 2.5 MG tablet, Take 1 tablet (2.5 mg total) by mouth once daily. (Patient not taking: Reported on 6/14/2024), Disp: 90 tablet, Rfl: 1    No current facility-administered medications for this visit.  Facility-Administered Medications Ordered in Other Visits:  ceFAZolin 2 g in dextrose 5 % in water (D5W) 50 mL IVPB (MB+), 2 g, Intravenous, On Call Procedure, Graciela Guerin, NP              Review of Systems   Constitutional: Negative for malaise/fatigue.   Cardiovascular:  Positive for dyspnea on exertion (improving). Negative for chest pain, irregular heartbeat, leg swelling and palpitations.   Respiratory:  Negative for shortness of breath.    Hematologic/Lymphatic: Negative for bleeding problem.   Skin:  Negative for rash.   Musculoskeletal:  Negative for myalgias.   Gastrointestinal:  Negative for hematemesis, hematochezia and nausea.   Genitourinary:  Negative for hematuria.   Neurological:  Negative for light-headedness.   Psychiatric/Behavioral:  Negative for altered mental status.    Allergic/Immunologic: Negative for persistent infections.          Objective     Physical Exam  Vitals and nursing note reviewed.   Constitutional:       Appearance: Normal appearance. She is well-developed.    HENT:      Head: Normocephalic.      Nose: Nose normal.   Eyes:      Pupils: Pupils are equal, round, and reactive to light.   Cardiovascular:      Rate and Rhythm: Normal rate and regular rhythm.   Pulmonary:      Effort: No respiratory distress.      Breath sounds: Normal breath sounds.   Musculoskeletal:         General: Normal range of motion.   Skin:     General: Skin is warm and dry.      Findings: No erythema.   Neurological:      Mental Status: She is alert and oriented to person, place, and time.   Psychiatric:         Speech: Speech normal.         Behavior: Behavior normal.       ECG: NSR, nl VA, QRS, QTc       Assessment and Plan     1. Paroxysmal atrial fibrillation    2. Pericardial effusion    3. PVCs (premature ventricular contractions)        Plan:    73 yoF here for arrhythmia management. No recurrence of PVCs. Pericardial effusion has resolved. pAF with longest episode at the time of a respiratory illness. No changes to therapy. RTC 6m

## 2024-06-14 ENCOUNTER — OFFICE VISIT (OUTPATIENT)
Dept: ELECTROPHYSIOLOGY | Facility: CLINIC | Age: 74
End: 2024-06-14
Payer: MEDICARE

## 2024-06-14 ENCOUNTER — HOSPITAL ENCOUNTER (OUTPATIENT)
Dept: CARDIOLOGY | Facility: CLINIC | Age: 74
Discharge: HOME | End: 2024-06-14
Payer: MEDICARE

## 2024-06-14 VITALS
HEART RATE: 54 BPM | BODY MASS INDEX: 30.55 KG/M2 | DIASTOLIC BLOOD PRESSURE: 82 MMHG | SYSTOLIC BLOOD PRESSURE: 136 MMHG | HEIGHT: 61 IN | WEIGHT: 161.81 LBS

## 2024-06-14 DIAGNOSIS — I48.0 PAROXYSMAL ATRIAL FIBRILLATION: Primary | ICD-10-CM

## 2024-06-14 DIAGNOSIS — I31.39 PERICARDIAL EFFUSION: ICD-10-CM

## 2024-06-14 DIAGNOSIS — I49.3 PVCS (PREMATURE VENTRICULAR CONTRACTIONS): ICD-10-CM

## 2024-06-14 LAB
OHS QRS DURATION: 84 MS
OHS QTC CALCULATION: 426 MS

## 2024-06-14 PROCEDURE — 3075F SYST BP GE 130 - 139MM HG: CPT | Mod: CPTII,S$GLB,, | Performed by: INTERNAL MEDICINE

## 2024-06-14 PROCEDURE — 3079F DIAST BP 80-89 MM HG: CPT | Mod: CPTII,S$GLB,, | Performed by: INTERNAL MEDICINE

## 2024-06-14 PROCEDURE — 93010 ELECTROCARDIOGRAM REPORT: CPT | Mod: S$GLB,,, | Performed by: INTERNAL MEDICINE

## 2024-06-14 PROCEDURE — 3288F FALL RISK ASSESSMENT DOCD: CPT | Mod: CPTII,S$GLB,, | Performed by: INTERNAL MEDICINE

## 2024-06-14 PROCEDURE — 1126F AMNT PAIN NOTED NONE PRSNT: CPT | Mod: CPTII,S$GLB,, | Performed by: INTERNAL MEDICINE

## 2024-06-14 PROCEDURE — 99214 OFFICE O/P EST MOD 30 MIN: CPT | Mod: S$GLB,,, | Performed by: INTERNAL MEDICINE

## 2024-06-14 PROCEDURE — 3066F NEPHROPATHY DOC TX: CPT | Mod: CPTII,S$GLB,, | Performed by: INTERNAL MEDICINE

## 2024-06-14 PROCEDURE — 93005 ELECTROCARDIOGRAM TRACING: CPT | Mod: S$GLB,,, | Performed by: INTERNAL MEDICINE

## 2024-06-14 PROCEDURE — 1159F MED LIST DOCD IN RCRD: CPT | Mod: CPTII,S$GLB,, | Performed by: INTERNAL MEDICINE

## 2024-06-14 PROCEDURE — 99999 PR PBB SHADOW E&M-EST. PATIENT-LVL III: CPT | Mod: PBBFAC,,, | Performed by: INTERNAL MEDICINE

## 2024-06-14 PROCEDURE — 1101F PT FALLS ASSESS-DOCD LE1/YR: CPT | Mod: CPTII,S$GLB,, | Performed by: INTERNAL MEDICINE

## 2024-06-14 PROCEDURE — 3008F BODY MASS INDEX DOCD: CPT | Mod: CPTII,S$GLB,, | Performed by: INTERNAL MEDICINE

## 2024-06-14 PROCEDURE — 4010F ACE/ARB THERAPY RXD/TAKEN: CPT | Mod: CPTII,S$GLB,, | Performed by: INTERNAL MEDICINE

## 2024-06-17 ENCOUNTER — HOSPITAL ENCOUNTER (OUTPATIENT)
Dept: CARDIOLOGY | Facility: HOSPITAL | Age: 74
Discharge: HOME | End: 2024-06-17
Attending: PHYSICIAN ASSISTANT
Payer: MEDICARE

## 2024-06-17 VITALS
DIASTOLIC BLOOD PRESSURE: 84 MMHG | SYSTOLIC BLOOD PRESSURE: 126 MMHG | BODY MASS INDEX: 30.4 KG/M2 | WEIGHT: 161 LBS | HEIGHT: 61 IN | HEART RATE: 63 BPM

## 2024-06-17 DIAGNOSIS — I50.20 HFREF (HEART FAILURE WITH REDUCED EJECTION FRACTION): ICD-10-CM

## 2024-06-17 LAB
ASCENDING AORTA: 3.24 CM
AV INDEX (PROSTH): 0.76
AV MEAN GRADIENT: 2 MMHG
AV PEAK GRADIENT: 3 MMHG
AV VALVE AREA BY VELOCITY RATIO: 2.55 CM²
AV VALVE AREA: 2.56 CM²
AV VELOCITY RATIO: 0.76
BSA FOR ECHO PROCEDURE: 1.77 M2
CV ECHO LV RWT: 0.45 CM
DOP CALC AO PEAK VEL: 0.87 M/S
DOP CALC AO VTI: 18.63 CM
DOP CALC LVOT AREA: 3.4 CM2
DOP CALC LVOT DIAMETER: 2.07 CM
DOP CALC LVOT PEAK VEL: 0.66 M/S
DOP CALC LVOT STROKE VOLUME: 47.76 CM3
DOP CALCLVOT PEAK VEL VTI: 14.2 CM
E WAVE DECELERATION TIME: 262.59 MSEC
E/A RATIO: 0.63
E/E' RATIO: 9.56 M/S
ECHO LV POSTERIOR WALL: 1.12 CM (ref 0.6–1.1)
EJECTION FRACTION: 50 %
FRACTIONAL SHORTENING: 24 % (ref 28–44)
INTERVENTRICULAR SEPTUM: 0.8 CM (ref 0.6–1.1)
LA MAJOR: 5.02 CM
LA MINOR: 4.78 CM
LA WIDTH: 3.69 CM
LEFT ATRIUM SIZE: 3.12 CM
LEFT ATRIUM VOLUME INDEX MOD: 33.1 ML/M2
LEFT ATRIUM VOLUME INDEX: 27.9 ML/M2
LEFT ATRIUM VOLUME MOD: 56.96 CM3
LEFT ATRIUM VOLUME: 47.92 CM3
LEFT INTERNAL DIMENSION IN SYSTOLE: 3.8 CM (ref 2.1–4)
LEFT VENTRICLE DIASTOLIC VOLUME INDEX: 66.1 ML/M2
LEFT VENTRICLE DIASTOLIC VOLUME: 113.7 ML
LEFT VENTRICLE MASS INDEX: 100 G/M2
LEFT VENTRICLE SYSTOLIC VOLUME INDEX: 42.7 ML/M2
LEFT VENTRICLE SYSTOLIC VOLUME: 73.36 ML
LEFT VENTRICULAR INTERNAL DIMENSION IN DIASTOLE: 5 CM (ref 3.5–6)
LEFT VENTRICULAR MASS: 172.3 G
LV LATERAL E/E' RATIO: 7.17 M/S
LV SEPTAL E/E' RATIO: 14.33 M/S
MV PEAK A VEL: 0.68 M/S
MV PEAK E VEL: 0.43 M/S
MV STENOSIS PRESSURE HALF TIME: 76.15 MS
MV VALVE AREA P 1/2 METHOD: 2.89 CM2
OHS CV RV/LV RATIO: 0.56 CM
PISA TR MAX VEL: 2.09 M/S
RA MAJOR: 4.63 CM
RA PRESSURE ESTIMATED: 3 MMHG
RA WIDTH: 2.75 CM
RIGHT VENTRICULAR END-DIASTOLIC DIMENSION: 2.82 CM
RV TB RVSP: 5 MMHG
SINUS: 2.94 CM
STJ: 2.45 CM
TDI LATERAL: 0.06 M/S
TDI SEPTAL: 0.03 M/S
TDI: 0.05 M/S
TR MAX PG: 17 MMHG
TRICUSPID ANNULAR PLANE SYSTOLIC EXCURSION: 1.41 CM
TV REST PULMONARY ARTERY PRESSURE: 20 MMHG
Z-SCORE OF LEFT VENTRICULAR DIMENSION IN END DIASTOLE: 0.47
Z-SCORE OF LEFT VENTRICULAR DIMENSION IN END SYSTOLE: 1.99

## 2024-06-17 PROCEDURE — 93306 TTE W/DOPPLER COMPLETE: CPT

## 2024-06-17 PROCEDURE — 93306 TTE W/DOPPLER COMPLETE: CPT | Mod: 26,,, | Performed by: INTERNAL MEDICINE

## 2024-06-23 ENCOUNTER — CLINICAL SUPPORT (OUTPATIENT)
Dept: CARDIOLOGY | Facility: HOSPITAL | Age: 74
End: 2024-06-23
Attending: INTERNAL MEDICINE
Payer: MEDICARE

## 2024-06-23 DIAGNOSIS — I48.20 CHRONIC ATRIAL FIBRILLATION, UNSPECIFIED: ICD-10-CM

## 2024-06-23 DIAGNOSIS — I48.0 PAROXYSMAL ATRIAL FIBRILLATION: ICD-10-CM

## 2024-06-23 PROCEDURE — 93298 REM INTERROG DEV EVAL SCRMS: CPT | Mod: 26,,, | Performed by: INTERNAL MEDICINE

## 2024-06-23 PROCEDURE — 93298 REM INTERROG DEV EVAL SCRMS: CPT | Performed by: INTERNAL MEDICINE

## 2024-06-24 ENCOUNTER — PATIENT MESSAGE (OUTPATIENT)
Dept: CARDIOLOGY | Facility: CLINIC | Age: 74
End: 2024-06-24
Payer: MEDICARE

## 2024-06-26 LAB
OHS CV AF BURDEN PERCENT: < 1
OHS CV DC REMOTE DEVICE TYPE: NORMAL

## 2024-06-28 RX ORDER — ERGOCALCIFEROL 1.25 MG/1
50000 CAPSULE ORAL
Qty: 4 CAPSULE | Refills: 2 | Status: SHIPPED | OUTPATIENT
Start: 2024-06-28 | End: 2024-09-14

## 2024-07-13 PROBLEM — Z98.890 STATUS POST RADIOFREQUENCY ABLATION (RFA) OPERATION FOR ARRHYTHMIA: Status: ACTIVE | Noted: 2024-07-13

## 2024-07-13 PROBLEM — I31.4: Status: ACTIVE | Noted: 2024-07-13

## 2024-07-13 PROBLEM — Z86.79 STATUS POST RADIOFREQUENCY ABLATION (RFA) OPERATION FOR ARRHYTHMIA: Status: ACTIVE | Noted: 2024-07-13

## 2024-07-13 PROBLEM — I97.89: Status: ACTIVE | Noted: 2024-07-13

## 2024-07-13 NOTE — PROGRESS NOTES
Chart has been dictated using voice recognition software.  It is not been reviewed carefully for any transcriptional errors due to this technology.   Subjective:   Patient ID:  Rajesh Mas is a 73 y.o. female who presents for follow-up of No chief complaint on file.      HPI: Patient with hypertension, CKD 3, idiopathic gout, and positive MICHELLE.  The patient was seen in 2022 ago by Sarah Rivera and Nelson for chest discomfort and had a normal echocardiogram and normal exercise stress test without imaging.  Patient had a Holter monitor 0 2-November-2023 showing a PVC burden 11.2% with no symptoms recorded.  However, the patient was advised to undergo ablation therapy of the right ventricular outflow tract origin PVCs which was performed on 18-January-2024.  During the procedure she had an acute drop in her blood pressure was noted to have a moderate size pericardial effusion with tamponade physiology.  The patient underwent pericardiocentesis on 18-January-2024.  While in the hospital, the patient's developed respiratory failure for left pleural effusion with acute diastolic dysfunction and treated with diuretics.  On 20/2-January-2024 the patient developed atrial fibrillation with rapid ventricular response responding to IV diltiazem.    The patient was hospitalized again from 03-09-February-2024 with shortness of breath thought due to a left-sided pleural effusion and possible pneumonia.  The patient also had recurrence of atrial fibrillation that was transient.  Patient had an had loop recorder implanted on 11-March-2024.  She had a repeat echocardiogram when 17-June-2024 that showed mild left ventricular hypertrophy an ejection fraction approximately 50%.  She had mild inferoseptal hypokinesis.     Breathing is much better.  Will get dyspnea if she runs. Can go up steps without dyspnea.  Patient denies any dyspnea at rest, orthopnea, or PND. Has some edema and takes furosemide 40 mg when ankles are swollen or when  weight is up.Patient denies any palpitations, lightheadedness, or syncope.     Past Medical History:   Diagnosis Date    Allergy     seasonal    Diverticulitis     Fever blister     Hypertension     Personal history of colonic polyps        Outpatient Medications Prior to Visit   Medication Sig Dispense Refill    allopurinoL (ZYLOPRIM) 100 MG tablet Take ½ tablet (50mg) with 300mg tablet for a total of 350 mg daily. 45 tablet 1    allopurinoL (ZYLOPRIM) 300 MG tablet Take one tablet by mouth once daily in combination with 50mg tablets for a daily dose of 350mg 90 tablet 3    ergocalciferol (ERGOCALCIFEROL) 50,000 unit Cap Take 1 capsule (50,000 Units total) by mouth every 7 days. for 12 doses 4 capsule 2    furosemide (LASIX) 40 MG tablet Take 1 tablet (40 mg total) by mouth daily as needed (for shortness of breath, swelling or 3lb weight gain on home scale). 30 tablet 11    metoprolol succinate (TOPROL-XL) 25 MG 24 hr tablet Take 1 tablet (25 mg total) by mouth once daily. 30 tablet 11    oxybutynin (DITROPAN) 5 MG Tab Take 1 tablet (5 mg total) by mouth 2 (two) times daily. 180 tablet 3    predniSONE (DELTASONE) 2.5 MG tablet Take 1 tablet (2.5 mg total) by mouth once daily. (Patient not taking: Reported on 6/14/2024) 90 tablet 1    sacubitriL-valsartan (ENTRESTO) 49-51 mg per tablet Take 1 tablet by mouth 2 (two) times daily. 180 tablet 3     Facility-Administered Medications Prior to Visit   Medication Dose Route Frequency Provider Last Rate Last Admin    ceFAZolin 2 g in dextrose 5 % in water (D5W) 50 mL IVPB (MB+)  2 g Intravenous On Call Procedure Graciela Guerin, NP           Review of Systems   Constitutional: Negative for weight gain and weight loss.   HENT:  Negative for nosebleeds.    Hematologic/Lymphatic: Negative for bleeding problem. Does not bruise/bleed easily.   Gastrointestinal:  Negative for hematemesis, hematochezia and melena.   Genitourinary:  Negative for hematuria.   Neurological:   "Negative for aphonia, focal weakness, loss of balance, numbness and weakness.      Objective:   Physical Exam  Constitutional:       Appearance: She is well-developed. She is obese.      Comments: /74   Pulse (!) 55   Ht 5' 1" (1.549 m)   Wt 73.5 kg (161 lb 15.6 oz)   LMP 11/28/1988 (Approximate)   SpO2 98%   BMI 30.60 kg/m²      Neck:      Vascular: No carotid bruit or JVD.   Cardiovascular:      Rate and Rhythm: Normal rate and regular rhythm.      Pulses: Intact distal pulses.      Heart sounds: Normal heart sounds. No murmur heard.     No friction rub. No gallop.   Pulmonary:      Effort: Pulmonary effort is normal.      Breath sounds: Normal breath sounds. No wheezing or rales.   Abdominal:      General: Bowel sounds are normal. There is no abdominal bruit.      Palpations: Abdomen is soft. There is no hepatomegaly.      Tenderness: There is no abdominal tenderness.   Musculoskeletal:      Cervical back: Neck supple.   Skin:     Nails: There is no clubbing.   Neurological:      Mental Status: She is alert and oriented to person, place, and time.           Lab Results   Component Value Date     06/03/2024    K 4.5 06/03/2024    BUN 20 06/03/2024    CREATININE 1.2 06/03/2024    EGFRNORACEVR 48.4 (A) 06/03/2024    GLU 87 06/03/2024    HGBA1C 5.2 01/13/2023    CHOL 185 06/03/2024    CHOL 174 03/15/2017    TRIG 126 06/03/2024    HDL 68 06/03/2024    LDLCALC 91.8 06/03/2024    HGB 12.7 04/08/2024    HCT 40.1 04/08/2024    WBC 7.13 04/08/2024     04/08/2024    INR 1.1 02/03/2024       Assessment:     1. Heart failure with mildly reduced ejection fraction    2. Paroxysmal atrial fibrillation    3. PVCs (premature ventricular contractions)    4. Status post radiofrequency ablation (RFA) operation for arrhythmia    5. Essential hypertension    6. Aortic atherosclerosis    7. BMI 31.0-31.9,adult    8. Stage 3a chronic kidney disease    9. Statin intolerance    10. Rheumatoid arthritis, involving " unspecified site, unspecified whether rheumatoid factor present      Patient has no symptoms of cardiac ischemia, heart failure, or significant arrhythmias, except for some dyspnea with high level of exertion.  Given the patient's recent cardiac problems, she is doing extremely well.  She has been encouraged to exercise more.  The patient will continue on her current medical regimen.    Unless there are intervening problems, patient should return for re-evaluation in 4 months.  MV echo cardiogram that time will be considered.    Plan:     Diagnoses and all orders for this visit:    Heart failure with mildly reduced ejection fraction    Paroxysmal atrial fibrillation    PVCs (premature ventricular contractions)    Status post radiofrequency ablation (RFA) operation for arrhythmia    Essential hypertension    Aortic atherosclerosis    BMI 31.0-31.9,adult    Stage 3a chronic kidney disease    Statin intolerance    Rheumatoid arthritis, involving unspecified site, unspecified whether rheumatoid factor present          Eugene Arenas MD  Consultative Cardiology

## 2024-07-15 ENCOUNTER — OFFICE VISIT (OUTPATIENT)
Dept: CARDIOLOGY | Facility: CLINIC | Age: 74
End: 2024-07-15
Payer: MEDICARE

## 2024-07-15 VITALS
HEART RATE: 55 BPM | DIASTOLIC BLOOD PRESSURE: 74 MMHG | HEIGHT: 61 IN | BODY MASS INDEX: 30.58 KG/M2 | WEIGHT: 162 LBS | OXYGEN SATURATION: 98 % | SYSTOLIC BLOOD PRESSURE: 128 MMHG

## 2024-07-15 DIAGNOSIS — I70.0 AORTIC ATHEROSCLEROSIS: ICD-10-CM

## 2024-07-15 DIAGNOSIS — N18.31 STAGE 3A CHRONIC KIDNEY DISEASE: ICD-10-CM

## 2024-07-15 DIAGNOSIS — Z86.79 STATUS POST RADIOFREQUENCY ABLATION (RFA) OPERATION FOR ARRHYTHMIA: ICD-10-CM

## 2024-07-15 DIAGNOSIS — I10 ESSENTIAL HYPERTENSION: Chronic | ICD-10-CM

## 2024-07-15 DIAGNOSIS — I49.3 PVCS (PREMATURE VENTRICULAR CONTRACTIONS): ICD-10-CM

## 2024-07-15 DIAGNOSIS — M06.9 RHEUMATOID ARTHRITIS, INVOLVING UNSPECIFIED SITE, UNSPECIFIED WHETHER RHEUMATOID FACTOR PRESENT: ICD-10-CM

## 2024-07-15 DIAGNOSIS — I48.0 PAROXYSMAL ATRIAL FIBRILLATION: ICD-10-CM

## 2024-07-15 DIAGNOSIS — I50.22 HEART FAILURE WITH MILDLY REDUCED EJECTION FRACTION: Primary | ICD-10-CM

## 2024-07-15 DIAGNOSIS — Z98.890 STATUS POST RADIOFREQUENCY ABLATION (RFA) OPERATION FOR ARRHYTHMIA: ICD-10-CM

## 2024-07-15 DIAGNOSIS — Z78.9 STATIN INTOLERANCE: ICD-10-CM

## 2024-07-15 PROCEDURE — 3078F DIAST BP <80 MM HG: CPT | Mod: CPTII,S$GLB,, | Performed by: INTERNAL MEDICINE

## 2024-07-15 PROCEDURE — 3066F NEPHROPATHY DOC TX: CPT | Mod: CPTII,S$GLB,, | Performed by: INTERNAL MEDICINE

## 2024-07-15 PROCEDURE — 3074F SYST BP LT 130 MM HG: CPT | Mod: CPTII,S$GLB,, | Performed by: INTERNAL MEDICINE

## 2024-07-15 PROCEDURE — 1159F MED LIST DOCD IN RCRD: CPT | Mod: CPTII,S$GLB,, | Performed by: INTERNAL MEDICINE

## 2024-07-15 PROCEDURE — 3008F BODY MASS INDEX DOCD: CPT | Mod: CPTII,S$GLB,, | Performed by: INTERNAL MEDICINE

## 2024-07-15 PROCEDURE — 1160F RVW MEDS BY RX/DR IN RCRD: CPT | Mod: CPTII,S$GLB,, | Performed by: INTERNAL MEDICINE

## 2024-07-15 PROCEDURE — 4010F ACE/ARB THERAPY RXD/TAKEN: CPT | Mod: CPTII,S$GLB,, | Performed by: INTERNAL MEDICINE

## 2024-07-15 PROCEDURE — 3288F FALL RISK ASSESSMENT DOCD: CPT | Mod: CPTII,S$GLB,, | Performed by: INTERNAL MEDICINE

## 2024-07-15 PROCEDURE — 99999 PR PBB SHADOW E&M-EST. PATIENT-LVL III: CPT | Mod: PBBFAC,,, | Performed by: INTERNAL MEDICINE

## 2024-07-15 PROCEDURE — 99214 OFFICE O/P EST MOD 30 MIN: CPT | Mod: S$GLB,,, | Performed by: INTERNAL MEDICINE

## 2024-07-15 PROCEDURE — 1101F PT FALLS ASSESS-DOCD LE1/YR: CPT | Mod: CPTII,S$GLB,, | Performed by: INTERNAL MEDICINE

## 2024-07-15 NOTE — Clinical Note
Thank you for allowing me to follow-up with Rajesh Mas for arrhythmia and heart failure with mildly reduced ejection fraction (HFmrEF). Please see my note for details of this encounter. If you have any questions, please contact me.  Thank you again for allowing to participate in the care of this patient.

## 2024-07-24 ENCOUNTER — PATIENT MESSAGE (OUTPATIENT)
Dept: NEPHROLOGY | Facility: CLINIC | Age: 74
End: 2024-07-24
Payer: MEDICARE

## 2024-07-24 ENCOUNTER — CLINICAL SUPPORT (OUTPATIENT)
Dept: CARDIOLOGY | Facility: HOSPITAL | Age: 74
End: 2024-07-24
Attending: INTERNAL MEDICINE
Payer: MEDICARE

## 2024-07-24 ENCOUNTER — TELEPHONE (OUTPATIENT)
Dept: NEPHROLOGY | Facility: CLINIC | Age: 74
End: 2024-07-24
Payer: MEDICARE

## 2024-07-24 DIAGNOSIS — N18.31 STAGE 3A CHRONIC KIDNEY DISEASE: Primary | ICD-10-CM

## 2024-07-24 DIAGNOSIS — I48.20 CHRONIC ATRIAL FIBRILLATION, UNSPECIFIED: ICD-10-CM

## 2024-07-24 DIAGNOSIS — I48.0 PAROXYSMAL ATRIAL FIBRILLATION: ICD-10-CM

## 2024-07-24 PROCEDURE — 93298 REM INTERROG DEV EVAL SCRMS: CPT | Mod: 26,,, | Performed by: INTERNAL MEDICINE

## 2024-07-24 PROCEDURE — 93298 REM INTERROG DEV EVAL SCRMS: CPT | Performed by: INTERNAL MEDICINE

## 2024-08-01 ENCOUNTER — LAB VISIT (OUTPATIENT)
Dept: LAB | Facility: HOSPITAL | Age: 74
End: 2024-08-01
Payer: MEDICARE

## 2024-08-01 DIAGNOSIS — N18.31 STAGE 3A CHRONIC KIDNEY DISEASE: ICD-10-CM

## 2024-08-01 LAB
BACTERIA #/AREA URNS AUTO: ABNORMAL /HPF
BILIRUB UR QL STRIP: NEGATIVE
CLARITY UR REFRACT.AUTO: CLEAR
COLOR UR AUTO: YELLOW
CREAT UR-MCNC: 120 MG/DL (ref 15–325)
GLUCOSE UR QL STRIP: ABNORMAL
HGB UR QL STRIP: NEGATIVE
KETONES UR QL STRIP: NEGATIVE
LEUKOCYTE ESTERASE UR QL STRIP: ABNORMAL
MICROSCOPIC COMMENT: ABNORMAL
NITRITE UR QL STRIP: NEGATIVE
PH UR STRIP: 6 [PH] (ref 5–8)
PROT UR QL STRIP: NEGATIVE
PROT UR-MCNC: 14 MG/DL (ref 0–15)
PROT/CREAT UR: 0.12 MG/G{CREAT} (ref 0–0.2)
RBC #/AREA URNS AUTO: 2 /HPF (ref 0–4)
SP GR UR STRIP: 1.02 (ref 1–1.03)
SQUAMOUS #/AREA URNS AUTO: 4 /HPF
URN SPEC COLLECT METH UR: ABNORMAL
WBC #/AREA URNS AUTO: 43 /HPF (ref 0–5)
YEAST UR QL AUTO: ABNORMAL

## 2024-08-01 PROCEDURE — 84156 ASSAY OF PROTEIN URINE: CPT | Performed by: NURSE PRACTITIONER

## 2024-08-01 PROCEDURE — 81001 URINALYSIS AUTO W/SCOPE: CPT | Performed by: NURSE PRACTITIONER

## 2024-08-05 ENCOUNTER — OFFICE VISIT (OUTPATIENT)
Dept: NEPHROLOGY | Facility: CLINIC | Age: 74
End: 2024-08-05
Payer: MEDICARE

## 2024-08-05 VITALS
HEART RATE: 71 BPM | HEIGHT: 61 IN | OXYGEN SATURATION: 99 % | DIASTOLIC BLOOD PRESSURE: 77 MMHG | BODY MASS INDEX: 30.76 KG/M2 | WEIGHT: 162.94 LBS | SYSTOLIC BLOOD PRESSURE: 119 MMHG

## 2024-08-05 DIAGNOSIS — R80.9 PROTEINURIA, UNSPECIFIED TYPE: ICD-10-CM

## 2024-08-05 DIAGNOSIS — I10 ESSENTIAL HYPERTENSION: ICD-10-CM

## 2024-08-05 DIAGNOSIS — N18.32 STAGE 3B CHRONIC KIDNEY DISEASE: Primary | ICD-10-CM

## 2024-08-05 DIAGNOSIS — N25.81 SECONDARY HYPERPARATHYROIDISM OF RENAL ORIGIN: ICD-10-CM

## 2024-08-05 DIAGNOSIS — I51.89 DIASTOLIC DYSFUNCTION: ICD-10-CM

## 2024-08-05 DIAGNOSIS — I50.22 HEART FAILURE WITH MILDLY REDUCED EJECTION FRACTION: ICD-10-CM

## 2024-08-05 DIAGNOSIS — Z87.39 HISTORY OF GOUT: ICD-10-CM

## 2024-08-05 DIAGNOSIS — E79.0 HYPERURICEMIA: ICD-10-CM

## 2024-08-05 DIAGNOSIS — R76.8 POSITIVE ANA (ANTINUCLEAR ANTIBODY): ICD-10-CM

## 2024-08-05 PROCEDURE — 99999 PR PBB SHADOW E&M-EST. PATIENT-LVL III: CPT | Mod: PBBFAC,,, | Performed by: NURSE PRACTITIONER

## 2024-08-24 ENCOUNTER — CLINICAL SUPPORT (OUTPATIENT)
Dept: CARDIOLOGY | Facility: HOSPITAL | Age: 74
End: 2024-08-24
Attending: INTERNAL MEDICINE
Payer: MEDICARE

## 2024-08-24 DIAGNOSIS — I48.20 CHRONIC ATRIAL FIBRILLATION, UNSPECIFIED: ICD-10-CM

## 2024-08-24 DIAGNOSIS — I48.0 PAROXYSMAL ATRIAL FIBRILLATION: ICD-10-CM

## 2024-08-24 PROCEDURE — 93298 REM INTERROG DEV EVAL SCRMS: CPT | Mod: 26,,, | Performed by: INTERNAL MEDICINE

## 2024-08-24 PROCEDURE — 93298 REM INTERROG DEV EVAL SCRMS: CPT | Performed by: INTERNAL MEDICINE

## 2024-08-27 LAB
OHS CV AF BURDEN PERCENT: < 1
OHS CV AF BURDEN PERCENT: < 1
OHS CV DC REMOTE DEVICE TYPE: NORMAL
OHS CV DC REMOTE DEVICE TYPE: NORMAL

## 2024-09-19 ENCOUNTER — OFFICE VISIT (OUTPATIENT)
Dept: OPTOMETRY | Facility: CLINIC | Age: 74
End: 2024-09-19
Payer: MEDICARE

## 2024-09-19 DIAGNOSIS — H52.4 HYPEROPIA OF BOTH EYES WITH ASTIGMATISM AND PRESBYOPIA: ICD-10-CM

## 2024-09-19 DIAGNOSIS — H25.13 NUCLEAR SCLEROSIS OF BOTH EYES: Primary | ICD-10-CM

## 2024-09-19 DIAGNOSIS — H43.812 PVD (POSTERIOR VITREOUS DETACHMENT), LEFT: ICD-10-CM

## 2024-09-19 DIAGNOSIS — H52.203 HYPEROPIA OF BOTH EYES WITH ASTIGMATISM AND PRESBYOPIA: ICD-10-CM

## 2024-09-19 DIAGNOSIS — H52.03 HYPEROPIA OF BOTH EYES WITH ASTIGMATISM AND PRESBYOPIA: ICD-10-CM

## 2024-09-19 DIAGNOSIS — H40.013 OAG (OPEN ANGLE GLAUCOMA) SUSPECT, LOW RISK, BILATERAL: ICD-10-CM

## 2024-09-19 PROCEDURE — 99999 PR PBB SHADOW E&M-EST. PATIENT-LVL III: CPT | Mod: PBBFAC,,, | Performed by: OPTOMETRIST

## 2024-09-19 PROCEDURE — 92014 COMPRE OPH EXAM EST PT 1/>: CPT | Mod: S$GLB,,, | Performed by: OPTOMETRIST

## 2024-09-19 PROCEDURE — 92015 DETERMINE REFRACTIVE STATE: CPT | Mod: S$GLB,,, | Performed by: OPTOMETRIST

## 2024-09-19 PROCEDURE — 1101F PT FALLS ASSESS-DOCD LE1/YR: CPT | Mod: CPTII,S$GLB,, | Performed by: OPTOMETRIST

## 2024-09-19 PROCEDURE — 3288F FALL RISK ASSESSMENT DOCD: CPT | Mod: CPTII,S$GLB,, | Performed by: OPTOMETRIST

## 2024-09-19 PROCEDURE — 3066F NEPHROPATHY DOC TX: CPT | Mod: CPTII,S$GLB,, | Performed by: OPTOMETRIST

## 2024-09-19 PROCEDURE — 1126F AMNT PAIN NOTED NONE PRSNT: CPT | Mod: CPTII,S$GLB,, | Performed by: OPTOMETRIST

## 2024-09-19 PROCEDURE — 92133 CPTRZD OPH DX IMG PST SGM ON: CPT | Mod: S$GLB,,, | Performed by: OPTOMETRIST

## 2024-09-19 PROCEDURE — 1159F MED LIST DOCD IN RCRD: CPT | Mod: CPTII,S$GLB,, | Performed by: OPTOMETRIST

## 2024-09-19 PROCEDURE — 4010F ACE/ARB THERAPY RXD/TAKEN: CPT | Mod: CPTII,S$GLB,, | Performed by: OPTOMETRIST

## 2024-09-19 NOTE — PROGRESS NOTES
HPI    Annual Exam for cataract evaluation   Pt states vision could be better     Pt denies Flashes   Pt reports Floater- Noticed over this past year      Pt denies Dry/ Itchy/ Burning  Gtt: No    Last edited by Rosas Hennessy, OD on 9/19/2024 10:07 AM.            Assessment /Plan     For exam results, see Encounter Report.    Nuclear sclerosis of both eyes  -Educated patient on presence of cataracts at today's exam, monitor at annual dilated fundus exam. 1-3 years surgical estimate.    PVD (posterior vitreous detachment), left  -Reviewed signs and symptoms of retinal detachment. Pt instructed to return to clinic immediately with flashes, floaters, or veil of darkness in vision.      OAG (open angle glaucoma) suspect, low risk, bilateral  -     OCT, Optic Nerve - OU - Both Eyes  -Enlarged CD's, FHx  -OCT WNL  -monitor low risk    Hyperopia of both eyes with astigmatism and presbyopia  Eyeglass Final Rx       Eyeglass Final Rx         Sphere Cylinder Axis Dist VA Add    Right +3.00 Sphere  20/50+ +2.50    Left +2.50 +0.50 180 20/25 +2.50      Type: PAL    Expiration Date: 9/19/2025                      RTC 1 yr

## 2024-09-25 ENCOUNTER — CLINICAL SUPPORT (OUTPATIENT)
Dept: CARDIOLOGY | Facility: HOSPITAL | Age: 74
End: 2024-09-25
Payer: MEDICARE

## 2024-09-25 ENCOUNTER — CLINICAL SUPPORT (OUTPATIENT)
Dept: CARDIOLOGY | Facility: HOSPITAL | Age: 74
End: 2024-09-25
Attending: INTERNAL MEDICINE
Payer: MEDICARE

## 2024-09-25 DIAGNOSIS — I48.0 PAROXYSMAL ATRIAL FIBRILLATION: ICD-10-CM

## 2024-09-25 DIAGNOSIS — I48.20 CHRONIC ATRIAL FIBRILLATION, UNSPECIFIED: ICD-10-CM

## 2024-09-25 PROCEDURE — 93298 REM INTERROG DEV EVAL SCRMS: CPT | Performed by: INTERNAL MEDICINE

## 2024-09-25 PROCEDURE — 93298 REM INTERROG DEV EVAL SCRMS: CPT | Mod: 26,,, | Performed by: INTERNAL MEDICINE

## 2024-09-27 LAB
OHS CV AF BURDEN PERCENT: < 1
OHS CV DC REMOTE DEVICE TYPE: NORMAL

## 2024-09-30 ENCOUNTER — PATIENT MESSAGE (OUTPATIENT)
Dept: CARDIOLOGY | Facility: CLINIC | Age: 74
End: 2024-09-30
Payer: MEDICARE

## 2024-10-02 ENCOUNTER — TELEPHONE (OUTPATIENT)
Dept: PHARMACY | Facility: CLINIC | Age: 74
End: 2024-10-02
Payer: MEDICARE

## 2024-10-02 DIAGNOSIS — I50.22 HEART FAILURE WITH MILDLY REDUCED EJECTION FRACTION: Primary | ICD-10-CM

## 2024-10-02 NOTE — TELEPHONE ENCOUNTER
We have reached out to MsBlair Tg to inform her of the [employer] application process for Entresto and whats required to apply.  She did not answer.         I left a voicemail   I mailed a letter and sent a portal message       Follow-up will be made in 5 business days.    Malgorzata Cool  Pharmacy Patient Assistance Team

## 2024-10-02 NOTE — LETTER
October 2, 2024    Rajesh Mas  [address]  [place] [address]             Dear Ms. Mas,      My name is Malgorzata Cool  I am reaching out on behalf of [facility]s Pharmacy Patient Assistance Team after receiving a referral from your Provider inquiring about assistance with your medication. I tried to contact you on 10/2/2024 . Sorry we missed you. Our goal is to assist qualified patients with financial assistance for their medications to better help enrollment for their medications to better help you achieve your health goals.      Please note that enrollment into available support will require the following documents:      Proof of household Income (such as social security statement, 1099 form, pension statement or 3 consecutive pay stubs)  Copy of all insurance cards (front and back)  Print out from your insurance or pharmacy showing how much you have spent on prescriptions this year  Completed Medication Access [facility] Authorization Forms       Please reach out to my phone number below if you are still in need of assistance with your medications. Follow-up attempt to reach you through MyChart or letter will be made in 5 business days. We look forward to hearing from you soon!    Thank you for choosing [facility] for your healthcare needs      Sincerely  Malgorzata LUJAN @[phone]  Pharmacy Patient Assistance Team  [address] [place] [address]  [id number]  [place] [address]  Fax: [phone]  Email: [email or web address]

## 2024-10-03 DIAGNOSIS — I50.22 HEART FAILURE WITH MILDLY REDUCED EJECTION FRACTION: Primary | ICD-10-CM

## 2024-10-03 DIAGNOSIS — I50.22 HEART FAILURE WITH MILDLY REDUCED EJECTION FRACTION: ICD-10-CM

## 2024-10-03 RX ORDER — VALSARTAN 80 MG/1
80 TABLET ORAL 2 TIMES DAILY
Qty: 180 TABLET | Refills: 3 | Status: SHIPPED | OUTPATIENT
Start: 2024-10-03 | End: 2025-10-03

## 2024-10-03 RX ORDER — SACUBITRIL AND VALSARTAN 49; 51 MG/1; MG/1
1 TABLET, FILM COATED ORAL 2 TIMES DAILY
Qty: 180 TABLET | Refills: 3 | OUTPATIENT
Start: 2024-10-03 | End: 2025-10-03

## 2024-10-30 ENCOUNTER — CLINICAL SUPPORT (OUTPATIENT)
Dept: CARDIOLOGY | Facility: HOSPITAL | Age: 74
End: 2024-10-30
Payer: MEDICARE

## 2024-10-30 ENCOUNTER — CLINICAL SUPPORT (OUTPATIENT)
Dept: CARDIOLOGY | Facility: HOSPITAL | Age: 74
End: 2024-10-30
Attending: INTERNAL MEDICINE
Payer: MEDICARE

## 2024-10-30 DIAGNOSIS — I48.0 PAROXYSMAL ATRIAL FIBRILLATION: ICD-10-CM

## 2024-10-30 DIAGNOSIS — I48.20 CHRONIC ATRIAL FIBRILLATION, UNSPECIFIED: ICD-10-CM

## 2024-10-30 PROCEDURE — 93298 REM INTERROG DEV EVAL SCRMS: CPT | Performed by: INTERNAL MEDICINE

## 2024-10-30 PROCEDURE — 93298 REM INTERROG DEV EVAL SCRMS: CPT | Mod: 26,,, | Performed by: INTERNAL MEDICINE

## 2024-10-31 LAB
OHS CV AF BURDEN PERCENT: < 1
OHS CV DC REMOTE DEVICE TYPE: NORMAL

## 2024-11-13 ENCOUNTER — PATIENT MESSAGE (OUTPATIENT)
Dept: INTERNAL MEDICINE | Facility: CLINIC | Age: 74
End: 2024-11-13
Payer: MEDICARE

## 2024-11-21 ENCOUNTER — OFFICE VISIT (OUTPATIENT)
Dept: INTERNAL MEDICINE | Facility: CLINIC | Age: 74
End: 2024-11-21
Payer: MEDICARE

## 2024-11-21 VITALS
BODY MASS INDEX: 31.48 KG/M2 | SYSTOLIC BLOOD PRESSURE: 124 MMHG | HEART RATE: 57 BPM | HEIGHT: 61 IN | DIASTOLIC BLOOD PRESSURE: 70 MMHG | WEIGHT: 166.75 LBS | OXYGEN SATURATION: 99 %

## 2024-11-21 DIAGNOSIS — E78.2 MIXED HYPERLIPIDEMIA: ICD-10-CM

## 2024-11-21 DIAGNOSIS — I10 ESSENTIAL HYPERTENSION: Primary | Chronic | ICD-10-CM

## 2024-11-21 DIAGNOSIS — I70.0 AORTIC ATHEROSCLEROSIS: ICD-10-CM

## 2024-11-21 DIAGNOSIS — I50.22 HEART FAILURE WITH MILDLY REDUCED EJECTION FRACTION: ICD-10-CM

## 2024-11-21 PROCEDURE — 99999 PR PBB SHADOW E&M-EST. PATIENT-LVL III: CPT | Mod: PBBFAC,,, | Performed by: INTERNAL MEDICINE

## 2024-11-21 RX ORDER — ROSUVASTATIN CALCIUM 5 MG/1
5 TABLET, COATED ORAL DAILY
Qty: 90 TABLET | Refills: 3 | Status: SHIPPED | OUTPATIENT
Start: 2024-11-21 | End: 2025-11-21

## 2024-11-21 NOTE — PROGRESS NOTES
"Subjective:       Patient ID: Rajesh Mas is a 73 y.o. female.    Chief Complaint: Annual Exam    HPI  Rajesh Mas is a 73 y.o. female here for routine follow up of the following chronic issues:      HFrEF  Metop xl 25mg  Entresto 49-51 too expensive so on valsartan 80mg BID instead  EF: 35%  Furosemide 40mg daily PRN - needing about once per week  Dry weight around 162 lbs on home scale.       She was previously admitted 2/3/24 to 2/9/24 for PVC s/p ablation 1/18/24 that was complicated by pericardial effusion with tamponade physiology s/p pericardiocentesis 1/18/24 and development of acute hypoxic respiratory failure from left pleural effusion and acute diastolic dysfunction. The pleural effusion improved with diuresis and she also was treated for CAP, placed on diltiazem gtt for development of Afib that resolved. She was discharged home on metoprolol and supplemental oxygen.     CKD 3b - gfr 44        History of gout on allopurinol 350mg  No longer on prednisone.    Previously on atorvastatin 40mg but unbearable cramping in legs.   The 10-year ASCVD risk score (Sandra AGUERO, et al., 2019) is: 11.5%    Values used to calculate the score:      Age: 73 years      Sex: Female      Is Non- : Yes      Diabetic: No      Tobacco smoker: No      Systolic Blood Pressure: 124 mmHg      Is BP treated: Yes      HDL Cholesterol: 68 mg/dL      Total Cholesterol: 185 mg/dL    Review of Systems   Constitutional:  Negative for fever.   Respiratory:  Negative for shortness of breath.    Cardiovascular:  Negative for chest pain.   Musculoskeletal: Negative.    Skin: Negative.        Objective:   /70 (BP Location: Left arm, Patient Position: Sitting)   Pulse (!) 57   Ht 5' 1" (1.549 m)   Wt 75.7 kg (166 lb 12.5 oz)   LMP 11/28/1988 (Approximate)   SpO2 99%   BMI 31.51 kg/m²      Physical Exam  Constitutional:       General: She is not in acute distress.     Appearance: She is well-developed. She " is not diaphoretic.   HENT:      Head: Normocephalic and atraumatic.   Cardiovascular:      Rate and Rhythm: Normal rate and regular rhythm.   Pulmonary:      Effort: Pulmonary effort is normal. No respiratory distress.      Breath sounds: No wheezing or rales.   Skin:     General: Skin is warm and dry.   Neurological:      Mental Status: She is alert and oriented to person, place, and time.   Psychiatric:         Behavior: Behavior normal.         Assessment:       1. Essential hypertension    2. Heart failure with mildly reduced ejection fraction    3. Aortic atherosclerosis    4. Mixed hyperlipidemia        Plan:       Essential hypertension  - stable and controlled  - continue current regimen    Heart failure with mildly reduced ejection fraction  Aortic atherosclerosis  Mixed hyperlipidemia  -     rosuvastatin (CRESTOR) 5 MG tablet; Take 1 tablet (5 mg total) by mouth once daily.  Dispense: 90 tablet; Refill: 3  -     HEPATIC FUNCTION PANEL; Future; Expected date: 11/21/2024  -     Lipid Panel; Future; Expected date: 11/21/2024          You are up to date for your primary preventive health care, and there are no reminders at this time.     Trial rosuvastatin

## 2024-12-04 ENCOUNTER — CLINICAL SUPPORT (OUTPATIENT)
Dept: CARDIOLOGY | Facility: HOSPITAL | Age: 74
End: 2024-12-04
Attending: INTERNAL MEDICINE
Payer: MEDICARE

## 2024-12-04 ENCOUNTER — CLINICAL SUPPORT (OUTPATIENT)
Dept: CARDIOLOGY | Facility: HOSPITAL | Age: 74
End: 2024-12-04
Payer: MEDICARE

## 2024-12-04 DIAGNOSIS — I48.0 PAROXYSMAL ATRIAL FIBRILLATION: ICD-10-CM

## 2024-12-04 DIAGNOSIS — I48.20 CHRONIC ATRIAL FIBRILLATION, UNSPECIFIED: ICD-10-CM

## 2024-12-04 PROCEDURE — 93298 REM INTERROG DEV EVAL SCRMS: CPT | Performed by: INTERNAL MEDICINE

## 2024-12-04 PROCEDURE — 93298 REM INTERROG DEV EVAL SCRMS: CPT | Mod: 26,,, | Performed by: INTERNAL MEDICINE

## 2024-12-12 LAB
OHS CV AF BURDEN PERCENT: < 1
OHS CV DC REMOTE DEVICE TYPE: NORMAL

## 2025-01-08 ENCOUNTER — CLINICAL SUPPORT (OUTPATIENT)
Dept: CARDIOLOGY | Facility: HOSPITAL | Age: 75
End: 2025-01-08
Attending: INTERNAL MEDICINE
Payer: MEDICARE

## 2025-01-08 ENCOUNTER — CLINICAL SUPPORT (OUTPATIENT)
Dept: CARDIOLOGY | Facility: HOSPITAL | Age: 75
End: 2025-01-08
Payer: MEDICARE

## 2025-01-08 DIAGNOSIS — I48.0 PAROXYSMAL ATRIAL FIBRILLATION: ICD-10-CM

## 2025-01-08 DIAGNOSIS — I48.20 CHRONIC ATRIAL FIBRILLATION, UNSPECIFIED: ICD-10-CM

## 2025-01-08 PROCEDURE — 93298 REM INTERROG DEV EVAL SCRMS: CPT | Performed by: INTERNAL MEDICINE

## 2025-01-08 PROCEDURE — 93298 REM INTERROG DEV EVAL SCRMS: CPT | Mod: 26,,, | Performed by: INTERNAL MEDICINE

## 2025-01-14 LAB
OHS CV AF BURDEN PERCENT: < 1
OHS CV DC REMOTE DEVICE TYPE: NORMAL

## 2025-01-24 ENCOUNTER — PATIENT MESSAGE (OUTPATIENT)
Dept: CARDIOLOGY | Facility: CLINIC | Age: 75
End: 2025-01-24
Payer: MEDICARE

## 2025-01-27 RX ORDER — METOPROLOL SUCCINATE 25 MG/1
25 TABLET, EXTENDED RELEASE ORAL DAILY
Qty: 90 TABLET | Refills: 3 | Status: SHIPPED | OUTPATIENT
Start: 2025-01-27 | End: 2026-01-27

## 2025-01-30 DIAGNOSIS — Z00.00 ENCOUNTER FOR MEDICARE ANNUAL WELLNESS EXAM: ICD-10-CM

## 2025-02-05 ENCOUNTER — LAB VISIT (OUTPATIENT)
Dept: LAB | Facility: HOSPITAL | Age: 75
End: 2025-02-05
Payer: MEDICARE

## 2025-02-05 DIAGNOSIS — N18.31 STAGE 3A CHRONIC KIDNEY DISEASE: ICD-10-CM

## 2025-02-05 DIAGNOSIS — N18.32 STAGE 3B CHRONIC KIDNEY DISEASE: ICD-10-CM

## 2025-02-05 LAB
CREAT UR-MCNC: 65 MG/DL (ref 15–325)
CREAT UR-MCNC: 65 MG/DL (ref 15–325)
CREATININE, URINE: 65 MG/DL
MICROALB/CREAT RATIO: 0.65
MICROALBUMIN URINE: 42
PROT UR-MCNC: 42 MG/DL (ref 0–15)
PROT UR-MCNC: 42 MG/DL (ref 0–15)
PROT/CREAT UR: 0.65 MG/G{CREAT} (ref 0–0.2)
PROT/CREAT UR: 0.65 MG/G{CREAT} (ref 0–0.2)

## 2025-02-05 PROCEDURE — 84156 ASSAY OF PROTEIN URINE: CPT | Performed by: NURSE PRACTITIONER

## 2025-02-11 ENCOUNTER — OFFICE VISIT (OUTPATIENT)
Dept: ELECTROPHYSIOLOGY | Facility: CLINIC | Age: 75
End: 2025-02-11
Payer: MEDICARE

## 2025-02-11 ENCOUNTER — HOSPITAL ENCOUNTER (OUTPATIENT)
Dept: CARDIOLOGY | Facility: CLINIC | Age: 75
Discharge: HOME | End: 2025-02-11
Payer: MEDICARE

## 2025-02-11 ENCOUNTER — CLINICAL SUPPORT (OUTPATIENT)
Dept: CARDIOLOGY | Facility: HOSPITAL | Age: 75
End: 2025-02-11
Attending: INTERNAL MEDICINE
Payer: MEDICARE

## 2025-02-11 ENCOUNTER — PATIENT MESSAGE (OUTPATIENT)
Dept: INTERNAL MEDICINE | Facility: CLINIC | Age: 75
End: 2025-02-11
Payer: MEDICARE

## 2025-02-11 ENCOUNTER — PATIENT MESSAGE (OUTPATIENT)
Dept: NEPHROLOGY | Facility: CLINIC | Age: 75
End: 2025-02-11
Payer: MEDICARE

## 2025-02-11 ENCOUNTER — CLINICAL SUPPORT (OUTPATIENT)
Dept: CARDIOLOGY | Facility: HOSPITAL | Age: 75
End: 2025-02-11
Payer: MEDICARE

## 2025-02-11 VITALS
DIASTOLIC BLOOD PRESSURE: 106 MMHG | HEIGHT: 61 IN | WEIGHT: 167.56 LBS | HEART RATE: 57 BPM | SYSTOLIC BLOOD PRESSURE: 174 MMHG | BODY MASS INDEX: 31.63 KG/M2

## 2025-02-11 DIAGNOSIS — I50.22 HEART FAILURE WITH MILDLY REDUCED EJECTION FRACTION: ICD-10-CM

## 2025-02-11 DIAGNOSIS — Z98.890 STATUS POST RADIOFREQUENCY ABLATION (RFA) OPERATION FOR ARRHYTHMIA: Primary | ICD-10-CM

## 2025-02-11 DIAGNOSIS — I48.0 PAROXYSMAL ATRIAL FIBRILLATION: ICD-10-CM

## 2025-02-11 DIAGNOSIS — I48.20 CHRONIC ATRIAL FIBRILLATION, UNSPECIFIED: ICD-10-CM

## 2025-02-11 DIAGNOSIS — I49.3 PVCS (PREMATURE VENTRICULAR CONTRACTIONS): ICD-10-CM

## 2025-02-11 DIAGNOSIS — Z86.79 STATUS POST RADIOFREQUENCY ABLATION (RFA) OPERATION FOR ARRHYTHMIA: Primary | ICD-10-CM

## 2025-02-11 LAB
OHS QRS DURATION: 78 MS
OHS QTC CALCULATION: 422 MS

## 2025-02-11 PROCEDURE — 93010 ELECTROCARDIOGRAM REPORT: CPT | Mod: S$GLB,,, | Performed by: INTERNAL MEDICINE

## 2025-02-11 PROCEDURE — 99999 PR PBB SHADOW E&M-EST. PATIENT-LVL III: CPT | Mod: PBBFAC,,, | Performed by: INTERNAL MEDICINE

## 2025-02-11 PROCEDURE — 93005 ELECTROCARDIOGRAM TRACING: CPT | Mod: S$GLB,,, | Performed by: INTERNAL MEDICINE

## 2025-02-11 PROCEDURE — 93298 REM INTERROG DEV EVAL SCRMS: CPT | Performed by: INTERNAL MEDICINE

## 2025-02-11 NOTE — PROGRESS NOTES
Subjective   Patient ID:  Rajesh Mas is a 74 y.o. female who presents for follow-up of Atrial Fibrillation      74 yoM here for evaluation of palpitations.     10/23: She has had palpitations for several months/years. She had normal EF noted last year. She feels heart palpitations more recently. EF normal in 2022. No syncope or near syncope. No prior monitor. She has OT PVCs on ECG today. She is not on AVN or AAD agents.     Holter 11/23 with 11% PVC burden. PVC ablation set for 1/18/24 12/23: Continued PVC symptoms. She is here with her  to discuss the ablation procedure.     6/24: PVC ablation 1/18/24 with associated pericardial effusion needing pericardiocentesis. She has had no further symptomatic PVCs. She had pneumonia with L pleural effusion requiring pleural drain. EF was 35-40% during this time. She had AF during that time. ILR was implanted 4/5/24 for AF management. Since implant the longest episode was <2h 4/21/24 when she had a respiratory illness. Overall she feels much better, no PVC recurrence.     Interval history: No AF since her last visit    Echo 2/24:  ·  Left Ventricle: Global hypokinesis present. There is moderately reduced systolic function with a visually estimated ejection fraction of 35 - 40%. Biplane (2D) method of discs ejection fraction is 41%. Global longitudinal strain is -11.0%. Grade I diastolic dysfunction.  ·  Right Ventricle: Normal right ventricular cavity size. Wall thickness is normal. Right ventricle wall motion  is normal. Systolic function is normal.  ·  Left Atrium: Left atrium is mildly dilated. There is an aneurysm along the interatrial septum.  ·  Aortic Valve: The aortic valve is a trileaflet valve. There is mild aortic valve sclerosis.  ·  Pulmonary Artery: The estimated pulmonary artery systolic pressure is 25 mmHg.  ·  IVC/SVC: Normal venous pressure at 3 mmHg.  ·  Pericardium: There is a trivial circumferential effusion.      Echo 9/22:  · The left  ventricle is normal in size with normal systolic function. The estimated ejection fraction is 55%.  · Normal right ventricular size with normal right ventricular systolic function.  · Normal left ventricular diastolic function.  · The estimated PA systolic pressure is 28 mmHg.  · Normal central venous pressure (3 mmHg).  · Cannot exclude PFO vs small ASD.    My interpretation of the ECG is:    Past Medical History:  No date: Allergy      Comment:  seasonal  No date: Diverticulitis  No date: Fever blister  No date: Hypertension  No date: Personal history of colonic polyps    Past Surgical History:  1/18/2024: ABLATION; N/A      Comment:  Procedure: Ablation;  Surgeon: Santi Ellison MD;                Location: [facility] EP LAB;  Service: Cardiology;  Laterality:               N/A;  PVC, RFA, PEPE, anes, MB, 3 Prep,*prepare to map                LVOT and RVOT*  04/26/2017: CHOLECYSTECTOMY  No date: COLON SURGERY  12/06/2016: COLONOSCOPY; N/A      Comment:  Procedure: COLONOSCOPY;  Surgeon: Fidel Mason MD;                 [facility]  Service: Endoscopy;                 Laterality: N/A;  05/17/2022: COLONOSCOPY; N/A      Comment:  Procedure: COLONOSCOPY;  Surgeon: RUSSELL Rolon MD;                 Location: [facility] ENDO (4TH FLR);  Service: Endoscopy;                 Laterality: N/A;  fully vaccinated, prep instr portal -ml  4/5/2024: INSERTION OF IMPLANTABLE LOOP RECORDER; N/A      Comment:  Procedure: Insertion, Implantable Loop Recorder;                 Surgeon: Santi Ellison MD;  Location: [facility] EP LAB;               Service: Cardiology;  Laterality: N/A;  a fib, ILR, BSCI,               Local, MB, 3 Prep  1997: partial colectomy      Comment:  sigmoid removed due to diverticulitis  1/18/2024: PERICARDIOCENTESIS; N/A      Comment:  Procedure: Pericardiocentesis;  Surgeon: Santi Ellison MD;  Location: [facility] EP LAB;  Service:                Cardiology;  Laterality:  N/A;  2007: SINUS SURGERY  June 1997@ [facility]: SMALL INTESTINE SURGERY      Comment:  Partial Sigm Colon /Divaticulitus    Social History    Socioeconomic History      Marital status:     Tobacco Use      Smoking status: Never      Smokeless tobacco: Never    Substance and Sexual Activity      Alcohol use: No      Drug use: Never      Sexual activity: Yes        Partners: Male        Birth control/protection: None        Comment: , together since 1971    Social Drivers of Health  Financial Resource Strain: Low Risk  (2/5/2025)      Overall Financial Resource Strain (CARDIA)          Difficulty of Paying Living Expenses: Not very hard  Food Insecurity: Unknown (2/5/2025)      Hunger Vital Sign          Worried About Running Out of Food in the Last Year: Never true          Ran Out of Food in the Last Year: Patient declined  Transportation Needs: No Transportation Needs (3/21/2024)      PRAPARE - Transportation          Lack of Transportation (Medical): No          Lack of Transportation (Non-Medical): No  Physical Activity: Sufficiently Active (2/5/2025)      Exercise Vital Sign          Days of Exercise per Week: 3 days          Minutes of Exercise per Session: 60 min  Stress: No Stress Concern Present (2/5/2025)      [employer] - Occupational Stress Questionnaire          Feeling of Stress : Not at all  Housing Stability: Low Risk  (3/21/2024)      Housing Stability Vital Sign          Unable to Pay for Housing in the Last Year: No          Number of Places Lived in the Last Year: 1          Unstable Housing in the Last Year: No    Review of patient's family history indicates:  Problem: Colon cancer      Relation: Maternal Grandmother          Name:               Age of Onset: (Not Specified)              Comment: colon cancer  Problem: Breast cancer      Relation: Maternal Grandmother          Name:               Age of Onset: (Not Specified)  Problem: Diabetes       Relation: Mother          Name: Lia Post              Age of Onset: (Not Specified)  Problem: Heart failure      Relation: Mother          Name: Lia Post              Age of Onset: (Not Specified)  Problem: Hypertension      Relation: Mother          Name: Lia Post              Age of Onset: (Not Specified)  Problem: Heart disease      Relation: Mother          Name: Lia Post              Age of Onset: (Not Specified)  Problem: Kidney disease      Relation: Mother          Name: Lia Post              Age of Onset: (Not Specified)              Comment: Dialysis  Problem: Other      Relation: Brother          Name:               Age of Onset: (Not Specified)              Comment: prostate issue  Problem: No Known Problems      Relation: Sister          Name:               Age of Onset: (Not Specified)  Problem: No Known Problems      Relation: Daughter          Name:               Age of Onset: (Not Specified)  Problem: No Known Problems      Relation: Daughter          Name:               Age of Onset: (Not Specified)  Problem: No Known Problems      Relation: Son          Name:               Age of Onset: (Not Specified)  Problem: No Known Problems      Relation: Son          Name:               Age of Onset: (Not Specified)  Problem: Liver cancer      Relation: Maternal Uncle          Name:               Age of Onset: (Not Specified)  Problem: Melanoma      Relation: Neg Hx          Name:               Age of Onset: (Not Specified)  Problem: Ovarian cancer      Relation: Neg Hx          Name:               Age of Onset: (Not Specified)  Problem: Cancer      Relation: Neg Hx          Name:               Age of Onset: (Not Specified)      Current Outpatient Medications:  allopurinoL (ZYLOPRIM) 100 MG tablet, Take ½ tablet (50mg) with 300mg tablet for a total of 350 mg daily., Disp: 45 tablet, Rfl: 1  allopurinoL (ZYLOPRIM) 300 MG tablet, Take one tablet by mouth once daily in  combination with 50mg tablets for a daily dose of 350mg, Disp: 90 tablet, Rfl: 3  furosemide (LASIX) 40 MG tablet, Take 1 tablet (40 mg total) by mouth daily as needed (for shortness of breath, swelling or 3lb weight gain on home scale)., Disp: 30 tablet, Rfl: 11  metoprolol succinate (TOPROL-XL) 25 MG 24 hr tablet, Take 1 tablet (25 mg total) by mouth once daily., Disp: 90 tablet, Rfl: 3  oxybutynin (DITROPAN) 5 MG Tab, Take 1 tablet (5 mg total) by mouth 2 (two) times daily., Disp: 180 tablet, Rfl: 3  rosuvastatin (CRESTOR) 5 MG tablet, Take 1 tablet (5 mg total) by mouth once daily., Disp: 90 tablet, Rfl: 3  valsartan (DIOVAN) 80 MG tablet, Take 1 tablet (80 mg total) by mouth 2 (two) times daily., Disp: 180 tablet, Rfl: 3    No current facility-administered medications for this visit.  Facility-Administered Medications Ordered in Other Visits:  ceFAZolin 2 g in dextrose 5 % in water (D5W) 50 mL IVPB (MB+), 2 g, Intravenous, On Call Procedure, Graciela Guerin NP              Review of Systems   Constitutional: Negative for malaise/fatigue.   Cardiovascular:  Positive for dyspnea on exertion (improving). Negative for chest pain, irregular heartbeat, leg swelling and palpitations.   Respiratory:  Negative for shortness of breath.    Hematologic/Lymphatic: Negative for bleeding problem.   Skin:  Negative for rash.   Musculoskeletal:  Negative for myalgias.   Gastrointestinal:  Negative for hematemesis, hematochezia and nausea.   Genitourinary:  Negative for hematuria.   Neurological:  Negative for light-headedness.   Psychiatric/Behavioral:  Negative for altered mental status.    Allergic/Immunologic: Negative for persistent infections.          Objective     Physical Exam  Vitals and nursing note reviewed.   Constitutional:       Appearance: Normal appearance. She is well-developed.   HENT:      Head: Normocephalic.      Nose: Nose normal.   Eyes:      Pupils: Pupils are equal, round, and reactive to light.    Cardiovascular:      Rate and Rhythm: Normal rate and regular rhythm.   Pulmonary:      Effort: No respiratory distress.      Breath sounds: Normal breath sounds.   Musculoskeletal:         General: Normal range of motion.   Skin:     General: Skin is warm and dry.      Findings: No erythema.   Neurological:      Mental Status: She is alert and oriented to person, place, and time.   Psychiatric:         Speech: Speech normal.         Behavior: Behavior normal.            Assessment and Plan     1. Status post radiofrequency ablation (RFA) operation for arrhythmia    2. Heart failure with mildly reduced ejection fraction    3. Paroxysmal atrial fibrillation        Plan:  74 yoF here for AF monitoring. No AF since her last visit. Continue current therapy. RTC 1y

## 2025-02-12 ENCOUNTER — TELEPHONE (OUTPATIENT)
Dept: NEPHROLOGY | Facility: CLINIC | Age: 75
End: 2025-02-12
Payer: MEDICARE

## 2025-02-12 NOTE — PLAN OF CARE
Problem: Adult Inpatient Plan of Care  Goal: Plan of Care Review  Outcome: Ongoing, Progressing  Goal: Patient-Specific Goal (Individualized)  Outcome: Ongoing, Progressing  Goal: Absence of Hospital-Acquired Illness or Injury  Outcome: Ongoing, Progressing  Goal: Optimal Comfort and Wellbeing  Outcome: Ongoing, Progressing  Goal: Readiness for Transition of Care  Outcome: Ongoing, Progressing     Problem: Infection  Goal: Absence of Infection Signs and Symptoms  Outcome: Ongoing, Progressing     Problem: Skin Injury Risk Increased  Goal: Skin Health and Integrity  Outcome: Ongoing, Progressing     Problem: Pain Acute  Goal: Acceptable Pain Control and Functional Ability  Outcome: Ongoing, Progressing     Problem: Gas Exchange Impaired  Goal: Optimal Gas Exchange  Outcome: Ongoing, Progressing     Problem: Airway Clearance Ineffective  Goal: Effective Airway Clearance  Outcome: Ongoing, Progressing      Lab Results   Component Value Date    HGBA1C 6.6 (H) 02/04/2025     Stable. Continue current medications and regular followup.

## 2025-02-12 NOTE — TELEPHONE ENCOUNTER
----- Message from Heather sent at 2/12/2025 10:37 AM CST -----  Regarding: Missed Call  Contact: Bulmaro  Returning a Missed Call    Caller: Rajesh    Returning call to: Yasemin     Caller can be reached @: [phone] (home)       Nature of the call: Pt missed a call about labs. Would like a call back to further discuss

## 2025-02-17 LAB
OHS CV AF BURDEN PERCENT: < 1
OHS CV DC REMOTE DEVICE TYPE: NORMAL

## 2025-02-18 ENCOUNTER — RESULTS FOLLOW-UP (OUTPATIENT)
Dept: INTERNAL MEDICINE | Facility: CLINIC | Age: 75
End: 2025-02-18

## 2025-02-25 ENCOUNTER — OFFICE VISIT (OUTPATIENT)
Dept: NEPHROLOGY | Facility: CLINIC | Age: 75
End: 2025-02-25
Payer: MEDICARE

## 2025-02-25 VITALS
DIASTOLIC BLOOD PRESSURE: 92 MMHG | HEART RATE: 66 BPM | WEIGHT: 167.56 LBS | BODY MASS INDEX: 31.66 KG/M2 | OXYGEN SATURATION: 98 % | SYSTOLIC BLOOD PRESSURE: 167 MMHG

## 2025-02-25 DIAGNOSIS — I10 ESSENTIAL HYPERTENSION: ICD-10-CM

## 2025-02-25 DIAGNOSIS — R76.8 POSITIVE ANA (ANTINUCLEAR ANTIBODY): ICD-10-CM

## 2025-02-25 DIAGNOSIS — Z87.39 HISTORY OF GOUT: ICD-10-CM

## 2025-02-25 DIAGNOSIS — E79.0 HYPERURICEMIA: ICD-10-CM

## 2025-02-25 DIAGNOSIS — I50.22 HEART FAILURE WITH MILDLY REDUCED EJECTION FRACTION: ICD-10-CM

## 2025-02-25 DIAGNOSIS — R80.9 PROTEINURIA, UNSPECIFIED TYPE: ICD-10-CM

## 2025-02-25 DIAGNOSIS — N25.81 SECONDARY HYPERPARATHYROIDISM OF RENAL ORIGIN: ICD-10-CM

## 2025-02-25 DIAGNOSIS — N18.31 STAGE 3A CHRONIC KIDNEY DISEASE: Primary | ICD-10-CM

## 2025-02-25 DIAGNOSIS — I51.89 DIASTOLIC DYSFUNCTION: ICD-10-CM

## 2025-02-25 PROCEDURE — 99999 PR PBB SHADOW E&M-EST. PATIENT-LVL III: CPT | Mod: PBBFAC,,, | Performed by: NURSE PRACTITIONER

## 2025-02-25 RX ORDER — SPIRONOLACTONE 25 MG/1
25 TABLET ORAL DAILY
Qty: 30 TABLET | Refills: 11 | Status: SHIPPED | OUTPATIENT
Start: 2025-02-25 | End: 2026-02-25

## 2025-02-25 NOTE — PROGRESS NOTES
Subjective:       Patient ID: Rajesh Mas is a 74 y.o. Black or  female who presents for evaluation of renal dysfunction.    HPI     Patient is new to me. No prior nephrologist.  Prior pertinent chart reviewed since this is patient's first appointment with me.    Patient presents for new evaluation of CKD 3b.  Baseline creatinine of ~1-1.3 since . Now trending up to 1.5--> 1.9 on last labs.     Significant other medical problems include HTN (reports dx ), diastolic dysfunction, HLD.  The patient denies taking NSAIDs, herbal supplements, or new antibiotics, recreational drugs, recent episode of dehydration, diarrhea, nausea or vomiting, acute illness, hospitalization or exposure to IV radiocontrast. She reports start atorvastatin at the beginning of July and stopped 2 weeks later due to myalgias. She drinks about 40 oz of water per day. She has been spending more time outside with increased perspiration. Bps at home range 126-130s/60. She denies recent gout flares and has been maintained on allopurinol 300mg qd. Uric acid trended up to 9.9 in . Reports crawfish intake during this time.     Significant family hx includes:   Paternal Grandfather ()    Paternal Grandmother ()    Maternal Grandmother () Colon cancer     Breast cancer   Maternal Grandfather ()    Father ( at age 60)    Mother ( at age 79) Diabetes    Heart failure    Hypertension    Heart disease    Kidney disease    Brother Other    Brother ()    Brother ()    Sister No Known Problems   Daughter No Known Problems   Daughter No Known Problems   Son No Known Problems   Son No Known Problems   Maternal Uncle Liver cancer       Last renal US: None available , reviewed.    Update 10/10/23:  - Presents for follow-up of CKD  - Now following with rheumatology. Allopurinol increased to 350mg qd.   - Denies gout flares or new issues today.     Update 24:  - Presents  for follow-up of CKD. sCr 1.2.  - S/p admission in January for palpitations where she underwent RFA for PVCs complicated by moderate pericardial effusion requiring emergency pericardiocentesis (250cc drained). Admission further complicated by acute resp failure, A fib with RVR requiring cardizem gtt (converted to NSR), and diastolic dysfunction requiring diuresis. Treated for CAP as well.    - Admitted February 2024 for left pleural effusion requiring thoracentesis   - She is doing well. Has not required home O2.     Update 8/5/24:   - Presents for follow-up of CKD. sCr 1.3.   - Took a PRN lasix last Thursday for swelling which has resolved.    Update 2/25/25:  - Presents for CKD. Cr 1.2  - Entresto stopped due to high cost. Now on Valsartan. She has noticed her BP trending up. Range today is about the same as where she is at home. Repeat BP in office 172/92.   - No other complaints. Asymptomatic.       Review of Systems   Respiratory:  Negative for shortness of breath.    Cardiovascular:  Negative for chest pain and leg swelling.   Gastrointestinal:  Negative for diarrhea, nausea and vomiting.   Genitourinary:  Negative for difficulty urinating, dysuria and hematuria.   All other systems reviewed and are negative.      Objective:       Pulse 66, weight 76 kg (167 lb 8.8 oz), last menstrual period 11/28/1988, SpO2 98%.  Physical Exam  Vitals reviewed.   Constitutional:       General: She is not in acute distress.     Appearance: Normal appearance.   HENT:      Head: Normocephalic and atraumatic.   Eyes:      General: No scleral icterus.  Cardiovascular:      Rate and Rhythm: Normal rate and regular rhythm.   Pulmonary:      Effort: Pulmonary effort is normal. No respiratory distress.      Breath sounds: No wheezing or rales.   Musculoskeletal:      Right lower leg: No edema.      Left lower leg: No edema.   Skin:     General: Skin is warm and dry.   Neurological:      Mental Status: She is alert and oriented to  person, place, and time.   Psychiatric:         Mood and Affect: Mood normal.         Behavior: Behavior normal.           Lab Results   Component Value Date    CREATININE 1.2 02/05/2025    URICACID 4.0 06/03/2024     Prot/Creat Ratio, Urine   Date Value Ref Range Status   02/05/2025 0.65 (H) 0.00 - 0.20 Final   02/05/2025 0.65 (H) 0.00 - 0.20 Final   08/01/2024 0.12 0.00 - 0.20 Final     Lab Results   Component Value Date     02/05/2025    K 4.5 02/05/2025    CO2 26 02/05/2025     02/05/2025     Lab Results   Component Value Date    PTH 79.1 (H) 02/05/2025    CALCIUM 9.4 02/05/2025    PHOS 3.4 02/05/2025     Lab Results   Component Value Date    HGB 13.8 02/05/2025    WBC 6.14 02/05/2025    HCT 43.1 02/05/2025      Lab Results   Component Value Date    HGBA1C 5.2 01/13/2023     (L) 02/05/2025    BUN 24 (H) 02/05/2025     Lab Results   Component Value Date    LDLCALC 46.2 (L) 02/05/2025         Assessment:       1. Stage 3a chronic kidney disease    2. Secondary hyperparathyroidism of renal origin    3. Hyperuricemia    4. Positive MICHELLE (antinuclear antibody)    5. Essential hypertension    6. History of gout    7. Diastolic dysfunction    8. Proteinuria, unspecified type    9. Heart failure with mildly reduced ejection fraction          Plan:   CKD stage 3a c eGFR 48 mL/min -  - Baseline sCr 1-1.3 since 2016 (last at baseline on labs September 2022). Previously trending up to 1.5--> 1.9  - CKD clinically related to longstanding HTN +/- uric acid nephropathy  - Continue adequate hydration. Avoid NSAIDs. Monitor Bps at home.     UPCR 120mg/g-->Now 650 mg/g. Now maintained on ARB. Add spironolactone 25 qd.    Acid-base WNL   Renal osteodystrophy PTH elevated in CKD. Ca and phos controlled.    Anemia WNL   DM No history   Lipid Management Myalgias with statin. CPK okay.    ESRD planning Provided education about the stages of CKD, usual progression, and need to monitor for and treat CKD-related disease  in an effort to delay progression of CKD.     Defer advanced planning     HTN - Uncontrolled on valsartan (entresto stopped due to cost), metoprolol, lasix 40 PRN  - Add spironolactone 25mg qd    Hyperuricemia  - Allopurinol 350mg qd  - Following with rheumatology   - No recent gout flares  - Discussed low purine diet previously.     Positive MICHELLE  - per rheumatology    HFrEF/ diastolic dysfunction - now on Valsartan, lasix PRN. Defer to cardiology.  - Add spironolactone as above    All questions patient had were answered.  Asked if further questions. None. F/u in clinic 2 months with labs and urine prior to next visit or sooner if needed.  ER for emergency concerns.    Summary of Plan:  - Rx spironolactone 25mg qd. Monitor home BP log.  - Repeat BMP 2 weeks  - RTC 2 months

## 2025-02-26 ENCOUNTER — OFFICE VISIT (OUTPATIENT)
Facility: CLINIC | Age: 75
End: 2025-02-26
Attending: OBSTETRICS & GYNECOLOGY
Payer: MEDICARE

## 2025-02-26 VITALS
SYSTOLIC BLOOD PRESSURE: 148 MMHG | HEART RATE: 64 BPM | DIASTOLIC BLOOD PRESSURE: 91 MMHG | HEIGHT: 61 IN | BODY MASS INDEX: 31.39 KG/M2 | WEIGHT: 166.25 LBS

## 2025-02-26 DIAGNOSIS — Z12.31 SCREENING MAMMOGRAM, ENCOUNTER FOR: ICD-10-CM

## 2025-02-26 DIAGNOSIS — N95.2 POSTMENOPAUSAL ATROPHIC VAGINITIS: ICD-10-CM

## 2025-02-26 DIAGNOSIS — Z01.419 ENCOUNTER FOR GYNECOLOGICAL EXAMINATION WITHOUT ABNORMAL FINDING: Primary | ICD-10-CM

## 2025-02-26 PROCEDURE — 3066F NEPHROPATHY DOC TX: CPT | Mod: CPTII,S$GLB,, | Performed by: OBSTETRICS & GYNECOLOGY

## 2025-02-26 PROCEDURE — 1160F RVW MEDS BY RX/DR IN RCRD: CPT | Mod: CPTII,S$GLB,, | Performed by: OBSTETRICS & GYNECOLOGY

## 2025-02-26 PROCEDURE — 1101F PT FALLS ASSESS-DOCD LE1/YR: CPT | Mod: CPTII,S$GLB,, | Performed by: OBSTETRICS & GYNECOLOGY

## 2025-02-26 PROCEDURE — 3077F SYST BP >= 140 MM HG: CPT | Mod: CPTII,S$GLB,, | Performed by: OBSTETRICS & GYNECOLOGY

## 2025-02-26 PROCEDURE — 3080F DIAST BP >= 90 MM HG: CPT | Mod: CPTII,S$GLB,, | Performed by: OBSTETRICS & GYNECOLOGY

## 2025-02-26 PROCEDURE — 1126F AMNT PAIN NOTED NONE PRSNT: CPT | Mod: CPTII,S$GLB,, | Performed by: OBSTETRICS & GYNECOLOGY

## 2025-02-26 PROCEDURE — 1159F MED LIST DOCD IN RCRD: CPT | Mod: CPTII,S$GLB,, | Performed by: OBSTETRICS & GYNECOLOGY

## 2025-02-26 PROCEDURE — 99999 PR PBB SHADOW E&M-EST. PATIENT-LVL III: CPT | Mod: PBBFAC,,, | Performed by: OBSTETRICS & GYNECOLOGY

## 2025-02-26 PROCEDURE — G0101 CA SCREEN;PELVIC/BREAST EXAM: HCPCS | Mod: S$GLB,,, | Performed by: OBSTETRICS & GYNECOLOGY

## 2025-02-26 PROCEDURE — 3288F FALL RISK ASSESSMENT DOCD: CPT | Mod: CPTII,S$GLB,, | Performed by: OBSTETRICS & GYNECOLOGY

## 2025-02-26 RX ORDER — ESTRADIOL 0.1 MG/G
1 CREAM VAGINAL
Qty: 42 G | Refills: 3 | Status: SHIPPED | OUTPATIENT
Start: 2025-02-28

## 2025-02-26 NOTE — PROGRESS NOTES
Subjective:       Patient ID: Rajesh Mas is a 74 y.o. female.    Chief Complaint:  Well Woman and Gynecologic Exam      History of Present Illness  Gynecologic Exam  Associated symptoms include frequency and urgency. Pertinent negatives include no abdominal pain, back pain or headaches.       Rajesh Mas is a 74 y.o. female  here for her annual GYN exam.    She is menopausal since age 40 and is not on HRT.   denies break through bleeding.   denies vaginal itching or irritation.  Denies vaginal discharge.  She is sexually active. She has had1 partner for 54 years .     History of abnormal pap: No  Last Pap: approximate date 2024 and was normal(neg HR HPV  Last MMG: normal-3-: BI-RADS Category 1: Negative -routine follow-up in 12 months  Last Dexa: 2024: Normal bone mineral density   Last Colonoscopy:  May 2022: polyps, advised to return in 5 years  denies domestic violence. She does feel safe at home.     Past Medical History:   Diagnosis Date    Allergy     seasonal    Diverticulitis     Fever blister     Hypertension     Personal history of colonic polyps      Past Surgical History:   Procedure Laterality Date    ABLATION N/A 2024    Procedure: Ablation;  Surgeon: Santi Ellison MD;  Location: [facility] EP LAB;  Service: Cardiology;  Laterality: N/A;  PVC, RFA, PEPE, anes, MB, 3 Prep,*prepare to map LVOT and RVOT*    CHOLECYSTECTOMY  2017    COLON SURGERY      COLONOSCOPY N/A 2016    Procedure: COLONOSCOPY;  Surgeon: Fidel Mason MD;  Location: [facility] ENDO ([address] [place] [address]);  Service: Endoscopy;  Laterality: N/A;    COLONOSCOPY N/A 2022    Procedure: COLONOSCOPY;  Surgeon: RUSSELL Rolon MD;  Location: [facility] ENDO ([facility] FLR);  Service: Endoscopy;  Laterality: N/A;  fully vaccinated, prep instr portal -ml    INSERTION OF IMPLANTABLE LOOP RECORDER N/A 2024    Procedure: Insertion, Implantable Loop Recorder;  Surgeon: Santi Ellison MD;  Location: [facility] EP LAB;   "Service: Cardiology;  Laterality: N/A;  a fib, ILR, BSCI, Local, MB, 3 Prep    partial colectomy      sigmoid removed due to diverticulitis    PERICARDIOCENTESIS N/A 2024    Procedure: Pericardiocentesis;  Surgeon: Santi Ellison MD;  Location: [facility] EP LAB;  Service: Cardiology;  Laterality: N/A;    SINUS SURGERY      SMALL INTESTINE SURGERY  1997@ [facility]    Partial Sigm Colon /Divaticulitus     Social History[1]  Family History   Problem Relation Name Age of Onset    Colon cancer Maternal Grandmother          colon cancer    Breast cancer Maternal Grandmother      Diabetes Mother Lia Post     Heart failure Mother Lia Post     Hypertension Mother Lia Post     Heart disease Mother Lia Post     Kidney disease Mother Lia Post         Dialysis    Other Brother          prostate issue    No Known Problems Sister      No Known Problems Daughter      No Known Problems Daughter      No Known Problems Son      No Known Problems Son      Liver cancer Maternal Uncle      Melanoma Neg Hx      Ovarian cancer Neg Hx      Cancer Neg Hx       OB History          5    Para   4    Term   4            AB   1    Living   4         SAB   1    IAB        Ectopic        Multiple        Live Births   4                 BP (!) 148/91 (Patient Position: Sitting)   Pulse 64   Ht 5' 1" (1.549 m)   Wt 75.4 kg (166 lb 3.6 oz)   LMP 1988 (Approximate)   BMI 31.41 kg/m²         GYN & OB History  Patient's last menstrual period was 1988 (approximate).   Date of Last Pap: 2024    OB History    Para Term  AB Living   5 4 4  1 4   SAB IAB Ectopic Multiple Live Births   1    4      # Outcome Date GA Lbr Yonny/2nd Weight Sex Type Anes PTL Lv   5 SAB            4 Term      Vag-Spont   HALLEY   3 Term      Vag-Spont   HALLEY   2 Term      Vag-Spont   HALLEY   1 Term      Vag-Spont   HALLEY       Review of Systems  Review of Systems   Constitutional:  Negative for " activity change, appetite change, fatigue and unexpected weight change.   HENT: Negative.     Eyes:  Negative for visual disturbance.   Respiratory:  Negative for shortness of breath and wheezing.    Cardiovascular:  Negative for chest pain, palpitations and leg swelling.   Gastrointestinal:  Negative for abdominal pain, bloating and blood in stool.   Endocrine: Negative for diabetes and hair loss.   Genitourinary:  Positive for frequency, urgency and vaginal dryness. Negative for bladder incontinence, decreased libido, dyspareunia and hot flashes.   Musculoskeletal:  Negative for back pain and joint swelling.   Integumentary:  Negative for acne, hair changes and nipple discharge.   Neurological:  Negative for headaches.   Hematological:  Does not bruise/bleed easily.   Psychiatric/Behavioral:  Negative for depression and sleep disturbance. The patient is not nervous/anxious.    Breast: Negative for mastodynia and nipple discharge          Objective:      Physical Exam:   Constitutional: She is oriented to person, place, and time. She appears well-developed and well-nourished.    HENT:   Head: Normocephalic and atraumatic.    Eyes: Pupils are equal, round, and reactive to light. EOM are normal.     Cardiovascular:  Normal rate and regular rhythm.             Pulmonary/Chest: Effort normal and breath sounds normal.   BREASTS:  no mass, no tenderness, no deformity and no retraction. Right breast exhibits no inverted nipple, no mass, no nipple discharge, no skin change, no tenderness, no bleeding and no swelling. Left breast exhibits no inverted nipple, no mass, no nipple discharge, no skin change, no tenderness, no bleeding and no swelling. Breasts are symmetrical.                Abdominal: Soft. Bowel sounds are normal.     Genitourinary:    Pelvic exam was performed with patient supine.      Genitourinary Comments: PELVIC: Normal external genitalia without lesions.  Normal hair distribution.  Adequate perineal body,  normal urethral meatus.  Vagina  Dry and poorly rugated, atrophic, without lesions or discharge.  Cervix pink, without lesions, discharge or tenderness.  No significant cystocele or rectocele.  Bimanual exam shows uterus to be normal size, regular, mobile and nontender.  Adnexa without masses or tenderness.    RECTAL:Deferred                 Musculoskeletal: Normal range of motion and moves all extremeties.       Neurological: She is alert and oriented to person, place, and time.    Skin: Skin is warm and dry.    Psychiatric: She has a normal mood and affect.              Assessment:        1. Encounter for gynecological examination without abnormal finding    2. Postmenopausal atrophic vaginitis    3. Screening mammogram, encounter for                Plan:        Problem List Items Addressed This Visit    None  Visit Diagnoses         Encounter for gynecological examination without abnormal finding    -  Primary      Postmenopausal atrophic vaginitis        Relevant Medications    estradioL (ESTRACE) 0.01 % (0.1 mg/gram) vaginal cream (Start on 2/28/2025)      Screening mammogram, encounter for        Relevant Orders    Mammo Digital Screening Bilat w/ Bryce ([facility])             Follow up in about 2 years (around 2/26/2027).            [1]   Social History  Socioeconomic History    Marital status:    Tobacco Use    Smoking status: Never    Smokeless tobacco: Never   Substance and Sexual Activity    Alcohol use: No    Drug use: Never    Sexual activity: Yes     Partners: Male     Birth control/protection: None     Comment: , together since 1971     Social Drivers of Health     Financial Resource Strain: Low Risk  (2/5/2025)    Overall Financial Resource Strain (CARDIA)     Difficulty of Paying Living Expenses: Not very hard   Food Insecurity: Unknown (2/5/2025)    Hunger Vital Sign     Worried About Running Out of Food in the Last Year: Never true     Ran Out of Food in the Last Year: Patient declined    Transportation Needs: No Transportation Needs (3/21/2024)    PRAPARE - Transportation     Lack of Transportation (Medical): No     Lack of Transportation (Non-Medical): No   Physical Activity: Sufficiently Active (2/5/2025)    Exercise Vital Sign     Days of Exercise per Week: 3 days     Minutes of Exercise per Session: 60 min   Stress: No Stress Concern Present (2/5/2025)    [employer] - Occupational Stress Questionnaire     Feeling of Stress : Not at all   Housing Stability: Low Risk  (3/21/2024)    Housing Stability Vital Sign     Unable to Pay for Housing in the Last Year: No     Number of Places Lived in the Last Year: 1     Unstable Housing in the Last Year: No

## 2025-03-11 ENCOUNTER — LAB VISIT (OUTPATIENT)
Dept: LAB | Facility: HOSPITAL | Age: 75
End: 2025-03-11
Payer: MEDICARE

## 2025-03-11 DIAGNOSIS — N18.31 STAGE 3A CHRONIC KIDNEY DISEASE: ICD-10-CM

## 2025-03-11 LAB
ANION GAP SERPL CALC-SCNC: 8 MMOL/L (ref 8–16)
BUN SERPL-MCNC: 23 MG/DL (ref 8–23)
CALCIUM SERPL-MCNC: 9.3 MG/DL (ref 8.7–10.5)
CHLORIDE SERPL-SCNC: 107 MMOL/L (ref 95–110)
CO2 SERPL-SCNC: 26 MMOL/L (ref 23–29)
CREAT SERPL-MCNC: 1.4 MG/DL (ref 0.5–1.4)
EST. GFR  (NO RACE VARIABLE): 40 ML/MIN/1.73 M^2
GLUCOSE SERPL-MCNC: 104 MG/DL (ref 70–110)
POTASSIUM SERPL-SCNC: 5 MMOL/L (ref 3.5–5.1)
SODIUM SERPL-SCNC: 141 MMOL/L (ref 136–145)

## 2025-03-11 PROCEDURE — 36415 COLL VENOUS BLD VENIPUNCTURE: CPT | Performed by: NURSE PRACTITIONER

## 2025-03-11 PROCEDURE — 80048 BASIC METABOLIC PNL TOTAL CA: CPT | Performed by: NURSE PRACTITIONER

## 2025-03-19 ENCOUNTER — CLINICAL SUPPORT (OUTPATIENT)
Dept: CARDIOLOGY | Facility: HOSPITAL | Age: 75
End: 2025-03-19
Attending: INTERNAL MEDICINE
Payer: MEDICARE

## 2025-03-19 ENCOUNTER — CLINICAL SUPPORT (OUTPATIENT)
Dept: CARDIOLOGY | Facility: HOSPITAL | Age: 75
End: 2025-03-19
Payer: MEDICARE

## 2025-03-19 DIAGNOSIS — I48.20 CHRONIC ATRIAL FIBRILLATION, UNSPECIFIED: ICD-10-CM

## 2025-03-19 DIAGNOSIS — I48.0 PAROXYSMAL ATRIAL FIBRILLATION: ICD-10-CM

## 2025-03-19 PROCEDURE — 93298 REM INTERROG DEV EVAL SCRMS: CPT | Performed by: INTERNAL MEDICINE

## 2025-03-19 PROCEDURE — 93298 REM INTERROG DEV EVAL SCRMS: CPT | Mod: 26,,, | Performed by: INTERNAL MEDICINE

## 2025-03-22 LAB
OHS CV AF BURDEN PERCENT: < 1
OHS CV DC REMOTE DEVICE TYPE: NORMAL

## 2025-03-25 DIAGNOSIS — R35.0 URINARY FREQUENCY: ICD-10-CM

## 2025-03-25 RX ORDER — OXYBUTYNIN CHLORIDE 5 MG/1
5 TABLET ORAL 2 TIMES DAILY
Qty: 180 TABLET | Refills: 3 | Status: SHIPPED | OUTPATIENT
Start: 2025-03-25

## 2025-03-25 NOTE — TELEPHONE ENCOUNTER
Refill Decision Note   Rajesh Mas  is requesting a refill authorization.  Brief Assessment and Rationale for Refill:  Approve     Medication Therapy Plan:         Comments:     Note composed:11:48 AM 03/25/2025

## 2025-03-28 ENCOUNTER — TELEPHONE (OUTPATIENT)
Dept: INTERNAL MEDICINE | Facility: CLINIC | Age: 75
End: 2025-03-28
Payer: MEDICARE

## 2025-03-28 NOTE — TELEPHONE ENCOUNTER
Spoke with the pt and give out no to call for AWV visit to re schedule . LEFT VM for Sathish  to call pt back to re schedule apt.

## 2025-04-09 ENCOUNTER — PATIENT MESSAGE (OUTPATIENT)
Dept: NEPHROLOGY | Facility: CLINIC | Age: 75
End: 2025-04-09
Payer: MEDICARE

## 2025-04-09 DIAGNOSIS — N18.31 STAGE 3A CHRONIC KIDNEY DISEASE: Primary | ICD-10-CM

## 2025-04-10 ENCOUNTER — LAB VISIT (OUTPATIENT)
Dept: LAB | Facility: HOSPITAL | Age: 75
End: 2025-04-10
Attending: NURSE PRACTITIONER
Payer: MEDICARE

## 2025-04-10 DIAGNOSIS — N18.31 STAGE 3A CHRONIC KIDNEY DISEASE: ICD-10-CM

## 2025-04-10 LAB
ABSOLUTE EOSINOPHIL (OHS): 0.11 K/UL
ABSOLUTE MONOCYTE (OHS): 0.5 K/UL (ref 0.3–1)
ABSOLUTE NEUTROPHIL COUNT (OHS): 3.44 K/UL (ref 1.8–7.7)
ALBUMIN SERPL BCP-MCNC: 3.6 G/DL (ref 3.5–5.2)
ANION GAP (OHS): 6 MMOL/L (ref 8–16)
BACTERIA #/AREA URNS AUTO: ABNORMAL /HPF
BASOPHILS # BLD AUTO: 0.05 K/UL
BASOPHILS NFR BLD AUTO: 0.8 %
BILIRUB UR QL STRIP.AUTO: NEGATIVE
BUN SERPL-MCNC: 30 MG/DL (ref 8–23)
CALCIUM SERPL-MCNC: 9.5 MG/DL (ref 8.7–10.5)
CHLORIDE SERPL-SCNC: 109 MMOL/L (ref 95–110)
CLARITY UR: CLEAR
CO2 SERPL-SCNC: 26 MMOL/L (ref 23–29)
COLOR UR AUTO: YELLOW
CREAT SERPL-MCNC: 1.4 MG/DL (ref 0.5–1.4)
CREAT UR-MCNC: 102 MG/DL (ref 15–325)
ERYTHROCYTE [DISTWIDTH] IN BLOOD BY AUTOMATED COUNT: 14.3 % (ref 11.5–14.5)
GFR SERPLBLD CREATININE-BSD FMLA CKD-EPI: 40 ML/MIN/1.73/M2
GLUCOSE SERPL-MCNC: 101 MG/DL (ref 70–110)
GLUCOSE UR QL STRIP: NEGATIVE
HCT VFR BLD AUTO: 42.8 % (ref 37–48.5)
HGB BLD-MCNC: 13.5 GM/DL (ref 12–16)
HGB UR QL STRIP: NEGATIVE
IMM GRANULOCYTES # BLD AUTO: 0.01 K/UL (ref 0–0.04)
IMM GRANULOCYTES NFR BLD AUTO: 0.2 % (ref 0–0.5)
KETONES UR QL STRIP: NEGATIVE
LEUKOCYTE ESTERASE UR QL STRIP: ABNORMAL
LYMPHOCYTES # BLD AUTO: 1.87 K/UL (ref 1–4.8)
MCH RBC QN AUTO: 28.8 PG (ref 27–31)
MCHC RBC AUTO-ENTMCNC: 31.5 G/DL (ref 32–36)
MCV RBC AUTO: 92 FL (ref 82–98)
MICROSCOPIC COMMENT: ABNORMAL
NITRITE UR QL STRIP: NEGATIVE
NUCLEATED RBC (/100WBC) (OHS): 0 /100 WBC
PH UR STRIP: 6 [PH]
PHOSPHATE SERPL-MCNC: 3.8 MG/DL (ref 2.7–4.5)
PLATELET # BLD AUTO: 125 K/UL (ref 150–450)
PMV BLD AUTO: 14.2 FL (ref 9.2–12.9)
POTASSIUM SERPL-SCNC: 5.3 MMOL/L (ref 3.5–5.1)
PROT UR QL STRIP: NEGATIVE
PROT UR-MCNC: 16 MG/DL
PROT/CREAT UR: 0.16 MG/G{CREAT}
RBC # BLD AUTO: 4.68 M/UL (ref 4–5.4)
RBC #/AREA URNS AUTO: 9 /HPF (ref 0–4)
RELATIVE EOSINOPHIL (OHS): 1.8 %
RELATIVE LYMPHOCYTE (OHS): 31.3 % (ref 18–48)
RELATIVE MONOCYTE (OHS): 8.4 % (ref 4–15)
RELATIVE NEUTROPHIL (OHS): 57.5 % (ref 38–73)
SODIUM SERPL-SCNC: 141 MMOL/L (ref 136–145)
SP GR UR STRIP: 1.01
SQUAMOUS #/AREA URNS AUTO: 34 /HPF
UROBILINOGEN UR STRIP-ACNC: NEGATIVE EU/DL
WBC # BLD AUTO: 5.98 K/UL (ref 3.9–12.7)
WBC #/AREA URNS AUTO: >100 /HPF (ref 0–5)
WBC CLUMPS UR QL AUTO: ABNORMAL

## 2025-04-10 PROCEDURE — 36415 COLL VENOUS BLD VENIPUNCTURE: CPT | Mod: PN

## 2025-04-10 PROCEDURE — 81003 URINALYSIS AUTO W/O SCOPE: CPT

## 2025-04-10 PROCEDURE — 82570 ASSAY OF URINE CREATININE: CPT

## 2025-04-10 PROCEDURE — 85025 COMPLETE CBC W/AUTO DIFF WBC: CPT

## 2025-04-10 PROCEDURE — 80069 RENAL FUNCTION PANEL: CPT

## 2025-04-15 ENCOUNTER — OFFICE VISIT (OUTPATIENT)
Dept: NEPHROLOGY | Facility: CLINIC | Age: 75
End: 2025-04-15
Payer: MEDICARE

## 2025-04-15 VITALS
DIASTOLIC BLOOD PRESSURE: 85 MMHG | HEART RATE: 75 BPM | BODY MASS INDEX: 31.24 KG/M2 | OXYGEN SATURATION: 95 % | SYSTOLIC BLOOD PRESSURE: 152 MMHG | WEIGHT: 165.38 LBS

## 2025-04-15 DIAGNOSIS — E79.0 HYPERURICEMIA: ICD-10-CM

## 2025-04-15 DIAGNOSIS — E87.5 HYPERKALEMIA: ICD-10-CM

## 2025-04-15 DIAGNOSIS — I51.89 DIASTOLIC DYSFUNCTION: ICD-10-CM

## 2025-04-15 DIAGNOSIS — I10 ESSENTIAL HYPERTENSION: ICD-10-CM

## 2025-04-15 DIAGNOSIS — N25.81 SECONDARY HYPERPARATHYROIDISM OF RENAL ORIGIN: ICD-10-CM

## 2025-04-15 DIAGNOSIS — N18.32 STAGE 3B CHRONIC KIDNEY DISEASE: Primary | ICD-10-CM

## 2025-04-15 DIAGNOSIS — R80.9 PROTEINURIA, UNSPECIFIED TYPE: ICD-10-CM

## 2025-04-15 DIAGNOSIS — R76.8 POSITIVE ANA (ANTINUCLEAR ANTIBODY): ICD-10-CM

## 2025-04-15 DIAGNOSIS — N39.0 UTI (URINARY TRACT INFECTION), UNCOMPLICATED: ICD-10-CM

## 2025-04-15 PROCEDURE — 3066F NEPHROPATHY DOC TX: CPT | Mod: CPTII,S$GLB,, | Performed by: NURSE PRACTITIONER

## 2025-04-15 PROCEDURE — 99999 PR PBB SHADOW E&M-EST. PATIENT-LVL III: CPT | Mod: PBBFAC,,, | Performed by: NURSE PRACTITIONER

## 2025-04-15 PROCEDURE — 3079F DIAST BP 80-89 MM HG: CPT | Mod: CPTII,S$GLB,, | Performed by: NURSE PRACTITIONER

## 2025-04-15 PROCEDURE — 1101F PT FALLS ASSESS-DOCD LE1/YR: CPT | Mod: CPTII,S$GLB,, | Performed by: NURSE PRACTITIONER

## 2025-04-15 PROCEDURE — G2211 COMPLEX E/M VISIT ADD ON: HCPCS | Mod: S$GLB,,, | Performed by: NURSE PRACTITIONER

## 2025-04-15 PROCEDURE — 3077F SYST BP >= 140 MM HG: CPT | Mod: CPTII,S$GLB,, | Performed by: NURSE PRACTITIONER

## 2025-04-15 PROCEDURE — 3008F BODY MASS INDEX DOCD: CPT | Mod: CPTII,S$GLB,, | Performed by: NURSE PRACTITIONER

## 2025-04-15 PROCEDURE — 3288F FALL RISK ASSESSMENT DOCD: CPT | Mod: CPTII,S$GLB,, | Performed by: NURSE PRACTITIONER

## 2025-04-15 PROCEDURE — 99214 OFFICE O/P EST MOD 30 MIN: CPT | Mod: S$GLB,,, | Performed by: NURSE PRACTITIONER

## 2025-04-15 PROCEDURE — 1159F MED LIST DOCD IN RCRD: CPT | Mod: CPTII,S$GLB,, | Performed by: NURSE PRACTITIONER

## 2025-04-15 PROCEDURE — 1125F AMNT PAIN NOTED PAIN PRSNT: CPT | Mod: CPTII,S$GLB,, | Performed by: NURSE PRACTITIONER

## 2025-04-15 RX ORDER — ERGOCALCIFEROL 1.25 MG/1
50000 CAPSULE ORAL
Qty: 4 CAPSULE | Refills: 2 | Status: SHIPPED | OUTPATIENT
Start: 2025-04-15 | End: 2025-07-02

## 2025-04-15 RX ORDER — AMOXICILLIN AND CLAVULANATE POTASSIUM 500; 125 MG/1; MG/1
1 TABLET, FILM COATED ORAL 2 TIMES DAILY
Qty: 10 TABLET | Refills: 0 | Status: SHIPPED | OUTPATIENT
Start: 2025-04-15 | End: 2025-04-21

## 2025-04-15 NOTE — PROGRESS NOTES
Subjective:       Patient ID: Rajesh Mas is a 74 y.o. Black or  female who presents for evaluation of renal dysfunction.    HPI     Patient is new to me. No prior nephrologist.  Prior pertinent chart reviewed since this is patient's first appointment with me.    Patient presents for new evaluation of CKD 3b.  Baseline creatinine of ~1-1.3 since . Now trending up to 1.5--> 1.9 on last labs.     Significant other medical problems include HTN (reports dx ), diastolic dysfunction, HLD.  The patient denies taking NSAIDs, herbal supplements, or new antibiotics, recreational drugs, recent episode of dehydration, diarrhea, nausea or vomiting, acute illness, hospitalization or exposure to IV radiocontrast. She reports start atorvastatin at the beginning of July and stopped 2 weeks later due to myalgias. She drinks about 40 oz of water per day. She has been spending more time outside with increased perspiration. Bps at home range 126-130s/60. She denies recent gout flares and has been maintained on allopurinol 300mg qd. Uric acid trended up to 9.9 in . Reports crawfish intake during this time.     Significant family hx includes:   Paternal Grandfather ()    Paternal Grandmother ()    Maternal Grandmother () Colon cancer     Breast cancer   Maternal Grandfather ()    Father ( at age 60)    Mother ( at age 79) Diabetes    Heart failure    Hypertension    Heart disease    Kidney disease    Brother Other    Brother ()    Brother ()    Sister No Known Problems   Daughter No Known Problems   Daughter No Known Problems   Son No Known Problems   Son No Known Problems   Maternal Uncle Liver cancer       Last renal US: None available , reviewed.    Update 10/10/23:  - Presents for follow-up of CKD  - Now following with rheumatology. Allopurinol increased to 350mg qd.   - Denies gout flares or new issues today.     Update 24:  - Presents  for follow-up of CKD. sCr 1.2.  - S/p admission in January for palpitations where she underwent RFA for PVCs complicated by moderate pericardial effusion requiring emergency pericardiocentesis (250cc drained). Admission further complicated by acute resp failure, A fib with RVR requiring cardizem gtt (converted to NSR), and diastolic dysfunction requiring diuresis. Treated for CAP as well.    - Admitted February 2024 for left pleural effusion requiring thoracentesis   - She is doing well. Has not required home O2.     Update 8/5/24:   - Presents for follow-up of CKD. sCr 1.3.   - Took a PRN lasix last Thursday for swelling which has resolved.    Update 2/25/25:  - Presents for CKD. Cr 1.2  - Entresto stopped due to high cost. Now on Valsartan. She has noticed her BP trending up. Range today is about the same as where she is at home. Repeat BP in office 172/92.   - No other complaints. Asymptomatic.     Update 4/15/25:  - Follow-up of HTN and CKD. Cr 1.4.   - BP 4/9/25: 147/82,  /77 on Friday morning, some readings before and after medications   - Notes dysuria and mild right flank pain. No hematuria.     Review of Systems   Genitourinary:  Positive for dysuria and flank pain. Negative for difficulty urinating and hematuria.   All other systems reviewed and are negative.      Objective:       Blood pressure (!) 152/85, pulse 75, weight 75 kg (165 lb 5.5 oz), last menstrual period 11/28/1988, SpO2 95%. Repeat 148/84  Physical Exam  Vitals reviewed.   Constitutional:       General: She is not in acute distress.     Appearance: Normal appearance.   HENT:      Head: Normocephalic and atraumatic.   Eyes:      General: No scleral icterus.  Cardiovascular:      Rate and Rhythm: Normal rate.   Pulmonary:      Effort: Pulmonary effort is normal. No respiratory distress.   Musculoskeletal:      Right lower leg: No edema.      Left lower leg: No edema.   Skin:     General: Skin is warm and dry.   Neurological:      Mental  Status: She is alert and oriented to person, place, and time.   Psychiatric:         Mood and Affect: Mood normal.         Behavior: Behavior normal.           Lab Results   Component Value Date    CREATININE 1.4 04/10/2025    URICACID 4.0 06/03/2024     Urine Protein/Creatinine Ratio   Date Value Ref Range Status   04/10/2025 0.16 <=0.20 Final     Prot/Creat Ratio, Urine   Date Value Ref Range Status   02/05/2025 0.65 (H) 0.00 - 0.20 Final   02/05/2025 0.65 (H) 0.00 - 0.20 Final   08/01/2024 0.12 0.00 - 0.20 Final     Lab Results   Component Value Date     04/10/2025    K 5.3 (H) 04/10/2025    CO2 26 04/10/2025     04/10/2025     Lab Results   Component Value Date    PTH 79.1 (H) 02/05/2025    CALCIUM 9.5 04/10/2025    PHOS 3.8 04/10/2025     Lab Results   Component Value Date    HGB 13.5 04/10/2025    WBC 5.98 04/10/2025    HCT 42.8 04/10/2025      Lab Results   Component Value Date    HGBA1C 5.2 01/13/2023     (L) 04/10/2025    BUN 30 (H) 04/10/2025     Lab Results   Component Value Date    LDLCALC 46.2 (L) 02/05/2025         Assessment:       1. Stage 3b chronic kidney disease    2. Hyperkalemia    3. Secondary hyperparathyroidism of renal origin    4. Hyperuricemia    5. Positive MICHELLE (antinuclear antibody)    6. Essential hypertension    7. Diastolic dysfunction    8. Proteinuria, unspecified type    9. UTI (urinary tract infection), uncomplicated            Plan:   CKD stage 3a-->3b c eGFR 48--> 40 mL/min -  - Baseline sCr 1-1.3 since 2016 (last at baseline on labs September 2022). Previously trending up to 1.5--> 1.9  - CKD clinically related to longstanding HTN +/- uric acid nephropathy  - Continue adequate hydration. Avoid NSAIDs. Monitor BP at home.     UPCR Improving. Now maintained on ARB and MRA.    Acid-base WNL   Renal osteodystrophy PTH elevated in CKD. Vit D low but improved. Ca and phos controlled.   Refill ergo qwk   Anemia WNL   DM No history   Lipid Management Myalgias with  statin. CPK okay.    ESRD planning Provided education about the stages of CKD, usual progression, and need to monitor for and treat CKD-related disease in an effort to delay progression of CKD.     Defer advanced planning     HTN - Improving on valsartan (entresto stopped due to cost), metoprolol, lasix 40 PRN, spironolactone 25 qd  - Continue home monitoring of BP after medications - bring log to appts    Hyperuricemia  - Allopurinol 350mg qd  - Following with rheumatology   - No recent gout flares  - Discussed low purine diet previously.     Positive MICHELLE  - per rheumatology    HFrEF/ diastolic dysfunction - now on Valsartan, lasix PRN. Defer to cardiology.  - Add spironolactone as above    Hyperkalemia - Mild uptrend with addition of spironolactone. Low K diet discussed and handout given today. Will repeat level in 2 weeks.     UTI - augmentin course     All questions patient had were answered.  Asked if further questions. None. F/u in clinic 3 months with labs and urine prior to next visit or sooner if needed.  ER for emergency concerns.    Summary of Plan:  - Continue home BP log - bring to appt  - Low potassium diet - handout given  - Repeat K level in 2 weeks  - RTC July with labs       Visit today included increased complexity associated with the care of the episodic problem hyperkalemia, elevated BP, UTI addressed and managing the longitudinal care of the patient due to the serious and/or complex managed problem(s) CKD 3.

## 2025-04-21 ENCOUNTER — RESULTS FOLLOW-UP (OUTPATIENT)
Dept: INTERNAL MEDICINE | Facility: CLINIC | Age: 75
End: 2025-04-21

## 2025-04-21 ENCOUNTER — OFFICE VISIT (OUTPATIENT)
Dept: INTERNAL MEDICINE | Facility: CLINIC | Age: 75
End: 2025-04-21
Payer: MEDICARE

## 2025-04-21 VITALS
RESPIRATION RATE: 18 BRPM | WEIGHT: 165.56 LBS | HEIGHT: 61 IN | TEMPERATURE: 98 F | DIASTOLIC BLOOD PRESSURE: 80 MMHG | OXYGEN SATURATION: 95 % | SYSTOLIC BLOOD PRESSURE: 138 MMHG | BODY MASS INDEX: 31.26 KG/M2 | HEART RATE: 70 BPM

## 2025-04-21 DIAGNOSIS — I50.22 HEART FAILURE WITH MILDLY REDUCED EJECTION FRACTION: Primary | ICD-10-CM

## 2025-04-21 DIAGNOSIS — I10 ESSENTIAL HYPERTENSION: Chronic | ICD-10-CM

## 2025-04-21 DIAGNOSIS — Z78.9 STATIN INTOLERANCE: ICD-10-CM

## 2025-04-21 DIAGNOSIS — I51.89 DIASTOLIC DYSFUNCTION: ICD-10-CM

## 2025-04-21 DIAGNOSIS — R10.9 RIGHT FLANK PAIN: ICD-10-CM

## 2025-04-21 DIAGNOSIS — N18.32 STAGE 3B CHRONIC KIDNEY DISEASE: ICD-10-CM

## 2025-04-21 PROBLEM — R07.9 CHEST PAIN OF UNKNOWN ETIOLOGY: Status: RESOLVED | Noted: 2023-10-05 | Resolved: 2025-04-21

## 2025-04-21 PROBLEM — M06.9 RHEUMATOID ARTHRITIS, INVOLVING UNSPECIFIED SITE, UNSPECIFIED WHETHER RHEUMATOID FACTOR PRESENT: Status: RESOLVED | Noted: 2024-05-03 | Resolved: 2025-04-21

## 2025-04-21 LAB
BILIRUB SERPL-MCNC: NEGATIVE MG/DL
BILIRUB UR QL STRIP.AUTO: NEGATIVE
BLOOD, POC UA: NEGATIVE
CLARITY UR: CLEAR
COLOR UR AUTO: YELLOW
GLUCOSE UR QL STRIP: NEGATIVE
GLUCOSE UR QL STRIP: NEGATIVE
HGB UR QL STRIP: NEGATIVE
HOLD SPECIMEN: NORMAL
KETONES UR QL STRIP: NEGATIVE
KETONES UR QL STRIP: NEGATIVE
LEUKOCYTE ESTERASE UR QL STRIP: NEGATIVE
LEUKOCYTE ESTERASE URINE, POC: NEGATIVE
NITRITE UR QL STRIP: NEGATIVE
NITRITE, POC UA: NEGATIVE
PH UR STRIP: 6 [PH]
PH, POC UA: 5.5
PROT UR QL STRIP: NEGATIVE
PROTEIN, POC: NEGATIVE
SP GR UR STRIP: 1.02
SPECIFIC GRAVITY, POC UA: 1.02
UROBILINOGEN UR STRIP-ACNC: NEGATIVE EU/DL
UROBILINOGEN, POC UA: NORMAL

## 2025-04-21 PROCEDURE — 1159F MED LIST DOCD IN RCRD: CPT | Mod: CPTII,S$GLB,, | Performed by: INTERNAL MEDICINE

## 2025-04-21 PROCEDURE — 3008F BODY MASS INDEX DOCD: CPT | Mod: CPTII,S$GLB,, | Performed by: INTERNAL MEDICINE

## 2025-04-21 PROCEDURE — G2211 COMPLEX E/M VISIT ADD ON: HCPCS | Mod: S$GLB,,, | Performed by: INTERNAL MEDICINE

## 2025-04-21 PROCEDURE — 1160F RVW MEDS BY RX/DR IN RCRD: CPT | Mod: CPTII,S$GLB,, | Performed by: INTERNAL MEDICINE

## 2025-04-21 PROCEDURE — 81003 URINALYSIS AUTO W/O SCOPE: CPT | Performed by: INTERNAL MEDICINE

## 2025-04-21 PROCEDURE — 99999 PR PBB SHADOW E&M-EST. PATIENT-LVL IV: CPT | Mod: PBBFAC,,, | Performed by: INTERNAL MEDICINE

## 2025-04-21 PROCEDURE — 3075F SYST BP GE 130 - 139MM HG: CPT | Mod: CPTII,S$GLB,, | Performed by: INTERNAL MEDICINE

## 2025-04-21 PROCEDURE — 3066F NEPHROPATHY DOC TX: CPT | Mod: CPTII,S$GLB,, | Performed by: INTERNAL MEDICINE

## 2025-04-21 PROCEDURE — 1125F AMNT PAIN NOTED PAIN PRSNT: CPT | Mod: CPTII,S$GLB,, | Performed by: INTERNAL MEDICINE

## 2025-04-21 PROCEDURE — 99214 OFFICE O/P EST MOD 30 MIN: CPT | Mod: S$GLB,,, | Performed by: INTERNAL MEDICINE

## 2025-04-21 PROCEDURE — 3079F DIAST BP 80-89 MM HG: CPT | Mod: CPTII,S$GLB,, | Performed by: INTERNAL MEDICINE

## 2025-04-21 PROCEDURE — 4010F ACE/ARB THERAPY RXD/TAKEN: CPT | Mod: CPTII,S$GLB,, | Performed by: INTERNAL MEDICINE

## 2025-04-21 PROCEDURE — 81003 URINALYSIS AUTO W/O SCOPE: CPT | Mod: QW,S$GLB,, | Performed by: INTERNAL MEDICINE

## 2025-04-21 RX ORDER — VALSARTAN 80 MG/1
80 TABLET ORAL 2 TIMES DAILY
Qty: 180 TABLET | Refills: 3 | Status: SHIPPED | OUTPATIENT
Start: 2025-04-21 | End: 2026-04-21

## 2025-04-21 NOTE — PROGRESS NOTES
"Subjective:       Patient ID: Rajesh Mas is a 74 y.o. female.    Chief Complaint: Follow-up    HPI  74 y.o. female here for follow up of    Just finished course of abx for UTI. Had a pulling sensation but no burning.     HFrEF; hx a fib  Metop xl 25mg  Entresto 49-51 too expensive so on valsartan 80mg BID instead  Spironolactone 25mg daily added in february  EF: 35%  Furosemide 40mg daily PRN - has not needed in weeks  Dry weight around 162 lbs on home scale.  No AF since her admission     CKD 3b - gfr 40s  Cr 1.4 baseline     History of gout on allopurinol 350mg  No longer on prednisone.     Previously on atorvastatin 40mg but unbearable cramping in legs.   Now on rosuvastatin which is going fine.   ------------------  She was previously admitted 2/3/24 to 2/9/24 for PVC s/p ablation 1/18/24 that was complicated by pericardial effusion with tamponade physiology s/p pericardiocentesis 1/18/24 and development of acute hypoxic respiratory failure from left pleural effusion and acute diastolic dysfunction. The pleural effusion improved with diuresis and she also was treated for CAP, placed on diltiazem gtt for development of Afib that resolved. She was discharged home on metoprolol and supplemental oxygen.            Review of Systems   Constitutional:  Negative for fever.   Respiratory:  Negative for shortness of breath.    Cardiovascular:  Negative for chest pain.   Musculoskeletal: Negative.    Skin: Negative.        Objective:   /80   Pulse 70   Temp 97.9 °F (36.6 °C) (Oral)   Resp 18   Ht 5' 1" (1.549 m)   Wt 75.1 kg (165 lb 9.1 oz)   LMP 11/28/1988 (Approximate)   SpO2 95%   BMI 31.28 kg/m²      Physical Exam  Constitutional:       General: She is not in acute distress.     Appearance: She is well-developed. She is not diaphoretic.   HENT:      Head: Normocephalic and atraumatic.   Cardiovascular:      Rate and Rhythm: Normal rate and regular rhythm.   Pulmonary:      Effort: Pulmonary effort is " normal. No respiratory distress.      Breath sounds: No wheezing or rales.   Musculoskeletal:      Comments: No cva tenderess, some discomfort with twisting over right flank   Skin:     General: Skin is warm and dry.   Neurological:      Mental Status: She is alert and oriented to person, place, and time.   Psychiatric:         Behavior: Behavior normal.         Assessment:       1. Heart failure with mildly reduced ejection fraction    2. Essential hypertension    3. Diastolic dysfunction    4. Stage 3b chronic kidney disease    5. Statin intolerance    6. Right flank pain        Plan:       Heart failure with mildly reduced ejection fraction  Essential hypertension  Diastolic dysfunction  -     valsartan (DIOVAN) 80 MG tablet; Take 1 tablet (80 mg total) by mouth 2 (two) times daily.  Dispense: 180 tablet; Refill: 3  Continue metoprolol and spironloactone  Has not needed lasix lately, will check exp date on bottle    Stage 3b chronic kidney disease  - stable and controlled  - continue current regimen    Right flank pain  -     Urinalysis, Reflex to Urine Culture  -     POCT Urinalysis          Health Maintenance Due   Topic    Mammogram       Phil mmg

## 2025-04-22 ENCOUNTER — PATIENT OUTREACH (OUTPATIENT)
Dept: ADMINISTRATIVE | Facility: HOSPITAL | Age: 75
End: 2025-04-22
Payer: MEDICARE

## 2025-04-23 ENCOUNTER — CLINICAL SUPPORT (OUTPATIENT)
Dept: CARDIOLOGY | Facility: HOSPITAL | Age: 75
End: 2025-04-23
Attending: INTERNAL MEDICINE
Payer: MEDICARE

## 2025-04-23 ENCOUNTER — CLINICAL SUPPORT (OUTPATIENT)
Dept: CARDIOLOGY | Facility: HOSPITAL | Age: 75
End: 2025-04-23
Payer: MEDICARE

## 2025-04-23 DIAGNOSIS — I48.0 PAROXYSMAL ATRIAL FIBRILLATION: ICD-10-CM

## 2025-04-23 DIAGNOSIS — I48.20 CHRONIC ATRIAL FIBRILLATION, UNSPECIFIED: ICD-10-CM

## 2025-04-23 PROCEDURE — 93298 REM INTERROG DEV EVAL SCRMS: CPT | Mod: 26,,, | Performed by: INTERNAL MEDICINE

## 2025-04-23 PROCEDURE — 93298 REM INTERROG DEV EVAL SCRMS: CPT | Performed by: INTERNAL MEDICINE

## 2025-04-29 ENCOUNTER — PATIENT MESSAGE (OUTPATIENT)
Dept: NEPHROLOGY | Facility: CLINIC | Age: 75
End: 2025-04-29
Payer: MEDICARE

## 2025-04-29 LAB
OHS CV AF BURDEN PERCENT: < 1
OHS CV DC REMOTE DEVICE TYPE: NORMAL

## 2025-05-01 ENCOUNTER — OFFICE VISIT (OUTPATIENT)
Dept: INTERNAL MEDICINE | Facility: CLINIC | Age: 75
End: 2025-05-01
Payer: MEDICARE

## 2025-05-01 VITALS
HEIGHT: 61 IN | WEIGHT: 166.25 LBS | HEART RATE: 66 BPM | DIASTOLIC BLOOD PRESSURE: 86 MMHG | OXYGEN SATURATION: 97 % | BODY MASS INDEX: 31.39 KG/M2 | SYSTOLIC BLOOD PRESSURE: 134 MMHG

## 2025-05-01 DIAGNOSIS — I50.22 HEART FAILURE WITH MILDLY REDUCED EJECTION FRACTION: ICD-10-CM

## 2025-05-01 DIAGNOSIS — I10 ESSENTIAL HYPERTENSION: Chronic | ICD-10-CM

## 2025-05-01 DIAGNOSIS — I48.0 PAROXYSMAL ATRIAL FIBRILLATION: ICD-10-CM

## 2025-05-01 DIAGNOSIS — E03.8 SUBCLINICAL HYPOTHYROIDISM: ICD-10-CM

## 2025-05-01 DIAGNOSIS — Z00.00 ENCOUNTER FOR MEDICARE ANNUAL WELLNESS EXAM: Primary | ICD-10-CM

## 2025-05-01 DIAGNOSIS — I70.0 AORTIC ATHEROSCLEROSIS: ICD-10-CM

## 2025-05-01 DIAGNOSIS — N18.32 STAGE 3B CHRONIC KIDNEY DISEASE: ICD-10-CM

## 2025-05-01 PROCEDURE — 99999 PR PBB SHADOW E&M-EST. PATIENT-LVL III: CPT | Mod: PBBFAC,,, | Performed by: NURSE PRACTITIONER

## 2025-05-01 NOTE — PROGRESS NOTES
"  Rajesh Mas presented for a follow-up Medicare AWV today. The following components were reviewed and updated:    Medical history  Family History  Social history  Allergies and Current Medications  Health Risk Assessment  Health Maintenance  Care Team    **See Completed Assessments for Annual Wellness visit with in the encounter summary    The following assessments were completed:  Depression Screening  Cognitive function Screening  Timed Get Up Test  Whisper Test      Opioid documentation:      Patient does not have a current opioid prescription.          Vitals:    05/01/25 0903   BP: 134/86   BP Location: Left arm   Patient Position: Sitting   Pulse: 66   SpO2: 97%   Weight: 75.4 kg (166 lb 3.6 oz)   Height: 5' 1" (1.549 m)     Body mass index is 31.41 kg/m².       Physical Exam  Constitutional:       General: She is not in acute distress.     Appearance: Normal appearance. She is not ill-appearing, toxic-appearing or diaphoretic.   HENT:      Head: Normocephalic and atraumatic.      Nose: Nose normal.      Mouth/Throat:      Mouth: Mucous membranes are moist.      Pharynx: Oropharynx is clear.   Eyes:      Pupils: Pupils are equal, round, and reactive to light.   Cardiovascular:      Rate and Rhythm: Normal rate and regular rhythm.   Pulmonary:      Effort: Pulmonary effort is normal.      Breath sounds: Normal breath sounds.   Abdominal:      General: Abdomen is flat.      Palpations: Abdomen is soft.   Musculoskeletal:      Cervical back: Neck supple.   Skin:     General: Skin is warm and dry.   Neurological:      Mental Status: She is alert and oriented to person, place, and time.   Psychiatric:         Mood and Affect: Mood normal.         Behavior: Behavior normal.       Diagnoses and health risks identified today and associated recommendations/orders:  1. Encounter for Medicare annual wellness exam  Here for Health Risk Assessment/Annual Wellness Visit. Health maintenance reviewed and updated. Follow " up in one year.    - Referral to Enhanced Annual Wellness Visit (eAWV) M+2    2. Essential hypertension  Problem is stable. Will continue current management. Follow up with PCP       3. Paroxysmal atrial fibrillation  Problem is stable. Will continue current management. Follow up with PCP/EP/cardiology       4. Aortic atherosclerosis  Problem is stable. Will continue current management. Follow up with PCP      5. Heart failure with mildly reduced ejection fraction  Problem is stable. Will continue current management. Follow up with PCP/cardiology         6. Stage 3b chronic kidney disease  Problem is stable. Will continue current management. Follow up with PCP/nephrology       7. BMI 31.0-31.9,adult  Problem is stable. Will continue current management. Follow up with PCP      8. Subclinical hypothyroidism  Problem is stable. Will continue current management. Follow up with PCP/nephrology         Provided [place] with a 5-10 year written screening schedule and personal prevention plan. Recommendations were developed using the USPSTF age appropriate recommendations. Education, counseling, and referrals were provided as needed.  After Visit Summary printed and given to patient which includes a list of additional screenings\tests needed.        Andrew Vasquez, ROSEMARY      I offered to discuss advanced care planning, including how to pick a person who would make decisions for you if you were unable to make them for yourself, called a health care power of , and what kind of decisions you might make such as use of life sustaining treatments such as ventilators and tube feeding when faced with a life limiting illness recorded on a living will that they will need to know. (How you want to be cared for as you near the end of your natural life)     X Patient is interested in learning more about how to make advanced directives.  I provided them paperwork and offered to discuss this with them.

## 2025-05-01 NOTE — PATIENT INSTRUCTIONS
Counseling and Referral of Other Preventative  (Italic type indicates deductible and co-insurance are waived)    Patient Name: Rajesh Mas  Today's Date: 5/1/2025    Health Maintenance       Date Due Completion Date    Mammogram 03/18/2025 3/18/2024    Override on 10/1/2015: Done    Annual UACr 02/05/2026 2/5/2025    High Dose Statin 04/21/2026 4/21/2025    DEXA Scan 05/10/2026 5/10/2024    TETANUS VACCINE 11/02/2026 11/2/2016    Colorectal Cancer Screening 05/17/2027 5/17/2022    Override on 12/6/2016: Done    Lipid Panel 02/05/2030 2/5/2025        No orders of the defined types were placed in this encounter.    The following information is provided to all patients.  This information is to help you find resources for any of the problems found today that may be affecting your health:                  Living healthy guide: www.[facility].[place]      Understanding Diabetes: www.diabetes.org      Eating healthy: www.cdc.gov/healthyweight      CDC home safety checklist: www.cdc.gov/steadi/patient.html      Agency on Aging: www.goea.[place]      Alcoholics anonymous (AA): www.aa.org      Physical Activity: www.ilir.nih.gov/qx6kxiw      Tobacco use: www.quitwithusla.org

## 2025-05-05 ENCOUNTER — LAB VISIT (OUTPATIENT)
Dept: LAB | Facility: HOSPITAL | Age: 75
End: 2025-05-05
Attending: NURSE PRACTITIONER
Payer: MEDICARE

## 2025-05-05 DIAGNOSIS — E87.5 HYPERKALEMIA: ICD-10-CM

## 2025-05-05 LAB — POTASSIUM SERPL-SCNC: 5.1 MMOL/L (ref 3.5–5.1)

## 2025-05-05 PROCEDURE — 36415 COLL VENOUS BLD VENIPUNCTURE: CPT | Mod: PN

## 2025-05-05 PROCEDURE — 84132 ASSAY OF SERUM POTASSIUM: CPT

## 2025-05-08 ENCOUNTER — RESULTS FOLLOW-UP (OUTPATIENT)
Dept: NEPHROLOGY | Facility: CLINIC | Age: 75
End: 2025-05-08

## 2025-05-28 ENCOUNTER — CLINICAL SUPPORT (OUTPATIENT)
Dept: CARDIOLOGY | Facility: HOSPITAL | Age: 75
End: 2025-05-28
Payer: MEDICARE

## 2025-05-28 ENCOUNTER — CLINICAL SUPPORT (OUTPATIENT)
Dept: CARDIOLOGY | Facility: HOSPITAL | Age: 75
End: 2025-05-28
Attending: INTERNAL MEDICINE
Payer: MEDICARE

## 2025-05-28 DIAGNOSIS — I48.0 PAROXYSMAL ATRIAL FIBRILLATION: ICD-10-CM

## 2025-05-28 DIAGNOSIS — I48.20 CHRONIC ATRIAL FIBRILLATION, UNSPECIFIED: ICD-10-CM

## 2025-05-28 PROCEDURE — 93298 REM INTERROG DEV EVAL SCRMS: CPT | Performed by: INTERNAL MEDICINE

## 2025-05-28 PROCEDURE — 93298 REM INTERROG DEV EVAL SCRMS: CPT | Mod: 26,,, | Performed by: INTERNAL MEDICINE

## 2025-05-29 LAB
OHS CV AF BURDEN PERCENT: < 1
OHS CV DC REMOTE DEVICE TYPE: NORMAL

## 2025-06-01 ENCOUNTER — PATIENT MESSAGE (OUTPATIENT)
Dept: NEPHROLOGY | Facility: CLINIC | Age: 75
End: 2025-06-01
Payer: MEDICARE

## 2025-06-02 DIAGNOSIS — N39.0 UTI (URINARY TRACT INFECTION), UNCOMPLICATED: Primary | ICD-10-CM

## 2025-06-03 ENCOUNTER — RESULTS FOLLOW-UP (OUTPATIENT)
Dept: NEPHROLOGY | Facility: CLINIC | Age: 75
End: 2025-06-03

## 2025-06-10 ENCOUNTER — HOSPITAL ENCOUNTER (OUTPATIENT)
Dept: RADIOLOGY | Facility: HOSPITAL | Age: 75
Discharge: HOME | End: 2025-06-10
Attending: NURSE PRACTITIONER
Payer: MEDICARE

## 2025-06-10 DIAGNOSIS — R10.9 FLANK PAIN: ICD-10-CM

## 2025-06-10 PROCEDURE — 76770 US EXAM ABDO BACK WALL COMP: CPT | Mod: TC

## 2025-06-10 PROCEDURE — 76770 US EXAM ABDO BACK WALL COMP: CPT | Mod: 26,,, | Performed by: STUDENT IN AN ORGANIZED HEALTH CARE EDUCATION/TRAINING PROGRAM

## 2025-06-18 ENCOUNTER — OFFICE VISIT (OUTPATIENT)
Dept: UROLOGY | Facility: CLINIC | Age: 75
End: 2025-06-18
Payer: MEDICARE

## 2025-06-18 ENCOUNTER — LAB VISIT (OUTPATIENT)
Dept: LAB | Facility: HOSPITAL | Age: 75
End: 2025-06-18
Attending: INTERNAL MEDICINE
Payer: MEDICARE

## 2025-06-18 VITALS
WEIGHT: 163.38 LBS | DIASTOLIC BLOOD PRESSURE: 85 MMHG | HEART RATE: 55 BPM | SYSTOLIC BLOOD PRESSURE: 155 MMHG | BODY MASS INDEX: 30.87 KG/M2

## 2025-06-18 DIAGNOSIS — R31.0 GROSS HEMATURIA: Primary | ICD-10-CM

## 2025-06-18 DIAGNOSIS — N39.0 UTI (URINARY TRACT INFECTION), UNCOMPLICATED: ICD-10-CM

## 2025-06-18 DIAGNOSIS — R31.0 GROSS HEMATURIA: ICD-10-CM

## 2025-06-18 LAB
CREAT SERPL-MCNC: 1.4 MG/DL (ref 0.5–1.4)
GFR SERPLBLD CREATININE-BSD FMLA CKD-EPI: 40 ML/MIN/1.73/M2

## 2025-06-18 PROCEDURE — 99204 OFFICE O/P NEW MOD 45 MIN: CPT | Mod: S$GLB,,,

## 2025-06-18 PROCEDURE — 3008F BODY MASS INDEX DOCD: CPT | Mod: CPTII,S$GLB,,

## 2025-06-18 PROCEDURE — 99999 PR PBB SHADOW E&M-EST. PATIENT-LVL IV: CPT | Mod: PBBFAC,,,

## 2025-06-18 PROCEDURE — 1160F RVW MEDS BY RX/DR IN RCRD: CPT | Mod: CPTII,S$GLB,,

## 2025-06-18 PROCEDURE — 3288F FALL RISK ASSESSMENT DOCD: CPT | Mod: CPTII,S$GLB,,

## 2025-06-18 PROCEDURE — 1101F PT FALLS ASSESS-DOCD LE1/YR: CPT | Mod: CPTII,S$GLB,,

## 2025-06-18 PROCEDURE — 82565 ASSAY OF CREATININE: CPT

## 2025-06-18 PROCEDURE — G2211 COMPLEX E/M VISIT ADD ON: HCPCS | Mod: S$GLB,,,

## 2025-06-18 PROCEDURE — 36415 COLL VENOUS BLD VENIPUNCTURE: CPT

## 2025-06-18 PROCEDURE — 1125F AMNT PAIN NOTED PAIN PRSNT: CPT | Mod: CPTII,S$GLB,,

## 2025-06-18 PROCEDURE — 1159F MED LIST DOCD IN RCRD: CPT | Mod: CPTII,S$GLB,,

## 2025-06-18 PROCEDURE — 3077F SYST BP >= 140 MM HG: CPT | Mod: CPTII,S$GLB,,

## 2025-06-18 PROCEDURE — 3066F NEPHROPATHY DOC TX: CPT | Mod: CPTII,S$GLB,,

## 2025-06-18 PROCEDURE — 3079F DIAST BP 80-89 MM HG: CPT | Mod: CPTII,S$GLB,,

## 2025-06-18 PROCEDURE — 4010F ACE/ARB THERAPY RXD/TAKEN: CPT | Mod: CPTII,S$GLB,,

## 2025-06-18 NOTE — PROGRESS NOTES
[facility]      New Gross Hematuria Note    6/18/2025    Referred by:  Barbra Woodard NP    History of Present Illness    CHIEF COMPLAINT:  Patient presents today for evaluation of recurrent urinary tract infections and gross hematuria.     GENITOURINARY:  She experienced hematuria on June 2nd which was self-confirmed visually. She reports internal flank pain. She was prescribed antibiotics by nephrology NP for this episode. She reports symptoms associated with UTIs are suprapubic pain/pressure and hematuria. She denies any symptoms today. There are no cultures available for review.     SOCIAL HISTORY:  She tried smoking once at age 20 but experienced nausea and vomiting, leading to immediate cessation. She had second-hand smoke exposure from living with smoking spouse for 3 years.        A review of 10+ systems was conducted with pertinent positive and negative findings documented in HPI with all other systems reviewed and negative.    Past medical, family, surgical and social history reviewed as documented in chart with pertinent positive medical, family, surgical and social history detailed in HPI.    Exam Findings:    Const: no acute distress, conversant and alert  Eyes: anicteric, extraocular muscles intact  ENMT: normocephalic, Nl oral membranes  Cardio: no cyanosis, nl cap refill  Pulm: no tachypnea; no resp distress  Abd: soft, no tenderness  Musc: no laceration, no tenderness  Neuro: alert; oriented x 3  Skin: warm, dry; no petichiae  Psych: no anxiety; normal speech       Assessment/Plan:    Assessment & Plan    IMPRESSION:  - Evaluated recurrent UTIs.  - Clarified UTI diagnosis requires symptoms, not just UA results.  - Investigating visible hematuria reported by patient on June 2nd.  - Considered limited smoking history (1 day) and secondhand smoke exposure (3 years).  - Assessed flank pain reported to Barbra in April.    GROSS HEMATURIA:  1. Explained importance of thorough  workup for visible hematuria, including ruling out urinary tract cancers.  2. Explained complementary roles of CT urogram and cystoscopy in hematuria evaluation.  3. Ordered CT urogram to rule out renal cancer and other urinary tract malignancies.  4. Ordered cystoscopy to directly visualize bladder and detect potential issues not visible on CT.    URINARY TRACT INFECTION:  1. Explained difference between asymptomatic bacteriuria and symptomatic UTI.  2. Discussed common UTI symptoms: urgency, incontinence, frequency, burning, hematuria.  3. Contact the office if UTI symptoms develop for catheterized urine culture.    FOLLOW-UP:  1. Ordered Lab work: Updated creatinine to be checked before CT.  2. Stop by lab for blood draw.       I spent a total of 45 minutes on the day of the visit.This includes face to face time and non-face to face time preparing to see the patient (eg, review of tests), obtaining and/or reviewing separately obtained history, documenting clinical information in the electronic or other health record, independently interpreting results and communicating results to the patient/family/caregiver, or care coordinator.     Visit complexity today is associated with medical care services that are part of the ongoing care related to the single serious and/or complex condition of Recurrent urinary tract infections (Abhijeet). A longitudinal relationship exists or is being developed between the patient and this practitioner for the care of this condition.

## 2025-07-02 ENCOUNTER — CLINICAL SUPPORT (OUTPATIENT)
Dept: CARDIOLOGY | Facility: HOSPITAL | Age: 75
End: 2025-07-02
Payer: MEDICARE

## 2025-07-02 ENCOUNTER — HOSPITAL ENCOUNTER (OUTPATIENT)
Dept: RADIOLOGY | Facility: HOSPITAL | Age: 75
Discharge: HOME | End: 2025-07-02
Payer: MEDICARE

## 2025-07-02 ENCOUNTER — CLINICAL SUPPORT (OUTPATIENT)
Dept: CARDIOLOGY | Facility: HOSPITAL | Age: 75
End: 2025-07-02
Attending: INTERNAL MEDICINE
Payer: MEDICARE

## 2025-07-02 DIAGNOSIS — I48.20 CHRONIC ATRIAL FIBRILLATION, UNSPECIFIED: ICD-10-CM

## 2025-07-02 DIAGNOSIS — R31.0 GROSS HEMATURIA: ICD-10-CM

## 2025-07-02 DIAGNOSIS — I48.0 PAROXYSMAL ATRIAL FIBRILLATION: ICD-10-CM

## 2025-07-02 PROCEDURE — 74178 CT ABD&PLV WO CNTR FLWD CNTR: CPT | Mod: 26,,, | Performed by: RADIOLOGY

## 2025-07-02 PROCEDURE — 74178 CT ABD&PLV WO CNTR FLWD CNTR: CPT | Mod: TC

## 2025-07-02 PROCEDURE — 93298 REM INTERROG DEV EVAL SCRMS: CPT | Performed by: INTERNAL MEDICINE

## 2025-07-02 PROCEDURE — 25500020 PHARM REV CODE 255

## 2025-07-02 PROCEDURE — 93298 REM INTERROG DEV EVAL SCRMS: CPT | Mod: 26,,, | Performed by: INTERNAL MEDICINE

## 2025-07-02 RX ADMIN — IOHEXOL 100 ML: 350 INJECTION, SOLUTION INTRAVENOUS at 08:07

## 2025-07-07 ENCOUNTER — TELEPHONE (OUTPATIENT)
Dept: UROLOGY | Facility: CLINIC | Age: 75
End: 2025-07-07
Payer: MEDICARE

## 2025-07-08 ENCOUNTER — TELEPHONE (OUTPATIENT)
Dept: UROLOGY | Facility: CLINIC | Age: 75
End: 2025-07-08
Payer: MEDICARE

## 2025-07-08 ENCOUNTER — PROCEDURE VISIT (OUTPATIENT)
Facility: CLINIC | Age: 75
End: 2025-07-08
Payer: MEDICARE

## 2025-07-08 VITALS
WEIGHT: 163.13 LBS | HEART RATE: 59 BPM | DIASTOLIC BLOOD PRESSURE: 91 MMHG | SYSTOLIC BLOOD PRESSURE: 156 MMHG | RESPIRATION RATE: 18 BRPM | TEMPERATURE: 98 F | BODY MASS INDEX: 30.83 KG/M2

## 2025-07-08 DIAGNOSIS — R31.0 GROSS HEMATURIA: ICD-10-CM

## 2025-07-08 DIAGNOSIS — N39.0 UTI (URINARY TRACT INFECTION), UNCOMPLICATED: Primary | ICD-10-CM

## 2025-07-08 DIAGNOSIS — N32.9 LESION OF BLADDER: Primary | ICD-10-CM

## 2025-07-08 PROCEDURE — 52000 CYSTOURETHROSCOPY: CPT | Mod: S$GLB,,, | Performed by: UROLOGY

## 2025-07-08 PROCEDURE — 99214 OFFICE O/P EST MOD 30 MIN: CPT | Mod: 25,S$GLB,, | Performed by: UROLOGY

## 2025-07-08 RX ORDER — LIDOCAINE HYDROCHLORIDE 20 MG/ML
JELLY TOPICAL
Status: COMPLETED | OUTPATIENT
Start: 2025-07-08 | End: 2025-07-08

## 2025-07-08 RX ADMIN — LIDOCAINE HYDROCHLORIDE: 20 JELLY TOPICAL at 08:07

## 2025-07-08 NOTE — PROGRESS NOTES
Office Cystourethroscopy Note    Date: 7/8/2025   Referring Provider: Deedee Walls NP     Reason for Cystoscopy: Gross Hematuria    Upper Tract Evaluation:   CT Urogram: Normal in size and location. Nonspecific mild bilateral perinephric stranding.  No hydronephrosis or nephrolithiasis. No ureteral dilatation.    Procedure Details: Informed consent was obtained and she was sterily prepped and 1% lidocaine jelly was injected per urethra. A flexible cystoscope was inserted into the bladder via the urethra. There was no evidence of stricture, stenosis, lesions, other obstruction, or diverticulum. Significant findings included a normal unobstructed urethra only. Cystoscopic examination of the bladder revealed orthotopically positioned, normal bilateral ureteral orifices with clear yellow urine effluxing from each orifice. All mucosal surfaces were examined with no apparent stones, tumors, foreign bodies, erythema, trabeculation, diverticula, or ulcers. She did have 3-4 inflammatory lesions at the bladder base and trigone. The procedure was concluded without complications. The patient was not administered a post-procedure antibiotic.     Specimens: No Specimens    Findings: Inflammatory Lesions    Other Findings:  PVR - 0 mL    Pelvic Exam:  No Pelvic Exam Repeated Today     Assesment and Plan:   Gross Hematuria: No obvious concerning etiology identified on cystoscopy today or upper urinary tract imaging. If gross hematuria persists, I would consider cystoscopy at the time of gross hematuria episode to attempt to identify source of bleeding or if bleeding lateralizes to either ureter or possible bilateral ureteroscopy.       In addition to cystoscopy evaluation for Gross Hematuria, the patient was additionally evaluated for finding of endometrial thickening and recurrent urinary tract infections.     Urology Return Visit     07/08/2025     Referred by:  Barbra Woodard NP    CHIEF COMPLAINT:  Patient presents  today for evaluation of recurrent urinary tract infections and gross hematuria.      GENITOURINARY:  She experienced hematuria on June 2nd which was self-confirmed visually. She reports internal flank pain. She was prescribed antibiotics by nephrology NP for this episode. She reports symptoms associated with UTIs are suprapubic pain/pressure and hematuria. She denies any symptoms today. There are no cultures available for review.     Recent CT scan shows:   Endometrial thickening.  Pelvic ultrasound and gyn evaluation suggested.      Patient has no culture-confirmed urinary tract infections in our system with most recent urine culture in 2022.        A review of 10+ systems was conducted with pertinent positive and negative findings documented in HPI with all other systems reviewed and negative.     Past medical, family, surgical and social history reviewed as documented in chart with pertinent positive medical, family, surgical and social history detailed in HPI.     Exam Findings:     Const: no acute distress, conversant and alert  Eyes: anicteric, extraocular muscles intact  ENMT: normocephalic, Nl oral membranes  Cardio: no cyanosis, nl cap refill  Pulm: no tachypnea; no resp distress  Abd: soft, no tenderness  Musc: no laceration, no tenderness  Neuro: alert; oriented x 3  Skin: warm, dry; no petichiae  Psych: no anxiety; normal speech         Assessment/Plan:     RECURRENT URINARY TRACT INFECTIONS  - Evaluated recurrent UTIs.  - Clarified UTI diagnosis requires symptoms and positive urine culture, not just UA results.  1. Will plan to fulgurate lesions seen on cystoscopy today.      ENDOMETRIAL THICKENING  1. Recommended referral to GYN for evaluation of incidental finding on CT Urogram    In addition to the procedural service provided today, the patients condition required a significant, separately identified E/M service above and beyond the service provided including the usual preoperative and postoperative  care associated with the procedure that was performed. This additional service was in addition to the normal assessment and explanation of the problem area treated by the service and follow up after the procedure. This E/M service was associated with a separate diagnosis, specifically Recurrent urinary tract infections (Abhijeet) and endometrial thickening.

## 2025-07-08 NOTE — PATIENT INSTRUCTIONS
What to Expect After a Cystoscopy  For the next 24-48 hours, you may feel a mild burning when you urinate. This burning is normal and expected. Drink plenty of water to dilute the urine to help relieve the burning sensation. You may also see a small amount of blood in your urine after the procedure.    Unless you are already taking antibiotics, you may be given an antibiotic after the test to prevent infection.    Signs and Symptoms to Report  Call the [facility] at [phone] if you develop any of the following:  Fever of 101 degrees or higher  Chills or persistent bleeding  Inability to urinate

## 2025-07-14 DIAGNOSIS — N18.32 STAGE 3B CHRONIC KIDNEY DISEASE: Primary | ICD-10-CM

## 2025-07-17 ENCOUNTER — LAB VISIT (OUTPATIENT)
Dept: LAB | Facility: HOSPITAL | Age: 75
End: 2025-07-17
Attending: NURSE PRACTITIONER
Payer: MEDICARE

## 2025-07-17 DIAGNOSIS — N18.32 STAGE 3B CHRONIC KIDNEY DISEASE: ICD-10-CM

## 2025-07-17 LAB
ABSOLUTE EOSINOPHIL (OHS): 0.11 K/UL
ABSOLUTE MONOCYTE (OHS): 0.63 K/UL (ref 0.3–1)
ABSOLUTE NEUTROPHIL COUNT (OHS): 4.25 K/UL (ref 1.8–7.7)
ALBUMIN SERPL BCP-MCNC: 3.8 G/DL (ref 3.5–5.2)
ANION GAP (OHS): 8 MMOL/L (ref 8–16)
BASOPHILS # BLD AUTO: 0.07 K/UL
BASOPHILS NFR BLD AUTO: 0.9 %
BUN SERPL-MCNC: 32 MG/DL (ref 8–23)
CALCIUM SERPL-MCNC: 9.3 MG/DL (ref 8.7–10.5)
CHLORIDE SERPL-SCNC: 109 MMOL/L (ref 95–110)
CO2 SERPL-SCNC: 26 MMOL/L (ref 23–29)
CREAT SERPL-MCNC: 1.6 MG/DL (ref 0.5–1.4)
ERYTHROCYTE [DISTWIDTH] IN BLOOD BY AUTOMATED COUNT: 14.3 % (ref 11.5–14.5)
GFR SERPLBLD CREATININE-BSD FMLA CKD-EPI: 34 ML/MIN/1.73/M2
GLUCOSE SERPL-MCNC: 111 MG/DL (ref 70–110)
HCT VFR BLD AUTO: 41.8 % (ref 37–48.5)
HGB BLD-MCNC: 13 GM/DL (ref 12–16)
IMM GRANULOCYTES # BLD AUTO: 0.01 K/UL (ref 0–0.04)
IMM GRANULOCYTES NFR BLD AUTO: 0.1 % (ref 0–0.5)
LYMPHOCYTES # BLD AUTO: 2.43 K/UL (ref 1–4.8)
MCH RBC QN AUTO: 28.4 PG (ref 27–31)
MCHC RBC AUTO-ENTMCNC: 31.1 G/DL (ref 32–36)
MCV RBC AUTO: 92 FL (ref 82–98)
NUCLEATED RBC (/100WBC) (OHS): 0 /100 WBC
PHOSPHATE SERPL-MCNC: 3 MG/DL (ref 2.7–4.5)
PLATELET # BLD AUTO: 130 K/UL (ref 150–450)
PMV BLD AUTO: ABNORMAL FL
POTASSIUM SERPL-SCNC: 5.1 MMOL/L (ref 3.5–5.1)
RBC # BLD AUTO: 4.57 M/UL (ref 4–5.4)
RELATIVE EOSINOPHIL (OHS): 1.5 %
RELATIVE LYMPHOCYTE (OHS): 32.4 % (ref 18–48)
RELATIVE MONOCYTE (OHS): 8.4 % (ref 4–15)
RELATIVE NEUTROPHIL (OHS): 56.7 % (ref 38–73)
SODIUM SERPL-SCNC: 143 MMOL/L (ref 136–145)
WBC # BLD AUTO: 7.5 K/UL (ref 3.9–12.7)

## 2025-07-17 PROCEDURE — 36415 COLL VENOUS BLD VENIPUNCTURE: CPT | Mod: PN

## 2025-07-17 PROCEDURE — 80069 RENAL FUNCTION PANEL: CPT

## 2025-07-17 PROCEDURE — 85025 COMPLETE CBC W/AUTO DIFF WBC: CPT

## 2025-07-18 ENCOUNTER — OFFICE VISIT (OUTPATIENT)
Dept: NEPHROLOGY | Facility: CLINIC | Age: 75
End: 2025-07-18
Payer: MEDICARE

## 2025-07-18 VITALS
BODY MASS INDEX: 31.66 KG/M2 | HEART RATE: 51 BPM | OXYGEN SATURATION: 95 % | DIASTOLIC BLOOD PRESSURE: 90 MMHG | WEIGHT: 167.56 LBS | SYSTOLIC BLOOD PRESSURE: 174 MMHG

## 2025-07-18 DIAGNOSIS — E87.5 HYPERKALEMIA: ICD-10-CM

## 2025-07-18 DIAGNOSIS — E79.0 HYPERURICEMIA: ICD-10-CM

## 2025-07-18 DIAGNOSIS — I10 ESSENTIAL HYPERTENSION: ICD-10-CM

## 2025-07-18 DIAGNOSIS — R76.8 POSITIVE ANA (ANTINUCLEAR ANTIBODY): ICD-10-CM

## 2025-07-18 DIAGNOSIS — N18.32 STAGE 3B CHRONIC KIDNEY DISEASE: Primary | ICD-10-CM

## 2025-07-18 DIAGNOSIS — R80.9 PROTEINURIA, UNSPECIFIED TYPE: ICD-10-CM

## 2025-07-18 DIAGNOSIS — I50.22 HEART FAILURE WITH MILDLY REDUCED EJECTION FRACTION: ICD-10-CM

## 2025-07-18 DIAGNOSIS — N25.81 SECONDARY HYPERPARATHYROIDISM OF RENAL ORIGIN: ICD-10-CM

## 2025-07-18 DIAGNOSIS — I51.89 DIASTOLIC DYSFUNCTION: ICD-10-CM

## 2025-07-18 LAB
OHS CV AF BURDEN PERCENT: < 1
OHS CV DC REMOTE DEVICE TYPE: NORMAL

## 2025-07-18 PROCEDURE — 99999 PR PBB SHADOW E&M-EST. PATIENT-LVL III: CPT | Mod: PBBFAC,,, | Performed by: NURSE PRACTITIONER

## 2025-07-18 NOTE — Clinical Note
Good afternoon Dr. Villarreal,  I saw Ms. Mas today for nephrology follow-up. She has been experiencing right flank discomfort for some time now. I repeated a retroperitoneal US recently which was unremarkable. She is following with Urology for recurrent UTI, but she denies urinary symptoms at this time. I requested that she follow up with you for further evaluation.   Thank you,  Noni

## 2025-07-18 NOTE — PROGRESS NOTES
Subjective:       Patient ID: Rajesh Mas is a 74 y.o. Black or  female who presents for evaluation of renal dysfunction.    HPI     Patient is new to me. No prior nephrologist.  Prior pertinent chart reviewed since this is patient's first appointment with me.    Patient presents for new evaluation of CKD 3b.  Baseline creatinine of ~1-1.3 since . Now trending up to 1.5--> 1.9 on last labs.     Significant other medical problems include HTN (reports dx ), diastolic dysfunction, HLD.  The patient denies taking NSAIDs, herbal supplements, or new antibiotics, recreational drugs, recent episode of dehydration, diarrhea, nausea or vomiting, acute illness, hospitalization or exposure to IV radiocontrast. She reports start atorvastatin at the beginning of July and stopped 2 weeks later due to myalgias. She drinks about 40 oz of water per day. She has been spending more time outside with increased perspiration. Bps at home range 126-130s/60. She denies recent gout flares and has been maintained on allopurinol 300mg qd. Uric acid trended up to 9.9 in . Reports crawfish intake during this time.     Significant family hx includes:   Paternal Grandfather ()    Paternal Grandmother ()    Maternal Grandmother () Colon cancer     Breast cancer   Maternal Grandfather ()    Father ( at age 60)    Mother ( at age 79) Diabetes    Heart failure    Hypertension    Heart disease    Kidney disease    Brother Other    Brother ()    Brother ()    Sister No Known Problems   Daughter No Known Problems   Daughter No Known Problems   Son No Known Problems   Son No Known Problems   Maternal Uncle Liver cancer       Last renal US: None available , reviewed.    Update 10/10/23:  - Presents for follow-up of CKD  - Now following with rheumatology. Allopurinol increased to 350mg qd.   - Denies gout flares or new issues today.     Update 24:  - Presents  for follow-up of CKD. sCr 1.2.  - S/p admission in January for palpitations where she underwent RFA for PVCs complicated by moderate pericardial effusion requiring emergency pericardiocentesis (250cc drained). Admission further complicated by acute resp failure, A fib with RVR requiring cardizem gtt (converted to NSR), and diastolic dysfunction requiring diuresis. Treated for CAP as well.    - Admitted February 2024 for left pleural effusion requiring thoracentesis   - She is doing well. Has not required home O2.     Update 8/5/24:   - Presents for follow-up of CKD. sCr 1.3.   - Took a PRN lasix last Thursday for swelling which has resolved.    Update 2/25/25:  - Presents for CKD. Cr 1.2  - Entresto stopped due to high cost. Now on Valsartan. She has noticed her BP trending up. Range today is about the same as where she is at home. Repeat BP in office 172/92.   - No other complaints. Asymptomatic.     Update 4/15/25:  - Follow-up of HTN and CKD. Cr 1.4.   - BP 4/9/25: 147/82,  /77 on Friday morning, some readings before and after medications   - Notes dysuria and mild right flank pain. No hematuria.     7/18/25:  - Presents for follow-up of CKD. Cr 1.6.  - BP is elevated today which she attributes to stress with parking and coming to appt. SBP was in the 150s prior to morning medications with BP reading of 112/74 after her medications.  - She continues with right flank pain. Denies urinary symptoms or other complaints. Repeat retroperitoneal US unremarkable.   - Drinks 2L of water per day    Review of Systems   Genitourinary:  Positive for flank pain. Negative for difficulty urinating, dysuria and hematuria.   All other systems reviewed and are negative.      Objective:       Blood pressure (!) 174/90, pulse (!) 51, weight 76 kg (167 lb 8.8 oz), last menstrual period 11/28/1988, SpO2 95%. Repeat 148/84  Physical Exam  Vitals reviewed.   Constitutional:       General: She is not in acute distress.      Appearance: Normal appearance.   HENT:      Head: Normocephalic and atraumatic.   Eyes:      General: No scleral icterus.  Cardiovascular:      Rate and Rhythm: Normal rate.   Pulmonary:      Effort: Pulmonary effort is normal. No respiratory distress.   Musculoskeletal:      Right lower leg: No edema.      Left lower leg: No edema.   Skin:     General: Skin is warm and dry.   Neurological:      Mental Status: She is alert and oriented to person, place, and time.   Psychiatric:         Mood and Affect: Mood normal.         Behavior: Behavior normal.         Lab Results   Component Value Date    CREATININE 1.6 (H) 07/17/2025    URICACID 4.0 06/03/2024     Urine Protein/Creatinine Ratio   Date Value Ref Range Status   07/17/2025 0.12 <=0.20 Final   04/10/2025 0.16 <=0.20 Final     Prot/Creat Ratio, Urine   Date Value Ref Range Status   02/05/2025 0.65 (H) 0.00 - 0.20 Final   02/05/2025 0.65 (H) 0.00 - 0.20 Final   08/01/2024 0.12 0.00 - 0.20 Final     Lab Results   Component Value Date     07/17/2025    K 5.1 07/17/2025    CO2 26 07/17/2025     07/17/2025     Lab Results   Component Value Date    PTH 79.1 (H) 02/05/2025    CALCIUM 9.3 07/17/2025    PHOS 3.0 07/17/2025     Lab Results   Component Value Date    HGB 13.0 07/17/2025    WBC 7.50 07/17/2025    HCT 41.8 07/17/2025      Lab Results   Component Value Date    HGBA1C 5.2 01/13/2023     (L) 07/17/2025    BUN 32 (H) 07/17/2025     Lab Results   Component Value Date    LDLCALC 46.2 (L) 02/05/2025         Assessment:       1. Stage 3b chronic kidney disease    2. Secondary hyperparathyroidism of renal origin    3. Hyperuricemia    4. Positive MICHELLE (antinuclear antibody)    5. Essential hypertension    6. Diastolic dysfunction    7. Proteinuria, unspecified type    8. Hyperkalemia    9. Heart failure with mildly reduced ejection fraction              Plan:   CKD stage 3b  -  - Current baseline Cr ~1.2-1.4. Trending up to 1.6 on last labs. Unclear  etiology. Possible progression. Repeat labs.    - CKD clinically related to longstanding HTN +/- uric acid nephropathy  - Continue adequate hydration. Avoid NSAIDs. Monitor BP at home.     UPCR Improving. Now maintained on ARB and MRA.    Acid-base WNL   Renal osteodystrophy PTH elevated in CKD. Vit D low but improved. Ca and phos controlled.   ergo qwk   Anemia WNL   DM No history   Lipid Management Myalgias with statin. CPK okay.    ESRD planning Provided education about the stages of CKD, usual progression, and need to monitor for and treat CKD-related disease in an effort to delay progression of CKD.     Defer advanced planning for now.      HTN - Elevated today, but controlled at home. Reports stress with parking.   - Improving on valsartan (entresto stopped due to cost), metoprolol, lasix 40 PRN, spironolactone 25 qd  - Continue home monitoring of BP after medications     Hyperuricemia  - Allopurinol 350mg qd  - Following with rheumatology   - No recent gout flares  - Discussed low purine diet previously.     Positive MICHELLE  - per rheumatology    HFrEF/ diastolic dysfunction - now on Valsartan, lasix PRN. Defer to cardiology.  - Added spironolactone as above    Hyperkalemia - Normalized. Mild uptrend with addition of spironolactone. Low K diet discussed and handout given.     Recurrent UTI - following with Urology.    All questions patient had were answered.  Asked if further questions. None. F/u in clinic 3 months with labs and urine prior to next visit or sooner if needed.  ER for emergency concerns.    Summary of Plan:  - Repeat BMP in 2 weeks - may need to adjust BP medications if no improvement in renal function    Visit today included increased complexity associated with the care of the patient due to the serious and/or complex managed problem(s) CKD 3, HTN, hyperkalemia, combined CHF, hyperuricemia, recurrent UTI, secondary hyperparathyroidism, proteinuria.

## 2025-07-24 ENCOUNTER — TELEPHONE (OUTPATIENT)
Dept: CARDIOLOGY | Facility: CLINIC | Age: 75
End: 2025-07-24
Payer: MEDICARE

## 2025-07-24 NOTE — TELEPHONE ENCOUNTER
----- Message from Diana sent at 7/24/2025  8:57 AM CDT -----  Regarding: Clearance  Patient calling to get medical clearance, schedule for a procedure on 7/31. Please call back @ [phone]. Thank you Diana

## 2025-07-24 NOTE — PRE-PROCEDURE INSTRUCTIONS
Patient reviewed-needs cardiology clearance. Patient was last seen 7/14/2024 and was to follow up in 4 months. Patient has loop recorded implanted.     Spoke with patient who verb understanding card clearance is needed. Patient will reach out to Dr. Arenas's office to schedule and PAT nurse will send a message to card office to assist patient in scheduling.

## 2025-07-25 ENCOUNTER — OFFICE VISIT (OUTPATIENT)
Dept: CARDIOLOGY | Facility: CLINIC | Age: 75
End: 2025-07-25
Payer: MEDICARE

## 2025-07-25 VITALS
HEIGHT: 61 IN | HEART RATE: 51 BPM | BODY MASS INDEX: 31.34 KG/M2 | OXYGEN SATURATION: 97 % | DIASTOLIC BLOOD PRESSURE: 77 MMHG | WEIGHT: 166 LBS | SYSTOLIC BLOOD PRESSURE: 149 MMHG

## 2025-07-25 DIAGNOSIS — I50.22 HEART FAILURE WITH MILDLY REDUCED EJECTION FRACTION: ICD-10-CM

## 2025-07-25 DIAGNOSIS — I48.91 ATRIAL FIBRILLATION, UNSPECIFIED TYPE: ICD-10-CM

## 2025-07-25 DIAGNOSIS — Z01.810 PREOP CARDIOVASCULAR EXAM: Primary | ICD-10-CM

## 2025-07-25 DIAGNOSIS — I10 ESSENTIAL HYPERTENSION: Chronic | ICD-10-CM

## 2025-07-25 DIAGNOSIS — I49.3 PVCS (PREMATURE VENTRICULAR CONTRACTIONS): ICD-10-CM

## 2025-07-25 DIAGNOSIS — Z86.79 STATUS POST RADIOFREQUENCY ABLATION (RFA) OPERATION FOR ARRHYTHMIA: ICD-10-CM

## 2025-07-25 DIAGNOSIS — I70.0 AORTIC ATHEROSCLEROSIS: ICD-10-CM

## 2025-07-25 DIAGNOSIS — Z98.890 STATUS POST RADIOFREQUENCY ABLATION (RFA) OPERATION FOR ARRHYTHMIA: ICD-10-CM

## 2025-07-25 PROCEDURE — 99999 PR PBB SHADOW E&M-EST. PATIENT-LVL IV: CPT | Mod: PBBFAC,,,

## 2025-07-25 RX ORDER — FUROSEMIDE 40 MG/1
40 TABLET ORAL DAILY PRN
Qty: 30 TABLET | Refills: 11 | Status: SHIPPED | OUTPATIENT
Start: 2025-07-25 | End: 2026-07-25

## 2025-07-25 NOTE — PROGRESS NOTES
General Cardiology Clinic Note  Reason for Visit: Pre-op  Last Clinic Visit: 07/15/24   General Cardiologist: Dr. Arenas     HPI:     Rajesh Mas is a 74 y.o. , who presents for pre-operative examination     PROBLEM LIST:  HFrEF (35%>50%)  PVC s/p ablation 1/18/2024  complicated by pericardial effusion   - follows with Dr. Ellison EP  Paroxysmal afib  - s/p ILR  HTN  CKD 3  Gout    Interval HPI:   Rajesh Mas presents today accompanied by her  for cardiac risk stratification in anticipation of upcoming cystoscopy with biopsy that is tentatively scheduled for 7/31 with Dr. Sosa. She reports doing well overall from a cardiac standpoint since her last clinic visit, patient denies any new cardiovascular complaints. Her blood pressure reading today in clinic, she reports home BP readings ranging in the 130-140's systolic. She does not exercise regularly but stays active walking. She reports no cardiac restrictions or limitations with activity. She reports no increased swelling to BLE, has not required the use of lasix in quite some time. She denies chest pain/pressure/tightness/discomfort, dyspnea on exertion, orthopnea, PND, peripheral edema, palpitations, syncope or claudication.    ROS:    Pertinent ROS included in HPI and below.  PMH:     Past Medical History:   Diagnosis Date    Allergy     seasonal    Diverticulitis     Fever blister     Hypertension     Personal history of colonic polyps      Past Surgical History:   Procedure Laterality Date    ABLATION N/A 1/18/2024    Procedure: Ablation;  Surgeon: Santi Ellison MD;  Location: [facility] EP LAB;  Service: Cardiology;  Laterality: N/A;  PVC, RFA, PEPE, anes, MB, 3 Prep,*prepare to map LVOT and RVOT*    CHOLECYSTECTOMY  04/26/2017    COLON SURGERY      COLONOSCOPY N/A 12/06/2016    Procedure: COLONOSCOPY;  Surgeon: Fidel Mason MD;  Location: [facility] ENDO ([address] [place] [address]);  Service: Endoscopy;  Laterality: N/A;    COLONOSCOPY N/A 05/17/2022     "Procedure: COLONOSCOPY;  Surgeon: RUSSELL Rolon MD;  Location: [facility] ENDO ([facility] FLR);  Service: Endoscopy;  Laterality: N/A;  fully vaccinated, prep instr portal -ml    INSERTION OF IMPLANTABLE LOOP RECORDER N/A 4/5/2024    Procedure: Insertion, Implantable Loop Recorder;  Surgeon: Santi Ellison MD;  Location: [facility] EP LAB;  Service: Cardiology;  Laterality: N/A;  a fib, ILR, BSCI, Local, MB, 3 Prep    partial colectomy  1997    sigmoid removed due to diverticulitis    PERICARDIOCENTESIS N/A 1/18/2024    Procedure: Pericardiocentesis;  Surgeon: Santi Ellison MD;  Location: [facility] EP LAB;  Service: Cardiology;  Laterality: N/A;    SINUS SURGERY  2007    SMALL INTESTINE SURGERY  June 1997@ [facility]    Partial Sigm Colon /Divaticulitus     Allergies:     Review of patient's allergies indicates:   Allergen Reactions    Adhesive Rash    Nickel sutures [surgical stainless steel] Itching and Rash     Medications:   Medications Ordered Prior to Encounter[1]  Social History:     Social History     Tobacco Use    Smoking status: Never    Smokeless tobacco: Never   Substance Use Topics    Alcohol use: No     Family History:     Family History   Problem Relation Name Age of Onset    Colon cancer Maternal Grandmother          colon cancer    Breast cancer Maternal Grandmother      Diabetes Mother Lia Post     Heart failure Mother Lia Post     Hypertension Mother Lia Post     Heart disease Mother Lia Post     Kidney disease Mother Lia Post         Dialysis    Other Brother          prostate issue    No Known Problems Sister      No Known Problems Daughter      No Known Problems Daughter      No Known Problems Son      No Known Problems Son      Liver cancer Maternal Uncle      Melanoma Neg Hx      Ovarian cancer Neg Hx      Cancer Neg Hx       Physical Exam:   BP (!) 149/77 (Patient Position: Sitting)   Pulse (!) 51   Ht 5' 1" (1.549 m)   Wt 75.3 kg (166 lb 0.1 oz)   LMP 11/28/1988 " (Approximate)   SpO2 97%   BMI 31.37 kg/m²      Physical Exam  Constitutional:       General: She is not in acute distress.     Appearance: Normal appearance. She is obese. She is not ill-appearing or toxic-appearing.   HENT:      Head: Normocephalic and atraumatic.      Nose: No congestion or rhinorrhea.      Mouth/Throat:      Mouth: Mucous membranes are moist.      Pharynx: Oropharynx is clear. No oropharyngeal exudate or posterior oropharyngeal erythema.   Eyes:      Extraocular Movements: Extraocular movements intact.      Conjunctiva/sclera: Conjunctivae normal.   Neck:      Vascular: No carotid bruit.   Cardiovascular:      Rate and Rhythm: Normal rate and regular rhythm.      Pulses: Normal pulses.           Carotid pulses are 2+ on the right side and 2+ on the left side.       Radial pulses are 2+ on the right side and 2+ on the left side.        Dorsalis pedis pulses are 2+ on the right side and 2+ on the left side.        Posterior tibial pulses are 2+ on the right side and 2+ on the left side.      Heart sounds: Normal heart sounds. No murmur heard.     No gallop.   Pulmonary:      Effort: Pulmonary effort is normal. No respiratory distress.      Breath sounds: Normal breath sounds. No stridor. No wheezing or rales.   Musculoskeletal:         General: No swelling. Normal range of motion.      Cervical back: Normal range of motion. No rigidity or tenderness.      Right lower leg: No edema.      Left lower leg: No edema.   Skin:     General: Skin is warm and dry.      Coloration: Skin is not jaundiced.      Findings: No bruising or lesion.   Neurological:      General: No focal deficit present.      Mental Status: She is alert and oriented to person, place, and time. Mental status is at baseline.      Cranial Nerves: No cranial nerve deficit.      Motor: No weakness.      Gait: Gait normal.   Psychiatric:         Mood and Affect: Mood normal.         Behavior: Behavior normal.         Thought Content:  Thought content normal.         Judgment: Judgment normal.        Labs:     Blood Tests:  Lab Results   Component Value Date    BNP 47 03/07/2024     07/17/2025     03/11/2025    K 5.1 07/17/2025    K 5.0 03/11/2025     07/17/2025     03/11/2025    CO2 26 07/17/2025    CO2 26 03/11/2025    BUN 32 (H) 07/17/2025    CREATININE 1.6 (H) 07/17/2025     (H) 07/17/2025     03/11/2025    HGBA1C 5.2 01/13/2023    MG 2.2 03/01/2024    AST 23 02/05/2025    ALT 19 02/05/2025    ALBUMIN 3.8 07/17/2025    ALBUMIN 3.7 02/05/2025    ALBUMIN 3.7 02/05/2025    PROT 8.2 02/05/2025    BILITOT 0.7 02/05/2025    WBC 7.50 07/17/2025    HGB 13.0 07/17/2025    HGB 13.8 02/05/2025    HCT 41.8 07/17/2025    HCT 43.1 02/05/2025    MCV 92 07/17/2025    MCV 92 02/05/2025     (L) 07/17/2025     (L) 02/05/2025    INR 1.1 02/03/2024    TSH 3.418 02/20/2024       Lab Results   Component Value Date    CHOL 127 02/05/2025    CHOL 174 03/15/2017    HDL 62 02/05/2025    TRIG 94 02/05/2025       Lab Results   Component Value Date    LDLCALC 46.2 (L) 02/05/2025       Lab Results   Component Value Date    TSH 3.418 02/20/2024       Lab Results   Component Value Date    HGBA1C 5.2 01/13/2023     Imaging:     Echocardiogram  TTE 06/17/24    Left Ventricle: The left ventricle is normal in size. Mildly increased ventricular mass. Mildly increased wall thickness. There is mild concentric hypertrophy. Regional wall motion abnormalities present. See diagram for wall motion findings. There is  low normal to mildly reduced systolic function. Ejection fraction by visual approximation is 50% ( mid 40s to low  50s). There is normal diastolic function.    Right Ventricle: Normal right ventricular cavity size. Wall thickness is normal. Systolic function is borderline low.    Tricuspid Valve: There is mild regurgitation.    Pulmonary Artery: The estimated pulmonary artery systolic pressure is 20 mmHg.    IVC/SVC: Normal  venous pressure at 3 mmHg.    TTE 02/20/24    Left Ventricle: Global hypokinesis present. There is moderately reduced systolic function with a visually estimated ejection fraction of 35 - 40%. Biplane (2D) method of discs ejection fraction is 41%. Global longitudinal strain is -11.0%. Grade I diastolic dysfunction.    Right Ventricle: Normal right ventricular cavity size. Wall thickness is normal. Right ventricle wall motion  is normal. Systolic function is normal.    Left Atrium: Left atrium is mildly dilated. There is an aneurysm along the interatrial septum.    Aortic Valve: The aortic valve is a trileaflet valve. There is mild aortic valve sclerosis.    Pulmonary Artery: The estimated pulmonary artery systolic pressure is 25 mmHg.    IVC/SVC: Normal venous pressure at 3 mmHg.    Pericardium: There is a trivial circumferential effusion.    TTE 02/03/24    Left Ventricle: The left ventricle is normal in size. Normal wall thickness. Global hypokinesis present. There is moderately reduced systolic function. Ejection fraction by visual approximation is 40%.    Right Ventricle: Normal right ventricular cavity size. Wall thickness is normal. Right ventricle wall motion  is normal. Systolic function is mildly reduced.    Pulmonary Artery: The estimated pulmonary artery systolic pressure is 25 mmHg.    IVC/SVC: Normal venous pressure at 3 mmHg.  The IVC is small in caliber and collapses fully with inspiration.    Bilateral pleural effusions.  The left pleural effusion is large.    There is likely some pericardial thickening and pericardial fat, but there is no evidence of significant pericardial effusion    TTE 01/22/24    Irregular rhyhm with tachycardia up to 138 bpm.    Left Ventricle: The left ventricle is normal in size. Normal wall thickness. Normal wall motion. There is moderately reduced systolic function. Ejection fraction by visual approximation is 35%. Unable to assess diastolic function due to atrial  fibrillation.    Right Ventricle: Normal right ventricular cavity size. Wall thickness is normal. Right ventricle wall motion  is normal. Systolic function is normal.    Pulmonary Artery: The estimated pulmonary artery systolic pressure is 20 mmHg.    IVC/SVC: Normal venous pressure at 3 mmHg.    TTE 01/19/24    Left Ventricle: The left ventricle is normal in size. Normal wall thickness. Normal wall motion. There is low normal systolic function with a visually estimated ejection fraction of 50 - 55%.    Right Ventricle: Normal right ventricular cavity size. Wall thickness is normal. Right ventricle wall motion  is normal. Systolic function is borderline low.    IVC/SVC: Normal venous pressure at 3 mmHg.    Pericardium: There is a trivial pericardial effusion. Left pleural effusion.    Limited TTE for pericardial effusion evaluation.    Overall the study quality was technically difficult, off axis views and poor endocardial visualization    TTE 01/18/24    2D echo only.    Left Ventricle: The left ventricle is normal in size. Ventricular mass is normal. Normal wall thickness. Normal wall motion. There is low normal systolic function with a visually estimated ejection fraction of 50 - 55%. There is normal diastolic function.    Right Ventricle: Normal right ventricular cavity size. Wall thickness is normal. Right ventricle wall motion  is normal. Systolic function is normal.    Aortic Valve: The aortic valve is a trileaflet valve. There is mild aortic valve sclerosis. There is mild annular calcification present.    Aorta: Aortic root is normal in size measuring 2.20 cm. Ascending aorta is normal measuring 2.99 cm.    IVC/SVC: Normal venous pressure at 3 mmHg.    Pericardium: There is no pericardial effusion.    Verall the study quality was technically difficult    Stress testing  None    Cath Lab  Ablation 01/18/24    Posterior/septal RVOT PVC source    PVC ablation.    3D mapping performed with Ensite.    Case  terminated early due to pericardial effusion, IC performed emergent pericardiocentesis with removal of <250 cc venous blood. Pericardial drain left in place with plans to observe overnight in the ICU. See IC note for pericardiocentesis    Other  Event monitor 24  At baseline, in the absence of symptoms, the rhythm was atrial fibrillation at 139 beats per minute. AFib was also present on the next activation on the same day, but on the next day (), sinus rhythm was present at 67 beats per minute. There were 5 other activations, each of which showed sinus rhythm. One of these was symptomatic (shortness of breath). Sinus rhythm with PVCs was seen with a heart rate of 85 beats per minute.     24 hr Holter 23    The predominant rhythm is sinus.     Sinus rhythm average HR 77 bpm.  PVC burden 11.2%.  No symptoms reported.    EK25:   Sinus bradycardia at 57 bpm   T wave abnormality, consider lateral ischemia   Abnormal ECG   When compared with ECG of 2024 07:57,   Premature supraventricular complexes are no longer Present     Assessment:     1. Preop cardiovascular exam    2. Heart failure with mildly reduced ejection fraction    3. Essential hypertension    4. Aortic atherosclerosis    5. Atrial fibrillation, unspecified type    6. Status post radiofrequency ablation (RFA) operation for arrhythmia    7. PVCs (premature ventricular contractions)    8. BMI 31.0-31.9,adult        Plan:     Preop cardiovascular exam  RCRI score of 1 is consistent with 6.0% zhen-operative risk of major adverse cardiac event (cardiac death, nonfatal MI, nonfatal cardiac arrest). Pt has no active cardiac condition (ACS/USA, decompenstated CHF, significant arrhythmias or severe valvular disease) and can easily achieve 4 METS.  As such, pt does not require further cardiac evaluation prior to undergoing surgery.  Pt should remain on beta-blockers throughout the entire zhen-procedure time period.  Patient is  currently not on any antiplatelets or anticoagulants. The remaining cardiac meds can be held as needed but should also be restarted after the procedure. These recommendations follow the most current Guideline on Perioperative Cardiovascular Evaluation and Management of Patients Undergoing Noncardiac Surgery released by the ACC/AHA (JACC 2014.07.944).    Heart failure with mildly reduced ejection fraction  TTE 6/24 with mildly reduced EF 50%. PASP 20 mmHg, IVC 3 mmHg, patient euvolemic and well compensated during my examination today, no signs of fluid or volume overload   Continue lasix 40 mg prn for leg swelling   Continue Valsartan 80 mg BID   Continue Spironolactone 25 mg qd  Continue metoprolol 25 mg qd   Order for repeat TTE placed -- schedule at earliest convenience     Essential hypertension  Blood pressure readings well controlled, no changes to medications today   Continue Valsartan 80 mg BID   Continue Spironolactone 25 mg qd  Low sodium diet and exercise encouraged     Aortic atherosclerosis  LDL well controlled and at goal   Continue rosuvastatin 5 mg qd     Atrial fibrillation, unspecified type  Status post radiofrequency ablation (RFA) operation for arrhythmia  PVCs (premature ventricular contractions)  Stable, no recurrent episodes of AF s/p ablation   In NSR today   Continue metoprolol as above     BMI 31.0-31.9,adult  Weight loss through a combination of diet and exercise encouraged  Recommend 150 minutes a week (30 minutes per day, 5 days per week) of moderate intensity exercise    Follow up in one year     Signed:  Sharon Lagos, PA-C [facility]     7/25/2025 8:19 AM    Follow-up:     Future Appointments   Date Time Provider Department Center   8/4/2025  8:00 AM ECHO, [facility] [place]   8/8/2025 10:15 AM [facility]   9/9/2025 10:00 AM Jose R Sosa MD [facility] [place]   10/16/2025  9:00 AM Barbra Woodard NP [facility] [place]    10/20/2025  9:30 AM Gail Villarreal MD [facility] PC[facility]              [1]   Current Outpatient Medications on File Prior to Visit   Medication Sig Dispense Refill    allopurinoL (ZYLOPRIM) 100 MG tablet Take ½ tablet (50mg) with 300mg tablet for a total of 350 mg daily. 45 tablet 1    allopurinoL (ZYLOPRIM) 300 MG tablet Take one tablet by mouth once daily in combination with 50mg tablets for a daily dose of 350mg 90 tablet 3    metoprolol succinate (TOPROL-XL) 25 MG 24 hr tablet Take 1 tablet (25 mg total) by mouth once daily. 90 tablet 3    oxybutynin (DITROPAN) 5 MG Tab Take 1 tablet (5 mg total) by mouth 2 (two) times daily. 180 tablet 3    rosuvastatin (CRESTOR) 5 MG tablet Take 1 tablet (5 mg total) by mouth once daily. 90 tablet 3    spironolactone (ALDACTONE) 25 MG tablet Take 1 tablet (25 mg total) by mouth once daily. 30 tablet 11    valsartan (DIOVAN) 80 MG tablet Take 1 tablet (80 mg total) by mouth 2 (two) times daily. 180 tablet 3    estradioL (ESTRACE) 0.01 % (0.1 mg/gram) vaginal cream Place 1 g vaginally 3 (three) times a week. (Patient not taking: Reported on 7/25/2025) 42 g 3    [DISCONTINUED] furosemide (LASIX) 40 MG tablet Take 1 tablet (40 mg total) by mouth daily as needed (for shortness of breath, swelling or 3lb weight gain on home scale). 30 tablet 11     Current Facility-Administered Medications on File Prior to Visit   Medication Dose Route Frequency Provider Last Rate Last Admin    ceFAZolin 2 g in dextrose 5 % in water (D5W) 50 mL IVPB (MB+)  2 g Intravenous On Call Procedure Graciela Guerin, NP

## 2025-07-30 NOTE — PRE-PROCEDURE INSTRUCTIONS
Patient reviewed on 7/30/2025.  Okay to proceed at [facility]. The following pre-procedure instructions and arrival time have been reviewed with patient via phone and sent to patient portal for review.  Patient verbalized an understanding.  Pt to be accompanied by her  day of procedure as responsible .    Dear Rajehs,     You are scheduled for a procedure with Dr. Sosa on 7/31/2025. Your scheduled arrival time is 9:00 am.  This arrival time is roughly 1.5-2 hours before your anticipated procedure time to allow sufficient time for pre-op.  Please wear comfortable clothes.  This procedure will take place at the [facility] at the corner of [facility] and [facility].  It is in the [place] [facility] next to [facility].  The address is:     [address] [place] [address].  [place] [id number]     After entering the building, you will proceed to the second floor where you can check in with registration. You should take any medications that you routinely take for blood pressure (other than those listed below), heart medications, thyroid, cholesterol, etc.      If you wear contact lenses, please wear glasses to your procedure.     Your fasting instructions are as follow:  Nothing to eat after midnight the evening before your surgery. Please STOP drinking clear liquids 2 hours prior to your arrival time. Clear liquids includes water, clear juices, Gatorade, black coffee (no milk/no cream), or soda.  Clear liquids do NOT include anything with pulp or food particles (chicken broth, ice cream, yogurt, Jello, etc.).  You MUST have a responsible  to bring you home.     Medications:  The evening before and morning of your procedure, please hold the following medications:  -Aspirin and Aspirin-containing products (Goody's powder, Excedrin)  -NSAIDs (Advil, Ibuprofen, Aleve, Diclofenac)  -Vitamins/Supplements  -Herbal remedies/Teas  -Stimulants (Adderall, Vyvanse, Adipex)  -Diabetic  medication (Please bring with you day of procedure)  -Prescription creams/gels/lotions  -May take Tylenol if needed     Bathing:  The evening before and morning of your procedure, take a shower using antibacterial soap (ex: Hibiclens or Dial antibacterial soap). DO NOT apply deodorant, lotion, cologne, or anything else to the skin. Wear loose, comfortable fitting clothing. Do not wear jewelry or bring any valuables with you. If you wear dentures or contacts, please bring your case with you or leave them at home. Use and bring any inhalers that you may have.     If you have any procedure-specific questions, please call your surgeon's office. Any other questions, don't hesitate to call me at [phone].     ON THE MORNING OF SURGERY, if you experience any issues, please call our Pre-op Desk a call at [phone].     Please reply to this portal message as receipt of delivery.     Thanks,  ALESSANDRO Ortiz  Anesthesia Dept   Pre-Admit Testing Team  [facility]     normal (ped)...

## 2025-07-31 ENCOUNTER — HOSPITAL ENCOUNTER (OUTPATIENT)
Facility: HOSPITAL | Age: 75
Discharge: HOME | End: 2025-07-31
Attending: UROLOGY | Admitting: UROLOGY
Payer: MEDICARE

## 2025-07-31 ENCOUNTER — ANESTHESIA EVENT (OUTPATIENT)
Dept: SURGERY | Facility: HOSPITAL | Age: 75
End: 2025-07-31
Payer: MEDICARE

## 2025-07-31 ENCOUNTER — ANESTHESIA (OUTPATIENT)
Dept: SURGERY | Facility: HOSPITAL | Age: 75
End: 2025-07-31
Payer: MEDICARE

## 2025-07-31 VITALS
WEIGHT: 165 LBS | OXYGEN SATURATION: 98 % | BODY MASS INDEX: 31.15 KG/M2 | DIASTOLIC BLOOD PRESSURE: 78 MMHG | TEMPERATURE: 98 F | HEIGHT: 61 IN | SYSTOLIC BLOOD PRESSURE: 149 MMHG | HEART RATE: 60 BPM | RESPIRATION RATE: 19 BRPM

## 2025-07-31 DIAGNOSIS — N32.9 LESION OF BLADDER: ICD-10-CM

## 2025-07-31 DIAGNOSIS — N32.9 LESION OF URINARY BLADDER: Primary | ICD-10-CM

## 2025-07-31 PROCEDURE — 63600175 PHARM REV CODE 636 W HCPCS: Performed by: NURSE ANESTHETIST, CERTIFIED REGISTERED

## 2025-07-31 PROCEDURE — 94761 N-INVAS EAR/PLS OXIMETRY MLT: CPT

## 2025-07-31 PROCEDURE — 37000008 HC ANESTHESIA 1ST 15 MINUTES: Performed by: UROLOGY

## 2025-07-31 PROCEDURE — 63600175 PHARM REV CODE 636 W HCPCS: Performed by: UROLOGY

## 2025-07-31 PROCEDURE — 25000003 PHARM REV CODE 250: Performed by: NURSE ANESTHETIST, CERTIFIED REGISTERED

## 2025-07-31 PROCEDURE — 52224 CYSTOSCOPY AND TREATMENT: CPT | Mod: ,,, | Performed by: UROLOGY

## 2025-07-31 PROCEDURE — 71000015 HC POSTOP RECOV 1ST HR: Performed by: UROLOGY

## 2025-07-31 PROCEDURE — 71000033 HC RECOVERY, INTIAL HOUR: Performed by: UROLOGY

## 2025-07-31 PROCEDURE — 99900035 HC TECH TIME PER 15 MIN (STAT)

## 2025-07-31 PROCEDURE — 36000707: Performed by: UROLOGY

## 2025-07-31 PROCEDURE — 36000706: Performed by: UROLOGY

## 2025-07-31 PROCEDURE — 25000003 PHARM REV CODE 250: Performed by: UROLOGY

## 2025-07-31 PROCEDURE — 37000009 HC ANESTHESIA EA ADD 15 MINS: Performed by: UROLOGY

## 2025-07-31 RX ORDER — FENTANYL CITRATE 50 UG/ML
INJECTION, SOLUTION INTRAMUSCULAR; INTRAVENOUS
Status: DISCONTINUED | OUTPATIENT
Start: 2025-07-31 | End: 2025-07-31

## 2025-07-31 RX ORDER — CEFAZOLIN 2 G/1
2 INJECTION, POWDER, FOR SOLUTION INTRAMUSCULAR; INTRAVENOUS ONCE
Status: COMPLETED | OUTPATIENT
Start: 2025-07-31 | End: 2025-07-31

## 2025-07-31 RX ORDER — MIDAZOLAM HYDROCHLORIDE 1 MG/ML
INJECTION INTRAMUSCULAR; INTRAVENOUS
Status: DISCONTINUED | OUTPATIENT
Start: 2025-07-31 | End: 2025-07-31

## 2025-07-31 RX ORDER — LIDOCAINE HYDROCHLORIDE 20 MG/ML
INJECTION INTRAVENOUS
Status: DISCONTINUED | OUTPATIENT
Start: 2025-07-31 | End: 2025-07-31

## 2025-07-31 RX ORDER — PROPOFOL 10 MG/ML
VIAL (ML) INTRAVENOUS
Status: DISCONTINUED | OUTPATIENT
Start: 2025-07-31 | End: 2025-07-31

## 2025-07-31 RX ORDER — FAMOTIDINE 10 MG/ML
INJECTION, SOLUTION INTRAVENOUS
Status: DISCONTINUED | OUTPATIENT
Start: 2025-07-31 | End: 2025-07-31

## 2025-07-31 RX ORDER — PHENYLEPHRINE HYDROCHLORIDE 10 MG/ML
INJECTION INTRAVENOUS
Status: DISCONTINUED | OUTPATIENT
Start: 2025-07-31 | End: 2025-07-31

## 2025-07-31 RX ORDER — SODIUM CHLORIDE 9 MG/ML
INJECTION, SOLUTION INTRAVENOUS CONTINUOUS
Status: DISCONTINUED | OUTPATIENT
Start: 2025-07-31 | End: 2025-07-31 | Stop reason: HOSPADM

## 2025-07-31 RX ORDER — ONDANSETRON HYDROCHLORIDE 2 MG/ML
INJECTION, SOLUTION INTRAVENOUS
Status: DISCONTINUED | OUTPATIENT
Start: 2025-07-31 | End: 2025-07-31

## 2025-07-31 RX ADMIN — LIDOCAINE HYDROCHLORIDE 100 MG: 20 INJECTION INTRAVENOUS at 10:07

## 2025-07-31 RX ADMIN — GLYCOPYRROLATE 0.1 MG: 0.2 INJECTION, SOLUTION INTRAMUSCULAR; INTRAVENOUS at 10:07

## 2025-07-31 RX ADMIN — PHENYLEPHRINE HYDROCHLORIDE 50 MCG: 10 INJECTION INTRAVENOUS at 10:07

## 2025-07-31 RX ADMIN — CEFAZOLIN 2 G: 2 INJECTION, POWDER, FOR SOLUTION INTRAMUSCULAR; INTRAVENOUS at 10:07

## 2025-07-31 RX ADMIN — SODIUM CHLORIDE: 9 INJECTION, SOLUTION INTRAVENOUS at 09:07

## 2025-07-31 RX ADMIN — PROPOFOL 50 MCG/KG/MIN: 10 INJECTION, EMULSION INTRAVENOUS at 10:07

## 2025-07-31 RX ADMIN — PROPOFOL 20 MG: 10 INJECTION, EMULSION INTRAVENOUS at 10:07

## 2025-07-31 RX ADMIN — SODIUM CHLORIDE: 0.9 INJECTION, SOLUTION INTRAVENOUS at 09:07

## 2025-07-31 RX ADMIN — ONDANSETRON 4 MG: 2 INJECTION INTRAMUSCULAR; INTRAVENOUS at 10:07

## 2025-07-31 RX ADMIN — MIDAZOLAM HYDROCHLORIDE 1 MG: 1 INJECTION, SOLUTION INTRAMUSCULAR; INTRAVENOUS at 09:07

## 2025-07-31 RX ADMIN — FAMOTIDINE 20 MG: 10 INJECTION, SOLUTION INTRAVENOUS at 09:07

## 2025-07-31 RX ADMIN — FENTANYL CITRATE 25 MCG: 50 INJECTION, SOLUTION INTRAMUSCULAR; INTRAVENOUS at 10:07

## 2025-07-31 NOTE — OP NOTE
Cystoscopy Operative Note  7/31/2025    Preoperative Diagnosis:   Recurrent Urinary Tract Infections  Bladder Lesion/Tumor    Postoperative Diagnosis:  Recurrent Urinary Tract Infections  Bladder Lesion/Tumor    Procedure:  Cystourethroscopy, with fulguration) or treatment of MINOR (less than 0.5 cm) lesion(s) with or without biopsy (11030)    Attending Surgeon: Jose R Sosa MD    Anesthesia: Monitored Anesthesia Care    EBL: <5 mL    Complications: None    Findings: bladder lesions (inflammatory)     Drains: None    Reason for procedure: Rajesh Mas is a very pleasant 74 y.o. female who presented with a history of recurrent urinary tract infections and several inflammatory bladder lesions noted on cystoscopy and was scheduled for cystoscopy for evaluation and management.    Procedure in Detail:  Informed consent was obtained by explaining all risks and benefits of the procedure to the patient in detail.  After informed consent, the patient was brought to the suite where Monitored Anesthesia Carewas administered prior to initiation of the invasive procedure. Appropriate perioperative antibiotics were given within 30 minutes of beginning the procedure. A formal timeout was performed prior to the procedure. The patient was gently placed in lithotomy position with all pressure points padded. Bilateral sequential compression devices were applied and activated. The patient was prepped and draped in standard fashion.    A 20-Mohawk rigid cystoscope was passed per urethra into the bladder. Inspection of the bladder demonstrated 3 inflammatory lesions of the bladder, the largest about 3-4 mm in diameter all on the bladder trigone. These lesions were fulgurated for destruction.    She tolerated the procedure well and was transferred in stable condition to the recovery room.     We will plan to see her back in 2-3 months for continued evaluation.

## 2025-07-31 NOTE — DISCHARGE INSTRUCTIONS
We will plan to see her back in 2-3 months for continued evaluation     Post Cystoscopy Instructions  Do not strain to have a bowel movement  No strenuous exercise x 7 days  No driving while you are on narcotic pain medications or if your powell  catheter is in place    What to Expect After a Cystoscopy  For the next 24-48 hours, you may feel a mild burning when you urinate. This burning is normal and expected. Drink plenty of water to dilute the urine to help relieve the burning sensation. You may also see a small amount of blood in your urine after the procedure. Do not strain to have a bowel movement. No strenuous exercise x 7 days. No driving while you are on narcotic pain medications or if your powell catheter is in place.         Signs and Symptoms to Report  Call the [facility] at [phone] if you develop any of the following:  Fever of 101 degrees or higher  Chills or persistent bleeding  Inability to urinate

## 2025-07-31 NOTE — PLAN OF CARE
Discharge instructions reviewed with patient and family member with verbalization of understanding.  PIV removed with catheter intact.  VSS.  No acute complaints of pain or discomfort.  Patient ready for discharge.

## 2025-07-31 NOTE — DISCHARGE SUMMARY
[facility]  Discharge Note  Short Stay    Procedure(s) (LRB):  CYSTOSCOPY, WITH BLADDER BIOPSY, WITH FULGURATION IF INDICATED (N/A)      OUTCOME: Patient tolerated treatment/procedure well without complication and is now ready for discharge.    DISPOSITION: Home or Self Care    FINAL DIAGNOSIS:  <principal problem not specified>    FOLLOWUP: In clinic    DISCHARGE INSTRUCTIONS:  No discharge procedures on file.     TIME SPENT ON DISCHARGE: 15 minutes

## 2025-07-31 NOTE — ANESTHESIA POSTPROCEDURE EVALUATION
Anesthesia Post Evaluation    Patient: Rajesh Mas    Procedure(s) Performed: Procedure(s) (LRB):  CYSTOSCOPY, WITH BLADDER BIOPSY, WITH FULGURATION IF INDICATED (N/A)    Final Anesthesia Type: general      Patient location during evaluation: PACU  Patient participation: Yes- Able to Participate  Level of consciousness: awake and alert  Post-procedure vital signs: reviewed and stable  Pain management: adequate  Airway patency: patent    PONV status at discharge: No PONV  Anesthetic complications: no      Cardiovascular status: blood pressure returned to baseline  Respiratory status: unassisted  Hydration status: euvolemic            Vitals Value Taken Time   /76 07/31/25 10:47   Temp 36.5 °C (97.7 °F) 07/31/25 10:35   Pulse 62 07/31/25 10:49   Resp 16 07/31/25 10:49   SpO2 97 % 07/31/25 10:49   Vitals shown include unfiled device data.      No case tracking events are documented in the log.      Pain/Vishal Score: Vishal Score: 9 (7/31/2025 10:35 AM)

## 2025-07-31 NOTE — PLAN OF CARE
Pt in preop bay 41, VSS, and IV inserted. Pt denies any open wounds on body or the use of any weight loss injections. Pt ready to roll.    Procedural consents verified with pt.

## 2025-07-31 NOTE — TRANSFER OF CARE
"Anesthesia Transfer of Care Note    Patient: Rajesh Mas    Procedure(s) Performed: Procedure(s) (LRB):  CYSTOSCOPY, WITH BLADDER BIOPSY, WITH FULGURATION IF INDICATED (N/A)    Patient location: PACU    Anesthesia Type: general    Transport from OR: Transported from OR on room air with adequate spontaneous ventilation    Post pain: adequate analgesia    Post assessment: no apparent anesthetic complications and tolerated procedure well    Post vital signs: stable    Level of consciousness: awake and alert    Nausea/Vomiting: no nausea/vomiting    Complications: none    Transfer of care protocol was followed      Last vitals: Visit Vitals  /83 (BP Location: Right arm, Patient Position: Lying)   Pulse (!) 53   Temp 36.8 °C (98.2 °F) (Temporal)   Resp 17   Ht 5' 1" (1.549 m)   Wt 74.8 kg (165 lb)   LMP 11/28/1988 (Approximate)   SpO2 100%   Breastfeeding No   BMI 31.18 kg/m²     "

## 2025-07-31 NOTE — ANESTHESIA PREPROCEDURE EVALUATION
07/31/2025  Rajesh Mas is a 74 y.o., female.      Pre-op Assessment    I have reviewed the Patient Summary Reports.     I have reviewed the Nursing Notes. I have reviewed the NPO Status.      Review of Systems  Anesthesia Hx:               Denies Personal Hx of Anesthesia complications.                    Cardiovascular:     Hypertension                                    Hypertension     Atrial Fibrillation     Renal/:  Chronic Renal Disease        Kidney Function/Disease             Endocrine:   Hypothyroidism       Hypothyroidism            Physical Exam  General: Well nourished    Airway:  Mallampati: II   Mouth Opening: Normal    Anesthesia Plan  Type of Anesthesia, risks & benefits discussed:    Anesthesia Type: Gen Natural Airway, Gen Supraglottic Airway  Informed Consent: Informed consent signed with the Patient and all parties understand the risks and agree with anesthesia plan.  All questions answered.   ASA Score: 2    Ready For Surgery From Anesthesia Perspective.   .

## 2025-08-08 ENCOUNTER — LAB VISIT (OUTPATIENT)
Dept: LAB | Facility: HOSPITAL | Age: 75
End: 2025-08-08
Attending: NURSE PRACTITIONER
Payer: MEDICARE

## 2025-08-08 DIAGNOSIS — N18.32 STAGE 3B CHRONIC KIDNEY DISEASE: ICD-10-CM

## 2025-08-08 LAB
ANION GAP (OHS): 7 MMOL/L (ref 8–16)
BUN SERPL-MCNC: 26 MG/DL (ref 8–23)
CALCIUM SERPL-MCNC: 9.4 MG/DL (ref 8.7–10.5)
CHLORIDE SERPL-SCNC: 109 MMOL/L (ref 95–110)
CO2 SERPL-SCNC: 25 MMOL/L (ref 23–29)
CREAT SERPL-MCNC: 1.4 MG/DL (ref 0.5–1.4)
GFR SERPLBLD CREATININE-BSD FMLA CKD-EPI: 40 ML/MIN/1.73/M2
GLUCOSE SERPL-MCNC: 97 MG/DL (ref 70–110)
POTASSIUM SERPL-SCNC: 5.2 MMOL/L (ref 3.5–5.1)
SODIUM SERPL-SCNC: 141 MMOL/L (ref 136–145)

## 2025-08-08 PROCEDURE — 36415 COLL VENOUS BLD VENIPUNCTURE: CPT | Mod: PN

## 2025-08-08 PROCEDURE — 80048 BASIC METABOLIC PNL TOTAL CA: CPT

## 2025-08-15 ENCOUNTER — HOSPITAL ENCOUNTER (OUTPATIENT)
Dept: CARDIOLOGY | Facility: HOSPITAL | Age: 75
Discharge: HOME | End: 2025-08-15
Payer: MEDICARE

## 2025-08-15 VITALS
HEART RATE: 58 BPM | SYSTOLIC BLOOD PRESSURE: 158 MMHG | WEIGHT: 164.88 LBS | BODY MASS INDEX: 31.13 KG/M2 | DIASTOLIC BLOOD PRESSURE: 84 MMHG | HEIGHT: 61 IN

## 2025-08-15 DIAGNOSIS — I50.22 HEART FAILURE WITH MILDLY REDUCED EJECTION FRACTION: ICD-10-CM

## 2025-08-15 LAB
AORTIC SIZE INDEX (SOV): 1.7 CM/M2
AORTIC SIZE INDEX: 1.8 CM/M2
ASCENDING AORTA: 3.2 CM
AV AREA BY CONTINUOUS VTI: 2.4 CM2
AV INDEX (PROSTH): 0.78
AV LVOT MEAN GRADIENT: 2 MMHG
AV LVOT PEAK GRADIENT: 4 MMHG
AV MEAN GRADIENT: 4 MMHG
AV PEAK GRADIENT: 6 MMHG
AV VALVE AREA BY VELOCITY RATIO: 2.6 CM²
AV VALVE AREA: 2.4 CM2
AV VELOCITY RATIO: 0.83
BSA FOR ECHO PROCEDURE: 1.79 M2
CV ECHO LV RWT: 0.29 CM
DOP CALC AO PEAK VEL: 1.2 M/S
DOP CALC AO VTI: 28.8 CM
DOP CALC LVOT AREA: 3.1 CM2
DOP CALC LVOT DIAMETER: 2 CM
DOP CALC LVOT PEAK VEL: 1 M/S
DOP CALCLVOT PEAK VEL VTI: 22.4 CM
E WAVE DECELERATION TIME: 235 MS
E/A RATIO: 0.72
E/E' RATIO: 10 M/S
ECHO EF ESTIMATED: 44 %
ECHO LV POSTERIOR WALL: 0.8 CM (ref 0.6–1.1)
FRACTIONAL SHORTENING: 34.5 % (ref 28–44)
INTERVENTRICULAR SEPTUM: 0.8 CM (ref 0.6–1.1)
IVC DIAMETER: 0.85 CM
LA MAJOR: 6.2 CM
LA MINOR: 6 CM
LA WIDTH: 4.4 CM
LEFT ATRIUM SIZE: 3.8 CM
LEFT ATRIUM VOLUME INDEX MOD: 48 ML/M2
LEFT ATRIUM VOLUME INDEX: 50 ML/M2
LEFT ATRIUM VOLUME MOD: 83 ML
LEFT ATRIUM VOLUME: 87 CM3
LEFT INTERNAL DIMENSION IN SYSTOLE: 3.6 CM (ref 2.1–4)
LEFT VENTRICLE DIASTOLIC VOLUME INDEX: 77.59 ML/M2
LEFT VENTRICLE DIASTOLIC VOLUME: 135 ML
LEFT VENTRICLE MASS INDEX: 91.9 G/M2
LEFT VENTRICLE SYSTOLIC VOLUME INDEX: 43.7 ML/M2
LEFT VENTRICLE SYSTOLIC VOLUME: 76 ML
LEFT VENTRICULAR INTERNAL DIMENSION IN DIASTOLE: 5.5 CM (ref 3.5–6)
LEFT VENTRICULAR MASS: 160 G
LV LATERAL E/E' RATIO: 8.3 M/S
LV SEPTAL E/E' RATIO: 12.5 M/S
Lab: 1.9 CM/M
Lab: 2.1 CM/M
MV A" WAVE DURATION": 102.76 MS
MV PEAK A VEL: 0.69 M/S
MV PEAK E VEL: 0.5 M/S
OHS CV CPX PATIENT HEIGHT IN: 61
OHS CV RV/LV RATIO: 0.75 CM
PISA TR MAX VEL: 2.4 M/S
PULM VEIN A" WAVE DURATION": 102.76 MS
PULM VEIN S/D RATIO: 1.8
PULMONIC VEIN PEAK A VELOCITY: 0.1 M/S
PV PEAK D VEL: 0.25 M/S
PV PEAK S VEL: 0.45 M/S
RA MAJOR: 5.01 CM
RA PRESSURE ESTIMATED: 3 MMHG
RA WIDTH: 3.81 CM
RIGHT ATRIAL AREA: 17 CM2
RIGHT VENTRICLE DIASTOLIC BASEL DIMENSION: 4.1 CM
RV TB RVSP: 5 MMHG
RV TISSUE DOPPLER FREE WALL SYSTOLIC VELOCITY 1 (APICAL 4 CHAMBER VIEW): 11.35 CM/S
SINUS: 3 CM
STJ: 2.7 CM
TDI LATERAL: 0.06 M/S
TDI SEPTAL: 0.04 M/S
TDI: 0.05 M/S
TRICUSPID ANNULAR PLANE SYSTOLIC EXCURSION: 1.6 CM
TV PEAK GRADIENT: 22 MMHG
TV REST PULMONARY ARTERY PRESSURE: 26 MMHG
Z-SCORE OF LEFT VENTRICULAR DIMENSION IN END DIASTOLE: 1.33
Z-SCORE OF LEFT VENTRICULAR DIMENSION IN END SYSTOLE: 1.49

## 2025-08-15 PROCEDURE — 93306 TTE W/DOPPLER COMPLETE: CPT

## 2025-08-15 PROCEDURE — 93306 TTE W/DOPPLER COMPLETE: CPT | Mod: 26,,, | Performed by: INTERNAL MEDICINE

## 2025-09-03 ENCOUNTER — TELEPHONE (OUTPATIENT)
Dept: UROLOGY | Facility: CLINIC | Age: 75
End: 2025-09-03
Payer: MEDICARE

## (undated) DEVICE — INTRO AGILIS MED CRL 8.5F 71CM

## (undated) DEVICE — SOL IRR WATER STRL 3000 ML

## (undated) DEVICE — PACK CYSTOSCOPY II ECLIPSE

## (undated) DEVICE — GLOVE SENSICARE PI SURG 8

## (undated) DEVICE — CUP MEDICINE GRAD STRL 2OZ

## (undated) DEVICE — KIT PERIVAC W/ STR CATH 8.3FR

## (undated) DEVICE — SCISSOR 5MMX35CM DIRECT DRIVE

## (undated) DEVICE — GUIDE WIRE AMPLATZ .035X1 MDTH

## (undated) DEVICE — BLADE SURG CARBON STEEL SZ11

## (undated) DEVICE — CLOSURE SKIN STERI STRIP 1/2X4

## (undated) DEVICE — DRESSING TRANS 4X4 TEGADERM

## (undated) DEVICE — KIT ANTIFOG

## (undated) DEVICE — PAD GROUND UNIV STYLE CORD 9IN

## (undated) DEVICE — KIT ENSITE ELECTRODE SURFACE

## (undated) DEVICE — NDL 18GA X1 1/2 REG BEVEL

## (undated) DEVICE — APPLIER CLIP ENDO LIGAMAX 5MM

## (undated) DEVICE — COVER DRAPE ACUSON STERILE

## (undated) DEVICE — KIT PROBE COVER WITH GEL

## (undated) DEVICE — NDL INJETAK ADJ TIP 35CM

## (undated) DEVICE — COVER PROBE ADHESIVE 8X72IN

## (undated) DEVICE — SHEATH HEMOSTASIS 8.5FR

## (undated) DEVICE — DRAPE PED LAP SURG 108X77IN

## (undated) DEVICE — CATH MAP BI-D HD SENSOR ENABLE

## (undated) DEVICE — SYR 10CC LUER LOCK

## (undated) DEVICE — TROCAR ENDOPATH XCEL 12X100MM

## (undated) DEVICE — DRAPE ABDOMINAL TIBURON 14X11

## (undated) DEVICE — SHEATH BRITE TIP 9F 35CM

## (undated) DEVICE — PACK LITHOTOMY I ECLIPSE

## (undated) DEVICE — INTRODUCER HEMOSTASIS 7.5F

## (undated) DEVICE — SOL NS 1000CC

## (undated) DEVICE — TRAY MINOR GEN SURG

## (undated) DEVICE — R CATH BIDIRECTIONL DF CRV 7FR

## (undated) DEVICE — SET CYSTO IRRIGATION UNIV SPIK

## (undated) DEVICE — GOWN SMARTGOWN LVL4 X-LONG XL

## (undated) DEVICE — BAG TISS RETRV MONARCH 10MM

## (undated) DEVICE — GOWN SURGICAL X-LARGE

## (undated) DEVICE — ELECTRODE REM PLYHSV RETURN 9

## (undated) DEVICE — PAD DEFIB CADENCE ADULT R2

## (undated) DEVICE — DRAPE INCISE IOBAN 2 23X17IN

## (undated) DEVICE — DRESSING MEPILEX FLEX 3X3IN

## (undated) DEVICE — TUBING HF INSUFFLATION W/ FLTR

## (undated) DEVICE — SUT MCRYL PLUS 4-0 PS2 27IN

## (undated) DEVICE — LINE PRESSURE MONITORING 96IN

## (undated) DEVICE — R CATH RESPONS QPLR JSN 6F 120

## (undated) DEVICE — ADHESIVE DERMABOND ADVANCED

## (undated) DEVICE — DRESSING TELFA STRL 4X3 LF

## (undated) DEVICE — GUIDEWIRE EMERALD 150CM PTFE

## (undated) DEVICE — PACK EP DRAPE OMC

## (undated) DEVICE — SUT 0 VICRYL / UR6 (J603)

## (undated) DEVICE — KIT MICROINTRO 4F .018X40X7CM

## (undated) DEVICE — IRRIGATOR ENDOSCOPY DISP.

## (undated) DEVICE — SEE MEDLINE ITEM 152622

## (undated) DEVICE — DRAPE STERI INSTRUMENT 1018

## (undated) DEVICE — CATH TACTICATH ABLAT BIDIR F-J

## (undated) DEVICE — WARMER DRAPE STERILE LF

## (undated) DEVICE — ADHESIVE MASTISOL VIAL 48/BX

## (undated) DEVICE — SET TUBING COOL POINT IRR

## (undated) DEVICE — TROCAR ENDOPATH XCEL 5MM 7.5CM

## (undated) DEVICE — DEVICE PERCLOSE SUT CLSR 6FR